# Patient Record
Sex: MALE | Race: WHITE | Employment: FULL TIME | URBAN - NONMETROPOLITAN AREA
[De-identification: names, ages, dates, MRNs, and addresses within clinical notes are randomized per-mention and may not be internally consistent; named-entity substitution may affect disease eponyms.]

---

## 2018-11-25 ENCOUNTER — HOSPITAL ENCOUNTER (EMERGENCY)
Age: 43
Discharge: HOME | End: 2018-11-25
Attending: EMERGENCY MEDICINE
Payer: COMMERCIAL

## 2018-11-25 ENCOUNTER — APPOINTMENT (OUTPATIENT)
Dept: CT IMAGING | Age: 43
End: 2018-11-25
Payer: COMMERCIAL

## 2018-11-25 ENCOUNTER — APPOINTMENT (OUTPATIENT)
Dept: GENERAL RADIOLOGY | Age: 43
End: 2018-11-25
Payer: COMMERCIAL

## 2018-11-25 VITALS
SYSTOLIC BLOOD PRESSURE: 142 MMHG | RESPIRATION RATE: 17 BRPM | BODY MASS INDEX: 26.68 KG/M2 | HEART RATE: 79 BPM | OXYGEN SATURATION: 98 % | WEIGHT: 170 LBS | DIASTOLIC BLOOD PRESSURE: 103 MMHG

## 2018-11-25 DIAGNOSIS — R07.89 CHEST WALL PAIN: Primary | ICD-10-CM

## 2018-11-25 LAB
ANION GAP SERPL CALCULATED.3IONS-SCNC: 13 MMOL/L (ref 9–17)
BUN BLDV-MCNC: 16 MG/DL (ref 6–20)
BUN/CREAT BLD: 23 (ref 9–20)
CALCIUM SERPL-MCNC: 8.9 MG/DL (ref 8.6–10.4)
CHLORIDE BLD-SCNC: 99 MMOL/L (ref 98–107)
CO2: 23 MMOL/L (ref 20–31)
CREAT SERPL-MCNC: 0.7 MG/DL (ref 0.7–1.2)
EKG ATRIAL RATE: 91 BPM
EKG P AXIS: 47 DEGREES
EKG P-R INTERVAL: 146 MS
EKG Q-T INTERVAL: 354 MS
EKG QRS DURATION: 88 MS
EKG QTC CALCULATION (BAZETT): 435 MS
EKG R AXIS: 65 DEGREES
EKG T AXIS: -18 DEGREES
EKG VENTRICULAR RATE: 91 BPM
GFR AFRICAN AMERICAN: >60 ML/MIN
GFR NON-AFRICAN AMERICAN: >60 ML/MIN
GFR SERPL CREATININE-BSD FRML MDRD: ABNORMAL ML/MIN/{1.73_M2}
GFR SERPL CREATININE-BSD FRML MDRD: ABNORMAL ML/MIN/{1.73_M2}
GLUCOSE BLD-MCNC: 290 MG/DL (ref 70–99)
HCT VFR BLD CALC: 39.7 % (ref 40.7–50.3)
HEMOGLOBIN: 13.9 G/DL (ref 13–17)
MCH RBC QN AUTO: 29.1 PG (ref 25.2–33.5)
MCHC RBC AUTO-ENTMCNC: 35 G/DL (ref 28.4–34.8)
MCV RBC AUTO: 83.2 FL (ref 82.6–102.9)
NRBC AUTOMATED: 0 PER 100 WBC
PDW BLD-RTO: 13.2 % (ref 11.8–14.4)
PLATELET # BLD: 268 K/UL (ref 138–453)
PMV BLD AUTO: 10 FL (ref 8.1–13.5)
POTASSIUM SERPL-SCNC: 4.1 MMOL/L (ref 3.7–5.3)
RBC # BLD: 4.77 M/UL (ref 4.21–5.77)
SODIUM BLD-SCNC: 135 MMOL/L (ref 135–144)
TROPONIN INTERP: NORMAL
TROPONIN T: <0.03 NG/ML
WBC # BLD: 10.3 K/UL (ref 3.5–11.3)

## 2018-11-25 PROCEDURE — 93005 ELECTROCARDIOGRAM TRACING: CPT

## 2018-11-25 PROCEDURE — 80048 BASIC METABOLIC PNL TOTAL CA: CPT

## 2018-11-25 PROCEDURE — 84484 ASSAY OF TROPONIN QUANT: CPT

## 2018-11-25 PROCEDURE — 36415 COLL VENOUS BLD VENIPUNCTURE: CPT

## 2018-11-25 PROCEDURE — 6360000004 HC RX CONTRAST MEDICATION: Performed by: EMERGENCY MEDICINE

## 2018-11-25 PROCEDURE — 96375 TX/PRO/DX INJ NEW DRUG ADDON: CPT

## 2018-11-25 PROCEDURE — 99285 EMERGENCY DEPT VISIT HI MDM: CPT

## 2018-11-25 PROCEDURE — 6370000000 HC RX 637 (ALT 250 FOR IP): Performed by: EMERGENCY MEDICINE

## 2018-11-25 PROCEDURE — 6360000002 HC RX W HCPCS: Performed by: EMERGENCY MEDICINE

## 2018-11-25 PROCEDURE — 96374 THER/PROPH/DIAG INJ IV PUSH: CPT

## 2018-11-25 PROCEDURE — 71260 CT THORAX DX C+: CPT

## 2018-11-25 PROCEDURE — 85027 COMPLETE CBC AUTOMATED: CPT

## 2018-11-25 PROCEDURE — 71046 X-RAY EXAM CHEST 2 VIEWS: CPT

## 2018-11-25 PROCEDURE — 96376 TX/PRO/DX INJ SAME DRUG ADON: CPT

## 2018-11-25 RX ORDER — ATORVASTATIN CALCIUM 20 MG/1
20 TABLET, FILM COATED ORAL DAILY
Status: ON HOLD | COMMUNITY
End: 2019-01-08

## 2018-11-25 RX ORDER — MORPHINE SULFATE 4 MG/ML
4 INJECTION, SOLUTION INTRAMUSCULAR; INTRAVENOUS ONCE
Status: COMPLETED | OUTPATIENT
Start: 2018-11-25 | End: 2018-11-25

## 2018-11-25 RX ORDER — ONDANSETRON 2 MG/ML
4 INJECTION INTRAMUSCULAR; INTRAVENOUS ONCE
Status: COMPLETED | OUTPATIENT
Start: 2018-11-25 | End: 2018-11-25

## 2018-11-25 RX ORDER — HYDROCODONE BITARTRATE AND ACETAMINOPHEN 5; 325 MG/1; MG/1
1 TABLET ORAL EVERY 6 HOURS PRN
Qty: 20 TABLET | Refills: 0 | Status: SHIPPED | OUTPATIENT
Start: 2018-11-25 | End: 2018-11-25 | Stop reason: ALTCHOICE

## 2018-11-25 RX ORDER — OXYCODONE HYDROCHLORIDE AND ACETAMINOPHEN 5; 325 MG/1; MG/1
1 TABLET ORAL EVERY 6 HOURS PRN
Qty: 20 TABLET | Refills: 0 | Status: SHIPPED | OUTPATIENT
Start: 2018-11-25 | End: 2018-11-30

## 2018-11-25 RX ADMIN — MORPHINE SULFATE 4 MG: 4 INJECTION, SOLUTION INTRAMUSCULAR; INTRAVENOUS at 18:37

## 2018-11-25 RX ADMIN — MORPHINE SULFATE 4 MG: 4 INJECTION, SOLUTION INTRAMUSCULAR; INTRAVENOUS at 17:21

## 2018-11-25 RX ADMIN — IOPAMIDOL 75 ML: 755 INJECTION, SOLUTION INTRAVENOUS at 19:01

## 2018-11-25 RX ADMIN — MORPHINE SULFATE 4 MG: 4 INJECTION, SOLUTION INTRAMUSCULAR; INTRAVENOUS at 19:54

## 2018-11-25 RX ADMIN — ONDANSETRON 4 MG: 2 INJECTION INTRAMUSCULAR; INTRAVENOUS at 19:53

## 2018-11-25 ASSESSMENT — ENCOUNTER SYMPTOMS
WHEEZING: 0
SHORTNESS OF BREATH: 1
NAUSEA: 1
DIARRHEA: 0
COUGH: 0
CONSTIPATION: 0
SORE THROAT: 0
RHINORRHEA: 0
ABDOMINAL DISTENTION: 0
VOMITING: 0

## 2018-11-25 ASSESSMENT — PAIN SCALES - GENERAL
PAINLEVEL_OUTOF10: 6
PAINLEVEL_OUTOF10: 8
PAINLEVEL_OUTOF10: 7
PAINLEVEL_OUTOF10: 8

## 2018-11-25 ASSESSMENT — PAIN DESCRIPTION - FREQUENCY
FREQUENCY: CONTINUOUS
FREQUENCY: CONTINUOUS

## 2018-11-25 ASSESSMENT — PAIN DESCRIPTION - PAIN TYPE
TYPE: ACUTE PAIN
TYPE: ACUTE PAIN

## 2018-11-25 ASSESSMENT — PAIN DESCRIPTION - ORIENTATION
ORIENTATION: MID
ORIENTATION: MID

## 2018-11-25 ASSESSMENT — PAIN DESCRIPTION - LOCATION
LOCATION: CHEST
LOCATION: CHEST

## 2018-11-25 ASSESSMENT — PAIN DESCRIPTION - DESCRIPTORS
DESCRIPTORS: ACHING
DESCRIPTORS: ACHING;STABBING

## 2018-11-25 ASSESSMENT — PAIN DESCRIPTION - PROGRESSION: CLINICAL_PROGRESSION: NOT CHANGED

## 2018-11-25 NOTE — ED PROVIDER NOTES
Admission medications    Medication Sig Start Date End Date Taking? Authorizing Provider   metFORMIN (GLUCOPHAGE) 500 MG tablet Take 500 mg by mouth daily   Yes Historical Provider, MD   atorvastatin (LIPITOR) 20 MG tablet Take 20 mg by mouth daily   Yes Historical Provider, MD   metoprolol tartrate (LOPRESSOR) 25 MG tablet Take 1 tablet by mouth 2 times daily  Patient taking differently: Take 50 mg by mouth 2 times daily  9/6/16  Yes Mynor Hess MD   lisinopril (PRINIVIL;ZESTRIL) 20 MG tablet Take 1 tablet by mouth daily  Patient taking differently: Take 5 mg by mouth daily  8/15/16  Yes Mynor Hess MD   aspirin 81 MG EC tablet Take 81 mg by mouth daily. Yes Historical Provider, MD   indomethacin (INDOCIN) 25 MG capsule Take 1 capsule by mouth 2 times daily 10/26/15 10/25/16  Mynor Hess MD       REVIEW OF SYSTEMS    (2-9 systems for level 4, 10 or more for level 5)      Review of Systems   Constitutional: Negative for activity change, appetite change, fatigue and fever. HENT: Negative for congestion, rhinorrhea and sore throat. Respiratory: Positive for shortness of breath. Negative for cough and wheezing. Cardiovascular: Positive for chest pain. Negative for palpitations and leg swelling. Gastrointestinal: Positive for nausea. Negative for abdominal distention, constipation, diarrhea and vomiting. Genitourinary: Negative for decreased urine volume and dysuria. Skin: Negative for rash. Neurological: Negative for dizziness, weakness, light-headedness, numbness and headaches. PHYSICAL EXAM   (up to 7 for level 4, 8 or more for level 5)     INITIAL VITALS:   /77   Pulse 85   Resp 18   Wt 170 lb (77.1 kg)   SpO2 95%   BMI 26.68 kg/m²     Physical Exam   Constitutional: He is oriented to person, place, and time. Nontoxic appearing, answering all questions appropriately no signs of distress   HENT:   Head: Normocephalic and atraumatic.    Eyes: Conjunctivae are normal. Right eye exhibits no discharge. Left eye exhibits no discharge. Cardiovascular: Normal rate, regular rhythm and normal heart sounds. Exam reveals no gallop and no friction rub. No murmur heard. Pulmonary/Chest: Effort normal and breath sounds normal. No respiratory distress. He has no wheezes. He has no rales. He exhibits tenderness. Sternotomy scar present that is haled, no surrounding erythema or edema noted, tenderness to palpation along the entire scar, no crepitus palpated. Abdominal: Soft. He exhibits no distension and no mass. There is no tenderness. There is no rebound and no guarding. Neurological: He is alert and oriented to person, place, and time. Skin: Skin is warm and dry. No rash noted. Nursing note and vitals reviewed. DIFFERENTIAL  DIAGNOSIS     Patient with signficnat chest wall pain, and did have symptoms starting after lifting something heavy, recent cabg, Plan for cardiac workup, and Analgesia, r/o ACS, v msk pain. klungs ctab.      PLAN (LABS / IMAGING / EKG):  Orders Placed This Encounter   Procedures    XR CHEST STANDARD (2 VW)    CT Chest Pulmonary Embolism W Contrast    CBC    Basic Metabolic Panel    Troponin    Telemetry monitoring    EKG 12 Lead    Insert peripheral IV       MEDICATIONS ORDERED:  Orders Placed This Encounter   Medications    morphine injection 4 mg    morphine injection 4 mg    iopamidol (ISOVUE-370) 76 % injection 75 mL       DIAGNOSTIC RESULTS / EMERGENCY DEPARTMENT COURSE / MDM     LABS:  Results for orders placed or performed during the hospital encounter of 11/25/18   CBC   Result Value Ref Range    WBC 10.3 3.5 - 11.3 k/uL    RBC 4.77 4.21 - 5.77 m/uL    Hemoglobin 13.9 13.0 - 17.0 g/dL    Hematocrit 39.7 (L) 40.7 - 50.3 %    MCV 83.2 82.6 - 102.9 fL    MCH 29.1 25.2 - 33.5 pg    MCHC 35.0 (H) 28.4 - 34.8 g/dL    RDW 13.2 11.8 - 14.4 %    Platelets 544 091 - 495 k/uL    MPV 10.0 8.1 - 13.5 fL    NRBC Automated 0.0 0.0 per

## 2018-11-26 NOTE — ED PROVIDER NOTES
8:10 PM:  Patient's EKG was compared to a previous EKG report and is unchanged. Cardiac biomarkers are not elevated. CT scan of the chest is negative for pulmonary embolism. Pain is very mechanical in character and he is placed on Percocet for this which she has taken in the past.         Summation      Patient Course:  Discharged. ED Medications administered this visit:    Medications   morphine injection 4 mg (4 mg Intravenous Given 11/25/18 1721)   morphine injection 4 mg (4 mg Intravenous Given 11/25/18 1837)   iopamidol (ISOVUE-370) 76 % injection 75 mL (75 mLs Intravenous Given 11/25/18 1901)   ondansetron (ZOFRAN) injection 4 mg (4 mg Intravenous Given 11/25/18 1953)   morphine injection 4 mg (4 mg Intravenous Given 11/25/18 1954)       New Prescriptions from this visit:    New Prescriptions    HYDROCODONE-ACETAMINOPHEN (NORCO) 5-325 MG PER TABLET    Take 1 tablet by mouth every 6 hours as needed for Pain for up to 5 days. .       Follow-up:  Keila Goodman MD  [place] [id number] [place] [id number] [phone]    Schedule an appointment as soon as possible for a visit       [address]  [phone]    As needed, If symptoms worsen        Final Impression:   1.  Chest wall pain               (Please note that portions of this note were completed with a voice recognition program.  Efforts were made to edit the dictations but occasionally words are mis-transcribed.)     Dwain Francis MD  11/25/18 2011

## 2019-01-07 ENCOUNTER — APPOINTMENT (OUTPATIENT)
Dept: GENERAL RADIOLOGY | Age: 44
DRG: 287 | End: 2019-01-07
Payer: COMMERCIAL

## 2019-01-07 ENCOUNTER — HOSPITAL ENCOUNTER (INPATIENT)
Age: 44
LOS: 1 days | Discharge: HOME | DRG: 287 | End: 2019-01-08
Attending: EMERGENCY MEDICINE | Admitting: INTERNAL MEDICINE
Payer: COMMERCIAL

## 2019-01-07 DIAGNOSIS — I50.9 MILD CONGESTIVE HEART FAILURE (HCC): ICD-10-CM

## 2019-01-07 DIAGNOSIS — I24.9 ACUTE CORONARY SYNDROME (HCC): Primary | ICD-10-CM

## 2019-01-07 LAB
ABSOLUTE EOS #: 0.22 K/UL (ref 0–0.44)
ABSOLUTE IMMATURE GRANULOCYTE: <0.03 K/UL (ref 0–0.3)
ABSOLUTE LYMPH #: 3.63 K/UL (ref 1.1–3.7)
ABSOLUTE MONO #: 1.01 K/UL (ref 0.1–1.2)
ANION GAP SERPL CALCULATED.3IONS-SCNC: 13 MMOL/L (ref 9–17)
BASOPHILS # BLD: 0 % (ref 0–2)
BASOPHILS ABSOLUTE: <0.03 K/UL (ref 0–0.2)
BUN BLDV-MCNC: 16 MG/DL (ref 6–20)
BUN/CREAT BLD: 21 (ref 9–20)
CALCIUM SERPL-MCNC: 8.8 MG/DL (ref 8.6–10.4)
CHLORIDE BLD-SCNC: 104 MMOL/L (ref 98–107)
CHOLESTEROL, FASTING: 121 MG/DL
CHOLESTEROL/HDL RATIO: 4.7
CO2: 23 MMOL/L (ref 20–31)
CREAT SERPL-MCNC: 0.78 MG/DL (ref 0.7–1.2)
DIFFERENTIAL TYPE: ABNORMAL
EOSINOPHILS RELATIVE PERCENT: 2 % (ref 1–4)
ESTIMATED AVERAGE GLUCOSE: 163 MG/DL
GFR AFRICAN AMERICAN: >60 ML/MIN
GFR NON-AFRICAN AMERICAN: >60 ML/MIN
GFR SERPL CREATININE-BSD FRML MDRD: ABNORMAL ML/MIN/{1.73_M2}
GFR SERPL CREATININE-BSD FRML MDRD: ABNORMAL ML/MIN/{1.73_M2}
GLUCOSE BLD-MCNC: 109 MG/DL (ref 70–99)
HBA1C MFR BLD: 7.3 % (ref 4.8–5.9)
HCT VFR BLD CALC: 38.2 % (ref 40.7–50.3)
HDLC SERPL-MCNC: 26 MG/DL
HEMOGLOBIN: 13.5 G/DL (ref 13–17)
IMMATURE GRANULOCYTES: 0 %
LDL CHOLESTEROL: 44 MG/DL (ref 0–130)
LYMPHOCYTES # BLD: 39 % (ref 24–43)
MCH RBC QN AUTO: 29.3 PG (ref 25.2–33.5)
MCHC RBC AUTO-ENTMCNC: 35.3 G/DL (ref 28.4–34.8)
MCV RBC AUTO: 83 FL (ref 82.6–102.9)
MONOCYTES # BLD: 11 % (ref 3–12)
NRBC AUTOMATED: 0 PER 100 WBC
PDW BLD-RTO: 13.7 % (ref 11.8–14.4)
PLATELET # BLD: 312 K/UL (ref 138–453)
PLATELET ESTIMATE: ABNORMAL
PMV BLD AUTO: 9.6 FL (ref 8.1–13.5)
POTASSIUM SERPL-SCNC: 3.8 MMOL/L (ref 3.7–5.3)
RBC # BLD: 4.6 M/UL (ref 4.21–5.77)
RBC # BLD: ABNORMAL 10*6/UL
SEG NEUTROPHILS: 48 % (ref 36–65)
SEGMENTED NEUTROPHILS ABSOLUTE COUNT: 4.39 K/UL (ref 1.5–8.1)
SODIUM BLD-SCNC: 140 MMOL/L (ref 135–144)
TRIGLYCERIDE, FASTING: 257 MG/DL
TROPONIN INTERP: NORMAL
TROPONIN INTERP: NORMAL
TROPONIN T: <0.03 NG/ML
TROPONIN T: <0.03 NG/ML
TROPONIN, HIGH SENSITIVITY: NORMAL NG/L (ref 0–22)
TROPONIN, HIGH SENSITIVITY: NORMAL NG/L (ref 0–22)
VLDLC SERPL CALC-MCNC: ABNORMAL MG/DL (ref 1–30)
WBC # BLD: 9.3 K/UL (ref 3.5–11.3)
WBC # BLD: ABNORMAL 10*3/UL

## 2019-01-07 PROCEDURE — 71045 X-RAY EXAM CHEST 1 VIEW: CPT

## 2019-01-07 PROCEDURE — 84484 ASSAY OF TROPONIN QUANT: CPT

## 2019-01-07 PROCEDURE — 96375 TX/PRO/DX INJ NEW DRUG ADDON: CPT

## 2019-01-07 PROCEDURE — 36415 COLL VENOUS BLD VENIPUNCTURE: CPT

## 2019-01-07 PROCEDURE — 83036 HEMOGLOBIN GLYCOSYLATED A1C: CPT

## 2019-01-07 PROCEDURE — 80061 LIPID PANEL: CPT

## 2019-01-07 PROCEDURE — 99285 EMERGENCY DEPT VISIT HI MDM: CPT

## 2019-01-07 PROCEDURE — 6370000000 HC RX 637 (ALT 250 FOR IP): Performed by: INTERNAL MEDICINE

## 2019-01-07 PROCEDURE — 93005 ELECTROCARDIOGRAM TRACING: CPT

## 2019-01-07 PROCEDURE — 6360000002 HC RX W HCPCS: Performed by: INTERNAL MEDICINE

## 2019-01-07 PROCEDURE — 2580000003 HC RX 258: Performed by: INTERNAL MEDICINE

## 2019-01-07 PROCEDURE — 96376 TX/PRO/DX INJ SAME DRUG ADON: CPT

## 2019-01-07 PROCEDURE — 85025 COMPLETE CBC W/AUTO DIFF WBC: CPT

## 2019-01-07 PROCEDURE — 96374 THER/PROPH/DIAG INJ IV PUSH: CPT

## 2019-01-07 PROCEDURE — 99255 IP/OBS CONSLTJ NEW/EST HI 80: CPT | Performed by: INTERNAL MEDICINE

## 2019-01-07 PROCEDURE — 80048 BASIC METABOLIC PNL TOTAL CA: CPT

## 2019-01-07 PROCEDURE — 6360000002 HC RX W HCPCS: Performed by: EMERGENCY MEDICINE

## 2019-01-07 PROCEDURE — 2000000000 HC ICU R&B

## 2019-01-07 RX ORDER — LISINOPRIL 20 MG/1
20 TABLET ORAL DAILY
Status: DISCONTINUED | OUTPATIENT
Start: 2019-01-07 | End: 2019-01-08 | Stop reason: HOSPADM

## 2019-01-07 RX ORDER — FENTANYL CITRATE 50 UG/ML
50 INJECTION, SOLUTION INTRAMUSCULAR; INTRAVENOUS ONCE
Status: COMPLETED | OUTPATIENT
Start: 2019-01-07 | End: 2019-01-07

## 2019-01-07 RX ORDER — ASPIRIN 81 MG/1
81 TABLET ORAL DAILY
Status: DISCONTINUED | OUTPATIENT
Start: 2019-01-07 | End: 2019-01-08 | Stop reason: HOSPADM

## 2019-01-07 RX ORDER — ATORVASTATIN CALCIUM 40 MG/1
40 TABLET, FILM COATED ORAL NIGHTLY
Status: DISCONTINUED | OUTPATIENT
Start: 2019-01-07 | End: 2019-01-08 | Stop reason: HOSPADM

## 2019-01-07 RX ORDER — ONDANSETRON 2 MG/ML
4 INJECTION INTRAMUSCULAR; INTRAVENOUS ONCE
Status: COMPLETED | OUTPATIENT
Start: 2019-01-07 | End: 2019-01-07

## 2019-01-07 RX ORDER — MORPHINE SULFATE 2 MG/ML
4 INJECTION, SOLUTION INTRAMUSCULAR; INTRAVENOUS ONCE
Status: COMPLETED | OUTPATIENT
Start: 2019-01-07 | End: 2019-01-07

## 2019-01-07 RX ORDER — SODIUM CHLORIDE 0.9 % (FLUSH) 0.9 %
10 SYRINGE (ML) INJECTION EVERY 12 HOURS SCHEDULED
Status: DISCONTINUED | OUTPATIENT
Start: 2019-01-07 | End: 2019-01-08 | Stop reason: HOSPADM

## 2019-01-07 RX ORDER — ACETAMINOPHEN 325 MG/1
650 TABLET ORAL EVERY 4 HOURS PRN
Status: DISCONTINUED | OUTPATIENT
Start: 2019-01-07 | End: 2019-01-08 | Stop reason: HOSPADM

## 2019-01-07 RX ORDER — ONDANSETRON 2 MG/ML
4 INJECTION INTRAMUSCULAR; INTRAVENOUS EVERY 6 HOURS PRN
Status: DISCONTINUED | OUTPATIENT
Start: 2019-01-07 | End: 2019-01-08 | Stop reason: HOSPADM

## 2019-01-07 RX ORDER — ALPRAZOLAM 0.25 MG/1
0.25 TABLET ORAL 2 TIMES DAILY PRN
Status: DISCONTINUED | OUTPATIENT
Start: 2019-01-07 | End: 2019-01-08 | Stop reason: HOSPADM

## 2019-01-07 RX ORDER — CLOPIDOGREL BISULFATE 75 MG/1
75 TABLET ORAL DAILY
Status: DISCONTINUED | OUTPATIENT
Start: 2019-01-07 | End: 2019-01-08 | Stop reason: HOSPADM

## 2019-01-07 RX ORDER — ALPRAZOLAM 0.25 MG/1
0.25 TABLET ORAL 2 TIMES DAILY PRN
COMMUNITY

## 2019-01-07 RX ORDER — FUROSEMIDE 10 MG/ML
60 INJECTION INTRAMUSCULAR; INTRAVENOUS ONCE
Status: COMPLETED | OUTPATIENT
Start: 2019-01-07 | End: 2019-01-07

## 2019-01-07 RX ORDER — ASPIRIN 81 MG/1
81 TABLET, CHEWABLE ORAL DAILY
Status: DISCONTINUED | OUTPATIENT
Start: 2019-01-07 | End: 2019-01-07

## 2019-01-07 RX ORDER — FENTANYL CITRATE 50 UG/ML
100 INJECTION, SOLUTION INTRAMUSCULAR; INTRAVENOUS ONCE
Status: COMPLETED | OUTPATIENT
Start: 2019-01-07 | End: 2019-01-07

## 2019-01-07 RX ORDER — SODIUM CHLORIDE 0.9 % (FLUSH) 0.9 %
10 SYRINGE (ML) INJECTION PRN
Status: DISCONTINUED | OUTPATIENT
Start: 2019-01-07 | End: 2019-01-08 | Stop reason: HOSPADM

## 2019-01-07 RX ORDER — KETOROLAC TROMETHAMINE 15 MG/ML
15 INJECTION, SOLUTION INTRAMUSCULAR; INTRAVENOUS ONCE
Status: COMPLETED | OUTPATIENT
Start: 2019-01-07 | End: 2019-01-07

## 2019-01-07 RX ADMIN — FUROSEMIDE 60 MG: 10 INJECTION, SOLUTION INTRAMUSCULAR; INTRAVENOUS at 13:51

## 2019-01-07 RX ADMIN — FENTANYL CITRATE 100 MCG: 50 INJECTION INTRAMUSCULAR; INTRAVENOUS at 14:45

## 2019-01-07 RX ADMIN — MORPHINE SULFATE 4 MG: 2 INJECTION, SOLUTION INTRAMUSCULAR; INTRAVENOUS at 13:51

## 2019-01-07 RX ADMIN — ENOXAPARIN SODIUM 40 MG: 40 INJECTION SUBCUTANEOUS at 19:24

## 2019-01-07 RX ADMIN — FENTANYL CITRATE 50 MCG: 50 INJECTION INTRAMUSCULAR; INTRAVENOUS at 16:47

## 2019-01-07 RX ADMIN — ACETAMINOPHEN 650 MG: 325 TABLET, FILM COATED ORAL at 20:38

## 2019-01-07 RX ADMIN — CLOPIDOGREL BISULFATE 75 MG: 75 TABLET ORAL at 19:24

## 2019-01-07 RX ADMIN — Medication 10 ML: at 20:39

## 2019-01-07 RX ADMIN — METOPROLOL TARTRATE 25 MG: 25 TABLET ORAL at 20:38

## 2019-01-07 RX ADMIN — ATORVASTATIN CALCIUM 40 MG: 40 TABLET, FILM COATED ORAL at 20:37

## 2019-01-07 RX ADMIN — ONDANSETRON 4 MG: 2 INJECTION INTRAMUSCULAR; INTRAVENOUS at 14:45

## 2019-01-07 RX ADMIN — ALPRAZOLAM 0.25 MG: 0.25 TABLET ORAL at 20:38

## 2019-01-07 RX ADMIN — KETOROLAC TROMETHAMINE 15 MG: 15 INJECTION INTRAMUSCULAR; INTRAVENOUS at 16:47

## 2019-01-07 ASSESSMENT — ENCOUNTER SYMPTOMS
DIARRHEA: 0
ABDOMINAL PAIN: 0
SHORTNESS OF BREATH: 1
CONSTIPATION: 0
ABDOMINAL DISTENTION: 0
BACK PAIN: 0
WHEEZING: 0
VOMITING: 0
COUGH: 0
COLOR CHANGE: 0
NAUSEA: 0

## 2019-01-07 ASSESSMENT — PAIN DESCRIPTION - LOCATION
LOCATION: CHEST
LOCATION_2: HEAD
LOCATION: CHEST
LOCATION: CHEST
LOCATION: HEAD
LOCATION: CHEST

## 2019-01-07 ASSESSMENT — PAIN DESCRIPTION - FREQUENCY
FREQUENCY: CONTINUOUS

## 2019-01-07 ASSESSMENT — PAIN SCALES - GENERAL
PAINLEVEL_OUTOF10: 4
PAINLEVEL_OUTOF10: 0
PAINLEVEL_OUTOF10: 4
PAINLEVEL_OUTOF10: 3
PAINLEVEL_OUTOF10: 6

## 2019-01-07 ASSESSMENT — PAIN DESCRIPTION - DESCRIPTORS
DESCRIPTORS: ACHING;TENDER
DESCRIPTORS_2: DULL
DESCRIPTORS: TENDER
DESCRIPTORS: STABBING
DESCRIPTORS: ACHING

## 2019-01-07 ASSESSMENT — PAIN DESCRIPTION - PAIN TYPE
TYPE: ACUTE PAIN
TYPE_2: ACUTE PAIN
TYPE: ACUTE PAIN
TYPE: ACUTE PAIN

## 2019-01-07 ASSESSMENT — PAIN DESCRIPTION - ONSET
ONSET: ON-GOING
ONSET: ON-GOING
ONSET: GRADUAL
ONSET: ON-GOING

## 2019-01-07 ASSESSMENT — PAIN DESCRIPTION - PROGRESSION: CLINICAL_PROGRESSION: GRADUALLY IMPROVING

## 2019-01-07 ASSESSMENT — PAIN DESCRIPTION - ORIENTATION
ORIENTATION_2: OTHER (COMMENT)
ORIENTATION: MID
ORIENTATION: OTHER (COMMENT)
ORIENTATION: MID
ORIENTATION: MID

## 2019-01-07 ASSESSMENT — PAIN DESCRIPTION - INTENSITY: RATING_2: 3

## 2019-01-08 ENCOUNTER — APPOINTMENT (OUTPATIENT)
Dept: NON INVASIVE DIAGNOSTICS | Age: 44
DRG: 287 | End: 2019-01-08
Payer: COMMERCIAL

## 2019-01-08 VITALS
SYSTOLIC BLOOD PRESSURE: 117 MMHG | HEIGHT: 67 IN | RESPIRATION RATE: 16 BRPM | DIASTOLIC BLOOD PRESSURE: 67 MMHG | BODY MASS INDEX: 33.65 KG/M2 | HEART RATE: 78 BPM | WEIGHT: 214.4 LBS | TEMPERATURE: 97.1 F | OXYGEN SATURATION: 95 %

## 2019-01-08 DIAGNOSIS — Z95.1 S/P CABG (CORONARY ARTERY BYPASS GRAFT): Primary | ICD-10-CM

## 2019-01-08 PROBLEM — I50.42 CHRONIC COMBINED SYSTOLIC AND DIASTOLIC CHF, NYHA CLASS 2 (HCC): Status: ACTIVE | Noted: 2019-01-08

## 2019-01-08 PROBLEM — I25.5 ISCHEMIC CARDIOMYOPATHY: Status: ACTIVE | Noted: 2019-01-08

## 2019-01-08 LAB
ANION GAP SERPL CALCULATED.3IONS-SCNC: 11 MMOL/L (ref 9–17)
BUN BLDV-MCNC: 22 MG/DL (ref 6–20)
BUN/CREAT BLD: 24 (ref 9–20)
CALCIUM SERPL-MCNC: 8.9 MG/DL (ref 8.6–10.4)
CHLORIDE BLD-SCNC: 102 MMOL/L (ref 98–107)
CHOLESTEROL/HDL RATIO: 4.3
CHOLESTEROL: 113 MG/DL
CO2: 28 MMOL/L (ref 20–31)
CREAT SERPL-MCNC: 0.92 MG/DL (ref 0.7–1.2)
EKG ATRIAL RATE: 77 BPM
EKG ATRIAL RATE: 82 BPM
EKG P AXIS: 36 DEGREES
EKG P AXIS: 36 DEGREES
EKG P-R INTERVAL: 146 MS
EKG P-R INTERVAL: 150 MS
EKG Q-T INTERVAL: 384 MS
EKG Q-T INTERVAL: 396 MS
EKG QRS DURATION: 92 MS
EKG QRS DURATION: 94 MS
EKG QTC CALCULATION (BAZETT): 448 MS
EKG QTC CALCULATION (BAZETT): 448 MS
EKG R AXIS: 50 DEGREES
EKG R AXIS: 53 DEGREES
EKG T AXIS: 13 DEGREES
EKG T AXIS: 8 DEGREES
EKG VENTRICULAR RATE: 77 BPM
EKG VENTRICULAR RATE: 82 BPM
GFR AFRICAN AMERICAN: >60 ML/MIN
GFR NON-AFRICAN AMERICAN: >60 ML/MIN
GFR SERPL CREATININE-BSD FRML MDRD: ABNORMAL ML/MIN/{1.73_M2}
GFR SERPL CREATININE-BSD FRML MDRD: ABNORMAL ML/MIN/{1.73_M2}
GLUCOSE BLD-MCNC: 142 MG/DL (ref 70–99)
HCT VFR BLD CALC: 37.5 % (ref 40.7–50.3)
HDLC SERPL-MCNC: 26 MG/DL
HEMOGLOBIN: 12.9 G/DL (ref 13–17)
LDL CHOLESTEROL: 36 MG/DL (ref 0–130)
LV EF: 43 %
LVEF MODALITY: NORMAL
MCH RBC QN AUTO: 29.3 PG (ref 25.2–33.5)
MCHC RBC AUTO-ENTMCNC: 34.4 G/DL (ref 28.4–34.8)
MCV RBC AUTO: 85 FL (ref 82.6–102.9)
NRBC AUTOMATED: 0 PER 100 WBC
PDW BLD-RTO: 13.9 % (ref 11.8–14.4)
PLATELET # BLD: 300 K/UL (ref 138–453)
PMV BLD AUTO: 9.9 FL (ref 8.1–13.5)
POTASSIUM SERPL-SCNC: 3.8 MMOL/L (ref 3.7–5.3)
RBC # BLD: 4.41 M/UL (ref 4.21–5.77)
SODIUM BLD-SCNC: 141 MMOL/L (ref 135–144)
TRIGL SERPL-MCNC: 253 MG/DL
VLDLC SERPL CALC-MCNC: ABNORMAL MG/DL (ref 1–30)
WBC # BLD: 7.7 K/UL (ref 3.5–11.3)

## 2019-01-08 PROCEDURE — 4A023N7 MEASUREMENT OF CARDIAC SAMPLING AND PRESSURE, LEFT HEART, PERCUTANEOUS APPROACH: ICD-10-PCS | Performed by: FAMILY MEDICINE

## 2019-01-08 PROCEDURE — 93306 TTE W/DOPPLER COMPLETE: CPT

## 2019-01-08 PROCEDURE — 2580000003 HC RX 258: Performed by: FAMILY MEDICINE

## 2019-01-08 PROCEDURE — 36415 COLL VENOUS BLD VENIPUNCTURE: CPT

## 2019-01-08 PROCEDURE — 80048 BASIC METABOLIC PNL TOTAL CA: CPT

## 2019-01-08 PROCEDURE — 6370000000 HC RX 637 (ALT 250 FOR IP): Performed by: INTERNAL MEDICINE

## 2019-01-08 PROCEDURE — B2111ZZ FLUOROSCOPY OF MULTIPLE CORONARY ARTERIES USING LOW OSMOLAR CONTRAST: ICD-10-PCS | Performed by: FAMILY MEDICINE

## 2019-01-08 PROCEDURE — 99238 HOSP IP/OBS DSCHRG MGMT 30/<: CPT | Performed by: INTERNAL MEDICINE

## 2019-01-08 PROCEDURE — B2151ZZ FLUOROSCOPY OF LEFT HEART USING LOW OSMOLAR CONTRAST: ICD-10-PCS | Performed by: FAMILY MEDICINE

## 2019-01-08 PROCEDURE — C1894 INTRO/SHEATH, NON-LASER: HCPCS

## 2019-01-08 PROCEDURE — C1725 CATH, TRANSLUMIN NON-LASER: HCPCS

## 2019-01-08 PROCEDURE — C1769 GUIDE WIRE: HCPCS

## 2019-01-08 PROCEDURE — 6370000000 HC RX 637 (ALT 250 FOR IP): Performed by: FAMILY MEDICINE

## 2019-01-08 PROCEDURE — 80061 LIPID PANEL: CPT

## 2019-01-08 PROCEDURE — 2709999900 HC NON-CHARGEABLE SUPPLY

## 2019-01-08 PROCEDURE — B2121ZZ FLUOROSCOPY OF SINGLE CORONARY ARTERY BYPASS GRAFT USING LOW OSMOLAR CONTRAST: ICD-10-PCS | Performed by: FAMILY MEDICINE

## 2019-01-08 PROCEDURE — 93455 CORONARY ART/GRFT ANGIO S&I: CPT | Performed by: FAMILY MEDICINE

## 2019-01-08 PROCEDURE — 85027 COMPLETE CBC AUTOMATED: CPT

## 2019-01-08 PROCEDURE — C1887 CATHETER, GUIDING: HCPCS

## 2019-01-08 PROCEDURE — 93459 L HRT ART/GRFT ANGIO: CPT | Performed by: FAMILY MEDICINE

## 2019-01-08 RX ORDER — NITROGLYCERIN 0.4 MG/1
TABLET SUBLINGUAL
Qty: 25 TABLET | Refills: 3 | Status: SHIPPED | OUTPATIENT
Start: 2019-01-08 | End: 2022-01-21 | Stop reason: SDUPTHER

## 2019-01-08 RX ORDER — ATORVASTATIN CALCIUM 80 MG/1
40 TABLET, FILM COATED ORAL DAILY
Qty: 30 TABLET | Refills: 2 | Status: SHIPPED | OUTPATIENT
Start: 2019-01-08 | End: 2019-01-31 | Stop reason: SDUPTHER

## 2019-01-08 RX ORDER — ACETAMINOPHEN 325 MG/1
650 TABLET ORAL EVERY 4 HOURS PRN
Status: DISCONTINUED | OUTPATIENT
Start: 2019-01-08 | End: 2019-01-08 | Stop reason: HOSPADM

## 2019-01-08 RX ORDER — NITROGLYCERIN 0.4 MG/1
0.4 TABLET SUBLINGUAL EVERY 5 MIN PRN
Status: DISCONTINUED | OUTPATIENT
Start: 2019-01-08 | End: 2019-01-08 | Stop reason: HOSPADM

## 2019-01-08 RX ORDER — DIPHENHYDRAMINE HCL 25 MG
50 CAPSULE ORAL ONCE
Status: DISCONTINUED | OUTPATIENT
Start: 2019-01-08 | End: 2019-01-08 | Stop reason: HOSPADM

## 2019-01-08 RX ORDER — SODIUM CHLORIDE 0.9 % (FLUSH) 0.9 %
10 SYRINGE (ML) INJECTION PRN
Status: DISCONTINUED | OUTPATIENT
Start: 2019-01-08 | End: 2019-01-08 | Stop reason: HOSPADM

## 2019-01-08 RX ORDER — CLOPIDOGREL BISULFATE 75 MG/1
75 TABLET ORAL DAILY
Qty: 30 TABLET | Refills: 3 | Status: SHIPPED | OUTPATIENT
Start: 2019-01-09 | End: 2019-01-31 | Stop reason: SDUPTHER

## 2019-01-08 RX ORDER — SODIUM CHLORIDE 0.9 % (FLUSH) 0.9 %
10 SYRINGE (ML) INJECTION EVERY 12 HOURS SCHEDULED
Status: DISCONTINUED | OUTPATIENT
Start: 2019-01-08 | End: 2019-01-08 | Stop reason: HOSPADM

## 2019-01-08 RX ORDER — RANOLAZINE 500 MG/1
500 TABLET, EXTENDED RELEASE ORAL 2 TIMES DAILY
Qty: 60 TABLET | Refills: 3 | Status: SHIPPED | OUTPATIENT
Start: 2019-01-08 | End: 2019-03-05

## 2019-01-08 RX ORDER — SODIUM CHLORIDE 9 MG/ML
INJECTION, SOLUTION INTRAVENOUS CONTINUOUS
Status: DISCONTINUED | OUTPATIENT
Start: 2019-01-08 | End: 2019-01-08 | Stop reason: HOSPADM

## 2019-01-08 RX ADMIN — ACETAMINOPHEN 650 MG: 325 TABLET, FILM COATED ORAL at 11:30

## 2019-01-08 RX ADMIN — CLOPIDOGREL BISULFATE 75 MG: 75 TABLET ORAL at 11:26

## 2019-01-08 RX ADMIN — LISINOPRIL 20 MG: 20 TABLET ORAL at 11:26

## 2019-01-08 RX ADMIN — ASPIRIN 81 MG: 81 TABLET, COATED ORAL at 11:26

## 2019-01-08 RX ADMIN — SODIUM CHLORIDE: 9 INJECTION, SOLUTION INTRAVENOUS at 08:45

## 2019-01-08 RX ADMIN — METOPROLOL TARTRATE 25 MG: 25 TABLET ORAL at 11:26

## 2019-01-08 ASSESSMENT — PAIN DESCRIPTION - LOCATION
LOCATION: CHEST
LOCATION_2: HEAD
LOCATION: CHEST
LOCATION: CHEST

## 2019-01-08 ASSESSMENT — PAIN SCALES - GENERAL
PAINLEVEL_OUTOF10: 2
PAINLEVEL_OUTOF10: 3
PAINLEVEL_OUTOF10: 3
PAINLEVEL_OUTOF10: 0
PAINLEVEL_OUTOF10: 3

## 2019-01-08 ASSESSMENT — PAIN DESCRIPTION - FREQUENCY
FREQUENCY: CONTINUOUS

## 2019-01-08 ASSESSMENT — PAIN DESCRIPTION - PAIN TYPE
TYPE: ACUTE PAIN
TYPE_2: ACUTE PAIN
TYPE: ACUTE PAIN
TYPE: ACUTE PAIN

## 2019-01-08 ASSESSMENT — PAIN DESCRIPTION - ONSET
ONSET: ON-GOING

## 2019-01-08 ASSESSMENT — PAIN DESCRIPTION - ORIENTATION
ORIENTATION: MID

## 2019-01-08 ASSESSMENT — PAIN DESCRIPTION - INTENSITY: RATING_2: 0

## 2019-01-08 ASSESSMENT — PAIN DESCRIPTION - DESCRIPTORS
DESCRIPTORS: TENDER
DESCRIPTORS: TENDER
DESCRIPTORS: ACHING;DULL

## 2019-01-09 RX ORDER — LISINOPRIL 20 MG/1
20 TABLET ORAL DAILY
Qty: 90 TABLET | Refills: 3 | Status: SHIPPED | OUTPATIENT
Start: 2019-01-09 | End: 2019-01-31 | Stop reason: SDUPTHER

## 2019-01-10 ENCOUNTER — HOSPITAL ENCOUNTER (OUTPATIENT)
Dept: CARDIAC REHAB | Age: 44
Setting detail: THERAPIES SERIES
Discharge: HOME | End: 2019-01-10
Payer: COMMERCIAL

## 2019-01-10 VITALS
RESPIRATION RATE: 16 BRPM | SYSTOLIC BLOOD PRESSURE: 98 MMHG | OXYGEN SATURATION: 97 % | HEIGHT: 67 IN | WEIGHT: 219 LBS | BODY MASS INDEX: 34.37 KG/M2 | HEART RATE: 77 BPM | DIASTOLIC BLOOD PRESSURE: 60 MMHG

## 2019-01-10 ASSESSMENT — PATIENT HEALTH QUESTIONNAIRE - PHQ9
SUM OF ALL RESPONSES TO PHQ QUESTIONS 1-9: 0
SUM OF ALL RESPONSES TO PHQ QUESTIONS 1-9: 0

## 2019-01-16 ENCOUNTER — HOSPITAL ENCOUNTER (OUTPATIENT)
Dept: CARDIAC REHAB | Age: 44
Setting detail: THERAPIES SERIES
Discharge: HOME | End: 2019-01-16
Payer: COMMERCIAL

## 2019-01-16 PROCEDURE — 93798 PHYS/QHP OP CAR RHAB W/ECG: CPT

## 2019-01-23 ENCOUNTER — HOSPITAL ENCOUNTER (OUTPATIENT)
Dept: CARDIAC REHAB | Age: 44
Setting detail: THERAPIES SERIES
Discharge: HOME | End: 2019-01-23
Payer: COMMERCIAL

## 2019-01-23 ENCOUNTER — HOSPITAL ENCOUNTER (OUTPATIENT)
Age: 44
Discharge: HOME | End: 2019-01-23
Payer: COMMERCIAL

## 2019-01-23 ENCOUNTER — OFFICE VISIT (OUTPATIENT)
Dept: CARDIOLOGY | Age: 44
End: 2019-01-23
Payer: COMMERCIAL

## 2019-01-23 VITALS
OXYGEN SATURATION: 96 % | SYSTOLIC BLOOD PRESSURE: 120 MMHG | WEIGHT: 221 LBS | BODY MASS INDEX: 34.69 KG/M2 | RESPIRATION RATE: 18 BRPM | DIASTOLIC BLOOD PRESSURE: 79 MMHG | HEIGHT: 67 IN | HEART RATE: 85 BPM

## 2019-01-23 DIAGNOSIS — I10 ESSENTIAL HYPERTENSION: ICD-10-CM

## 2019-01-23 DIAGNOSIS — Z98.890 S/P CARDIAC CATHETERIZATION: ICD-10-CM

## 2019-01-23 DIAGNOSIS — I25.810 CORONARY ARTERY DISEASE INVOLVING CORONARY BYPASS GRAFT OF NATIVE HEART WITHOUT ANGINA PECTORIS: Primary | ICD-10-CM

## 2019-01-23 DIAGNOSIS — E78.5 DYSLIPIDEMIA: ICD-10-CM

## 2019-01-23 LAB
ESTIMATED AVERAGE GLUCOSE: 148 MG/DL
HBA1C MFR BLD: 6.8 % (ref 4.8–5.9)

## 2019-01-23 PROCEDURE — 93798 PHYS/QHP OP CAR RHAB W/ECG: CPT

## 2019-01-23 PROCEDURE — 36415 COLL VENOUS BLD VENIPUNCTURE: CPT

## 2019-01-23 PROCEDURE — 83036 HEMOGLOBIN GLYCOSYLATED A1C: CPT

## 2019-01-23 PROCEDURE — 99214 OFFICE O/P EST MOD 30 MIN: CPT | Performed by: INTERNAL MEDICINE

## 2019-01-24 DIAGNOSIS — Z95.1 S/P CABG (CORONARY ARTERY BYPASS GRAFT): Primary | ICD-10-CM

## 2019-01-28 ENCOUNTER — HOSPITAL ENCOUNTER (OUTPATIENT)
Dept: CARDIAC REHAB | Age: 44
Setting detail: THERAPIES SERIES
Discharge: HOME | End: 2019-01-28
Payer: COMMERCIAL

## 2019-01-28 PROCEDURE — 93798 PHYS/QHP OP CAR RHAB W/ECG: CPT

## 2019-01-30 ENCOUNTER — HOSPITAL ENCOUNTER (OUTPATIENT)
Dept: NON INVASIVE DIAGNOSTICS | Age: 44
Discharge: HOME | End: 2019-01-30
Payer: COMMERCIAL

## 2019-01-30 DIAGNOSIS — Z95.1 S/P CABG (CORONARY ARTERY BYPASS GRAFT): ICD-10-CM

## 2019-01-30 PROCEDURE — 93017 CV STRESS TEST TRACING ONLY: CPT

## 2019-01-31 RX ORDER — CLOPIDOGREL BISULFATE 75 MG/1
75 TABLET ORAL DAILY
Qty: 90 TABLET | Refills: 3 | Status: SHIPPED | OUTPATIENT
Start: 2019-01-31 | End: 2022-01-27 | Stop reason: SDUPTHER

## 2019-01-31 RX ORDER — ATORVASTATIN CALCIUM 80 MG/1
40 TABLET, FILM COATED ORAL DAILY
Qty: 90 TABLET | Refills: 3 | Status: SHIPPED | OUTPATIENT
Start: 2019-01-31 | End: 2019-05-16 | Stop reason: DRUGHIGH

## 2019-01-31 RX ORDER — LISINOPRIL 20 MG/1
20 TABLET ORAL DAILY
Qty: 90 TABLET | Refills: 3 | Status: SHIPPED | OUTPATIENT
Start: 2019-01-31 | End: 2019-01-31 | Stop reason: SDUPTHER

## 2019-02-01 ENCOUNTER — TELEPHONE (OUTPATIENT)
Dept: CARDIOLOGY | Age: 44
End: 2019-02-01

## 2019-02-01 RX ORDER — METOPROLOL TARTRATE 50 MG/1
50 TABLET, FILM COATED ORAL 2 TIMES DAILY
Qty: 60 TABLET | Refills: 3 | Status: ON HOLD | OUTPATIENT
Start: 2019-02-01 | End: 2021-12-27 | Stop reason: HOSPADM

## 2019-02-01 RX ORDER — LISINOPRIL 20 MG/1
20 TABLET ORAL DAILY
Qty: 90 TABLET | Refills: 3 | Status: SHIPPED | OUTPATIENT
Start: 2019-02-01 | End: 2022-04-13 | Stop reason: SDUPTHER

## 2019-02-04 ENCOUNTER — HOSPITAL ENCOUNTER (OUTPATIENT)
Dept: CARDIAC REHAB | Age: 44
Setting detail: THERAPIES SERIES
Discharge: HOME | End: 2019-02-04
Payer: COMMERCIAL

## 2019-02-04 PROCEDURE — 93798 PHYS/QHP OP CAR RHAB W/ECG: CPT

## 2019-02-04 PROCEDURE — G0424 PULMONARY REHAB W EXER: HCPCS

## 2019-02-05 ENCOUNTER — APPOINTMENT (OUTPATIENT)
Dept: GENERAL RADIOLOGY | Age: 44
End: 2019-02-05
Payer: COMMERCIAL

## 2019-02-05 ENCOUNTER — HOSPITAL ENCOUNTER (EMERGENCY)
Age: 44
Discharge: HOME | End: 2019-02-05
Attending: EMERGENCY MEDICINE
Payer: COMMERCIAL

## 2019-02-05 ENCOUNTER — TELEPHONE (OUTPATIENT)
Dept: OTHER | Facility: CLINIC | Age: 44
End: 2019-02-05

## 2019-02-05 VITALS
OXYGEN SATURATION: 95 % | RESPIRATION RATE: 21 BRPM | DIASTOLIC BLOOD PRESSURE: 62 MMHG | TEMPERATURE: 97.6 F | SYSTOLIC BLOOD PRESSURE: 107 MMHG | HEART RATE: 77 BPM

## 2019-02-05 DIAGNOSIS — R07.89 CHEST TIGHTNESS: Primary | ICD-10-CM

## 2019-02-05 LAB
ABSOLUTE EOS #: 0.17 K/UL (ref 0–0.44)
ABSOLUTE IMMATURE GRANULOCYTE: 0.04 K/UL (ref 0–0.3)
ABSOLUTE LYMPH #: 3.26 K/UL (ref 1.1–3.7)
ABSOLUTE MONO #: 0.88 K/UL (ref 0.1–1.2)
ANION GAP SERPL CALCULATED.3IONS-SCNC: 11 MMOL/L (ref 9–17)
BASOPHILS # BLD: 0 % (ref 0–2)
BASOPHILS ABSOLUTE: 0.04 K/UL (ref 0–0.2)
BUN BLDV-MCNC: 17 MG/DL (ref 6–20)
BUN/CREAT BLD: 21 (ref 9–20)
CALCIUM SERPL-MCNC: 9 MG/DL (ref 8.6–10.4)
CHLORIDE BLD-SCNC: 104 MMOL/L (ref 98–107)
CO2: 24 MMOL/L (ref 20–31)
CREAT SERPL-MCNC: 0.81 MG/DL (ref 0.7–1.2)
D-DIMER QUANTITATIVE: <0.19 MG/L FEU (ref 0.19–0.5)
DIFFERENTIAL TYPE: ABNORMAL
EKG ATRIAL RATE: 87 BPM
EKG P AXIS: 36 DEGREES
EKG P-R INTERVAL: 150 MS
EKG Q-T INTERVAL: 356 MS
EKG QRS DURATION: 88 MS
EKG QTC CALCULATION (BAZETT): 428 MS
EKG R AXIS: 59 DEGREES
EKG T AXIS: 12 DEGREES
EKG VENTRICULAR RATE: 87 BPM
EOSINOPHILS RELATIVE PERCENT: 2 % (ref 1–4)
GFR AFRICAN AMERICAN: >60 ML/MIN
GFR NON-AFRICAN AMERICAN: >60 ML/MIN
GFR SERPL CREATININE-BSD FRML MDRD: ABNORMAL ML/MIN/{1.73_M2}
GFR SERPL CREATININE-BSD FRML MDRD: ABNORMAL ML/MIN/{1.73_M2}
GLUCOSE BLD-MCNC: 236 MG/DL (ref 70–99)
HCT VFR BLD CALC: 36.3 % (ref 40.7–50.3)
HEMOGLOBIN: 12.8 G/DL (ref 13–17)
IMMATURE GRANULOCYTES: 0 %
LYMPHOCYTES # BLD: 30 % (ref 24–43)
MCH RBC QN AUTO: 30.3 PG (ref 25.2–33.5)
MCHC RBC AUTO-ENTMCNC: 35.3 G/DL (ref 28.4–34.8)
MCV RBC AUTO: 86 FL (ref 82.6–102.9)
MONOCYTES # BLD: 8 % (ref 3–12)
NRBC AUTOMATED: 0 PER 100 WBC
PDW BLD-RTO: 13.4 % (ref 11.8–14.4)
PLATELET # BLD: 304 K/UL (ref 138–453)
PLATELET ESTIMATE: ABNORMAL
PMV BLD AUTO: 10.1 FL (ref 8.1–13.5)
POTASSIUM SERPL-SCNC: 3.7 MMOL/L (ref 3.7–5.3)
RBC # BLD: 4.22 M/UL (ref 4.21–5.77)
RBC # BLD: ABNORMAL 10*6/UL
SEG NEUTROPHILS: 60 % (ref 36–65)
SEGMENTED NEUTROPHILS ABSOLUTE COUNT: 6.66 K/UL (ref 1.5–8.1)
SODIUM BLD-SCNC: 139 MMOL/L (ref 135–144)
TROPONIN INTERP: NORMAL
TROPONIN T: <0.03 NG/ML
TROPONIN, HIGH SENSITIVITY: NORMAL NG/L (ref 0–22)
WBC # BLD: 11.1 K/UL (ref 3.5–11.3)
WBC # BLD: ABNORMAL 10*3/UL

## 2019-02-05 PROCEDURE — 80048 BASIC METABOLIC PNL TOTAL CA: CPT

## 2019-02-05 PROCEDURE — 84484 ASSAY OF TROPONIN QUANT: CPT

## 2019-02-05 PROCEDURE — 93005 ELECTROCARDIOGRAM TRACING: CPT

## 2019-02-05 PROCEDURE — 85025 COMPLETE CBC W/AUTO DIFF WBC: CPT

## 2019-02-05 PROCEDURE — 71045 X-RAY EXAM CHEST 1 VIEW: CPT

## 2019-02-05 PROCEDURE — 85379 FIBRIN DEGRADATION QUANT: CPT

## 2019-02-05 PROCEDURE — 99285 EMERGENCY DEPT VISIT HI MDM: CPT

## 2019-02-05 PROCEDURE — 36415 COLL VENOUS BLD VENIPUNCTURE: CPT

## 2019-02-05 RX ORDER — CETIRIZINE HYDROCHLORIDE 10 MG/1
10 TABLET ORAL DAILY
Qty: 30 TABLET | Refills: 0 | Status: SHIPPED | OUTPATIENT
Start: 2019-02-05 | End: 2019-03-07

## 2019-02-06 ENCOUNTER — TELEPHONE (OUTPATIENT)
Dept: OTHER | Facility: CLINIC | Age: 44
End: 2019-02-06

## 2019-02-06 ENCOUNTER — HOSPITAL ENCOUNTER (OUTPATIENT)
Dept: CARDIAC REHAB | Age: 44
Setting detail: THERAPIES SERIES
Discharge: HOME | End: 2019-02-06
Payer: COMMERCIAL

## 2019-02-06 PROCEDURE — 93798 PHYS/QHP OP CAR RHAB W/ECG: CPT

## 2019-03-02 ENCOUNTER — APPOINTMENT (OUTPATIENT)
Dept: GENERAL RADIOLOGY | Age: 44
End: 2019-03-02
Payer: COMMERCIAL

## 2019-03-02 ENCOUNTER — HOSPITAL ENCOUNTER (EMERGENCY)
Age: 44
Discharge: HOME | End: 2019-03-02
Payer: COMMERCIAL

## 2019-03-02 VITALS
OXYGEN SATURATION: 97 % | WEIGHT: 218 LBS | HEART RATE: 85 BPM | RESPIRATION RATE: 18 BRPM | BODY MASS INDEX: 34.21 KG/M2 | DIASTOLIC BLOOD PRESSURE: 75 MMHG | HEIGHT: 67 IN | TEMPERATURE: 98.2 F | SYSTOLIC BLOOD PRESSURE: 131 MMHG

## 2019-03-02 DIAGNOSIS — T14.8XXA ABRASION: ICD-10-CM

## 2019-03-02 DIAGNOSIS — S60.221A CONTUSION OF RIGHT HAND, INITIAL ENCOUNTER: Primary | ICD-10-CM

## 2019-03-02 DIAGNOSIS — S60.211A CONTUSION OF RIGHT WRIST, INITIAL ENCOUNTER: ICD-10-CM

## 2019-03-02 PROCEDURE — 6370000000 HC RX 637 (ALT 250 FOR IP): Performed by: PHYSICIAN ASSISTANT

## 2019-03-02 PROCEDURE — 73130 X-RAY EXAM OF HAND: CPT

## 2019-03-02 PROCEDURE — 73110 X-RAY EXAM OF WRIST: CPT

## 2019-03-02 PROCEDURE — 99283 EMERGENCY DEPT VISIT LOW MDM: CPT

## 2019-03-02 RX ORDER — HYDROCODONE BITARTRATE AND ACETAMINOPHEN 5; 325 MG/1; MG/1
1 TABLET ORAL EVERY 6 HOURS PRN
Qty: 16 TABLET | Refills: 0 | Status: SHIPPED | OUTPATIENT
Start: 2019-03-02 | End: 2019-03-05 | Stop reason: ALTCHOICE

## 2019-03-02 RX ORDER — OXYCODONE HYDROCHLORIDE AND ACETAMINOPHEN 5; 325 MG/1; MG/1
1 TABLET ORAL ONCE
Status: DISCONTINUED | OUTPATIENT
Start: 2019-03-02 | End: 2019-03-02 | Stop reason: HOSPADM

## 2019-03-02 ASSESSMENT — ENCOUNTER SYMPTOMS: ROS SKIN COMMENTS: SEE HPI

## 2019-03-02 ASSESSMENT — PAIN SCALES - GENERAL: PAINLEVEL_OUTOF10: 7

## 2019-03-02 ASSESSMENT — PAIN DESCRIPTION - PAIN TYPE: TYPE: ACUTE PAIN

## 2019-03-02 ASSESSMENT — PAIN DESCRIPTION - DESCRIPTORS: DESCRIPTORS: ACHING

## 2019-03-02 ASSESSMENT — PAIN DESCRIPTION - LOCATION: LOCATION: HAND

## 2019-03-05 ENCOUNTER — HOSPITAL ENCOUNTER (EMERGENCY)
Age: 44
Discharge: HOME | End: 2019-03-05
Attending: EMERGENCY MEDICINE
Payer: COMMERCIAL

## 2019-03-05 VITALS
TEMPERATURE: 97.4 F | BODY MASS INDEX: 34.14 KG/M2 | HEART RATE: 83 BPM | DIASTOLIC BLOOD PRESSURE: 106 MMHG | WEIGHT: 218 LBS | OXYGEN SATURATION: 98 % | SYSTOLIC BLOOD PRESSURE: 142 MMHG | RESPIRATION RATE: 16 BRPM

## 2019-03-05 DIAGNOSIS — S69.91XA INJURY OF RIGHT WRIST, INITIAL ENCOUNTER: Primary | ICD-10-CM

## 2019-03-05 PROCEDURE — 29130 APPL FINGER SPLINT STATIC: CPT

## 2019-03-05 PROCEDURE — 99282 EMERGENCY DEPT VISIT SF MDM: CPT

## 2019-03-05 RX ORDER — TRAMADOL HYDROCHLORIDE 50 MG/1
50 TABLET ORAL EVERY 6 HOURS PRN
Qty: 10 TABLET | Refills: 0 | Status: SHIPPED | OUTPATIENT
Start: 2019-03-05 | End: 2019-03-08

## 2019-03-05 ASSESSMENT — ENCOUNTER SYMPTOMS
CHEST TIGHTNESS: 0
VOMITING: 0
RHINORRHEA: 0
SHORTNESS OF BREATH: 0
SORE THROAT: 0
COUGH: 0
BACK PAIN: 0
EYE DISCHARGE: 0
BLOOD IN STOOL: 0
DIARRHEA: 0
ABDOMINAL PAIN: 0
NAUSEA: 0
EYE REDNESS: 0
CONSTIPATION: 0
WHEEZING: 0

## 2019-03-05 ASSESSMENT — PAIN DESCRIPTION - PAIN TYPE: TYPE: ACUTE PAIN

## 2019-03-05 ASSESSMENT — PAIN SCALES - GENERAL: PAINLEVEL_OUTOF10: 7

## 2019-03-05 ASSESSMENT — PAIN DESCRIPTION - DESCRIPTORS: DESCRIPTORS: ACHING

## 2019-03-05 ASSESSMENT — PAIN DESCRIPTION - LOCATION: LOCATION: HAND

## 2019-04-03 ENCOUNTER — APPOINTMENT (OUTPATIENT)
Dept: CT IMAGING | Age: 44
End: 2019-04-03
Payer: COMMERCIAL

## 2019-04-03 ENCOUNTER — APPOINTMENT (OUTPATIENT)
Dept: GENERAL RADIOLOGY | Age: 44
End: 2019-04-03
Payer: COMMERCIAL

## 2019-04-03 ENCOUNTER — HOSPITAL ENCOUNTER (EMERGENCY)
Age: 44
Discharge: HOME | End: 2019-04-03
Attending: EMERGENCY MEDICINE
Payer: COMMERCIAL

## 2019-04-03 VITALS
DIASTOLIC BLOOD PRESSURE: 81 MMHG | RESPIRATION RATE: 16 BRPM | SYSTOLIC BLOOD PRESSURE: 144 MMHG | HEART RATE: 91 BPM | TEMPERATURE: 98.2 F | WEIGHT: 225 LBS | BODY MASS INDEX: 35.31 KG/M2 | HEIGHT: 67 IN | OXYGEN SATURATION: 96 %

## 2019-04-03 DIAGNOSIS — S01.81XA LACERATION OF FOREHEAD, INITIAL ENCOUNTER: Primary | ICD-10-CM

## 2019-04-03 PROCEDURE — 99283 EMERGENCY DEPT VISIT LOW MDM: CPT

## 2019-04-03 PROCEDURE — 70250 X-RAY EXAM OF SKULL: CPT

## 2019-04-03 PROCEDURE — 70450 CT HEAD/BRAIN W/O DYE: CPT

## 2019-04-03 PROCEDURE — 6370000000 HC RX 637 (ALT 250 FOR IP): Performed by: EMERGENCY MEDICINE

## 2019-04-03 PROCEDURE — 6360000002 HC RX W HCPCS: Performed by: EMERGENCY MEDICINE

## 2019-04-03 PROCEDURE — 90715 TDAP VACCINE 7 YRS/> IM: CPT | Performed by: EMERGENCY MEDICINE

## 2019-04-03 PROCEDURE — 2500000003 HC RX 250 WO HCPCS: Performed by: EMERGENCY MEDICINE

## 2019-04-03 PROCEDURE — 90471 IMMUNIZATION ADMIN: CPT | Performed by: EMERGENCY MEDICINE

## 2019-04-03 RX ORDER — HYDROCODONE BITARTRATE AND ACETAMINOPHEN 5; 325 MG/1; MG/1
2 TABLET ORAL ONCE
Status: COMPLETED | OUTPATIENT
Start: 2019-04-03 | End: 2019-04-03

## 2019-04-03 RX ORDER — ONDANSETRON 4 MG/1
4 TABLET, ORALLY DISINTEGRATING ORAL ONCE
Status: COMPLETED | OUTPATIENT
Start: 2019-04-03 | End: 2019-04-03

## 2019-04-03 RX ORDER — LIDOCAINE HYDROCHLORIDE AND EPINEPHRINE 10; 10 MG/ML; UG/ML
20 INJECTION, SOLUTION INFILTRATION; PERINEURAL ONCE
Status: COMPLETED | OUTPATIENT
Start: 2019-04-03 | End: 2019-04-03

## 2019-04-03 RX ORDER — ACETAMINOPHEN 500 MG
1000 TABLET ORAL ONCE
Status: COMPLETED | OUTPATIENT
Start: 2019-04-03 | End: 2019-04-03

## 2019-04-03 RX ADMIN — ACETAMINOPHEN 1000 MG: 500 TABLET, FILM COATED ORAL at 21:16

## 2019-04-03 RX ADMIN — TETANUS TOXOID, REDUCED DIPHTHERIA TOXOID AND ACELLULAR PERTUSSIS VACCINE, ADSORBED 0.5 ML: 5; 2.5; 8; 8; 2.5 SUSPENSION INTRAMUSCULAR at 23:17

## 2019-04-03 RX ADMIN — ONDANSETRON 4 MG: 4 TABLET, ORALLY DISINTEGRATING ORAL at 21:03

## 2019-04-03 RX ADMIN — LIDOCAINE HYDROCHLORIDE AND EPINEPHRINE 20 ML: 10; 10 INJECTION, SOLUTION INFILTRATION; PERINEURAL at 22:41

## 2019-04-03 RX ADMIN — HYDROCODONE BITARTRATE AND ACETAMINOPHEN 2 TABLET: 5; 325 TABLET ORAL at 22:40

## 2019-04-03 ASSESSMENT — PAIN SCALES - GENERAL
PAINLEVEL_OUTOF10: 7
PAINLEVEL_OUTOF10: 4
PAINLEVEL_OUTOF10: 7

## 2019-04-03 ASSESSMENT — PAIN DESCRIPTION - PAIN TYPE: TYPE: ACUTE PAIN

## 2019-04-03 ASSESSMENT — PAIN DESCRIPTION - LOCATION: LOCATION: FACE

## 2019-04-03 ASSESSMENT — PAIN DESCRIPTION - DESCRIPTORS: DESCRIPTORS: ACHING

## 2019-04-03 ASSESSMENT — PAIN DESCRIPTION - ORIENTATION: ORIENTATION: MID;UPPER

## 2019-04-04 NOTE — ED NOTES
Patient reports nausea and feeling \"pukey\". Maira Oshea PA-C aware.       Edna Land RN  04/03/19 7193

## 2019-04-08 NOTE — ED PROVIDER NOTES
[facility] ED  eMERGENCY dEPARTMENT eNCOUnter      Pt Name: Josie Carrion  MRN: [id number]  [place] 1975  Date of evaluation: 4/3/2019  Provider: Mani Maxwell MD    CHIEF COMPLAINT     Chief Complaint   Patient presents with    Laceration     Forehead, onset 30mins ago after accidentally hitting himself with a fire poker. Pt is on blood thinners         HISTORY OF PRESENT ILLNESS   (Location/Symptom, Timing/Onset, Context/Setting,Quality, Duration, Modifying Factors, Severity)  Note limiting factors. Josie Carrion is a38 y.o. male who presents to the emergency department      Pt is a 43 yo male who presents after hitting himself in the head with a poker. The patient arrived with a piece of the metal stuck in his head, but took it out prior to being seen. Bleeding controlled. Pt is on blood thinner. Said he \"saw stars\" after being hit but did not lose consciousness. Pt only c/o headache at this time. Nursing Notes werereviewed. REVIEW OF SYSTEMS    (2-9 systems for level 4, 10 or more for level 5)     Review of Systems   Constitutional: Negative. HENT: Negative. Eyes: Negative. Respiratory: Negative. Cardiovascular: Negative. Gastrointestinal: Negative. Musculoskeletal: Negative. Neurological: Positive for light-headedness and headaches. Hematological: Bruises/bleeds easily. Except as noted above the remainder of the review of systems was reviewed and negative. PAST MEDICAL HISTORY     Past Medical History:   Diagnosis Date    CAD (coronary artery disease)     Carpal tunnel syndrome     Depressive disorder, not elsewhere classified     [facility] [address]     Heart attack ([facility] [address] 08/11/2018    STEMI    History of echocardiogram 01/08/2019    EF 40-45%. LV cavity sixe is mildly increased. Inferior and inferoseptal wall hypokenesis noted. Mild diastolic dysfunction.     Hyperlipidemia     Hypertension 2009         SURGICALHISTORY of motion. Neurological: He is alert and oriented to person, place, and time. Skin: Skin is warm and dry. DIAGNOSTIC RESULTS     EKG: All EKG's are interpreted by the Emergency Department Physician who either signs orCo-signs this chart in the absence of a cardiologist.      RADIOLOGY:   Non-plainfilm images such as CT, Ultrasound and MRI are read by the radiologist. Plain radiographic images are visualized and preliminarily interpreted by the emergency physician with the below findings:      Interpretationper the Radiologist below, if available at the time of this note:    CT Head WO Contrast   Final Result   No acute intracranial abnormality. XR SKULL (<4 VIEWS)   Final Result   No radiodense foreign body               ED BEDSIDE ULTRASOUND:   Performed by ED Physician - none    LABS:  Labs Reviewed - No data to display    All other labs were within normal range or not returned as of this dictation. EMERGENCY DEPARTMENT COURSE and DIFFERENTIAL DIAGNOSIS/MDM:   Vitals:    Vitals:    04/03/19 2033 04/03/19 2105 04/03/19 2124 04/03/19 2324   BP: (!) 155/104   (!) 144/81   Pulse: 91      Resp: 16      Temp: 98.2 °F (36.8 °C)      TempSrc: Tympanic      SpO2: 96% 97% 96%    Weight: 225 lb (102.1 kg)      Height: 5' 7\" (1.702 m)            MDM          Lac Repair  Date/Time: 4/9/2019 7:55 AM  Performed by: Robert Herrera MD  Authorized by: Robert Herrera MD     Consent:     Consent obtained:  Verbal    Consent given by:  Patient    Risks discussed:  Infection  Anesthesia (see MAR for exact dosages):      Anesthesia method:  None  Laceration details:     Location:  Scalp  Treatment:     Area cleansed with:  Betadine    Amount of cleaning:  Standard    Irrigation solution:  Sterile saline    Visualized foreign bodies/material removed: no    Skin repair:     Repair method:  Tissue adhesive  Approximation:     Approximation:  Close    Vermilion border: well-aligned    Post-procedure details: Dressing:  Antibiotic ointment    Patient tolerance of procedure: Tolerated well, no immediate complications        FINAL IMPRESSION      1. Laceration of forehead, initial encounter New Problem       DISPOSITION/PLAN   DISPOSITION Decision To Discharge 04/03/2019 11:02:51 PM      PATIENT REFERRED TO:  No follow-up provider specified. DISCHARGE MEDICATIONS:  Discharge Medication List as of 4/3/2019 11:14 PM                 Summation      Patient Course:      ED Medications administered this visit:    Medications   ondansetron (ZOFRAN-ODT) disintegrating tablet 4 mg (4 mg Oral Given 4/3/19 2103)   acetaminophen (TYLENOL) tablet 1,000 mg (1,000 mg Oral Given 4/3/19 2116)   lidocaine-EPINEPHrine 1 percent-1:468930 injection 20 mL (20 mLs Intradermal Given by Other 4/3/19 4841)   HYDROcodone-acetaminophen (NORCO) 5-325 MG per tablet 2 tablet (2 tablets Oral Given 4/3/19 7970)   Tetanus-Diphth-Acell Pertussis (BOOSTRIX) injection 0.5 mL (0.5 mLs Intramuscular Given 4/3/19 8357)       New Prescriptions from this visit:    Discharge Medication List as of 4/3/2019 11:14 PM          Follow-up:  No follow-up provider specified. Final Impression:   1.  Laceration of forehead, initial encounter New Problem              (Please note that portions of this note were completed with a voice recognition program.  Efforts were made to edit the dictations but occasionally words are mis-transcribed.)           Kermit Johnson MD  04/09/19 6400

## 2019-04-09 ASSESSMENT — ENCOUNTER SYMPTOMS
GASTROINTESTINAL NEGATIVE: 1
RESPIRATORY NEGATIVE: 1
EYES NEGATIVE: 1

## 2019-04-27 ENCOUNTER — HOSPITAL ENCOUNTER (OUTPATIENT)
Age: 44
Setting detail: OBSERVATION
Discharge: HOME | End: 2019-04-28
Attending: EMERGENCY MEDICINE | Admitting: INTERNAL MEDICINE
Payer: COMMERCIAL

## 2019-04-27 ENCOUNTER — APPOINTMENT (OUTPATIENT)
Dept: GENERAL RADIOLOGY | Age: 44
End: 2019-04-27
Payer: COMMERCIAL

## 2019-04-27 DIAGNOSIS — R07.9 CHEST PAIN, UNSPECIFIED TYPE: Primary | ICD-10-CM

## 2019-04-27 LAB
ABSOLUTE EOS #: 0.17 K/UL (ref 0–0.44)
ABSOLUTE IMMATURE GRANULOCYTE: 0.04 K/UL (ref 0–0.3)
ABSOLUTE LYMPH #: 3.99 K/UL (ref 1.1–3.7)
ABSOLUTE MONO #: 0.9 K/UL (ref 0.1–1.2)
ANION GAP SERPL CALCULATED.3IONS-SCNC: 15 MMOL/L (ref 9–17)
BASOPHILS # BLD: 1 % (ref 0–2)
BASOPHILS ABSOLUTE: 0.05 K/UL (ref 0–0.2)
BUN BLDV-MCNC: 18 MG/DL (ref 6–20)
BUN/CREAT BLD: 19 (ref 9–20)
CALCIUM SERPL-MCNC: 8.8 MG/DL (ref 8.6–10.4)
CHLORIDE BLD-SCNC: 98 MMOL/L (ref 98–107)
CO2: 24 MMOL/L (ref 20–31)
CREAT SERPL-MCNC: 0.93 MG/DL (ref 0.7–1.2)
DIFFERENTIAL TYPE: ABNORMAL
EKG ATRIAL RATE: 92 BPM
EKG P AXIS: 57 DEGREES
EKG P-R INTERVAL: 132 MS
EKG Q-T INTERVAL: 342 MS
EKG QRS DURATION: 94 MS
EKG QTC CALCULATION (BAZETT): 422 MS
EKG R AXIS: 81 DEGREES
EKG T AXIS: -42 DEGREES
EKG VENTRICULAR RATE: 92 BPM
EOSINOPHILS RELATIVE PERCENT: 2 % (ref 1–4)
GFR AFRICAN AMERICAN: >60 ML/MIN
GFR NON-AFRICAN AMERICAN: >60 ML/MIN
GFR SERPL CREATININE-BSD FRML MDRD: ABNORMAL ML/MIN/{1.73_M2}
GFR SERPL CREATININE-BSD FRML MDRD: ABNORMAL ML/MIN/{1.73_M2}
GLUCOSE BLD-MCNC: 305 MG/DL (ref 70–99)
HCT VFR BLD CALC: 40.7 % (ref 40.7–50.3)
HEMOGLOBIN: 14.2 G/DL (ref 13–17)
IMMATURE GRANULOCYTES: 0 %
LYMPHOCYTES # BLD: 38 % (ref 24–43)
MCH RBC QN AUTO: 30.5 PG (ref 25.2–33.5)
MCHC RBC AUTO-ENTMCNC: 34.9 G/DL (ref 28.4–34.8)
MCV RBC AUTO: 87.5 FL (ref 82.6–102.9)
MONOCYTES # BLD: 9 % (ref 3–12)
NRBC AUTOMATED: 0 PER 100 WBC
PDW BLD-RTO: 12.9 % (ref 11.8–14.4)
PLATELET # BLD: 280 K/UL (ref 138–453)
PLATELET ESTIMATE: ABNORMAL
PMV BLD AUTO: 10.3 FL (ref 8.1–13.5)
POTASSIUM SERPL-SCNC: 3.9 MMOL/L (ref 3.7–5.3)
RBC # BLD: 4.65 M/UL (ref 4.21–5.77)
RBC # BLD: ABNORMAL 10*6/UL
SEG NEUTROPHILS: 50 % (ref 36–65)
SEGMENTED NEUTROPHILS ABSOLUTE COUNT: 5.35 K/UL (ref 1.5–8.1)
SODIUM BLD-SCNC: 137 MMOL/L (ref 135–144)
TROPONIN INTERP: NORMAL
TROPONIN INTERP: NORMAL
TROPONIN T: <0.03 NG/ML
TROPONIN T: <0.03 NG/ML
TROPONIN, HIGH SENSITIVITY: NORMAL NG/L (ref 0–22)
TROPONIN, HIGH SENSITIVITY: NORMAL NG/L (ref 0–22)
WBC # BLD: 10.5 K/UL (ref 3.5–11.3)
WBC # BLD: ABNORMAL 10*3/UL

## 2019-04-27 PROCEDURE — 96376 TX/PRO/DX INJ SAME DRUG ADON: CPT

## 2019-04-27 PROCEDURE — 93005 ELECTROCARDIOGRAM TRACING: CPT

## 2019-04-27 PROCEDURE — 71045 X-RAY EXAM CHEST 1 VIEW: CPT

## 2019-04-27 PROCEDURE — 84484 ASSAY OF TROPONIN QUANT: CPT

## 2019-04-27 PROCEDURE — 85025 COMPLETE CBC W/AUTO DIFF WBC: CPT

## 2019-04-27 PROCEDURE — 6360000002 HC RX W HCPCS: Performed by: EMERGENCY MEDICINE

## 2019-04-27 PROCEDURE — G0378 HOSPITAL OBSERVATION PER HR: HCPCS

## 2019-04-27 PROCEDURE — 99285 EMERGENCY DEPT VISIT HI MDM: CPT

## 2019-04-27 PROCEDURE — 80048 BASIC METABOLIC PNL TOTAL CA: CPT

## 2019-04-27 PROCEDURE — 96374 THER/PROPH/DIAG INJ IV PUSH: CPT

## 2019-04-27 PROCEDURE — 96375 TX/PRO/DX INJ NEW DRUG ADDON: CPT

## 2019-04-27 PROCEDURE — 6370000000 HC RX 637 (ALT 250 FOR IP): Performed by: EMERGENCY MEDICINE

## 2019-04-27 RX ORDER — NITROGLYCERIN 0.4 MG/1
0.4 TABLET SUBLINGUAL EVERY 5 MIN PRN
Status: DISCONTINUED | OUTPATIENT
Start: 2019-04-27 | End: 2019-04-28 | Stop reason: HOSPADM

## 2019-04-27 RX ORDER — ONDANSETRON 2 MG/ML
4 INJECTION INTRAMUSCULAR; INTRAVENOUS ONCE
Status: COMPLETED | OUTPATIENT
Start: 2019-04-27 | End: 2019-04-27

## 2019-04-27 RX ORDER — ASPIRIN 325 MG
325 TABLET ORAL ONCE
Status: COMPLETED | OUTPATIENT
Start: 2019-04-27 | End: 2019-04-27

## 2019-04-27 RX ORDER — MORPHINE SULFATE 4 MG/ML
4 INJECTION, SOLUTION INTRAMUSCULAR; INTRAVENOUS ONCE
Status: COMPLETED | OUTPATIENT
Start: 2019-04-27 | End: 2019-04-27

## 2019-04-27 RX ADMIN — NITROGLYCERIN 0.4 MG: 0.4 TABLET SUBLINGUAL at 18:24

## 2019-04-27 RX ADMIN — NITROGLYCERIN 0.4 MG: 0.4 TABLET SUBLINGUAL at 19:54

## 2019-04-27 RX ADMIN — ASPIRIN 325 MG: 325 TABLET, COATED ORAL at 18:13

## 2019-04-27 RX ADMIN — NITROGLYCERIN 1 INCH: 20 OINTMENT TOPICAL at 19:37

## 2019-04-27 RX ADMIN — MORPHINE SULFATE 4 MG: 4 INJECTION INTRAVENOUS at 22:57

## 2019-04-27 RX ADMIN — NITROGLYCERIN 0.4 MG: 0.4 TABLET SUBLINGUAL at 18:15

## 2019-04-27 RX ADMIN — MORPHINE SULFATE 4 MG: 4 INJECTION INTRAVENOUS at 21:45

## 2019-04-27 RX ADMIN — ONDANSETRON 4 MG: 2 INJECTION INTRAMUSCULAR; INTRAVENOUS at 21:45

## 2019-04-27 ASSESSMENT — ENCOUNTER SYMPTOMS
APNEA: 0
SHORTNESS OF BREATH: 1
EYE DISCHARGE: 0
ABDOMINAL PAIN: 0
EYE REDNESS: 0
CHEST TIGHTNESS: 0
SORE THROAT: 0

## 2019-04-27 ASSESSMENT — PAIN SCALES - GENERAL
PAINLEVEL_OUTOF10: 3
PAINLEVEL_OUTOF10: 4
PAINLEVEL_OUTOF10: 6
PAINLEVEL_OUTOF10: 4
PAINLEVEL_OUTOF10: 3
PAINLEVEL_OUTOF10: 2
PAINLEVEL_OUTOF10: 6
PAINLEVEL_OUTOF10: 6
PAINLEVEL_OUTOF10: 4
PAINLEVEL_OUTOF10: 5
PAINLEVEL_OUTOF10: 4
PAINLEVEL_OUTOF10: 4

## 2019-04-27 ASSESSMENT — PAIN DESCRIPTION - PAIN TYPE
TYPE: ACUTE PAIN

## 2019-04-27 ASSESSMENT — PAIN DESCRIPTION - LOCATION
LOCATION: CHEST
LOCATION: CHEST;ARM
LOCATION: CHEST

## 2019-04-27 ASSESSMENT — PAIN DESCRIPTION - ORIENTATION
ORIENTATION: LEFT
ORIENTATION: LEFT

## 2019-04-27 ASSESSMENT — PAIN DESCRIPTION - PROGRESSION
CLINICAL_PROGRESSION: GRADUALLY IMPROVING
CLINICAL_PROGRESSION: GRADUALLY WORSENING
CLINICAL_PROGRESSION: GRADUALLY IMPROVING

## 2019-04-27 ASSESSMENT — PAIN DESCRIPTION - DESCRIPTORS: DESCRIPTORS: PRESSURE

## 2019-04-27 NOTE — ED PROVIDER NOTES
[facility] MED SURG  EMERGENCY DEPARTMENT ENCOUNTER      Pt Name: Yasmeen Lantigua  MRN: [id number]  [place] 1975  Date of evaluation: 4/27/2019  Provider: Raegan Ramirez, [address] [place] [address]       Chief Complaint   Patient presents with    Chest Pain     pt c/o left anterior chest pressure with radiation down his left arm. Pt states onset in the past 30 min       HISTORY OF PRESENT ILLNESS    Yasmeen Lantigua is a 44 y.o. male with hz of CABG who presents to the emergency department from from home with CC of chest pressure for 30 minutes. He states the \"pressure\" feels like when he had his MI. Patient has associated pain and numbness radiation down the left arm. Patient also has associated:  Diaphoresis, sob, and nausea. Triage notes and Nursing notes were reviewed by myself. Any discrepancies are addressed above. PAST MEDICAL HISTORY     Past Medical History:   Diagnosis Date    CAD (coronary artery disease)     Carpal tunnel syndrome     Depressive disorder, not elsewhere classified     Diabetes mellitus ([facility] [address]     Heart attack ([facility] [address] 08/11/2018    STEMI    History of echocardiogram 01/08/2019    EF 40-45%. LV cavity sixe is mildly increased. Inferior and inferoseptal wall hypokenesis noted. Mild diastolic dysfunction.     Hyperlipidemia     Hypertension 2009       SURGICAL HISTORY       Past Surgical History:   Procedure Laterality Date    CARDIAC CATHETERIZATION Left 01/07/2018    Right Ulnar/[facility]/    CORONARY ANGIOPLASTY  08/2018    double bypass[facility],     CORONARY ARTERY BYPASS GRAFT  2018       CURRENT MEDICATIONS       Discharge Medication List as of 4/28/2019  2:05 PM      CONTINUE these medications which have NOT CHANGED    Details   lisinopril (PRINIVIL;ZESTRIL) 20 MG tablet Take 1 tablet by mouth daily, Disp-90 tablet, R-3Normal      metoprolol tartrate (LOPRESSOR) 50 MG tablet Take 1 tablet by mouth 2 times daily, Intimate partner violence:     Fear of current or ex partner: None     Emotionally abused: None     Physically abused: None     Forced sexual activity: None   Other Topics Concern    None   Social History Narrative    None       REVIEW OF SYSTEMS     Review of Systems   Constitutional: Negative for activity change, diaphoresis and fever. HENT: Negative for congestion, ear discharge, ear pain and sore throat. Eyes: Negative for discharge and redness. Respiratory: Positive for shortness of breath. Negative for apnea and chest tightness. Cardiovascular: Positive for chest pain. Gastrointestinal: Negative for abdominal pain. Skin: Negative for pallor and rash. Neurological: Negative for dizziness, seizures, syncope, facial asymmetry, speech difficulty, weakness and headaches. All other systems reviewed and are negative. Except as noted above the remainder of the review of systems was reviewed and is negative. PHYSICAL EXAM    (up to 7 for level 4, 8 or more for level 5)     ED Triage Vitals [19 1658]   BP Temp Temp Source Pulse Resp SpO2 Height Weight   (!) 168/97 98.5 °F (36.9 °C) Tympanic 92 20 96 % -- 224 lb (101.6 kg)       Physical Exam   Constitutional: He is oriented to person, place, and time. He appears well-developed and well-nourished. HENT:   Head: Normocephalic and atraumatic. Eyes: Pupils are equal, round, and reactive to light. Conjunctivae and EOM are normal.   Neck: Normal range of motion. Neck supple. Cardiovascular: Normal rate, regular rhythm, normal heart sounds and intact distal pulses. Pulmonary/Chest: Effort normal and breath sounds normal.   Abdominal: Soft. Bowel sounds are normal.   Musculoskeletal: Normal range of motion. Neurological: He is alert and oriented to person, place, and time. Skin: Skin is warm and dry. Nursing note and vitals reviewed.       DIAGNOSTIC RESULTS     EK  Normal sinus at ventricular rate of 92 bpm  Normal axis, st depression leads II, III, aVF (unchanged from 2019)  ME, QRS, QTc intervals: normal      All EKG's are interpreted by [facility] Phys[facility] who either signs or Co-signs this chart in the absence of a cardiologist.      RADIOLOGY:   Interpretation per the Radiologist below, if available at the time of this note:    XR CHEST PORTABLE   Final Result   No acute cardiopulmonary disease. LABS:  Labs Reviewed   BASIC METABOLIC PANEL - Abnormal; Notable for the following components:       Result Value    Glucose 305 (*)     All other components within normal limits   CBC WITH AUTO DIFFERENTIAL - Abnormal; Notable for the following components:    MCHC 34.9 (*)     Absolute Lymph # 3.99 (*)     All other components within normal limits   CBC - Abnormal; Notable for the following components:    Hematocrit 39.6 (*)     All other components within normal limits   TROPONIN   TROPONIN   TROPONIN       All other labs were within normal range or not returned as of this dictation. EMERGENCY DEPARTMENT COURSE andMedical Decision Makin:  Signed out patient to Dr. Zakia Shoemaker pending second troponin. Strict return precautions and follow up instructions were discussed with the patient with which the patient agrees    ED Medications administered this visit:    Medications   aspirin tablet 325 mg (325 mg Oral Given 19)   nitroglycerin (NITRO-BID) 2 % ointment 1 inch (1 inch Topical Given 19)   ondansetron (ZOFRAN) injection 4 mg (4 mg Intravenous Given 19)   morphine injection 4 mg (4 mg Intravenous Given 19)   morphine injection 4 mg (4 mg Intravenous Given 19)          CLINICAL       1.  Chest pain, unspecified type          DISPOSITION/PLAN   DISPOSITION              (Please note that portions of this note were completed with a voice recognition program.  Efforts were made to edit the dictations but occasionallywords are

## 2019-04-28 VITALS
TEMPERATURE: 97.4 F | SYSTOLIC BLOOD PRESSURE: 113 MMHG | OXYGEN SATURATION: 97 % | BODY MASS INDEX: 34.84 KG/M2 | WEIGHT: 222 LBS | RESPIRATION RATE: 16 BRPM | HEIGHT: 67 IN | DIASTOLIC BLOOD PRESSURE: 74 MMHG | HEART RATE: 68 BPM

## 2019-04-28 PROBLEM — I25.10 ASHD (ARTERIOSCLEROTIC HEART DISEASE): Chronic | Status: ACTIVE | Noted: 2019-04-28

## 2019-04-28 LAB
EKG ATRIAL RATE: 66 BPM
EKG ATRIAL RATE: 81 BPM
EKG P AXIS: 31 DEGREES
EKG P AXIS: 44 DEGREES
EKG P-R INTERVAL: 150 MS
EKG P-R INTERVAL: 162 MS
EKG Q-T INTERVAL: 380 MS
EKG Q-T INTERVAL: 414 MS
EKG QRS DURATION: 94 MS
EKG QRS DURATION: 94 MS
EKG QTC CALCULATION (BAZETT): 434 MS
EKG QTC CALCULATION (BAZETT): 441 MS
EKG R AXIS: 59 DEGREES
EKG R AXIS: 63 DEGREES
EKG T AXIS: 17 DEGREES
EKG T AXIS: 30 DEGREES
EKG VENTRICULAR RATE: 66 BPM
EKG VENTRICULAR RATE: 81 BPM
HCT VFR BLD CALC: 39.6 % (ref 40.7–50.3)
HEMOGLOBIN: 13.4 G/DL (ref 13–17)
MCH RBC QN AUTO: 29.8 PG (ref 25.2–33.5)
MCHC RBC AUTO-ENTMCNC: 33.8 G/DL (ref 28.4–34.8)
MCV RBC AUTO: 88.2 FL (ref 82.6–102.9)
NRBC AUTOMATED: 0 PER 100 WBC
PDW BLD-RTO: 13.2 % (ref 11.8–14.4)
PLATELET # BLD: 262 K/UL (ref 138–453)
PMV BLD AUTO: 10.4 FL (ref 8.1–13.5)
RBC # BLD: 4.49 M/UL (ref 4.21–5.77)
TROPONIN INTERP: NORMAL
TROPONIN T: <0.03 NG/ML
TROPONIN, HIGH SENSITIVITY: NORMAL NG/L (ref 0–22)
WBC # BLD: 10.2 K/UL (ref 3.5–11.3)

## 2019-04-28 PROCEDURE — 36415 COLL VENOUS BLD VENIPUNCTURE: CPT

## 2019-04-28 PROCEDURE — 6370000000 HC RX 637 (ALT 250 FOR IP): Performed by: INTERNAL MEDICINE

## 2019-04-28 PROCEDURE — 6360000002 HC RX W HCPCS: Performed by: INTERNAL MEDICINE

## 2019-04-28 PROCEDURE — 93005 ELECTROCARDIOGRAM TRACING: CPT

## 2019-04-28 PROCEDURE — 2580000003 HC RX 258: Performed by: INTERNAL MEDICINE

## 2019-04-28 PROCEDURE — 96375 TX/PRO/DX INJ NEW DRUG ADDON: CPT

## 2019-04-28 PROCEDURE — 84484 ASSAY OF TROPONIN QUANT: CPT

## 2019-04-28 PROCEDURE — 85027 COMPLETE CBC AUTOMATED: CPT

## 2019-04-28 PROCEDURE — G0378 HOSPITAL OBSERVATION PER HR: HCPCS

## 2019-04-28 RX ORDER — ACETAMINOPHEN 325 MG/1
650 TABLET ORAL EVERY 4 HOURS PRN
Status: DISCONTINUED | OUTPATIENT
Start: 2019-04-28 | End: 2019-04-28 | Stop reason: HOSPADM

## 2019-04-28 RX ORDER — SODIUM CHLORIDE 9 MG/ML
INJECTION, SOLUTION INTRAVENOUS CONTINUOUS
Status: DISCONTINUED | OUTPATIENT
Start: 2019-04-28 | End: 2019-04-28 | Stop reason: HOSPADM

## 2019-04-28 RX ORDER — ASPIRIN 81 MG/1
81 TABLET ORAL DAILY
Status: DISCONTINUED | OUTPATIENT
Start: 2019-04-28 | End: 2019-04-28 | Stop reason: HOSPADM

## 2019-04-28 RX ORDER — ONDANSETRON 2 MG/ML
4 INJECTION INTRAMUSCULAR; INTRAVENOUS EVERY 6 HOURS PRN
Status: DISCONTINUED | OUTPATIENT
Start: 2019-04-28 | End: 2019-04-28 | Stop reason: HOSPADM

## 2019-04-28 RX ORDER — LISINOPRIL 20 MG/1
20 TABLET ORAL DAILY
Status: DISCONTINUED | OUTPATIENT
Start: 2019-04-28 | End: 2019-04-28 | Stop reason: HOSPADM

## 2019-04-28 RX ORDER — CLOPIDOGREL BISULFATE 75 MG/1
75 TABLET ORAL DAILY
Status: DISCONTINUED | OUTPATIENT
Start: 2019-04-28 | End: 2019-04-28 | Stop reason: HOSPADM

## 2019-04-28 RX ORDER — ALPRAZOLAM 0.25 MG/1
0.25 TABLET ORAL 2 TIMES DAILY PRN
Status: DISCONTINUED | OUTPATIENT
Start: 2019-04-28 | End: 2019-04-28 | Stop reason: HOSPADM

## 2019-04-28 RX ORDER — SODIUM CHLORIDE 0.9 % (FLUSH) 0.9 %
10 SYRINGE (ML) INJECTION EVERY 12 HOURS SCHEDULED
Status: DISCONTINUED | OUTPATIENT
Start: 2019-04-28 | End: 2019-04-28 | Stop reason: HOSPADM

## 2019-04-28 RX ORDER — ATORVASTATIN CALCIUM 40 MG/1
40 TABLET, FILM COATED ORAL DAILY
Status: DISCONTINUED | OUTPATIENT
Start: 2019-04-28 | End: 2019-04-28 | Stop reason: HOSPADM

## 2019-04-28 RX ORDER — KETOROLAC TROMETHAMINE 15 MG/ML
15 INJECTION, SOLUTION INTRAMUSCULAR; INTRAVENOUS EVERY 6 HOURS PRN
Status: DISCONTINUED | OUTPATIENT
Start: 2019-04-28 | End: 2019-04-28 | Stop reason: HOSPADM

## 2019-04-28 RX ORDER — METOPROLOL TARTRATE 50 MG/1
50 TABLET, FILM COATED ORAL 2 TIMES DAILY
Status: DISCONTINUED | OUTPATIENT
Start: 2019-04-28 | End: 2019-04-28 | Stop reason: HOSPADM

## 2019-04-28 RX ORDER — SODIUM CHLORIDE 0.9 % (FLUSH) 0.9 %
10 SYRINGE (ML) INJECTION PRN
Status: DISCONTINUED | OUTPATIENT
Start: 2019-04-28 | End: 2019-04-28 | Stop reason: HOSPADM

## 2019-04-28 RX ADMIN — SODIUM CHLORIDE: 9 INJECTION, SOLUTION INTRAVENOUS at 00:45

## 2019-04-28 RX ADMIN — ASPIRIN 81 MG: 81 TABLET, COATED ORAL at 09:32

## 2019-04-28 RX ADMIN — CLOPIDOGREL BISULFATE 75 MG: 75 TABLET ORAL at 09:32

## 2019-04-28 RX ADMIN — METFORMIN HYDROCHLORIDE 500 MG: 500 TABLET ORAL at 09:32

## 2019-04-28 RX ADMIN — LISINOPRIL 20 MG: 20 TABLET ORAL at 09:32

## 2019-04-28 RX ADMIN — ALPRAZOLAM 0.25 MG: 0.25 TABLET ORAL at 00:52

## 2019-04-28 RX ADMIN — ACETAMINOPHEN 650 MG: 325 TABLET, FILM COATED ORAL at 07:28

## 2019-04-28 RX ADMIN — KETOROLAC TROMETHAMINE 15 MG: 15 INJECTION, SOLUTION INTRAMUSCULAR; INTRAVENOUS at 12:30

## 2019-04-28 RX ADMIN — ATORVASTATIN CALCIUM 40 MG: 40 TABLET, FILM COATED ORAL at 09:32

## 2019-04-28 RX ADMIN — METOPROLOL TARTRATE 50 MG: 50 TABLET ORAL at 09:32

## 2019-04-28 RX ADMIN — ACETAMINOPHEN 650 MG: 325 TABLET, FILM COATED ORAL at 00:52

## 2019-04-28 RX ADMIN — METOPROLOL TARTRATE 50 MG: 50 TABLET ORAL at 00:52

## 2019-04-28 ASSESSMENT — PAIN DESCRIPTION - FREQUENCY
FREQUENCY: CONTINUOUS

## 2019-04-28 ASSESSMENT — PAIN DESCRIPTION - DESCRIPTORS
DESCRIPTORS: HEADACHE
DESCRIPTORS: ACHING;CONSTANT
DESCRIPTORS: DISCOMFORT

## 2019-04-28 ASSESSMENT — PAIN DESCRIPTION - PROGRESSION
CLINICAL_PROGRESSION: NOT CHANGED
CLINICAL_PROGRESSION: NOT CHANGED
CLINICAL_PROGRESSION: GRADUALLY IMPROVING

## 2019-04-28 ASSESSMENT — PAIN SCALES - GENERAL
PAINLEVEL_OUTOF10: 3
PAINLEVEL_OUTOF10: 0
PAINLEVEL_OUTOF10: 6
PAINLEVEL_OUTOF10: 1
PAINLEVEL_OUTOF10: 2
PAINLEVEL_OUTOF10: 4
PAINLEVEL_OUTOF10: 6

## 2019-04-28 ASSESSMENT — PAIN DESCRIPTION - LOCATION
LOCATION: HEAD
LOCATION: CHEST
LOCATION: CHEST;ARM

## 2019-04-28 ASSESSMENT — PAIN DESCRIPTION - ONSET
ONSET: ON-GOING

## 2019-04-28 ASSESSMENT — PAIN DESCRIPTION - ORIENTATION: ORIENTATION: LEFT

## 2019-04-28 ASSESSMENT — PAIN - FUNCTIONAL ASSESSMENT
PAIN_FUNCTIONAL_ASSESSMENT: ACTIVITIES ARE NOT PREVENTED

## 2019-04-28 ASSESSMENT — PAIN DESCRIPTION - PAIN TYPE
TYPE: ACUTE PAIN

## 2019-04-28 NOTE — PROGRESS NOTES
Discharge instructions gone over with the patient and patients spouse. Understanding noted, both verbalized to call Cecy Dinh and  to make follow up appts tomorrow.

## 2019-04-28 NOTE — H&P
Patient:  Joseph Oropeza  MRN: [id number]    Chief Complaint:    Chief Complaint   Patient presents with    Chest Pain     pt c/o left anterior chest pressure with radiation down his left arm. Pt states onset in the past 30 min       History Obtained From:  patient, electronic medical record    PCP: Parish Toribio MD    History of Present Illness: The patient is a 44 y.o. male who presents with  Chest pain in the center portion of his chest.  A little bit similar to when he had had heart issues in the past.  He recently had CABG in 2018 and subsequent heart catheterization January 2019. This morning he denies anything. No diaphoresis, chest pain, shortness of breath or nausea. Patient states he feels good except for the headache that the nitroglycerin gave him. Past Medical History:        Diagnosis Date    CAD (coronary artery disease)     Carpal tunnel syndrome     Depressive disorder, not elsewhere classified     Diabetes mellitus ([facility] [address]     Heart attack ([facility] [address] 08/11/2018    STEMI    History of echocardiogram 01/08/2019    EF 40-45%. LV cavity sixe is mildly increased. Inferior and inferoseptal wall hypokenesis noted. Mild diastolic dysfunction.  Hyperlipidemia     Hypertension 2009       Past Surgical History:        Procedure Laterality Date    CARDIAC CATHETERIZATION Left 01/07/2018    Right Ulnar/[facility]/    CORONARY ANGIOPLASTY  08/2018    double bypass, [facility],     CORONARY ARTERY BYPASS GRAFT  2018       Family History:       Problem Relation Age of Onset    High Blood Pressure Mother     Heart Disease Father     High Blood Pressure Father     Asthma Sister        Social History:   TOBACCO:   reports that he quit smoking about 8 months ago. He smoked 0.00 packs per day. He has never used smokeless tobacco.  ETOH:   reports that he does not drink alcohol. Allergies:  Patient has no known allergies.     Medications Prior to Admission:    Prior to Admission medications    Medication Sig Start Date End Date Taking? Authorizing Provider   lisinopril (PRINIVIL;ZESTRIL) 20 MG tablet Take 1 tablet by mouth daily 2/1/19  Yes Eva Jain MD   metoprolol tartrate (LOPRESSOR) 50 MG tablet Take 1 tablet by mouth 2 times daily  Patient taking differently: Take 50 mg by mouth 2 times daily 50mg in AM and 25mg HS 2/1/19  Yes Eva Jain MD   clopidogrel (PLAVIX) 75 MG tablet Take 1 tablet by mouth daily 1/31/19  Yes Eva Jain MD   atorvastatin (LIPITOR) 80 MG tablet Take 0.5 tablets by mouth daily 1/31/19  Yes Eva Jain MD   metFORMIN (GLUCOPHAGE) 500 MG tablet Take 500 mg by mouth 2 times daily (with meals)    Yes Historical Provider, MD   aspirin 81 MG EC tablet Take 81 mg by mouth daily. Yes Historical Provider, MD   nitroGLYCERIN (NITROSTAT) 0.4 MG SL tablet up to max of 3 total doses. If no relief after 1 dose, call 911. Patient taking differently: Place 0.4 mg under the tongue every 5 minutes as needed up to max of 3 total doses. If no relief after 1 dose, call 911. 1/8/19   Eva Jain MD   ALPRAZolam Marcella Major) 0.25 MG tablet Take 0.25 mg by mouth 2 times daily as needed for Sleep or Anxiety. Hieu Plummer Historical Provider, MD       Review of Systems:  Constitutional: Negative for activity change, diaphoresis and fever. HENT: Negative for congestion, ear discharge, ear pain and sore throat. Eyes: Negative for discharge and redness. Respiratory: Positive for shortness of breath. Negative for apnea and chest tightness. Cardiovascular: Positive for chest pain. Gastrointestinal: Negative for abdominal pain. Skin: Negative for pallor and rash. Neurological: Negative for dizziness, seizures, syncope, facial asymmetry, speech difficulty, weakness and headaches.    All other systems reviewed and are negative        Physical Exam:    Vitals:   Temp: 97.4 °F (36.3 °C)  BP: 113/74  Resp: 16  Pulse: 68  SpO2: 97 %  24HR INTAKE/OUTPUT:      Intake/Output Summary (Last 24 hours) at 4/28/2019 1339  Last data filed at 4/28/2019 1303  Gross per 24 hour   Intake 951 ml   Output 300 ml   Net 651 ml       Exam:  GEN:  alert and oriented to person, place and time  EYES: No gross abnormalities. and PERRL  NECK: normal, supple,  no carotid bruits  PULM: clear to auscultation bilaterally- no wheezes, rales or rhonchi, normal air movement, no respiratory distress  COR: regular rate & rhythm  ABD:  soft, non-tender and non-distended  EXT:     No edema. Scar noted on left leg  NEURO: follows commands, [place], no deficits  SKIN:  no rashes or significant lesions  -----------------------------------------------------------------  Diagnostic Data:   Lab Results   Component Value Date    WBC 10.2 04/28/2019    HGB 13.4 04/28/2019     04/28/2019       Lab Results   Component Value Date    BUN 18 04/27/2019    CREATININE 0.93 04/27/2019     04/27/2019    K 3.9 04/27/2019    CALCIUM 8.8 04/27/2019    CL 98 04/27/2019    CO2 24 04/27/2019    LABGLOM >60 04/27/2019       No results found for: Jauregui Potters, EPITHUA, LEUKOCYTESUR, SPECGRAV, GLUCOSEU, KETUA, PROTEINU, HGBUR, CASTUA, CRYSTUA, BACTERIA, YEAST    Lab Results   Component Value Date    TROPONINT <0.03 04/28/2019    CKTOTAL 104 03/13/2016    CKMB 1.3 03/13/2016       Xr Chest Portable    Result Date: 4/27/2019  EXAMINATION: SINGLE XRAY VIEW OF THE CHEST 4/27/2019 5:05 pm COMPARISON: February 5, 2019 HISTORY: ORDERING SYSTEM PROVIDED HISTORY:  TECHNOLOGIST PROVIDED HISTORY: cp FINDINGS: The patient is status post median sternotomy. The cardiomediastinal silhouette is stable. The lungs are grossly clear. There is no pleural effusion. There is no pneumothorax. There is no acute osseous abnormality. No acute cardiopulmonary disease.          Assessment:    Principal Problem:    Chest pain  Active Problems:    Chronic combined systolic and diastolic CHF, NYHA class 2 (HCC)    ASHD (arteriosclerotic heart disease) /  CABG   Resolved Problems:    * No resolved hospital problems. *      Patient Active Problem List    Diagnosis Date Noted    ASHD (arteriosclerotic heart disease) /  CABG 2018 2019    Ischemic cardiomyopathy 2019    Chronic combined systolic and diastolic CHF, NYHA class 2 ([address] 2019    S/P CABG (coronary artery bypass graft)     Dyslipidemia     Acute coronary syndrome ([facility] [address] 2019    Mild congestive heart failure ([facility] [address] 2019    Chest pain 2015    HTN (hypertension) 2015    Hyperlipidemia 2015    Tobacco abuse 2015    Family history of premature CAD 2015    Essential hypertension 2015    Pain in rib, left lower 2012       Plan:     · This patient requires overnight observation because of  Chest pain adult with history of known CAD but negative heart catheterization   · Factors affecting the medical complexity of this patient include Principal Problem:  ·   Chest pain  · Active Problems:  ·   Chronic combined systolic and diastolic CHF, NYHA class 2 ([facility])  ·   ASHD (arteriosclerotic heart disease) /  CABG   · Resolved Problems:  ·   * No resolved hospital problems. *  ·   · Estimated length of stay is 1 days  ·  cardiac enzymes are negative. Patient is completely asymptomatic other than headache from nitroglycerin. Recommended discharge home and follow up with outpatient. Patient was pleased with this. · High risk medication monitorin    CORE MEASURES  Core measures including DVT prophylaxis, Code Status, Nutrition, Therapy Options, chart reviewed and advance directives reviewed at this visit.     Bryce Smith MD  2019, 1:39 PM

## 2019-04-28 NOTE — ED NOTES
Writer informed that patient can not be taken to floor for admission until midnight due to lack of nursing staff to care for patient.      Ricardo Chua RN  04/27/19 0400

## 2019-04-28 NOTE — PROGRESS NOTES
Patient states his pain is a 3-4/10, states it as an achy type of pain that starts upper left chest radiating to his left armpit and arm. Patient states he does have full sensation in the LUE but notices in his fingertips some numbness/tingling. EKG done per orders.  Will continue to monitor

## 2019-04-28 NOTE — DISCHARGE SUMMARY
Home    Patient will be followed by Nelsy Riggs MD in 1-2 weeks,   Follow up with Dr. Miguel Mc    Signed:  Neil Garay  4/28/2019, 1:44 PM

## 2019-04-28 NOTE — PROGRESS NOTES
Admit from the ER Dx. Chest pain. PT is alert and oriented, has chest pain rated a 2. SR no ectopy rates in the 70's.

## 2019-05-16 RX ORDER — ATORVASTATIN CALCIUM 40 MG/1
40 TABLET, FILM COATED ORAL DAILY
Qty: 90 TABLET | Refills: 3 | Status: SHIPPED | OUTPATIENT
Start: 2019-05-16 | End: 2019-07-15 | Stop reason: SDUPTHER

## 2019-07-15 RX ORDER — ATORVASTATIN CALCIUM 40 MG/1
40 TABLET, FILM COATED ORAL DAILY
Qty: 90 TABLET | Refills: 3 | Status: SHIPPED | OUTPATIENT
Start: 2019-07-15

## 2020-12-14 ENCOUNTER — HOSPITAL ENCOUNTER (OUTPATIENT)
Dept: LAB | Age: 45
Setting detail: SPECIMEN
Discharge: HOME | End: 2020-12-14
Payer: COMMERCIAL

## 2020-12-14 PROCEDURE — U0003 INFECTIOUS AGENT DETECTION BY NUCLEIC ACID (DNA OR RNA); SEVERE ACUTE RESPIRATORY SYNDROME CORONAVIRUS 2 (SARS-COV-2) (CORONAVIRUS DISEASE [COVID-19]), AMPLIFIED PROBE TECHNIQUE, MAKING USE OF HIGH THROUGHPUT TECHNOLOGIES AS DESCRIBED BY CMS-2020-01-R: HCPCS

## 2020-12-14 PROCEDURE — C9803 HOPD COVID-19 SPEC COLLECT: HCPCS

## 2020-12-16 LAB — SARS-COV-2, NAA: NOT DETECTED

## 2021-12-21 ENCOUNTER — APPOINTMENT (OUTPATIENT)
Dept: GENERAL RADIOLOGY | Age: 46
End: 2021-12-21
Payer: COMMERCIAL

## 2021-12-21 ENCOUNTER — HOSPITAL ENCOUNTER (EMERGENCY)
Age: 46
Discharge: HOME | End: 2021-12-21
Attending: EMERGENCY MEDICINE
Payer: COMMERCIAL

## 2021-12-21 VITALS
BODY MASS INDEX: 29.66 KG/M2 | WEIGHT: 189 LBS | DIASTOLIC BLOOD PRESSURE: 83 MMHG | HEIGHT: 67 IN | RESPIRATION RATE: 18 BRPM | SYSTOLIC BLOOD PRESSURE: 135 MMHG | OXYGEN SATURATION: 96 % | HEART RATE: 80 BPM | TEMPERATURE: 98 F

## 2021-12-21 DIAGNOSIS — I49.8 BIGEMINY: ICD-10-CM

## 2021-12-21 DIAGNOSIS — D72.829 LEUKOCYTOSIS, UNSPECIFIED TYPE: ICD-10-CM

## 2021-12-21 DIAGNOSIS — R00.2 PALPITATIONS: Primary | ICD-10-CM

## 2021-12-21 DIAGNOSIS — R07.89 ATYPICAL CHEST PAIN: ICD-10-CM

## 2021-12-21 LAB
ABSOLUTE EOS #: 0.18 K/UL (ref 0–0.44)
ABSOLUTE IMMATURE GRANULOCYTE: 0.18 K/UL (ref 0–0.3)
ABSOLUTE LYMPH #: 5.25 K/UL (ref 1.1–3.7)
ABSOLUTE MONO #: 1.63 K/UL (ref 0.1–1.2)
ANION GAP SERPL CALCULATED.3IONS-SCNC: 12 MMOL/L (ref 9–17)
BASOPHILS # BLD: 0 % (ref 0–2)
BASOPHILS ABSOLUTE: 0 K/UL (ref 0–0.2)
BUN BLDV-MCNC: 26 MG/DL (ref 6–20)
BUN/CREAT BLD: 29 (ref 9–20)
CALCIUM SERPL-MCNC: 9.7 MG/DL (ref 8.6–10.4)
CHLORIDE BLD-SCNC: 103 MMOL/L (ref 98–107)
CO2: 24 MMOL/L (ref 20–31)
CREAT SERPL-MCNC: 0.9 MG/DL (ref 0.7–1.2)
D-DIMER QUANTITATIVE: <0.27 MG/L FEU (ref 0–0.59)
DIFFERENTIAL TYPE: ABNORMAL
EOSINOPHILS RELATIVE PERCENT: 1 % (ref 1–4)
GFR AFRICAN AMERICAN: >60 ML/MIN
GFR NON-AFRICAN AMERICAN: >60 ML/MIN
GFR SERPL CREATININE-BSD FRML MDRD: ABNORMAL ML/MIN/{1.73_M2}
GFR SERPL CREATININE-BSD FRML MDRD: ABNORMAL ML/MIN/{1.73_M2}
GLUCOSE BLD-MCNC: 120 MG/DL (ref 70–99)
HCT VFR BLD CALC: 41.5 % (ref 40.7–50.3)
HEMOGLOBIN: 13.9 G/DL (ref 13–17)
IMMATURE GRANULOCYTES: 1 %
LYMPHOCYTES # BLD: 29 % (ref 24–43)
MAGNESIUM: 1.8 MG/DL (ref 1.6–2.6)
MCH RBC QN AUTO: 30 PG (ref 25.2–33.5)
MCHC RBC AUTO-ENTMCNC: 33.5 G/DL (ref 28.4–34.8)
MCV RBC AUTO: 89.6 FL (ref 82.6–102.9)
MONOCYTES # BLD: 9 % (ref 3–12)
MORPHOLOGY: NORMAL
NRBC AUTOMATED: 0 PER 100 WBC
PDW BLD-RTO: 12.5 % (ref 11.8–14.4)
PLATELET # BLD: 429 K/UL (ref 138–453)
PLATELET ESTIMATE: ABNORMAL
PMV BLD AUTO: 10.1 FL (ref 8.1–13.5)
POTASSIUM SERPL-SCNC: 4.3 MMOL/L (ref 3.7–5.3)
RBC # BLD: 4.63 M/UL (ref 4.21–5.77)
RBC # BLD: ABNORMAL 10*6/UL
SEG NEUTROPHILS: 60 % (ref 36–65)
SEGMENTED NEUTROPHILS ABSOLUTE COUNT: 10.86 K/UL (ref 1.5–8.1)
SODIUM BLD-SCNC: 139 MMOL/L (ref 135–144)
TROPONIN INTERP: NORMAL
TROPONIN INTERP: NORMAL
TROPONIN T: NORMAL NG/ML
TROPONIN T: NORMAL NG/ML
TROPONIN, HIGH SENSITIVITY: 7 NG/L (ref 0–22)
TROPONIN, HIGH SENSITIVITY: <6 NG/L (ref 0–22)
WBC # BLD: 18.1 K/UL (ref 3.5–11.3)
WBC # BLD: ABNORMAL 10*3/UL

## 2021-12-21 PROCEDURE — 83735 ASSAY OF MAGNESIUM: CPT

## 2021-12-21 PROCEDURE — 85379 FIBRIN DEGRADATION QUANT: CPT

## 2021-12-21 PROCEDURE — 6370000000 HC RX 637 (ALT 250 FOR IP): Performed by: EMERGENCY MEDICINE

## 2021-12-21 PROCEDURE — 99285 EMERGENCY DEPT VISIT HI MDM: CPT

## 2021-12-21 PROCEDURE — 80048 BASIC METABOLIC PNL TOTAL CA: CPT

## 2021-12-21 PROCEDURE — 93005 ELECTROCARDIOGRAM TRACING: CPT | Performed by: EMERGENCY MEDICINE

## 2021-12-21 PROCEDURE — 85025 COMPLETE CBC W/AUTO DIFF WBC: CPT

## 2021-12-21 PROCEDURE — 71045 X-RAY EXAM CHEST 1 VIEW: CPT

## 2021-12-21 PROCEDURE — 84484 ASSAY OF TROPONIN QUANT: CPT

## 2021-12-21 RX ORDER — ACETAMINOPHEN 325 MG/1
650 TABLET ORAL ONCE
Status: COMPLETED | OUTPATIENT
Start: 2021-12-21 | End: 2021-12-21

## 2021-12-21 RX ORDER — FENOFIBRATE 160 MG/1
160 TABLET ORAL DAILY
COMMUNITY

## 2021-12-21 RX ADMIN — ACETAMINOPHEN 650 MG: 325 TABLET ORAL at 15:17

## 2021-12-21 ASSESSMENT — PAIN SCALES - GENERAL
PAINLEVEL_OUTOF10: 6
PAINLEVEL_OUTOF10: 0
PAINLEVEL_OUTOF10: 5

## 2021-12-21 ASSESSMENT — PAIN DESCRIPTION - LOCATION: LOCATION: CHEST

## 2021-12-21 ASSESSMENT — PAIN DESCRIPTION - PAIN TYPE: TYPE: ACUTE PAIN

## 2021-12-21 NOTE — ED PROVIDER NOTES
[facility] ED  EMERGENCY DEPARTMENT ENCOUNTER      Pt Name: Tamela Hastings  MRN: [id number]  [place] 1975  Date of evaluation: 2021  Provider: Josefa Stoll MD    [address] [place] [address]       Chief Complaint   Patient presents with    Chest Pain     onset just prior to arrival.          HISTORY OF PRESENT ILLNESS   (Location/Symptom, Timing/Onset, Context/Setting, Quality, Duration, Modifying Factors, Severity)  Note limiting factors. Tamela Hsatings is a 40 y.o. male who presents to the emergency department     67-year-old patient presents emergency department relating he has had chest tightness exacerbated when feeling his heart skip a beat. He has had this similar symptomatology for proximately 24 hours in duration. The patient has a significant past history-2018, cardiac stent placed and thereafter open heart surgery. Patient denies any associated syncopal episodes. Denies any radiation discomfort neck arm or jaw. Denies any associated shortness of breath. The patient also admittedly has hypertension, hypercholesterolemia, is prediabetic all of which is controlled. The patient has positive family history in that his father  from myocardial infarction in his late 45s. Patient is currently taking a baby aspirin as well as Plavix. Denies any previous history for deep venous fibrosis or pulmonary emboli. Patient relates that he has a follow-up appointment with Dr. Cony Lujan . Nursing Notes were reviewed. REVIEW OF SYSTEMS    (2-9 systems for level 4, 10 or more for level 5)     Review of Systems   All other systems reviewed and are negative. Except as noted above the remainder of the review of systems was reviewed and negative.        PAST MEDICAL HISTORY     Past Medical History:   Diagnosis Date    CAD (coronary artery disease)     Carpal tunnel syndrome     Depressive disorder, not elsewhere classified     [facility] mellitus [facility] [address]     Heart attack ([facility] [address] 08/11/2018    STEMI    History of echocardiogram 01/08/2019    EF 40-45%. LV cavity sixe is mildly increased. Inferior and inferoseptal wall hypokenesis noted. Mild diastolic dysfunction.  Hyperlipidemia     Hypertension 2009         SURGICAL HISTORY       Past Surgical History:   Procedure Laterality Date    CARDIAC CATHETERIZATION Left 01/07/2018    [facility] Ulnar/[facility]/    CORONARY ANGIOPLASTY  08/2018    double bypass, [facility],     CORONARY ARTERY BYPASS GRAFT  2018         CURRENT MEDICATIONS       Discharge Medication List as of 12/21/2021  4:43 PM      CONTINUE these medications which have NOT CHANGED    Details   fenofibrate (TRIGLIDE) 160 MG tablet Take 160 mg by mouth dailyHistorical Med      atorvastatin (LIPITOR) 40 MG tablet Take 1 tablet by mouth daily, Disp-90 tablet, R-3Normal      lisinopril (PRINIVIL;ZESTRIL) 20 MG tablet Take 1 tablet by mouth daily, Disp-90 tablet, R-3Normal      metoprolol tartrate (LOPRESSOR) 50 MG tablet Take 1 tablet by mouth 2 times daily, Disp-60 tablet, R-3Normal      clopidogrel (PLAVIX) 75 MG tablet Take 1 tablet by mouth daily, Disp-90 tablet, R-3Normal      metFORMIN (GLUCOPHAGE) 500 MG tablet Take 500 mg by mouth 2 times daily (with meals) Historical Med      aspirin 81 MG EC tablet Take 81 mg by mouth daily. nitroGLYCERIN (NITROSTAT) 0.4 MG SL tablet up to max of 3 total doses. If no relief after 1 dose, call 911., Disp-25 tablet, R-3Normal      ALPRAZolam (XANAX) 0.25 MG tablet Take 0.25 mg by mouth 2 times daily as needed for Sleep or Anxiety. Paloma Huynh Historical Med             ALLERGIES     Patient has no known allergies.     FAMILY HISTORY       Family History   Problem Relation Age of Onset    High Blood Pressure Mother     Heart Disease Father     High Blood Pressure Father     Asthma Sister           SOCIAL HISTORY       Social History     Socioeconomic History    Marital status:  8 or more for level 5)     ED Triage Vitals [12/21/21 1330]   BP Temp Temp Source Pulse Resp SpO2 Height Weight   139/84 98 °F (36.7 °C) Tympanic 88 18 98 % -- --       Physical Exam  Vitals reviewed. Constitutional:       Appearance: Normal appearance. HENT:      Head: Normocephalic. Nose: Nose normal.      Mouth/Throat:      Mouth: Mucous membranes are moist.   Eyes:      Extraocular Movements: Extraocular movements intact. Pupils: Pupils are equal, round, and reactive to light. Cardiovascular:      Rate and Rhythm: Normal rate. Rhythm irregular. Pulses: Normal pulses. Heart sounds: Normal heart sounds. Pulmonary:      Effort: Pulmonary effort is normal.      Breath sounds: Normal breath sounds. Abdominal:      General: Abdomen is flat. Palpations: Abdomen is soft. Tenderness: There is no abdominal tenderness. Musculoskeletal:         General: Normal range of motion. Cervical back: Normal range of motion. Right lower leg: No edema. Left lower leg: No edema. Skin:     General: Skin is warm. Capillary Refill: Capillary refill takes less than 2 seconds. Findings: No lesion or rash. Neurological:      General: No focal deficit present. Mental Status: He is alert and oriented to person, place, and time. Sensory: No sensory deficit. Motor: No weakness.          DIAGNOSTIC RESULTS     EKG: All EKG's are interpreted by the Emergency Department Physician who either signs or Co-signs this chart in the absence of a cardiologist.      RADIOLOGY:   Non-plain film images such as CT, Ultrasound and MRI are read by the radiologist. Plain radiographic images are visualized and preliminarily interpreted by the emergency physician with the below findings:      Interpretation per the Radiologist below, if available at the time of this note:    XR CHEST PORTABLE   Final Result   Unchanged appearance of the chest without acute airspace disease identified. NM MYOCARDIAL SPECT REST EXERCISE OR RX    (Results Pending)         ED BEDSIDE ULTRASOUND:   Performed by ED Physician - none    LABS:  Labs Reviewed   BASIC METABOLIC PANEL - Abnormal; Notable for the following components:       Result Value    Glucose 120 (*)     BUN 26 (*)     Bun/Cre Ratio 29 (*)     All other components within normal limits   CBC WITH AUTO DIFFERENTIAL - Abnormal; Notable for the following components:    WBC 18.1 (*)     Immature Granulocytes 1 (*)     Segs Absolute 10.86 (*)     Absolute Lymph # 5.25 (*)     Absolute Mono # 1.63 (*)     All other components within normal limits   TROPONIN   MAGNESIUM   TROPONIN   D-DIMER, QUANTITATIVE       All other labs were within normal range or not returned as of this dictation. EMERGENCY DEPARTMENT COURSE and DIFFERENTIAL DIAGNOSIS/MDM:   Vitals:    Vitals:    12/21/21 1630 12/21/21 1647 12/21/21 1649 12/21/21 1650   BP: 117/79   135/83   Pulse: 84 80     Resp: 20 19 18    Temp:       TempSrc:       SpO2: 96% 96%     Weight:       Height:             MDM  Number of Diagnoses or Management Options  Atypical chest pain  Bigeminy  Leukocytosis, unspecified type  Palpitations  Diagnosis management comments: 71-year-old patient presents emergency department multiple cardiac risk factors with concerns of chest pain primarily when he feels his heart skipping a beat. There is been no history for loss of consciousness. No chest pain without the component of feeling of his heart skipping a beat.     The patient has had no recent cardiac work-up to include but not limited to cardiac stress test Holter monitors or cardiac echo    Laboratory studies imaging studies electrocardiogram interpretation have been reviewed and discussed with the patient and cardiologist on-call Dr. Kiel Coreas  requests that the patient be scheduled for 24-hour Holter monitor, complete echo, Ernestina stress scan  All this was accomplished-patient given follow-up instructions as documented  Patient encouraged to contact his primary care physician concerning leukocytosis as currently there is no obvious focus of infection    Patient acknowledges discharge diagnosis and importance of timely [id number] no additional questions or concerns was released in stable condition       Amount and/or Complexity of Data Reviewed  Decide to obtain previous medical records or to obtain history from someone other than the patient: yes          REASSESSMENT   Serial cardiac enzymes performed found to be negative, D-dimer negative.  Patient remains comfortable pain-free when he is not having PVC    ED Course as of 12/23/21 1301   Tue Dec 21, 2021   1342 EKG 12 Lead  Performed at 1339-the ventricular rate is 62-sinus rhythm with frequent PVCs-PA interval 0.146-QRS duration 1.094-QTc corrected 0.367 no ST segment elevation [RS]   1513 XR CHEST PORTABLE  Chest x-ray negative radiologist read [RS]   Thu Dec 23, 2021   1258 CBC Auto Differential(!):    WBC 18.1(!)   RBC 4.63   Hemoglobin Quant 13.9   Hematocrit 41.5   MCV 89.6   MCH 30.0   MCHC 33.5   RDW 12.5   Platelet Count 547   MPV 10.1   NRBC Automated 0.0   Differential Type NOT REPORTED   WBC Morphology NOT REPORTED   RBC Morphology NOT REPORTED   Platelet Estimate NOT REPORTED   Seg Neutrophils 60   Lymphocytes 29   Monocytes 9   Eosinophils % 1   Immature Granulocytes 1(!)   Basophils 0   Segs Absolute 10.86(!)   Absolute Lymph # 5.25(!)   Absolute Mono # 1.63(!)   Absolute Eos # 0.18   Absolute Immature Granulocyte 0.18   Basophils Absolute 0.00   Morphology Normal [RS]   1259 Troponin:    Troponin, High Sensitivity <6   Troponin T NOT REPORTED   Troponin Interp NOT REPORTED [RS]   7022 Basic Metabolic Panel(!):    Glucose 120(!)   BUN 26(!)   Creatinine 0.90   Bun/Cre Ratio 29(!)   CALCIUM, SERUM, 669952 9.7   Sodium 139   Potassium 4.3   Chloride 103   CO2 24   Anion Gap 12   GFR Non- >60   GFR  >60 GFR Comment        GFR Staging      [RS]   1259 D-Dimer, Quantitative:    D-Dimer, Quant <0.27 [RS]      ED Course User Index  [RS] Yakov Go MD         CRITICAL CARE TIME   Total Critical Care time was minutes, excluding separately reportable procedures. There was a high probability of clinically significant/life threatening deterioration in the patient's condition which required my urgent intervention. CONSULTS:  None    PROCEDURES:  Unless otherwise noted below, none     Procedures    FINAL IMPRESSION      1. Palpitations    2. Bigeminy    3. Leukocytosis, unspecified type    4. Atypical chest pain          DISPOSITION/PLAN   DISPOSITION Decision To Discharge 12/21/2021 04:54:31 PM      PATIENT REFERRED TO:  No follow-up provider specified. DISCHARGE MEDICATIONS:  Discharge Medication List as of 12/21/2021  4:43 PM        Controlled Substances Monitoring:     RX Monitoring 3/2/2019   Attestation The Prescription Monitoring Report for this patient was reviewed today. Acute Pain Prescriptions Prescription exceeds daily limit for a specific reason. See comments or note. ;Severe pain not adequately treated with lower dose.        (Please note that portions of this note were completed with a voice recognition program.  Efforts were made to edit the dictations but occasionally words are mis-transcribed.)    Yakov Go MD (electronically signed)  Attending Emergency Physician           Yakov Go MD  12/23/21 0868

## 2021-12-21 NOTE — Clinical Note
Lianet Valenzuela was seen and treated in our emergency department on 12/21/2021. He may return to work on 12/23/2021. If you have any questions or concerns, please don't hesitate to call.       Gertrude Goldman MD

## 2021-12-22 ENCOUNTER — HOSPITAL ENCOUNTER (OUTPATIENT)
Dept: NON INVASIVE DIAGNOSTICS | Age: 46
Discharge: HOME | End: 2021-12-22
Payer: COMMERCIAL

## 2021-12-22 LAB
EKG ATRIAL RATE: 62 BPM
EKG P AXIS: 36 DEGREES
EKG P-R INTERVAL: 146 MS
EKG Q-T INTERVAL: 362 MS
EKG QRS DURATION: 94 MS
EKG QTC CALCULATION (BAZETT): 367 MS
EKG R AXIS: 72 DEGREES
EKG T AXIS: 30 DEGREES
EKG VENTRICULAR RATE: 62 BPM
LV EF: 43 %
LVEF MODALITY: NORMAL

## 2021-12-22 PROCEDURE — 93010 ELECTROCARDIOGRAM REPORT: CPT | Performed by: FAMILY MEDICINE

## 2021-12-22 PROCEDURE — 93017 CV STRESS TEST TRACING ONLY: CPT

## 2021-12-22 PROCEDURE — 78452 HT MUSCLE IMAGE SPECT MULT: CPT

## 2021-12-22 PROCEDURE — 93226 XTRNL ECG REC<48 HR SCAN A/R: CPT

## 2021-12-22 PROCEDURE — 3430000000 HC RX DIAGNOSTIC RADIOPHARMACEUTICAL: Performed by: FAMILY MEDICINE

## 2021-12-22 PROCEDURE — 6360000002 HC RX W HCPCS: Performed by: FAMILY MEDICINE

## 2021-12-22 PROCEDURE — 93225 XTRNL ECG REC<48 HRS REC: CPT

## 2021-12-22 PROCEDURE — A9500 TC99M SESTAMIBI: HCPCS | Performed by: FAMILY MEDICINE

## 2021-12-22 PROCEDURE — 93306 TTE W/DOPPLER COMPLETE: CPT

## 2021-12-22 RX ADMIN — REGADENOSON 0.4 MG: 0.08 INJECTION, SOLUTION INTRAVENOUS at 11:15

## 2021-12-22 RX ADMIN — Medication 10 MILLICURIE: at 11:01

## 2021-12-22 RX ADMIN — Medication 30 MILLICURIE: at 11:01

## 2021-12-23 NOTE — PROCEDURES
[address] [facility] [id number]                              CARDIAC STRESS TEST    PATIENT NAME: Radha Chin                    :        1975  MED REC NO:   [id number]                              ROOM:  ACCOUNT NO:   [de-identified]                           ADMIT DATE: 2021  PROVIDER:     Barbara Cleveland MD    CARDIOVASCULAR DIAGNOSTIC DEPARTMENT    DATE OF STUDY:  2021    ORDERING PROVIDER:  Barbara Cleveland MD    PRIMARY CARE PROVIDER:  Yesenia Madison MD    INTERPRETING PHYSICIAN:  Barbara Cleveland MD    EXERCISE MYOCARDIAL PERFUSION STRESS TEST REPORT    Rest/stress single-isotope SPECT imaging with exercise stress and gated  SPECT imaging. INDICATION:  Assessment of recent chest pain and/or discomfort. CLINICAL HISTORY:  The patient is a 73-year-old man with known coronary  artery disease. Previous cardiac history includes:  Coronary artery disease, stress  test, cardiac catheterization, coronary artery bypass graft, myocardial  infarction. Other previous history includes:  Chest pain, palpitations, fatigue,  dyspnea, lightheadedness, diabetes mellitus, hypertension, family  history of coronary artery disease in father. Symptoms just prior to testing included:  None. Relevant medications:  Metoprolol. PROCEDURE:  The patient performed treadmill exercise using a Edwar  protocol, completing 9:07 minutes and completing an estimated workload  of 01.77 metabolic equivalents (METS). The patient was unable to continue exercise testing due to leg pain, and  so regadenoson, at a dose of 0.4 mg, was given in order to optimize  cardiac stress imaging. The test was terminated due to leg fatigue. The heart rate was 82 beats per minute at baseline and increased to 133  beats per minute at peak exercise, which was 76% of the maximum  predicted heart rate.  The rest blood pressure was 110/62 mmHg and  increased to 132/82 mmHg, which is a normal response. During the  procedure, the patient developed fatigue and leg fatigue, but denied any  chest discomfort. Myocardial perfusion imaging: Imaging was performed at rest 30-45  minutes following the injection of 30 mCi of sestamibi. At peak  exercise, the patient was injected with 30 mCi of sestamibi and exercise  was continued for 1 minute. Gating post-stress tomographic imaging was  performed 30-45 minutes after stress. STRESS ECG RESULTS:  The resting electrocardiogram demonstrated normal  sinus rhythm without definitive ST-segment abnormalities suggestive of  myocardial ischemia. At peak exercise and during recovery, the patient developed:    No significant ST segment changes suggestive of myocardial ischemia with  occasional premature atrial contractions (PACs) and no premature  ventricular contractions (PVCs). NUCLEAR IMAGING RESULTS:  The overall quality of the study is good. Mild/moderate attenuation artifact was seen. There is no evidence of  abnormal lung uptake. Additionally, the right ventricle appears normal.  The left ventricular cavity is noted to be normal in size on the stress  images. There is no evidence of transient ischemic dilatation (TID) of  the left ventricle. Gated SPECT imaging demonstrates hypokinesis of the inferior and  inferoseptal region(s). The left ventricular ejection fraction was  calculated to be 43%. The rest images demonstrated a small/moderate perfusion abnormality of  severe intensity in the inferior region(s). On stress imaging, a moderate perfusion abnormality of moderate/severe  intensity was noted in the inferolateral, inferior and inferoseptal  region(s). IMPRESSION:  1. Abnormal myocardial perfusion study.  There is a moderate perfusion  defect of moderate/severe intensity in the inferolateral, inferior and  inferoseptal regions during stress and rest imaging, which is most  consistent with an old myocardial infarction with jazmine-infarct ischemia. 2.  Global left ventricular systolic function was abnormal with an EF of  43% with regional wall motion abnormalities. 3.  No significant electrocardiographic evidence of myocardial ischemia  during EKG monitoring without significant associated arrhythmias. Overall, these results are most consistent with an intermediate risk for  significant coronary artery disease. Additional testing including cardiac catheterization may be indicated. The sensitivity for detecting ischemia on this test may have been  reduced due to the patient being on a beta blocker.       Luanne Zuniga MD    D: 12/23/2021 9:03:44       T: 12/23/2021 9:04:24     JIGAR/ALDEN_EDIT  Job#: [id number]     Doc#: Unknown    CC:  Anne Marie Barrios MD

## 2021-12-23 NOTE — RESULT ENCOUNTER NOTE
Please let Mr. Hayde Olmos know that his test was abnormal and please make them an appointment in 1-2 weeks if not already done. Thanks.     Diann

## 2021-12-24 LAB
ACQUISITION DURATION: NORMAL S
AVERAGE HEART RATE: 89 BPM
EKG DIAGNOSIS: NORMAL
HOLTER MAX HEART RATE: 112 BPM
HOOKUP DATE: NORMAL
HOOKUP TIME: NORMAL
MAX HEART RATE TIME/DATE: NORMAL
MIN HEART RATE TIME/DATE: NORMAL
MIN HEART RATE: 73 BPM
NUMBER OF QRS COMPLEXES: NORMAL
NUMBER OF SUPRAVENTRICULAR COUPLETS: 0
NUMBER OF SUPRAVENTRICULAR ECTOPICS: 0
NUMBER OF SUPRAVENTRICULAR ISOLATED BEATS: 0
NUMBER OF VENTRICULAR BIGEMINAL CYCLES: 192
NUMBER OF VENTRICULAR COUPLETS: 128
NUMBER OF VENTRICULAR ECTOPICS: 7837

## 2021-12-26 ENCOUNTER — APPOINTMENT (OUTPATIENT)
Dept: GENERAL RADIOLOGY | Age: 46
End: 2021-12-26
Payer: COMMERCIAL

## 2021-12-26 ENCOUNTER — HOSPITAL ENCOUNTER (OUTPATIENT)
Age: 46
Setting detail: OBSERVATION
Discharge: HOME | End: 2021-12-27
Attending: EMERGENCY MEDICINE | Admitting: INTERNAL MEDICINE
Payer: COMMERCIAL

## 2021-12-26 DIAGNOSIS — R00.2 PALPITATIONS: ICD-10-CM

## 2021-12-26 DIAGNOSIS — R07.9 CHEST PAIN AT REST: ICD-10-CM

## 2021-12-26 DIAGNOSIS — I24.9 ACS (ACUTE CORONARY SYNDROME) (HCC): Primary | ICD-10-CM

## 2021-12-26 LAB
ABSOLUTE EOS #: 0.32 K/UL (ref 0–0.44)
ABSOLUTE IMMATURE GRANULOCYTE: 0.07 K/UL (ref 0–0.3)
ABSOLUTE LYMPH #: 5.22 K/UL (ref 1.1–3.7)
ABSOLUTE MONO #: 1.13 K/UL (ref 0.1–1.2)
ALBUMIN SERPL-MCNC: 4.4 G/DL (ref 3.5–5.2)
ALBUMIN/GLOBULIN RATIO: 1.5 (ref 1–2.5)
ALP BLD-CCNC: 65 U/L (ref 40–129)
ALT SERPL-CCNC: 15 U/L (ref 5–41)
ANION GAP SERPL CALCULATED.3IONS-SCNC: 12 MMOL/L (ref 9–17)
AST SERPL-CCNC: 17 U/L
BASOPHILS # BLD: 1 % (ref 0–2)
BASOPHILS ABSOLUTE: 0.07 K/UL (ref 0–0.2)
BILIRUB SERPL-MCNC: 0.2 MG/DL (ref 0.3–1.2)
BNP INTERPRETATION: NORMAL
BUN BLDV-MCNC: 26 MG/DL (ref 6–20)
BUN/CREAT BLD: 25 (ref 9–20)
CALCIUM SERPL-MCNC: 9.9 MG/DL (ref 8.6–10.4)
CHLORIDE BLD-SCNC: 103 MMOL/L (ref 98–107)
CO2: 23 MMOL/L (ref 20–31)
CREAT SERPL-MCNC: 1.03 MG/DL (ref 0.7–1.2)
D-DIMER QUANTITATIVE: 0.28 MG/L FEU (ref 0–0.59)
DIFFERENTIAL TYPE: ABNORMAL
EOSINOPHILS RELATIVE PERCENT: 2 % (ref 1–4)
GFR AFRICAN AMERICAN: >60 ML/MIN
GFR NON-AFRICAN AMERICAN: >60 ML/MIN
GFR SERPL CREATININE-BSD FRML MDRD: ABNORMAL ML/MIN/{1.73_M2}
GFR SERPL CREATININE-BSD FRML MDRD: ABNORMAL ML/MIN/{1.73_M2}
GLUCOSE BLD-MCNC: 101 MG/DL (ref 70–99)
HCT VFR BLD CALC: 39.9 % (ref 40.7–50.3)
HEMOGLOBIN: 13.9 G/DL (ref 13–17)
IMMATURE GRANULOCYTES: 1 %
INR BLD: 1
LYMPHOCYTES # BLD: 39 % (ref 24–43)
MCH RBC QN AUTO: 31.3 PG (ref 25.2–33.5)
MCHC RBC AUTO-ENTMCNC: 34.8 G/DL (ref 28.4–34.8)
MCV RBC AUTO: 89.9 FL (ref 82.6–102.9)
MONOCYTES # BLD: 8 % (ref 3–12)
NRBC AUTOMATED: 0 PER 100 WBC
PARTIAL THROMBOPLASTIN TIME: 27.5 SEC (ref 23.9–33.8)
PDW BLD-RTO: 12.5 % (ref 11.8–14.4)
PLATELET # BLD: 379 K/UL (ref 138–453)
PLATELET ESTIMATE: ABNORMAL
PMV BLD AUTO: 9.8 FL (ref 8.1–13.5)
POTASSIUM SERPL-SCNC: 4.4 MMOL/L (ref 3.7–5.3)
PRO-BNP: 281 PG/ML
PROTHROMBIN TIME: 12.9 SEC (ref 11.5–14.2)
RBC # BLD: 4.44 M/UL (ref 4.21–5.77)
RBC # BLD: ABNORMAL 10*6/UL
SARS-COV-2, RAPID: NOT DETECTED
SEG NEUTROPHILS: 49 % (ref 36–65)
SEGMENTED NEUTROPHILS ABSOLUTE COUNT: 6.69 K/UL (ref 1.5–8.1)
SODIUM BLD-SCNC: 138 MMOL/L (ref 135–144)
SPECIMEN DESCRIPTION: NORMAL
TOTAL PROTEIN: 7.3 G/DL (ref 6.4–8.3)
TROPONIN INTERP: NORMAL
TROPONIN INTERP: NORMAL
TROPONIN T: NORMAL NG/ML
TROPONIN T: NORMAL NG/ML
TROPONIN, HIGH SENSITIVITY: <6 NG/L (ref 0–22)
TROPONIN, HIGH SENSITIVITY: <6 NG/L (ref 0–22)
WBC # BLD: 13.5 K/UL (ref 3.5–11.3)
WBC # BLD: ABNORMAL 10*3/UL

## 2021-12-26 PROCEDURE — 85730 THROMBOPLASTIN TIME PARTIAL: CPT

## 2021-12-26 PROCEDURE — 93005 ELECTROCARDIOGRAM TRACING: CPT | Performed by: EMERGENCY MEDICINE

## 2021-12-26 PROCEDURE — 99284 EMERGENCY DEPT VISIT MOD MDM: CPT

## 2021-12-26 PROCEDURE — 6360000002 HC RX W HCPCS: Performed by: EMERGENCY MEDICINE

## 2021-12-26 PROCEDURE — 85610 PROTHROMBIN TIME: CPT

## 2021-12-26 PROCEDURE — 83880 ASSAY OF NATRIURETIC PEPTIDE: CPT

## 2021-12-26 PROCEDURE — 96374 THER/PROPH/DIAG INJ IV PUSH: CPT

## 2021-12-26 PROCEDURE — 6370000000 HC RX 637 (ALT 250 FOR IP): Performed by: EMERGENCY MEDICINE

## 2021-12-26 PROCEDURE — 85379 FIBRIN DEGRADATION QUANT: CPT

## 2021-12-26 PROCEDURE — 87635 SARS-COV-2 COVID-19 AMP PRB: CPT

## 2021-12-26 PROCEDURE — 36415 COLL VENOUS BLD VENIPUNCTURE: CPT

## 2021-12-26 PROCEDURE — 80053 COMPREHEN METABOLIC PANEL: CPT

## 2021-12-26 PROCEDURE — 84484 ASSAY OF TROPONIN QUANT: CPT

## 2021-12-26 PROCEDURE — 71045 X-RAY EXAM CHEST 1 VIEW: CPT

## 2021-12-26 PROCEDURE — C9803 HOPD COVID-19 SPEC COLLECT: HCPCS

## 2021-12-26 PROCEDURE — 96376 TX/PRO/DX INJ SAME DRUG ADON: CPT

## 2021-12-26 PROCEDURE — 1200000000 HC SEMI PRIVATE

## 2021-12-26 PROCEDURE — 85025 COMPLETE CBC W/AUTO DIFF WBC: CPT

## 2021-12-26 PROCEDURE — G0378 HOSPITAL OBSERVATION PER HR: HCPCS

## 2021-12-26 RX ORDER — NITROGLYCERIN 0.4 MG/1
0.4 TABLET SUBLINGUAL EVERY 5 MIN PRN
Status: DISCONTINUED | OUTPATIENT
Start: 2021-12-26 | End: 2021-12-27 | Stop reason: HOSPADM

## 2021-12-26 RX ORDER — ASPIRIN 81 MG/1
324 TABLET, CHEWABLE ORAL ONCE
Status: COMPLETED | OUTPATIENT
Start: 2021-12-26 | End: 2021-12-26

## 2021-12-26 RX ORDER — MORPHINE SULFATE 4 MG/ML
4 INJECTION, SOLUTION INTRAMUSCULAR; INTRAVENOUS ONCE
Status: COMPLETED | OUTPATIENT
Start: 2021-12-26 | End: 2021-12-26

## 2021-12-26 RX ORDER — MORPHINE SULFATE 2 MG/ML
2 INJECTION, SOLUTION INTRAMUSCULAR; INTRAVENOUS ONCE
Status: COMPLETED | OUTPATIENT
Start: 2021-12-26 | End: 2021-12-26

## 2021-12-26 RX ADMIN — MORPHINE SULFATE 4 MG: 4 INJECTION, SOLUTION INTRAMUSCULAR; INTRAVENOUS at 21:33

## 2021-12-26 RX ADMIN — MORPHINE SULFATE 2 MG: 2 INJECTION, SOLUTION INTRAMUSCULAR; INTRAVENOUS at 19:47

## 2021-12-26 RX ADMIN — ASPIRIN 324 MG: 81 TABLET, CHEWABLE ORAL at 18:28

## 2021-12-26 RX ADMIN — NITROGLYCERIN 0.4 MG: 0.4 TABLET SUBLINGUAL at 18:52

## 2021-12-26 RX ADMIN — NITROGLYCERIN 0.4 MG: 0.4 TABLET SUBLINGUAL at 18:28

## 2021-12-26 RX ADMIN — NITROGLYCERIN 0.4 MG: 0.4 TABLET SUBLINGUAL at 19:11

## 2021-12-26 ASSESSMENT — PAIN SCALES - GENERAL
PAINLEVEL_OUTOF10: 5
PAINLEVEL_OUTOF10: 7
PAINLEVEL_OUTOF10: 5

## 2021-12-26 ASSESSMENT — PAIN DESCRIPTION - LOCATION
LOCATION: CHEST
LOCATION: CHEST

## 2021-12-26 ASSESSMENT — PAIN DESCRIPTION - PAIN TYPE
TYPE: ACUTE PAIN
TYPE: ACUTE PAIN

## 2021-12-26 NOTE — ED PROVIDER NOTES
[address] [facility]  EMERGENCY DEPARTMENT ENCOUNTER      Pt Name: Jimmy Daniels  MRN: [id number]  [place] 1975  Date of evaluation: 12/26/2021  Provider: Monroe Leigh MD    CHIEF COMPLAINT       Chief Complaint   Patient presents with    Chest Pain     patient was seen here last week. chest pain ongoing for past week. patient had a stress test, echo, and event monitor this week. history of open heart. HISTORY OF PRESENT ILLNESS   (Location/Symptom, Timing/Onset, Context/Setting, Quality, Duration, Modifying Factors, Severity)  Note limiting factors. Jimmy Daniels is a 40 y.o. male who presents to the emergency department      43-year-old male presents emergency department for evaluation of left anterior chest pressure that began suddenly while he was watching TV at home. Pain has been constant since. Denies any nausea or vomiting. No diaphoresis. No cough or shortness of breath. Patient has a history of bypass surgery following vascular injury during cardiac stenting. Patient does have nitroglycerin but he had not taken any yet at home because last time he was seen in the emergency department was told his blood pressure was low. Denies any weakness dizziness lightheadedness. Did take a baby aspirin this morning. No recent medication changes. Did have a recent outpatient Holter monitor cardiac echo and stress test.          Nursing Notes were reviewed. REVIEW OF SYSTEMS    (2-9 systems for level 4, 10 or more for level 5)     Review of Systems   All other systems reviewed and are negative. Except as noted above the remainder of the review of systems was reviewed and negative. PAST MEDICAL HISTORY     Past Medical History:   Diagnosis Date    CAD (coronary artery disease)     Carpal tunnel syndrome     Depressive disorder, not elsewhere classified     Diabetes mellitus ([address]     Heart attack ([facility] [address] 08/11/2018    STEMI    History of echocardiogram 01/08/2019    EF 40-45%. LV cavity sixe is mildly increased. Inferior and inferoseptal wall hypokenesis noted. Mild diastolic dysfunction.  Hyperlipidemia     Hypertension 2009         SURGICAL HISTORY       Past Surgical History:   Procedure Laterality Date    CARDIAC CATHETERIZATION Left 01/07/2018    [facility]/    CORONARY ANGIOPLASTY  08/2018    double bypass, [facility],     CORONARY ARTERY BYPASS GRAFT  2018         CURRENT MEDICATIONS       Previous Medications    ALPRAZOLAM (XANAX) 0.25 MG TABLET    Take 0.25 mg by mouth 2 times daily as needed for Sleep or Anxiety. .    ASPIRIN 81 MG EC TABLET    Take 81 mg by mouth daily. ATORVASTATIN (LIPITOR) 40 MG TABLET    Take 1 tablet by mouth daily    CLOPIDOGREL (PLAVIX) 75 MG TABLET    Take 1 tablet by mouth daily    FENOFIBRATE (TRIGLIDE) 160 MG TABLET    Take 160 mg by mouth daily    LISINOPRIL (PRINIVIL;ZESTRIL) 20 MG TABLET    Take 1 tablet by mouth daily    METFORMIN (GLUCOPHAGE) 500 MG TABLET    Take 500 mg by mouth 2 times daily (with meals)     METOPROLOL TARTRATE (LOPRESSOR) 50 MG TABLET    Take 1 tablet by mouth 2 times daily    NITROGLYCERIN (NITROSTAT) 0.4 MG SL TABLET    up to max of 3 total doses. If no relief after 1 dose, call 911. ALLERGIES     Patient has no known allergies.     FAMILY HISTORY       Family History   Problem Relation Age of Onset    High Blood Pressure Mother     Heart Disease Father     High Blood Pressure Father     Asthma Sister           SOCIAL HISTORY       Social History     Socioeconomic History    Marital status:      Spouse name: Not on file    Number of children: Not on file    Years of education: Not on file    Highest education level: Not on file   Occupational History    Not on file   Tobacco Use    Smoking status: Current Some Day Smoker     Packs/day: 0.00     Types: Cigarettes    Smokeless tobacco: Never Used   Substance and Sexual Activity    Alcohol use: No    Drug use: No    Sexual activity: Yes     Partners: Female   Other Topics Concern    Not on file   Social History Narrative    Not on file     Social Determinants of Health     Financial Resource Strain:     Difficulty of Paying Living Expenses: Not on file   Food Insecurity:     Worried About Running Out of Food in the Last Year: Not on file    Jaylen of Food in the Last Year: Not on file   Transportation Needs:     Lack of Transportation (Medical): Not on file    Lack of Transportation (Non-Medical): Not on file   Physical Activity:     Days of Exercise per Week: Not on file    Minutes of Exercise per Session: Not on file   Stress:     Feeling of Stress : Not on file   Social Connections:     Frequency of Communication with Friends and Family: Not on file    Frequency of Social Gatherings with Friends and Family: Not on file    Attends [employer] Services: Not on file    Active Member of [address] [place] [address] Chef or Organizations: Not on file    Attends Club or Organization Meetings: Not on file    Marital Status: Not on file   Intimate Partner Violence:     Fear of Current or Ex-Partner: Not on file    Emotionally Abused: Not on file    Physically Abused: Not on file    Sexually Abused: Not on file   Housing Stability:     Unable to Pay for Housing in the Last Year: Not on file    Number of [place] in the Last Year: Not on file    Unstable Housing in the Last Year: Not on file       SCREENINGS                        PHYSICAL EXAM    (up to 7 for level 4, 8 or more for level 5)     ED Triage Vitals [12/26/21 1804]   BP Temp Temp Source Pulse Resp SpO2 Height Weight   -- 97.9 °F (36.6 °C) Tympanic -- -- -- -- --       Physical Exam  Vitals and nursing note reviewed. Constitutional:       General: He is not in acute distress. Appearance: He is not toxic-appearing. HENT:      Head: Normocephalic and atraumatic. Cardiovascular:      Rate and Rhythm: Normal rate and regular rhythm. Pulmonary:      Effort: Pulmonary effort is normal. No respiratory distress. Breath sounds: Normal breath sounds. Neurological:      General: No focal deficit present. Mental Status: He is alert and oriented to person, place, and time. Psychiatric:         Mood and Affect: Mood normal.         Behavior: Behavior normal.         DIAGNOSTIC RESULTS     EKG: All EKG's are interpreted by the Emergency Department Physician who either signs or Co-signs this chart in the absence of a cardiologist.    Sinus rhythm rate of 85 bpm.  Jony PVCs. T wave inversions lateral leads which is unchanged when compared with previous EKG from 4/28/2019. No acute ST segment or T wave findings. Sinus rhythm frequent PVCs no acute findings of ischemia or infarction. RADIOLOGY:   Non-plain film images such as CT, Ultrasound and MRI are read by the radiologist. Plain radiographic images are visualized and preliminarily interpreted by the emergency physician with the below findings:        Interpretation per the Radiologist below, if available at the time of this note:    No orders to display         ED BEDSIDE ULTRASOUND:   Performed by ED Physician - none    LABS:  Labs Reviewed - No data to display    All other labs were within normal range or not returned as of this dictation. EMERGENCY DEPARTMENT COURSE and DIFFERENTIAL DIAGNOSIS/MDM:   Vitals:    Vitals:    12/26/21 1804   Temp: 97.9 °F (36.6 °C)   TempSrc: Tympanic           MDM  Number of Diagnoses or Management Options  ACS (acute coronary syndrome) ([facility] [address]  Diagnosis management comments: Aspirin and nitroglycerin given. EKG PVCs otherwise unchanged. Troponin negative. Recent studies reviewed. Patient did have an abnormal myocardial perfusion study performed on the 12/21/2021. Per cardiology read there was moderate perfusion defect in the inferior lateral pleural and inferior septal regions. Attempted to call Dr. Rubén Bojorquez were unable to reach him.   Discussed via telephone with Dr. Chey Padilla cardiology at [facility]. [facility]. Per our discussion we will repeat troponin if repeat troponin is negative patient can be considered for an outpatient cardiac catheterization or observation admission but did not need to be transferred to [facility] unless there was change in his EKG or troponin or if his chest pain does not resolve. .  Patient reevaluated. After 3 sublingual nitro he still had some minor chest discomfort. Morphine was ordered. Discussed via telephone with Dr. Yeni Corona as well. Repeat troponin ordered for 9 PM.  Repeat EKG at 9 PM.  If no significant change and patient is chest pain-free will likely admit him here for observation. Patient and family updated on plan of care. MIPS       REASSESSMENT          CRITICAL CARE TIME   Total Critical Care time was  minutes, excluding separately reportable procedures. There was a high probability of clinically significant/life threatening deterioration in the patient's condition which required my urgent intervention. CONSULTS:  None    PROCEDURES:  Unless otherwise noted below, none     Procedures        FINAL IMPRESSION    No diagnosis found. DISPOSITION/PLAN   DISPOSITION        PATIENT REFERRED TO:  No follow-up provider specified. DISCHARGE MEDICATIONS:  New Prescriptions    No medications on file     Controlled Substances Monitoring:     RX Monitoring 3/2/2019   Attestation The Prescription Monitoring Report for this patient was reviewed today. Acute Pain Prescriptions Prescription exceeds daily limit for a specific reason. See comments or note. ;Severe pain not adequately treated with lower dose.        (Please note that portions of this note were completed with a voice recognition program.  Efforts were made to edit the dictations but occasionally words are mis-transcribed.)    Pretty Andujar MD (electronically signed)  Attending Emergency Physician             Pretty Andujar MD  01/11/22 6829

## 2021-12-26 NOTE — ED NOTES
Called Dr Angel Duenas via cell phone, left voicemail, waiting for call back.       Linda Ibarra  12/26/21 1638

## 2021-12-27 ENCOUNTER — TELEPHONE (OUTPATIENT)
Dept: CARDIOLOGY | Age: 46
End: 2021-12-27

## 2021-12-27 VITALS
SYSTOLIC BLOOD PRESSURE: 115 MMHG | HEIGHT: 67 IN | HEART RATE: 82 BPM | OXYGEN SATURATION: 96 % | BODY MASS INDEX: 30.01 KG/M2 | RESPIRATION RATE: 18 BRPM | TEMPERATURE: 97.4 F | WEIGHT: 191.2 LBS | DIASTOLIC BLOOD PRESSURE: 57 MMHG

## 2021-12-27 DIAGNOSIS — R07.9 CHEST PAIN AT REST: ICD-10-CM

## 2021-12-27 DIAGNOSIS — R00.2 PALPITATIONS: Primary | ICD-10-CM

## 2021-12-27 LAB
EKG ATRIAL RATE: 85 BPM
EKG ATRIAL RATE: 87 BPM
EKG P AXIS: 24 DEGREES
EKG P AXIS: 25 DEGREES
EKG P-R INTERVAL: 138 MS
EKG P-R INTERVAL: 144 MS
EKG Q-T INTERVAL: 366 MS
EKG Q-T INTERVAL: 368 MS
EKG QRS DURATION: 94 MS
EKG QRS DURATION: 94 MS
EKG QTC CALCULATION (BAZETT): 435 MS
EKG QTC CALCULATION (BAZETT): 442 MS
EKG R AXIS: 68 DEGREES
EKG R AXIS: 69 DEGREES
EKG T AXIS: 30 DEGREES
EKG T AXIS: 32 DEGREES
EKG VENTRICULAR RATE: 85 BPM
EKG VENTRICULAR RATE: 87 BPM

## 2021-12-27 PROCEDURE — 93010 ELECTROCARDIOGRAM REPORT: CPT | Performed by: FAMILY MEDICINE

## 2021-12-27 PROCEDURE — 93242 EXT ECG>48HR<7D RECORDING: CPT

## 2021-12-27 PROCEDURE — 99223 1ST HOSP IP/OBS HIGH 75: CPT | Performed by: FAMILY MEDICINE

## 2021-12-27 PROCEDURE — 6370000000 HC RX 637 (ALT 250 FOR IP): Performed by: FAMILY MEDICINE

## 2021-12-27 PROCEDURE — 6370000000 HC RX 637 (ALT 250 FOR IP): Performed by: INTERNAL MEDICINE

## 2021-12-27 PROCEDURE — 93243 EXT ECG>48HR<7D SCAN A/R: CPT

## 2021-12-27 PROCEDURE — G0378 HOSPITAL OBSERVATION PER HR: HCPCS

## 2021-12-27 RX ORDER — LISINOPRIL 20 MG/1
20 TABLET ORAL DAILY
Status: DISCONTINUED | OUTPATIENT
Start: 2021-12-27 | End: 2021-12-27 | Stop reason: HOSPADM

## 2021-12-27 RX ORDER — DILTIAZEM HYDROCHLORIDE 120 MG/1
120 CAPSULE, COATED, EXTENDED RELEASE ORAL DAILY
Status: DISCONTINUED | OUTPATIENT
Start: 2021-12-27 | End: 2021-12-27 | Stop reason: HOSPADM

## 2021-12-27 RX ORDER — CLOPIDOGREL BISULFATE 75 MG/1
75 TABLET ORAL DAILY
Status: DISCONTINUED | OUTPATIENT
Start: 2021-12-27 | End: 2021-12-27 | Stop reason: HOSPADM

## 2021-12-27 RX ORDER — ASPIRIN 81 MG/1
81 TABLET, CHEWABLE ORAL DAILY
Status: DISCONTINUED | OUTPATIENT
Start: 2021-12-27 | End: 2021-12-27 | Stop reason: HOSPADM

## 2021-12-27 RX ORDER — METOPROLOL SUCCINATE 100 MG/1
100 TABLET, EXTENDED RELEASE ORAL DAILY
Status: DISCONTINUED | OUTPATIENT
Start: 2021-12-28 | End: 2021-12-27 | Stop reason: HOSPADM

## 2021-12-27 RX ORDER — METOPROLOL SUCCINATE 100 MG/1
100 TABLET, EXTENDED RELEASE ORAL DAILY
Qty: 30 TABLET | Refills: 0 | Status: SHIPPED | OUTPATIENT
Start: 2021-12-28 | End: 2022-01-21 | Stop reason: SDUPTHER

## 2021-12-27 RX ORDER — ATORVASTATIN CALCIUM 40 MG/1
40 TABLET, FILM COATED ORAL NIGHTLY
Status: DISCONTINUED | OUTPATIENT
Start: 2021-12-27 | End: 2021-12-27 | Stop reason: HOSPADM

## 2021-12-27 RX ORDER — ALPRAZOLAM 0.25 MG/1
0.25 TABLET ORAL 2 TIMES DAILY PRN
Status: DISCONTINUED | OUTPATIENT
Start: 2021-12-27 | End: 2021-12-27 | Stop reason: HOSPADM

## 2021-12-27 RX ORDER — FENOFIBRATE 160 MG/1
160 TABLET ORAL DAILY
Status: DISCONTINUED | OUTPATIENT
Start: 2021-12-27 | End: 2021-12-27 | Stop reason: HOSPADM

## 2021-12-27 RX ORDER — METOPROLOL TARTRATE 50 MG/1
50 TABLET, FILM COATED ORAL 2 TIMES DAILY
Status: DISCONTINUED | OUTPATIENT
Start: 2021-12-27 | End: 2021-12-27

## 2021-12-27 RX ORDER — DILTIAZEM HYDROCHLORIDE 120 MG/1
120 CAPSULE, COATED, EXTENDED RELEASE ORAL DAILY
Qty: 30 CAPSULE | Refills: 0 | Status: SHIPPED | OUTPATIENT
Start: 2021-12-28 | End: 2022-01-21 | Stop reason: SDUPTHER

## 2021-12-27 RX ADMIN — FENOFIBRATE 160 MG: 160 TABLET ORAL at 08:09

## 2021-12-27 RX ADMIN — LISINOPRIL 20 MG: 20 TABLET ORAL at 08:09

## 2021-12-27 RX ADMIN — METFORMIN HYDROCHLORIDE 500 MG: 500 TABLET ORAL at 08:09

## 2021-12-27 RX ADMIN — CLOPIDOGREL BISULFATE 75 MG: 75 TABLET ORAL at 08:09

## 2021-12-27 RX ADMIN — ASPIRIN 81 MG: 81 TABLET, CHEWABLE ORAL at 08:09

## 2021-12-27 RX ADMIN — DILTIAZEM HYDROCHLORIDE 120 MG: 120 CAPSULE, COATED, EXTENDED RELEASE ORAL at 13:10

## 2021-12-27 RX ADMIN — METOPROLOL TARTRATE 50 MG: 50 TABLET, FILM COATED ORAL at 08:09

## 2021-12-27 ASSESSMENT — PAIN DESCRIPTION - PAIN TYPE
TYPE: ACUTE PAIN
TYPE: ACUTE PAIN

## 2021-12-27 ASSESSMENT — PAIN SCALES - GENERAL
PAINLEVEL_OUTOF10: 6
PAINLEVEL_OUTOF10: 3

## 2021-12-27 ASSESSMENT — PAIN DESCRIPTION - LOCATION
LOCATION: CHEST
LOCATION: CHEST

## 2021-12-27 ASSESSMENT — PAIN DESCRIPTION - ORIENTATION: ORIENTATION: LEFT;MID

## 2021-12-27 NOTE — PROGRESS NOTES
Left message for Dr. Iva Coleman asking for admission orders for pt and something to treat his chest, pain and also mentioned hes here for ACS

## 2021-12-27 NOTE — RESULT ENCOUNTER NOTE
Please make an appointment for Mr. Jennie Mclaughlin with me in the next 2-3 weeks. Thanks.     Diann

## 2021-12-27 NOTE — DISCHARGE SUMMARY
Mckenna Garcia M.D. Internal Medicine Discharge Summary    Patient ID:  Sebastian Srivastava  [id number]  1975    Admission date: 12/26/2021    Discharge date: 12/27/2021     Admitting Physician: Jimena Andrews MD     Primary Care Physician: Marlene Roberts MD     Primary Discharge Diagnoses:   Patient Active Problem List    Diagnosis Date Noted    Acute coronary syndromes [facility]) 12/26/2021    ASHD (arteriosclerotic heart disease) /  CABG 2018 04/28/2019    Ischemic cardiomyopathy 01/08/2019    Chronic combined systolic and diastolic CHF, NYHA class 2 ([facility] [address] 01/08/2019    S/P CABG (coronary artery bypass graft)     Dyslipidemia     Acute coronary syndrome ([facility] [address] 01/07/2019    Mild congestive heart failure ([address] 01/07/2019    Chest pain 06/02/2015    HTN (hypertension) 06/02/2015    Tobacco abuse 06/02/2015    Family history of premature CAD 06/02/2015    Pain in rib, left lower 05/31/2012       Additional Diagnoses:       Diagnosis Date    CAD (coronary artery disease)     Carpal tunnel syndrome     Depressive disorder, not elsewhere classified     Diabetes mellitus ([facility] [address]     Heart attack ([facility] [address] 08/11/2018    STEMI    History of echocardiogram 01/08/2019    EF 40-45%. LV cavity sixe is mildly increased. Inferior and inferoseptal wall hypokenesis noted. Mild diastolic dysfunction.  Hyperlipidemia     Hypertension 2009        Hospital Course: The patient was admitted for chest pain suspicious for acute coronary syndrome. He presented to the ER yesterday with left anterior chest pressure while watching TV at home. The pain is constant and radiated through to his back but not to his arm or neck. He denies any diaphoresis or SOB. He has a known h/o CAD and required emergent CABG following heart cath due to vascular injury during a stent placement. He had an abnormal myocardial perfusion study 12/21/21 with moderate perfusion defect in the inferior lateral and inferior septal regions. Troponins were negative and EKG showed no ischemic changes. Per Cardiology Lopressor was changed to Toprol  mg po QD and Diltiazem  mg po QD was added.        Discharge Exam:  GEN:    Awake, alert and oriented x3. EYES:  EOMI, pupils equal   NECK: Supple. No lymphadenopathy. No carotid bruit  CVS:    regular rate and rhythm, no audible murmur  PULM:  CTA, no wheezes, rales or rhonchi, no acute respiratory distress  ABD:    Bowels sounds normal.  Abdomen is soft. No distention. no tenderness to palpation. EXT:   no edema bilaterally . No calf tenderness. NEURO: Moves all extremities. Motor and sensory are grossly intact  SKIN:  No rashes. No skin lesions. Consultants:    · Dr. Diann Rivera, cardiology    Procedures:    · none    Complications:   · none    Significant Diagnostic Studies:   · Discharge Labs:  Lab Results   Component Value Date    WBC 13.5 (H) 12/26/2021    HGB 13.9 12/26/2021    MCV 89.9 12/26/2021     12/26/2021      Lab Results   Component Value Date    GLUCOSE 101 (H) 12/26/2021    BUN 26 (H) 12/26/2021    CREATININE 1.03 12/26/2021     12/26/2021    K 4.4 12/26/2021    CALCIUM 9.9 12/26/2021     12/26/2021    CO2 23 12/26/2021       Discharge Condition:   · stable    Disposition:   · Discharge to Home    Discharge Medications:     Medication List      START taking these medications    dilTIAZem 120 MG extended release capsule  Commonly known as: CARDIZEM CD  Take 1 capsule by mouth daily  Start taking on: December 28, 2021     metoprolol succinate 100 MG extended release tablet  Commonly known as: TOPROL XL  Take 1 tablet by mouth daily  Start taking on: December 28, 2021        CHANGE how you take these medications    nitroGLYCERIN 0.4 MG SL tablet  Commonly known as: NITROSTAT  up to max of 3 total doses. If no relief after 1 dose, call 911.   What changed:   · how much to take  · how to take this  · when to take this  · reasons to take this CONTINUE taking these medications    ALPRAZolam 0.25 MG tablet  Commonly known as: XANAX     aspirin 81 MG EC tablet     atorvastatin 40 MG tablet  Commonly known as: LIPITOR  Take 1 tablet by mouth daily     clopidogrel 75 MG tablet  Commonly known as: PLAVIX  Take 1 tablet by mouth daily     fenofibrate 160 MG tablet  Commonly known as: TRIGLIDE     lisinopril 20 MG tablet  Commonly known as: PRINIVIL;ZESTRIL  Take 1 tablet by mouth daily     metFORMIN 500 MG tablet  Commonly known as: GLUCOPHAGE        STOP taking these medications    metoprolol tartrate 50 MG tablet  Commonly known as: LOPRESSOR           Where to Get Your Medications      These medications were sent to [address] [phone]  [address] [place] [address]    Phone: [phone]   · dilTIAZem 120 MG extended release capsule  · metoprolol succinate 100 MG extended release tablet         Patient Instructions:   · Activity: activity as tolerated  · Diet: diabetic diet  · Wound Care: none needed  · Follow up with Brandin Caldwell MD in 1-2 weeks   · Follow-up with Dr. Bard Mendoza as directed    [facility] on Discharge (if applicable)  ACE/ARB in CHF: N/A  ASA in MI: N/A  Statin in MI: N/A  Statin in CVA: N/A  Antiplatelet in CVA: N/A    Total time spent on discharge services: 35 minutes  Including the following activities:  · Evaluation and Management of patient  · Discussion with patient and/or surrogate about current care plan  · Coordination with Case Management and/or   · Coordination of care with Consultants (if applicable)   · Coordination of care with Receiving Facility Physician (if applicable)  · Completion of DME forms (if applicable)  · Preparation of Discharge Summary  · Preparation of Medication Reconciliation  · Preparation of Discharge Prescriptions      Signed:  Marcella Cruz MD, M.D.  12/27/2021  4:32 PM

## 2021-12-27 NOTE — PROGRESS NOTES
Received a callback from Dr. Sandra Olson notified him the patients name, age, room number, and his admitting dx, then told him I needed admission orders because this patient has been up to the floor since midnight. I was told to put in orders for observation status for chest pain, restart home meds, consult cardio ( notified him that varun is suppose to round on pt today per ED nurse), keep npo but sips of water, continuous tele is also okay, when I asked for something for something for pts chest pain I was told no.

## 2021-12-27 NOTE — PROGRESS NOTES
Pt reports he was finally able to take a little nap, chest pain is still there continuously but has toned down a little bit to a 5-6 out of ten but he is still having those intermittent sharp pains that make him jump and want to yell,

## 2021-12-27 NOTE — PROGRESS NOTES
Pt arrived up to room, tele hooked up and VS obtained were WNL, only complaint pt had is that his chest is hurting left lower and then not radiating to the right , he also reports some intermittent numbness.  Tingling / weird sensation in his left fingers which is new per pt, hes rating his chest pain 8/10 and sometimes worse when he has these sharp shooting spasms In his chest. Notified pt I would be calling dr Benita Dick for orders shortly

## 2021-12-27 NOTE — CONSULTS
Joselo Jc am scribing for and in the presence of Pili Briseno. Diann GONZALEZ, MS, F.A.C.C. Patient: Adeola Saucedo  : 1975  Date of Admission: 2021  Primary Care Physician: Dhruv Cordon  Today's Date: 2021    REASON FOR CONSULTATION/CC: Chest Discomfort and Abnormal Stress Test    HPI: I had the pleasure of seeing Mr. Edd Boyce today who is a 40 y.o. male with a history of intermittent chest discomfort leading to this consultation. He reported that his chest pain started last night. He was just sitting down watching a movie. He also stated that he started to have heart palpitations as well. His heart palpitations lasted until about midnight last night. He stated that his chest pain felt like an ache pain. He had a stress test on 2018 (inferior wall defect with jazmine-infarct ischemia, ejection fraction 47% with inferior wall hypokinesis). Cardiac catheterization back in 2018, he had a retrograde dissection of the RCA back to the right coronary cusp and the ascending aorta.  He was taken for an emergent CABG with SVG to RCA. Echo on 2019: EF 40-45%. LV cavity sixe is mildly increased. Inferior and inferoseptal wall hypokenesis noted. Mild diastolic dysfunction. Cardiac Catheterization on 2019: Severe single vessel disease involving the LAD, circumflex and right coronary arteries. 1 of 1 bypass grafts patent. Normal LVEDP. Stress test done on 2021 showed abnormal myocardial perfusion study. There is a moderate perfusion defect of moderate/severe intensity in the inferolateral, inferior and inferoseptal regions during stress and rest imaging, which is most consistent with an old myocardial infarction with jazmine-infarct ischemia. EF was 43%. Echo done on 2021 showed the global left ventricular systolic function appears mildly reduced with an estimated ejection fraction of 40-45%. The inferoseptal and inferior walls appear hypokinetic in their motion. The left ventricular cavity size is within normal limits and the left ventricular wall thickness is mildly increased. Holter done on 12/22/2021 showed rhythm was sinus. Average AK interval 0.16, average QRS duration 0.09. Ventricular ectopic beats 6% of test.2. No diary; no symptoms reported. Frequent PVC's comprising 6% of total beats  including a few short ventricular runs, the longest being 7 beats at 83 bpm.    Mr. Ori Rivas as above came into the ER yesterday due to chest discomfort leading to this consultation. He came into the ER on 12/21/2021 due to chest pain as well. He reported that his chest pain started last night. He was just sitting down watching a movie. He also stated that when he gets this chest discomfort it is always associated with heart palpitations as well. His heart palpitations lasted until about midnight last night. He stated that his chest pain felt like an ache pain. His episodes are random. His heart palpitations he thinks has gotten worse over the last two weeks. He is eating and drinking ok. No issues with diarrhea or constipation. He denied any current chest pain, shortness of breath, abdominal pain, bleeding problems, problems with his medications or any other concerns at this time. Past Medical History:   Diagnosis Date    CAD (coronary artery disease)     Carpal tunnel syndrome     Depressive disorder, not elsewhere classified     Diabetes mellitus ([address]     Heart attack [facility] 08/11/2018    STEMI    History of echocardiogram 01/08/2019    EF 40-45%. LV cavity sixe is mildly increased. Inferior and inferoseptal wall hypokenesis noted. Mild diastolic dysfunction.  Hyperlipidemia     Hypertension 2009       CURRENT ALLERGIES: Patient has no known allergies. REVIEW OF SYSTEMS: 14 systems were reviewed. Pertinent positives and negatives as above, all else negative.      Past Surgical History:   Procedure Laterality Date    CARDIAC CATHETERIZATION Left 01/07/2018    Right [facility]/    CORONARY ANGIOPLASTY  08/2018    double bypass, [facility],     CORONARY ARTERY BYPASS GRAFT  2018    Social History:  Social History     Tobacco Use    Smoking status: Current Some Day Smoker     Packs/day: 0.00     Types: Cigarettes    Smokeless tobacco: Never Used   Substance Use Topics    Alcohol use: No    Drug use: No        OUTPATIENT MEDICATIONS:  Outpatient Medications Marked as Taking for the 12/26/21 encounter [facility] Encounter)   Medication Sig Dispense Refill    fenofibrate (TRIGLIDE) 160 MG tablet Take 160 mg by mouth daily      atorvastatin (LIPITOR) 40 MG tablet Take 1 tablet by mouth daily 90 tablet 3    lisinopril (PRINIVIL;ZESTRIL) 20 MG tablet Take 1 tablet by mouth daily 90 tablet 3    metoprolol tartrate (LOPRESSOR) 50 MG tablet Take 1 tablet by mouth 2 times daily 60 tablet 3    clopidogrel (PLAVIX) 75 MG tablet Take 1 tablet by mouth daily 90 tablet 3    nitroGLYCERIN (NITROSTAT) 0.4 MG SL tablet up to max of 3 total doses. If no relief after 1 dose, call 911. (Patient taking differently: Place 0.4 mg under the tongue every 5 minutes as needed up to max of 3 total doses. If no relief after 1 dose, call 911.) 25 tablet 3    ALPRAZolam (XANAX) 0.25 MG tablet Take 0.25 mg by mouth 2 times daily as needed for Sleep or Anxiety. Robert Franco metFORMIN (GLUCOPHAGE) 500 MG tablet Take 500 mg by mouth 2 times daily (with meals)       aspirin 81 MG EC tablet Take 81 mg by mouth daily.            ALPRAZolam (XANAX) tablet 0.25 mg, BID PRN  aspirin chewable tablet 81 mg, Daily  atorvastatin (LIPITOR) tablet 40 mg, Nightly  fenofibrate (TRIGLIDE) tablet 160 mg, Daily  lisinopril (PRINIVIL;ZESTRIL) tablet 20 mg, Daily  metFORMIN (GLUCOPHAGE) tablet 500 mg, BID WC  clopidogrel (PLAVIX) tablet 75 mg, Daily  dilTIAZem (CARDIZEM CD) extended release capsule 120 mg, Daily  [START ON 12/28/2021] metoprolol succinate (TOPROL XL) extended release tablet 100 mg, Daily  nitroGLYCERIN (NITROSTAT) SL tablet 0.4 mg, Q5 Min PRN        FAMILY HISTORY: family history includes Asthma in his sister; Heart Disease in his father; High Blood Pressure in his father and mother. PHYSICAL EXAM:   /61   Pulse 92   Temp 97 °F (36.1 °C) (Temporal)   Resp 18   Ht 5' 7.01\" (1.702 m)   Wt 191 lb 3.2 oz (86.7 kg)   SpO2 95%   BMI 29.94 kg/m²  Body mass index is 29.94 kg/m². [ INSTRUCTIONS:  \"[x]\" Indicates a positive item  \"[]\" Indicates a negative item   Vital Signs: (As obtained by patient/caregiver or practitioner observation)  No flowsheet data found. Constitutional: [x] Appears well-developed and well-nourished [x] No apparent distress      [] Abnormal-   Mental status  [x] Alert and awake  [x] Oriented to person/place/time [x]Able to follow commands      Eyes:  EOM    [x]  Normal  [] Abnormal-  Sclera  [x]  Normal  [] Abnormal -         Discharge [x]  None visible  [] Abnormal -    HENT:   [x] Normocephalic, atraumatic.   [] Abnormal   [] Mouth/Throat: Mucous membranes are moist.     External Ears [x] Normal  [] Abnormal-     Neck: [x] No visualized mass     Pulmonary/Chest: [x] Respiratory effort normal.  [x] No visualized signs of difficulty breathing or respiratory distress        [] Abnormal-     Neurological:        [x] No Facial Asymmetry (Cranial nerve 7 motor function) (limited exam to video visit)          [] No gaze palsy        [] Abnormal-         Skin:        [x] No significant exanthematous lesions or discoloration noted on facial skin         [] Abnormal-            Psychiatric:       [x] Normal Affect [] No Hallucinations        [] Abnormal-     Other pertinent observable physical exam findings: None          MOST RECENT LABS ON RECORD:   Lab Results   Component Value Date    WBC 13.5 (H) 12/26/2021    HGB 13.9 12/26/2021    HCT 39.9 (L) 12/26/2021     12/26/2021    CHOL 113 01/08/2019    TRIG 253 (H) 01/08/2019    HDL 26 (L) 01/08/2019    ALT 15 12/26/2021    AST 17 12/26/2021     12/26/2021    K 4.4 12/26/2021     12/26/2021    CREATININE 1.03 12/26/2021    BUN 26 (H) 12/26/2021    CO2 23 12/26/2021    INR 1.0 12/26/2021    LABA1C 6.8 (H) 01/23/2019       ASSESSMENT:  Patient Active Problem List    Diagnosis Date Noted    Acute coronary syndromes ([facility] 12/26/2021    ASHD (arteriosclerotic heart disease) /  CABG 2018 04/28/2019    Ischemic cardiomyopathy 01/08/2019    Chronic combined systolic and diastolic CHF, NYHA class 2 ([facility] 01/08/2019    S/P CABG (coronary artery bypass graft)     Dyslipidemia     Acute coronary syndrome ([facility] 01/07/2019    Mild congestive heart failure ([facility] 01/07/2019    Chest pain 06/02/2015    HTN (hypertension) 06/02/2015    Tobacco abuse 06/02/2015    Family history of premature CAD 06/02/2015    Pain in rib, left lower 05/31/2012        PLAN:  · Atypical chest pain but still suspicious for possible myocardial ischemia: History of CABG in the past. However, sounds like more likely arrhythmogenic especially with unremarkable HS troponin  · Antiplatelet Agent: Continue clopidogrel (Plavix): Plavix 75 mg daily. · Beta Blocker: STOP metoprolol tartrate (Lopressor) and START Metoprolol succinate (Toprol XL) 100 mg daily. I also discussed the potential side effects of this medication including lightheadedness and dizziness and instructed them to stop the medication of this occurs and call our office if this occurs. · Anti-anginal medications: START diltiazem CD (Cardizem CD) 120 mg once daily. I also discussed the potential side effects of this medication including lightheadedness and dizziness and instructed them to stop the medication of this occurs and call our office if this occurs. · Cholesterol Reduction Therapy: Continue Atorvastatin (Lipitor) 40 mg daily. · Counseling: I advised Mr. Tru Daley to call our office or go to the emergency room if he develops worsening or persistent chest pain or shortness of breath as this could be life threatening.  Abnormal Stress Test: As above his heart palpitations were his biggest complaint today. · I will follow up with him in two weeks in my office and if he is still having chest pains then we will go forward with doing a heart cath and discussing it more in detail at that time.  Recurrent intermittent palpitations: Rate Control Symptomatic. His heart palpitations was what he was more concerned with at this time. · Beta Blocker: STOP metoprolol tartrate (Lopressor) and START Metoprolol succinate (Toprol XL) 100 mg daily. I also discussed the potential side effects of this medication including lightheadedness and dizziness and instructed them to stop the medication of this occurs and call our office if this occurs. · Calcium Channel Blocker: START diltiazem CD (Cardizem CD) 120 mg once daily. · Additional Testing List: I ordered a CAM MONITOR to try and pinpoint the etiology of their symptoms     Coronary Artery Disease: S/p CABG in the past. Abnormal stress but will consider doing a heart catheterization in 1-2 weeks especially if chest pain persists in spite of medication changes.  Antiplatelet Agent: Continue clopidogrel (Plavix): Plavix 75 mg daily.  Beta Blocker: STOP metoprolol tartrate (Lopressor) and START Metoprolol succinate (Toprol XL) 100 mg daily.  Calcium Channel Blocker: START diltiazem CD (Cardizem CD) 120 mg once daily.  Anti-anginal medications: Continue nitroglycerin 0.4 mg tablets as needed for chest pain.  Cholesterol Reduction Therapy: Continue Atorvastatin (Lipitor) 40 mg daily.  Ischemic Cardiomyopathy: New York Heart Association Class: IIa (Mild symptoms only in normal activities), Echo done on 12/22/2021 showed his EF 40-45%    Beta Blocker: STOP metoprolol tartrate (Lopressor) and START Metoprolol succinate (Toprol XL) 100 mg daily.       ACE Inibitor/ARB: Continue lisinopril 20 mg daily. · Essential Hypertension: Controlled   Beta Blocker: STOP metoprolol tartrate (Lopressor) and START Metoprolol succinate (Toprol XL) 100 mg daily.  ACE Inibitor/ARB: Continue lisinopril 20 mg daily.  Calcium Channel Blocker: START diltiazem CD (Cardizem CD) 120 mg once daily. · Hyperlipidemia: Mixed, LDL done on 4/21/2021 was 34 mg/dL   · Cholesterol Reduction Therapy: Continue Atorvastatin (Lipitor) 40 mg daily. Once again, thank you for allowing me to participate in this patients care. Please do not hesitate to contact me could I be of further assistance. Sincerely,  Estephania Perkins. Diann GONZALEZ, MS, F.A.C.C. [facility] Cardiology Specialist     Place  Maribel Tenorio Sushmayessi Miranda Ayden [facility], [address] [place] [address]  Phone: [phone], Fax: [phone]     I believe that the risk of significant morbidity and mortality related to the patient's current medical conditions are: intermediate-highBritney Aguilera is a 40 y.o. male being evaluated by a Virtual Visit (video visit) encounter to address concerns as mentioned above. A caregiver was present when appropriate. Due to this being a TeleHealth encounter (During MKBEY-30 public health emergency), evaluation of the following organ systems was limited: Vitals/Constitutional/EENT/Resp/CV/GI//MS/Neuro/Skin/Heme-Lymph-Imm. Pursuant to the emergency declaration under the [address] [place] [address], [address] [place] [address] authority and the [employer] Act, this Virtual Visit was conducted with patient's (and/or legal guardian's) consent, to reduce the patient's risk of exposure to COVID-19 and provide necessary medical care. The patient (and/or legal guardian) has also been advised to contact this office for worsening conditions or problems, and seek emergency medical treatment and/or call 911 if deemed necessary.     Services were provided through a video synchronous discussion virtually to substitute for in-person visit. Mr. Edd Boyce was located in the patients hospital room in [address] [place] [address] [place] performed the visit from my home in [address] on 12/27/2021 at 12:54 PM    An electronic signature was used to authenticate this note.

## 2021-12-27 NOTE — ED NOTES
Called Dr Justice Kaba via cell phone, connected call to Dr Yobani Paz.      Klaudia Fuentes  12/26/21 1927

## 2021-12-27 NOTE — TELEPHONE ENCOUNTER
----- Message from Becky Alvarado MD sent at 12/23/2021  4:57 PM EST -----  Please let Mr. Emma Kessler know that his test was abnormal and please make them an appointment in 1-2 weeks if not already done. Thanks.     Diann

## 2021-12-27 NOTE — PROGRESS NOTES
Vitals and assessment done. Vitals are within normal limits. Patient states his continues to have chest pain rating it a 3/10 left/mid chest that feels like pressure. States it is much improved from last night but it is still there. Denies any shortness of breath or feeling dizzy. Will continue to monitor.

## 2021-12-27 NOTE — PROGRESS NOTES
While pt was in ED Dr. Gagan Serrato spoke with cardio but now Calling cardio office and faxing form now.  Just to be sure

## 2021-12-27 NOTE — ED NOTES
[employer] for transfer to [facility], waiting for call back.       Yumiko Cordero  12/26/21 1917

## 2021-12-27 NOTE — PROGRESS NOTES
Respiratory called to place CAM monitor. Discharge instructions went over with the patient. Patient denies any chest pain and no signs of distress. Patient will call when ready to leave.

## 2021-12-27 NOTE — PROGRESS NOTES
SW met with pt and spouse to complete assessment. Pt is alert and oriented and pleasant. Pt is a 40year old  male admitted for acute coronary syndrome. Pt lives with his wife and daughter in their home in [place]. Pt does not use any [facility] or community services at home. Pt drives and is able to get to appointments. Pt is a full code and follows with Dr Gloria Pulido as PCP. Pt does not have advance directives and states that his wife would be his decision maker if needed. Pt reports that his medications are affordable with his insurance. Pt plans to return home at discharge. Pt identifies no discharge needs or concerns at this time. SW will remain available as needed.  Prosper CHISHOLMW 12/27/2021

## 2021-12-27 NOTE — ED NOTES
[facility] called back with cardiologist, connected call to Dr Chantelle Jaramillo.       David Singer  12/26/21 1919

## 2021-12-27 NOTE — PROGRESS NOTES
Dr Alexandrea Del Rosario office called and notified patient has had no chest pain since this morning. They will pass on to Alexandrea Del Rosario. Possible DC.

## 2021-12-27 NOTE — DISCHARGE INSTR - DIET

## 2021-12-27 NOTE — H&P
History and Physical    Patient:  Fabián Perdomo  MRN: [id number]    Chief Complaint:  Chest pain    History Obtained From:  patient, electronic medical record    PCP: Kadeem Henderson MD    History of Present Illness: The patient is a 40 y.o. male who ended to the emergency room with complaints of left anterior chest pressure that began suddenly while sitting watching TV at home. Patient stated the pain was constant. He denied nausea or vomiting. He denied diaphoresis. He denied cough or shortness of breath. Patient does have history of bypass surgery following vascular injury during a cardiac stenting. Patient does have nitroglycerin but stated he had not taken any at home because previously had decreased his blood pressure so low that he had to go to the emergency room. Patient denied weakness or dizziness. He denied lightheadedness or syncope. Patient did complain of pain radiating into his back and shoulder. Patient stated he did take baby aspirin that morning. Patient stated he did have a recent outpatient Holter monitor, echocardiogram as well as stress test.  EKG showed PVCs otherwise was unchanged. His troponins were negative. Patient did have abnormal myocardial perfusion study on 12/21/2021. Per cardiology read that there was a moderate perfusion defect in the inferior lateral pleural and inferior septal regions. Emergency room attempted to call Dr. Ana Persaud however was unable to reach him. Discussion took place with [address] [place] [address] cardiology at [facility]. Junito's however it was requested to repeat the troponin and if negative then patient could be considered for outpatient cardiac catheterization or observation admission and did not need transfer to [place] unless there was a change in his EKG or troponin or if chest pain did not resolve.     Past Medical History:        Diagnosis Date    CAD (coronary artery disease)     Carpal tunnel syndrome     Depressive disorder, not elsewhere classified  [facility]     Heart attack ([facility] 08/11/2018    STEMI    History of echocardiogram 01/08/2019    EF 40-45%. LV cavity sixe is mildly increased. Inferior and inferoseptal wall hypokenesis noted. Mild diastolic dysfunction.  Hyperlipidemia     Hypertension 2009       Past Surgical History:        Procedure Laterality Date    CARDIAC CATHETERIZATION Left 01/07/2018    Right Ulnar/[facility]/    CORONARY ANGIOPLASTY  08/2018    double bypass, [facility],     CORONARY ARTERY BYPASS GRAFT  2018       Medications Prior to Admission:    Prior to Admission medications    Medication Sig Start Date End Date Taking? Authorizing Provider   fenofibrate (TRIGLIDE) 160 MG tablet Take 160 mg by mouth daily   Yes Historical Provider, MD   atorvastatin (LIPITOR) 40 MG tablet Take 1 tablet by mouth daily 7/15/19  Yes Sogn Encinas MD   lisinopril (PRINIVIL;ZESTRIL) 20 MG tablet Take 1 tablet by mouth daily 2/1/19  Yes Song Encinas MD   metoprolol tartrate (LOPRESSOR) 50 MG tablet Take 1 tablet by mouth 2 times daily 2/1/19  Yes Song Encinas MD   clopidogrel (PLAVIX) 75 MG tablet Take 1 tablet by mouth daily 1/31/19  Yes Song Encinas MD   nitroGLYCERIN (NITROSTAT) 0.4 MG SL tablet up to max of 3 total doses. If no relief after 1 dose, call 911. Patient taking differently: Place 0.4 mg under the tongue every 5 minutes as needed up to max of 3 total doses. If no relief after 1 dose, call 911. 1/8/19  Yes Song Encinas MD   ALPRAZolam Robinson Estevez) 0.25 MG tablet Take 0.25 mg by mouth 2 times daily as needed for Sleep or Anxiety. .   Yes Historical Provider, MD   metFORMIN (GLUCOPHAGE) 500 MG tablet Take 500 mg by mouth 2 times daily (with meals)    Yes Historical Provider, MD   aspirin 81 MG EC tablet Take 81 mg by mouth daily. Yes Historical Provider, MD       Allergies:  Patient has no known allergies.     Social History:   TOBACCO:   reports that he has been smoking cigarettes. He has been smoking about 0.00 packs per day. He has never used smokeless tobacco.  ETOH:   reports no history of alcohol use. Family History:       Problem Relation Age of Onset    High Blood Pressure Mother     Heart Disease Father     High Blood Pressure Father     Asthma Sister        Allergies:  Patient has no known allergies. Medications Prior to Admission:    Prior to Admission medications    Medication Sig Start Date End Date Taking? Authorizing Provider   fenofibrate (TRIGLIDE) 160 MG tablet Take 160 mg by mouth daily   Yes Historical Provider, MD   atorvastatin (LIPITOR) 40 MG tablet Take 1 tablet by mouth daily 7/15/19  Yes Meera House MD   lisinopril (PRINIVIL;ZESTRIL) 20 MG tablet Take 1 tablet by mouth daily 2/1/19  Yes Meera House MD   metoprolol tartrate (LOPRESSOR) 50 MG tablet Take 1 tablet by mouth 2 times daily 2/1/19  Yes Meera House MD   clopidogrel (PLAVIX) 75 MG tablet Take 1 tablet by mouth daily 1/31/19  Yes Meera House MD   nitroGLYCERIN (NITROSTAT) 0.4 MG SL tablet up to max of 3 total doses. If no relief after 1 dose, call 911. Patient taking differently: Place 0.4 mg under the tongue every 5 minutes as needed up to max of 3 total doses. If no relief after 1 dose, call 911. 1/8/19  Yes Meera House MD   ALPRAZolam Damir Rouleau) 0.25 MG tablet Take 0.25 mg by mouth 2 times daily as needed for Sleep or Anxiety. .   Yes Historical Provider, MD   metFORMIN (GLUCOPHAGE) 500 MG tablet Take 500 mg by mouth 2 times daily (with meals)    Yes Historical Provider, MD   aspirin 81 MG EC tablet Take 81 mg by mouth daily. Yes Historical Provider, MD       Review of Systems:  Constitutional:negative  for fevers, and negative for chills.   Eyes: negative for visual disturbance   ENT: negative for sore throat, negative nasal congestion, and negative for earache  Respiratory: negative for shortness of breath, negative for cough, and negative for 12/26/2021       UA:   No results found for: COLORU, CLARITYU, SPECGRAV, WBCUA, RBCUA, EPITHUA, LEUKOCYTESUR, GLUCOSEU, BLOODU, KETUA, PROTEINU, HGBUR, CASTUA, CRYSTUA, BACTERIA, YEAST    Lactic Acid:   No results found for: LACTA    D-Dimer:  Lab Results   Component Value Date    DDIMER 0.28 12/26/2021       PT/INR:  Lab Results   Component Value Date    PROTIME 12.9 12/26/2021    INR 1.0 12/26/2021       High Sensitivity Troponin:  Recent Labs     12/26/21  1810 12/26/21 2110   TROPHS <6 <6       ABGs:   No results found for: PHART, PH, PRK6ETH, PCO2, PO2ART, PO2, UQR7TIF, HCO3, BEART, BE, THGBART, THB, VNX9RAH, G0ZMJOQO, O2SAT, FIO2        XR CHEST PORTABLE   Final Result   Clear lungs. No acute cardiopulmonary abnormality. EKG reviewed    Assessment:    Active Problems:    Chest pain    Acute coronary syndromes [facility])  Resolved Problems:    * No resolved hospital problems.  *      Patient Active Problem List    Diagnosis Date Noted    Acute coronary syndromes ([facility] 12/26/2021    ASHD (arteriosclerotic heart disease) /  CABG 2018 04/28/2019    Ischemic cardiomyopathy 01/08/2019    Chronic combined systolic and diastolic CHF, NYHA class 2 ([facility] 01/08/2019    S/P CABG (coronary artery bypass graft)     Dyslipidemia     Acute coronary syndrome ([facility] 01/07/2019    Mild congestive heart failure ([facility] 01/07/2019    Chest pain 06/02/2015    HTN (hypertension) 06/02/2015    Tobacco abuse 06/02/2015    Family history of premature CAD 06/02/2015    Pain in rib, left lower 05/31/2012       Plan:     · This patient requires overnight observation because of Chest pain  · Factors affecting the medical complexity of this patient include ASHD  · Estimated length of stay is 1 days  · Chest pain  · Patient cardiology  · No cath here  · Trend labs  · ASHD  · Continue aspirin, Lipitor, Plavix, Triglide, lisinopril, Lopressor  · DVT prophylaxis: plavix  · Peptic ulcer prophylaxis: Pepcid  · High risk medications: none  · Social Service and Case Management consults for DC planning  · Dietician consult initiated    CORE MEASURES  DVT prophylaxis: Plavix, Triglide  Decubitus ulcer present on admission: No  CODE STATUS: FULL CODE  Nutrition Status: good   Physical therapy: No   Old Charts reviewed: Yes  EKG Reviewed:  Yes  Advance Directive Addressed: Yes    KRUNAL Camarillo - CNP, APRN, NP-C  12/27/2021, 8:11 AM

## 2021-12-28 ENCOUNTER — TELEPHONE (OUTPATIENT)
Dept: CARDIOLOGY | Age: 46
End: 2021-12-28

## 2021-12-28 NOTE — TELEPHONE ENCOUNTER
Please let patient know that I did not take him off work so if someone did he should talk to them but I have no objection to him going back to work. Who said he could not work?

## 2021-12-28 NOTE — TELEPHONE ENCOUNTER
----- Message from Genaro Wyman MD sent at 12/23/2021  4:57 PM EST -----  Please let Mr. Jennie Mclaughlin know that his test was abnormal and please make them an appointment in 1-2 weeks if not already done. Thanks.     Diann

## 2021-12-28 NOTE — TELEPHONE ENCOUNTER
Pt was admitted to hospital and has a virtual visit with . Pt would like to know if he can have a Return To Work slip?

## 2022-01-06 NOTE — PROCEDURES
[address], [place] [address]                                 EVENT MONITOR    PATIENT NAME: Thais Plaza                    :        1975  MED REC NO:   [id number]                              ROOM:       0294  ACCOUNT NO:   [de-identified]                           ADMIT DATE: 2021  PROVIDER:     Marco Palomino MD    CARDIOVASCULAR DIAGNOSTIC DEPARTMENT    DATE OF STUDY:  2021    ORDERING PROVIDER:  Marco Palomino MD    PRIMARY CARE PROVIDER:  Kristi Stephens MD    INTERPRETING PHYSICIAN:  Marco Palomino MD    DIAGNOSIS:  Palpitations. PHYSICIAN INTERPRETATION:  1. Predominant rhythm:  Normal sinus rhythm. 2.  Atrial tachycardia:  1 episode. Longest/fastest, 7 beats at average  112 bpm up to 126 bpm.  3.  PAC 0.01%. 4. PVC 1.26%. Multiple symptoms were reported and were mostly associated with isolated  PVCs. Clinical correlation required.         Sanjeev Vargas MD    D: 2022 9:28:10       T: 2022 9:29:37     JIGAR/ALDEN_EDIT  Job#: [id number]     Doc#: [id number]    CC:  Kristi Stephens MD

## 2022-01-07 ENCOUNTER — TELEPHONE (OUTPATIENT)
Dept: CARDIOLOGY | Age: 47
End: 2022-01-07

## 2022-01-07 NOTE — TELEPHONE ENCOUNTER
----- Message from Yris Dejesus MD sent at 1/6/2022  5:03 PM EST -----  Let Mr. Benton Gates know their test result was ok. Will discuss at next visit. Thanks.

## 2022-01-21 ENCOUNTER — HOSPITAL ENCOUNTER (OUTPATIENT)
Age: 47
Discharge: HOME | End: 2022-01-21
Payer: COMMERCIAL

## 2022-01-21 ENCOUNTER — OFFICE VISIT (OUTPATIENT)
Dept: CARDIOLOGY | Age: 47
End: 2022-01-21
Payer: COMMERCIAL

## 2022-01-21 VITALS
SYSTOLIC BLOOD PRESSURE: 129 MMHG | HEIGHT: 67 IN | OXYGEN SATURATION: 97 % | BODY MASS INDEX: 29.91 KG/M2 | WEIGHT: 190.6 LBS | RESPIRATION RATE: 18 BRPM | HEART RATE: 79 BPM | DIASTOLIC BLOOD PRESSURE: 89 MMHG

## 2022-01-21 DIAGNOSIS — E78.5 DYSLIPIDEMIA: ICD-10-CM

## 2022-01-21 DIAGNOSIS — R07.89 ATYPICAL CHEST PAIN: ICD-10-CM

## 2022-01-21 DIAGNOSIS — I10 ESSENTIAL HYPERTENSION: ICD-10-CM

## 2022-01-21 DIAGNOSIS — R94.39 ABNORMAL STRESS TEST: ICD-10-CM

## 2022-01-21 DIAGNOSIS — Z95.1 S/P CABG (CORONARY ARTERY BYPASS GRAFT): ICD-10-CM

## 2022-01-21 DIAGNOSIS — R00.2 PALPITATION: ICD-10-CM

## 2022-01-21 DIAGNOSIS — I25.10 ASHD (ARTERIOSCLEROTIC HEART DISEASE): ICD-10-CM

## 2022-01-21 DIAGNOSIS — R07.89 ATYPICAL CHEST PAIN: Primary | ICD-10-CM

## 2022-01-21 DIAGNOSIS — I20.8 CHRONIC STABLE ANGINA (HCC): ICD-10-CM

## 2022-01-21 LAB
ANION GAP SERPL CALCULATED.3IONS-SCNC: 12 MMOL/L (ref 9–17)
BUN BLDV-MCNC: 25 MG/DL (ref 6–20)
BUN/CREAT BLD: 25 (ref 9–20)
CALCIUM SERPL-MCNC: 9.7 MG/DL (ref 8.6–10.4)
CHLORIDE BLD-SCNC: 106 MMOL/L (ref 98–107)
CO2: 24 MMOL/L (ref 20–31)
CREAT SERPL-MCNC: 1 MG/DL (ref 0.7–1.2)
GFR AFRICAN AMERICAN: >60 ML/MIN
GFR NON-AFRICAN AMERICAN: >60 ML/MIN
GFR SERPL CREATININE-BSD FRML MDRD: ABNORMAL ML/MIN/{1.73_M2}
GFR SERPL CREATININE-BSD FRML MDRD: ABNORMAL ML/MIN/{1.73_M2}
GLUCOSE BLD-MCNC: 115 MG/DL (ref 70–99)
HCT VFR BLD CALC: 41.8 % (ref 40.7–50.3)
HEMOGLOBIN: 14 G/DL (ref 13–17)
MCH RBC QN AUTO: 30.3 PG (ref 25.2–33.5)
MCHC RBC AUTO-ENTMCNC: 33.5 G/DL (ref 28.4–34.8)
MCV RBC AUTO: 90.5 FL (ref 82.6–102.9)
NRBC AUTOMATED: 0 PER 100 WBC
PDW BLD-RTO: 12.8 % (ref 11.8–14.4)
PLATELET # BLD: 428 K/UL (ref 138–453)
PMV BLD AUTO: 10 FL (ref 8.1–13.5)
POTASSIUM SERPL-SCNC: 4.6 MMOL/L (ref 3.7–5.3)
RBC # BLD: 4.62 M/UL (ref 4.21–5.77)
SODIUM BLD-SCNC: 142 MMOL/L (ref 135–144)
WBC # BLD: 11.3 K/UL (ref 3.5–11.3)

## 2022-01-21 PROCEDURE — 99215 OFFICE O/P EST HI 40 MIN: CPT | Performed by: INTERNAL MEDICINE

## 2022-01-21 PROCEDURE — 80048 BASIC METABOLIC PNL TOTAL CA: CPT

## 2022-01-21 PROCEDURE — 36415 COLL VENOUS BLD VENIPUNCTURE: CPT

## 2022-01-21 PROCEDURE — 85027 COMPLETE CBC AUTOMATED: CPT

## 2022-01-21 RX ORDER — DILTIAZEM HYDROCHLORIDE 120 MG/1
120 CAPSULE, COATED, EXTENDED RELEASE ORAL DAILY
Qty: 90 CAPSULE | Refills: 3 | Status: SHIPPED | OUTPATIENT
Start: 2022-01-21 | End: 2022-06-01 | Stop reason: SDUPTHER

## 2022-01-21 RX ORDER — METOPROLOL SUCCINATE 100 MG/1
100 TABLET, EXTENDED RELEASE ORAL DAILY
Qty: 90 TABLET | Refills: 3 | Status: SHIPPED | OUTPATIENT
Start: 2022-01-21

## 2022-01-21 RX ORDER — SEMAGLUTIDE 1.34 MG/ML
INJECTION, SOLUTION SUBCUTANEOUS
Status: ON HOLD | COMMUNITY
Start: 2022-01-05 | End: 2022-09-13 | Stop reason: HOSPADM

## 2022-01-21 RX ORDER — NITROGLYCERIN 0.4 MG/1
0.4 TABLET SUBLINGUAL EVERY 5 MIN PRN
Qty: 25 TABLET | Refills: 3 | Status: SHIPPED | OUTPATIENT
Start: 2022-01-21

## 2022-01-21 NOTE — PROGRESS NOTES
Oswaldo Artist am scribing for and in the presence of Virlinda Najjar, MD, F.A.C.C..    Patient: Jurgen Campos  : 1975  Date of Visit: 2022    REASON FOR VISIT / CONSULTATION: Follow-up (Hx: CAD s/p cath, HTN, HLD. pt hasnt been seen since 2019. multiple ER visits. he only has mild palps now. sharp CP off and on maybe a couple minutes, goes away on its own. sometimes radiates to right shoulder or back. about 6 times since discharge. mostly on exertion. does have dizziness with palps. does have SOB )      History of Present Illness:        Dear Hyacinth Mueller MD    I had the pleasure of seeing Mr. Ronaldo Jimenez today who is a 40 y.o. male who presents for a follow up following a hospital visit on 2019 with chest pain during activity. He had a stress test on 2018 (inferior wall defect with jazmine-infarct ischemia, ejection fraction 47% with inferior wall hypokinesis).     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 He was taken for an emergent CABG with SVG to RCA. Echo on 2019: EF 40-45%. LV cavity sixe is mildly increased. Inferior and inferoseptal wall hypokenesis noted. Mild diastolic dysfunction. Cardiac Catheterization on 2019: Severe single vessel disease involving the LAD, circumflex and right coronary arteries. 1 of 1 bypass grafts patent. Normal LVEDP. Echo on 2019: EF 40-45%. LV cavity sixe is mildly increased. Inferior and inferoseptal wall hypokenesis noted. Mild diastolic dysfunction    Stress test done on 2021 showed abnormal myocardial perfusion study. There is a moderate perfusion defect of moderate/severe intensity in the inferolateral, inferior and inferoseptal regions during stress and rest imaging, which is most consistent with an old myocardial infarction with jazmine-infarct ischemia.  EF was 43%    Echo done on 12/22/2021 showed the global left ventricular systolic function appears mildly reduced with an estimated ejection fraction of 40-45%. The inferoseptal and inferior walls appear hypokinetic in their motion. The left ventricular cavity size is within normal limits and the left ventricular wall thickness is mildly increased. Holter done on 12/22/2021 showed rhythm was sinus. Average [place] interval 0.16, average QRS duration 0.09. Ventricular ectopic beats 6% of test.2. No diary; no symptoms reported. Frequent PVC's comprising 6% of total beats  including a few short ventricular runs, the longest being 7 beats at 83 bpm.     Mr. Jai Huynh is here for multiple ER and hospital visits. He said he was watching tv and he felt like his heart was would beat really hard. He was seen in the ED for this. He had an echo and stress test as outlined above. He was also evaluated by Dr. Radha Garcia and started on Toprol-XL in addition to his diltiazem. He continues to get sharp chest pain when walking. He reported that the pain goes to the right shoulder and is very similar to the pain he had prior to his bypass surgery. This started around the same time he had palpitations. It only happen when he overexerts hisself but this is something new to him. His a stress test is abnormal as outlined above showing infarction with jazmine-infarction ischemia. He does walk around 12,000 steps daily. In the last couple of years he has lost 50 pounds. He said he went from being a lazy  to doing a job where he stays active. No stomach pain, nausea or vomiting. No problems moving his bowels. He does wake up after about 2-3 hours and has trouble going back to sleep. Denied any abdominal pain, bleeding problems, problems with his medications or any other concerns at this time. No fever or cough. No leg or thigh pain when walking.      PAST MEDICAL HISTORY:        Past Medical History:   Diagnosis Date    CAD (coronary artery disease)  Carpal tunnel syndrome     Depressive disorder, not elsewhere classified     Diabetes mellitus ([facility]     Heart attack ([facility] 08/11/2018    STEMI    History of echocardiogram 01/08/2019    EF 40-45%. LV cavity sixe is mildly increased. Inferior and inferoseptal wall hypokenesis noted. Mild diastolic dysfunction.  Hyperlipidemia     Hypertension 2009       CURRENT ALLERGIES: Patient has no known allergies. REVIEW OF SYSTEMS: 14 systems were reviewed. Pertinent positives and negatives as above, all else negative. Past Surgical History:   Procedure Laterality Date    CARDIAC CATHETERIZATION Left 01/07/2018    [facility]    CORONARY ANGIOPLASTY  08/2018    double bypass, [facility],     CORONARY ARTERY BYPASS GRAFT  2018    Social History:  Social History     Tobacco Use    Smoking status: Current Some Day Smoker     Packs/day: 0.00     Types: Cigarettes    Smokeless tobacco: Never Used   Substance Use Topics    Alcohol use: No    Drug use: No        CURRENT MEDICATIONS:        Outpatient Medications Marked as Taking for the 1/21/22 encounter (Office Visit) with Brenden Fuller MD   Medication Sig Dispense Refill    dilTIAZem (CARDIZEM CD) 120 MG extended release capsule Take 1 capsule by mouth daily 30 capsule 0    metoprolol succinate (TOPROL XL) 100 MG extended release tablet Take 1 tablet by mouth daily 30 tablet 0    fenofibrate (TRIGLIDE) 160 MG tablet Take 160 mg by mouth daily      atorvastatin (LIPITOR) 40 MG tablet Take 1 tablet by mouth daily 90 tablet 3    lisinopril (PRINIVIL;ZESTRIL) 20 MG tablet Take 1 tablet by mouth daily 90 tablet 3    clopidogrel (PLAVIX) 75 MG tablet Take 1 tablet by mouth daily 90 tablet 3    nitroGLYCERIN (NITROSTAT) 0.4 MG SL tablet up to max of 3 total doses. If no relief after 1 dose, call 911.  (Patient taking differently: Place 0.4 mg under the tongue every 5 minutes as needed up to max of 3 total  12/26/2021    CHOL 113 01/08/2019    TRIG 253 (H) 01/08/2019    HDL 26 (L) 01/08/2019    ALT 15 12/26/2021    AST 17 12/26/2021     12/26/2021    K 4.4 12/26/2021     12/26/2021    CREATININE 1.03 12/26/2021    BUN 26 (H) 12/26/2021    CO2 23 12/26/2021    INR 1.0 12/26/2021    LABA1C 6.8 (H) 01/23/2019       ASSESSMENT:     1. Atypical chest pain    2. Abnormal stress test    3. Palpitation    4. ASHD (arteriosclerotic heart disease)    5. S/P CABG (coronary artery bypass graft)    6. Essential hypertension    7. Dyslipidemia    8. Chronic stable angina (HCC)         PLAN:      · Atypical chest pain but still suspicious for possible myocardial ischemia:  · Medical Management    Antiplatelet Agent: Continue aspirin 81 mg daily and Plavix 75 mg daily.  Beta Blocker: Continue Metoprolol succinate (Toprol XL) 100 mg daily.  Continue diltiazem 120 mg daily.  Anti-anginal medications: Continue nitroglycerin 0.4 mg tablets as needed for chest pain.  Cholesterol Reduction Therapy: Continue Atorvastatin (Lipitor) 40 mg daily. and continue fenofibrate 160 mg daily   Additional counseling: I advised them to call our office or go to the emergency room if they developed worsening or persistent chest pain or increased shortness of breath as this could be life threatening.  Abnormal Stress Test:    For these reasons, I recommended that the patient consider undergoing a cardiac catheterization with coronary angiography to assess their coronary anatomy and facilitate better treatment recommendations. I discussed the risks, benefits, and alternatives to the procedure including the risk of bleeding, contrast induced allergy and/or kidney damage, arrythmia, stroke, heart attack, death, or the need for further procedures.  I also discussed the fact that although treatment with simple medical management is a potential treatment option in place of cardiac catheterization, I expressed my opinion that cardiac catheterization in order to define their coronary anatomy and rule out severe 3 vessel or left main coronary artery disease would significant help guide the most appropriate treatment strategy ranging from no treatment, to medications, stents, to even coronary bypass surgery. The patient verbalized understanding of the risks benefits and alternatives and stated that they would like to undergo the procedure. We will plan to schedule the procedure here at [facility] on Next week. Alexandro Hess Medical Management for suspected Coronary artery disease and myocardial ischemia:   Antiplatelet Agent: Continue aspirin 81 mg daily and Plavix 75 mg daily.  Beta Blocker: Continue Metoprolol succinate (Toprol XL) 100 mg daily.  Continue diltiazem as above.  Anti-anginal medications: Continue nitroglycerin 0.4 mg tablets as needed for chest pain.  Cholesterol Reduction Therapy: Continue Atorvastatin (Lipitor) 40 mg daily. and continue fenofibrate 160 mg daily   Additional Testing List: I took the liberty of ordering a BMP for today to assess their potassium and renal function. I told them that they could get their lab work performed at the location of their choosing, unfortunately, if the lab work was not performed at a [facility]) facility I could not guarantee my ability to follow up with them on their results. and  I took the liberty of ordering a CBC. I told them that they could get their lab work performed at the location of their choosing, unfortunately, if the lab work was not performed at a [facility]) facility I could not guarantee my ability to follow up with them on their results.   Recurrent intermittent palpitations:  · Beta Blocker: Continue Metoprolol succinate (Toprol XL) 100 mg daily. · Calcium Channel Blocker: Continue diltiazem CD (Cardizem CD) 120 mg once daily. · His palpitations are much better.   I reviewed details of the stress test and echo with him and answered all of his questions. Atherosclerotic Heart Disease: S/P CABG x1 done on August 11, 2018 SVG to RCA  Antiplatelet Agent: Continue aspirin 81 mg daily and clopidogrel (Plavix) 75 mg daily. I also reminded him to watch for signs of blood in his stool or black tarry stools and stop the medication immediately if this develops as this could be life threatening. Beta Blocker: Continue Metoprolol succinate (Toprol XL) 100 mg daily. Anti-anginal medications: Continue nitroglycerin 0.4 mg tablets as needed for chest pain. Cholesterol Reduction Therapy: Continue Atorvastatin (Lipitor) 40 mg daily. and continue fenofibrate 160 mg daily. Follow-up repeat lipid panel as above. Blood pressures controlled. Essential Hypertension: Controlled  Beta Blocker: Continue Metoprolol succinate (Toprol XL) 100 mg daily. ACE Inibitor/ARB: Continue lisinopril 20 mg daily. Calcium Channel Blocker: Continue diltiazem CD (Cardizem CD) 120 mg once daily. Diuretics: Not indicated at this time. Additional Testing List: None    · Hyperlipidemia: Mixed  · Cholesterol Reduction Therapy: Continue Atorvastatin (Lipitor) 40 mg daily. and continue fenofibrate 160 mg daily. · Follow-up repeat lipid panel as above. In the meantime, I encouraged Mr. Hailey Hope to continue to take his other medications. FOLLOW UP:   I told Mr. Hailey Hope to call my office if he had any problems, but otherwise I asked him to Return in about 4 weeks (around 2/18/2022). However, I would be happy to see him sooner should the need arise. Sincerely,  Pamela White MD, F.A.C.C. [facility] Cardiology Specialist     [address]  Phone: [phone], Fax: [phone]     I believe that the risk of significant morbidity and mortality related to the patient's current medical conditions are: high.  Between 45-59 minutes were spent during prep work, discussion and exam of the patient, and follow up documentation and all of their questions were answered. The documentation recorded by the scribe, accurately and completely reflects the services I personally performed and the decisions made by me. Poornima Melvin MD, F.A.C.C.  January 21, 2022

## 2022-01-21 NOTE — PATIENT INSTRUCTIONS
SURVEY:    You may be receiving a survey from [employer] regarding your visit today. Please complete the survey to enable us to provide the highest quality of care to you and your family. If you cannot score us a very good on any question, please call the office to discuss how we could have made your experience a very good one. Thank you.

## 2022-01-24 ENCOUNTER — TELEPHONE (OUTPATIENT)
Dept: CARDIOLOGY | Age: 47
End: 2022-01-24

## 2022-01-24 NOTE — TELEPHONE ENCOUNTER
----- Message from Zuly Scales MD sent at 1/21/2022  9:37 PM EST -----  Blood work is good.   Thank you

## 2022-01-26 ENCOUNTER — HOSPITAL ENCOUNTER (EMERGENCY)
Age: 47
Discharge: HOME | End: 2022-01-26
Attending: EMERGENCY MEDICINE
Payer: COMMERCIAL

## 2022-01-26 ENCOUNTER — APPOINTMENT (OUTPATIENT)
Dept: GENERAL RADIOLOGY | Age: 47
End: 2022-01-26
Payer: COMMERCIAL

## 2022-01-26 VITALS
SYSTOLIC BLOOD PRESSURE: 154 MMHG | TEMPERATURE: 101.1 F | RESPIRATION RATE: 18 BRPM | HEART RATE: 72 BPM | WEIGHT: 190 LBS | OXYGEN SATURATION: 96 % | DIASTOLIC BLOOD PRESSURE: 80 MMHG | HEIGHT: 67 IN | BODY MASS INDEX: 29.82 KG/M2

## 2022-01-26 DIAGNOSIS — R07.89 ATYPICAL CHEST PAIN: Primary | ICD-10-CM

## 2022-01-26 LAB
ABSOLUTE EOS #: 0.24 K/UL (ref 0–0.44)
ABSOLUTE IMMATURE GRANULOCYTE: 0.04 K/UL (ref 0–0.3)
ABSOLUTE LYMPH #: 3.91 K/UL (ref 1.1–3.7)
ABSOLUTE MONO #: 0.87 K/UL (ref 0.1–1.2)
ALBUMIN SERPL-MCNC: 4.7 G/DL (ref 3.5–5.2)
ALBUMIN/GLOBULIN RATIO: 1.6 (ref 1–2.5)
ALP BLD-CCNC: 66 U/L (ref 40–129)
ALT SERPL-CCNC: 17 U/L (ref 5–41)
ANION GAP SERPL CALCULATED.3IONS-SCNC: 13 MMOL/L (ref 9–17)
AST SERPL-CCNC: 18 U/L
BASOPHILS # BLD: 1 % (ref 0–2)
BASOPHILS ABSOLUTE: 0.06 K/UL (ref 0–0.2)
BILIRUB SERPL-MCNC: 0.2 MG/DL (ref 0.3–1.2)
BNP INTERPRETATION: ABNORMAL
BUN BLDV-MCNC: 23 MG/DL (ref 6–20)
BUN/CREAT BLD: 26 (ref 9–20)
CALCIUM SERPL-MCNC: 9.8 MG/DL (ref 8.6–10.4)
CHLORIDE BLD-SCNC: 104 MMOL/L (ref 98–107)
CO2: 23 MMOL/L (ref 20–31)
CREAT SERPL-MCNC: 0.9 MG/DL (ref 0.7–1.2)
D-DIMER QUANTITATIVE: <0.27 MG/L FEU (ref 0–0.59)
DIFFERENTIAL TYPE: ABNORMAL
EOSINOPHILS RELATIVE PERCENT: 2 % (ref 1–4)
GFR AFRICAN AMERICAN: >60 ML/MIN
GFR NON-AFRICAN AMERICAN: >60 ML/MIN
GFR SERPL CREATININE-BSD FRML MDRD: ABNORMAL ML/MIN/{1.73_M2}
GFR SERPL CREATININE-BSD FRML MDRD: ABNORMAL ML/MIN/{1.73_M2}
GLUCOSE BLD-MCNC: 158 MG/DL (ref 70–99)
HCT VFR BLD CALC: 38.9 % (ref 40.7–50.3)
HEMOGLOBIN: 13.5 G/DL (ref 13–17)
IMMATURE GRANULOCYTES: 0 %
LYMPHOCYTES # BLD: 37 % (ref 24–43)
MAGNESIUM: 1.7 MG/DL (ref 1.6–2.6)
MCH RBC QN AUTO: 30.8 PG (ref 25.2–33.5)
MCHC RBC AUTO-ENTMCNC: 34.7 G/DL (ref 28.4–34.8)
MCV RBC AUTO: 88.6 FL (ref 82.6–102.9)
MONOCYTES # BLD: 8 % (ref 3–12)
NRBC AUTOMATED: 0 PER 100 WBC
PDW BLD-RTO: 12.6 % (ref 11.8–14.4)
PLATELET # BLD: 403 K/UL (ref 138–453)
PLATELET ESTIMATE: ABNORMAL
PMV BLD AUTO: 10 FL (ref 8.1–13.5)
POTASSIUM SERPL-SCNC: 3.5 MMOL/L (ref 3.7–5.3)
PRO-BNP: 302 PG/ML
RBC # BLD: 4.39 M/UL (ref 4.21–5.77)
RBC # BLD: ABNORMAL 10*6/UL
SARS-COV-2, RAPID: NOT DETECTED
SEG NEUTROPHILS: 52 % (ref 36–65)
SEGMENTED NEUTROPHILS ABSOLUTE COUNT: 5.44 K/UL (ref 1.5–8.1)
SODIUM BLD-SCNC: 140 MMOL/L (ref 135–144)
SPECIMEN DESCRIPTION: NORMAL
TOTAL PROTEIN: 7.6 G/DL (ref 6.4–8.3)
TROPONIN INTERP: NORMAL
TROPONIN INTERP: NORMAL
TROPONIN T: NORMAL NG/ML
TROPONIN T: NORMAL NG/ML
TROPONIN, HIGH SENSITIVITY: 8 NG/L (ref 0–22)
TROPONIN, HIGH SENSITIVITY: 9 NG/L (ref 0–22)
WBC # BLD: 10.6 K/UL (ref 3.5–11.3)
WBC # BLD: ABNORMAL 10*3/UL

## 2022-01-26 PROCEDURE — 6360000002 HC RX W HCPCS: Performed by: EMERGENCY MEDICINE

## 2022-01-26 PROCEDURE — 83880 ASSAY OF NATRIURETIC PEPTIDE: CPT

## 2022-01-26 PROCEDURE — 96376 TX/PRO/DX INJ SAME DRUG ADON: CPT

## 2022-01-26 PROCEDURE — 96374 THER/PROPH/DIAG INJ IV PUSH: CPT

## 2022-01-26 PROCEDURE — 36415 COLL VENOUS BLD VENIPUNCTURE: CPT

## 2022-01-26 PROCEDURE — 6370000000 HC RX 637 (ALT 250 FOR IP): Performed by: EMERGENCY MEDICINE

## 2022-01-26 PROCEDURE — 80053 COMPREHEN METABOLIC PANEL: CPT

## 2022-01-26 PROCEDURE — 85379 FIBRIN DEGRADATION QUANT: CPT

## 2022-01-26 PROCEDURE — 83735 ASSAY OF MAGNESIUM: CPT

## 2022-01-26 PROCEDURE — 96375 TX/PRO/DX INJ NEW DRUG ADDON: CPT

## 2022-01-26 PROCEDURE — 71045 X-RAY EXAM CHEST 1 VIEW: CPT

## 2022-01-26 PROCEDURE — 84484 ASSAY OF TROPONIN QUANT: CPT

## 2022-01-26 PROCEDURE — 99283 EMERGENCY DEPT VISIT LOW MDM: CPT

## 2022-01-26 PROCEDURE — 87635 SARS-COV-2 COVID-19 AMP PRB: CPT

## 2022-01-26 PROCEDURE — 85025 COMPLETE CBC W/AUTO DIFF WBC: CPT

## 2022-01-26 PROCEDURE — 93005 ELECTROCARDIOGRAM TRACING: CPT | Performed by: EMERGENCY MEDICINE

## 2022-01-26 RX ORDER — MORPHINE SULFATE 2 MG/ML
2 INJECTION, SOLUTION INTRAMUSCULAR; INTRAVENOUS ONCE
Status: COMPLETED | OUTPATIENT
Start: 2022-01-26 | End: 2022-01-26

## 2022-01-26 RX ORDER — NITROGLYCERIN 0.4 MG/1
0.4 TABLET SUBLINGUAL EVERY 5 MIN PRN
Status: DISCONTINUED | OUTPATIENT
Start: 2022-01-26 | End: 2022-01-27 | Stop reason: HOSPADM

## 2022-01-26 RX ORDER — ONDANSETRON 2 MG/ML
4 INJECTION INTRAMUSCULAR; INTRAVENOUS ONCE
Status: COMPLETED | OUTPATIENT
Start: 2022-01-26 | End: 2022-01-26

## 2022-01-26 RX ORDER — ACETAMINOPHEN 325 MG/1
650 TABLET ORAL ONCE
Status: COMPLETED | OUTPATIENT
Start: 2022-01-26 | End: 2022-01-26

## 2022-01-26 RX ADMIN — ACETAMINOPHEN 650 MG: 325 TABLET ORAL at 20:34

## 2022-01-26 RX ADMIN — NITROGLYCERIN 0.4 MG: 0.4 TABLET SUBLINGUAL at 21:31

## 2022-01-26 RX ADMIN — MORPHINE SULFATE 2 MG: 2 INJECTION, SOLUTION INTRAMUSCULAR; INTRAVENOUS at 22:45

## 2022-01-26 RX ADMIN — MORPHINE SULFATE 2 MG: 2 INJECTION, SOLUTION INTRAMUSCULAR; INTRAVENOUS at 20:33

## 2022-01-26 RX ADMIN — ONDANSETRON 4 MG: 2 INJECTION INTRAMUSCULAR; INTRAVENOUS at 20:33

## 2022-01-26 ASSESSMENT — PAIN DESCRIPTION - PAIN TYPE: TYPE: ACUTE PAIN

## 2022-01-26 ASSESSMENT — PAIN DESCRIPTION - LOCATION: LOCATION: CHEST

## 2022-01-26 ASSESSMENT — PAIN SCALES - GENERAL
PAINLEVEL_OUTOF10: 8
PAINLEVEL_OUTOF10: 5
PAINLEVEL_OUTOF10: 9

## 2022-01-26 ASSESSMENT — PAIN DESCRIPTION - DESCRIPTORS: DESCRIPTORS: CRUSHING;ACHING

## 2022-01-27 ENCOUNTER — TELEPHONE (OUTPATIENT)
Dept: CARDIOLOGY | Age: 47
End: 2022-01-27

## 2022-01-27 ENCOUNTER — HOSPITAL ENCOUNTER (OUTPATIENT)
Dept: CARDIAC CATH/INVASIVE PROCEDURES | Age: 47
Discharge: HOME | End: 2022-01-27
Payer: COMMERCIAL

## 2022-01-27 ENCOUNTER — HOSPITAL ENCOUNTER (OUTPATIENT)
Dept: NON INVASIVE DIAGNOSTICS | Age: 47
Discharge: HOME | End: 2022-01-27
Payer: COMMERCIAL

## 2022-01-27 VITALS
HEART RATE: 81 BPM | BODY MASS INDEX: 29.82 KG/M2 | HEIGHT: 67 IN | SYSTOLIC BLOOD PRESSURE: 126 MMHG | DIASTOLIC BLOOD PRESSURE: 78 MMHG | WEIGHT: 190 LBS | RESPIRATION RATE: 17 BRPM | TEMPERATURE: 97.6 F | OXYGEN SATURATION: 98 %

## 2022-01-27 DIAGNOSIS — I49.9 IRREGULAR HEART RATE: ICD-10-CM

## 2022-01-27 DIAGNOSIS — R00.2 PALPITATIONS: ICD-10-CM

## 2022-01-27 DIAGNOSIS — R00.2 PALPITATIONS: Primary | ICD-10-CM

## 2022-01-27 PROBLEM — R94.39 ABNORMAL RESULT OF OTHER CARDIOVASCULAR FUNCTION STUDY: Status: ACTIVE | Noted: 2022-01-27

## 2022-01-27 LAB
EKG ATRIAL RATE: 76 BPM
EKG P AXIS: 48 DEGREES
EKG P-R INTERVAL: 146 MS
EKG Q-T INTERVAL: 372 MS
EKG QRS DURATION: 94 MS
EKG QTC CALCULATION (BAZETT): 418 MS
EKG R AXIS: 69 DEGREES
EKG T AXIS: 56 DEGREES
EKG VENTRICULAR RATE: 76 BPM

## 2022-01-27 PROCEDURE — 2500000003 HC RX 250 WO HCPCS

## 2022-01-27 PROCEDURE — 6360000004 HC RX CONTRAST MEDICATION: Performed by: FAMILY MEDICINE

## 2022-01-27 PROCEDURE — C1887 CATHETER, GUIDING: HCPCS

## 2022-01-27 PROCEDURE — 93458 L HRT ARTERY/VENTRICLE ANGIO: CPT

## 2022-01-27 PROCEDURE — 93459 L HRT ART/GRFT ANGIO: CPT | Performed by: FAMILY MEDICINE

## 2022-01-27 PROCEDURE — C1769 GUIDE WIRE: HCPCS

## 2022-01-27 PROCEDURE — 2709999900 HC NON-CHARGEABLE SUPPLY

## 2022-01-27 PROCEDURE — 99153 MOD SED SAME PHYS/QHP EA: CPT

## 2022-01-27 PROCEDURE — C1894 INTRO/SHEATH, NON-LASER: HCPCS

## 2022-01-27 PROCEDURE — 99152 MOD SED SAME PHYS/QHP 5/>YRS: CPT

## 2022-01-27 PROCEDURE — 93010 ELECTROCARDIOGRAM REPORT: CPT | Performed by: INTERNAL MEDICINE

## 2022-01-27 PROCEDURE — 2580000003 HC RX 258: Performed by: FAMILY MEDICINE

## 2022-01-27 PROCEDURE — 6360000002 HC RX W HCPCS

## 2022-01-27 RX ORDER — SODIUM CHLORIDE 0.9 % (FLUSH) 0.9 %
5-40 SYRINGE (ML) INJECTION EVERY 12 HOURS SCHEDULED
Status: DISCONTINUED | OUTPATIENT
Start: 2022-01-27 | End: 2022-01-28 | Stop reason: HOSPADM

## 2022-01-27 RX ORDER — NITROGLYCERIN 0.4 MG/1
0.4 TABLET SUBLINGUAL EVERY 5 MIN PRN
Status: DISCONTINUED | OUTPATIENT
Start: 2022-01-27 | End: 2022-01-28 | Stop reason: HOSPADM

## 2022-01-27 RX ORDER — SODIUM CHLORIDE 9 MG/ML
25 INJECTION, SOLUTION INTRAVENOUS PRN
Status: DISCONTINUED | OUTPATIENT
Start: 2022-01-27 | End: 2022-01-28 | Stop reason: HOSPADM

## 2022-01-27 RX ORDER — ACETAMINOPHEN 325 MG/1
650 TABLET ORAL EVERY 4 HOURS PRN
Status: DISCONTINUED | OUTPATIENT
Start: 2022-01-27 | End: 2022-01-28 | Stop reason: HOSPADM

## 2022-01-27 RX ORDER — CLOPIDOGREL BISULFATE 75 MG/1
75 TABLET ORAL DAILY
Qty: 90 TABLET | Refills: 3 | Status: SHIPPED | OUTPATIENT
Start: 2022-01-27

## 2022-01-27 RX ORDER — SODIUM CHLORIDE 9 MG/ML
INJECTION, SOLUTION INTRAVENOUS CONTINUOUS
Status: DISCONTINUED | OUTPATIENT
Start: 2022-01-27 | End: 2022-01-28 | Stop reason: HOSPADM

## 2022-01-27 RX ORDER — DIPHENHYDRAMINE HCL 25 MG
50 CAPSULE ORAL ONCE
Status: DISCONTINUED | OUTPATIENT
Start: 2022-01-27 | End: 2022-01-28 | Stop reason: HOSPADM

## 2022-01-27 RX ORDER — SODIUM CHLORIDE 0.9 % (FLUSH) 0.9 %
5-40 SYRINGE (ML) INJECTION PRN
Status: DISCONTINUED | OUTPATIENT
Start: 2022-01-27 | End: 2022-01-28 | Stop reason: HOSPADM

## 2022-01-27 RX ADMIN — SODIUM CHLORIDE, PRESERVATIVE FREE 10 ML: 5 INJECTION INTRAVENOUS at 10:27

## 2022-01-27 RX ADMIN — IOPAMIDOL 60 ML: 755 INJECTION, SOLUTION INTRAVENOUS at 11:55

## 2022-01-27 RX ADMIN — SODIUM CHLORIDE: 9 INJECTION, SOLUTION INTRAVENOUS at 10:28

## 2022-01-27 ASSESSMENT — HEART SCORE: ECG: 0

## 2022-01-27 ASSESSMENT — PAIN SCALES - GENERAL: PAINLEVEL_OUTOF10: 0

## 2022-01-27 NOTE — TELEPHONE ENCOUNTER
Jodine Charon called us back saying he went to the ER last night due to going into atrial fibrillation & then developing chest pain shortly after that. He just woke up but denies having any further chest pain this morning - he is planning on going through with the cath this morning.

## 2022-01-27 NOTE — TELEPHONE ENCOUNTER
----- Message from Brenden Fuller MD sent at 1/27/2022  8:21 AM EST -----      ----- Message -----  From: Jack Selby MD  Sent: 1/26/2022  11:39 PM EST  To: Brenden Fuller MD

## 2022-01-27 NOTE — PROGRESS NOTES

## 2022-01-27 NOTE — PROGRESS NOTES
Please call Britney Sharon Dad and ask how he is doing this morning. He should have the cath done today. He was in the emergency room last night.  Thank you

## 2022-01-27 NOTE — TELEPHONE ENCOUNTER
Dr Lorelei Ontiveros note Please call Mr. Frantz Person and ask how he is doing this morning. He should have the cath done today. He was in the emergency room last night. Thank you      Called Mr. Frantz Person and left message

## 2022-01-27 NOTE — OP NOTE
-  Coronary Angiography Brief Post Operative Note:    Severe single vessel coronary artery disease involving a proximal 100% stenosis in the right coronary artery. Normal left ventricular end diastolic pressure. Widely patient SVG-PDA graft. Continue standard risk factor modification as clinically indicated and consider alternative etiologies of the patients symptoms.    Electronically signed by Edmond Huynh MD on 1/27/2022 at 1:06 PM

## 2022-01-27 NOTE — ED PROVIDER NOTES
[facility]  EMERGENCY DEPARTMENT ENCOUNTER      Pt Name: Jesus Santa  MRN: [id number]  [place] 1975  Date of evaluation: 1/26/2022  Provider: Shonna Avalos MD    CHIEF COMPLAINT       Chief Complaint   Patient presents with    Chest Pain     Pt states cp began at 724 this evening. Pt radiates to the back. cabg history. is supposed to see dr Latricia Lira for heart cath tomorrow. HISTORY OF PRESENT ILLNESS   (Location/Symptom, Timing/Onset, Context/Setting, Quality, Duration, Modifying Factors, Severity)  Note limiting factors. Jesus Santa is a 40 y.o. male who presents to the emergency department     78-year-old patient presents emergency department with concerns of chest pain. He stated that that approxi-7 PM he felt that his heart went into \"atrial fib\". Thereafter he developed sharp stabbing chest pains with some radiation to his back. The patient does have previous history for coronary artery disease necessitating stent placement and thereafter coronary bypass grafting. Most recently the patient had his medication changed with the addition of Toprol. Patient denies any fever. Denies any recent illnesses. He is vaccinated against Covid. Patient denies any syncopal episodes, nausea vomiting or symptoms suggestive of diaphoresis. Patient is being followed by cardiologist Dr. Neena Dee Notes were reviewed. REVIEW OF SYSTEMS    (2-9 systems for level 4, 10 or more for level 5)     Review of Systems   All other systems reviewed and are negative. Except as noted above the remainder of the review of systems was reviewed and negative. PAST MEDICAL HISTORY     Past Medical History:   Diagnosis Date    CAD (coronary artery disease)     Carpal tunnel syndrome     Depressive disorder, not elsewhere classified     Diabetes mellitus ([address]     [facility] [address] [address] 08/11/2018    STEMI    History of echocardiogram 01/08/2019    EF 40-45%.  LV cavity sixe is mildly increased. Inferior and inferoseptal wall hypokenesis noted. Mild diastolic dysfunction.  Hyperlipidemia     Hypertension 2009         SURGICAL HISTORY       Past Surgical History:   Procedure Laterality Date    CARDIAC CATHETERIZATION Left 01/07/2018    Right Ulnar/[facility]/    CARDIAC CATHETERIZATION Left 01/27/2022    Dr Tyson Agudelo/ right ulnar-    CORONARY ANGIOPLASTY  08/2018    [facility] bypass[facility],     CORONARY ARTERY BYPASS GRAFT  2018         CURRENT MEDICATIONS       Discharge Medication List as of 1/26/2022 10:56 PM      CONTINUE these medications which have NOT CHANGED    Details   OZEMPIC, 1 MG/DOSE, 4 MG/3ML SOPN DAWHistorical Med      nitroGLYCERIN (NITROSTAT) 0.4 MG SL tablet Place 1 tablet under the tongue every 5 minutes as needed for Chest pain up to max of 3 total doses. If no relief after 1 dose, call 911., Disp-25 tablet, R-3Normal      metoprolol succinate (TOPROL XL) 100 MG extended release tablet Take 1 tablet by mouth daily, Disp-90 tablet, R-3Normal      dilTIAZem (CARDIZEM CD) 120 MG extended release capsule Take 1 capsule by mouth daily, Disp-90 capsule, R-3Normal      fenofibrate (TRIGLIDE) 160 MG tablet Take 160 mg by mouth dailyHistorical Med      atorvastatin (LIPITOR) 40 MG tablet Take 1 tablet by mouth daily, Disp-90 tablet, R-3Normal      lisinopril (PRINIVIL;ZESTRIL) 20 MG tablet Take 1 tablet by mouth daily, Disp-90 tablet, R-3Normal      clopidogrel (PLAVIX) 75 MG tablet Take 1 tablet by mouth daily, Disp-90 tablet, R-3Normal      ALPRAZolam (XANAX) 0.25 MG tablet Take 0.25 mg by mouth 2 times daily as needed for Sleep or Anxiety. Dequan Garcia Historical Med      metFORMIN (GLUCOPHAGE) 500 MG tablet Take 500 mg by mouth 2 times daily (with meals) Historical Med      aspirin 81 MG EC tablet Take 81 mg by mouth daily. ALLERGIES     Patient has no known allergies.     FAMILY HISTORY       Family History Problem Relation Age of Onset    High Blood Pressure Mother     Heart Disease Father     High Blood Pressure Father     Asthma Sister           SOCIAL HISTORY       Social History     Socioeconomic History    Marital status:      Spouse name: Not on file    Number of children: Not on file    Years of education: Not on file    Highest education level: Not on file   Occupational History    Not on file   Tobacco Use    Smoking status: Current Some Day Smoker     Packs/day: 0.25     Types: Cigarettes    Smokeless tobacco: Never Used   Vaping Use    Vaping Use: Never used   Substance and Sexual Activity    Alcohol use: Yes     Comment: Rare    Drug use: No    Sexual activity: Yes     Partners: Female   Other Topics Concern    Not on file   Social History Narrative    Not on file     Social Determinants of Health     Financial Resource Strain:     Difficulty of Paying Living Expenses: Not on file   Food Insecurity:     Worried About Running Out of Food in the Last Year: Not on file    Jaylen of Food in the Last Year: Not on file   Transportation Needs:     Lack of Transportation (Medical): Not on file    Lack of Transportation (Non-Medical):  Not on file   Physical Activity:     Days of Exercise per Week: Not on file    Minutes of Exercise per Session: Not on file   Stress:     Feeling of Stress : Not on file   Social Connections:     Frequency of Communication with Friends and Family: Not on file    Frequency of Social Gatherings with Friends and Family: Not on file    Attends Sikhism Services: Not on file    Active Member of Clubs or Organizations: Not on file    Attends Club or Organization Meetings: Not on file    Marital Status: Not on file   Intimate Partner Violence:     Fear of Current or Ex-Partner: Not on file    Emotionally Abused: Not on file    Physically Abused: Not on file    Sexually Abused: Not on file   Housing Stability:     Unable to Pay for Housing in the Last Year: Not on file    Number of Places Lived in the Last Year: Not on file    Unstable Housing in the Last Year: Not on file       SCREENINGS          Heart Score for chest pain patients  History: Slightly Suspicious  ECG: Normal  Patient Age: < 39 years  *Risk factors for Atherosclerotic disease: Diabetes Mellitus,Hypercholesterolemia,Hypertension,Coronary Artery Disease  Risk Factors: > 3 Risk factors or history of atherosclerotic disease*  Troponin: < 1X normal limit  Heart Score Total: 2             PHYSICAL EXAM    (up to 7 for level 4, 8 or more for level 5)     ED Triage Vitals [01/26/22 2018]   BP Temp Temp Source Pulse Resp SpO2 Height Weight   (!) 154/80 101.1 °F (38.4 °C) Oral 78 20 98 % 5' 7\" (1.702 m) 190 lb (86.2 kg)       Physical Exam  Vitals and nursing note reviewed. HENT:      Head: Normocephalic. Nose: Nose normal.      Mouth/Throat:      Mouth: Mucous membranes are moist.   Eyes:      Extraocular Movements: Extraocular movements intact. Cardiovascular:      Rate and Rhythm: Normal rate and regular rhythm. Pulses: Normal pulses. Heart sounds: Normal heart sounds. Comments: Well-healed surgical incisional scar consistent with coronary bypass grafting  Pulmonary:      Effort: Pulmonary effort is normal.      Breath sounds: Normal breath sounds. Abdominal:      General: Abdomen is flat. Palpations: Abdomen is soft. Musculoskeletal:         General: No swelling or tenderness. Normal range of motion. Cervical back: Normal range of motion. Skin:     General: Skin is warm. Capillary Refill: Capillary refill takes less than 2 seconds. Findings: No lesion or rash. Neurological:      General: No focal deficit present. Mental Status: He is alert and oriented to person, place, and time.          DIAGNOSTIC RESULTS     EKG: All EKG's are interpreted by the Emergency Department Physician who either signs or Co-signs this chart in the absence of a cardiologist.      RADIOLOGY:   Non-plain film images such as CT, Ultrasound and MRI are read by the radiologist. Plain radiographic images are visualized and preliminarily interpreted by the emergency physician with the below findings:      Interpretation per the Radiologist below, if available at the time of this note:    XR CHEST PORTABLE   Final Result   No evidence of acute cardiopulmonary disease. ED BEDSIDE ULTRASOUND:   Performed by ED Physician - none    LABS:  Labs Reviewed   CBC WITH AUTO DIFFERENTIAL - Abnormal; Notable for the following components:       Result Value    Hematocrit 38.9 (*)     Absolute Lymph # 3.91 (*)     All other components within normal limits   COMPREHENSIVE METABOLIC PANEL W/ REFLEX TO MG FOR LOW K - Abnormal; Notable for the following components:    Glucose 158 (*)     BUN 23 (*)     Bun/Cre Ratio 26 (*)     Potassium 3.5 (*)     Total Bilirubin 0.20 (*)     All other components within normal limits   BRAIN NATRIURETIC PEPTIDE - Abnormal; Notable for the following components:    Pro- (*)     All other components within normal limits   COVID-19, RAPID   TROPONIN   D-DIMER, QUANTITATIVE   MAGNESIUM   TROPONIN       All other labs were within normal range or not returned as of this dictation. EMERGENCY DEPARTMENT COURSE and DIFFERENTIAL DIAGNOSIS/MDM:   Vitals:    Vitals:    01/26/22 2239 01/26/22 2254 01/26/22 2309 01/26/22 2324   BP:       Pulse: 73 72 75 72   Resp: 21 19 16 18   Temp:       TempSrc:       SpO2:       Weight:       Height:             MDM  Number of Diagnoses or Management Options  Atypical chest pain  Diagnosis management comments: Patient presents emergency department with history as documented in HPI    Given history, physical exam and diagnostic testing I feel this patient does not have an acute medical emergency.   Patient has been adequately risk stratified and using reasonable practice norms it is felt the patient is unlikely to be suffering from significant cardiovascular pathology; ACS, pulmonary embolism, pneumothorax, pneumonia, dissection of the aorta, pericardial tamponade. Laboratory studies imaging studies electrocardiogram interpretation reviewed with the patient    Serial high intensity troponins remain flat-D-dimer negative  Consultation undertaken with Dr. Guerita Aldrich the patient's cardiologist agreeable have the patient discharged and follow-up as previously arranged for his heart catheterization tomorrow     Patient knowledges discharge diagnosis and the importance of timely follow-up invited to return to emergency department with any concerns whatsoever have no additional concerns this time patient discharged in stable condition       Amount and/or Complexity of Data Reviewed  Decide to obtain previous medical records or to obtain history from someone other than the patient: yes          REASSESSMENT   Patient remained hemodynamic stable nontoxic-appearing during emergency department visitation-minimal relief with nitro sublingually x1, majority relief obtained with IV morphine 2 mg x 2    ED Course as of 01/27/22 1511   Wed Jan 26, 2022 2035 EKG 12 Lead  Performed at 2025-the ventricular rate 76, TN interval 0.146-QRS duration 0.094-QTc corrected 0.418 no ST segment elevation [RS]   2118 XR CHEST PORTABLE  Chest x-ray no acute processes radiologist read [RS]      ED Course User Index  [RS] Tyler Mchugh MD         CRITICAL CARE TIME   Total Critical Care time was minutes, excluding separately reportable procedures. There was a high probability of clinically significant/life threatening deterioration in the patient's condition which required my urgent intervention. CONSULTS:  None    PROCEDURES:  Unless otherwise noted below, none     Procedures    FINAL IMPRESSION      1.  Atypical chest pain          DISPOSITION/PLAN   DISPOSITION        PATIENT REFERRED TO:  Zoraida Beal MD  [facility] [address] [place] [id number] [phone]    Call in 2 days      Adair Ngo MD  [place] [phone]    Call in 3 days        DISCHARGE MEDICATIONS:  Discharge Medication List as of 1/26/2022 10:56 PM        Controlled Substances Monitoring:     RX Monitoring 3/2/2019   Attestation The Prescription Monitoring Report for this patient was reviewed today. Acute Pain Prescriptions Prescription exceeds daily limit for a specific reason. See comments or note. ;Severe pain not adequately treated with lower dose.        (Please note that portions of this note were completed with a voice recognition program.  Efforts were made to edit the dictations but occasionally words are mis-transcribed.)    Suzanna Guardado MD (electronically signed)  Attending Emergency Physician            Suzanna Guardado MD  01/27/22 [id number]

## 2022-02-25 NOTE — PROCEDURES
[address] [place] [id number]                                 EVENT MONITOR    PATIENT NAME: Amada Sepulveda                    :        1975  MED REC NO:   [id number]                              ROOM:  ACCOUNT NO:   [de-identified]                           ADMIT DATE: 2022  PROVIDER:     Gala Willis MD    CARDIOVASCULAR DIAGNOSTIC DEPARTMENT    DATE OF STUDY:  2022    ORDERING PROVIDER:  Gala Willis MD    PRIMARY CARE PROVIDER:  Milad Gary MD    INTERPRETING PHYSICIAN:  Gala Willis MD    DIAGNOSIS:  Palpitations; cardiac arrhythmia, unspecified. PHYSICIAN INTERPRETATION:  1. Predominant rhythm:  Normal sinus rhythm. 2.  PAC 0.02%. 3.  PVC 0.64%. Occasional PVCs and PACs. No symptoms were reported. Clinical  correlation required.         Madhu Lujan MD    D: 2022 9:46:31       T: 2022 9:47:38     JIGAR/ALDEN_EDIT  Job#: [id number]     Doc#: Unknown    CC:  Milad Gary MD

## 2022-03-11 ENCOUNTER — OFFICE VISIT (OUTPATIENT)
Dept: CARDIOLOGY | Age: 47
End: 2022-03-11
Payer: COMMERCIAL

## 2022-03-11 VITALS
SYSTOLIC BLOOD PRESSURE: 118 MMHG | HEART RATE: 81 BPM | OXYGEN SATURATION: 98 % | RESPIRATION RATE: 18 BRPM | HEIGHT: 67 IN | DIASTOLIC BLOOD PRESSURE: 74 MMHG | WEIGHT: 192.6 LBS | BODY MASS INDEX: 30.23 KG/M2

## 2022-03-11 DIAGNOSIS — R00.2 PALPITATIONS: ICD-10-CM

## 2022-03-11 DIAGNOSIS — R07.89 ATYPICAL CHEST PAIN: Primary | ICD-10-CM

## 2022-03-11 DIAGNOSIS — I25.10 ASHD (ARTERIOSCLEROTIC HEART DISEASE): ICD-10-CM

## 2022-03-11 DIAGNOSIS — E78.5 DYSLIPIDEMIA: ICD-10-CM

## 2022-03-11 DIAGNOSIS — I10 ESSENTIAL HYPERTENSION: ICD-10-CM

## 2022-03-11 PROCEDURE — 99213 OFFICE O/P EST LOW 20 MIN: CPT | Performed by: INTERNAL MEDICINE

## 2022-03-11 NOTE — PROGRESS NOTES
Tootie Crane am scribing for and in the presence of Franklin Sandifer, MD, F.A.C.C..    Patient: Sade Thakur  : 1975  Date of Visit: 2022    REASON FOR VISIT / CONSULTATION: Follow-up (HX:CP,abn stress, palp,ASHD, s/p cABG, HTN, HLD,angina PT is here for heart cath follow up on  he states he is doing well he quit smoking a month ago. occasional palp deneis:CP,SOB,lightheaded/dizziness,)      History of Present Illness:        Dear Lupis Mi MD    I had the pleasure of seeing Mr. Breanna Johnson today who is a 40 y.o. male who presents for a follow up following a hospital visit on 2019 with chest pain during activity. He had a stress test on 2018 (inferior wall defect with jazmine-infarct ischemia, ejection fraction 47% with inferior wall hypokinesis).     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 He was taken for an emergent CABG with SVG to RCA. Echo on 2019: EF 40-45%. LV cavity sixe is mildly increased. Inferior and inferoseptal wall hypokenesis noted. Mild diastolic dysfunction. Cardiac Catheterization on 2019: Severe single vessel disease involving the LAD, circumflex and right coronary arteries. 1 of 1 bypass grafts patent. Normal LVEDP. Echo on 2019: EF 40-45%. LV cavity sixe is mildly increased. Inferior and inferoseptal wall hypokenesis noted. Mild diastolic dysfunction    Stress test done on 2021 showed abnormal myocardial perfusion study. There is a moderate perfusion defect of moderate/severe intensity in the inferolateral, inferior and inferoseptal regions during stress and rest imaging, which is most consistent with an old myocardial infarction with jazmine-infarct ischemia.  EF was 43%    Echo done on 2021 showed the global left ventricular systolic function appears mildly reduced with an estimated ejection fraction of 40-45%. The inferoseptal and inferior walls appear hypokinetic in their motion. The left ventricular cavity size is within normal limits and the left ventricular wall thickness is mildly increased. Holter done on 12/22/2021 showed rhythm was sinus. Average NC interval 0.16, average QRS duration 0.09. Ventricular ectopic beats 6% of test.2. No diary; no symptoms reported. Frequent PVC's comprising 6% of total beats  including a few short ventricular runs, the longest being 7 beats at 83 bpm.     Heart cath done on 1/27/2022- Severe single vessel disease involving the right coronary arteries. 1 of 1 bypass grafts patent. Normal LVEDP. CAM done on 1/27/2022-  1. Predominant rhythm:  Normal sinus rhythm. 2.  PAC 0.02%. 3.  PVC 0.64%. Mr. Nata Galloway is here for follow up after having heart cath done on 1/26/2022. He states he is doing well and quit smoking a month ago. He also states he was admitted to [facility] for pancreatitis. His diabetic medication Ozempic was stopped. He is very happy that he quit smoking. He has no cardiac complaints today. No chest pain, pressure or tightness. No significant shortness of breath. No orthopnea or PND. No fever or cough. No nausea, vomiting or abdominal pain. He is very active physically with no significant exertional symptoms. No problems with current medications. He has no other complaints at this point. PAST MEDICAL HISTORY:        Past Medical History:   Diagnosis Date    CAD (coronary artery disease)     Carpal tunnel syndrome     Depressive disorder, not elsewhere classified     [facility] mellitus ([facility] [address]     Heart attack ([facility] [address] 08/11/2018    STEMI    History of echocardiogram 01/08/2019    EF 40-45%. LV cavity sixe is mildly increased. Inferior and inferoseptal wall hypokenesis noted. Mild diastolic dysfunction.  Hyperlipidemia     Hypertension 2009       CURRENT ALLERGIES: Patient has no known allergies.  REVIEW OF SYSTEMS: 14 systems were reviewed. Pertinent positives and negatives as above, all else negative. Past Surgical History:   Procedure Laterality Date    CARDIAC CATHETERIZATION Left 2018    Right Ulnar/[facility]/    CARDIAC CATHETERIZATION Left 2022    Dr Peri Agudelo/ right ulnar-    CORONARY ANGIOPLASTY  2018    double bypass, [facility],     CORONARY ARTERY BYPASS GRAFT  2018    Social History:  Social History     Tobacco Use    Smoking status: Former Smoker     Packs/day: 0.25     Years: 30.00     Pack years: 7.50     Types: Cigarettes     Quit date: 2022     Years since quittin.0    Smokeless tobacco: Never Used   Vaping Use    Vaping Use: Never used   Substance Use Topics    Alcohol use: Yes     Comment: Rare    Drug use: No        CURRENT MEDICATIONS:        Outpatient Medications Marked as Taking for the 3/11/22 encounter (Office Visit) with Nick Gómez MD   Medication Sig Dispense Refill    clopidogrel (PLAVIX) 75 MG tablet Take 1 tablet by mouth daily 90 tablet 3    nitroGLYCERIN (NITROSTAT) 0.4 MG SL tablet Place 1 tablet under the tongue every 5 minutes as needed for Chest pain up to max of 3 total doses. If no relief after 1 dose, call 911. 25 tablet 3    metoprolol succinate (TOPROL XL) 100 MG extended release tablet Take 1 tablet by mouth daily 90 tablet 3    dilTIAZem (CARDIZEM CD) 120 MG extended release capsule Take 1 capsule by mouth daily 90 capsule 3    fenofibrate (TRIGLIDE) 160 MG tablet Take 160 mg by mouth daily      atorvastatin (LIPITOR) 40 MG tablet Take 1 tablet by mouth daily 90 tablet 3    lisinopril (PRINIVIL;ZESTRIL) 20 MG tablet Take 1 tablet by mouth daily 90 tablet 3    ALPRAZolam (XANAX) 0.25 MG tablet Take 0.25 mg by mouth 2 times daily as needed for Sleep or Anxiety. Saintclair Challenger metFORMIN (GLUCOPHAGE) 500 MG tablet Take 500 mg by mouth 2 times daily (with meals)       aspirin 81 MG EC tablet Take 81 mg by mouth daily. FAMILY HISTORY: family history includes Asthma in his sister; Heart Disease in his father; High Blood Pressure in his father and mother. Physical Examination:     /74 (Site: Left Upper Arm, Position: Sitting, Cuff Size: Medium Adult)   Pulse 81   Resp 18   Ht 5' 7\" (1.702 m)   Wt 192 lb 9.6 oz (87.4 kg)   SpO2 98%   BMI 30.17 kg/m²  Body mass index is 30.17 kg/m². Constitutional: He is oriented to person, place, and time. He appears well-developed and well-nourished. In no acute distress. HEENT: Normocephalic and atraumatic. No JVD present. Carotid bruit is not present. No mass and no thyromegaly present. No lymphadenopathy present. Cardiovascular: Normal rate, regular rhythm, normal heart sounds. Exam reveals no gallop and no friction rubs. No heart murmur heard. Pulmonary/Chest: Effort normal and breath sounds normal. No respiratory distress. He has no wheezes, rhonchi or rales. Abdominal: Soft, non-tender. Bowel sounds and aorta are normal. He exhibits no organomegaly, mass or bruit. Extremities: No edema. No cyanosis and no clubbing. Pulses are 2+ radial and carotid pulses. 2+ dorsalis pedis and posterior tibial pulses bilaterally. Neurological: He is alert and oriented to person, place, and time. No evidence of gross cranial nerve deficit. Coordination appeared normal.   Skin: Skin is warm and dry. There is no rash or diaphoresis. Psychiatric: He has a normal mood and affect.  His speech is normal and behavior is normal.      MOST RECENT LABS ON RECORD:   Lab Results   Component Value Date    WBC 10.6 01/26/2022    HGB 13.5 01/26/2022    HCT 38.9 (L) 01/26/2022     01/26/2022    CHOL 113 01/08/2019    TRIG 253 (H) 01/08/2019    HDL 26 (L) 01/08/2019    ALT 17 01/26/2022    AST 18 01/26/2022     01/26/2022    K 3.5 (L) 01/26/2022     01/26/2022    CREATININE 0.90 01/26/2022    BUN 23 (H) 01/26/2022    [facility] 01/26/2022    INR 1.0 12/26/2021    LABA1C 6.8 (H) 01/23/2019       ASSESSMENT:     1. Atypical chest pain    2. Palpitations    3. ASHD (arteriosclerotic heart disease)    4. Essential hypertension    5. Dyslipidemia         PLAN:          Recurrent intermittent palpitations:  · Beta Blocker: Continue Metoprolol succinate (Toprol XL) 100 mg daily. · Calcium Channel Blocker: Continue diltiazem CD (Cardizem CD) 120 mg once daily. Atherosclerotic Heart Disease: S/P CABG x1 done on August 11, 2018 SVG to RCA  Repeat cardiac cath on 1/27/2022 showed patent SVG to right PDA, otherwise nonobstructive disease of the left coronary artery system. Antiplatelet Agent: Continue aspirin 81 mg daily and clopidogrel (Plavix) 75 mg daily. I also reminded him to watch for signs of blood in his stool or black tarry stools and stop the medication immediately if this develops as this could be life threatening. Beta Blocker: Continue Metoprolol succinate (Toprol XL) 100 mg daily. Anti-anginal medications: Continue nitroglycerin 0.4 mg tablets as needed for chest pain. Cholesterol Reduction Therapy: Continue Atorvastatin (Lipitor) 40 mg daily. and continue fenofibrate 160 mg daily. Blood pressures controlled. Essential Hypertension: Controlled  Beta Blocker: Continue Metoprolol succinate (Toprol XL) 100 mg daily. ACE Inibitor/ARB: Continue lisinopril 20 mg daily. Calcium Channel Blocker: Continue diltiazem CD (Cardizem CD) 120 mg once daily. Diuretics: Not indicated at this time. Additional Testing List: None    · Hyperlipidemia: Mixed  · Cholesterol Reduction Therapy: Continue Atorvastatin (Lipitor) 40 mg daily. and continue fenofibrate 160 mg daily. · LDL 61 on 2/13/2022. In the meantime, I encouraged Mr. Cassie Kim to continue to take his other medications. FOLLOW UP:   I told Mr. Cassie Kim to call my office if he had any problems, but otherwise I asked him to Return in about 1 year (around 3/11/2023).

## 2022-03-28 NOTE — LETTER
No phone call made from surgery coordinator, possibly it is from [place] [address]  [facility] AT THE [facility] MED SURG  [facility] [address]  Phone: [phone]             December 29, 2021    Patient: Magali Dennis   YOB: 1975   Date of Visit: 12/26/2021       To Whom It May Concern:    Keke Eason was seen and treated in our facility  beginning 12/26/2021 until 12/27/2021. He may return to work 12/29/2021 with no restrictions.       Sincerely,       Burke Vences RN         Signature:__________________________________

## 2022-04-13 ENCOUNTER — TELEPHONE (OUTPATIENT)
Dept: CARDIOLOGY | Age: 47
End: 2022-04-13

## 2022-04-13 RX ORDER — LISINOPRIL 20 MG/1
20 TABLET ORAL DAILY
Qty: 90 TABLET | Refills: 3 | Status: SHIPPED | OUTPATIENT
Start: 2022-04-13

## 2022-06-01 DIAGNOSIS — I25.10 ASHD (ARTERIOSCLEROTIC HEART DISEASE): ICD-10-CM

## 2022-06-01 DIAGNOSIS — R00.2 PALPITATION: ICD-10-CM

## 2022-06-01 DIAGNOSIS — Z95.1 S/P CABG (CORONARY ARTERY BYPASS GRAFT): ICD-10-CM

## 2022-06-01 DIAGNOSIS — I10 ESSENTIAL HYPERTENSION: ICD-10-CM

## 2022-06-01 DIAGNOSIS — R07.89 ATYPICAL CHEST PAIN: ICD-10-CM

## 2022-06-01 DIAGNOSIS — R94.39 ABNORMAL STRESS TEST: ICD-10-CM

## 2022-06-01 DIAGNOSIS — E78.5 DYSLIPIDEMIA: ICD-10-CM

## 2022-06-01 RX ORDER — DILTIAZEM HYDROCHLORIDE 120 MG/1
120 CAPSULE, COATED, EXTENDED RELEASE ORAL DAILY
Qty: 90 CAPSULE | Refills: 3 | Status: SHIPPED | OUTPATIENT
Start: 2022-06-01

## 2022-06-23 ENCOUNTER — TELEPHONE (OUTPATIENT)
Dept: CARDIOLOGY | Age: 47
End: 2022-06-23

## 2022-06-23 NOTE — TELEPHONE ENCOUNTER
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 Can you clear him from that appointment or do you need to see him?

## 2022-06-24 NOTE — TELEPHONE ENCOUNTER
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 Please write him a letter. His most recent work-up including cardiac cath and echo are good. Patient is compliant with medications and follow-up.   Thank you

## 2022-09-11 ENCOUNTER — HOSPITAL ENCOUNTER (INPATIENT)
Age: 47
LOS: 1 days | Discharge: HOME | DRG: 439 | End: 2022-09-13
Attending: EMERGENCY MEDICINE | Admitting: STUDENT IN AN ORGANIZED HEALTH CARE EDUCATION/TRAINING PROGRAM
Payer: COMMERCIAL

## 2022-09-11 ENCOUNTER — APPOINTMENT (OUTPATIENT)
Dept: CT IMAGING | Age: 47
DRG: 439 | End: 2022-09-11
Payer: COMMERCIAL

## 2022-09-11 ENCOUNTER — APPOINTMENT (OUTPATIENT)
Dept: GENERAL RADIOLOGY | Age: 47
DRG: 439 | End: 2022-09-11
Payer: COMMERCIAL

## 2022-09-11 DIAGNOSIS — K85.00 IDIOPATHIC ACUTE PANCREATITIS WITHOUT INFECTION OR NECROSIS: Primary | ICD-10-CM

## 2022-09-11 LAB
ABSOLUTE EOS #: 0.67 K/UL (ref 0–0.44)
ABSOLUTE IMMATURE GRANULOCYTE: 0.05 K/UL (ref 0–0.3)
ABSOLUTE LYMPH #: 4.39 K/UL (ref 1.1–3.7)
ABSOLUTE MONO #: 0.94 K/UL (ref 0.1–1.2)
ALBUMIN SERPL-MCNC: 4.6 G/DL (ref 3.5–5.2)
ALBUMIN/GLOBULIN RATIO: 1.4 (ref 1–2.5)
ALP BLD-CCNC: 68 U/L (ref 40–129)
ALT SERPL-CCNC: 17 U/L (ref 5–41)
ANION GAP SERPL CALCULATED.3IONS-SCNC: 11 MMOL/L (ref 9–17)
AST SERPL-CCNC: 17 U/L
BASOPHILS # BLD: 1 % (ref 0–2)
BASOPHILS ABSOLUTE: 0.08 K/UL (ref 0–0.2)
BILIRUB SERPL-MCNC: 0.3 MG/DL (ref 0.3–1.2)
BILIRUBIN URINE: NEGATIVE
BUN BLDV-MCNC: 18 MG/DL (ref 6–20)
BUN/CREAT BLD: 19 (ref 9–20)
CALCIUM SERPL-MCNC: 9.9 MG/DL (ref 8.6–10.4)
CHLORIDE BLD-SCNC: 102 MMOL/L (ref 98–107)
CO2: 25 MMOL/L (ref 20–31)
COLOR: YELLOW
CREAT SERPL-MCNC: 0.97 MG/DL (ref 0.7–1.2)
EOSINOPHILS RELATIVE PERCENT: 5 % (ref 1–4)
GFR AFRICAN AMERICAN: >60 ML/MIN
GFR NON-AFRICAN AMERICAN: >60 ML/MIN
GFR SERPL CREATININE-BSD FRML MDRD: ABNORMAL ML/MIN/{1.73_M2}
GFR SERPL CREATININE-BSD FRML MDRD: ABNORMAL ML/MIN/{1.73_M2}
GLUCOSE BLD-MCNC: 187 MG/DL (ref 70–99)
GLUCOSE URINE: NEGATIVE
HCT VFR BLD CALC: 40.7 % (ref 40.7–50.3)
HEMOGLOBIN: 13.8 G/DL (ref 13–17)
IMMATURE GRANULOCYTES: 0 %
KETONES, URINE: NEGATIVE
LEUKOCYTE ESTERASE, URINE: NEGATIVE
LIPASE: 446 U/L (ref 13–60)
LYMPHOCYTES # BLD: 33 % (ref 24–43)
MCH RBC QN AUTO: 30.3 PG (ref 25.2–33.5)
MCHC RBC AUTO-ENTMCNC: 33.9 G/DL (ref 28.4–34.8)
MCV RBC AUTO: 89.5 FL (ref 82.6–102.9)
MONOCYTES # BLD: 7 % (ref 3–12)
NITRITE, URINE: NEGATIVE
NRBC AUTOMATED: 0 PER 100 WBC
PDW BLD-RTO: 12.9 % (ref 11.8–14.4)
PH UA: 7 (ref 5–9)
PLATELET # BLD: 397 K/UL (ref 138–453)
PMV BLD AUTO: 10 FL (ref 8.1–13.5)
POTASSIUM SERPL-SCNC: 4.1 MMOL/L (ref 3.7–5.3)
PROTEIN UA: NEGATIVE
RBC # BLD: 4.55 M/UL (ref 4.21–5.77)
SEG NEUTROPHILS: 54 % (ref 36–65)
SEGMENTED NEUTROPHILS ABSOLUTE COUNT: 7.02 K/UL (ref 1.5–8.1)
SODIUM BLD-SCNC: 138 MMOL/L (ref 135–144)
SPECIFIC GRAVITY UA: 1.01 (ref 1.01–1.02)
TOTAL PROTEIN: 7.9 G/DL (ref 6.4–8.3)
TROPONIN, HIGH SENSITIVITY: <6 NG/L (ref 0–22)
TURBIDITY: CLEAR
URINE HGB: NEGATIVE
UROBILINOGEN, URINE: NORMAL
WBC # BLD: 13.2 K/UL (ref 3.5–11.3)

## 2022-09-11 PROCEDURE — 36415 COLL VENOUS BLD VENIPUNCTURE: CPT

## 2022-09-11 PROCEDURE — 85025 COMPLETE CBC W/AUTO DIFF WBC: CPT

## 2022-09-11 PROCEDURE — A4216 STERILE WATER/SALINE, 10 ML: HCPCS | Performed by: EMERGENCY MEDICINE

## 2022-09-11 PROCEDURE — 74177 CT ABD & PELVIS W/CONTRAST: CPT

## 2022-09-11 PROCEDURE — 83690 ASSAY OF LIPASE: CPT

## 2022-09-11 PROCEDURE — 93005 ELECTROCARDIOGRAM TRACING: CPT | Performed by: EMERGENCY MEDICINE

## 2022-09-11 PROCEDURE — 2500000003 HC RX 250 WO HCPCS: Performed by: EMERGENCY MEDICINE

## 2022-09-11 PROCEDURE — 6360000004 HC RX CONTRAST MEDICATION: Performed by: EMERGENCY MEDICINE

## 2022-09-11 PROCEDURE — 80053 COMPREHEN METABOLIC PANEL: CPT

## 2022-09-11 PROCEDURE — 96374 THER/PROPH/DIAG INJ IV PUSH: CPT

## 2022-09-11 PROCEDURE — 81003 URINALYSIS AUTO W/O SCOPE: CPT

## 2022-09-11 PROCEDURE — 84484 ASSAY OF TROPONIN QUANT: CPT

## 2022-09-11 PROCEDURE — 2580000003 HC RX 258: Performed by: EMERGENCY MEDICINE

## 2022-09-11 RX ORDER — SODIUM CHLORIDE 0.9 % (FLUSH) 0.9 %
3 SYRINGE (ML) INJECTION EVERY 8 HOURS
Status: DISCONTINUED | OUTPATIENT
Start: 2022-09-11 | End: 2022-09-13 | Stop reason: HOSPADM

## 2022-09-11 RX ORDER — 0.9 % SODIUM CHLORIDE 0.9 %
1000 INTRAVENOUS SOLUTION INTRAVENOUS ONCE
Status: COMPLETED | OUTPATIENT
Start: 2022-09-11 | End: 2022-09-12

## 2022-09-11 RX ADMIN — SODIUM CHLORIDE 1000 ML: 9 INJECTION, SOLUTION INTRAVENOUS at 23:18

## 2022-09-11 RX ADMIN — FAMOTIDINE 20 MG: 10 INJECTION INTRAVENOUS at 23:19

## 2022-09-11 RX ADMIN — IOPAMIDOL 75 ML: 755 INJECTION, SOLUTION INTRAVENOUS at 23:56

## 2022-09-11 NOTE — LETTER
[facility] AT [facility] MED SURG  [facility] [address]  Phone: [phone]    No name on file. September 13, 2022     Patient: Sebastian Srivastava   YOB: 1975   Date of Visit: 9/11/2022       To Whom It May Concern: It is my medical opinion that Justus Cohen may return to work on 9/15/22 with no restrictions. If you have any questions or concerns, please don't hesitate to call. Sincerely,        No name on file.

## 2022-09-11 NOTE — LETTER
[facility] AT THE [facility] MED SURG  [facility] [address]  Phone: [phone]             September 13, 2022    Patient: Lazarus Flesher   YOB: 1975   Date of Visit: 9/11/2022       To Whom It May Concern:    Janice Ruth was seen and treated in our facility  beginning 9/11/2022 until 9/13/22 . He may return to work on 9/15/22 with no restrictions.       Sincerely,       Kody Galvez RN         Signature:__________________________________

## 2022-09-12 ENCOUNTER — APPOINTMENT (OUTPATIENT)
Dept: ULTRASOUND IMAGING | Age: 47
DRG: 439 | End: 2022-09-12
Payer: COMMERCIAL

## 2022-09-12 ENCOUNTER — APPOINTMENT (OUTPATIENT)
Dept: GENERAL RADIOLOGY | Age: 47
DRG: 439 | End: 2022-09-12
Payer: COMMERCIAL

## 2022-09-12 PROBLEM — R10.13 EPIGASTRIC PAIN: Status: ACTIVE | Noted: 2022-09-12

## 2022-09-12 PROBLEM — K85.90 PANCREATITIS, UNSPECIFIED PANCREATITIS TYPE: Status: ACTIVE | Noted: 2022-09-12

## 2022-09-12 PROBLEM — R11.0 NAUSEA: Status: ACTIVE | Noted: 2022-09-12

## 2022-09-12 LAB
ABSOLUTE EOS #: 0.42 K/UL (ref 0–0.44)
ABSOLUTE IMMATURE GRANULOCYTE: 0.05 K/UL (ref 0–0.3)
ABSOLUTE LYMPH #: 3.97 K/UL (ref 1.1–3.7)
ABSOLUTE MONO #: 0.69 K/UL (ref 0.1–1.2)
ALBUMIN SERPL-MCNC: 3.9 G/DL (ref 3.5–5.2)
ALBUMIN/GLOBULIN RATIO: 1.5 (ref 1–2.5)
ALP BLD-CCNC: 52 U/L (ref 40–129)
ALT SERPL-CCNC: 13 U/L (ref 5–41)
ANION GAP SERPL CALCULATED.3IONS-SCNC: 11 MMOL/L (ref 9–17)
AST SERPL-CCNC: 12 U/L
BASOPHILS # BLD: 1 % (ref 0–2)
BASOPHILS ABSOLUTE: 0.09 K/UL (ref 0–0.2)
BILIRUB SERPL-MCNC: 0.2 MG/DL (ref 0.3–1.2)
BUN BLDV-MCNC: 20 MG/DL (ref 6–20)
BUN/CREAT BLD: 18 (ref 9–20)
CALCIUM SERPL-MCNC: 8.8 MG/DL (ref 8.6–10.4)
CHLORIDE BLD-SCNC: 109 MMOL/L (ref 98–107)
CHOLESTEROL/HDL RATIO: 4.7
CHOLESTEROL: 98 MG/DL
CO2: 23 MMOL/L (ref 20–31)
CREAT SERPL-MCNC: 1.09 MG/DL (ref 0.7–1.2)
EKG ATRIAL RATE: 83 BPM
EKG P AXIS: 32 DEGREES
EKG P-R INTERVAL: 148 MS
EKG Q-T INTERVAL: 390 MS
EKG QRS DURATION: 100 MS
EKG QTC CALCULATION (BAZETT): 458 MS
EKG R AXIS: 77 DEGREES
EKG T AXIS: 26 DEGREES
EKG VENTRICULAR RATE: 83 BPM
EOSINOPHILS RELATIVE PERCENT: 4 % (ref 1–4)
ESTIMATED AVERAGE GLUCOSE: 134 MG/DL
GFR AFRICAN AMERICAN: >60 ML/MIN
GFR NON-AFRICAN AMERICAN: >60 ML/MIN
GFR SERPL CREATININE-BSD FRML MDRD: ABNORMAL ML/MIN/{1.73_M2}
GFR SERPL CREATININE-BSD FRML MDRD: ABNORMAL ML/MIN/{1.73_M2}
GLUCOSE BLD-MCNC: 102 MG/DL (ref 70–99)
HBA1C MFR BLD: 6.3 % (ref 4–6)
HCT VFR BLD CALC: 36.8 % (ref 40.7–50.3)
HDLC SERPL-MCNC: 21 MG/DL
HEMOGLOBIN: 12.3 G/DL (ref 13–17)
IMMATURE GRANULOCYTES: 1 %
LDL CHOLESTEROL: 54 MG/DL (ref 0–130)
LIPASE: 746 U/L (ref 13–60)
LYMPHOCYTES # BLD: 38 % (ref 24–43)
MCH RBC QN AUTO: 30.1 PG (ref 25.2–33.5)
MCHC RBC AUTO-ENTMCNC: 33.4 G/DL (ref 28.4–34.8)
MCV RBC AUTO: 90 FL (ref 82.6–102.9)
MONOCYTES # BLD: 7 % (ref 3–12)
NRBC AUTOMATED: 0 PER 100 WBC
PDW BLD-RTO: 12.9 % (ref 11.8–14.4)
PLATELET # BLD: 341 K/UL (ref 138–453)
PMV BLD AUTO: 10.1 FL (ref 8.1–13.5)
POTASSIUM SERPL-SCNC: 4.6 MMOL/L (ref 3.7–5.3)
RBC # BLD: 4.09 M/UL (ref 4.21–5.77)
SEG NEUTROPHILS: 49 % (ref 36–65)
SEGMENTED NEUTROPHILS ABSOLUTE COUNT: 5.35 K/UL (ref 1.5–8.1)
SODIUM BLD-SCNC: 143 MMOL/L (ref 135–144)
TOTAL PROTEIN: 6.5 G/DL (ref 6.4–8.3)
TRIGL SERPL-MCNC: 113 MG/DL
TSH SERPL DL<=0.05 MIU/L-ACNC: 2.35 UIU/ML (ref 0.3–5)
WBC # BLD: 10.6 K/UL (ref 3.5–11.3)

## 2022-09-12 PROCEDURE — 1200000000 HC SEMI PRIVATE

## 2022-09-12 PROCEDURE — 2580000003 HC RX 258: Performed by: NURSE PRACTITIONER

## 2022-09-12 PROCEDURE — 99285 EMERGENCY DEPT VISIT HI MDM: CPT

## 2022-09-12 PROCEDURE — 96375 TX/PRO/DX INJ NEW DRUG ADDON: CPT

## 2022-09-12 PROCEDURE — 71045 X-RAY EXAM CHEST 1 VIEW: CPT

## 2022-09-12 PROCEDURE — 93010 ELECTROCARDIOGRAM REPORT: CPT | Performed by: INTERNAL MEDICINE

## 2022-09-12 PROCEDURE — 6360000002 HC RX W HCPCS: Performed by: STUDENT IN AN ORGANIZED HEALTH CARE EDUCATION/TRAINING PROGRAM

## 2022-09-12 PROCEDURE — 83036 HEMOGLOBIN GLYCOSYLATED A1C: CPT

## 2022-09-12 PROCEDURE — 80053 COMPREHEN METABOLIC PANEL: CPT

## 2022-09-12 PROCEDURE — 6370000000 HC RX 637 (ALT 250 FOR IP): Performed by: NURSE PRACTITIONER

## 2022-09-12 PROCEDURE — 99222 1ST HOSP IP/OBS MODERATE 55: CPT | Performed by: INTERNAL MEDICINE

## 2022-09-12 PROCEDURE — 6370000000 HC RX 637 (ALT 250 FOR IP): Performed by: STUDENT IN AN ORGANIZED HEALTH CARE EDUCATION/TRAINING PROGRAM

## 2022-09-12 PROCEDURE — 2580000003 HC RX 258: Performed by: STUDENT IN AN ORGANIZED HEALTH CARE EDUCATION/TRAINING PROGRAM

## 2022-09-12 PROCEDURE — 76705 ECHO EXAM OF ABDOMEN: CPT

## 2022-09-12 PROCEDURE — 85025 COMPLETE CBC W/AUTO DIFF WBC: CPT

## 2022-09-12 PROCEDURE — 6360000002 HC RX W HCPCS: Performed by: EMERGENCY MEDICINE

## 2022-09-12 PROCEDURE — 94761 N-INVAS EAR/PLS OXIMETRY MLT: CPT

## 2022-09-12 PROCEDURE — 80061 LIPID PANEL: CPT

## 2022-09-12 PROCEDURE — 6360000002 HC RX W HCPCS: Performed by: NURSE PRACTITIONER

## 2022-09-12 PROCEDURE — 84443 ASSAY THYROID STIM HORMONE: CPT

## 2022-09-12 PROCEDURE — 83690 ASSAY OF LIPASE: CPT

## 2022-09-12 PROCEDURE — 36415 COLL VENOUS BLD VENIPUNCTURE: CPT

## 2022-09-12 PROCEDURE — 82787 IGG 1 2 3 OR 4 EACH: CPT

## 2022-09-12 RX ORDER — ONDANSETRON 4 MG/1
4 TABLET, ORALLY DISINTEGRATING ORAL EVERY 4 HOURS PRN
Status: DISCONTINUED | OUTPATIENT
Start: 2022-09-12 | End: 2022-09-13 | Stop reason: HOSPADM

## 2022-09-12 RX ORDER — ONDANSETRON 2 MG/ML
4 INJECTION INTRAMUSCULAR; INTRAVENOUS ONCE
Status: COMPLETED | OUTPATIENT
Start: 2022-09-12 | End: 2022-09-12

## 2022-09-12 RX ORDER — ALPRAZOLAM 0.25 MG/1
0.25 TABLET ORAL 2 TIMES DAILY PRN
Status: DISCONTINUED | OUTPATIENT
Start: 2022-09-12 | End: 2022-09-13 | Stop reason: HOSPADM

## 2022-09-12 RX ORDER — METOPROLOL SUCCINATE 100 MG/1
100 TABLET, EXTENDED RELEASE ORAL DAILY
Status: DISCONTINUED | OUTPATIENT
Start: 2022-09-12 | End: 2022-09-13 | Stop reason: HOSPADM

## 2022-09-12 RX ORDER — KETOROLAC TROMETHAMINE 15 MG/ML
15 INJECTION, SOLUTION INTRAMUSCULAR; INTRAVENOUS ONCE
Status: COMPLETED | OUTPATIENT
Start: 2022-09-12 | End: 2022-09-12

## 2022-09-12 RX ORDER — ENOXAPARIN SODIUM 100 MG/ML
40 INJECTION SUBCUTANEOUS DAILY
Status: DISCONTINUED | OUTPATIENT
Start: 2022-09-12 | End: 2022-09-13 | Stop reason: HOSPADM

## 2022-09-12 RX ORDER — ASPIRIN 81 MG/1
81 TABLET ORAL DAILY
Status: DISCONTINUED | OUTPATIENT
Start: 2022-09-12 | End: 2022-09-13 | Stop reason: HOSPADM

## 2022-09-12 RX ORDER — SODIUM CHLORIDE 9 MG/ML
INJECTION, SOLUTION INTRAVENOUS PRN
Status: DISCONTINUED | OUTPATIENT
Start: 2022-09-12 | End: 2022-09-13 | Stop reason: HOSPADM

## 2022-09-12 RX ORDER — SODIUM CHLORIDE 0.9 % (FLUSH) 0.9 %
10 SYRINGE (ML) INJECTION EVERY 12 HOURS SCHEDULED
Status: DISCONTINUED | OUTPATIENT
Start: 2022-09-12 | End: 2022-09-13 | Stop reason: HOSPADM

## 2022-09-12 RX ORDER — HYDROMORPHONE HCL 110MG/55ML
0.25 PATIENT CONTROLLED ANALGESIA SYRINGE INTRAVENOUS
Status: DISCONTINUED | OUTPATIENT
Start: 2022-09-12 | End: 2022-09-13

## 2022-09-12 RX ORDER — FAMOTIDINE 20 MG/1
20 TABLET, FILM COATED ORAL 2 TIMES DAILY
Status: DISCONTINUED | OUTPATIENT
Start: 2022-09-12 | End: 2022-09-13 | Stop reason: HOSPADM

## 2022-09-12 RX ORDER — SODIUM CHLORIDE 0.9 % (FLUSH) 0.9 %
10 SYRINGE (ML) INJECTION PRN
Status: DISCONTINUED | OUTPATIENT
Start: 2022-09-12 | End: 2022-09-13 | Stop reason: HOSPADM

## 2022-09-12 RX ORDER — DILTIAZEM HYDROCHLORIDE 120 MG/1
120 CAPSULE, COATED, EXTENDED RELEASE ORAL DAILY
Status: DISCONTINUED | OUTPATIENT
Start: 2022-09-12 | End: 2022-09-13 | Stop reason: HOSPADM

## 2022-09-12 RX ORDER — SODIUM CHLORIDE 9 MG/ML
INJECTION, SOLUTION INTRAVENOUS CONTINUOUS
Status: DISCONTINUED | OUTPATIENT
Start: 2022-09-12 | End: 2022-09-12

## 2022-09-12 RX ORDER — ATORVASTATIN CALCIUM 40 MG/1
40 TABLET, FILM COATED ORAL DAILY
Status: DISCONTINUED | OUTPATIENT
Start: 2022-09-12 | End: 2022-09-13 | Stop reason: HOSPADM

## 2022-09-12 RX ORDER — ONDANSETRON 2 MG/ML
4 INJECTION INTRAMUSCULAR; INTRAVENOUS EVERY 4 HOURS PRN
Status: DISCONTINUED | OUTPATIENT
Start: 2022-09-12 | End: 2022-09-13 | Stop reason: HOSPADM

## 2022-09-12 RX ORDER — SODIUM CHLORIDE 9 MG/ML
INJECTION, SOLUTION INTRAVENOUS CONTINUOUS
Status: ACTIVE | OUTPATIENT
Start: 2022-09-12 | End: 2022-09-12

## 2022-09-12 RX ORDER — ONDANSETRON 2 MG/ML
4 INJECTION INTRAMUSCULAR; INTRAVENOUS EVERY 4 HOURS PRN
Status: DISCONTINUED | OUTPATIENT
Start: 2022-09-12 | End: 2022-09-12

## 2022-09-12 RX ORDER — FENOFIBRATE 160 MG/1
160 TABLET ORAL DAILY
Status: DISCONTINUED | OUTPATIENT
Start: 2022-09-12 | End: 2022-09-13 | Stop reason: HOSPADM

## 2022-09-12 RX ORDER — HYDROMORPHONE HCL 110MG/55ML
0.5 PATIENT CONTROLLED ANALGESIA SYRINGE INTRAVENOUS
Status: DISCONTINUED | OUTPATIENT
Start: 2022-09-12 | End: 2022-09-13

## 2022-09-12 RX ORDER — ONDANSETRON 2 MG/ML
4 INJECTION INTRAMUSCULAR; INTRAVENOUS EVERY 6 HOURS PRN
Status: DISCONTINUED | OUTPATIENT
Start: 2022-09-12 | End: 2022-09-12

## 2022-09-12 RX ORDER — ONDANSETRON 4 MG/1
4 TABLET, ORALLY DISINTEGRATING ORAL EVERY 8 HOURS PRN
Status: DISCONTINUED | OUTPATIENT
Start: 2022-09-12 | End: 2022-09-12

## 2022-09-12 RX ORDER — POTASSIUM CHLORIDE 20 MEQ/1
40 TABLET, EXTENDED RELEASE ORAL PRN
Status: DISCONTINUED | OUTPATIENT
Start: 2022-09-12 | End: 2022-09-13 | Stop reason: HOSPADM

## 2022-09-12 RX ORDER — POTASSIUM CHLORIDE 7.45 MG/ML
10 INJECTION INTRAVENOUS PRN
Status: DISCONTINUED | OUTPATIENT
Start: 2022-09-12 | End: 2022-09-13 | Stop reason: HOSPADM

## 2022-09-12 RX ORDER — LISINOPRIL 20 MG/1
20 TABLET ORAL DAILY
Status: DISCONTINUED | OUTPATIENT
Start: 2022-09-12 | End: 2022-09-13 | Stop reason: HOSPADM

## 2022-09-12 RX ORDER — MAGNESIUM SULFATE IN WATER 40 MG/ML
2000 INJECTION, SOLUTION INTRAVENOUS PRN
Status: DISCONTINUED | OUTPATIENT
Start: 2022-09-12 | End: 2022-09-13 | Stop reason: HOSPADM

## 2022-09-12 RX ORDER — ACETAMINOPHEN 325 MG/1
650 TABLET ORAL EVERY 6 HOURS PRN
Status: DISCONTINUED | OUTPATIENT
Start: 2022-09-12 | End: 2022-09-13 | Stop reason: HOSPADM

## 2022-09-12 RX ORDER — CLOPIDOGREL BISULFATE 75 MG/1
75 TABLET ORAL DAILY
Status: DISCONTINUED | OUTPATIENT
Start: 2022-09-12 | End: 2022-09-13 | Stop reason: HOSPADM

## 2022-09-12 RX ORDER — ACETAMINOPHEN 650 MG/1
650 SUPPOSITORY RECTAL EVERY 6 HOURS PRN
Status: DISCONTINUED | OUTPATIENT
Start: 2022-09-12 | End: 2022-09-13 | Stop reason: HOSPADM

## 2022-09-12 RX ORDER — POLYETHYLENE GLYCOL 3350 17 G/17G
17 POWDER, FOR SOLUTION ORAL DAILY PRN
Status: DISCONTINUED | OUTPATIENT
Start: 2022-09-12 | End: 2022-09-13 | Stop reason: HOSPADM

## 2022-09-12 RX ORDER — MORPHINE SULFATE 4 MG/ML
4 INJECTION, SOLUTION INTRAMUSCULAR; INTRAVENOUS ONCE
Status: COMPLETED | OUTPATIENT
Start: 2022-09-12 | End: 2022-09-12

## 2022-09-12 RX ADMIN — HYDROMORPHONE HYDROCHLORIDE 0.5 MG: 2 INJECTION, SOLUTION INTRAMUSCULAR; INTRAVENOUS; SUBCUTANEOUS at 07:40

## 2022-09-12 RX ADMIN — ASPIRIN 81 MG: 81 TABLET, COATED ORAL at 08:42

## 2022-09-12 RX ADMIN — FENOFIBRATE 160 MG: 160 TABLET ORAL at 20:06

## 2022-09-12 RX ADMIN — HYDROMORPHONE HYDROCHLORIDE 0.5 MG: 2 INJECTION, SOLUTION INTRAMUSCULAR; INTRAVENOUS; SUBCUTANEOUS at 04:29

## 2022-09-12 RX ADMIN — ATORVASTATIN CALCIUM 40 MG: 40 TABLET, FILM COATED ORAL at 20:06

## 2022-09-12 RX ADMIN — METFORMIN HYDROCHLORIDE 500 MG: 500 TABLET ORAL at 16:50

## 2022-09-12 RX ADMIN — ONDANSETRON 4 MG: 4 TABLET, ORALLY DISINTEGRATING ORAL at 13:22

## 2022-09-12 RX ADMIN — HYDROMORPHONE HYDROCHLORIDE 0.5 MG: 2 INJECTION, SOLUTION INTRAMUSCULAR; INTRAVENOUS; SUBCUTANEOUS at 10:21

## 2022-09-12 RX ADMIN — SODIUM CHLORIDE: 9 INJECTION, SOLUTION INTRAVENOUS at 09:16

## 2022-09-12 RX ADMIN — ONDANSETRON 4 MG: 2 INJECTION INTRAMUSCULAR; INTRAVENOUS at 00:24

## 2022-09-12 RX ADMIN — SODIUM CHLORIDE: 9 INJECTION, SOLUTION INTRAVENOUS at 02:38

## 2022-09-12 RX ADMIN — CLOPIDOGREL BISULFATE 75 MG: 75 TABLET ORAL at 20:06

## 2022-09-12 RX ADMIN — ENOXAPARIN SODIUM 40 MG: 100 INJECTION SUBCUTANEOUS at 08:42

## 2022-09-12 RX ADMIN — ONDANSETRON 4 MG: 4 TABLET, ORALLY DISINTEGRATING ORAL at 08:40

## 2022-09-12 RX ADMIN — DILTIAZEM HYDROCHLORIDE 120 MG: 120 CAPSULE, COATED, EXTENDED RELEASE ORAL at 08:42

## 2022-09-12 RX ADMIN — MORPHINE SULFATE 4 MG: 4 INJECTION, SOLUTION INTRAMUSCULAR; INTRAVENOUS at 00:24

## 2022-09-12 RX ADMIN — HYDROMORPHONE HYDROCHLORIDE 0.5 MG: 2 INJECTION, SOLUTION INTRAMUSCULAR; INTRAVENOUS; SUBCUTANEOUS at 20:06

## 2022-09-12 RX ADMIN — HYDROMORPHONE HYDROCHLORIDE 0.5 MG: 2 INJECTION, SOLUTION INTRAMUSCULAR; INTRAVENOUS; SUBCUTANEOUS at 16:50

## 2022-09-12 RX ADMIN — FAMOTIDINE 20 MG: 20 TABLET ORAL at 08:42

## 2022-09-12 RX ADMIN — METFORMIN HYDROCHLORIDE 500 MG: 500 TABLET ORAL at 09:15

## 2022-09-12 RX ADMIN — HYDROMORPHONE HYDROCHLORIDE 1 MG: 1 INJECTION, SOLUTION INTRAMUSCULAR; INTRAVENOUS; SUBCUTANEOUS at 01:26

## 2022-09-12 RX ADMIN — KETOROLAC TROMETHAMINE 15 MG: 15 INJECTION, SOLUTION INTRAMUSCULAR; INTRAVENOUS at 01:03

## 2022-09-12 RX ADMIN — SODIUM CHLORIDE: 9 INJECTION, SOLUTION INTRAVENOUS at 14:49

## 2022-09-12 RX ADMIN — HYDROMORPHONE HYDROCHLORIDE 0.5 MG: 2 INJECTION, SOLUTION INTRAMUSCULAR; INTRAVENOUS; SUBCUTANEOUS at 13:18

## 2022-09-12 RX ADMIN — FAMOTIDINE 20 MG: 20 TABLET ORAL at 20:06

## 2022-09-12 RX ADMIN — LISINOPRIL 20 MG: 20 TABLET ORAL at 08:42

## 2022-09-12 RX ADMIN — ONDANSETRON 4 MG: 2 INJECTION INTRAMUSCULAR; INTRAVENOUS at 23:35

## 2022-09-12 RX ADMIN — HYDROMORPHONE HYDROCHLORIDE 0.5 MG: 2 INJECTION, SOLUTION INTRAMUSCULAR; INTRAVENOUS; SUBCUTANEOUS at 23:31

## 2022-09-12 ASSESSMENT — PAIN SCALES - GENERAL
PAINLEVEL_OUTOF10: 9
PAINLEVEL_OUTOF10: 8
PAINLEVEL_OUTOF10: 5
PAINLEVEL_OUTOF10: 8
PAINLEVEL_OUTOF10: 6
PAINLEVEL_OUTOF10: 6
PAINLEVEL_OUTOF10: 8
PAINLEVEL_OUTOF10: 9
PAINLEVEL_OUTOF10: 6
PAINLEVEL_OUTOF10: 6
PAINLEVEL_OUTOF10: 8
PAINLEVEL_OUTOF10: 9
PAINLEVEL_OUTOF10: 9
PAINLEVEL_OUTOF10: 7
PAINLEVEL_OUTOF10: 8
PAINLEVEL_OUTOF10: 6
PAINLEVEL_OUTOF10: 8
PAINLEVEL_OUTOF10: 6
PAINLEVEL_OUTOF10: 9
PAINLEVEL_OUTOF10: 9

## 2022-09-12 ASSESSMENT — PAIN DESCRIPTION - DESCRIPTORS
DESCRIPTORS: SHARP;STABBING
DESCRIPTORS: STABBING
DESCRIPTORS: TENDER
DESCRIPTORS: PRESSURE
DESCRIPTORS: SHARP;STABBING
DESCRIPTORS: STABBING;BURNING
DESCRIPTORS: STABBING
DESCRIPTORS: STABBING

## 2022-09-12 ASSESSMENT — PAIN DESCRIPTION - FREQUENCY
FREQUENCY: CONTINUOUS
FREQUENCY: INTERMITTENT
FREQUENCY: CONTINUOUS

## 2022-09-12 ASSESSMENT — PAIN - FUNCTIONAL ASSESSMENT
PAIN_FUNCTIONAL_ASSESSMENT: ACTIVITIES ARE NOT PREVENTED
PAIN_FUNCTIONAL_ASSESSMENT: PREVENTS OR INTERFERES SOME ACTIVE ACTIVITIES AND ADLS
PAIN_FUNCTIONAL_ASSESSMENT: ACTIVITIES ARE NOT PREVENTED
PAIN_FUNCTIONAL_ASSESSMENT: ACTIVITIES ARE NOT PREVENTED
PAIN_FUNCTIONAL_ASSESSMENT: PREVENTS OR INTERFERES SOME ACTIVE ACTIVITIES AND ADLS

## 2022-09-12 ASSESSMENT — PAIN DESCRIPTION - LOCATION
LOCATION: ABDOMEN
LOCATION: ABDOMEN;BACK
LOCATION: ABDOMEN

## 2022-09-12 ASSESSMENT — PAIN DESCRIPTION - PAIN TYPE
TYPE: ACUTE PAIN

## 2022-09-12 ASSESSMENT — LIFESTYLE VARIABLES
HOW OFTEN DO YOU HAVE A DRINK CONTAINING ALCOHOL: NEVER
HOW MANY STANDARD DRINKS CONTAINING ALCOHOL DO YOU HAVE ON A TYPICAL DAY: PATIENT DOES NOT DRINK

## 2022-09-12 ASSESSMENT — PAIN SCALES - WONG BAKER: WONGBAKER_NUMERICALRESPONSE: 2

## 2022-09-12 ASSESSMENT — PAIN DESCRIPTION - ONSET
ONSET: ON-GOING

## 2022-09-12 ASSESSMENT — PAIN DESCRIPTION - ORIENTATION
ORIENTATION: MID;UPPER
ORIENTATION: UPPER
ORIENTATION: UPPER
ORIENTATION: MID;UPPER

## 2022-09-12 NOTE — PROGRESS NOTES
[facility]  Inpatient/Observation/Outpatient Rehabilitation    Date: 2022  Patient Name: Isi Peterson       [x] Inpatient Acute/Observation       []  Outpatient  : 1975       [] Pt no showed for scheduled appointment    [] Pt refused/declined therapy at this time due to:           [x] Pt cancelled due to:  [] No Reason Given   [] Sick/ill   [] Other:  Patient declined OT services, stating they are at their baseline level of function. Patient notes they are independent with functional mobility, toileting and simple ADLs. Patient discharged per their request. Patient was advised to inform staff if there is a decline in function or if they have any questions, and therapy can return at that time. Patient verbalized understanding.        LUIS Castillo, OTR/L Date: 2022

## 2022-09-12 NOTE — PROGRESS NOTES
Pt sitting in bed watching TV when writer entered the room. Pt is A&O x4. Vitals and assessment as charted. Pt stated pain is a 8 out of 10 in his upper abdomen. Writer went over the next time he is due for pain medication and pt is in agreeance and will call when he would like it. Pt denies any further needs at this time. Call light within reach.

## 2022-09-12 NOTE — CONSULTS
diarrhea  Patient complains of abdominal pain. The pain is located in the epigastrium. The pain is described as stabbing, and is 9/10 in intensity. Onset was 2 weeks ago. Symptoms have been gradually worsening since. Aggravating factors include food and moving around too much. Alleviating factors include ain medication and saying still. Associated symptoms include diarrhea and nausea. The patient denies fever, hematochezia, and melena. He endorses about 5-10 bowel movements a day currently adding that yesterday was once a day. Past Medical History:  Past Medical History:   Diagnosis Date    CAD (coronary artery disease)     Carpal tunnel syndrome     Depressive disorder, not elsewhere classified     Diabetes mellitus ([facility] [address]     Heart attack ([facility] [address] 2018    STEMI    History of echocardiogram 2019    EF 40-45%. LV cavity sixe is mildly increased. Inferior and inferoseptal wall hypokenesis noted. Mild diastolic dysfunction.     Hyperlipidemia     Hypertension        Past Surgical History:  Past Surgical History:   Procedure Laterality Date    CARDIAC CATHETERIZATION Left 2018    Right Ulnar/[facility]/    CARDIAC CATHETERIZATION Left 2022    Dr Andrea Agudelo/ right ulnar-    CORONARY ANGIOPLASTY  2018    double bypass, [facility],     CORONARY ARTERY BYPASS GRAFT  2018       Social History:  Social History     Tobacco Use    Smoking status: Former     Packs/day: 0.25     Years: 30.00     Pack years: 7.50     Types: Cigarettes     Quit date: 2022     Years since quittin.5    Smokeless tobacco: Never   Substance Use Topics    Alcohol use: Yes     Comment: Rare       Family History:   Family History   Problem Relation Age of Onset    High Blood Pressure Mother     Heart Disease Father     High Blood Pressure Father     Asthma Sister        Diet:  Diet NPO    Scheduled Medications:    aspirin  81 mg Oral Daily    atorvastatin  40 mg Oral Daily    clopidogrel  75 mg Oral Daily    dilTIAZem  120 mg Oral Daily    fenofibrate  160 mg Oral Daily    lisinopril  20 mg Oral Daily    metFORMIN  500 mg Oral BID     metoprolol succinate  100 mg Oral Daily    sodium chloride flush  10 mL IntraVENous 2 times per day    enoxaparin  40 mg SubCUTAneous Daily    famotidine  20 mg Oral BID    sodium chloride flush  3 mL IntraVENous Q8H     Infusions:    sodium chloride 150 mL/hr at 09/12/22 0238    sodium chloride       PRN Medications: ALPRAZolam, sodium chloride flush, sodium chloride, ondansetron **OR** ondansetron, polyethylene glycol, acetaminophen **OR** acetaminophen, potassium chloride **OR** potassium alternative oral replacement **OR** potassium chloride, magnesium sulfate, HYDROmorphone **OR** HYDROmorphone    Allergies: No Known Allergies    ROS: Constitutional: negative for chills, fevers and sweats  Eyes: negative for cataracts, icterus and redness  Ears, nose, mouth, throat, and face: negative for epistaxis, hearing loss and sore throat  Respiratory: negative for cough, hemoptysis and sputum  Cardiovascular: negative for chest pain, dyspnea and lower extremity edema  Gastrointestinal: as per HPI  Genitourinary: negative for dysuria, frequency and hematuria  Neurological: negative for coordination problems, dizziness and gait problems  Behavioral/Psych: positive for anxiety, negative for depression and mood swings    Objective:     Physical Exam:   /80   Pulse 66   Temp 97.1 °F (36.2 °C) (Temporal)   Resp 20   Ht 5' 7\" (1.702 m)   Wt 195 lb 12.8 oz (88.8 kg)   SpO2 94%   BMI 30.67 kg/m²      General appearance: alert, appears stated age and cooperative  Skin: Skin color, texture, turgor normal. No rashes or lesions  HEENT:   Neck: no adenopathy, no carotid bruit, no JVD, supple, symmetrical, trachea midline and thyroid not enlarged, symmetric, no tenderness/mass/nodules  Lungs: clear to auscultation bilaterally  Heart: regular rate and rhythm, S1, S2 normal, no murmur, click, rub or gallop  Abdomen: soft, tender to epigastric palpation; bowel sounds normal; no masses,  no organomegaly  Extremities: extremities normal, atraumatic, no cyanosis or edema  Neurologic: Mental status: Alert, oriented, thought content appropriate    Labs:  CBC:   Recent Labs     09/11/22 2305 09/12/22  0535   WBC 13.2* 10.6   HGB 13.8 12.3*    341     BMP:    Recent Labs     09/11/22 2305 09/12/22  0535    143   K 4.1 4.6    109*   CO2 25 23   BUN 18 20   CREATININE 0.97 1.09   GLUCOSE 187* 102*     Hepatic:   Recent Labs     09/11/22 2305 09/12/22  0535   AST 17 12   ALT 17 13   BILITOT 0.3 0.2*   ALKPHOS 68 52     Troponin: No results for input(s): TROPONINI in the last 72 hours. BNP: No results for input(s): BNP in the last 72 hours. Lipids: No results for input(s): CHOL, HDL in the last 72 hours. Invalid input(s): LDLCALCU  ABGs: No results found for: PHART, PO2ART, WPG4ASH  INR: No results for input(s): INR in the last 72 hours. Recent Labs     09/11/22 2305 09/12/22  0535   WBC 13.2* 10.6   HGB 13.8 12.3*   MCV 89.5 90.0    341    143   K 4.1 4.6    109*   CO2 25 23   BUN 18 20   CREATININE 0.97 1.09   GLUCOSE 187* 102*   CALCIUM 9.9 8.8   PROT 7.9 6.5   LABALBU 4.6 3.9   AST 17 12   ALT 17 13   ALKPHOS 68 52   BILITOT 0.3 0.2*   LIPASE 446* 746*       12/22/2021  ECHO-2D-M-MODE COMPLETE    Global left ventricular systolic function appears mildly reduced with an  estimated ejection fraction of 40-45%. The inferoseptal and inferior walls appear hypokinetic in their motion. The left ventricular cavity size is within normal limits and the left  ventricular wall thickness is mildly increased. No significant valvular disease was seen. CT of abdomen and pelvis   No definite acute process identified in the abdomen or in the pelvis. No evidence of urinary calculus or obstructive uropathy either side. No evidence of intestinal obstruction or ileus. No obvious constipation. Normal appendix visualized. Negative study of liver, gallbladder, spleen, pancreas and adrenal glands     Assessment:   Principal Problem:    Pancreatitis, unspecified pancreatitis type  Resolved Problems:    * No resolved hospital problems. *    Mr. Hayde Olmos is a 56year old man with a PMH of coronary artery disease s/p 2 vessel bypass graft whose recurrent pancreatitis started after he had a gastroenteritis. Differential for his pancreatitis includes auto-immune vs. Infectious. US needed to rule out possible of any stones in his gallbladder as it has greater sensitivity. Plan:   Pancreatitis with epigastric pain whose BUN/Cr ratio is at 18 - in need of volume resuscitation. He needs 200 ccs/hr of volume for 5 hours - reassess for volume overload. Then he can come down to 150ccs/hr for another 5 hours with reassessment. Pain control: Currently, patient is behind on pain control. Dilaudid every 2.5 hrs PRN is ideal in this acute phase of pancreatitis. Check IgG4 level  Check daily CBC, CMP, Lipase level. Nausea: Treat with SL Zofran 4mg every 4 hours   Diet: He states that he cannot tolerate any food orally at this time. Consideration is for broth in the next 24 hours as he tolerates. Screening for colon cancer: This can be done in the outpatient setting. He has never had one and has 2 maternal uncles who  of colon cancer - one in his 62s and another in his 46s. RUQ: US of the gallbladder to confirm no gallstones. We appreciate the consult and will follow along in his care. 9.  We appreciate the consult and will follow along in his care. Electronically signed by Josie Cerrato MD on 2022 at 8:04 AM     Note is dictated utilizing voice recognition software. Unfortunately this leads to occasional typographical errors. Please contact our office if you have any questions.

## 2022-09-12 NOTE — ED PROVIDER NOTES
[facility]      Pt Name: Enedina Tijerina  MRN: [id number]  [place] 1975  Date of evaluation: 9/11/2022  Provider: Eladia Montes MD    [address] [place] [address]       Chief Complaint   Patient presents with    Chest Pain     Mid-epigastric pain int x2 weeks with radiation to back, c/o feeling \"clammy\" and nausea, pt states CABG in 2018, pt states he felt like his abd/chest went \"numb and on fire\" PTA     HISTORY OF PRESENT ILLNESS   (Location/Symptom, Timing/Onset, Context/Setting, Quality, Duration, Modifying Factors, Severity)  Note limiting factors. Enedina Tijerina is a 56 y.o. male who presents to the emergency department with concern for epigastric pain. Denies any alcohol abuse. States he had pancreatitis in the past but is not sure from what. States this was years ago. Denies any urinary symptoms denies any shortness of breath. States he did have a CABG back in 2018. Denies any other acute complaints. HPI    Nursing Notes were reviewed. REVIEW OF SYSTEMS    (2-9 systems for level 4, 10 or more for level 5)     Review of Systems    Except as noted above the remainder of the review of systems was reviewed and negative. PAST MEDICAL HISTORY     Past Medical History:   Diagnosis Date    CAD (coronary artery disease)     Carpal tunnel syndrome     Depressive disorder, not elsewhere classified     Diabetes mellitus ([address]     Heart attack ([facility] [address] 08/11/2018    STEMI    History of echocardiogram 01/08/2019    EF 40-45%. LV cavity sixe is mildly increased. Inferior and inferoseptal wall hypokenesis noted. Mild diastolic dysfunction.     Hyperlipidemia     Hypertension 2009         SURGICAL HISTORY       Past Surgical History:   Procedure Laterality Date    CARDIAC CATHETERIZATION Left 01/07/2018    Right Ulnar/[facility]/    CARDIAC CATHETERIZATION Left 01/27/2022    Dr Darling Agudelo/ right ulnar-    CORONARY ANGIOPLASTY  08/2018    double bypass, [email or web address]    CORONARY ARTERY BYPASS GRAFT  2018         CURRENT MEDICATIONS       Current Discharge Medication List        CONTINUE these medications which have NOT CHANGED    Details   dilTIAZem (CARDIZEM CD) 120 MG extended release capsule Take 1 capsule by mouth daily  Qty: 90 capsule, Refills: 3    Associated Diagnoses: Atypical chest pain; Abnormal stress test; Palpitation; ASHD (arteriosclerotic heart disease); S/P CABG (coronary artery bypass graft); Essential hypertension; Dyslipidemia      lisinopril (PRINIVIL;ZESTRIL) 20 MG tablet Take 1 tablet by mouth daily  Qty: 90 tablet, Refills: 3      clopidogrel (PLAVIX) 75 MG tablet Take 1 tablet by mouth daily  Qty: 90 tablet, Refills: 3      OZEMPIC, 1 MG/DOSE, 4 MG/3ML SOPN       nitroGLYCERIN (NITROSTAT) 0.4 MG SL tablet Place 1 tablet under the tongue every 5 minutes as needed for Chest pain up to max of 3 total doses. If no relief after 1 dose, call 911. Qty: 25 tablet, Refills: 3      metoprolol succinate (TOPROL XL) 100 MG extended release tablet Take 1 tablet by mouth daily  Qty: 90 tablet, Refills: 3    Associated Diagnoses: Atypical chest pain; Abnormal stress test; Palpitation; ASHD (arteriosclerotic heart disease); S/P CABG (coronary artery bypass graft); Essential hypertension; Dyslipidemia      fenofibrate (TRIGLIDE) 160 MG tablet Take 160 mg by mouth daily      atorvastatin (LIPITOR) 40 MG tablet Take 1 tablet by mouth daily  Qty: 90 tablet, Refills: 3      ALPRAZolam (XANAX) 0.25 MG tablet Take 0.25 mg by mouth 2 times daily as needed for Sleep or Anxiety. .      metFORMIN (GLUCOPHAGE) 500 MG tablet Take 500 mg by mouth 2 times daily (with meals)       aspirin 81 MG EC tablet Take 81 mg by mouth daily. ALLERGIES     Patient has no known allergies.     FAMILY HISTORY       Family History   Problem Relation Age of Onset    High Blood Pressure Mother     Heart Disease Father     High Blood Pressure Father     Asthma Sister           SOCIAL HISTORY       Social History     Socioeconomic History    Marital status:      Spouse name: None    Number of children: None    Years of education: None    Highest education level: None   Tobacco Use    Smoking status: Former     Packs/day: 0.25     Years: 30.00     Pack years: 7.50     Types: Cigarettes     Quit date: 2022     Years since quittin.5    Smokeless tobacco: Never   Vaping Use    Vaping Use: Never used   Substance and Sexual Activity    Alcohol use: Yes     Comment: Rare    Drug use: No    Sexual activity: Yes     Partners: Female       SCREENINGS         Chely Coma Scale  Eye Opening: Spontaneous  Best Verbal Response: Oriented  Best Motor Response: Obeys commands  Chely Coma Scale Score: 15                     CIWA Assessment  BP: (!) 141/92  Heart Rate: 68                 PHYSICAL EXAM    (up to 7 for level 4, 8 or more for level 5)     ED Triage Vitals   BP Temp Temp Source Heart Rate Resp SpO2 Height Weight   22 2304 22 2301 22 2301 22 2301 22 23022 23022 0226 22 0226   (!) 147/86 99 °F (37.2 °C) Tympanic 76 17 98 % 5' 7\" (1.702 m) 195 lb 12.8 oz (88.8 kg)       Physical Exam    DIAGNOSTIC RESULTS     EKG: All EKG's are interpreted by the Emergency Department Physician who either signs or Co-signs this chart in the absence of a cardiologist.        RADIOLOGY:   Non-plain film images such as CT, Ultrasound and MRI are read by the radiologist. Plain radiographic images are visualized and preliminarily interpreted by the emergency physician with the below findings:        Interpretation per the Radiologist below, if available at the time of this note:    CT ABDOMEN PELVIS W IV CONTRAST Additional Contrast? None   Preliminary Result   No definite acute process identified in the abdomen or in the pelvis. No evidence of urinary calculus or obstructive uropathy either side.       No evidence of intestinal obstruction or ileus. No obvious constipation. Normal appendix visualized. Negative study of liver, gallbladder, spleen, pancreas and adrenal glands. XR CHEST PORTABLE   Preliminary Result   Stable chest x-ray, without a definable acute cardiopulmonary abnormality. ED BEDSIDE ULTRASOUND:   Performed by ED Physician - none    LABS:  Labs Reviewed   CBC WITH AUTO DIFFERENTIAL - Abnormal; Notable for the following components:       Result Value    WBC 13.2 (*)     Eosinophils % 5 (*)     Absolute Lymph # 4.39 (*)     Absolute Eos # 0.67 (*)     All other components within normal limits   COMPREHENSIVE METABOLIC PANEL - Abnormal; Notable for the following components:    Glucose 187 (*)     All other components within normal limits   LIPASE - Abnormal; Notable for the following components:    Lipase 446 (*)     All other components within normal limits   TROPONIN   URINALYSIS   LIPID PANEL   CBC WITH AUTO DIFFERENTIAL   COMPREHENSIVE METABOLIC PANEL W/ REFLEX TO MG FOR LOW K   LIPASE   HEMOGLOBIN A1C   TSH WITH REFLEX       All other labs were within normal range or not returned as of this dictation. EMERGENCY DEPARTMENT COURSE and DIFFERENTIAL DIAGNOSIS/MDM:   Vitals:    Vitals:    09/12/22 0131 09/12/22 0146 09/12/22 0200 09/12/22 0226   BP: 120/75 115/71 123/82 (!) 141/92   Pulse: 75 71 72 68   Resp: 26 23 20 18   Temp:    97.4 °F (36.3 °C)   TempSrc:    Temporal   SpO2: 96% 94% 94% 96%   Weight:    195 lb 12.8 oz (88.8 kg)   Height:    5' 7\" (1.702 m)         MDM  Number of Diagnoses or Management Options  Idiopathic acute pancreatitis without infection or necrosis  Diagnosis management comments: 71-year-old male presented with concern for epigastric pain. Initially patient had no peritoneal signs. Labs were consistent with pancreatitis. There is no evidence of gallstones or biliary tract involvement.   Patient was provided multiple pain medications and eventually admitted for pain control. Patient denies any alcohol abuse or history of triglycerides and stated he did have a previous episode of pancreatitis but he was not sure what it was secondary to. At time be admitted patient was not hypotensive or tachycardic afebrile not any acute distress, last dose of pain medication hydromorphone did help the patient. Amount and/or Complexity of Data Reviewed  Decide to obtain previous medical records or to obtain history from someone other than the patient: yes          [facility]   Total Critical Care time was  minutes, excluding separately reportable procedures. There was a high probability of clinically significant/life threatening deterioration in the patient's condition which required my urgent intervention. CONSULTS:  IP CONSULT TO SOCIAL WORK    PROCEDURES:  Unless otherwise noted below, none     Procedures        FINAL IMPRESSION      1. Idiopathic acute pancreatitis without infection or necrosis          DISPOSITION/PLAN   DISPOSITION Admitted 09/12/2022 01:53:30 AM      PATIENT REFERRED TO:  No follow-up provider specified. DISCHARGE MEDICATIONS:  Current Discharge Medication List        Controlled Substances Monitoring:     RX Monitoring 3/2/2019   Attestation The Prescription Monitoring Report for this patient was reviewed today. Acute Pain Prescriptions Prescription exceeds daily limit for a specific reason. See comments or note. ;Severe pain not adequately treated with lower dose.        (Please note that portions of this note were completed with a voice recognition program.  Efforts were made to edit the dictations but occasionally words are mis-transcribed.)    Willy Pretty MD (electronically signed)  Attending Emergency Physician            Willy Pretty MD  09/12/22 1580

## 2022-09-12 NOTE — PROGRESS NOTES
Patient admitted to [facility] at this time. Patient is alert and oriented x4, calm and cooperative with assessment. Writer applied telemetry pack and began fluids per orders requests. EKG done in ER. Patient requests water- writer  reminds patient of NPO status, patient verifies understanding. Denying any other additional needs, call light placed within reach, bed in lowest position-will continue to monitor.

## 2022-09-12 NOTE — PROGRESS NOTES
Writer to bedside to complete morning assessment. Upon entry to room, pt sitting up in bed watching television, respirations even and unlabored while on room air. Vitals obtained and assessment completed, see flow sheet for details. Pt educated on plan of care. Pt having pain, not due for pain medication until 0730. Pt denies further needs from writer at this time. Call light in reach. Will continue to monitor.

## 2022-09-12 NOTE — PROGRESS NOTES
Called lab at the request of Manju Garcia CNP, Dr Keon Jc wants pt to have an IgG4 check. Unsure how to place this order. Lab is going to look in to how order needs to be placed.

## 2022-09-12 NOTE — PROGRESS NOTES
[facility]  Inpatient/Observation/Outpatient Rehabilitation    Date: 2022  Patient Name: Jimmy Daniels       [x] Inpatient Acute/Observation       []  Outpatient  : 1975       [] Pt no showed for scheduled appointment    [] Pt refused/declined therapy at this time due to:           [x] Pt cancelled due to:  [] No Reason Given   [] Sick/ill   [] Other: Per nurse and pt, pt is completely independent and no need for a Pt eval at this time     Therapist/Assistant will attempt to see this patient, at our earliest opportunity.        Shailesh Ingram, PT Date: 2022

## 2022-09-12 NOTE — PROGRESS NOTES
Met with Patient this p.m. to discuss discharge planning. Patient is a 56year old , white male, admitted with a diagnosis of Pancreatitis. Patient is alert and oriented, pleasant and cooperative with this assessment. States that he wishes to return home with his wife when deemed medically stable for discharge. Patient resides with his wife in [place]. He is employed with [employer] and work location is [employer]. Patient uses no DME and has no outside resources or services currently in place. He drives himself and provides for his own transportation needs. PCP is Dr. John Chavarria. Patient has medical insurance and has no difficulty with regards to affording his medication at this time. Discharge plan is home when stable. Patient is a 'Full Code' status and has no medical directives currently in place. Patient voices interest in executing these documents. Referral made to [facility] care, Mariela Guerrero, for assistance with this request.  No anticipated needs or concerns identified by Patient at this time. LSW to monitor and assist with discharge planning as appropriate.     [address], [facility]  9/12/2022

## 2022-09-12 NOTE — PROGRESS NOTES
Afternoon assessment completed at this time. Pt complaining of pain and nausea. PRN PO Zofran and IV Dilaudid given. Pt denies further needs at this time. Visitor at bedside. Will continue to monitor.

## 2022-09-12 NOTE — H&P
Take 1 tablet by mouth daily 4/13/22   Song Encinas MD   clopidogrel (PLAVIX) 75 MG tablet Take 1 tablet by mouth daily 1/27/22   David Bass MD   [facility], 1 MG/DOSE, 4 MG/3ML [facility]  1/5/22   Historical Provider, MD   nitroGLYCERIN (NITROSTAT) 0.4 MG SL tablet Place 1 tablet under the tongue every 5 minutes as needed for Chest pain up to max of 3 total doses. If no relief after 1 dose, call 911. Patient not taking: Reported on 9/11/2022 1/21/22   Song Encinas MD   metoprolol succinate (TOPROL XL) 100 MG extended release tablet Take 1 tablet by mouth daily 1/21/22   Song Encinas MD   fenofibrate (TRIGLIDE) 160 MG tablet Take 160 mg by mouth daily    Historical Provider, MD   atorvastatin (LIPITOR) 40 MG tablet Take 1 tablet by mouth daily 7/15/19   Song Encinas MD   ALPRAZolam Robinson Estevez) 0.25 MG tablet Take 0.25 mg by mouth 2 times daily as needed for Sleep or Anxiety. Kathie Diaz Historical Provider, MD   metFORMIN (GLUCOPHAGE) 500 MG tablet Take 500 mg by mouth 2 times daily (with meals)     Historical Provider, MD   aspirin 81 MG EC tablet Take 81 mg by mouth daily. Historical Provider, MD       Allergies:  Patient has no known allergies. Social History:   TOBACCO:   reports that he quit smoking about 6 months ago. His smoking use included cigarettes. He has a 7.50 pack-year smoking history. He has never used smokeless tobacco.  ETOH:   reports current alcohol use. Family History:       Problem Relation Age of Onset    High Blood Pressure Mother     Heart Disease Father     High Blood Pressure Father     Asthma Sister        Allergies:  Patient has no known allergies. Medications Prior to Admission:    Prior to Admission medications    Medication Sig Start Date End Date Taking?  Authorizing Provider   dilTIAZem (CARDIZEM CD) 120 MG extended release capsule Take 1 capsule by mouth daily 6/1/22   Song Encinas MD   lisinopril (PRINIVIL;ZESTRIL) 20 MG tablet Take 1 tablet by mouth daily 4/13/22   Francesca Toth Rafaela Graves MD   clopidogrel (PLAVIX) 75 MG tablet Take 1 tablet by mouth daily 1/27/22   Ori Vicente MD   [facility], 1 MG/DOSE, 4 MG/3ML [facility]  1/5/22   Historical Provider, MD   nitroGLYCERIN (NITROSTAT) 0.4 MG SL tablet Place 1 tablet under the tongue every 5 minutes as needed for Chest pain up to max of 3 total doses. If no relief after 1 dose, call 911. Patient not taking: Reported on 9/11/2022 1/21/22   Raven Zhou MD   metoprolol succinate (TOPROL XL) 100 MG extended release tablet Take 1 tablet by mouth daily 1/21/22   Raven Zhou MD   fenofibrate (TRIGLIDE) 160 MG tablet Take 160 mg by mouth daily    Historical Provider, MD   atorvastatin (LIPITOR) 40 MG tablet Take 1 tablet by mouth daily 7/15/19   Raven Zhou MD   ALPRAZolam Priscilla Buggy) 0.25 MG tablet Take 0.25 mg by mouth 2 times daily as needed for Sleep or Anxiety. Baljit Conroy Historical Provider, MD   metFORMIN (GLUCOPHAGE) 500 MG tablet Take 500 mg by mouth 2 times daily (with meals)     Historical Provider, MD   aspirin 81 MG EC tablet Take 81 mg by mouth daily. Historical Provider, MD       Review of Systems:  Constitutional:negative  for fevers, and negative for chills. Eyes: negative for visual disturbance   ENT: negative for sore throat, negative nasal congestion, and negative for earache  Respiratory: negative for shortness of breath, negative for cough, and negative for wheezing  Cardiovascular: negative for chest pain, negative for palpitations, and negative for syncope  Gastrointestinal: positive for abdominal pain, positive for nausea,negative for vomiting, negative for diarrhea, negative for constipation, and negative for hematochezia or melena  Genitourinary: negative for dysuria, negative for urinary urgency, negative for urinary frequency, and negative for hematuria  Skin: negative for skin rash, and negative for skin lesions  Neurological: negative for unilateral weakness, numbness or tingling.     Physical Exam:    Vitals:   Temp: 97.4 °F (36.3 °C)  BP: 125/88  Resp: 20  Heart Rate: 81  SpO2: 95 %  24HR INTAKE/OUTPUT:    Intake/Output Summary (Last 24 hours) at 9/12/2022 1452  Last data filed at 9/12/2022 1318  Gross per 24 hour   Intake 841.81 ml   Output --   Net 841.81 ml       Weight    Body mass index is 30.67 kg/m². Exam:  GEN:    Awake, alert and oriented x3. EYES:  EOMI, pupils equal   NECK: Supple. No lymphadenopathy. No carotid bruit  CVS:    regular rate and rhythm, no audible murmur  PULM:  CTA, no wheezes, rales or rhonchi, no acute respiratory distress  ABD:    Bowels sounds normal.  Abdomen is soft. No distention. epigastric tenderness to palpation. EXT:   no edema bilaterally . No calf tenderness. NEURO: Moves all extremities. Motor and sensory are grossly intact  SKIN:  No rashes.   No skin lesions.    -----------------------------------------------------------------  Diagnostic Data:     DATA:    CBC:   Lab Results   Component Value Date    WBC 10.6 09/12/2022    RBC 4.09 (L) 09/12/2022    HGB 12.3 (L) 09/12/2022    HCT 36.8 (L) 09/12/2022    MCV 90.0 09/12/2022     09/12/2022        CMP:   Lab Results   Component Value Date    GLUCOSE 102 (H) 09/12/2022    BUN 20 09/12/2022    CREATININE 1.09 09/12/2022     09/12/2022    K 4.6 09/12/2022    CALCIUM 8.8 09/12/2022     (H) 09/12/2022    CO2 23 09/12/2022    PROT 6.5 09/12/2022    LABALBU 3.9 09/12/2022    BILITOT 0.2 (L) 09/12/2022    ALKPHOS 52 09/12/2022    ALT 13 09/12/2022    AST 12 09/12/2022       UA:   Lab Results   Component Value Date    COLORU Yellow 09/11/2022    SPECGRAV 1.010 09/11/2022    LEUKOCYTESUR NEGATIVE 09/11/2022    GLUCOSEU NEGATIVE 09/11/2022    KETUA NEGATIVE 09/11/2022    PROTEINU NEGATIVE 09/11/2022    HGBUR NEGATIVE 09/11/2022       Lactic Acid:   No results found for: LACTA    D-Dimer:  Lab Results   Component Value Date    DDIMER <0.27 01/26/2022       PT/INR:  Lab Results   Component Value Date/Time    PROTIME 12.9 12/26/2021 06:10 PM    INR 1.0 12/26/2021 06:10 PM       High Sensitivity Troponin:  Recent Labs     09/11/22  2305   TROPHS <6       ABGs:   No results found for: PHART, PH, TDQ5HET, PCO2, PO2ART, PO2, PNB3QEW, HCO3, BEART, BE, THGBART, THB, NAM0LYC, K2PTYRAD, O2SAT, FIO2        US GALLBLADDER RUQ   Final Result   Simple right renal cyst with otherwise unremarkable exam      RECOMMENDATIONS:   Unavailable         CT ABDOMEN PELVIS W IV CONTRAST Additional Contrast? None   Final Result   No definite acute process identified in the abdomen or in the pelvis. No evidence of urinary calculus or obstructive uropathy either side. No evidence of intestinal obstruction or ileus. No obvious constipation. Normal appendix visualized. Negative study of liver, gallbladder, spleen, pancreas and adrenal glands. XR CHEST PORTABLE   Final Result   Stable chest x-ray, without a definable acute cardiopulmonary abnormality. EKG reviewed    Assessment:    Principal Problem:    Pancreatitis, unspecified pancreatitis type  Active Problems:    Nausea    Epigastric pain    HTN (hypertension)    Chronic combined systolic and diastolic CHF, NYHA class 2 (HCC)    ASHD (arteriosclerotic heart disease) /  CABG 2018  Resolved Problems:    * No resolved hospital problems.  *      Patient Active Problem List    Diagnosis Date Noted    Abnormal result of other cardiovascular function study 01/27/2022    Palpitations     Pancreatitis, unspecified pancreatitis type 09/12/2022    Nausea 09/12/2022    Epigastric pain 09/12/2022    ACS (acute coronary syndrome) ([address] 12/26/2021    ASHD (arteriosclerotic heart disease) /  CABG 2018 04/28/2019    Ischemic cardiomyopathy 01/08/2019    Chronic combined systolic and diastolic CHF, NYHA class 2 ([address] 01/08/2019    S/P CABG (coronary artery bypass graft)     Dyslipidemia     Acute coronary syndrome ([address] 01/07/2019    Mild congestive heart failure ([address] 01/07/2019 Chest pain at rest 06/02/2015    HTN (hypertension) 06/02/2015    Tobacco abuse 06/02/2015    Family history of premature CAD 06/02/2015    Pain in rib, left lower 05/31/2012       Plan: This patient requires inpatient admission because of idiopathic pancreatitis  Factors affecting the medical complexity of this patient include ASHD, chronic combined CHF, hypertension  Estimated length of stay is 3 days  Idiopathic pancreatitis  Appreciate Dr. Mk Riley  IV fluids-200 mL/h x 5 hours followed by 150 MLS per hour x5 hours if no development Rales or signs of CHF. No IV fluids to be run overnight  N.p.o.  Pain management  IgG4  Ultrasound gallbladder-negative  ASHD  Continue aspirin, Lipitor, Plavix, Triglide  Chronic combined CHF  Caution with IV fluids  Continue Toprol-XL  Hypertension  Continue Toprol-XL, lisinopril, Cardizem CD  DVT prophylaxis: Lovenox  Peptic ulcer prophylaxis: Pepcid  High risk medications: none  Social Service and Case Management consults for DC planning  Dietician consult initiated    CORE MEASURES  DVT prophylaxis: Lovenox  Decubitus ulcer present on admission: No  CODE STATUS: FULL CODE  Nutrition Status: fair   Physical therapy: No   Old Charts reviewed: Yes  EKG Reviewed:  Yes  Advance Directive Addressed: Yes    KRUNAL Mancilla - CNP, KRUNAL, NP-C  9/12/2022, 2:52 PM

## 2022-09-12 NOTE — PROGRESS NOTES
Comprehensive Nutrition Assessment    Type and Reason for Visit:  Initial    Nutrition Recommendations/Plan:   Continue NPO diet. Progress to a low fat diet as medically appropriate. Malnutrition Assessment:  Malnutrition Status: At risk for malnutrition (Comment) (09/12/22 0913)    Context:  Acute Illness     Findings of the 6 clinical characteristics of malnutrition:  Energy Intake:  50% or less of estimated energy requirements for 5 or more days (2 weeks)  Weight Loss:  No significant weight loss     Body Fat Loss:  No significant body fat loss     Muscle Mass Loss:  No significant muscle mass loss    Fluid Accumulation:  No significant fluid accumulation     Strength:  Not Performed    Nutrition Assessment:    Inadequate oral intakes r/t altered GI aeb NPO, Hx N/V, reported decreased PO over the last 2 weeks. Pt reports having nausea and pain. States he follows a low carb diet, similar to keto diet. Discussed the importance of having balanced meal plans and eating more carbohydrate containing foods instead of eating protein foods as bulk of meal. Encouraged vegetables, low fat meals. Nutrition Related Findings:    appears well nourished Wound Type: None       Current Nutrition Intake & Therapies:    Average Meal Intake: NPO  Average Supplements Intake: NPO  Diet NPO    Anthropometric Measures:  Height: 5' 7\" (170.2 cm)  Ideal Body Weight (IBW): 148 lbs (67 kg)    Admission Body Weight: 195 lb 12.8 oz (88.8 kg)  Current Body Weight: 195 lb 12.8 oz (88.8 kg), 132.3 % IBW. Weight Source: Bed Scale  Current BMI (kg/m2): 30.7  Usual Body Weight: 190 lb 9.6 oz (86.5 kg)  % Weight Change (Calculated): 2.7  Weight Adjustment For: No Adjustment                 BMI Categories: Obese Class 1 (BMI 30.0-34. 9)    Estimated Daily Nutrient Needs:  Energy Requirements Based On: Kcal/kg  Weight Used for Energy Requirements: Current  Energy (kcal/day): 7789-2795 (15-18/kg)  Weight Used for Protein Requirements: Ideal  Protein (g/day): 87-101g (1.3-1.5g/kg)  Method Used for Fluid Requirements: ml/Kg  Fluid (ml/day): 2225 ml (25/kg)    Nutrition Diagnosis:   Inadequate oral intake related to altered GI function as evidenced by NPO or clear liquid status due to medical condition, poor intake prior to admission, nausea, vomiting    Nutrition Interventions:   Food and/or Nutrient Delivery: Continue NPO  Nutrition Education/Counseling: Education needed  Coordination of Nutrition Care: Continue to monitor while inpatient       Goals:     Goals: Initiate PO diet     Recent Labs     09/11/22  2305 09/12/22  0535    143   K 4.1 4.6    109*   CO2 25 23   BUN 18 20   CREATININE 0.97 1.09   GLUCOSE 187* 102*   ALT 17 13   ALKPHOS 68 52   GFR                            Lab Results   Component Value Date/Time    LABALBU 3.9 09/12/2022 05:35 AM    LABALBU 4.3 05/07/2012 08:14 AM       Nutrition Monitoring and Evaluation:   Behavioral-Environmental Outcomes: None Identified  Food/Nutrient Intake Outcomes: Diet Advancement/Tolerance  Physical Signs/Symptoms Outcomes: Biochemical Data, GI Status, Nausea or Vomiting, Weight    Discharge Planning:     Too soon to determine     Tisha Felty, [address] [place] [address],   Contact: [phone]

## 2022-09-13 VITALS
RESPIRATION RATE: 18 BRPM | HEART RATE: 84 BPM | DIASTOLIC BLOOD PRESSURE: 75 MMHG | HEIGHT: 67 IN | BODY MASS INDEX: 31.04 KG/M2 | SYSTOLIC BLOOD PRESSURE: 128 MMHG | OXYGEN SATURATION: 98 % | TEMPERATURE: 97.8 F | WEIGHT: 197.8 LBS

## 2022-09-13 LAB
ABSOLUTE EOS #: 0.41 K/UL (ref 0–0.44)
ABSOLUTE IMMATURE GRANULOCYTE: 0.08 K/UL (ref 0–0.3)
ABSOLUTE LYMPH #: 2.69 K/UL (ref 1.1–3.7)
ABSOLUTE MONO #: 0.69 K/UL (ref 0.1–1.2)
ALBUMIN SERPL-MCNC: 3.8 G/DL (ref 3.5–5.2)
ALBUMIN/GLOBULIN RATIO: 1.5 (ref 1–2.5)
ALP BLD-CCNC: 56 U/L (ref 40–129)
ALT SERPL-CCNC: 12 U/L (ref 5–41)
ANION GAP SERPL CALCULATED.3IONS-SCNC: 14 MMOL/L (ref 9–17)
AST SERPL-CCNC: 15 U/L
BASOPHILS # BLD: 0 % (ref 0–2)
BASOPHILS ABSOLUTE: 0.06 K/UL (ref 0–0.2)
BILIRUB SERPL-MCNC: 0.3 MG/DL (ref 0.3–1.2)
BUN BLDV-MCNC: 16 MG/DL (ref 6–20)
BUN/CREAT BLD: 18 (ref 9–20)
CALCIUM SERPL-MCNC: 8.9 MG/DL (ref 8.6–10.4)
CHLORIDE BLD-SCNC: 104 MMOL/L (ref 98–107)
CO2: 21 MMOL/L (ref 20–31)
CREAT SERPL-MCNC: 0.88 MG/DL (ref 0.7–1.2)
EOSINOPHILS RELATIVE PERCENT: 3 % (ref 1–4)
GFR AFRICAN AMERICAN: >60 ML/MIN
GFR NON-AFRICAN AMERICAN: >60 ML/MIN
GFR SERPL CREATININE-BSD FRML MDRD: ABNORMAL ML/MIN/{1.73_M2}
GFR SERPL CREATININE-BSD FRML MDRD: ABNORMAL ML/MIN/{1.73_M2}
GLUCOSE BLD-MCNC: 77 MG/DL (ref 70–99)
HCT VFR BLD CALC: 35.6 % (ref 40.7–50.3)
HEMOGLOBIN: 11.8 G/DL (ref 13–17)
IMMATURE GRANULOCYTES: 1 %
LIPASE: 144 U/L (ref 13–60)
LYMPHOCYTES # BLD: 19 % (ref 24–43)
MCH RBC QN AUTO: 30 PG (ref 25.2–33.5)
MCHC RBC AUTO-ENTMCNC: 33.1 G/DL (ref 28.4–34.8)
MCV RBC AUTO: 90.6 FL (ref 82.6–102.9)
MONOCYTES # BLD: 5 % (ref 3–12)
NRBC AUTOMATED: 0 PER 100 WBC
PDW BLD-RTO: 12.8 % (ref 11.8–14.4)
PLATELET # BLD: 325 K/UL (ref 138–453)
PMV BLD AUTO: 10.7 FL (ref 8.1–13.5)
POTASSIUM SERPL-SCNC: 3.6 MMOL/L (ref 3.7–5.3)
RBC # BLD: 3.93 M/UL (ref 4.21–5.77)
SEG NEUTROPHILS: 72 % (ref 36–65)
SEGMENTED NEUTROPHILS ABSOLUTE COUNT: 10.16 K/UL (ref 1.5–8.1)
SODIUM BLD-SCNC: 139 MMOL/L (ref 135–144)
TOTAL PROTEIN: 6.3 G/DL (ref 6.4–8.3)
WBC # BLD: 14.1 K/UL (ref 3.5–11.3)

## 2022-09-13 PROCEDURE — 80053 COMPREHEN METABOLIC PANEL: CPT

## 2022-09-13 PROCEDURE — 6370000000 HC RX 637 (ALT 250 FOR IP): Performed by: STUDENT IN AN ORGANIZED HEALTH CARE EDUCATION/TRAINING PROGRAM

## 2022-09-13 PROCEDURE — 36415 COLL VENOUS BLD VENIPUNCTURE: CPT

## 2022-09-13 PROCEDURE — 6360000002 HC RX W HCPCS: Performed by: STUDENT IN AN ORGANIZED HEALTH CARE EDUCATION/TRAINING PROGRAM

## 2022-09-13 PROCEDURE — 2580000003 HC RX 258: Performed by: STUDENT IN AN ORGANIZED HEALTH CARE EDUCATION/TRAINING PROGRAM

## 2022-09-13 PROCEDURE — 85025 COMPLETE CBC W/AUTO DIFF WBC: CPT

## 2022-09-13 PROCEDURE — 6370000000 HC RX 637 (ALT 250 FOR IP): Performed by: NURSE PRACTITIONER

## 2022-09-13 PROCEDURE — 99233 SBSQ HOSP IP/OBS HIGH 50: CPT | Performed by: INTERNAL MEDICINE

## 2022-09-13 PROCEDURE — 6360000002 HC RX W HCPCS: Performed by: NURSE PRACTITIONER

## 2022-09-13 PROCEDURE — 83690 ASSAY OF LIPASE: CPT

## 2022-09-13 RX ORDER — OXYCODONE HYDROCHLORIDE AND ACETAMINOPHEN 5; 325 MG/1; MG/1
1 TABLET ORAL EVERY 8 HOURS PRN
Qty: 9 TABLET | Refills: 0 | Status: SHIPPED | OUTPATIENT
Start: 2022-09-13 | End: 2022-09-16

## 2022-09-13 RX ORDER — POLYETHYLENE GLYCOL 3350, SODIUM SULFATE ANHYDROUS, SODIUM BICARBONATE, SODIUM CHLORIDE, POTASSIUM CHLORIDE 236; 22.74; 6.74; 5.86; 2.97 G/4L; G/4L; G/4L; G/4L; G/4L
4 POWDER, FOR SOLUTION ORAL ONCE
Qty: 4000 ML | Refills: 0 | Status: SHIPPED | OUTPATIENT
Start: 2022-09-13 | End: 2022-09-13

## 2022-09-13 RX ORDER — OXYCODONE HYDROCHLORIDE AND ACETAMINOPHEN 5; 325 MG/1; MG/1
1 TABLET ORAL EVERY 8 HOURS PRN
Status: DISCONTINUED | OUTPATIENT
Start: 2022-09-13 | End: 2022-09-13 | Stop reason: HOSPADM

## 2022-09-13 RX ORDER — TRAMADOL HYDROCHLORIDE 50 MG/1
50 TABLET ORAL EVERY 6 HOURS PRN
Status: DISCONTINUED | OUTPATIENT
Start: 2022-09-13 | End: 2022-09-13

## 2022-09-13 RX ADMIN — LISINOPRIL 20 MG: 20 TABLET ORAL at 08:11

## 2022-09-13 RX ADMIN — ONDANSETRON 4 MG: 2 INJECTION INTRAMUSCULAR; INTRAVENOUS at 03:57

## 2022-09-13 RX ADMIN — OXYCODONE HYDROCHLORIDE AND ACETAMINOPHEN 1 TABLET: 5; 325 TABLET ORAL at 09:43

## 2022-09-13 RX ADMIN — ASPIRIN 81 MG: 81 TABLET, COATED ORAL at 08:11

## 2022-09-13 RX ADMIN — METFORMIN HYDROCHLORIDE 500 MG: 500 TABLET ORAL at 08:11

## 2022-09-13 RX ADMIN — HYDROMORPHONE HYDROCHLORIDE 0.5 MG: 2 INJECTION, SOLUTION INTRAMUSCULAR; INTRAVENOUS; SUBCUTANEOUS at 03:51

## 2022-09-13 RX ADMIN — SODIUM CHLORIDE, PRESERVATIVE FREE 10 ML: 5 INJECTION INTRAVENOUS at 08:33

## 2022-09-13 RX ADMIN — ENOXAPARIN SODIUM 40 MG: 100 INJECTION SUBCUTANEOUS at 08:11

## 2022-09-13 RX ADMIN — FAMOTIDINE 20 MG: 20 TABLET ORAL at 08:11

## 2022-09-13 RX ADMIN — DILTIAZEM HYDROCHLORIDE 120 MG: 120 CAPSULE, COATED, EXTENDED RELEASE ORAL at 08:11

## 2022-09-13 ASSESSMENT — PAIN SCALES - GENERAL
PAINLEVEL_OUTOF10: 8
PAINLEVEL_OUTOF10: 8
PAINLEVEL_OUTOF10: 5
PAINLEVEL_OUTOF10: 5
PAINLEVEL_OUTOF10: 7

## 2022-09-13 ASSESSMENT — PAIN DESCRIPTION - LOCATION
LOCATION: ABDOMEN

## 2022-09-13 ASSESSMENT — PAIN SCALES - WONG BAKER
WONGBAKER_NUMERICALRESPONSE: 2

## 2022-09-13 ASSESSMENT — PAIN - FUNCTIONAL ASSESSMENT
PAIN_FUNCTIONAL_ASSESSMENT: ACTIVITIES ARE NOT PREVENTED
PAIN_FUNCTIONAL_ASSESSMENT: PREVENTS OR INTERFERES SOME ACTIVE ACTIVITIES AND ADLS
PAIN_FUNCTIONAL_ASSESSMENT: ACTIVITIES ARE NOT PREVENTED
PAIN_FUNCTIONAL_ASSESSMENT: PREVENTS OR INTERFERES SOME ACTIVE ACTIVITIES AND ADLS

## 2022-09-13 ASSESSMENT — PAIN DESCRIPTION - FREQUENCY
FREQUENCY: CONTINUOUS

## 2022-09-13 ASSESSMENT — PAIN DESCRIPTION - DESCRIPTORS
DESCRIPTORS: BURNING
DESCRIPTORS: BURNING
DESCRIPTORS: STABBING
DESCRIPTORS: STABBING

## 2022-09-13 ASSESSMENT — PAIN DESCRIPTION - ORIENTATION
ORIENTATION: MID;UPPER

## 2022-09-13 ASSESSMENT — PAIN DESCRIPTION - PAIN TYPE
TYPE: ACUTE PAIN

## 2022-09-13 ASSESSMENT — PAIN DESCRIPTION - DIRECTION
RADIATING_TOWARDS: UPPER ABD
RADIATING_TOWARDS: UPPER ABD

## 2022-09-13 ASSESSMENT — PAIN DESCRIPTION - ONSET
ONSET: ON-GOING

## 2022-09-13 NOTE — PROGRESS NOTES
Writer reviewed discharge instructions with patient and patient's mother. Patient denied questions. Copy of discharge instructions given to patient.

## 2022-09-13 NOTE — DISCHARGE SUMMARY
Discharge Summary    Wilfred Posada  :  1975  MRN:  [id number]    Admit date:  2022      Discharge date: 2022     Admitting Physician:  Bdu Car MD    Discharge Diagnoses:    Principal Problem:    Pancreatitis, unspecified pancreatitis type  Active Problems:    Nausea    Epigastric pain    HTN (hypertension)    Chronic combined systolic and diastolic CHF, NYHA class 2 (HCC)    ASHD (arteriosclerotic heart disease) /  CABG   Resolved Problems:    * No resolved hospital problems. *      Hospital Course:   Wilfred Posada is a 56 y.o. male admitted with idiopathic pancreatitis. He presented to the emergency room with complaints of epigastric pain. Patient stated the pain has been on and off for approximately 2 weeks with radiation into his back. He complained of nausea but no vomiting. He does have a history of CABG in 2018. He also stated he had a history of pancreatitis approximately 5 months ago with no known cause. He denied any alcohol abuse. He denied fever or chills. He denied any shortness of breath. During patient's evaluation CT abdomen and pelvis were completed which was negative. Chest x-ray was negative. Lipase was slightly elevated at 446. He was admitted and labs were trended. IVF given. Dr. Brenda Warren was consulted and will plan an outpatient colonoscopy. Hemodynamically he is stable. Tolerating liquids well. He will be discharged home today and follow up with Dr Brenda Warren in 2 weeks    Consultants:   Dr. Corry Krause, GI    Procedures: none    Complications: none    Discharge Condition: fair    Exam:  GEN:    Awake, alert and oriented x3. EYES:   EOMI, pupils equal   NECK: Supple. No lymphadenopathy. No carotid bruit  CVS:     regular rate and rhythm, no audible murmur  PULM:  CTA, no wheezes, rales or rhonchi, no acute respiratory distress  ABD:     Bowels sounds normal.  Abdomen is soft. No distention. Epigastric  tenderness to palpation. EXT:     no edema bilaterally . No calf tenderness. NEURO: Moves all extremities. Motor and sensory are grossly intact  SKIN:    No rashes. No skin lesions. Significant Diagnostic Studies:   Lab Results   Component Value Date    WBC 14.1 (H) 09/13/2022    HGB 11.8 (L) 09/13/2022     09/13/2022       Lab Results   Component Value Date    BUN 16 09/13/2022    CREATININE 0.88 09/13/2022     09/13/2022    K 3.6 (L) 09/13/2022    CALCIUM 8.9 09/13/2022     09/13/2022    CO2 21 09/13/2022    LABGLOM >60 09/13/2022       Lab Results   Component Value Date    LEUKOCYTESUR NEGATIVE 09/11/2022    SPECGRAV 1.010 09/11/2022    GLUCOSEU NEGATIVE 09/11/2022    KETUA NEGATIVE 09/11/2022    PROTEINU NEGATIVE 09/11/2022    HGBUR NEGATIVE 09/11/2022       CT ABDOMEN PELVIS W IV CONTRAST Additional Contrast? None    Result Date: 9/12/2022  EXAMINATION: CT OF THE ABDOMEN AND PELVIS WITH CONTRAST 9/11/2022 11:01 pm TECHNIQUE: CT of the abdomen and pelvis was performed with the administration of intravenous contrast. Multiplanar reformatted images are provided for review. Automated exposure control, iterative reconstruction, and/or weight based adjustment of the mA/kV was utilized to reduce the radiation dose to as low as reasonably achievable. COMPARISON: CT scan of the abdomen and pelvis on 07/13/2012. HISTORY: ORDERING SYSTEM PROVIDED HISTORY: abdominal pain TECHNOLOGIST PROVIDED HISTORY: abdominal pain Decision Support Exception - unselect if not a suspected or confirmed emergency medical condition->Emergency Medical Condition (MA) FINDINGS: Lower Chest: No acute process or focal abnormality in the base of the lungs. No hiatal hernia. No pneumoperitoneum. Organs: Normal liver. Gallbladder is contracted with no diagnostic finding. Normal spleen, pancreas and bilateral adrenal glands. At the lower pole of the right kidney, there is a simple cyst measuring 3.1 cm, which was only 0.58 cm in size in 2012.   No evidence of mass or hydronephrosis or stone in kidneys. Bilateral ureters are of normal size, without evidence of stone or obstruction. No evidence of stone or focal abnormality in the bladder. GI/Bowel: Large amount of food material present in stomach. Loops of small bowel are not abnormally distended or dilated. Normal appendix posterior inferior to cecum. Cecum is in high position in the right side of mid abdomen. Small to moderate amount of stool and small amount of gas are scattered in the right colon and transverse colon. In the descending colon small amount of stool and gas are scattered. In the sigmoid colon and rectum small amount of stool and gas are scattered. No evidence of diverticulosis coli. No evidence of colitis. Pelvis: No acute process in the pelvis. Retroperitoneum, peritoneum: Normal abdominal aorta with mild calcified plaque. All mesenteric arteries and their major branches are patent and opacified. No evidence of periaortic or mesenteric pathologic lymphadenopathy. No evidence of ascites. Bones/Soft Tissues: No evidence of inguinal, femoral or ventral hernia. No evidence of focal abnormality or acute process seen in the lumbar spine, pelvic bones and hip joints. No definite acute process identified in the abdomen or in the pelvis. No evidence of urinary calculus or obstructive uropathy either side. No evidence of intestinal obstruction or ileus. No obvious constipation. Normal appendix visualized. Negative study of liver, gallbladder, spleen, pancreas and adrenal glands. US GALLBLADDER RUQ    Result Date: 9/12/2022  EXAMINATION: RIGHT UPPER QUADRANT ULTRASOUND 9/12/2022 8:51 am COMPARISON: None. HISTORY: ORDERING SYSTEM PROVIDED HISTORY: pancreatitis TECHNOLOGIST PROVIDED HISTORY: pancreatitis FINDINGS: LIVER:  The liver demonstrates normal echogenicity without evidence of intrahepatic biliary ductal dilatation. Liver 13.6 cm in length.  BILIARY SYSTEM:  Gallbladder is unremarkable without evidence of pericholecystic fluid, wall thickening or stones. Negative sonographic Hinojosa's sign. Common bile duct is within normal limits measuring 3.9 mm. RIGHT KIDNEY: Right kidney 11 cm in length with normal echotexture. 3 cm cyst inferior pole right kidney appears simple. PANCREAS:  Visualized portions of the pancreas are unremarkable. OTHER: No evidence of right upper quadrant ascites. Simple right renal cyst with otherwise unremarkable exam RECOMMENDATIONS: Unavailable     XR CHEST PORTABLE    Result Date: 9/12/2022  EXAMINATION: ONE XRAY VIEW OF THE CHEST. ONE PORTABLE UPRIGHT AP VIEW OF CHEST 9/11/2022 11:03 pm COMPARISON: Chest x-ray on 01/26/2022 HISTORY: ORDERING SYSTEM PROVIDED HISTORY: chest pain TECHNOLOGIST PROVIDED HISTORY: chest pain FINDINGS: The lungs are without acute focal process. There is no effusion or pneumothorax. The cardiomediastinal silhouette is without acute process. Evidence of previous cardiothoracic surgery with sternal sutures in place, similar to previous studies. The osseous structures are without acute process. No interval change as compared to previous chest x-ray on 01/23/2022. Stable chest x-ray, without a definable acute cardiopulmonary abnormality.        Assessment and Plan:  Patient Active Problem List    Diagnosis Date Noted    Abnormal result of other cardiovascular function study 01/27/2022    Palpitations     Pancreatitis, unspecified pancreatitis type 09/12/2022    Nausea 09/12/2022    Epigastric pain 09/12/2022    ACS (acute coronary syndrome) ([address] 12/26/2021    ASHD (arteriosclerotic heart disease) /  CABG 2018 04/28/2019    Ischemic cardiomyopathy 01/08/2019    Chronic combined systolic and diastolic CHF, NYHA class 2 ([address] 01/08/2019    S/P CABG (coronary artery bypass graft)     Dyslipidemia     Acute coronary syndrome ([address] 01/07/2019    Mild congestive heart failure ([address] 01/07/2019    Chest pain at rest 06/02/2015    HTN (hypertension) 06/02/2015 Tobacco abuse 06/02/2015    Family history of premature CAD 06/02/2015    Pain in rib, left lower 05/31/2012        Discharge Medications:         Medication List        START taking these medications      oxyCODONE-acetaminophen 5-325 MG per tablet  Commonly known as: PERCOCET  Take 1 tablet by mouth every 8 hours as needed for Pain for up to 3 days. CONTINUE taking these medications      ALPRAZolam 0.25 MG tablet  Commonly known as: XANAX     aspirin 81 MG EC tablet     atorvastatin 40 MG tablet  Commonly known as: LIPITOR  Take 1 tablet by mouth daily     clopidogrel 75 MG tablet  Commonly known as: PLAVIX  Take 1 tablet by mouth daily     dilTIAZem 120 MG extended release capsule  Commonly known as: CARDIZEM CD  Take 1 capsule by mouth daily     fenofibrate 160 MG tablet  Commonly known as: TRIGLIDE     lisinopril 20 MG tablet  Commonly known as: PRINIVIL;ZESTRIL  Take 1 tablet by mouth daily     metFORMIN 500 MG tablet  Commonly known as: GLUCOPHAGE     metoprolol succinate 100 MG extended release tablet  Commonly known as: TOPROL XL  Take 1 tablet by mouth daily            STOP taking these medications      Ozempic (1 MG/DOSE) 4 MG/3ML Sopn  Generic drug: Semaglutide (1 MG/DOSE)            ASK your doctor about these medications      nitroGLYCERIN 0.4 MG SL tablet  Commonly known as: NITROSTAT  Place 1 tablet under the tongue every 5 minutes as needed for Chest pain up to max of 3 total doses. If no relief after 1 dose, call 911. Where to Get Your Medications        These medications were sent to [facility] [id number] Ning Rodriguez, [address] [place] [address], [address] [place] [address]      Phone: [phone]   oxyCODONE-acetaminophen 5-325 MG per tablet         Patient Instructions:    Activity: activity as tolerated  Diet: encourage fluids  Wound Care: none needed  Other: none     Disposition:   Discharge to Home    Follow up:  Patient will be followed by Yolette Malhotra MD in 1-2 weeks; Dr. Melania Salgado Dr 2 weeks    CORE MEASURES on Discharge (if applicable)  ACE/ARB in CHF: NA  Statin in [place]: NA  ASA in [place]: NA  Statin in CVA: NA  Antiplatelet in CVA: NA    Total time spent on discharge services: 40 minutes    Including the following activities:  Evaluation and Management of patient  Discussion with patient and/or surrogate about current care plan  Coordination with Case Management and/or   Coordination of care with Consultants (if applicable)   Coordination of care with Receiving Facility Physician (if applicable)  Completion of DME forms (if applicable)  Preparation of Discharge Summary  Preparation of Medication Reconciliation  Preparation of Discharge Prescriptions    Signed:  KRUNAL Burns - CNP, KRUNAL, NP-C  9/13/2022, 11:23 AM

## 2022-09-13 NOTE — PROGRESS NOTES
Pt laying in bed watching TV. Vitals and assessment as charted. Pt stated his pain is a 7 out of 10 in his abdomen. Dilaudid 0.5mg IV given. Call light within reach.

## 2022-09-13 NOTE — DISCHARGE INSTR - DIET
Good nutrition is important when healing from an illness, injury, or surgery. Follow any nutrition recommendations given to you during your hospital stay. If you were given an oral nutrition supplement while in the hospital, continue to take this supplement at home. You can take it with meals, in-between meals, and/or before bedtime. These supplements can be purchased at most local grocery stores, pharmacies, and chain [employer]stores.    If you have any questions about your diet or nutrition, call the hospital and ask for the dietitian  Fluids encouraged

## 2022-09-13 NOTE — PROGRESS NOTES
Progress Note    SUBJECTIVE:    Patient seen for f/u of Pancreatitis, unspecified pancreatitis type. He resting in bed no complaints. No n/v. Tolerating liquids. ROS:   Constitutional: negative  for fevers, and negative for chills. Respiratory: negative for shortness of breath, negative for cough, and negative for wheezing  Cardiovascular: negative for chest pain, and negative for palpitations  Gastrointestinal: negative for abdominal pain, negative for nausea,negative for vomiting, negative for diarrhea, and negative for constipation     All other systems were reviewed with the patient and are negative unless otherwise stated in HPI      OBJECTIVE:      Vitals:   Vitals:    09/13/22 0715   BP: 128/75   Pulse: 84   Resp: 18   Temp: 97.8 °F (36.6 °C)   SpO2: 98%     Weight: 197 lb 12.8 oz (89.7 kg)   Height: 5' 7\" (170.2 cm)     Weight  Wt Readings from Last 3 Encounters:   09/13/22 197 lb 12.8 oz (89.7 kg)   03/11/22 192 lb 9.6 oz (87.4 kg)   01/27/22 190 lb (86.2 kg)     Body mass index is 30.98 kg/m². 24HR INTAKE/OUTPUT:      Intake/Output Summary (Last 24 hours) at 9/13/2022 1117  Last data filed at 9/12/2022 2004  Gross per 24 hour   Intake 1839.81 ml   Output --   Net 1839.81 ml     -----------------------------------------------------------------  Exam:    GEN:    Awake, alert and oriented x3. EYES:  EOMI, pupils equal   NECK: Supple. No lymphadenopathy. No carotid bruit  CVS:    regular rate and rhythm, no audible murmur  PULM:  CTA, no wheezes, rales or rhonchi, no acute respiratory distress  ABD:    Bowels sounds normal.  Abdomen is soft. No distention. Epigastric  tenderness to palpation. EXT:   no edema bilaterally . No calf tenderness. NEURO: Moves all extremities. Motor and sensory are grossly intact  SKIN:  No rashes.   No skin lesions.    -----------------------------------------------------------------    Diagnostic Data:      Complete Blood Count:   Recent Labs 09/11/22 2305 09/12/22  0535 09/13/22  0530   WBC 13.2* 10.6 14.1*   RBC 4.55 4.09* 3.93*   HGB 13.8 12.3* 11.8*   HCT 40.7 36.8* 35.6*   MCV 89.5 90.0 90.6   MCH 30.3 30.1 30.0   MCHC 33.9 33.4 33.1   RDW 12.9 12.9 12.8    341 325   MPV 10.0 10.1 10.7        Last 3 Blood Glucose:   Recent Labs     09/11/22 2305 09/12/22  0535 09/13/22  0530   GLUCOSE 187* 102* 77        Comprehensive Metabolic Profile:   Recent Labs     09/11/22 2305 09/12/22  0535 09/13/22  0530    143 139   K 4.1 4.6 3.6*    109* 104   CO2 25 23 21   BUN 18 20 16   CREATININE 0.97 1.09 0.88   GLUCOSE 187* 102* 77   CALCIUM 9.9 8.8 8.9   PROT 7.9 6.5 6.3*   LABALBU 4.6 3.9 3.8   BILITOT 0.3 0.2* 0.3   ALKPHOS 68 52 56   AST 17 12 15   ALT 17 13 12        Urinalysis:   Lab Results   Component Value Date/Time    NITRU NEGATIVE 09/11/2022 11:19 PM    COLORU Yellow 09/11/2022 11:19 PM    PHUR 7.0 09/11/2022 11:19 PM    SPECGRAV 1.010 09/11/2022 11:19 PM    LEUKOCYTESUR NEGATIVE 09/11/2022 11:19 PM    UROBILINOGEN Normal 09/11/2022 11:19 PM    BILIRUBINUR NEGATIVE 09/11/2022 11:19 PM    GLUCOSEU NEGATIVE 09/11/2022 11:19 PM    KETUA NEGATIVE 09/11/2022 11:19 PM       HgBA1c:    Lab Results   Component Value Date/Time    LABA1C 6.3 09/12/2022 05:35 AM       Lactic Acid: No results found for: LACTA     Troponin: No results for input(s): TROPONINI in the last 72 hours. CRP:  No results for input(s): CRP in the last 72 hours. Radiology/Imaging:  US GALLBLADDER RUQ   Final Result   Simple right renal cyst with otherwise unremarkable exam      RECOMMENDATIONS:   Unavailable         CT ABDOMEN PELVIS W IV CONTRAST Additional Contrast? None   Final Result   No definite acute process identified in the abdomen or in the pelvis. No evidence of urinary calculus or obstructive uropathy either side. No evidence of intestinal obstruction or ileus. No obvious constipation. Normal appendix visualized.       Negative study of liver, gallbladder, spleen, pancreas and adrenal glands. XR CHEST PORTABLE   Final Result   Stable chest x-ray, without a definable acute cardiopulmonary abnormality. ASSESSMENT / PLAN:  Pancreatitis, unspecified pancreatitis type  Continue current therapy   Appreciate Dr. Catrina Reyes  Received IV fluids-200 mL/h x 5 hours followed by 150 MLS per hour x5 hours if no development Rales or signs of CHF. No IV fluids to be run overnight  Adv diet  Pain management-Percocet  IgG4-pending  Ultrasound gallbladder-negative  Outpatient scope  ASHD  Continue aspirin, Lipitor, Plavix, Triglide  Chronic combined CHF  Caution with IV fluids  Continue Toprol-XL  Hypertension  Continue Toprol-XL, lisinopril, Cardizem CD  Nutrition status:    Well developed, well nourished with no malnutrition  Dietician consult initiated  Hospital Prophylaxis:   DVT: Lovenox   Stress Ulcer: H2 Blocker   High risk medications: none   Disposition:    Discharge plan is home      KRUNAL Garcia - CNP , KRUNAL, NP-C  Hospitalist Medicine        9/13/2022, 11:17 AM

## 2022-09-13 NOTE — PLAN OF CARE
Problem: Pain  Goal: Verbalizes/displays adequate comfort level or baseline comfort level  Outcome: Progressing  Note: Pt is able to verbalize when in pain. Pt given pain medications as needed. Will continue to monitor.

## 2022-09-13 NOTE — PROGRESS NOTES
Gastroenterology Progress Note    nEedina Tijerina is a 56 y.o. male patient. Hospitalization Day:1    I am seeing the patient today for pancreatitis    SUBJECTIVE:      GI ROS: Abdominal pain is now down to about a 7    Physical    VITALS:  /75   Pulse 84   Temp 97.8 °F (36.6 °C) (Temporal)   Resp 18   Ht 5' 7\" (1.702 m)   Wt 197 lb 12.8 oz (89.7 kg)   SpO2 98%   BMI 30.98 kg/m²   TEMPERATURE:  Current - Temp: 97.8 °F (36.6 °C); Max - Temp  Av.3 °F (36.3 °C)  Min: 96.6 °F (35.9 °C)  Max: 97.8 °F (36.6 °C)    General:  Alert and oriented,  No apparent distress  Skin- without jaundice  Eyes: anicteric sclera  Cardiac: No JVD  Lungs No wheezing,  normal effort  Abdomen : Non-distended  Ext: without edema  Neuro: no asterixis     Data    Data Review:    Recent Labs     2235 22  0530   WBC 13.2* 10.6 14.1*   HGB 13.8 12.3* 11.8*   HCT 40.7 36.8* 35.6*   MCV 89.5 90.0 90.6    341 325     Recent Labs     22  0535 22  0530    143 139   K 4.1 4.6 3.6*    109* 104   CO2 25 23 21   BUN 18 20 16   CREATININE 0.97 1.09 0.88     Recent Labs     22  0535 22  0530   AST 17 12 15   ALT 17 13 12   BILITOT 0.3 0.2* 0.3   ALKPHOS 68 52 56     Recent Labs     22  0535 22  0530   LIPASE 446* 746* 144*     No results for input(s): PROTIME, INR in the last 72 hours. No results for input(s): PTT in the last 72 hours. Radiology Review:  US liver  Simple right renal cyst with otherwise unremarkable exam       RECOMMENDATIONS:   Unavailable       ASSESSMENT:  Principal Problem:    Pancreatitis, unspecified pancreatitis type  Active Problems:    Nausea    Epigastric pain    HTN (hypertension)    Chronic combined systolic and diastolic CHF, NYHA class 2 (HCC)    ASHD (arteriosclerotic heart disease) /  CABG   Resolved Problems:    * No resolved hospital problems.  *      The conditions that I am treating are Stable and are improving    PLAN :  OK to advance with clear liquid, low fat diet  2. Pain control as needed  3. Check daily lipase. 4. F/U with GI in outpatient clinic setting   Thank you for allowing me to participate in the care of your patient. Please feel free to contact me with any concerns. [phone]    Terrance Ko MD    Note is dictated utilizing voice recognition software. Unfortunately this leads to occasional typographical errors. Please contact our office if you have any questions.

## 2022-09-13 NOTE — PLAN OF CARE
Problem: Discharge Planning  Goal: Discharge to home or other facility with appropriate resources  Outcome: Completed     Problem: Pain  Goal: Verbalizes/displays adequate comfort level or baseline comfort level  9/13/2022 1133 by Jeffrey Funes RN  Outcome: Completed  9/13/2022 0102 by Juan A Zuniga RN  Outcome: Progressing  Note: Pt is able to verbalize when in pain. Pt given pain medications as needed. Will continue to monitor.        Problem: ABCDS Injury Assessment  Goal: Absence of physical injury  Outcome: Completed     Problem: Nutrition Deficit:  Goal: Optimize nutritional status  Outcome: Completed

## 2022-09-14 LAB
IGG 1: 449 MG/DL (ref 240–1118)
IGG 2: 308 MG/DL (ref 124–549)
IGG 3: 42 MG/DL (ref 21–134)
IGG 4: 51 MG/DL (ref 1–123)

## 2022-09-27 ENCOUNTER — TELEPHONE (OUTPATIENT)
Dept: CARDIOLOGY | Age: 47
End: 2022-09-27

## 2022-09-27 ENCOUNTER — TELEPHONE (OUTPATIENT)
Dept: GASTROENTEROLOGY | Age: 47
End: 2022-09-27

## 2022-09-27 ENCOUNTER — OFFICE VISIT (OUTPATIENT)
Dept: GASTROENTEROLOGY | Age: 47
End: 2022-09-27
Payer: COMMERCIAL

## 2022-09-27 ENCOUNTER — HOSPITAL ENCOUNTER (OUTPATIENT)
Age: 47
Discharge: HOME | End: 2022-09-27
Payer: COMMERCIAL

## 2022-09-27 VITALS
HEIGHT: 67 IN | DIASTOLIC BLOOD PRESSURE: 87 MMHG | HEART RATE: 86 BPM | RESPIRATION RATE: 18 BRPM | TEMPERATURE: 97.6 F | WEIGHT: 196.6 LBS | BODY MASS INDEX: 30.86 KG/M2 | SYSTOLIC BLOOD PRESSURE: 149 MMHG

## 2022-09-27 DIAGNOSIS — Z12.11 COLON CANCER SCREENING: ICD-10-CM

## 2022-09-27 DIAGNOSIS — R10.13 EPIGASTRIC ABDOMINAL PAIN: Primary | ICD-10-CM

## 2022-09-27 LAB
ALBUMIN SERPL-MCNC: 4.8 G/DL (ref 3.5–5.2)
ALBUMIN/GLOBULIN RATIO: 1.5 (ref 1–2.5)
ALP BLD-CCNC: 65 U/L (ref 40–129)
ALT SERPL-CCNC: 19 U/L (ref 5–41)
AMYLASE: 52 U/L (ref 28–100)
ANION GAP SERPL CALCULATED.3IONS-SCNC: 11 MMOL/L (ref 9–17)
AST SERPL-CCNC: 17 U/L
BILIRUB SERPL-MCNC: 0.3 MG/DL (ref 0.3–1.2)
BUN BLDV-MCNC: 24 MG/DL (ref 6–20)
BUN/CREAT BLD: 28 (ref 9–20)
CALCIUM SERPL-MCNC: 10.4 MG/DL (ref 8.6–10.4)
CHLORIDE BLD-SCNC: 101 MMOL/L (ref 98–107)
CO2: 26 MMOL/L (ref 20–31)
CREAT SERPL-MCNC: 0.86 MG/DL (ref 0.7–1.2)
GFR AFRICAN AMERICAN: >60 ML/MIN
GFR NON-AFRICAN AMERICAN: >60 ML/MIN
GFR SERPL CREATININE-BSD FRML MDRD: ABNORMAL ML/MIN/{1.73_M2}
GFR SERPL CREATININE-BSD FRML MDRD: ABNORMAL ML/MIN/{1.73_M2}
GLUCOSE BLD-MCNC: 117 MG/DL (ref 70–99)
POTASSIUM SERPL-SCNC: 4.5 MMOL/L (ref 3.7–5.3)
SODIUM BLD-SCNC: 138 MMOL/L (ref 135–144)
TOTAL PROTEIN: 8 G/DL (ref 6.4–8.3)

## 2022-09-27 PROCEDURE — 80053 COMPREHEN METABOLIC PANEL: CPT

## 2022-09-27 PROCEDURE — 36415 COLL VENOUS BLD VENIPUNCTURE: CPT

## 2022-09-27 PROCEDURE — 99213 OFFICE O/P EST LOW 20 MIN: CPT | Performed by: INTERNAL MEDICINE

## 2022-09-27 PROCEDURE — 82150 ASSAY OF AMYLASE: CPT

## 2022-09-27 RX ORDER — POLYETHYLENE GLYCOL 3350, SODIUM CHLORIDE, SODIUM BICARBONATE, POTASSIUM CHLORIDE 420; 11.2; 5.72; 1.48 G/4L; G/4L; G/4L; G/4L
4000 POWDER, FOR SOLUTION ORAL ONCE
Qty: 4000 ML | Refills: 0 | Status: SHIPPED | OUTPATIENT
Start: 2022-09-27 | End: 2022-09-27

## 2022-09-27 ASSESSMENT — ENCOUNTER SYMPTOMS
ABDOMINAL PAIN: 1
EYES NEGATIVE: 1
ALLERGIC/IMMUNOLOGIC NEGATIVE: 1

## 2022-09-27 NOTE — TELEPHONE ENCOUNTER
Dr Melara's office called asking if it is okay for patient to hold Plavix. Per Dr Jyoti Patel pt can hold plavix 7 days prior to colonoscopy.

## 2022-09-27 NOTE — PROGRESS NOTES
[facility] [place] [facility]  PART OF [place] [address] [address]  Dept: [phone]    Chief Complaint   Patient presents with    [address] follow up; pt states having \"extreme\" abdominal pain, nausea and diarrhea        HPI     Delmy Nichols is a 56 y.o. male with a past medical history of coronary artery disease, depression, diabetes mellitus type 2, hypertension, hyperlipidemia who presents for a post hospital discharge follow-up visit. He was hospitalized from 09/11/2022 to 9/13/2022 for management of pancreatitis with a presenting lipase level of 446 and a discharge lipase level of 144. His pain resolved, but he reports that he has had sharp acute epigastric pain which led him to present to the ED on 09/20/2022 - his lipase was 93 and amylase normal at 38 . His lipase level  which led to returned acutely on 09/20/2022. He states thathe has changed his diet to low fat. He adds that the pain is epigastric and currently about 8/10. Past Medical History:   Diagnosis Date    CAD (coronary artery disease)     Carpal tunnel syndrome     Depressive disorder, not elsewhere classified     Diabetes mellitus ([facility] [address]     Heart attack ([facility] [address] 08/11/2018    STEMI    History of echocardiogram 01/08/2019    EF 40-45%. LV cavity sixe is mildly increased. Inferior and inferoseptal wall hypokenesis noted. Mild diastolic dysfunction.     Hyperlipidemia     Hypertension 2009        Past Surgical History:   Procedure Laterality Date    CARDIAC CATHETERIZATION Left 01/07/2018    Right Ulnar/[facility]/    CARDIAC CATHETERIZATION Left 01/27/2022    Dr Vasu Agudelo/ right ulnar-    CORONARY ANGIOPLASTY  08/2018    double bypass, [place],     CORONARY ARTERY BYPASS GRAFT  2018        Family History   Problem Relation Age of Onset    High Blood Pressure Mother Heart Disease Father     High Blood Pressure Father     Asthma Sister     Colon Cancer Paternal Uncle     Colon Cancer Paternal Uncle     Pancreatic Cancer Maternal Aunt     Pancreatic Cancer Maternal Aunt         Review of Systems   Constitutional: Negative. HENT: Negative. Eyes: Negative. Respiratory:          COPD   Cardiovascular:         CAD  Hypertension   Gastrointestinal:  Positive for abdominal pain. Endocrine:        Hyperlipidemia   Genitourinary: Negative. Musculoskeletal: Negative. Skin: Negative. Allergic/Immunologic: Negative. Neurological: Negative. Hematological: Negative. Psychiatric/Behavioral:          Depressive disorder      BP (!) 149/87 (Site: Right Upper Arm, Position: Sitting, Cuff Size: Medium Adult)   Pulse 86   Temp 97.6 °F (36.4 °C) (Temporal)   Resp 18   Ht 5' 7\" (1.702 m)   Wt 196 lb 9.6 oz (89.2 kg)   BMI 30.79 kg/m²      Physical Exam  Constitutional:       Appearance: Normal appearance. HENT:      Head: Normocephalic and atraumatic. Nose: Nose normal.      Mouth/Throat:      Mouth: Mucous membranes are dry. Eyes:      Extraocular Movements: Extraocular movements intact. Pupils: Pupils are equal, round, and reactive to light. Cardiovascular:      Rate and Rhythm: Normal rate. Pulses: Normal pulses. Heart sounds: Normal heart sounds. Pulmonary:      Effort: Pulmonary effort is normal.      Breath sounds: Normal breath sounds. Abdominal:      General: Bowel sounds are normal.      Palpations: Abdomen is soft. Musculoskeletal:         General: Normal range of motion. Cervical back: Normal range of motion. Skin:     General: Skin is warm. Neurological:      General: No focal deficit present. Mental Status: He is alert.    Psychiatric:         Mood and Affect: Mood normal.        Lab Results   Component Value Date    WBC 14.1 (H) 09/13/2022    HGB 11.8 (L) 09/13/2022    HCT 35.6 (L) 09/13/2022    MCV 90.6 09/13/2022     09/13/2022       Lab Results   Component Value Date     09/27/2022    K 4.5 09/27/2022     09/27/2022    CO2 26 09/27/2022    BUN 24 (H) 09/27/2022    CREATININE 0.86 09/27/2022    GLUCOSE 117 (H) 09/27/2022    CALCIUM 10.4 09/27/2022    PROT 8.0 09/27/2022    LABALBU 4.8 09/27/2022    BILITOT 0.3 09/27/2022    ALKPHOS 65 09/27/2022    AST 17 09/27/2022    ALT 19 09/27/2022    LABGLOM >60 09/27/2022    GFRAA >60 09/27/2022       Assessment:  Porter Cam is a 56 y.o. male with a past medical history of coronary artery disease, depression, diabetes mellitus type 2, hypertension, hyperlipidemia, recurrent acute pancreatitis with a recent hospitalization from 09/11/- 09/13/2022. He presents for a post discharge visit with a complaint of epigastric pain which he states is at the same level as the pain he had while hospitalized - the difference is that the lipase level at presentation was 446 and currently it is 93. A lipase of 93 is not diagnostic of pancreatitis - it needs to be 3 times upper limit of normal. He is in need of a screening colonoscopy and an EGD can be performed. With a lipase of 93, there is no utility in getting a CT scan or US of the abdomen. Plan:  1. Epigastric abdominal pain  - EGD; Future  - COLONOSCOPY (Screening); Future  - polyethylene glycol-electrolytes (NULYTELY) 420 g solution; Take 4,000 mLs by mouth once for 1 dose  Dispense: 4000 mL; Refill: 0    2. Colon cancer screening  - COLONOSCOPY (Screening); Future  - polyethylene glycol-electrolytes (NULYTELY) 420 g solution; Take 4,000 mLs by mouth once for 1 dose  Dispense: 4000 mL; Refill: 0    3. Additional recommendations based on findings    Spent 20 minutes with the patient with greater than 50 percent of the time was spent on face-to-face time in discussion with the patient regarding diagnostic options/results, treatment options, counseling, and follow-up plan.   Electronically signed by Leodan Alvarado MD FOSTER on 9/27/22 at 1:57 PM EDT

## 2022-09-27 NOTE — TELEPHONE ENCOUNTER
Patient scheduled colonoscopy for 10/4/2022 per Dr. Jolly Humphrey request. Patient needs to hold Plavix 7 days prior to procedure (starting tonight). Spoke with Marjorie kaur from [facility], this is ok per Dr. Keerthi Calloway. Went over [employer] extended prep instructions with patient. Verbalized understanding. Faxed scheduling form to surgery.

## 2022-09-27 NOTE — H&P (VIEW-ONLY)
[facility] [place] PART OF [address]  Dept: [phone]    Chief Complaint   Patient presents with    [address] [place] [address] follow up; pt states having \"extreme\" abdominal pain, nausea and diarrhea        ZAHEER Mcadams is a 56 y.o. male with a past medical history of coronary artery disease, depression, diabetes mellitus type 2, hypertension, hyperlipidemia who presents for a post hospital discharge follow-up visit. He was hospitalized from 09/11/2022 to 9/13/2022 for management of pancreatitis with a presenting lipase level of 446 and a discharge lipase level of 144. His pain resolved, but he reports that he has had sharp acute epigastric pain which led him to present to the ED on 09/20/2022 - his lipase was 93 and amylase normal at 38 . His lipase level  which led to returned acutely on 09/20/2022. He states thathe has changed his diet to low fat. He adds that the pain is epigastric and currently about 8/10. Past Medical History:   Diagnosis Date    CAD (coronary artery disease)     Carpal tunnel syndrome     Depressive disorder, not elsewhere classified     Diabetes mellitus ([facility] [address]     Heart attack ([facility] [address] 08/11/2018    STEMI    History of echocardiogram 01/08/2019    EF 40-45%. LV cavity sixe is mildly increased. Inferior and inferoseptal wall hypokenesis noted. Mild diastolic dysfunction.     Hyperlipidemia     Hypertension 2009        Past Surgical History:   Procedure Laterality Date    CARDIAC CATHETERIZATION Left 01/07/2018    Right Ulnar/[facility]/    CARDIAC CATHETERIZATION Left 01/27/2022    Dr Sean Agudelo/ right ulnar-    CORONARY ANGIOPLASTY  08/2018    double bypass, [facility],     CORONARY ARTERY BYPASS GRAFT  2018        Family History   Problem Relation Age of Onset    High Blood Pressure Mother Heart Disease Father     High Blood Pressure Father     Asthma Sister     Colon Cancer Paternal Uncle     Colon Cancer Paternal Uncle     Pancreatic Cancer Maternal Aunt     Pancreatic Cancer Maternal Aunt         Review of Systems   Constitutional: Negative. HENT: Negative. Eyes: Negative. Respiratory:          COPD   Cardiovascular:         CAD  Hypertension   Gastrointestinal:  Positive for abdominal pain. Endocrine:        Hyperlipidemia   Genitourinary: Negative. Musculoskeletal: Negative. Skin: Negative. Allergic/Immunologic: Negative. Neurological: Negative. Hematological: Negative. Psychiatric/Behavioral:          Depressive disorder      BP (!) 149/87 (Site: Right Upper Arm, Position: Sitting, Cuff Size: Medium Adult)   Pulse 86   Temp 97.6 °F (36.4 °C) (Temporal)   Resp 18   Ht 5' 7\" (1.702 m)   Wt 196 lb 9.6 oz (89.2 kg)   BMI 30.79 kg/m²      Physical Exam  Constitutional:       Appearance: Normal appearance. HENT:      Head: Normocephalic and atraumatic. Nose: Nose normal.      Mouth/Throat:      Mouth: Mucous membranes are dry. Eyes:      Extraocular Movements: Extraocular movements intact. Pupils: Pupils are equal, round, and reactive to light. Cardiovascular:      Rate and Rhythm: Normal rate. Pulses: Normal pulses. Heart sounds: Normal heart sounds. Pulmonary:      Effort: Pulmonary effort is normal.      Breath sounds: Normal breath sounds. Abdominal:      General: Bowel sounds are normal.      Palpations: Abdomen is soft. Musculoskeletal:         General: Normal range of motion. Cervical back: Normal range of motion. Skin:     General: Skin is warm. Neurological:      General: No focal deficit present. Mental Status: He is alert.    Psychiatric:         Mood and Affect: Mood normal.        Lab Results   Component Value Date    WBC 14.1 (H) 09/13/2022    HGB 11.8 (L) 09/13/2022    HCT 35.6 (L) 09/13/2022    MCV 90.6 09/13/2022     09/13/2022       Lab Results   Component Value Date     09/27/2022    K 4.5 09/27/2022     09/27/2022    CO2 26 09/27/2022    BUN 24 (H) 09/27/2022    CREATININE 0.86 09/27/2022    GLUCOSE 117 (H) 09/27/2022    CALCIUM 10.4 09/27/2022    PROT 8.0 09/27/2022    LABALBU 4.8 09/27/2022    BILITOT 0.3 09/27/2022    ALKPHOS 65 09/27/2022    AST 17 09/27/2022    ALT 19 09/27/2022    LABGLOM >60 09/27/2022    GFRAA >60 09/27/2022       Assessment:  Dandre Ruffin is a 56 y.o. male with a past medical history of coronary artery disease, depression, diabetes mellitus type 2, hypertension, hyperlipidemia, recurrent acute pancreatitis with a recent hospitalization from 09/11/- 09/13/2022. He presents for a post discharge visit with a complaint of epigastric pain which he states is at the same level as the pain he had while hospitalized - the difference is that the lipase level at presentation was 446 and currently it is 93. A lipase of 93 is not diagnostic of pancreatitis - it needs to be 3 times upper limit of normal. He is in need of a screening colonoscopy and an EGD can be performed. With a lipase of 93, there is no utility in getting a CT scan or US of the abdomen. Plan:  1. Epigastric abdominal pain  - EGD; Future  - COLONOSCOPY (Screening); Future  - polyethylene glycol-electrolytes (NULYTELY) 420 g solution; Take 4,000 mLs by mouth once for 1 dose  Dispense: 4000 mL; Refill: 0    2. Colon cancer screening  - COLONOSCOPY (Screening); Future  - polyethylene glycol-electrolytes (NULYTELY) 420 g solution; Take 4,000 mLs by mouth once for 1 dose  Dispense: 4000 mL; Refill: 0    3. Additional recommendations based on findings    Spent 20 minutes with the patient with greater than 50 percent of the time was spent on face-to-face time in discussion with the patient regarding diagnostic options/results, treatment options, counseling, and follow-up plan.   Electronically signed by Darnell Valenzuela MD [facility]ER on 9/27/22 at 1:57 PM EDT

## 2022-09-27 NOTE — PATIENT INSTRUCTIONS
SURVEY:    You may be receiving a survey from [email or web address] regarding your visit today. Please complete the survey to enable us to provide the highest quality of care to you and your family. If you cannot score us a very good on any question, please call the office to discuss how we could have made your experience a very good one. Thank you.

## 2022-10-03 ENCOUNTER — ANESTHESIA EVENT (OUTPATIENT)
Dept: OPERATING ROOM | Age: 47
End: 2022-10-03
Payer: COMMERCIAL

## 2022-10-04 ENCOUNTER — ANESTHESIA (OUTPATIENT)
Dept: OPERATING ROOM | Age: 47
End: 2022-10-04
Payer: COMMERCIAL

## 2022-10-04 ENCOUNTER — HOSPITAL ENCOUNTER (OUTPATIENT)
Age: 47
Setting detail: OUTPATIENT SURGERY
Discharge: HOME | End: 2022-10-04
Attending: INTERNAL MEDICINE | Admitting: INTERNAL MEDICINE
Payer: COMMERCIAL

## 2022-10-04 VITALS
WEIGHT: 190 LBS | SYSTOLIC BLOOD PRESSURE: 119 MMHG | HEART RATE: 70 BPM | BODY MASS INDEX: 29.82 KG/M2 | DIASTOLIC BLOOD PRESSURE: 78 MMHG | HEIGHT: 67 IN | TEMPERATURE: 98.4 F | RESPIRATION RATE: 16 BRPM | OXYGEN SATURATION: 98 %

## 2022-10-04 DIAGNOSIS — K85.90 ACUTE PANCREATITIS, UNSPECIFIED COMPLICATION STATUS, UNSPECIFIED PANCREATITIS TYPE: ICD-10-CM

## 2022-10-04 LAB — GLUCOSE BLD-MCNC: 95 MG/DL (ref 74–100)

## 2022-10-04 PROCEDURE — 3609027000 HC COLONOSCOPY: Performed by: INTERNAL MEDICINE

## 2022-10-04 PROCEDURE — 7100000011 HC PHASE II RECOVERY - ADDTL 15 MIN: Performed by: INTERNAL MEDICINE

## 2022-10-04 PROCEDURE — 45378 DIAGNOSTIC COLONOSCOPY: CPT | Performed by: INTERNAL MEDICINE

## 2022-10-04 PROCEDURE — 6360000002 HC RX W HCPCS: Performed by: NURSE ANESTHETIST, CERTIFIED REGISTERED

## 2022-10-04 PROCEDURE — 82947 ASSAY GLUCOSE BLOOD QUANT: CPT

## 2022-10-04 PROCEDURE — 2500000003 HC RX 250 WO HCPCS: Performed by: NURSE ANESTHETIST, CERTIFIED REGISTERED

## 2022-10-04 PROCEDURE — 88305 TISSUE EXAM BY PATHOLOGIST: CPT

## 2022-10-04 PROCEDURE — 7100000010 HC PHASE II RECOVERY - FIRST 15 MIN: Performed by: INTERNAL MEDICINE

## 2022-10-04 PROCEDURE — 2580000003 HC RX 258: Performed by: NURSE ANESTHETIST, CERTIFIED REGISTERED

## 2022-10-04 PROCEDURE — 43239 EGD BIOPSY SINGLE/MULTIPLE: CPT | Performed by: INTERNAL MEDICINE

## 2022-10-04 PROCEDURE — 3609012400 HC EGD TRANSORAL BIOPSY SINGLE/MULTIPLE: Performed by: INTERNAL MEDICINE

## 2022-10-04 PROCEDURE — 2709999900 HC NON-CHARGEABLE SUPPLY: Performed by: INTERNAL MEDICINE

## 2022-10-04 PROCEDURE — 3700000000 HC ANESTHESIA ATTENDED CARE: Performed by: INTERNAL MEDICINE

## 2022-10-04 PROCEDURE — 3700000001 HC ADD 15 MINUTES (ANESTHESIA): Performed by: INTERNAL MEDICINE

## 2022-10-04 PROCEDURE — A4216 STERILE WATER/SALINE, 10 ML: HCPCS | Performed by: NURSE ANESTHETIST, CERTIFIED REGISTERED

## 2022-10-04 RX ORDER — SODIUM CHLORIDE, SODIUM LACTATE, POTASSIUM CHLORIDE, CALCIUM CHLORIDE 600; 310; 30; 20 MG/100ML; MG/100ML; MG/100ML; MG/100ML
INJECTION, SOLUTION INTRAVENOUS CONTINUOUS
Status: DISCONTINUED | OUTPATIENT
Start: 2022-10-04 | End: 2022-10-04 | Stop reason: HOSPADM

## 2022-10-04 RX ORDER — SUCRALFATE 1 G/1
1 TABLET ORAL 4 TIMES DAILY
Qty: 120 TABLET | Refills: 0 | Status: ON HOLD | OUTPATIENT
Start: 2022-10-04 | End: 2022-10-08 | Stop reason: SDUPTHER

## 2022-10-04 RX ORDER — PHENYLEPHRINE HYDROCHLORIDE 10 MG/ML
INJECTION INTRAVENOUS PRN
Status: DISCONTINUED | OUTPATIENT
Start: 2022-10-04 | End: 2022-10-04 | Stop reason: SDUPTHER

## 2022-10-04 RX ORDER — LIDOCAINE HYDROCHLORIDE 20 MG/ML
INJECTION, SOLUTION EPIDURAL; INFILTRATION; INTRACAUDAL; PERINEURAL PRN
Status: DISCONTINUED | OUTPATIENT
Start: 2022-10-04 | End: 2022-10-04 | Stop reason: SDUPTHER

## 2022-10-04 RX ORDER — SODIUM CHLORIDE 9 MG/ML
INJECTION INTRAVENOUS PRN
Status: DISCONTINUED | OUTPATIENT
Start: 2022-10-04 | End: 2022-10-04 | Stop reason: SDUPTHER

## 2022-10-04 RX ORDER — PROPOFOL 10 MG/ML
INJECTION, EMULSION INTRAVENOUS CONTINUOUS PRN
Status: DISCONTINUED | OUTPATIENT
Start: 2022-10-04 | End: 2022-10-04 | Stop reason: SDUPTHER

## 2022-10-04 RX ORDER — PANTOPRAZOLE SODIUM 40 MG/1
40 TABLET, DELAYED RELEASE ORAL
Qty: 30 TABLET | Refills: 1 | Status: ON HOLD | OUTPATIENT
Start: 2022-10-04 | End: 2022-10-08 | Stop reason: SDUPTHER

## 2022-10-04 RX ORDER — PROPOFOL 10 MG/ML
INJECTION, EMULSION INTRAVENOUS PRN
Status: DISCONTINUED | OUTPATIENT
Start: 2022-10-04 | End: 2022-10-04 | Stop reason: SDUPTHER

## 2022-10-04 RX ADMIN — PROPOFOL 40 MG: 10 INJECTION, EMULSION INTRAVENOUS at 11:18

## 2022-10-04 RX ADMIN — PROPOFOL 60 MG: 10 INJECTION, EMULSION INTRAVENOUS at 11:17

## 2022-10-04 RX ADMIN — PROPOFOL 50 MG: 10 INJECTION, EMULSION INTRAVENOUS at 11:19

## 2022-10-04 RX ADMIN — PHENYLEPHRINE HYDROCHLORIDE 100 MCG: 10 INJECTION INTRAVENOUS at 11:32

## 2022-10-04 RX ADMIN — PROPOFOL 180 MCG/KG/MIN: 10 INJECTION, EMULSION INTRAVENOUS at 11:18

## 2022-10-04 RX ADMIN — PHENYLEPHRINE HYDROCHLORIDE 150 MCG: 10 INJECTION INTRAVENOUS at 11:40

## 2022-10-04 RX ADMIN — SODIUM CHLORIDE 20 ML: 9 INJECTION INTRAMUSCULAR; INTRAVENOUS; SUBCUTANEOUS at 11:32

## 2022-10-04 RX ADMIN — SODIUM CHLORIDE, POTASSIUM CHLORIDE, SODIUM LACTATE AND CALCIUM CHLORIDE: 600; 310; 30; 20 INJECTION, SOLUTION INTRAVENOUS at 09:41

## 2022-10-04 RX ADMIN — LIDOCAINE HYDROCHLORIDE 40 MG: 20 INJECTION, SOLUTION EPIDURAL; INFILTRATION; INTRACAUDAL; PERINEURAL at 11:14

## 2022-10-04 RX ADMIN — LIDOCAINE HYDROCHLORIDE 100 MG: 20 INJECTION, SOLUTION EPIDURAL; INFILTRATION; INTRACAUDAL; PERINEURAL at 11:20

## 2022-10-04 RX ADMIN — LIDOCAINE HYDROCHLORIDE 40 MG: 20 INJECTION, SOLUTION EPIDURAL; INFILTRATION; INTRACAUDAL; PERINEURAL at 11:17

## 2022-10-04 RX ADMIN — PROPOFOL 30 MG: 10 INJECTION, EMULSION INTRAVENOUS at 11:32

## 2022-10-04 ASSESSMENT — LIFESTYLE VARIABLES: SMOKING_STATUS: 1

## 2022-10-04 ASSESSMENT — PAIN - FUNCTIONAL ASSESSMENT: PAIN_FUNCTIONAL_ASSESSMENT: NONE - DENIES PAIN

## 2022-10-04 NOTE — INTERVAL H&P NOTE
Update History & Physical    The patient's History and Physical of September 27, 2022 was reviewed with the patient and I examined the patient. There was no change. The surgical site was confirmed by the patient and me. Plan: The risks, benefits, expected outcome, and alternative to the recommended procedure have been discussed with the patient. Patient understands and wants to proceed with the procedure.      Electronically signed by Maximo Pearce MD on 10/4/2022 at 9:49 AM

## 2022-10-04 NOTE — ANESTHESIA PRE PROCEDURE
Department of Anesthesiology  Preprocedure Note       Name:  Willow Rodgers   Age:  56 y.o.  :  1975                                          MRN:  [id number]         Date:  10/4/2022      Surgeon: Rossy Howe):  Janette Bishop MD    Procedure: Procedure(s):  COLORECTAL CANCER SCREENING, HIGH RISK  EGD ESOPHAGOGASTRODUODENOSCOPY    Medications prior to admission:   Prior to Admission medications    Medication Sig Start Date End Date Taking? Authorizing Provider   dilTIAZem (CARDIZEM CD) 120 MG extended release capsule Take 1 capsule by mouth daily 22   Andres Downing MD   lisinopril (PRINIVIL;ZESTRIL) 20 MG tablet Take 1 tablet by mouth daily 22   Andres Downing MD   clopidogrel (PLAVIX) 75 MG tablet Take 1 tablet by mouth daily 22   Sherman De La Fuente MD   nitroGLYCERIN (NITROSTAT) 0.4 MG SL tablet Place 1 tablet under the tongue every 5 minutes as needed for Chest pain up to max of 3 total doses. If no relief after 1 dose, call 911. 22   Andres Downing MD   metoprolol succinate (TOPROL XL) 100 MG extended release tablet Take 1 tablet by mouth daily  Patient taking differently: Take 100 mg by mouth nightly 22   Andres Downing MD   fenofibrate (TRIGLIDE) 160 MG tablet Take 160 mg by mouth daily    Historical Provider, MD   atorvastatin (LIPITOR) 40 MG tablet Take 1 tablet by mouth daily 7/15/19   Andres Downing MD   [facility]olam [facility]) 0.25 MG tablet Take 0.25 mg by mouth 2 times daily as needed for Sleep or Anxiety. Bernstein Hidden Historical Provider, MD   metFORMIN (GLUCOPHAGE) 500 MG tablet Take 500 mg by mouth 2 times daily (with meals)     Historical Provider, MD   aspirin 81 MG EC tablet Take 81 mg by mouth daily.       Historical Provider, MD       Current medications:    Current Facility-Administered Medications   Medication Dose Route Frequency Provider Last Rate Last Admin    lactated ringers infusion   IntraVENous Continuous KRUNAL Khan - CRNA 100 mL/hr at 10/04/22 0941 New Bag at 10/04/22 0941       Allergies:  No Known Allergies    Problem List:    Patient Active Problem List   Diagnosis Code    Pain in rib, left lower R07.81    Chest pain at rest R07.9    HTN (hypertension) I10    Tobacco abuse Z72.0    Family history of premature CAD Z82.49    Acute coronary syndrome ([facility] [address] I24.9    Mild congestive heart failure (HCC) I50.9    S/P CABG (coronary artery bypass graft) Z95.1    Dyslipidemia E78.5    Ischemic cardiomyopathy I25.5    Chronic combined systolic and diastolic CHF, NYHA class 2 (HCC) I50.42    ASHD (arteriosclerotic heart disease) /  CABG 2018 I25.10    ACS (acute coronary syndrome) ([facility]) I24.9    Palpitations R00.2    Abnormal result of other cardiovascular function study R94.39    Pancreatitis, unspecified pancreatitis type K85.90    Nausea R11.0    Epigastric pain R10.13       Past Medical History:        Diagnosis Date    CAD (coronary artery disease)     Carpal tunnel syndrome     Depressive disorder, not elsewhere classified     Diabetes mellitus ([facility] [address]     Heart attack ([facility] [address] 2018    STEMI    History of echocardiogram 2019    EF 40-45%. LV cavity sixe is mildly increased. Inferior and inferoseptal wall hypokenesis noted. Mild diastolic dysfunction.     Hyperlipidemia     Hypertension 2009       Past Surgical History:        Procedure Laterality Date    CARDIAC CATHETERIZATION Left 2018    Right Ulnar/[facility]/    CARDIAC CATHETERIZATION Left 2022    Dr Apoorva Agudelo/ right ulnar-    CORONARY ANGIOPLASTY  2018    double bypass, [facility],     CORONARY ARTERY BYPASS GRAFT  2018       Social History:    Social History     Tobacco Use    Smoking status: Some Days     Packs/day: 0.25     Years: 30.00     Pack years: 7.50     Types: Cigarettes     Last attempt to quit: 2022     Years since quittin.6    Smokeless tobacco: Never   Substance Use Topics    Alcohol use: Yes     Comment: Rare                                Ready to quit: Not Answered  Counseling given: Not Answered      Vital Signs (Current):   Vitals:    09/28/22 1031 10/04/22 0920 10/04/22 0930   BP:   127/83   Pulse:   77   Resp:   16   Temp:   36.4 °C (97.6 °F)   TempSrc:   Temporal   SpO2:   96%   Weight: 196 lb (88.9 kg) 190 lb (86.2 kg)    Height: 5' 7\" (1.702 m) 5' 7\" (1.702 m)                                               BP Readings from Last 3 Encounters:   10/04/22 127/83   09/27/22 (!) 149/87   09/13/22 128/75       NPO Status: Time of last liquid consumption: 0500                        Time of last solid consumption: 2100                        Date of last liquid consumption: 10/04/22                        Date of last solid food consumption: 10/01/22    BMI:   Wt Readings from Last 3 Encounters:   10/04/22 190 lb (86.2 kg)   09/27/22 196 lb 9.6 oz (89.2 kg)   09/13/22 197 lb 12.8 oz (89.7 kg)     Body mass index is 29.76 kg/m².     CBC:   Lab Results   Component Value Date/Time    WBC 14.1 09/13/2022 05:30 AM    RBC 3.93 09/13/2022 05:30 AM    RBC 4.74 05/07/2012 08:14 AM    HGB 11.8 09/13/2022 05:30 AM    HCT 35.6 09/13/2022 05:30 AM    MCV 90.6 09/13/2022 05:30 AM    RDW 12.8 09/13/2022 05:30 AM     09/13/2022 05:30 AM     05/07/2012 08:14 AM       CMP:   Lab Results   Component Value Date/Time     09/27/2022 12:28 PM    K 4.5 09/27/2022 12:28 PM     09/27/2022 12:28 PM    CO2 26 09/27/2022 12:28 PM    BUN 24 09/27/2022 12:28 PM    CREATININE 0.86 09/27/2022 12:28 PM    GFRAA >60 09/27/2022 12:28 PM    LABGLOM >60 09/27/2022 12:28 PM    GLUCOSE 117 09/27/2022 12:28 PM    GLUCOSE 104 05/07/2012 08:14 AM    PROT 8.0 09/27/2022 12:28 PM    CALCIUM 10.4 09/27/2022 12:28 PM    BILITOT 0.3 09/27/2022 12:28 PM    ALKPHOS 65 09/27/2022 12:28 PM    AST 17 09/27/2022 12:28 PM    ALT 19 09/27/2022 12:28 PM       POC Tests:   Recent Labs     10/04/22  0939   POCGLU 95 Coags:   Lab Results   Component Value Date/Time    PROTIME 12.9 12/26/2021 06:10 PM    INR 1.0 12/26/2021 06:10 PM    APTT 27.5 12/26/2021 06:10 PM       HCG (If Applicable): No results found for: PREGTESTUR, PREGSERUM, HCG, HCGQUANT     ABGs: No results found for: PHART, PO2ART, AYM3XJF, EBI2MQF, BEART, S1CBYRMX     Type & Screen (If Applicable):  No results found for: LABABO, LABRH    Drug/Infectious Status (If Applicable):  No results found for: HIV, HEPCAB    COVID-19 Screening (If Applicable):   Lab Results   Component Value Date/Time    COVID19 Not Detected 01/26/2022 08:31 PM    COVID19 Not Detected 12/14/2020 01:19 PM           Anesthesia Evaluation  Patient summary reviewed and Nursing notes reviewed no history of anesthetic complications:   Airway: Mallampati: II          Dental: normal exam         Pulmonary:normal exam    (+) current smoker                           Cardiovascular:    (+) hypertension:, past MI: no interval change, CAD:, CABG/stent (S/P CABG x1 done on August 2018 SVG to RCA):, CHF:,       ECG reviewed               Beta Blocker:  Dose within 24 Hrs      ROS comment: Feeling much better after 2018 bypass    9/11/22  Sinus rhythm with occasional Premature ventricular complexes  Otherwise normal ECG  When compared with ECG of 26-JAN-2022 20:25,  Premature ventricular complexes are now Present    Heart cath done on 1/27/2022- Severe single vessel disease involving the right coronary arteries. 1 of 1 bypass grafts patent. Normal LVEDP     Neuro/Psych:   (+) depression/anxiety             GI/Hepatic/Renal: Neg GI/Hepatic/Renal ROS            Endo/Other:    (+) Diabetes, . Abdominal:   (+) obese,           Vascular: Other Findings:           Anesthesia Plan      general and TIVA     ASA 3       Induction: intravenous. MIPS: Prophylactic antiemetics administered. Anesthetic plan and risks discussed with patient.       Plan discussed with CRNA.                    Macie Hightower, APRN - CRNA   10/4/2022

## 2022-10-04 NOTE — ANESTHESIA POSTPROCEDURE EVALUATION
Department of Anesthesiology  Postprocedure Note    Patient: Tony Winston  MRN: [id number]  YOB: 1975  Date of evaluation: 10/4/2022      Procedure Summary     Date: 10/04/22 Room / Location: [address] [place] [address]    Anesthesia Start: 1113 Anesthesia Stop: 6717    Procedures:       COLORECTAL CANCER SCREENING, HIGH RISK      EGD BIOPSY Diagnosis:       Acute pancreatitis, unspecified complication status, unspecified pancreatitis type      (PANCREATITIS, EPIGASTRIC ABD. PAIN)    Surgeons: Estephanie Arizmendi MD Responsible Provider: KRUNAL Pruett CRNA    Anesthesia Type: general, TIVA ASA Status: 3          Anesthesia Type: No value filed.     Yrn Phase I:      Yrn Phase II: Yrn Score: 10      Anesthesia Post Evaluation    Patient location during evaluation: PACU  Patient participation: complete - patient participated  Level of consciousness: awake and alert  Pain score: 0  Airway patency: patent  Nausea & Vomiting: no nausea and no vomiting  Complications: no  Cardiovascular status: hemodynamically stable  Respiratory status: acceptable  Hydration status: euvolemic  Multimodal analgesia pain management approach

## 2022-10-04 NOTE — OP NOTE
[facility]    COLONOSCOPY    PROCEDURE DATE: 10/04/22    REFERRING PHYSICIAN: No ref. provider found     PRIMARY CARE PROVIDER: Erica Estrada MD    ATTENDING PHYSICIAN: Tatiana Singer MD     HISTORY: Mr. Jana Michael is a 56 y.o. male who presents to the  endoscopy unit for colonoscopy. The patient's clinical history is remarkable for panrcreatitis. He is currently medically stable and appropriate for the planned procedure. PREOPERATIVE DIAGNOSIS: screening for colon cancer. PROCEDURES:   Transanal Colonoscopy --screening. POSTPROCEDURE DIAGNOSIS:  Tortuous colon    MEDICATIONS: MAC per anesthesia     EBL: None      INSTRUMENT: Olympus CF-H190 AL Pediatric flexible Colonoscope. PREPARATION: The nature and character of the procedure as well as risks, benefits, and alternatives were discussed with the patient and informed consent was obtained. Complications were said to include, but were not limited to: medication allergy, medication reaction, cardiovascular and respiratory problems, bleeding, perforation, infection, and/or missed diagnosis. Following arrival in the endoscopy room, the patient was placed in the left lateral decubitus position and final time-out accomplished in the presence of the nursing staff. Baseline vital signs were obtained and reviewed, and IV sedation was subsequently initiated. FINDINGS: Rectal examination demonstrated no significant visible external abnormality and digital palpation was unremarkable. Following adequate conscious sedation the colonoscope was introduced and advanced under direct visualization to the cecum, which was identified by the ileocecal valve and appendiceal orifice. The bowel preparation was felt to be normal. Cecal intubation time was 4 minutes. Once maximally inserted, the endoscope was withdrawn and the mucosa was carefully inspected. The mucosal exam revealed normal colon mucosa. Tortuous colon overall.  Retroflexion was performed in the rectum and no internal hemorrhoids were noted. Withdrawal time was 6 minutes. IMPRESSION:   Tortuous colon     RECOMMENDATIONS:   1) Follow up with referring provider, as previously scheduled. 2) Repeat Colonoscopy in 10 years       Electronically signed by Zaid Bowen MD on 10/4/2022 at 12:26 PM       The patient was counseled at length about the risks of teodoro Covid-19 during their perioperative period and any recovery window from their procedure. The patient was made aware that teodoro Covid-19  may worsen their prognosis for recovering from their procedure  and lend to a higher morbidity and/or mortality risk. All material risks, benefits, and reasonable alternatives including postponing the procedure were discussed. The patient DOES wish to proceed with the procedure at this time.

## 2022-10-04 NOTE — PROGRESS NOTES
Patient verbalizes readiness for discharge. Discharge instructions given to patient and responsible adult, answered all questions, and verbalized understanding of discharge instructions. Discharge Criteria    Inpatients must meet Criteria 1 through 7. All other patients are either YES or N/A. If a NO is chosen then Anesthesia or Surgeon must be notified. 1.  Minimum 30 minutes after last dose of sedative medication, minimum 120 minutes after last dose of reversal agent. Yes      2. Systolic BP stable within 20 mmHg for 30 minutes & systolic BP between 90 & 009 or within 10 mmHg of baseline. Yes      3. Pulse between 60 and 100 or within 10 bpm of baseline. Yes      4. Spontaneous respiratory rate >/= 10 per minute. Yes      5. SaO2 >/= 95 or  >/= baseline. Yes      6. Able to cough and swallow or return to baseline function. Yes      7. Alert and oriented or return to baseline mental status. Yes      8. Demonstrates controlled, coordinated movements, ambulates with steady gait, or return to baseline activity function. Yes      9. Minimal or no pain or nausea, or at a level tolerable and acceptable to patient. Yes      10. Takes and retains oral fluids as allowed. Yes      11. Procedural / perioperative site stable. Minimal or no bleeding. Yes          12. If GI endoscopy procedure, minimal or no abdominal distention or passing flatus. Yes      13. Written discharge instructions and emergency telephone number provided. Yes      14. Accompanied by a responsible adult.     Yes

## 2022-10-04 NOTE — DISCHARGE INSTRUCTIONS
SAME DAY SURGERY DISCHARGE INSTRUCTIONS    1. Do not drive or operate hazardous machinery for 24 hours. 2.  Do not make important personal or business decisions for 24 hours. 3.  Do not drink alcoholic beverages for 24 hours. 4.  Do not smoke tobacco products for 24 hours. 5.  Eat light foods (Jell-O, soups, etc....) and drink plenty of fluids (water, Sprite, etc...) up to 8 glasses per day, as you can tolerate. 6.  Limit your activities for 24 hours. Do not engage in heavy work until your surgeon gives you permission. 7.  Call your surgeon for any questions regarding your surgery. COLONOSCOPY DISCHARGE INSTRUCTIONS:    It's normal to have a feeling of fullness or mild cramping in your abdomen afterwards due to air that is put into your bowel during the procedure. Mild activities such as walking will help you pass the air. You may resume your regular diet. ENDOSCOPY DISCHARGE INSTRUCTIONS:    You may have a mild sore throat; this should get better over the next day or two. Sipping warm liquids, a salt-water gargle or throat lozenges may be used. You may have some belching or a feeling of fullness in your abdomen. This is from air that was put into your stomach during the procedure. This should pass in a few hours. May resume your regular diet. You will receive a letter or phone call with your test results in 2 weeks. If you have not received a letter or a phone call in 2 weeks please call the office for your results. CALL THE DOCTOR IF YOU HAVE:    Chest pain or trouble breathing. A hoarse voice or trouble swallowing    Bleeding, vomiting or spitting up of blood that is more than a few streaks or red or black stools    A fever above 101F or if you have chills    Pain that is worse or different than any pain you had before the procedure    Nausea or vomiting that lasts for more than 2 hours.        If symptoms are to severe call 911 or go to the nearest Emergency Room.

## 2022-10-04 NOTE — OP NOTE
[facility]    EGD    PROCEDURE DATE: 10/04/22    REFERRING PHYSICIAN: No ref. provider found     PRIMARY CARE PROVIDER: Kerwin Woodard MD    ATTENDING PHYSICIAN: Emir Webb MD     HISTORY: Mr. Willow Rodgers is a 56 y.o. male who presents to the  Endoscopy unit for upper endoscopy. The patient's clinical history is remarkable for pancreatitis. He is currently medically stable and appropriate for the planned procedure. PREOPERATIVE DIAGNOSIS: Epigastric pain. PROCEDURES:   1) Transoral Upper Endoscopy. POSTOPERATIVE DIAGNOSIS:   Gastric ulcer  Gastritis  Duodenitis  Gastris erosions     MEDICATIONS: MAC per anesthesia     EBL: 2 ml    INSTRUMENT: Olympus GIF-H190 flexible Gastroscope. PREPARATION: The nature and character of the procedure as well as risks, benefits, and alternatives were discussed with the patient and informed consent was obtained. Complications were said to include, but were not limited to: medication allergy, medication reaction, cardiovascular and respiratory problems, bleeding, perforation, infection, and/or missed diagnosis. Following arrival in the endoscopy room, the patient was placed in the left lateral decubitus position and final time-out accomplished in the presence of the nursing staff. Baseline vital signs were obtained and reviewed, and IV sedation was subsequently initiated. FINDINGS:   Esophagus: The esophagus was inspected to the Z-line. The endoscopic exam showed no ulcers or lesions. GEJ appeared regular. Stomach: The stomach was inspected in both forward and retroflex fashion and was appropriately distensible. The cardia, fundus, incisura, antrum and pylorus were identified via direct visualization. The endoscopic exam showed hematin throughout stomach with fresh blood. 3cm ulcer noted on greater curvature of stomach with blackened appeared. No active bleeding vessels noted. Scattered erosions noted on gastric mucosa.   Retroflex showed no ulcers on incisura and no hiatal hernia. Duodenum: The proximal small bowel was inspected through the bulb, sweep, and second portion of the duodenum. The endoscopic exam showed erythema in bulb with small erosions present at the border of the bulb and sweep as well as in the third portion. Biopsies of gastric antrum, gastric body adjacent to the ulcer, duodenal bulb with cold biopsy forceps. IMPRESSION:  Gastric ulcer  Gastritis  Duodenitis  Gastris erosions       RECOMMENDATIONS:    1) Avoid NSAIDs. These include medications like ibuprofen, Aleve, Motrin, Naprosyn, aspirin 325 mg. Alternative is acetaminophen no more than 2400 mg daily. 2) Recommend pantoprazole mg daily for 30 days, Carafate 1 g QID for 30 days  3) Repeat EGD in 8 weeks           Electronically signed by Gumaro Wade MD on 10/4/2022 at 12:30 PM      The patient was counseled at length about the risks of teodoro Covid-19 during their perioperative period and any recovery window from their procedure. The patient was made aware that teodoro Covid-19  may worsen their prognosis for recovering from their procedure  and lend to a higher morbidity and/or mortality risk. All material risks, benefits, and reasonable alternatives including postponing the procedure were discussed. The patient DOES wish to proceed with the procedure at this time.

## 2022-10-05 ENCOUNTER — APPOINTMENT (OUTPATIENT)
Dept: CT IMAGING | Age: 47
DRG: 383 | End: 2022-10-05
Payer: COMMERCIAL

## 2022-10-05 ENCOUNTER — HOSPITAL ENCOUNTER (INPATIENT)
Age: 47
LOS: 1 days | Discharge: HOME | DRG: 383 | End: 2022-10-08
Attending: STUDENT IN AN ORGANIZED HEALTH CARE EDUCATION/TRAINING PROGRAM | Admitting: STUDENT IN AN ORGANIZED HEALTH CARE EDUCATION/TRAINING PROGRAM
Payer: COMMERCIAL

## 2022-10-05 ENCOUNTER — TELEPHONE (OUTPATIENT)
Dept: GASTROENTEROLOGY | Age: 47
End: 2022-10-05

## 2022-10-05 DIAGNOSIS — R10.9 INTRACTABLE ABDOMINAL PAIN: ICD-10-CM

## 2022-10-05 DIAGNOSIS — R10.10 PAIN OF UPPER ABDOMEN: Primary | ICD-10-CM

## 2022-10-05 LAB
ABSOLUTE EOS #: 0.05 K/UL (ref 0–0.44)
ABSOLUTE IMMATURE GRANULOCYTE: 0.04 K/UL (ref 0–0.3)
ABSOLUTE LYMPH #: 3.2 K/UL (ref 1.1–3.7)
ABSOLUTE MONO #: 0.5 K/UL (ref 0.1–1.2)
ALBUMIN SERPL-MCNC: 5.1 G/DL (ref 3.5–5.2)
ALBUMIN/GLOBULIN RATIO: 1.5 (ref 1–2.5)
ALP BLD-CCNC: 75 U/L (ref 40–129)
ALT SERPL-CCNC: 29 U/L (ref 5–41)
ANION GAP SERPL CALCULATED.3IONS-SCNC: 15 MMOL/L (ref 9–17)
AST SERPL-CCNC: 22 U/L
BASOPHILS # BLD: 0 % (ref 0–2)
BASOPHILS ABSOLUTE: <0.03 K/UL (ref 0–0.2)
BILIRUB SERPL-MCNC: 0.3 MG/DL (ref 0.3–1.2)
BILIRUBIN URINE: NEGATIVE
BUN BLDV-MCNC: 20 MG/DL (ref 6–20)
BUN/CREAT BLD: 19 (ref 9–20)
CALCIUM SERPL-MCNC: 10.2 MG/DL (ref 8.6–10.4)
CHLORIDE BLD-SCNC: 106 MMOL/L (ref 98–107)
CO2: 21 MMOL/L (ref 20–31)
COLOR: YELLOW
CREAT SERPL-MCNC: 1.06 MG/DL (ref 0.7–1.2)
EOSINOPHILS RELATIVE PERCENT: 0 % (ref 1–4)
EPITHELIAL CELLS UA: NORMAL /HPF (ref 0–5)
GFR SERPL CREATININE-BSD FRML MDRD: >60 ML/MIN/1.73M2
GLUCOSE BLD-MCNC: 144 MG/DL (ref 70–99)
GLUCOSE URINE: NEGATIVE
HCT VFR BLD CALC: 46.7 % (ref 40.7–50.3)
HEMOGLOBIN: 16.4 G/DL (ref 13–17)
IMMATURE GRANULOCYTES: 0 %
KETONES, URINE: NEGATIVE
LACTIC ACID: 2.8 MMOL/L (ref 0.5–2.2)
LEUKOCYTE ESTERASE, URINE: NEGATIVE
LIPASE: 102 U/L (ref 13–60)
LIPASE: 85 U/L (ref 13–60)
LYMPHOCYTES # BLD: 26 % (ref 24–43)
MCH RBC QN AUTO: 31.4 PG (ref 25.2–33.5)
MCHC RBC AUTO-ENTMCNC: 35.1 G/DL (ref 28.4–34.8)
MCV RBC AUTO: 89.3 FL (ref 82.6–102.9)
MONOCYTES # BLD: 4 % (ref 3–12)
NITRITE, URINE: NEGATIVE
NRBC AUTOMATED: 0 PER 100 WBC
PDW BLD-RTO: 12.9 % (ref 11.8–14.4)
PH UA: 5.5 (ref 5–9)
PLATELET # BLD: 399 K/UL (ref 138–453)
PMV BLD AUTO: 10.2 FL (ref 8.1–13.5)
POTASSIUM SERPL-SCNC: 4.5 MMOL/L (ref 3.7–5.3)
PROTEIN UA: NEGATIVE
RBC # BLD: 5.23 M/UL (ref 4.21–5.77)
RBC UA: NORMAL /HPF (ref 0–2)
SEG NEUTROPHILS: 70 % (ref 36–65)
SEGMENTED NEUTROPHILS ABSOLUTE COUNT: 8.67 K/UL (ref 1.5–8.1)
SODIUM BLD-SCNC: 142 MMOL/L (ref 135–144)
SPECIFIC GRAVITY UA: 1.01 (ref 1.01–1.02)
TOTAL PROTEIN: 8.6 G/DL (ref 6.4–8.3)
TURBIDITY: CLEAR
URINE HGB: NEGATIVE
UROBILINOGEN, URINE: NORMAL
WBC # BLD: 12.5 K/UL (ref 3.5–11.3)
WBC UA: NORMAL /HPF (ref 0–5)

## 2022-10-05 PROCEDURE — 74177 CT ABD & PELVIS W/CONTRAST: CPT

## 2022-10-05 PROCEDURE — 2580000003 HC RX 258: Performed by: STUDENT IN AN ORGANIZED HEALTH CARE EDUCATION/TRAINING PROGRAM

## 2022-10-05 PROCEDURE — 99285 EMERGENCY DEPT VISIT HI MDM: CPT

## 2022-10-05 PROCEDURE — 96361 HYDRATE IV INFUSION ADD-ON: CPT

## 2022-10-05 PROCEDURE — 96375 TX/PRO/DX INJ NEW DRUG ADDON: CPT

## 2022-10-05 PROCEDURE — G0378 HOSPITAL OBSERVATION PER HR: HCPCS

## 2022-10-05 PROCEDURE — 96374 THER/PROPH/DIAG INJ IV PUSH: CPT

## 2022-10-05 PROCEDURE — 2580000003 HC RX 258: Performed by: PHYSICIAN ASSISTANT

## 2022-10-05 PROCEDURE — 83690 ASSAY OF LIPASE: CPT

## 2022-10-05 PROCEDURE — 96376 TX/PRO/DX INJ SAME DRUG ADON: CPT

## 2022-10-05 PROCEDURE — 93005 ELECTROCARDIOGRAM TRACING: CPT | Performed by: STUDENT IN AN ORGANIZED HEALTH CARE EDUCATION/TRAINING PROGRAM

## 2022-10-05 PROCEDURE — 81001 URINALYSIS AUTO W/SCOPE: CPT

## 2022-10-05 PROCEDURE — 85025 COMPLETE CBC W/AUTO DIFF WBC: CPT

## 2022-10-05 PROCEDURE — 6360000002 HC RX W HCPCS: Performed by: STUDENT IN AN ORGANIZED HEALTH CARE EDUCATION/TRAINING PROGRAM

## 2022-10-05 PROCEDURE — 94761 N-INVAS EAR/PLS OXIMETRY MLT: CPT

## 2022-10-05 PROCEDURE — 80053 COMPREHEN METABOLIC PANEL: CPT

## 2022-10-05 PROCEDURE — 6370000000 HC RX 637 (ALT 250 FOR IP): Performed by: STUDENT IN AN ORGANIZED HEALTH CARE EDUCATION/TRAINING PROGRAM

## 2022-10-05 PROCEDURE — 6360000004 HC RX CONTRAST MEDICATION: Performed by: PHYSICIAN ASSISTANT

## 2022-10-05 PROCEDURE — 83605 ASSAY OF LACTIC ACID: CPT

## 2022-10-05 PROCEDURE — 36415 COLL VENOUS BLD VENIPUNCTURE: CPT

## 2022-10-05 PROCEDURE — 6360000002 HC RX W HCPCS: Performed by: PHYSICIAN ASSISTANT

## 2022-10-05 RX ORDER — ENOXAPARIN SODIUM 100 MG/ML
40 INJECTION SUBCUTANEOUS DAILY
Status: DISCONTINUED | OUTPATIENT
Start: 2022-10-05 | End: 2022-10-07

## 2022-10-05 RX ORDER — POTASSIUM CHLORIDE 7.45 MG/ML
10 INJECTION INTRAVENOUS PRN
Status: DISCONTINUED | OUTPATIENT
Start: 2022-10-05 | End: 2022-10-06

## 2022-10-05 RX ORDER — ATORVASTATIN CALCIUM 40 MG/1
40 TABLET, FILM COATED ORAL DAILY
Status: DISCONTINUED | OUTPATIENT
Start: 2022-10-05 | End: 2022-10-08 | Stop reason: HOSPADM

## 2022-10-05 RX ORDER — ONDANSETRON 2 MG/ML
4 INJECTION INTRAMUSCULAR; INTRAVENOUS ONCE
Status: COMPLETED | OUTPATIENT
Start: 2022-10-05 | End: 2022-10-05

## 2022-10-05 RX ORDER — SUCRALFATE 1 G/1
1 TABLET ORAL 4 TIMES DAILY
Status: DISCONTINUED | OUTPATIENT
Start: 2022-10-05 | End: 2022-10-08 | Stop reason: HOSPADM

## 2022-10-05 RX ORDER — ASPIRIN 81 MG/1
81 TABLET ORAL DAILY
Status: DISCONTINUED | OUTPATIENT
Start: 2022-10-06 | End: 2022-10-08 | Stop reason: HOSPADM

## 2022-10-05 RX ORDER — ACETAMINOPHEN 650 MG/1
650 SUPPOSITORY RECTAL EVERY 6 HOURS PRN
Status: DISCONTINUED | OUTPATIENT
Start: 2022-10-05 | End: 2022-10-08 | Stop reason: HOSPADM

## 2022-10-05 RX ORDER — SODIUM CHLORIDE 9 MG/ML
INJECTION, SOLUTION INTRAVENOUS PRN
Status: DISCONTINUED | OUTPATIENT
Start: 2022-10-05 | End: 2022-10-08 | Stop reason: HOSPADM

## 2022-10-05 RX ORDER — CLOPIDOGREL BISULFATE 75 MG/1
75 TABLET ORAL DAILY
Status: DISCONTINUED | OUTPATIENT
Start: 2022-10-05 | End: 2022-10-08 | Stop reason: HOSPADM

## 2022-10-05 RX ORDER — POLYETHYLENE GLYCOL 3350 17 G/17G
17 POWDER, FOR SOLUTION ORAL DAILY PRN
Status: DISCONTINUED | OUTPATIENT
Start: 2022-10-05 | End: 2022-10-08 | Stop reason: HOSPADM

## 2022-10-05 RX ORDER — 0.9 % SODIUM CHLORIDE 0.9 %
1000 INTRAVENOUS SOLUTION INTRAVENOUS ONCE
Status: COMPLETED | OUTPATIENT
Start: 2022-10-05 | End: 2022-10-05

## 2022-10-05 RX ORDER — PANTOPRAZOLE SODIUM 40 MG/1
40 TABLET, DELAYED RELEASE ORAL
Status: DISCONTINUED | OUTPATIENT
Start: 2022-10-06 | End: 2022-10-07

## 2022-10-05 RX ORDER — POTASSIUM CHLORIDE 20 MEQ/1
40 TABLET, EXTENDED RELEASE ORAL PRN
Status: DISCONTINUED | OUTPATIENT
Start: 2022-10-05 | End: 2022-10-06

## 2022-10-05 RX ORDER — ACETAMINOPHEN 325 MG/1
650 TABLET ORAL EVERY 6 HOURS PRN
Status: DISCONTINUED | OUTPATIENT
Start: 2022-10-05 | End: 2022-10-08 | Stop reason: HOSPADM

## 2022-10-05 RX ORDER — MORPHINE SULFATE 2 MG/ML
2 INJECTION, SOLUTION INTRAMUSCULAR; INTRAVENOUS
Status: DISCONTINUED | OUTPATIENT
Start: 2022-10-05 | End: 2022-10-06

## 2022-10-05 RX ORDER — METOPROLOL SUCCINATE 100 MG/1
100 TABLET, EXTENDED RELEASE ORAL NIGHTLY
Status: DISCONTINUED | OUTPATIENT
Start: 2022-10-05 | End: 2022-10-08 | Stop reason: HOSPADM

## 2022-10-05 RX ORDER — FENOFIBRATE 160 MG/1
160 TABLET ORAL DAILY
Status: DISCONTINUED | OUTPATIENT
Start: 2022-10-05 | End: 2022-10-08 | Stop reason: HOSPADM

## 2022-10-05 RX ORDER — MORPHINE SULFATE 4 MG/ML
4 INJECTION, SOLUTION INTRAMUSCULAR; INTRAVENOUS ONCE
Status: COMPLETED | OUTPATIENT
Start: 2022-10-05 | End: 2022-10-05

## 2022-10-05 RX ORDER — LISINOPRIL 20 MG/1
20 TABLET ORAL DAILY
Status: DISCONTINUED | OUTPATIENT
Start: 2022-10-06 | End: 2022-10-08 | Stop reason: HOSPADM

## 2022-10-05 RX ORDER — ONDANSETRON 2 MG/ML
4 INJECTION INTRAMUSCULAR; INTRAVENOUS EVERY 6 HOURS PRN
Status: DISCONTINUED | OUTPATIENT
Start: 2022-10-05 | End: 2022-10-08 | Stop reason: HOSPADM

## 2022-10-05 RX ORDER — PROCHLORPERAZINE EDISYLATE 5 MG/ML
10 INJECTION INTRAMUSCULAR; INTRAVENOUS ONCE
Status: COMPLETED | OUTPATIENT
Start: 2022-10-05 | End: 2022-10-05

## 2022-10-05 RX ORDER — DILTIAZEM HYDROCHLORIDE 120 MG/1
120 CAPSULE, COATED, EXTENDED RELEASE ORAL DAILY
Status: DISCONTINUED | OUTPATIENT
Start: 2022-10-06 | End: 2022-10-08 | Stop reason: HOSPADM

## 2022-10-05 RX ORDER — SODIUM CHLORIDE 0.9 % (FLUSH) 0.9 %
10 SYRINGE (ML) INJECTION PRN
Status: DISCONTINUED | OUTPATIENT
Start: 2022-10-05 | End: 2022-10-08 | Stop reason: HOSPADM

## 2022-10-05 RX ORDER — MAGNESIUM SULFATE IN WATER 40 MG/ML
2000 INJECTION, SOLUTION INTRAVENOUS PRN
Status: DISCONTINUED | OUTPATIENT
Start: 2022-10-05 | End: 2022-10-08 | Stop reason: HOSPADM

## 2022-10-05 RX ORDER — SODIUM CHLORIDE 9 MG/ML
INJECTION, SOLUTION INTRAVENOUS CONTINUOUS
Status: DISCONTINUED | OUTPATIENT
Start: 2022-10-05 | End: 2022-10-08

## 2022-10-05 RX ORDER — ALPRAZOLAM 0.25 MG/1
0.25 TABLET ORAL 2 TIMES DAILY PRN
Status: DISCONTINUED | OUTPATIENT
Start: 2022-10-05 | End: 2022-10-08 | Stop reason: HOSPADM

## 2022-10-05 RX ORDER — MORPHINE SULFATE 4 MG/ML
4 INJECTION, SOLUTION INTRAMUSCULAR; INTRAVENOUS
Status: DISCONTINUED | OUTPATIENT
Start: 2022-10-05 | End: 2022-10-06

## 2022-10-05 RX ORDER — ONDANSETRON 4 MG/1
4 TABLET, ORALLY DISINTEGRATING ORAL EVERY 8 HOURS PRN
Status: DISCONTINUED | OUTPATIENT
Start: 2022-10-05 | End: 2022-10-08 | Stop reason: HOSPADM

## 2022-10-05 RX ORDER — SODIUM CHLORIDE 0.9 % (FLUSH) 0.9 %
10 SYRINGE (ML) INJECTION EVERY 12 HOURS SCHEDULED
Status: DISCONTINUED | OUTPATIENT
Start: 2022-10-05 | End: 2022-10-08 | Stop reason: HOSPADM

## 2022-10-05 RX ADMIN — IOPAMIDOL 75 ML: 755 INJECTION, SOLUTION INTRAVENOUS at 15:48

## 2022-10-05 RX ADMIN — ONDANSETRON 4 MG: 2 INJECTION INTRAMUSCULAR; INTRAVENOUS at 15:35

## 2022-10-05 RX ADMIN — METOPROLOL SUCCINATE 100 MG: 100 TABLET, FILM COATED, EXTENDED RELEASE ORAL at 20:46

## 2022-10-05 RX ADMIN — SODIUM CHLORIDE: 9 INJECTION, SOLUTION INTRAVENOUS at 20:09

## 2022-10-05 RX ADMIN — MORPHINE SULFATE 4 MG: 4 INJECTION, SOLUTION INTRAMUSCULAR; INTRAVENOUS at 15:35

## 2022-10-05 RX ADMIN — MORPHINE SULFATE 4 MG: 4 INJECTION, SOLUTION INTRAMUSCULAR; INTRAVENOUS at 17:52

## 2022-10-05 RX ADMIN — CLOPIDOGREL BISULFATE 75 MG: 75 TABLET ORAL at 20:46

## 2022-10-05 RX ADMIN — FENOFIBRATE 160 MG: 160 TABLET ORAL at 20:46

## 2022-10-05 RX ADMIN — MORPHINE SULFATE 4 MG: 4 INJECTION, SOLUTION INTRAMUSCULAR; INTRAVENOUS at 20:44

## 2022-10-05 RX ADMIN — ONDANSETRON 4 MG: 2 INJECTION INTRAMUSCULAR; INTRAVENOUS at 17:14

## 2022-10-05 RX ADMIN — SUCRALFATE 1 G: 1 TABLET ORAL at 20:46

## 2022-10-05 RX ADMIN — SODIUM CHLORIDE 1000 ML: 9 INJECTION, SOLUTION INTRAVENOUS at 16:27

## 2022-10-05 RX ADMIN — PROCHLORPERAZINE EDISYLATE 10 MG: 5 INJECTION INTRAMUSCULAR; INTRAVENOUS at 19:11

## 2022-10-05 RX ADMIN — MORPHINE SULFATE 4 MG: 4 INJECTION, SOLUTION INTRAMUSCULAR; INTRAVENOUS at 23:54

## 2022-10-05 RX ADMIN — ATORVASTATIN CALCIUM 40 MG: 40 TABLET, FILM COATED ORAL at 20:46

## 2022-10-05 ASSESSMENT — PAIN DESCRIPTION - PAIN TYPE: TYPE: ACUTE PAIN

## 2022-10-05 ASSESSMENT — PAIN DESCRIPTION - ORIENTATION
ORIENTATION: MID
ORIENTATION: LEFT;UPPER;MID
ORIENTATION: MID

## 2022-10-05 ASSESSMENT — PAIN DESCRIPTION - LOCATION
LOCATION: ABDOMEN

## 2022-10-05 ASSESSMENT — PAIN SCALES - GENERAL
PAINLEVEL_OUTOF10: 7
PAINLEVEL_OUTOF10: 8
PAINLEVEL_OUTOF10: 0
PAINLEVEL_OUTOF10: 7
PAINLEVEL_OUTOF10: 8

## 2022-10-05 ASSESSMENT — PAIN DESCRIPTION - DESCRIPTORS
DESCRIPTORS: CRUSHING
DESCRIPTORS: STABBING
DESCRIPTORS: STABBING

## 2022-10-05 ASSESSMENT — PAIN DESCRIPTION - DIRECTION: RADIATING_TOWARDS: BACK

## 2022-10-05 ASSESSMENT — ENCOUNTER SYMPTOMS
NAUSEA: 1
ABDOMINAL DISTENTION: 1
VOMITING: 1

## 2022-10-05 ASSESSMENT — PAIN DESCRIPTION - ONSET: ONSET: ON-GOING

## 2022-10-05 ASSESSMENT — PAIN DESCRIPTION - FREQUENCY: FREQUENCY: CONTINUOUS

## 2022-10-05 ASSESSMENT — PAIN - FUNCTIONAL ASSESSMENT
PAIN_FUNCTIONAL_ASSESSMENT: 0-10
PAIN_FUNCTIONAL_ASSESSMENT: PREVENTS OR INTERFERES SOME ACTIVE ACTIVITIES AND ADLS

## 2022-10-05 NOTE — ED PROVIDER NOTES
[facility]  EMERGENCY DEPARTMENT ENCOUNTER      Pt Name: Jana Michael  MRN: [id number]  [place] 1975  Date of evaluation: 10/5/2022  Provider: Damaris Meraz PA-C    CHIEF COMPLAINT       Chief Complaint   Patient presents with    Abdominal Pain     Mid abdominal, LUQ since this AM. Pt had EGD and colonoscopy yesterday    Emesis         HISTORY OF PRESENT ILLNESS   (Location/Symptom, Timing/Onset, Context/Setting, Quality, Duration, Modifying Factors, Severity)  Note limiting factors. Jana Michael is a 56 y.o. male who presents to the emergency department PMH of pancreatitis for evaluation of sudden onset of epigastric region abdominal pain with associated nausea and vomiting nonbloody beginning at 1 PM today after eating chicken and mashed potatoes. Patient recently had a colonoscopy and a EGD yesterday by GI specialist Dr. Sowmya Lucero. Patient admits to chills but denies fever chest pain acute shortness of breath syncopal events or other complaints. Patient also denies recent EtOH. Nursing Notes were reviewed. REVIEW OF SYSTEMS    (2-9 systems for level 4, 10 or more for level 5)     Review of Systems   Constitutional:  Positive for chills. Negative for fever. See hpi   Cardiovascular: Negative. Gastrointestinal:  Positive for abdominal distention, nausea and vomiting. See hpi   Genitourinary: Negative. Musculoskeletal: Negative. Skin: Negative. Neurological: Negative. Psychiatric/Behavioral: Negative. Except as noted above the remainder of the review of systems was reviewed and negative. PAST MEDICAL HISTORY     Past Medical History:   Diagnosis Date    CAD (coronary artery disease)     Carpal tunnel syndrome     Depressive disorder, not elsewhere classified     Diabetes mellitus ([address]     Heart attack ([facility] [address] 08/11/2018    STEMI    History of echocardiogram 01/08/2019    EF 40-45%. LV cavity sixe is mildly increased.  Inferior and inferoseptal wall hypokenesis noted. Mild diastolic dysfunction. Hyperlipidemia     Hypertension 2009         SURGICAL HISTORY       Past Surgical History:   Procedure Laterality Date    CARDIAC CATHETERIZATION Left 01/07/2018    Right Ulnar/[facility]/    CARDIAC CATHETERIZATION Left 01/27/2022    Dr Anali Agudelo/ right ulnar-    COLONOSCOPY N/A 10/4/2022    COLORECTAL CANCER SCREENING, HIGH RISK performed by Karen Kelly MD at [facility]  08/2018    double bypass, [facility],     CORONARY ARTERY BYPASS GRAFT  2018    UPPER GASTROINTESTINAL ENDOSCOPY N/A 10/4/2022    EGD BIOPSY performed by Karen Kelly MD at [address]       Previous Medications    ALPRAZOLAM (XANAX) 0.25 MG TABLET    Take 0.25 mg by mouth 2 times daily as needed for Sleep or Anxiety. .    ASPIRIN 81 MG EC TABLET    Take 81 mg by mouth daily. ATORVASTATIN (LIPITOR) 40 MG TABLET    Take 1 tablet by mouth daily    CLOPIDOGREL (PLAVIX) 75 MG TABLET    Take 1 tablet by mouth daily    DILTIAZEM (CARDIZEM CD) 120 MG EXTENDED RELEASE CAPSULE    Take 1 capsule by mouth daily    FENOFIBRATE (TRIGLIDE) 160 MG TABLET    Take 160 mg by mouth daily    LISINOPRIL (PRINIVIL;ZESTRIL) 20 MG TABLET    Take 1 tablet by mouth daily    METFORMIN (GLUCOPHAGE) 500 MG TABLET    Take 500 mg by mouth 2 times daily (with meals)     METOPROLOL SUCCINATE (TOPROL XL) 100 MG EXTENDED RELEASE TABLET    Take 1 tablet by mouth daily    NITROGLYCERIN (NITROSTAT) 0.4 MG SL TABLET    Place 1 tablet under the tongue every 5 minutes as needed for Chest pain up to max of 3 total doses. If no relief after 1 dose, call 911. PANTOPRAZOLE (PROTONIX) 40 MG TABLET    Take 1 tablet by mouth every morning (before breakfast)    SUCRALFATE (CARAFATE) 1 GM TABLET    Take 1 tablet by mouth 4 times daily       ALLERGIES     Patient has no known allergies.     FAMILY HISTORY       Family History Problem Relation Age of Onset    High Blood Pressure Mother     Heart Disease Father     High Blood Pressure Father     Asthma Sister     Colon Cancer Paternal Uncle     Colon Cancer Paternal Uncle     Pancreatic Cancer Maternal Aunt     Pancreatic Cancer Maternal Aunt           SOCIAL HISTORY       Social History     Socioeconomic History    Marital status:    Tobacco Use    Smoking status: Some Days     Packs/day: 0.25     Years: 30.00     Pack years: 7.50     Types: Cigarettes     Last attempt to quit: 2022     Years since quittin.6    Smokeless tobacco: Never   Vaping Use    Vaping Use: Never used   Substance and Sexual Activity    Alcohol use: Yes     Comment: Rare    Drug use: No    Sexual activity: Yes     Partners: Female       SCREENINGS         Chely Coma Scale  Eye Opening: Spontaneous  Best Verbal Response: Oriented  Best Motor Response: Obeys commands  Chely Coma Scale Score: 15                     CIWA Assessment  BP: 115/76  Heart Rate: 99                 PHYSICAL EXAM    (up to 7 for level 4, 8 or more for level 5)     ED Triage Vitals   BP Temp Temp Source Heart Rate Resp SpO2 Height Weight   10/05/22 1418 10/05/22 1422 10/05/22 1422 10/05/22 1418 10/05/22 1418 10/05/22 1418 -- --   (!) 120/104 98.4 °F (36.9 °C) Oral 99 22 97 %         Physical Exam  Vitals and nursing note reviewed. Constitutional:       General: He is not in acute distress. Appearance: He is well-developed. He is not ill-appearing or toxic-appearing. HENT:      Head: Normocephalic. Cardiovascular:      Rate and Rhythm: Normal rate and regular rhythm. Pulmonary:      Effort: Pulmonary effort is normal.      Breath sounds: Normal breath sounds. Abdominal:      General: Bowel sounds are normal.      Tenderness: There is abdominal tenderness in the epigastric area. Skin:     General: Skin is warm and dry. Capillary Refill: Capillary refill takes less than 2 seconds.    Neurological: General: No focal deficit present. Mental Status: He is alert. Psychiatric:         Mood and Affect: Mood normal.         Behavior: Behavior normal.       DIAGNOSTIC RESULTS     E  RADIOLOGY:   Non-plain film images such as CT, Ultrasound and MRI are read by the radiologist. Plain radiographic images are visualized and preliminarily interpreted by the emergency physician with the below findings:        Interpretation per the Radiologist below, if available at the time of this note:    CT ABDOMEN PELVIS W IV CONTRAST Additional Contrast? None   Preliminary Result   No acute abnormality is seen in the abdomen or pelvis. No bowel obstruction is seen. Normal appendix. ED BEDSIDE ULTRASOUND:   Performed by ED Physician - none    LABS:  Labs Reviewed   CBC WITH AUTO DIFFERENTIAL - Abnormal; Notable for the following components:       Result Value    WBC 12.5 (*)     MCHC 35.1 (*)     Seg Neutrophils 70 (*)     Eosinophils % 0 (*)     Segs Absolute 8.67 (*)     All other components within normal limits   COMPREHENSIVE METABOLIC PANEL - Abnormal; Notable for the following components:    Glucose 144 (*)     Total Protein 8.6 (*)     All other components within normal limits   LACTIC ACID - Abnormal; Notable for the following components:    Lactic Acid 2.8 (*)     All other components within normal limits   LIPASE - Abnormal; Notable for the following components:    Lipase 85 (*)     All other components within normal limits   LIPASE - Abnormal; Notable for the following components:    Lipase 102 (*)     All other components within normal limits   URINALYSIS WITH MICROSCOPIC       All other labs were within normal range or not returned as of this dictation.     EMERGENCY DEPARTMENT COURSE and DIFFERENTIAL DIAGNOSIS/MDM:   Vitals:    Vitals:    10/05/22 1730 10/05/22 1800 10/05/22 1815 10/05/22 1835   BP: 92/69 106/75 116/72 115/76   Pulse:       Resp:       Temp:       TempSrc:       SpO2: MDM     Amount and/or Complexity of Data Reviewed  Decide to obtain previous medical records or to obtain history from someone other than the patient: yes          REASSESSMENT      Patient has had uncomplicated ER course patient reevaluated 1848 hrs. Patient reports his pain is getting worse with continued nausea. No vomiting appreciated at this time. I discussed discharge versus observation. Patient states he knows if he goes home with pain medicine and Zofran he will be back as he feels like his pain is getting worse. We will phone hospitalist to present case at this time. CRITICAL CARE TIME   Total Critical Care time was  minutes, excluding separately reportable procedures. There was a high probability of clinically significant/life threatening deterioration in the patient's condition which required my urgent intervention. CONSULTS:  I discussed case with Dr. Kyree Jeffries including history clinical exam laboratory imaging findings who assumes care of patient. PROCEDURES:  Unless otherwise noted below, none     Procedures        FINAL IMPRESSION      1. Pain of upper abdomen          DISPOSITION/PLAN   DISPOSITION Admitted 10/05/2022 06:54:43 PM      PATIENT REFERRED TO:  No follow-up provider specified. DISCHARGE MEDICATIONS:  New Prescriptions    No medications on file     Controlled Substances Monitoring:     RX Monitoring 3/2/2019   Attestation The Prescription Monitoring Report for this patient was reviewed today. Acute Pain Prescriptions Prescription exceeds daily limit for a specific reason. See comments or note. ;Severe pain not adequately treated with lower dose.        (Please note that portions of this note were completed with a voice recognition program.  Efforts were made to edit the dictations but occasionally words are mis-transcribed.)    Ioana Card PA-C (electronically signed)  Attending Emergency Physician           Ioana Card PA-C  10/05/22 [id number]

## 2022-10-05 NOTE — LETTER
Critical access hospital AT THE [facility]  [facility] [address]  Phone: [phone]             October 8, 2022    Patient: Linnea Cam   YOB: 1975   Date of Visit: 10/5/2022       To Whom It May Concern:    Irina Hood was seen and treated in our facility  beginning 10/5/2022 until 10/8/2022 . He may return to work on 10/10/2022.       Sincerely,       Niraj Treadwell RN         Signature:__________________________________

## 2022-10-05 NOTE — ED NOTES
Attempted to call report. Nurse will call back when able to take report on patient.           Cristina Posada RN  10/05/22 [id number]

## 2022-10-05 NOTE — TELEPHONE ENCOUNTER
Patient called stated he is having left lower abd pain he had EGD and colon yesterday woke up this morning vomiting called over to surgery spoke to Erendira Pitts spoke to Dr Manuela Garcia she stated for the patient go to the Ed, I called patient called advise him to go to ED

## 2022-10-06 PROBLEM — E43 SEVERE MALNUTRITION (HCC): Status: ACTIVE | Noted: 2022-10-06

## 2022-10-06 LAB
ABSOLUTE EOS #: 0.27 K/UL (ref 0–0.44)
ABSOLUTE IMMATURE GRANULOCYTE: <0.03 K/UL (ref 0–0.3)
ABSOLUTE LYMPH #: 3.29 K/UL (ref 1.1–3.7)
ABSOLUTE MONO #: 1.23 K/UL (ref 0.1–1.2)
ALBUMIN SERPL-MCNC: 3.7 G/DL (ref 3.5–5.2)
ALBUMIN/GLOBULIN RATIO: 1.4 (ref 1–2.5)
ALP BLD-CCNC: 50 U/L (ref 40–129)
ALT SERPL-CCNC: 20 U/L (ref 5–41)
ANION GAP SERPL CALCULATED.3IONS-SCNC: 11 MMOL/L (ref 9–17)
AST SERPL-CCNC: 16 U/L
BASOPHILS # BLD: 0 % (ref 0–2)
BASOPHILS ABSOLUTE: 0.03 K/UL (ref 0–0.2)
BILIRUB SERPL-MCNC: 0.4 MG/DL (ref 0.3–1.2)
BUN BLDV-MCNC: 23 MG/DL (ref 6–20)
BUN/CREAT BLD: 24 (ref 9–20)
CALCIUM SERPL-MCNC: 8.6 MG/DL (ref 8.6–10.4)
CHLORIDE BLD-SCNC: 109 MMOL/L (ref 98–107)
CO2: 21 MMOL/L (ref 20–31)
CREAT SERPL-MCNC: 0.96 MG/DL (ref 0.7–1.2)
DATE, STOOL #1: ABNORMAL
EKG ATRIAL RATE: 83 BPM
EKG P AXIS: 35 DEGREES
EKG P-R INTERVAL: 144 MS
EKG Q-T INTERVAL: 390 MS
EKG QRS DURATION: 98 MS
EKG QTC CALCULATION (BAZETT): 458 MS
EKG R AXIS: 73 DEGREES
EKG T AXIS: 43 DEGREES
EKG VENTRICULAR RATE: 83 BPM
EOSINOPHILS RELATIVE PERCENT: 3 % (ref 1–4)
GFR SERPL CREATININE-BSD FRML MDRD: >60 ML/MIN/1.73M2
GLUCOSE BLD-MCNC: 84 MG/DL (ref 70–99)
HCT VFR BLD CALC: 34.7 % (ref 40.7–50.3)
HCT VFR BLD CALC: 37.4 % (ref 40.7–50.3)
HEMOCCULT SP1 STL QL: POSITIVE
HEMOGLOBIN: 12 G/DL (ref 13–17)
HEMOGLOBIN: 12.9 G/DL (ref 13–17)
IMMATURE GRANULOCYTES: 0 %
LIPASE: 51 U/L (ref 13–60)
LYMPHOCYTES # BLD: 30 % (ref 24–43)
MCH RBC QN AUTO: 30.9 PG (ref 25.2–33.5)
MCHC RBC AUTO-ENTMCNC: 34.5 G/DL (ref 28.4–34.8)
MCV RBC AUTO: 89.7 FL (ref 82.6–102.9)
MONOCYTES # BLD: 11 % (ref 3–12)
NRBC AUTOMATED: 0 PER 100 WBC
PDW BLD-RTO: 13.1 % (ref 11.8–14.4)
PLATELET # BLD: 307 K/UL (ref 138–453)
PMV BLD AUTO: 10.6 FL (ref 8.1–13.5)
POTASSIUM SERPL-SCNC: 3.7 MMOL/L (ref 3.7–5.3)
RBC # BLD: 4.17 M/UL (ref 4.21–5.77)
SEG NEUTROPHILS: 56 % (ref 36–65)
SEGMENTED NEUTROPHILS ABSOLUTE COUNT: 6.04 K/UL (ref 1.5–8.1)
SODIUM BLD-SCNC: 141 MMOL/L (ref 135–144)
SURGICAL PATHOLOGY REPORT: NORMAL
TIME, STOOL #1: 1635
TOTAL PROTEIN: 6.3 G/DL (ref 6.4–8.3)
WBC # BLD: 10.9 K/UL (ref 3.5–11.3)

## 2022-10-06 PROCEDURE — 85018 HEMOGLOBIN: CPT

## 2022-10-06 PROCEDURE — 96361 HYDRATE IV INFUSION ADD-ON: CPT

## 2022-10-06 PROCEDURE — 94761 N-INVAS EAR/PLS OXIMETRY MLT: CPT

## 2022-10-06 PROCEDURE — 82272 OCCULT BLD FECES 1-3 TESTS: CPT

## 2022-10-06 PROCEDURE — G0378 HOSPITAL OBSERVATION PER HR: HCPCS

## 2022-10-06 PROCEDURE — 85014 HEMATOCRIT: CPT

## 2022-10-06 PROCEDURE — 2580000003 HC RX 258: Performed by: NURSE PRACTITIONER

## 2022-10-06 PROCEDURE — 6360000002 HC RX W HCPCS: Performed by: STUDENT IN AN ORGANIZED HEALTH CARE EDUCATION/TRAINING PROGRAM

## 2022-10-06 PROCEDURE — 97162 PT EVAL MOD COMPLEX 30 MIN: CPT

## 2022-10-06 PROCEDURE — 6370000000 HC RX 637 (ALT 250 FOR IP): Performed by: STUDENT IN AN ORGANIZED HEALTH CARE EDUCATION/TRAINING PROGRAM

## 2022-10-06 PROCEDURE — 36415 COLL VENOUS BLD VENIPUNCTURE: CPT

## 2022-10-06 PROCEDURE — 96376 TX/PRO/DX INJ SAME DRUG ADON: CPT

## 2022-10-06 PROCEDURE — 93010 ELECTROCARDIOGRAM REPORT: CPT | Performed by: INTERNAL MEDICINE

## 2022-10-06 PROCEDURE — 2580000003 HC RX 258: Performed by: STUDENT IN AN ORGANIZED HEALTH CARE EDUCATION/TRAINING PROGRAM

## 2022-10-06 PROCEDURE — 96375 TX/PRO/DX INJ NEW DRUG ADDON: CPT

## 2022-10-06 PROCEDURE — 80053 COMPREHEN METABOLIC PANEL: CPT

## 2022-10-06 PROCEDURE — 6370000000 HC RX 637 (ALT 250 FOR IP): Performed by: NURSE PRACTITIONER

## 2022-10-06 PROCEDURE — 85025 COMPLETE CBC W/AUTO DIFF WBC: CPT

## 2022-10-06 PROCEDURE — 83690 ASSAY OF LIPASE: CPT

## 2022-10-06 PROCEDURE — 96372 THER/PROPH/DIAG INJ SC/IM: CPT

## 2022-10-06 RX ORDER — OXYCODONE HYDROCHLORIDE 5 MG/1
5 TABLET ORAL EVERY 4 HOURS PRN
Status: DISCONTINUED | OUTPATIENT
Start: 2022-10-06 | End: 2022-10-08 | Stop reason: HOSPADM

## 2022-10-06 RX ORDER — MORPHINE SULFATE 2 MG/ML
2 INJECTION, SOLUTION INTRAMUSCULAR; INTRAVENOUS
Status: DISCONTINUED | OUTPATIENT
Start: 2022-10-06 | End: 2022-10-08 | Stop reason: HOSPADM

## 2022-10-06 RX ORDER — TRAMADOL HYDROCHLORIDE 50 MG/1
50 TABLET ORAL EVERY 6 HOURS PRN
Status: DISCONTINUED | OUTPATIENT
Start: 2022-10-06 | End: 2022-10-08 | Stop reason: HOSPADM

## 2022-10-06 RX ORDER — MORPHINE SULFATE 4 MG/ML
4 INJECTION, SOLUTION INTRAMUSCULAR; INTRAVENOUS
Status: DISCONTINUED | OUTPATIENT
Start: 2022-10-06 | End: 2022-10-08 | Stop reason: HOSPADM

## 2022-10-06 RX ORDER — OXYCODONE HYDROCHLORIDE 5 MG/1
10 TABLET ORAL EVERY 4 HOURS PRN
Status: DISCONTINUED | OUTPATIENT
Start: 2022-10-06 | End: 2022-10-08 | Stop reason: HOSPADM

## 2022-10-06 RX ADMIN — MORPHINE SULFATE 4 MG: 4 INJECTION, SOLUTION INTRAMUSCULAR; INTRAVENOUS at 03:40

## 2022-10-06 RX ADMIN — OXYCODONE 10 MG: 5 TABLET ORAL at 17:21

## 2022-10-06 RX ADMIN — SUCRALFATE 1 G: 1 TABLET ORAL at 17:21

## 2022-10-06 RX ADMIN — ONDANSETRON 4 MG: 2 INJECTION INTRAMUSCULAR; INTRAVENOUS at 07:25

## 2022-10-06 RX ADMIN — METOPROLOL SUCCINATE 100 MG: 100 TABLET, FILM COATED, EXTENDED RELEASE ORAL at 21:03

## 2022-10-06 RX ADMIN — ONDANSETRON 4 MG: 2 INJECTION INTRAMUSCULAR; INTRAVENOUS at 00:00

## 2022-10-06 RX ADMIN — SODIUM CHLORIDE: 9 INJECTION, SOLUTION INTRAVENOUS at 14:37

## 2022-10-06 RX ADMIN — SODIUM CHLORIDE: 9 INJECTION, SOLUTION INTRAVENOUS at 03:43

## 2022-10-06 RX ADMIN — SUCRALFATE 1 G: 1 TABLET ORAL at 11:59

## 2022-10-06 RX ADMIN — ATORVASTATIN CALCIUM 40 MG: 40 TABLET, FILM COATED ORAL at 21:03

## 2022-10-06 RX ADMIN — TRAMADOL HYDROCHLORIDE 50 MG: 50 TABLET, COATED ORAL at 13:10

## 2022-10-06 RX ADMIN — ENOXAPARIN SODIUM 40 MG: 100 INJECTION SUBCUTANEOUS at 09:30

## 2022-10-06 RX ADMIN — OXYCODONE 10 MG: 5 TABLET ORAL at 21:40

## 2022-10-06 RX ADMIN — MORPHINE SULFATE 4 MG: 4 INJECTION, SOLUTION INTRAMUSCULAR; INTRAVENOUS at 09:30

## 2022-10-06 RX ADMIN — ONDANSETRON 4 MG: 2 INJECTION INTRAMUSCULAR; INTRAVENOUS at 13:11

## 2022-10-06 RX ADMIN — MORPHINE SULFATE 4 MG: 4 INJECTION, SOLUTION INTRAMUSCULAR; INTRAVENOUS at 11:59

## 2022-10-06 RX ADMIN — MORPHINE SULFATE 4 MG: 4 INJECTION, SOLUTION INTRAMUSCULAR; INTRAVENOUS at 07:25

## 2022-10-06 RX ADMIN — SUCRALFATE 1 G: 1 TABLET ORAL at 21:03

## 2022-10-06 ASSESSMENT — PAIN DESCRIPTION - LOCATION
LOCATION: ABDOMEN

## 2022-10-06 ASSESSMENT — PAIN DESCRIPTION - FREQUENCY
FREQUENCY: CONTINUOUS

## 2022-10-06 ASSESSMENT — PAIN DESCRIPTION - ORIENTATION
ORIENTATION: LEFT;UPPER
ORIENTATION: MID
ORIENTATION: LEFT
ORIENTATION: LEFT
ORIENTATION: LEFT;UPPER
ORIENTATION: MID
ORIENTATION: LEFT;UPPER
ORIENTATION: LEFT
ORIENTATION: LEFT;UPPER
ORIENTATION: LEFT;UPPER

## 2022-10-06 ASSESSMENT — PAIN DESCRIPTION - ONSET
ONSET: ON-GOING

## 2022-10-06 ASSESSMENT — PAIN DESCRIPTION - DESCRIPTORS
DESCRIPTORS: STABBING
DESCRIPTORS: STABBING;CRAMPING
DESCRIPTORS: STABBING;CRAMPING
DESCRIPTORS: STABBING
DESCRIPTORS: CRUSHING
DESCRIPTORS: STABBING
DESCRIPTORS: STABBING
DESCRIPTORS: STABBING;CRAMPING
DESCRIPTORS: STABBING
DESCRIPTORS: STABBING;CRAMPING
DESCRIPTORS: STABBING;CRAMPING
DESCRIPTORS: STABBING
DESCRIPTORS: STABBING
DESCRIPTORS: ACHING

## 2022-10-06 ASSESSMENT — PAIN DESCRIPTION - PAIN TYPE
TYPE: ACUTE PAIN

## 2022-10-06 ASSESSMENT — PAIN DESCRIPTION - DIRECTION
RADIATING_TOWARDS: BACK

## 2022-10-06 ASSESSMENT — PAIN SCALES - GENERAL
PAINLEVEL_OUTOF10: 6
PAINLEVEL_OUTOF10: 7
PAINLEVEL_OUTOF10: 8
PAINLEVEL_OUTOF10: 8
PAINLEVEL_OUTOF10: 3
PAINLEVEL_OUTOF10: 9
PAINLEVEL_OUTOF10: 6
PAINLEVEL_OUTOF10: 7
PAINLEVEL_OUTOF10: 8
PAINLEVEL_OUTOF10: 6
PAINLEVEL_OUTOF10: 8
PAINLEVEL_OUTOF10: 6
PAINLEVEL_OUTOF10: 8
PAINLEVEL_OUTOF10: 8
PAINLEVEL_OUTOF10: 2
PAINLEVEL_OUTOF10: 8

## 2022-10-06 ASSESSMENT — PAIN - FUNCTIONAL ASSESSMENT
PAIN_FUNCTIONAL_ASSESSMENT: PREVENTS OR INTERFERES SOME ACTIVE ACTIVITIES AND ADLS
PAIN_FUNCTIONAL_ASSESSMENT: ACTIVITIES ARE NOT PREVENTED
PAIN_FUNCTIONAL_ASSESSMENT: PREVENTS OR INTERFERES SOME ACTIVE ACTIVITIES AND ADLS
PAIN_FUNCTIONAL_ASSESSMENT: ACTIVITIES ARE NOT PREVENTED

## 2022-10-06 ASSESSMENT — PAIN SCALES - WONG BAKER: WONGBAKER_NUMERICALRESPONSE: 0

## 2022-10-06 NOTE — PROGRESS NOTES
Discussed discharge plans with the patient. Patient is a 56year old male here with Pain of upper abdomen. He is alert , oriented , and pleasant during  our conversation. Patient is  and lives with his wife. He uses no medical equipment. Patient and his wife share in the cooking and the cleaning. He is independent with his ADL's. Patient manages his own mediations and drives. He has no outside services in the home. His PCP is Aleksey Thorne MD . He has medical insurance that helps with medication costs. He works a full time job at [employer]     The discharge plan is home with no services at this time. He does not have advance directives . He wants his wife to make medical decision if he is unable too. PHANW to monitor and assist with any needs or concerns as they arise.     JAK Solis

## 2022-10-06 NOTE — PROGRESS NOTES
Pt shift assessment and vitals completed as charted. Pt resting in bed. Pt continues to complain of abdominal pain 8/10, informed pt writer will look into prn medication to see when it's available next. Writer collected H&H at this time and sent down to lab. Pt denies any additional needs. Bed in lowest position, call light within reach. Will continue to monitor.

## 2022-10-06 NOTE — H&P
History and Physical    Patient:  Horace Poon  MRN: [id number]    Chief Complaint:  abdominal pain    History Obtained From:  patient, electronic medical record    PCP: Trinity Baker MD    History of Present Illness: The patient is a 56 y.o. male who presented to the ER with complaints of abdominal pain primarily LUQ area and epigastric area. Recently scoped by Dr. Kleber Khan and he stated he has been fighting with the [employer] regarding his Carafate. Colonoscopy from 10/4/2022 showed tortuous colon. EGD from 10/4/2022 showed gastric ulcer, gastritis, duodenitis and gastric erosions. At that time patient was instructed to avoid NSAIDs and was placed on Protonix and Carafate with plan for a repeat EGD in 8 weeks. Patient admits to smoking history. Past Medical History:        Diagnosis Date    CAD (coronary artery disease)     Carpal tunnel syndrome     Depressive disorder, not elsewhere classified     Diabetes mellitus ([address]     Heart attack ([facility] [address] 08/11/2018    STEMI    History of echocardiogram 01/08/2019    EF 40-45%. LV cavity sixe is mildly increased. Inferior and inferoseptal wall hypokenesis noted. Mild diastolic dysfunction. Hyperlipidemia     Hypertension 2009       Past Surgical History:        Procedure Laterality Date    CARDIAC CATHETERIZATION Left 01/07/2018    Right Ulnar/[facility]/    CARDIAC CATHETERIZATION Left 01/27/2022    Dr Annel Agudelo/ right ulnar-    COLONOSCOPY N/A 10/4/2022    COLORECTAL CANCER SCREENING, HIGH RISK performed by Lauren Ames MD at [facility]  08/2018    double bypass, [facility],     CORONARY ARTERY BYPASS GRAFT  2018    UPPER GASTROINTESTINAL ENDOSCOPY N/A 10/4/2022    EGD BIOPSY performed by Lauren Ames MD at [address] [place] [address]       Medications Prior to Admission:    Prior to Admission medications    Medication Sig Start Date End Date Taking?  Authorizing Provider   pantoprazole (PROTONIX) 40 MG tablet Take 1 tablet by mouth every morning (before breakfast) 10/4/22 11/3/22  Janette Bishop MD   sucralfate (CARAFATE) 1 GM tablet Take 1 tablet by mouth 4 times daily 10/4/22 11/3/22  Janette Bishop MD   dilTIAZem (CARDIZEM CD) 120 MG extended release capsule Take 1 capsule by mouth daily 6/1/22   Andres Downing MD   lisinopril (PRINIVIL;ZESTRIL) 20 MG tablet Take 1 tablet by mouth daily 4/13/22   Andres Downing MD   clopidogrel (PLAVIX) 75 MG tablet Take 1 tablet by mouth daily 1/27/22   Sherman De La Fuente MD   nitroGLYCERIN (NITROSTAT) 0.4 MG SL tablet Place 1 tablet under the tongue every 5 minutes as needed for Chest pain up to max of 3 total doses. If no relief after 1 dose, call 911. 1/21/22   Andres Downing MD   metoprolol succinate (TOPROL XL) 100 MG extended release tablet Take 1 tablet by mouth daily  Patient taking differently: Take 100 mg by mouth nightly 1/21/22   Andres Downing MD   fenofibrate (TRIGLIDE) 160 MG tablet Take 160 mg by mouth daily    Historical Provider, MD   atorvastatin (LIPITOR) 40 MG tablet Take 1 tablet by mouth daily 7/15/19   Andres Downing MD   ALPRAZolam [facility]) 0.25 MG tablet Take 0.25 mg by mouth 2 times daily as needed for Sleep or Anxiety. Bernstein Hidden Historical Provider, MD   metFORMIN (GLUCOPHAGE) 500 MG tablet Take 500 mg by mouth 2 times daily (with meals)     Historical Provider, MD   aspirin 81 MG EC tablet Take 81 mg by mouth daily. Historical Provider, MD       Allergies:  Patient has no known allergies. Social History:   TOBACCO:   reports that he quit smoking about 7 months ago. His smoking use included cigarettes. He has a 7.50 pack-year smoking history. He has never used smokeless tobacco.  ETOH:   reports that he does not currently use alcohol.     Family History:       Problem Relation Age of Onset    High Blood Pressure Mother     Heart Disease Father     High Blood Pressure Father     Asthma Sister     Colon Cancer Paternal Uncle     Colon Cancer Paternal Uncle     Pancreatic Cancer Maternal Aunt     Pancreatic Cancer Maternal Aunt        Allergies:  Patient has no known allergies. Medications Prior to Admission:    Prior to Admission medications    Medication Sig Start Date End Date Taking? Authorizing Provider   pantoprazole (PROTONIX) 40 MG tablet Take 1 tablet by mouth every morning (before breakfast) 10/4/22 11/3/22  Bailee Velasquez MD   sucralfate (CARAFATE) 1 GM tablet Take 1 tablet by mouth 4 times daily 10/4/22 11/3/22  Bailee Velasquez MD   dilTIAZem (CARDIZEM CD) 120 MG extended release capsule Take 1 capsule by mouth daily 6/1/22   Todd Canas MD   lisinopril (PRINIVIL;ZESTRIL) 20 MG tablet Take 1 tablet by mouth daily 4/13/22   Todd Canas MD   clopidogrel (PLAVIX) 75 MG tablet Take 1 tablet by mouth daily 1/27/22   India Mi MD   nitroGLYCERIN (NITROSTAT) 0.4 MG SL tablet Place 1 tablet under the tongue every 5 minutes as needed for Chest pain up to max of 3 total doses. If no relief after 1 dose, call 911. 1/21/22   Todd Canas MD   metoprolol succinate (TOPROL XL) 100 MG extended release tablet Take 1 tablet by mouth daily  Patient taking differently: Take 100 mg by mouth nightly 1/21/22   Todd Canas MD   fenofibrate (TRIGLIDE) 160 MG tablet Take 160 mg by mouth daily    Historical Provider, MD   atorvastatin (LIPITOR) 40 MG tablet Take 1 tablet by mouth daily 7/15/19   Todd Canas MD   ALPRAZolam Pinky Regulus) 0.25 MG tablet Take 0.25 mg by mouth 2 times daily as needed for Sleep or Anxiety. Boo Samuels Historical Provider, MD   metFORMIN (GLUCOPHAGE) 500 MG tablet Take 500 mg by mouth 2 times daily (with meals)     Historical Provider, MD   aspirin 81 MG EC tablet Take 81 mg by mouth daily. Historical Provider, MD       Review of Systems:  Constitutional:negative  for fevers, and negative for chills.   Eyes: negative for visual disturbance   ENT: negative for sore throat, negative nasal congestion, and negative for earache  Respiratory: negative for shortness of breath, negative for cough, and negative for wheezing  Cardiovascular: negative for chest pain, negative for palpitations, and negative for syncope  Gastrointestinal: positive for abdominal pain, positive for nausea,positive for vomiting, negative for diarrhea, negative for constipation, and negative for hematochezia or melena  Genitourinary: negative for dysuria, negative for urinary urgency, negative for urinary frequency, and negative for hematuria  Skin: negative for skin rash, and negative for skin lesions  Neurological: negative for unilateral weakness, numbness or tingling. Physical Exam:    Vitals:   Temp: 96.8 °F (36 °C)  BP: 99/68  Resp: 18  Heart Rate: 86  SpO2: 93 %  24HR INTAKE/OUTPUT:    Intake/Output Summary (Last 24 hours) at 10/6/2022 5873  Last data filed at 10/6/2022 0534  Gross per 24 hour   Intake 1112.68 ml   Output --   Net 1112.68 ml       Weight    Body mass index is 29.87 kg/m². Exam:  GEN:    Awake, alert and oriented x3. EYES:  EOMI, pupils equal   NECK: Supple. No lymphadenopathy. No carotid bruit  CVS:    regular rate and rhythm, no audible murmur  PULM:  CTA, no wheezes, rales or rhonchi, no acute respiratory distress  ABD:    Bowels sounds normal.  Abdomen is soft. No distention. LUQ, epigastric  tenderness to palpation. EXT:   no edema bilaterally . No calf tenderness. NEURO: Moves all extremities. Motor and sensory are grossly intact  SKIN:  No rashes.   No skin lesions.    -----------------------------------------------------------------  Diagnostic Data:     DATA:    CBC:   Lab Results   Component Value Date    WBC 10.9 10/06/2022    RBC 4.17 (L) 10/06/2022    HGB 12.9 (L) 10/06/2022    HCT 37.4 (L) 10/06/2022    MCV 89.7 10/06/2022     10/06/2022        CMP:   Lab Results   Component Value Date    GLUCOSE 84 10/06/2022    BUN 23 (H) 10/06/2022    CREATININE 0.96 10/06/2022     10/06/2022    K 3.7 10/06/2022    CALCIUM 8.6 10/06/2022     (H) 10/06/2022    CO2 21 10/06/2022    PROT 6.3 (L) 10/06/2022    LABALBU 3.7 10/06/2022    BILITOT 0.4 10/06/2022    ALKPHOS 50 10/06/2022    ALT 20 10/06/2022    AST 16 10/06/2022       UA:   Lab Results   Component Value Date    COLORU Yellow 10/05/2022    SPECGRAV 1.010 10/05/2022    WBCUA 0 TO 2 10/05/2022    RBCUA 0 TO 2 10/05/2022    EPITHUA 0 TO 2 10/05/2022    LEUKOCYTESUR NEGATIVE 10/05/2022    GLUCOSEU NEGATIVE 10/05/2022    KETUA NEGATIVE 10/05/2022    PROTEINU NEGATIVE 10/05/2022    HGBUR NEGATIVE 10/05/2022       Lactic Acid:   Lab Results   Component Value Date    LACTA 2.8 (H) 10/05/2022       D-Dimer:  Lab Results   Component Value Date    DDIMER <0.27 01/26/2022       PT/INR:  Lab Results   Component Value Date/Time    PROTIME 12.9 12/26/2021 06:10 PM    INR 1.0 12/26/2021 06:10 PM       High Sensitivity Troponin:  No results for input(s): TROPHS in the last 72 hours. ABGs:   No results found for: PHART, PH, KBC3HBG, PCO2, PO2ART, PO2, YYN3ZPB, HCO3, BEART, BE, THGBART, THB, LBF9ZFD, Q7QTNGMW, O2SAT, FIO2        CT ABDOMEN PELVIS W IV CONTRAST Additional Contrast? None   Preliminary Result   No acute abnormality is seen in the abdomen or pelvis. No bowel obstruction is seen. Normal appendix. EKG reviewed    Assessment:    Principal Problem:    Intractable abdominal pain  Active Problems:    HTN (hypertension)    Tobacco abuse    Chronic combined systolic and diastolic CHF, NYHA class 2 (HCC)    ASHD (arteriosclerotic heart disease) /  CABG 2018  Resolved Problems:    * No resolved hospital problems.  *      Patient Active Problem List    Diagnosis Date Noted    Abnormal result of other cardiovascular function study 01/27/2022    Palpitations     Intractable abdominal pain 10/05/2022    Pancreatitis, unspecified pancreatitis type 09/12/2022    Nausea 09/12/2022    Epigastric pain 09/12/2022    [facility] 12/26/2021    ASHD (arteriosclerotic heart disease) /  CABG 2018 04/28/2019    Ischemic cardiomyopathy 01/08/2019    Chronic combined systolic and diastolic CHF, NYHA class 2 ([facility] 01/08/2019    S/P CABG (coronary artery bypass graft)     Dyslipidemia     Acute coronary syndrome ([facility] 01/07/2019    Mild congestive heart failure ([facility] 01/07/2019    Chest pain at rest 06/02/2015    HTN (hypertension) 06/02/2015    Tobacco abuse 06/02/2015    Family history of premature CAD 06/02/2015    Pain in rib, left lower 05/31/2012       Plan: This patient requires inpatient admission because of intractable abdominal pain  Factors affecting the medical complexity of this patient include tobacco abuse, chronic combined CHF, hypertension  Estimated length of stay is 2 days  Intractable abdominal pain  Continue Protonix, Carafate  IV fluids  Start clear liquids  Tobacco abuse  Smoking cessation  Chronic combined CHF  Caution with IV fluids  Continue lisinopril, Toprol-XL  Hypertension  Continue Cardizem, lisinopril  DVT prophylaxis: Lovenox  Peptic ulcer prophylaxis: Pepcid  High risk medications: none  Social Service and Case Management consults for DC planning  Dietician consult initiated    CORE MEASURES  DVT prophylaxis: Lovenox  Decubitus ulcer present on admission: No  CODE STATUS: FULL CODE  Nutrition Status: fair   Physical therapy: No   Old Charts reviewed: Yes  EKG Reviewed:  Yes  Advance Directive Addressed: Yes    KRUNAL Jurado - CNP, KRUNAL, NP-C  10/6/2022, 7:12 AM

## 2022-10-06 NOTE — PROGRESS NOTES
Reassessment completed at this time. Vitals taken and documented. Patient encouraged to ask questions. Patient requests for pain medication and nausea medication. Medication given. Patient denies any further needs or complaints. Call light and bed side table within reach. Side rails up times two.

## 2022-10-06 NOTE — PROGRESS NOTES
Physical Therapy  Facility/Department: [facility] AT THE [facility] MED SURG  Physical Therapy Initial Assessment    Name: Burke Torres  : 1975  MRN: [id number]  Date of Service: 10/6/2022    Discharge Recommendations:  Home independently   PT Equipment Recommendations  Equipment Needed: No      Patient Diagnosis(es): The encounter diagnosis was Pain of upper abdomen. Past Medical History:  has a past medical history of CAD (coronary artery disease), Carpal tunnel syndrome, Depressive disorder, not elsewhere classified, Diabetes mellitus ([facility] [address] Heart attack ([facility] [address] History of echocardiogram, Hyperlipidemia, and Hypertension. Past Surgical History:  has a past surgical history that includes Coronary angioplasty (2018); Cardiac catheterization (Left, 2018); Coronary artery bypass graft (); Cardiac catheterization (Left, 2022); Colonoscopy (N/A, 10/4/2022); and Upper gastrointestinal endoscopy (N/A, 10/4/2022). Assessment   Body Structures, Functions, Activity Limitations Requiring Skilled Therapeutic Intervention:  (NONE)  Assessment: UPPER ABDOMEN PAIN  Treatment Diagnosis: GENERALIZED WEAKNESS. Therapy Prognosis: Good  Decision Making: Medium Complexity  No Skilled PT: Independent with functional mobility   Requires PT Follow-Up: No  Activity Tolerance  Activity Tolerance: Patient tolerated treatment well     Plan   Physcial Therapy Plan  General Plan: Discharge with evaluation only  Safety Devices  Type of Devices: Call light within reach, Left in chair, Nurse notified  Restraints  Restraints Initially in Place: No     Restrictions  Restrictions/Precautions  Required Braces or Orthoses?: No     Subjective   General  Chart Reviewed: Yes  Patient assessed for rehabilitation services?: Yes  Additional Pertinent Hx: UPPER ABDOMEN PAIN  Diagnosis: UPPERABDOMEN PAIN. Follows Commands: Within Functional Limits  Subjective  Subjective: PAIN UPPER ABDOMEN /10.          Social/Functional History Vision/Hearing  Vision  Vision: Within Functional Limits  Hearing  Hearing: Within functional limits    Cognition   Orientation  Overall Orientation Status: Within Normal Limits  Cognition  Overall Cognitive Status: WNL     Objective   Heart Rate: 81  Heart Rate Source: Apical;Monitor  BP: 99/67  BP Location: Left upper arm  BP Method: Automatic  Patient Position: Semi fowlers  MAP (Calculated): 77.67  Resp: 18  SpO2: 93 %  O2 Device: None (Room air)     Observation/Palpation  Posture: Good        AROM RLE (degrees)  RLE AROM: WNL  AROM LLE (degrees)  LLE AROM : WNL  AROM RUE (degrees)  RUE AROM : WNL  AROM LUE (degrees)  LUE AROM : WNL  Strength RLE  Strength RLE: WNL  Strength LLE  Strength LLE: WNL  Strength RUE  Strength RUE: WNL  Strength LUE  Strength LUE: WNL        Bed Mobility Training  Bed Mobility Training: Yes  Overall Level of Assistance: Independent  Interventions: Demonstration  Rolling: Independent  Supine to Sit: Independent  Sit to Supine: Independent  Scooting: Independent  Balance  Sitting: Intact  Standing: Intact  Transfer Training  Transfer Training: Yes  Overall Level of Assistance: Independent  Interventions: Demonstration  Sit to Stand: Independent  Stand to Sit: Independent  Stand Pivot Transfers: Independent  Bed to Chair: Independent  Toilet Transfer: Independent  Gait Training  Gait Training: Yes  Right Side Weight Bearing: As tolerated  Left Side Weight Bearing: As tolerated  Gait  Overall Level of Assistance: Independent  Interventions: Demonstration  Distance (ft): 30 Feet  Assistive Device:  (GAIT NO DEVICES)                          OutComes Score                                                  AM-PAC Score             Tinneti Score       Goals  Short Term Goals  Time Frame for Short Term Goals: 1 DAY  Long Term Goals  Time Frame for Long Term Goals : 1 WEEK  Long Term Goal 1: RETURN TO WORK  Patient Goals   Patient Goals : RETURN HOME       Education  Patient Education  Education Given To: Patient  Education Provided: Transfer Training (GAIT TRAINING)  Education Method: Demonstration  Barriers to Learning: None  Education Outcome: Verbalized understanding      Therapy Time   Individual Concurrent Group Co-treatment   Time In [phone]         Time Out 0746         Minutes 15         Timed Code Treatment Minutes: [address]

## 2022-10-06 NOTE — PROGRESS NOTES
Called Dr. Scott Barrientos office at this time due to patient lack of pain control. Patient has been medicated with all available medications and still rating pain 8/10. Waiting for call back.

## 2022-10-06 NOTE — PROGRESS NOTES
Patient A&Ox4, calm and cooperative. Assessment and vital signs completed, see flowsheet. Patient reports stabbing pain 8/10 to LUQ of abdomen, radiating to back. RUQ and LUQ of abdomen hyperactive, RLQ and LLQ are active. Tenderness noted in LUQ. Patient states pain is worse when he moves. Patient resting in bed, call light within reach, denies further needs, will continue to monitor.

## 2022-10-06 NOTE — PROGRESS NOTES
Patient admitted to floor. Vitals taken and documented. Assessment completed and documented. Navigator completed. Patient oriented to room and call light system. Patient educated on physician's orders and medication to be given tonight. Patient encouraged to ask questions. Denies needs or concerns at this time. Patient requesting for pain medication. Call light and over bed table within reach. Side rails up times two.

## 2022-10-06 NOTE — PROGRESS NOTES
[facility]  Inpatient/Observation/Outpatient Rehabilitation    Date: 10/6/2022  Patient Name: Zeeshan Linda       [x] Inpatient Acute/Observation       []  Outpatient  : 1975       [] Pt no showed for scheduled appointment    [x] Pt refused/declined therapy at this time due to:        Patient declined OT services, stating they are at their baseline level of function. Patient notes they are independent with functional mobility, toileting and simple ADLs. Patient discharged per their request. Patient was advised to inform staff if there is a decline in function or if they have any questions, and therapy can return at that time. Patient verbalized understanding.         [] Pt cancelled due to:  [] No Reason Given   [] Sick/ill   [] Other:      LUIS Fournier, OTR/L Date: 10/6/2022

## 2022-10-06 NOTE — PROGRESS NOTES
Comprehensive Nutrition Assessment    Type and Reason for Visit:  Initial, Positive Nutrition Screen    Nutrition Recommendations/Plan:   Low fat diet advancement as per GI  Education low fat     Malnutrition Assessment:  Malnutrition Status:  Severe malnutrition (10/06/22 0911)    Context:  Acute Illness     Findings of the 6 clinical characteristics of malnutrition:  Energy Intake:  50% or less of estimated energy requirements for 5 or more days  Weight Loss:  Greater than 2% over 1 week     Body Fat Loss:  No significant body fat loss     Muscle Mass Loss:  No significant muscle mass loss    Fluid Accumulation:  No significant fluid accumulation     Strength:  Not Performed    Nutrition Assessment:    Severe (acute) malnutrition r/t inadequate nutrient intakes, AEB <50% PO last 5 days and >2% weight losses in last week. Recent pancreatitis, EGD and colonoscopy. States avoiding higher fat foods, but also asks for more information regarding. DM with good control per A1C. Nutrition Related Findings:    no malnutrition indices Wound Type: None       Current Nutrition Intake & Therapies:    Average Meal Intake: NPO  Average Supplements Intake: NPO  Diet NPO    Anthropometric Measures:  Height: 5' 7\" (170.2 cm)  Ideal Body Weight (IBW): 148 lbs (67 kg)    Admission Body Weight: 190 lb 11.2 oz (86.5 kg)  Current Body Weight: 190 lb 11.2 oz (86.5 kg), 128.9 % IBW. Weight Source: Bed Scale  Current BMI (kg/m2): 29.9  Usual Body Weight: 195 lb (88.5 kg) (195-198# just prior to scope prep)  % Weight Change (Calculated): -2.2  Weight Adjustment For: No Adjustment                 BMI Categories: Overweight (BMI 25.0-29. 9)    Estimated Daily Nutrient Needs:  Energy Requirements Based On: Kcal/kg  Weight Used for Energy Requirements: Current  Energy (kcal/day): 9305-4340 (18-23)  Weight Used for Protein Requirements: Ideal  Protein (g/day): 81-94 (1.2-1.4)  Method Used for Fluid Requirements: 1 ml/kcal  Fluid (ml/day): 2000    Nutrition Diagnosis:   Severe malnutrition, In context of acute illness or injury related to altered GI function as evidenced by NPO or clear liquid status due to medical condition, poor intake prior to admission, weight loss greater than or equal to 2% in 1 week    Lab Results   Component Value Date     10/06/2022    K 3.7 10/06/2022     (H) 10/06/2022    CO2 21 10/06/2022    BUN 23 (H) 10/06/2022    CREATININE 0.96 10/06/2022    GLUCOSE 84 10/06/2022    CALCIUM 8.6 10/06/2022    PROT 6.3 (L) 10/06/2022    LABALBU 3.7 10/06/2022    BILITOT 0.4 10/06/2022    ALKPHOS 50 10/06/2022    AST 16 10/06/2022    ALT 20 10/06/2022    LABGLOM >60 10/06/2022    GFRAA >60 09/27/2022     Lab Results   Component Value Date    LABA1C 6.3 (H) 09/12/2022     Lab Results   Component Value Date     09/12/2022     No results found for: VITD25    Nutrition Interventions:   Food and/or Nutrient Delivery: Start Oral Diet  Nutrition Education/Counseling: Education initiated  Coordination of Nutrition Care: Continue to monitor while inpatient  Plan of Care discussed with: patient    Goals:     Goals: Meet at least 75% of estimated needs       Nutrition Monitoring and Evaluation:   Behavioral-Environmental Outcomes: None Identified  Food/Nutrient Intake Outcomes: Food and Nutrient Intake  Physical Signs/Symptoms Outcomes: Biochemical Data, Weight, GI Status    Discharge Planning:     Too soon to determine     Danyelle Danielson, [address] [place] [address],   Contact: [phone]

## 2022-10-06 NOTE — PLAN OF CARE
Problem: Discharge Planning  Goal: Discharge to home or other facility with appropriate resources  Outcome: Progressing  Flowsheets (Taken 10/6/2022 0426)  Discharge to home or other facility with appropriate resources: Identify barriers to discharge with patient and caregiver     Problem: Pain  Goal: Verbalizes/displays adequate comfort level or baseline comfort level  Outcome: Progressing  Flowsheets (Taken 10/6/2022 0426)  Verbalizes/displays adequate comfort level or baseline comfort level:   Encourage patient to monitor pain and request assistance   Assess pain using appropriate pain scale   Administer analgesics based on type and severity of pain and evaluate response   Implement non-pharmacological measures as appropriate and evaluate response   Consider cultural and social influences on pain and pain management   Notify Licensed Independent Practitioner if interventions unsuccessful or patient reports new pain     Problem: Safety - Adult  Goal: Free from fall injury  Outcome: Progressing  Flowsheets (Taken 10/6/2022 0426)  Free From Fall Injury: Instruct family/caregiver on patient safety

## 2022-10-06 NOTE — PROGRESS NOTES
Patient A&Ox4, calm and cooperative. Assessment and vital signs completed, see flowsheet. Patient reports stabbing pain 6/10 to LUQ of abdomen, radiating to back, improved from 8/10 after receiving tramadol. Patient ate prior to assessment in accordance with clear liquid diet. Bowel sounds active in all four quadrants. Patient resting in bed, call light within reach, denies further needs, will continue to monitor.

## 2022-10-06 NOTE — PROGRESS NOTES
Clinical Pharmacy Note  Metformin-IV Contrast Interaction Monitoring  Serum Creatinine Follow-up    Ana Jefferson is a 56 y.o. male     Recent Labs     10/05/22  1430 10/06/22  0540   CREATININE 1.06 0.96     Estimated Creatinine Clearance: 101 mL/min (based on SCr of 0.96 mg/dL). It is recommended that METFORMIN be temporarily discontinued in patients undergoing radiologic studies in which intravascular iodinated contrast media are utilized. DATE:  iopamidol given on 10/5/22 at  15:48. Authorizing Physician for original order: Dr Leland Gaucher    Assessment/Plan:  1. Metformin has been held > 48 hours from time of IV contrast dye administration. 2.  Will re-evaluate GFR in 48 hours and restart if appropriate. If eGFR is greater than 30, it should be safe to resume metformin. Note: previous  recommendations stated the following as safe to administer: sCr  < 1.5 mg/dL in males, or < 1.4 mg/dL in females.       Nithin Lee, [address] [place] [address], [address] [place] [address]  10/6/2022  11:47 AM

## 2022-10-07 ENCOUNTER — APPOINTMENT (OUTPATIENT)
Dept: CT IMAGING | Age: 47
DRG: 383 | End: 2022-10-07
Payer: COMMERCIAL

## 2022-10-07 LAB
ABSOLUTE EOS #: 0.31 K/UL (ref 0–0.44)
ABSOLUTE IMMATURE GRANULOCYTE: <0.03 K/UL (ref 0–0.3)
ABSOLUTE LYMPH #: 3.41 K/UL (ref 1.1–3.7)
ABSOLUTE MONO #: 0.93 K/UL (ref 0.1–1.2)
ALBUMIN SERPL-MCNC: 3.7 G/DL (ref 3.5–5.2)
ALBUMIN/GLOBULIN RATIO: 1.5 (ref 1–2.5)
ALP BLD-CCNC: 50 U/L (ref 40–129)
ALT SERPL-CCNC: 19 U/L (ref 5–41)
ANION GAP SERPL CALCULATED.3IONS-SCNC: 9 MMOL/L (ref 9–17)
AST SERPL-CCNC: 19 U/L
BASOPHILS # BLD: 0 % (ref 0–2)
BASOPHILS ABSOLUTE: <0.03 K/UL (ref 0–0.2)
BILIRUB SERPL-MCNC: 0.4 MG/DL (ref 0.3–1.2)
BUN BLDV-MCNC: 15 MG/DL (ref 6–20)
BUN/CREAT BLD: 17 (ref 9–20)
CALCIUM SERPL-MCNC: 8.7 MG/DL (ref 8.6–10.4)
CHLORIDE BLD-SCNC: 105 MMOL/L (ref 98–107)
CO2: 25 MMOL/L (ref 20–31)
CREAT SERPL-MCNC: 0.88 MG/DL (ref 0.7–1.2)
EOSINOPHILS RELATIVE PERCENT: 3 % (ref 1–4)
GFR SERPL CREATININE-BSD FRML MDRD: >60 ML/MIN/1.73M2
GLUCOSE BLD-MCNC: 77 MG/DL (ref 70–99)
HCT VFR BLD CALC: 34.5 % (ref 40.7–50.3)
HEMOGLOBIN: 11.9 G/DL (ref 13–17)
IMMATURE GRANULOCYTES: 0 %
LYMPHOCYTES # BLD: 37 % (ref 24–43)
MCH RBC QN AUTO: 30.6 PG (ref 25.2–33.5)
MCHC RBC AUTO-ENTMCNC: 34.5 G/DL (ref 28.4–34.8)
MCV RBC AUTO: 88.7 FL (ref 82.6–102.9)
MONOCYTES # BLD: 10 % (ref 3–12)
NRBC AUTOMATED: 0 PER 100 WBC
PDW BLD-RTO: 12.7 % (ref 11.8–14.4)
PLATELET # BLD: 262 K/UL (ref 138–453)
PMV BLD AUTO: 10.1 FL (ref 8.1–13.5)
POTASSIUM SERPL-SCNC: 3.9 MMOL/L (ref 3.7–5.3)
RBC # BLD: 3.89 M/UL (ref 4.21–5.77)
SEG NEUTROPHILS: 50 % (ref 36–65)
SEGMENTED NEUTROPHILS ABSOLUTE COUNT: 4.64 K/UL (ref 1.5–8.1)
SODIUM BLD-SCNC: 139 MMOL/L (ref 135–144)
TOTAL PROTEIN: 6.1 G/DL (ref 6.4–8.3)
WBC # BLD: 9.3 K/UL (ref 3.5–11.3)

## 2022-10-07 PROCEDURE — 6370000000 HC RX 637 (ALT 250 FOR IP): Performed by: STUDENT IN AN ORGANIZED HEALTH CARE EDUCATION/TRAINING PROGRAM

## 2022-10-07 PROCEDURE — 96376 TX/PRO/DX INJ SAME DRUG ADON: CPT

## 2022-10-07 PROCEDURE — 2580000003 HC RX 258: Performed by: STUDENT IN AN ORGANIZED HEALTH CARE EDUCATION/TRAINING PROGRAM

## 2022-10-07 PROCEDURE — 74177 CT ABD & PELVIS W/CONTRAST: CPT

## 2022-10-07 PROCEDURE — 96361 HYDRATE IV INFUSION ADD-ON: CPT

## 2022-10-07 PROCEDURE — 6360000004 HC RX CONTRAST MEDICATION: Performed by: STUDENT IN AN ORGANIZED HEALTH CARE EDUCATION/TRAINING PROGRAM

## 2022-10-07 PROCEDURE — 2580000003 HC RX 258: Performed by: NURSE PRACTITIONER

## 2022-10-07 PROCEDURE — 6360000002 HC RX W HCPCS: Performed by: STUDENT IN AN ORGANIZED HEALTH CARE EDUCATION/TRAINING PROGRAM

## 2022-10-07 PROCEDURE — 1200000000 HC SEMI PRIVATE

## 2022-10-07 PROCEDURE — 85025 COMPLETE CBC W/AUTO DIFF WBC: CPT

## 2022-10-07 PROCEDURE — 36415 COLL VENOUS BLD VENIPUNCTURE: CPT

## 2022-10-07 PROCEDURE — 80053 COMPREHEN METABOLIC PANEL: CPT

## 2022-10-07 PROCEDURE — 94761 N-INVAS EAR/PLS OXIMETRY MLT: CPT

## 2022-10-07 RX ORDER — PANTOPRAZOLE SODIUM 40 MG/1
40 TABLET, DELAYED RELEASE ORAL
Status: DISCONTINUED | OUTPATIENT
Start: 2022-10-07 | End: 2022-10-08 | Stop reason: HOSPADM

## 2022-10-07 RX ORDER — POLYVINYL ALCOHOL 14 MG/ML
1 SOLUTION/ DROPS OPHTHALMIC EVERY 6 HOURS PRN
Status: DISCONTINUED | OUTPATIENT
Start: 2022-10-07 | End: 2022-10-08 | Stop reason: HOSPADM

## 2022-10-07 RX ADMIN — POLYVINYL ALCOHOL 1 DROP: 14 SOLUTION/ DROPS OPHTHALMIC at 08:44

## 2022-10-07 RX ADMIN — ONDANSETRON 4 MG: 2 INJECTION INTRAMUSCULAR; INTRAVENOUS at 01:04

## 2022-10-07 RX ADMIN — SODIUM CHLORIDE: 9 INJECTION, SOLUTION INTRAVENOUS at 13:47

## 2022-10-07 RX ADMIN — PANTOPRAZOLE SODIUM 40 MG: 40 TABLET, DELAYED RELEASE ORAL at 06:31

## 2022-10-07 RX ADMIN — SUCRALFATE 1 G: 1 TABLET ORAL at 16:00

## 2022-10-07 RX ADMIN — OXYCODONE 5 MG: 5 TABLET ORAL at 20:19

## 2022-10-07 RX ADMIN — IOPAMIDOL 75 ML: 755 INJECTION, SOLUTION INTRAVENOUS at 13:20

## 2022-10-07 RX ADMIN — DILTIAZEM HYDROCHLORIDE 120 MG: 120 CAPSULE, COATED, EXTENDED RELEASE ORAL at 08:36

## 2022-10-07 RX ADMIN — SODIUM CHLORIDE: 9 INJECTION, SOLUTION INTRAVENOUS at 03:27

## 2022-10-07 RX ADMIN — MORPHINE SULFATE 2 MG: 2 INJECTION, SOLUTION INTRAMUSCULAR; INTRAVENOUS at 09:42

## 2022-10-07 RX ADMIN — POLYVINYL ALCOHOL 1 DROP: 14 SOLUTION/ DROPS OPHTHALMIC at 16:01

## 2022-10-07 RX ADMIN — OXYCODONE 10 MG: 5 TABLET ORAL at 06:41

## 2022-10-07 RX ADMIN — OXYCODONE 10 MG: 5 TABLET ORAL at 01:58

## 2022-10-07 RX ADMIN — OXYCODONE 10 MG: 5 TABLET ORAL at 11:41

## 2022-10-07 RX ADMIN — SUCRALFATE 1 G: 1 TABLET ORAL at 13:44

## 2022-10-07 RX ADMIN — PANTOPRAZOLE SODIUM 40 MG: 40 TABLET, DELAYED RELEASE ORAL at 16:00

## 2022-10-07 RX ADMIN — SODIUM CHLORIDE, PRESERVATIVE FREE 10 ML: 5 INJECTION INTRAVENOUS at 13:55

## 2022-10-07 RX ADMIN — SUCRALFATE 1 G: 1 TABLET ORAL at 08:37

## 2022-10-07 RX ADMIN — ATORVASTATIN CALCIUM 40 MG: 40 TABLET, FILM COATED ORAL at 20:19

## 2022-10-07 RX ADMIN — FENOFIBRATE 160 MG: 160 TABLET ORAL at 08:36

## 2022-10-07 RX ADMIN — METOPROLOL SUCCINATE 100 MG: 100 TABLET, FILM COATED, EXTENDED RELEASE ORAL at 20:18

## 2022-10-07 RX ADMIN — ONDANSETRON 4 MG: 4 TABLET, ORALLY DISINTEGRATING ORAL at 16:00

## 2022-10-07 RX ADMIN — OXYCODONE 5 MG: 5 TABLET ORAL at 15:59

## 2022-10-07 RX ADMIN — LISINOPRIL 20 MG: 20 TABLET ORAL at 08:35

## 2022-10-07 RX ADMIN — SUCRALFATE 1 G: 1 TABLET ORAL at 20:18

## 2022-10-07 RX ADMIN — ONDANSETRON 4 MG: 2 INJECTION INTRAMUSCULAR; INTRAVENOUS at 08:54

## 2022-10-07 ASSESSMENT — PAIN DESCRIPTION - PAIN TYPE
TYPE: ACUTE PAIN

## 2022-10-07 ASSESSMENT — PAIN DESCRIPTION - DESCRIPTORS
DESCRIPTORS: STABBING
DESCRIPTORS: STABBING
DESCRIPTORS: POUNDING
DESCRIPTORS: STABBING
DESCRIPTORS: ACHING
DESCRIPTORS: STABBING;CRAMPING
DESCRIPTORS: STABBING
DESCRIPTORS: STABBING;CRAMPING
DESCRIPTORS: SHARP;STABBING
DESCRIPTORS: STABBING

## 2022-10-07 ASSESSMENT — PAIN DESCRIPTION - LOCATION
LOCATION: ABDOMEN

## 2022-10-07 ASSESSMENT — PAIN SCALES - GENERAL
PAINLEVEL_OUTOF10: 8
PAINLEVEL_OUTOF10: 8
PAINLEVEL_OUTOF10: 7
PAINLEVEL_OUTOF10: 8
PAINLEVEL_OUTOF10: 7
PAINLEVEL_OUTOF10: 8
PAINLEVEL_OUTOF10: 6
PAINLEVEL_OUTOF10: 7
PAINLEVEL_OUTOF10: 7
PAINLEVEL_OUTOF10: 8
PAINLEVEL_OUTOF10: 9

## 2022-10-07 ASSESSMENT — PAIN DESCRIPTION - ONSET
ONSET: ON-GOING

## 2022-10-07 ASSESSMENT — PAIN DESCRIPTION - ORIENTATION
ORIENTATION: UPPER;LEFT
ORIENTATION: LEFT
ORIENTATION: LEFT
ORIENTATION: LEFT;UPPER
ORIENTATION: UPPER;LEFT
ORIENTATION: UPPER;LEFT
ORIENTATION: LEFT
ORIENTATION: LEFT
ORIENTATION: LEFT;UPPER
ORIENTATION: LEFT

## 2022-10-07 ASSESSMENT — PAIN DESCRIPTION - FREQUENCY
FREQUENCY: CONTINUOUS

## 2022-10-07 ASSESSMENT — PAIN - FUNCTIONAL ASSESSMENT
PAIN_FUNCTIONAL_ASSESSMENT: PREVENTS OR INTERFERES SOME ACTIVE ACTIVITIES AND ADLS
PAIN_FUNCTIONAL_ASSESSMENT: ACTIVITIES ARE NOT PREVENTED
PAIN_FUNCTIONAL_ASSESSMENT: PREVENTS OR INTERFERES SOME ACTIVE ACTIVITIES AND ADLS
PAIN_FUNCTIONAL_ASSESSMENT: PREVENTS OR INTERFERES SOME ACTIVE ACTIVITIES AND ADLS
PAIN_FUNCTIONAL_ASSESSMENT: ACTIVITIES ARE NOT PREVENTED
PAIN_FUNCTIONAL_ASSESSMENT: PREVENTS OR INTERFERES SOME ACTIVE ACTIVITIES AND ADLS
PAIN_FUNCTIONAL_ASSESSMENT: ACTIVITIES ARE NOT PREVENTED

## 2022-10-07 ASSESSMENT — PAIN SCALES - WONG BAKER
WONGBAKER_NUMERICALRESPONSE: 0

## 2022-10-07 ASSESSMENT — PAIN DESCRIPTION - DIRECTION
RADIATING_TOWARDS: LEFT SIDE OF BACK
RADIATING_TOWARDS: BACK

## 2022-10-07 NOTE — PROGRESS NOTES
Pt reassessed and vitals complete as charted. Pt resting in bed. Pt complains of pain 8/10 in abdomen. Writer informed pt prn pain medication is not available until 0140. Pt complains of nausea, prn IV zofran given. Pt denies any additional needs at this time. Bed in lowest position. Call light within reach. Will continue to monitor.

## 2022-10-07 NOTE — PROGRESS NOTES
Pt stated when he attempted to eat broth it caused him to have pain 9/10. Pt asked for medication. Nurse assessing to see what we can give the pt. Pt denied any other needs at this time.

## 2022-10-07 NOTE — PLAN OF CARE
Problem: Discharge Planning  Goal: Discharge to home or other facility with appropriate resources  Flowsheets (Taken 10/7/2022 0235)  Discharge to home or other facility with appropriate resources: Identify barriers to discharge with patient and caregiver     Problem: Pain  Goal: Verbalizes/displays adequate comfort level or baseline comfort level  Flowsheets (Taken 10/7/2022 0235)  Verbalizes/displays adequate comfort level or baseline comfort level:   Encourage patient to monitor pain and request assistance   Assess pain using appropriate pain scale   Administer analgesics based on type and severity of pain and evaluate response   Implement non-pharmacological measures as appropriate and evaluate response     Problem: Safety - Adult  Goal: Free from fall injury  Flowsheets (Taken 10/6/2022 0426 by Bossman Johnston RN)  Free From Fall Injury: Instruct family/caregiver on patient safety  Note: Call light in reach at all times, frequent checks, bed in lowest position, wheels of bed and chair locked, non skid footwear on, appropriate transfer techniques, personal items within reach, walkways free of obstructions, fall risk armband and sign displayed, Cortes fall risk score per protocol. No falls this shift, will continue to monitor.        Problem: Nutrition Deficit:  Goal: Optimize nutritional status  Flowsheets (Taken 10/7/2022 0235)  Nutrient intake appropriate for improving, restoring, or maintaining nutritional needs:   Monitor oral intake, labs, and treatment plans   Provide specific nutrition education to patient or family as appropriate     Problem: Hematologic - Adult  Goal: Maintains hematologic stability  Flowsheets (Taken 10/7/2022 0235)  Maintains hematologic stability:   Assess for signs and symptoms of bleeding or hemorrhage   Monitor labs for bleeding or clotting disorders  Note: Continue to monitor H&H.

## 2022-10-07 NOTE — PROGRESS NOTES
Progress Note    SUBJECTIVE:    Patient seen for f/u of Intractable abdominal pain. He resting quietly continues to complain of abdominal pain. Hemodynamically stable. ROS:   Constitutional: negative  for fevers, and negative for chills. Respiratory: negative for shortness of breath, negative for cough, and negative for wheezing  Cardiovascular: negative for chest pain, and negative for palpitations  Gastrointestinal: positive for abdominal pain, negative for nausea,negative for vomiting, negative for diarrhea, and negative for constipation     All other systems were reviewed with the patient and are negative unless otherwise stated in HPI      OBJECTIVE:      Vitals:   Vitals:    10/07/22 1211   BP:    Pulse:    Resp: 16   Temp:    SpO2:      Weight: 197 lb 6.4 oz (89.5 kg)   Height: 5' 7\" (170.2 cm)     Weight  Wt Readings from Last 3 Encounters:   10/07/22 197 lb 6.4 oz (89.5 kg)   10/04/22 190 lb (86.2 kg)   09/27/22 196 lb 9.6 oz (89.2 kg)     Body mass index is 30.92 kg/m². 24HR INTAKE/OUTPUT:      Intake/Output Summary (Last 24 hours) at 10/7/2022 1344  Last data filed at 10/7/2022 0513  Gross per 24 hour   Intake 2170.47 ml   Output 1 ml   Net 2169.47 ml     -----------------------------------------------------------------  Exam:    GEN:    Awake, alert and oriented x3. EYES:   EOMI, pupils equal   NECK: Supple. No lymphadenopathy. No carotid bruit  CVS:     regular rate and rhythm, no audible murmur  PULM:  CTA, no wheezes, rales or rhonchi, no acute respiratory distress  ABD:     Bowels sounds normal.  Abdomen is soft. No distention. LUQ, epigastric  tenderness to palpation. EXT:     no edema bilaterally . No calf tenderness. NEURO: Moves all extremities. Motor and sensory are grossly intact  SKIN:    No rashes.   No skin lesions.    -----------------------------------------------------------------    Diagnostic Data:      Complete Blood Count:   Recent Labs     10/05/22  5903 10/06/22  0540 10/06/22  1855 10/07/22  0615   WBC 12.5* 10.9  --  9.3   RBC 5.23 4.17*  --  3.89*   HGB 16.4 12.9* 12.0* 11.9*   HCT 46.7 37.4* 34.7* 34.5*   MCV 89.3 89.7  --  88.7   MCH 31.4 30.9  --  30.6   MCHC 35.1* 34.5  --  34.5   RDW 12.9 13.1  --  12.7    307  --  262   MPV 10.2 10.6  --  10.1        Last 3 Blood Glucose:   Recent Labs     10/05/22  1430 10/06/22  0540 10/07/22  0615   GLUCOSE 144* 84 77        Comprehensive Metabolic Profile:   Recent Labs     10/05/22  1430 10/06/22  0540 10/07/22  0615    141 139   K 4.5 3.7 3.9    109* 105   CO2 21 21 25   BUN 20 23* 15   CREATININE 1.06 0.96 0.88   GLUCOSE 144* 84 77   CALCIUM 10.2 8.6 8.7   PROT 8.6* 6.3* 6.1*   LABALBU 5.1 3.7 3.7   BILITOT 0.3 0.4 0.4   ALKPHOS 75 50 50   AST 22 16 19   ALT 29 20 19        Urinalysis:   Lab Results   Component Value Date/Time    NITRU NEGATIVE 10/05/2022 06:19 PM    COLORU Yellow 10/05/2022 06:19 PM    PHUR 5.5 10/05/2022 06:19 PM    WBCUA 0 TO 2 10/05/2022 06:19 PM    RBCUA 0 TO 2 10/05/2022 06:19 PM    SPECGRAV 1.010 10/05/2022 06:19 PM    LEUKOCYTESUR NEGATIVE 10/05/2022 06:19 PM    UROBILINOGEN Normal 10/05/2022 06:19 PM    BILIRUBINUR NEGATIVE 10/05/2022 06:19 PM    GLUCOSEU NEGATIVE 10/05/2022 06:19 PM    KETUA NEGATIVE 10/05/2022 06:19 PM       HgBA1c:    Lab Results   Component Value Date/Time    LABA1C 6.3 09/12/2022 05:35 AM       Lactic Acid:   Lab Results   Component Value Date/Time    LACTA 2.8 10/05/2022 02:28 PM        Troponin: No results for input(s): TROPONINI in the last 72 hours. CRP:  No results for input(s): CRP in the last 72 hours. Radiology/Imaging:  CT ABDOMEN PELVIS W IV CONTRAST Additional Contrast? None   Final Result   No acute abnormality is seen in the abdomen or pelvis. No bowel obstruction is seen. Normal appendix.          CT ABDOMEN PELVIS W IV CONTRAST Additional Contrast? None    (Results Pending)         ASSESSMENT / PLAN:  Intractable abdominal pain  Continue current therapy   Appreciate general surgery   Continue Protonix, Carafate  IV fluids  Start clear liquids  Stool positive for blood  Labs stable  Hold aspirin and Plavix  Repeat CT scan of abdomen and pelvis today  Tobacco abuse  Smoking cessation  Chronic combined CHF  Caution with IV fluids  Continue lisinopril, Toprol-XL  Hypertension  Continue Cardizem, lisinopril  Nutrition status:   obesity, non-morbid  Dietician consult initiated  Hospital Prophylaxis:   DVT: SCD's   Stress Ulcer:  Protonix, Carafate    High risk medications: none   Disposition:    Discharge plan is home      KRUNAL Potts - CNP , KRUNAL, NP-C  Hospitalist Medicine        10/7/2022, 1:44 PM

## 2022-10-07 NOTE — PROGRESS NOTES
Writer at bedside for shift assessment. Patient sitting up in bed, respirations are even and unlabored while on room air. Vitals obtained and assessment completed, see flowsheet for details. Patient states he is in 8/ 10 pain. pt denies further needs at this time. Call light in reach, will continue to monitor.

## 2022-10-07 NOTE — PLAN OF CARE
Problem: Discharge Planning  Goal: Discharge to home or other facility with appropriate resources  10/7/2022 0956 by Paulino Fitch RN  Outcome: Progressing  Flowsheets (Taken 10/7/2022 0705)  Discharge to home or other facility with appropriate resources: Identify barriers to discharge with patient and caregiver  10/7/2022 0235 by Pippa Shaffer RN  Flowsheets (Taken 10/7/2022 0235)  Discharge to home or other facility with appropriate resources: Identify barriers to discharge with patient and caregiver     Problem: Pain  Goal: Verbalizes/displays adequate comfort level or baseline comfort level  10/7/2022 0956 by Paulino Fitch RN  Outcome: Progressing  Flowsheets (Taken 10/7/2022 0235 by Pippa Shaffer RN)  Verbalizes/displays adequate comfort level or baseline comfort level:   Encourage patient to monitor pain and request assistance   Assess pain using appropriate pain scale   Administer analgesics based on type and severity of pain and evaluate response   Implement non-pharmacological measures as appropriate and evaluate response  10/7/2022 0235 by Pippa Shaffer RN  Flowsheets (Taken 10/7/2022 0235)  Verbalizes/displays adequate comfort level or baseline comfort level:   Encourage patient to monitor pain and request assistance   Assess pain using appropriate pain scale   Administer analgesics based on type and severity of pain and evaluate response   Implement non-pharmacological measures as appropriate and evaluate response     Problem: Safety - Adult  Goal: Free from fall injury  10/7/2022 0956 by Paulino Fitch RN  Outcome: Progressing  Flowsheets (Taken 10/7/2022 0955)  Free From Fall Injury: Instruct family/caregiver on patient safety  10/7/2022 0235 by Pippa Shaffer RN  Flowsheets (Taken 10/6/2022 0426 by Leigh Siddiqi RN)  Free From Fall Injury: Instruct family/caregiver on patient safety  Note: Call light in reach at all times, frequent checks, bed in lowest position, wheels of bed and chair locked, non skid footwear on, appropriate transfer techniques, personal items within reach, walkways free of obstructions, fall risk armband and sign displayed, Cortes fall risk score per protocol. No falls this shift, will continue to monitor.        Problem: Nutrition Deficit:  Goal: Optimize nutritional status  10/7/2022 0956 by Ariadne Daniel RN  Outcome: Progressing  Flowsheets (Taken 10/7/2022 0235 by Morenita Tran RN)  Nutrient intake appropriate for improving, restoring, or maintaining nutritional needs:   Monitor oral intake, labs, and treatment plans   Provide specific nutrition education to patient or family as appropriate  10/7/2022 0235 by Morenita Tran RN  Flowsheets (Taken 10/7/2022 0235)  Nutrient intake appropriate for improving, restoring, or maintaining nutritional needs:   Monitor oral intake, labs, and treatment plans   Provide specific nutrition education to patient or family as appropriate     Problem: Hematologic - Adult  Goal: Maintains hematologic stability  10/7/2022 0956 by Ariadne Daniel RN  Outcome: Progressing  Flowsheets (Taken 10/7/2022 0705)  Maintains hematologic stability: Assess for signs and symptoms of bleeding or hemorrhage  10/7/2022 0235 by Morenita Tran RN  Flowsheets (Taken 10/7/2022 0235)  Maintains hematologic stability:   Assess for signs and symptoms of bleeding or hemorrhage   Monitor labs for bleeding or clotting disorders  Note: Continue to monitor H&H.

## 2022-10-07 NOTE — PROGRESS NOTES
Vitals and assessment done at this time. See flowsheet for more details. Pt stated he was having some pain in the LUQ. Pt stated he was having some rebound tenderness. Pt resting in bed. Denied any other needs at this time. Call light within reach. Will continue monitor.

## 2022-10-08 VITALS
HEART RATE: 61 BPM | TEMPERATURE: 97.1 F | RESPIRATION RATE: 16 BRPM | WEIGHT: 197 LBS | SYSTOLIC BLOOD PRESSURE: 124 MMHG | OXYGEN SATURATION: 95 % | BODY MASS INDEX: 30.92 KG/M2 | HEIGHT: 67 IN | DIASTOLIC BLOOD PRESSURE: 80 MMHG

## 2022-10-08 LAB
ABSOLUTE EOS #: 0.29 K/UL (ref 0–0.44)
ABSOLUTE IMMATURE GRANULOCYTE: <0.03 K/UL (ref 0–0.3)
ABSOLUTE LYMPH #: 2.58 K/UL (ref 1.1–3.7)
ABSOLUTE MONO #: 0.87 K/UL (ref 0.1–1.2)
ALBUMIN SERPL-MCNC: 3.7 G/DL (ref 3.5–5.2)
ALBUMIN/GLOBULIN RATIO: 1.5 (ref 1–2.5)
ALP BLD-CCNC: 50 U/L (ref 40–129)
ALT SERPL-CCNC: 20 U/L (ref 5–41)
ANION GAP SERPL CALCULATED.3IONS-SCNC: 11 MMOL/L (ref 9–17)
AST SERPL-CCNC: 19 U/L
BASOPHILS # BLD: 0 % (ref 0–2)
BASOPHILS ABSOLUTE: 0.03 K/UL (ref 0–0.2)
BILIRUB SERPL-MCNC: 0.4 MG/DL (ref 0.3–1.2)
BUN BLDV-MCNC: 6 MG/DL (ref 6–20)
BUN/CREAT BLD: 7 (ref 9–20)
CALCIUM SERPL-MCNC: 8.6 MG/DL (ref 8.6–10.4)
CHLORIDE BLD-SCNC: 104 MMOL/L (ref 98–107)
CO2: 25 MMOL/L (ref 20–31)
CREAT SERPL-MCNC: 0.82 MG/DL (ref 0.7–1.2)
EOSINOPHILS RELATIVE PERCENT: 3 % (ref 1–4)
GFR SERPL CREATININE-BSD FRML MDRD: >60 ML/MIN/1.73M2
GLUCOSE BLD-MCNC: 86 MG/DL (ref 70–99)
HCT VFR BLD CALC: 34.2 % (ref 40.7–50.3)
HEMOGLOBIN: 12.2 G/DL (ref 13–17)
IMMATURE GRANULOCYTES: 0 %
LYMPHOCYTES # BLD: 28 % (ref 24–43)
MAGNESIUM: 1.6 MG/DL (ref 1.6–2.6)
MCH RBC QN AUTO: 31.3 PG (ref 25.2–33.5)
MCHC RBC AUTO-ENTMCNC: 35.7 G/DL (ref 28.4–34.8)
MCV RBC AUTO: 87.7 FL (ref 82.6–102.9)
MONOCYTES # BLD: 10 % (ref 3–12)
NRBC AUTOMATED: 0 PER 100 WBC
PDW BLD-RTO: 12.6 % (ref 11.8–14.4)
PLATELET # BLD: 279 K/UL (ref 138–453)
PMV BLD AUTO: 10.2 FL (ref 8.1–13.5)
POTASSIUM SERPL-SCNC: 3.4 MMOL/L (ref 3.7–5.3)
RBC # BLD: 3.9 M/UL (ref 4.21–5.77)
SEG NEUTROPHILS: 59 % (ref 36–65)
SEGMENTED NEUTROPHILS ABSOLUTE COUNT: 5.37 K/UL (ref 1.5–8.1)
SODIUM BLD-SCNC: 140 MMOL/L (ref 135–144)
TOTAL PROTEIN: 6.2 G/DL (ref 6.4–8.3)
WBC # BLD: 9.2 K/UL (ref 3.5–11.3)

## 2022-10-08 PROCEDURE — 80053 COMPREHEN METABOLIC PANEL: CPT

## 2022-10-08 PROCEDURE — 83735 ASSAY OF MAGNESIUM: CPT

## 2022-10-08 PROCEDURE — 85025 COMPLETE CBC W/AUTO DIFF WBC: CPT

## 2022-10-08 PROCEDURE — 36415 COLL VENOUS BLD VENIPUNCTURE: CPT

## 2022-10-08 PROCEDURE — 94761 N-INVAS EAR/PLS OXIMETRY MLT: CPT

## 2022-10-08 PROCEDURE — 2580000003 HC RX 258: Performed by: NURSE PRACTITIONER

## 2022-10-08 PROCEDURE — 6370000000 HC RX 637 (ALT 250 FOR IP): Performed by: NURSE PRACTITIONER

## 2022-10-08 PROCEDURE — 6360000002 HC RX W HCPCS: Performed by: STUDENT IN AN ORGANIZED HEALTH CARE EDUCATION/TRAINING PROGRAM

## 2022-10-08 PROCEDURE — 6370000000 HC RX 637 (ALT 250 FOR IP): Performed by: STUDENT IN AN ORGANIZED HEALTH CARE EDUCATION/TRAINING PROGRAM

## 2022-10-08 RX ORDER — OXYCODONE HYDROCHLORIDE 5 MG/1
5 TABLET ORAL EVERY 6 HOURS PRN
Qty: 12 TABLET | Refills: 0 | Status: SHIPPED | OUTPATIENT
Start: 2022-10-08 | End: 2022-10-11

## 2022-10-08 RX ORDER — ONDANSETRON 4 MG/1
4 TABLET, ORALLY DISINTEGRATING ORAL EVERY 8 HOURS PRN
Qty: 20 TABLET | Refills: 0 | Status: SHIPPED | OUTPATIENT
Start: 2022-10-08

## 2022-10-08 RX ORDER — POLYETHYLENE GLYCOL 3350 17 G/17G
17 POWDER, FOR SOLUTION ORAL DAILY PRN
Qty: 527 G | Refills: 0 | Status: SHIPPED | OUTPATIENT
Start: 2022-10-08 | End: 2022-11-07

## 2022-10-08 RX ORDER — PANTOPRAZOLE SODIUM 40 MG/1
40 TABLET, DELAYED RELEASE ORAL 2 TIMES DAILY
Qty: 60 TABLET | Refills: 0 | Status: SHIPPED | OUTPATIENT
Start: 2022-10-08 | End: 2022-11-07

## 2022-10-08 RX ORDER — SUCRALFATE 1 G/1
1 TABLET ORAL 4 TIMES DAILY
Qty: 120 TABLET | Refills: 0 | Status: SHIPPED | OUTPATIENT
Start: 2022-10-08 | End: 2022-11-07

## 2022-10-08 RX ADMIN — DILTIAZEM HYDROCHLORIDE 120 MG: 120 CAPSULE, COATED, EXTENDED RELEASE ORAL at 08:55

## 2022-10-08 RX ADMIN — ONDANSETRON 4 MG: 2 INJECTION INTRAMUSCULAR; INTRAVENOUS at 07:05

## 2022-10-08 RX ADMIN — TRAMADOL HYDROCHLORIDE 50 MG: 50 TABLET, COATED ORAL at 11:05

## 2022-10-08 RX ADMIN — LISINOPRIL 20 MG: 20 TABLET ORAL at 08:55

## 2022-10-08 RX ADMIN — OXYCODONE 10 MG: 5 TABLET ORAL at 07:03

## 2022-10-08 RX ADMIN — SUCRALFATE 1 G: 1 TABLET ORAL at 12:16

## 2022-10-08 RX ADMIN — FENOFIBRATE 160 MG: 160 TABLET ORAL at 08:55

## 2022-10-08 RX ADMIN — SODIUM CHLORIDE: 9 INJECTION, SOLUTION INTRAVENOUS at 01:33

## 2022-10-08 RX ADMIN — PANTOPRAZOLE SODIUM 40 MG: 40 TABLET, DELAYED RELEASE ORAL at 06:57

## 2022-10-08 RX ADMIN — METFORMIN HYDROCHLORIDE 500 MG: 500 TABLET ORAL at 12:16

## 2022-10-08 RX ADMIN — OXYCODONE 10 MG: 5 TABLET ORAL at 12:15

## 2022-10-08 RX ADMIN — SUCRALFATE 1 G: 1 TABLET ORAL at 08:55

## 2022-10-08 RX ADMIN — OXYCODONE 5 MG: 5 TABLET ORAL at 01:33

## 2022-10-08 ASSESSMENT — PAIN - FUNCTIONAL ASSESSMENT: PAIN_FUNCTIONAL_ASSESSMENT: ACTIVITIES ARE NOT PREVENTED

## 2022-10-08 ASSESSMENT — PAIN DESCRIPTION - DESCRIPTORS
DESCRIPTORS: ACHING
DESCRIPTORS: STABBING;SHARP
DESCRIPTORS: ACHING
DESCRIPTORS: ACHING

## 2022-10-08 ASSESSMENT — PAIN SCALES - GENERAL
PAINLEVEL_OUTOF10: 7

## 2022-10-08 ASSESSMENT — PAIN DESCRIPTION - PAIN TYPE: TYPE: ACUTE PAIN

## 2022-10-08 ASSESSMENT — PAIN DESCRIPTION - LOCATION
LOCATION: ABDOMEN

## 2022-10-08 ASSESSMENT — PAIN DESCRIPTION - FREQUENCY: FREQUENCY: CONTINUOUS

## 2022-10-08 ASSESSMENT — PAIN DESCRIPTION - ONSET: ONSET: ON-GOING

## 2022-10-08 ASSESSMENT — PAIN DESCRIPTION - ORIENTATION
ORIENTATION: RIGHT
ORIENTATION: MID;LEFT
ORIENTATION: MID

## 2022-10-08 NOTE — PROGRESS NOTES
Patient in bed resting at this time. Vitals and assessment completed. Vitals stable. Patient alert and oriented. Patient complains of pain in abdomen and slight nausea. PRN medications administered. Assessment negative otherwise. Patient denies any needs. Call light within reach. Will continue to monitor.

## 2022-10-08 NOTE — PROGRESS NOTES
Patient left facility at this time. Discharge instructions previously administered to patient without complication. IV previously removed without complication and dressing intact. Patient left with all personal belongings with private transportation to home.

## 2022-10-08 NOTE — DISCHARGE SUMMARY
Natalya Espinosa M.D. Internal Medicine Discharge Summary    Patient ID:  Stephen Coon  [id number]  1975    Admission date: 10/5/2022    Discharge date: 10/8/2022     Admitting Physician: Lex Gaytan MD     Primary Care Physician: Genesis Orta MD     Primary Discharge Diagnoses:   Patient Active Problem List    Diagnosis Date Noted    Abnormal result of other cardiovascular function study 01/27/2022    Palpitations     Severe malnutrition ([address] 10/06/2022    Intractable abdominal pain 10/05/2022    Pancreatitis, unspecified pancreatitis type 09/12/2022    Nausea 09/12/2022    Epigastric pain 09/12/2022    ACS (acute coronary syndrome) ([address] 12/26/2021    ASHD (arteriosclerotic heart disease) /  CABG 2018 04/28/2019    Ischemic cardiomyopathy 01/08/2019    Chronic combined systolic and diastolic CHF, NYHA class 2 ([address] 01/08/2019    S/P CABG (coronary artery bypass graft)     Dyslipidemia     Acute coronary syndrome ([address] 01/07/2019    Mild congestive heart failure ([address] 01/07/2019    Chest pain at rest 06/02/2015    HTN (hypertension) 06/02/2015    Tobacco abuse 06/02/2015    Family history of premature CAD 06/02/2015    Pain in rib, left lower 05/31/2012       Additional Diagnoses:       Diagnosis Date    CAD (coronary artery disease)     Carpal tunnel syndrome     Depressive disorder, not elsewhere classified     Diabetes mellitus ([address]     Heart attack ([address] 08/11/2018    STEMI    History of echocardiogram 01/08/2019    EF 40-45%. LV cavity sixe is mildly increased. Inferior and inferoseptal wall hypokenesis noted. Mild diastolic dysfunction. Hyperlipidemia     Hypertension 2009        Hospital Course: The patient was admitted for intractable abd pain due to gastric ulcer. He was seen by Gen Surg and was found to have a non-surgical abdomen. He was treated with PPI and Carafate. Initially required morphine for pain but was weaned to Percocet.   He is tolerating diet for the most part but still having some epigastric post partum pain. Per Gen Surg, workup is complete and he may be DC'd home with PPI BID and Carafate QID. Discharge Exam:  GEN:    Awake, alert and oriented x3. EYES:  EOMI, pupils equal   NECK: Supple. No lymphadenopathy. No carotid bruit  CVS:    regular rate and rhythm, no audible murmur  PULM:  CTA, no wheezes, rales or rhonchi, no acute respiratory distress  ABD:    Bowels sounds normal.  Abdomen is soft. No distention. epigastric tenderness to palpation. EXT:   no edema bilaterally . No calf tenderness. NEURO: Moves all extremities. Motor and sensory are grossly intact  SKIN:  No rashes. No skin lesions.       Consultants:    Dr. Jun Wood, gen surg    Procedures:    none    Complications:   none    Significant Diagnostic Studies:   Discharge Labs:  Complete Blood Count:   Recent Labs     10/06/22  0540 10/06/22  1855 10/07/22  0615 10/08/22  0520   WBC 10.9  --  9.3 9.2   RBC 4.17*  --  3.89* 3.90*   HGB 12.9* 12.0* 11.9* 12.2*   HCT 37.4* 34.7* 34.5* 34.2*   MCV 89.7  --  88.7 87.7   MCH 30.9  --  30.6 31.3   MCHC 34.5  --  34.5 35.7*   RDW 13.1  --  12.7 12.6     --  262 279   MPV 10.6  --  10.1 10.2        Last 3 Blood Glucose:   Recent Labs     10/05/22  1430 10/06/22  0540 10/07/22  0615 10/08/22  0520   GLUCOSE 144* 84 77 86        Comprehensive Metabolic Profile:   Recent Labs     10/06/22  0540 10/07/22  0615 10/08/22  0520    139 140   K 3.7 3.9 3.4*   * 105 104   CO2 21 25 25   BUN 23* 15 6   CREATININE 0.96 0.88 0.82   GLUCOSE 84 77 86   CALCIUM 8.6 8.7 8.6   PROT 6.3* 6.1* 6.2*   LABALBU 3.7 3.7 3.7   BILITOT 0.4 0.4 0.4   ALKPHOS 50 50 50   AST 16 19 19   ALT 20 19 20        Urinalysis:   Lab Results   Component Value Date/Time    NITRU NEGATIVE 10/05/2022 06:19 PM    COLORU Yellow 10/05/2022 06:19 PM    PHUR 5.5 10/05/2022 06:19 PM    WBCUA 0 TO 2 10/05/2022 06:19 PM    RBCUA 0 TO 2 10/05/2022 06:19 PM    SPECGRAV 1.010 10/05/2022 06:19 PM LEUKOCYTESUR NEGATIVE 10/05/2022 06:19 PM    UROBILINOGEN Normal 10/05/2022 06:19 PM    BILIRUBINUR NEGATIVE 10/05/2022 06:19 PM    GLUCOSEU NEGATIVE 10/05/2022 06:19 PM    KETUA NEGATIVE 10/05/2022 06:19 PM       HgBA1c:    Lab Results   Component Value Date/Time    LABA1C 6.3 09/12/2022 05:35 AM       Lactic Acid:   Lab Results   Component Value Date/Time    LACTA 2.8 10/05/2022 02:28 PM        Radiology/Imaging:  CT ABDOMEN PELVIS W IV CONTRAST Additional Contrast? None   Final Result   No acute intra-abdominal or intrapelvic abnormalities are noted. Bibasilar atelectasis         CT ABDOMEN PELVIS W IV CONTRAST Additional Contrast? None   Final Result   No acute abnormality is seen in the abdomen or pelvis. No bowel obstruction is seen. Normal appendix. Discharge Condition:   stable    Disposition:   Discharge to Home    Discharge Medications:     Medication List        START taking these medications      ondansetron 4 MG disintegrating tablet  Commonly known as: ZOFRAN-ODT  Take 1 tablet by mouth every 8 hours as needed for Nausea or Vomiting     oxyCODONE 5 MG immediate release tablet  Commonly known as: ROXICODONE  Take 1 tablet by mouth every 6 hours as needed for Pain for up to 3 days.      polyethylene glycol 17 g packet  Commonly known as: GLYCOLAX  Take 17 g by mouth daily as needed for Constipation            CHANGE how you take these medications      metoprolol succinate 100 MG extended release tablet  Commonly known as: TOPROL XL  Take 1 tablet by mouth daily  What changed: when to take this     pantoprazole 40 MG tablet  Commonly known as: PROTONIX  Take 1 tablet by mouth in the morning and at bedtime  What changed: when to take this            CONTINUE taking these medications      ALPRAZolam 0.25 MG tablet  Commonly known as: XANAX     aspirin 81 MG EC tablet     atorvastatin 40 MG tablet  Commonly known as: LIPITOR  Take 1 tablet by mouth daily     clopidogrel 75 MG tablet  Commonly known as: PLAVIX  Take 1 tablet by mouth daily     dilTIAZem 120 MG extended release capsule  Commonly known as: CARDIZEM CD  Take 1 capsule by mouth daily     fenofibrate 160 MG tablet  Commonly known as: TRIGLIDE     lisinopril 20 MG tablet  Commonly known as: PRINIVIL;ZESTRIL  Take 1 tablet by mouth daily     metFORMIN 500 MG tablet  Commonly known as: GLUCOPHAGE     nitroGLYCERIN 0.4 MG SL tablet  Commonly known as: NITROSTAT  Place 1 tablet under the tongue every 5 minutes as needed for Chest pain up to max of 3 total doses. If no relief after 1 dose, call 911.     sucralfate 1 GM tablet  Commonly known as: Carafate  Take 1 tablet by mouth 4 times daily               Where to Get Your Medications        These medications were sent to [facility] [id number] [facility], [address] [place] [address]      Phone: [phone]   ondansetron 4 MG disintegrating tablet  oxyCODONE 5 MG immediate release tablet  pantoprazole 40 MG tablet  polyethylene glycol 17 g packet  sucralfate 1 GM tablet         Patient Instructions:    Activity: activity as tolerated  Diet: regular diet  Wound Care: none needed  Follow up with Willis Mathews MD in 1-2 weeks   Follow-up with Dr. Manuela Garcia in 1-2 weeks as directed    [facility] on Discharge (if applicable)  ACE/ARB in CHF: N/A  ASA in MI: N/A  Statin in [place]: N/A  Statin in CVA: N/A  Antiplatelet in CVA: N/A    Total time spent on discharge services: 40 minutes  Including the following activities:  Evaluation and Management of patient  Discussion with patient and/or surrogate about current care plan  Coordination with Case Management and/or   Coordination of care with Consultants (if applicable)   Coordination of care with Receiving Facility Physician (if applicable)  Completion of DME forms (if applicable)  Preparation of Discharge Summary  Preparation of Medication Reconciliation  Preparation of Discharge Prescriptions      Signed:  Agnes Francois MD, MJUAREZ.  10/8/2022  12:34 PM

## 2022-10-08 NOTE — DISCHARGE INSTR - DIET
Good nutrition is important when healing from an illness, injury, or surgery. Follow any nutrition recommendations given to you during your hospital stay. If you were given an oral nutrition supplement while in the hospital, continue to take this supplement at home. You can take it with meals, in-between meals, and/or before bedtime. These supplements can be purchased at most local grocery stores, pharmacies, and chain Tomorrow-stores. If you have any questions about your diet or nutrition, call the hospital and ask for the dietitian.   Regular diet as tolerated

## 2022-10-08 NOTE — PROGRESS NOTES
Quincy Simons M.D. Internal Medicine Progress Note    Patient: Kenroy Soto  Date of Admission: 10/5/2022  2:31 PM  Hospital Day # 1  Date of Evaluation: 10/8/2022      SUBJECTIVE:    Patient seen for f/u of Intractable abdominal pain. He has known gastric ulcer and further workup has been negative. He is feeling better today and tolerating liquid diet, however did have some epigastric pain after eating. Nausea has improved. He has been constipated      ROS:   Constitutional: negative  for fevers, and negative for chills. Respiratory: negative for shortness of breath, negative for cough, and negative for wheezing  Cardiovascular: negative for chest pain, and negative for palpitations  Gastrointestinal: positive for abdominal pain, negative for nausea,negative for vomiting, negative for diarrhea, and positive for constipation     All other systems were reviewed with the patient and are negative unless otherwise stated in HPI    -----------------------------------------------------------------  OBJECTIVE:  Vitals:   Temp: 97.1 °F (36.2 °C)  BP: 132/81  Resp: 16  Heart Rate: 69  SpO2: 95 % on room air    Weight  Wt Readings from Last 3 Encounters:   10/08/22 197 lb (89.4 kg)   10/04/22 190 lb (86.2 kg)   09/27/22 196 lb 9.6 oz (89.2 kg)     Body mass index is 30.85 kg/m². 24HR INTAKE/OUTPUT:      Intake/Output Summary (Last 24 hours) at 10/8/2022 1218  Last data filed at 10/8/2022 0855  Gross per 24 hour   Intake 2087 ml   Output --   Net 2087 ml       Exam:  GEN:    Awake, alert and oriented x3. EYES:  EOMI, pupils equal   NECK: Supple. No lymphadenopathy. No carotid bruit  CVS:    regular rate and rhythm, no audible murmur  PULM:  CTA, no wheezes, rales or rhonchi, no acute respiratory distress  ABD:    Bowels sounds normal.  Abdomen is soft. No distention. epigastric tenderness to palpation. EXT:   no edema bilaterally . No calf tenderness. NEURO: Moves all extremities.   Motor and sensory are grossly intact  SKIN:  No rashes. No skin lesions.    -----------------------------------------------------------------  DATA:  Complete Blood Count:   Recent Labs     10/06/22  0540 10/06/22  1855 10/07/22  0615 10/08/22  0520   WBC 10.9  --  9.3 9.2   RBC 4.17*  --  3.89* 3.90*   HGB 12.9* 12.0* 11.9* 12.2*   HCT 37.4* 34.7* 34.5* 34.2*   MCV 89.7  --  88.7 87.7   MCH 30.9  --  30.6 31.3   MCHC 34.5  --  34.5 35.7*   RDW 13.1  --  12.7 12.6     --  262 279   MPV 10.6  --  10.1 10.2        Last 3 Blood Glucose:   Recent Labs     10/05/22  1430 10/06/22  0540 10/07/22  0615 10/08/22  0520   GLUCOSE 144* 84 77 86        Comprehensive Metabolic Profile:   Recent Labs     10/06/22  0540 10/07/22  0615 10/08/22  0520    139 140   K 3.7 3.9 3.4*   * 105 104   CO2 21 25 25   BUN 23* 15 6   CREATININE 0.96 0.88 0.82   GLUCOSE 84 77 86   CALCIUM 8.6 8.7 8.6   PROT 6.3* 6.1* 6.2*   LABALBU 3.7 3.7 3.7   BILITOT 0.4 0.4 0.4   ALKPHOS 50 50 50   AST 16 19 19   ALT 20 19 20        Urinalysis:   Lab Results   Component Value Date/Time    NITRU NEGATIVE 10/05/2022 06:19 PM    COLORU Yellow 10/05/2022 06:19 PM    PHUR 5.5 10/05/2022 06:19 PM    WBCUA 0 TO 2 10/05/2022 06:19 PM    RBCUA 0 TO 2 10/05/2022 06:19 PM    SPECGRAV 1.010 10/05/2022 06:19 PM    LEUKOCYTESUR NEGATIVE 10/05/2022 06:19 PM    UROBILINOGEN Normal 10/05/2022 06:19 PM    BILIRUBINUR NEGATIVE 10/05/2022 06:19 PM    GLUCOSEU NEGATIVE 10/05/2022 06:19 PM    KETUA NEGATIVE 10/05/2022 06:19 PM       HgBA1c:    Lab Results   Component Value Date/Time    LABA1C 6.3 09/12/2022 05:35 AM       Lactic Acid:   Lab Results   Component Value Date/Time    LACTA 2.8 10/05/2022 02:28 PM        Radiology/Imaging:  CT ABDOMEN PELVIS W IV CONTRAST Additional Contrast? None   Final Result   No acute intra-abdominal or intrapelvic abnormalities are noted.       Bibasilar atelectasis         CT ABDOMEN PELVIS W IV CONTRAST Additional Contrast? None   Final Result No acute abnormality is seen in the abdomen or pelvis. No bowel obstruction is seen. Normal appendix.                  ASSESSMENT / PLAN:  Intractable abdominal pain d/t gastric ulcer  Gen Surg consult appreciated - discussed with Dr. Cameron PAYNE from Gen Surg standpoint for DC today  Continue Protonix and Carafate  Will DC with zofran prn as well  Counseled to avoid NSAIDs  Diabetes mellitus  Continue Metformin  Hypertension  Continue Cardizem, Lisinopril, Metoprolol  Nutrition status:   obesity, non-morbid  Dietician consult initiated  High risk medications: none   Disposition:    Discharge plan is home    Dionne Robbins MD , M.GHADA.  10/8/2022  12:18 PM

## 2022-10-08 NOTE — PLAN OF CARE
Problem: Discharge Planning  Goal: Discharge to home or other facility with appropriate resources  10/8/2022 1311 by Barry Crow RN  Outcome: Completed     Problem: Pain  Goal: Verbalizes/displays adequate comfort level or baseline comfort level  10/8/2022 1311 by Barry Crow RN  Outcome: Completed     Problem: Safety - Adult  Goal: Free from fall injury  10/8/2022 1311 by Barry Crow RN  Outcome: Completed     Problem: Nutrition Deficit:  Goal: Optimize nutritional status  10/8/2022 1311 by Barry Crow RN  Outcome: Completed     Problem: Hematologic - Adult  Goal: Maintains hematologic stability  10/8/2022 1311 by Barry Crow RN  Outcome: Completed

## 2022-10-08 NOTE — PLAN OF CARE
Problem: Discharge Planning  Goal: Discharge to home or other facility with appropriate resources  Outcome: Progressing  Flowsheets (Taken 10/7/2022 0705 by Ariadne Daniel, RN)  Discharge to home or other facility with appropriate resources: Identify barriers to discharge with patient and caregiver     Problem: Pain  Goal: Verbalizes/displays adequate comfort level or baseline comfort level  Outcome: Progressing  Flowsheets (Taken 10/7/2022 0235 by Morenita Tran RN)  Verbalizes/displays adequate comfort level or baseline comfort level:   Encourage patient to monitor pain and request assistance   Assess pain using appropriate pain scale   Administer analgesics based on type and severity of pain and evaluate response   Implement non-pharmacological measures as appropriate and evaluate response     Problem: Safety - Adult  Goal: Free from fall injury  Outcome: Progressing  Flowsheets (Taken 10/7/2022 0955 by Ariadne Daniel RN)  Free From Fall Injury: Instruct family/caregiver on patient safety     Problem: Nutrition Deficit:  Goal: Optimize nutritional status  Outcome: Progressing  Flowsheets (Taken 10/7/2022 0235 by Morenita Tran RN)  Nutrient intake appropriate for improving, restoring, or maintaining nutritional needs:   Monitor oral intake, labs, and treatment plans   Provide specific nutrition education to patient or family as appropriate     Problem: Hematologic - Adult  Goal: Maintains hematologic stability  Outcome: Progressing  Flowsheets (Taken 10/7/2022 0705 by Ariadne Daniel RN)  Maintains hematologic stability: Assess for signs and symptoms of bleeding or hemorrhage

## 2022-10-08 NOTE — PROGRESS NOTES
Writer at bedside for second assessment. Patient resting in bed. Vitals and second assessment completed as charted. Patient states he is having pain, medication given. Patient denies any further needs at this time. Call light within reach.

## 2022-10-11 ENCOUNTER — TELEPHONE (OUTPATIENT)
Dept: GASTROENTEROLOGY | Age: 47
End: 2022-10-11

## 2022-10-11 NOTE — TELEPHONE ENCOUNTER
Contacted patient regarding need to move EGD to 12/7/22 instead of Dec. 20th. Patient agreeable, states has not yet received instructions - verified mailing address and copy of instructions mailed.

## 2022-10-27 RX ORDER — PANTOPRAZOLE SODIUM 40 MG/1
TABLET, DELAYED RELEASE ORAL
Qty: 30 TABLET | Refills: 1 | OUTPATIENT
Start: 2022-10-27

## 2022-11-18 ENCOUNTER — OFFICE VISIT (OUTPATIENT)
Dept: PRIMARY CARE CLINIC | Age: 47
End: 2022-11-18
Payer: COMMERCIAL

## 2022-11-18 VITALS
OXYGEN SATURATION: 98 % | HEART RATE: 72 BPM | BODY MASS INDEX: 31.71 KG/M2 | WEIGHT: 202 LBS | SYSTOLIC BLOOD PRESSURE: 118 MMHG | DIASTOLIC BLOOD PRESSURE: 80 MMHG | HEIGHT: 67 IN

## 2022-11-18 DIAGNOSIS — R05.9 COUGH, UNSPECIFIED TYPE: ICD-10-CM

## 2022-11-18 DIAGNOSIS — F41.1 GENERALIZED ANXIETY DISORDER: ICD-10-CM

## 2022-11-18 DIAGNOSIS — E11.69 TYPE 2 DIABETES MELLITUS WITH OTHER SPECIFIED COMPLICATION, WITHOUT LONG-TERM CURRENT USE OF INSULIN (HCC): ICD-10-CM

## 2022-11-18 DIAGNOSIS — I10 PRIMARY HYPERTENSION: ICD-10-CM

## 2022-11-18 DIAGNOSIS — E78.5 HYPERLIPIDEMIA, UNSPECIFIED HYPERLIPIDEMIA TYPE: ICD-10-CM

## 2022-11-18 DIAGNOSIS — J45.20 MILD INTERMITTENT ASTHMA, UNSPECIFIED WHETHER COMPLICATED: Primary | ICD-10-CM

## 2022-11-18 PROBLEM — E11.9 DIABETES MELLITUS (HCC): Status: ACTIVE | Noted: 2022-11-18

## 2022-11-18 PROCEDURE — 99204 OFFICE O/P NEW MOD 45 MIN: CPT | Performed by: STUDENT IN AN ORGANIZED HEALTH CARE EDUCATION/TRAINING PROGRAM

## 2022-11-18 PROCEDURE — 3074F SYST BP LT 130 MM HG: CPT | Performed by: STUDENT IN AN ORGANIZED HEALTH CARE EDUCATION/TRAINING PROGRAM

## 2022-11-18 PROCEDURE — 3044F HG A1C LEVEL LT 7.0%: CPT | Performed by: STUDENT IN AN ORGANIZED HEALTH CARE EDUCATION/TRAINING PROGRAM

## 2022-11-18 PROCEDURE — 3078F DIAST BP <80 MM HG: CPT | Performed by: STUDENT IN AN ORGANIZED HEALTH CARE EDUCATION/TRAINING PROGRAM

## 2022-11-18 RX ORDER — FENOFIBRATE 160 MG/1
160 TABLET ORAL DAILY
Qty: 90 TABLET | Refills: 0 | Status: SHIPPED | OUTPATIENT
Start: 2022-11-18

## 2022-11-18 RX ORDER — POLYETHYLENE GLYCOL 3350, SODIUM CHLORIDE, SODIUM BICARBONATE, POTASSIUM CHLORIDE 420; 11.2; 5.72; 1.48 G/4L; G/4L; G/4L; G/4L
POWDER, FOR SOLUTION ORAL
COMMUNITY
Start: 2022-09-27 | End: 2022-11-29 | Stop reason: CLARIF

## 2022-11-18 RX ORDER — METOPROLOL TARTRATE 50 MG/1
TABLET, FILM COATED ORAL
COMMUNITY
Start: 2022-09-04 | End: 2022-11-18

## 2022-11-18 RX ORDER — FLUTICASONE PROPIONATE 110 UG/1
1 AEROSOL, METERED RESPIRATORY (INHALATION) 2 TIMES DAILY
Qty: 12 G | Refills: 3 | Status: SHIPPED | OUTPATIENT
Start: 2022-11-18

## 2022-11-18 RX ORDER — ALBUTEROL SULFATE 90 UG/1
2 AEROSOL, METERED RESPIRATORY (INHALATION) 4 TIMES DAILY PRN
Qty: 18 G | Refills: 3 | Status: SHIPPED | OUTPATIENT
Start: 2022-11-18

## 2022-11-18 RX ORDER — ALPRAZOLAM 0.25 MG/1
0.25 TABLET ORAL 2 TIMES DAILY PRN
Qty: 60 TABLET | Refills: 0 | Status: SHIPPED | OUTPATIENT
Start: 2022-11-18 | End: 2022-12-18

## 2022-11-18 RX ORDER — ATORVASTATIN CALCIUM 80 MG/1
TABLET, FILM COATED ORAL
COMMUNITY
Start: 2022-09-04

## 2022-11-18 RX ORDER — PREDNISONE 20 MG/1
TABLET ORAL
COMMUNITY
Start: 2022-11-15 | End: 2022-11-29 | Stop reason: CLARIF

## 2022-11-18 RX ORDER — METOPROLOL SUCCINATE 100 MG/1
100 TABLET, EXTENDED RELEASE ORAL DAILY
COMMUNITY
End: 2022-11-18

## 2022-11-18 RX ORDER — AMOXICILLIN 875 MG/1
TABLET, COATED ORAL
COMMUNITY
Start: 2022-11-15 | End: 2022-11-29 | Stop reason: CLARIF

## 2022-11-18 RX ORDER — PREDNISONE 20 MG/1
20 TABLET ORAL 2 TIMES DAILY
Qty: 10 TABLET | Refills: 0 | Status: SHIPPED | OUTPATIENT
Start: 2022-11-18 | End: 2022-11-23

## 2022-11-18 SDOH — HEALTH STABILITY: PHYSICAL HEALTH: ON AVERAGE, HOW MANY MINUTES DO YOU ENGAGE IN EXERCISE AT THIS LEVEL?: 10 MIN

## 2022-11-18 SDOH — HEALTH STABILITY: PHYSICAL HEALTH: ON AVERAGE, HOW MANY DAYS PER WEEK DO YOU ENGAGE IN MODERATE TO STRENUOUS EXERCISE (LIKE A BRISK WALK)?: 3 DAYS

## 2022-11-18 ASSESSMENT — SOCIAL DETERMINANTS OF HEALTH (SDOH)
WITHIN THE LAST YEAR, HAVE YOU BEEN HUMILIATED OR EMOTIONALLY ABUSED IN OTHER WAYS BY YOUR PARTNER OR EX-PARTNER?: NO
WITHIN THE LAST YEAR, HAVE YOU BEEN KICKED, HIT, SLAPPED, OR OTHERWISE PHYSICALLY HURT BY YOUR PARTNER OR EX-PARTNER?: NO
WITHIN THE LAST YEAR, HAVE YOU BEEN AFRAID OF YOUR PARTNER OR EX-PARTNER?: NO
WITHIN THE LAST YEAR, HAVE TO BEEN RAPED OR FORCED TO HAVE ANY KIND OF SEXUAL ACTIVITY BY YOUR PARTNER OR EX-PARTNER?: NO

## 2022-11-18 ASSESSMENT — PATIENT HEALTH QUESTIONNAIRE - PHQ9
6. FEELING BAD ABOUT YOURSELF - OR THAT YOU ARE A FAILURE OR HAVE LET YOURSELF OR YOUR FAMILY DOWN: 0
SUM OF ALL RESPONSES TO PHQ QUESTIONS 1-9: 14
SUM OF ALL RESPONSES TO PHQ QUESTIONS 1-9: 14
7. TROUBLE CONCENTRATING ON THINGS, SUCH AS READING THE NEWSPAPER OR WATCHING TELEVISION: 3
SUM OF ALL RESPONSES TO PHQ9 QUESTIONS 1 & 2: 4
8. MOVING OR SPEAKING SO SLOWLY THAT OTHER PEOPLE COULD HAVE NOTICED. OR THE OPPOSITE, BEING SO FIGETY OR RESTLESS THAT YOU HAVE BEEN MOVING AROUND A LOT MORE THAN USUAL: 0
4. FEELING TIRED OR HAVING LITTLE ENERGY: 2
3. TROUBLE FALLING OR STAYING ASLEEP: 3
SUM OF ALL RESPONSES TO PHQ QUESTIONS 1-9: 14
2. FEELING DOWN, DEPRESSED OR HOPELESS: 1
10. IF YOU CHECKED OFF ANY PROBLEMS, HOW DIFFICULT HAVE THESE PROBLEMS MADE IT FOR YOU TO DO YOUR WORK, TAKE CARE OF THINGS AT HOME, OR GET ALONG WITH OTHER PEOPLE: 0
1. LITTLE INTEREST OR PLEASURE IN DOING THINGS: 3
5. POOR APPETITE OR OVEREATING: 2
9. THOUGHTS THAT YOU WOULD BE BETTER OFF DEAD, OR OF HURTING YOURSELF: 0
SUM OF ALL RESPONSES TO PHQ QUESTIONS 1-9: 14

## 2022-11-18 NOTE — PATIENT INSTRUCTIONS
SURVEY:    You may be receiving a survey from [employer] regarding your visit today. Please complete the survey to enable us to provide the highest quality of care to you and your family. If you cannot score us a very good on any question, please call the office to discuss how we could of made your experience a very good one. Thank you.

## 2022-11-18 NOTE — PROGRESS NOTES
[address], [address] , [facility], [address] [place] [address] is a 56 y.o. male with  has a past medical history of CAD (coronary artery disease), Carpal tunnel syndrome, Depressive disorder, not elsewhere classified, Diabetes mellitus ([facility] [address] Heart attack ([facility] [address] History of echocardiogram, Hyperlipidemia, and Hypertension. Presented to the office today for:  Chief Complaint   Patient presents with    Establish Care    Chest Congestion       Assessment/Plan   1. Mild intermittent asthma, unspecified whether complicated  -     albuterol sulfate HFA (VENTOLIN HFA) 108 (90 Base) MCG/ACT inhaler; Inhale 2 puffs into the lungs 4 times daily as needed for Wheezing, Disp-18 g, R-3Normal  -     DME Order for Nebulizer as OP  -     fluticasone (FLOVENT HFA) 110 MCG/ACT inhaler; Inhale 1 puff into the lungs 2 times daily, Disp-12 g, R-3Normal  -     predniSONE (DELTASONE) 20 MG tablet; Take 1 tablet by mouth 2 times daily for 5 days, Disp-10 tablet, R-0Normal  2. Primary hypertension  -     CBC with Auto Differential; Future  -     Comprehensive Metabolic Panel; Future  3. Hyperlipidemia, unspecified hyperlipidemia type  -     fenofibrate (TRIGLIDE) 160 MG tablet; Take 1 tablet by mouth daily, Disp-90 tablet, R-0Normal  -     Lipid Panel; Future  4. Generalized anxiety disorder  -     ALPRAZolam (XANAX) 0.25 MG tablet; Take 1 tablet by mouth 2 times daily as needed for Sleep or Anxiety for up to 30 days. , Disp-60 tablet, R-0Normal  5. Type 2 diabetes mellitus with other specified complication, without long-term current use of insulin (HCC)  -     Hemoglobin A1C; Future  6. Cough, unspecified type    Return in about 3 months (around 2/18/2023) for F/U T2DM/Meds. Mild intermittent asthma - Flovent started today, Albuterol PRN  HTN - stable at this time, continue w/ meds at the current doses.   Hyperlipidemia - on a statin and fenofibrate, continue at the current doses  Anxiety - continue w/ Xanax at the current dose for now, risks/benefits/alternatives were discussed. T2DM - stable A1c at this time. Continue w/ metformin at the current dose. F/U with Cardiology as per prior plans. Lisa Alvarez was evaluated today and a DME order was entered for a nebulizer compressor in order to administer Albuterol due to the diagnosis of Mild intermittent asthma. The need for this equipment and treatment was discussed with the patient and he understands and is in agreement. Last PDMP Arturo Almanzar as Reviewed:  Review User Review Instant Review Result   Arron Alcala 11/18/2022  1:33 PM Reviewed PDMP [1]         All patient questions answered. Pt voiced understanding. Medications Discontinued During This Encounter   Medication Reason    atorvastatin (LIPITOR) 40 MG tablet LIST CLEANUP    metoprolol tartrate (LOPRESSOR) 50 MG tablet LIST CLEANUP    metoprolol succinate (TOPROL XL) 100 MG extended release tablet LIST CLEANUP    ALPRAZolam (XANAX) 0.25 MG tablet REORDER    fenofibrate (TRIGLIDE) 160 MG tablet REORDER       Patient received counseling on the following healthy behaviors: nutrition, exercise and medication adherence. I encouraged and discussed lifestyle modifications including diet and exercise and the patient was agreeable to making positive/beneficial changes to both to help improve their overall health. Discussed use, benefit, and side effects of prescribed medications. Barriers to medication compliance addressed. Patient given educational materials: see patient instructions. HM - HM items completed today as per orders. Outstanding HM items though not limited to immunizations were discussed with the patient today, including risks, benefits and alternatives. The patient will discuss these during the next appointment per their preference. If there are any worsening or concerning signs or symptoms, patient will report to the ED and/or contact EMS-[id number] for immediate evaluation.  Teach back method was used. Subjective:    HPI  Chief Complaint   Patient presents with    Establish Care    Chest Congestion     Establish care  New patient is here to establish care. Cough, shortness of breath, Asthma  Currently patient has a cough and has had this for 2 weeks. He is currently taking amoxicillin 875mg and prednisone and albuterol PRN, sometimes daily. The patient has a history of asthma, at the age of 16. HYPERTENSION:  He is not exercising, He is adherent to a low-salt diet. Blood pressure is being monitored at home, average readings are 124/81. Patient is on Lisinopril, metoprolol, Cardizem. Hyperlipidemia:  No new myalgias or GI upset on atorvastatin (Lipitor). Medication compliance: compliant all of the time. Patient is  following a low fat, low cholesterol diet. He is  exercising regularly. He is also on fenofibrate    Lab Results   Component Value Date    CHOL 98 09/12/2022    TRIG 113 09/12/2022    HDL 21 (L) 09/12/2022     Lab Results   Component Value Date    ALT 20 10/08/2022    AST 19 10/08/2022        Anxiety:  The patient presents for new evaluation and treatment of anxiety disorder. He has the following anxiety symptoms: difficulty concentrating and fatigue. Current treatment includes Xanax. He needs a refill on Xanax. He usually takes it up to 2 times BID and for sleeping difficulty. The patient did not tolerate SSRI/SNRI's well. He uses breathing techniques. The patient has been noting more stress at work. The patient had been following with Tita Phipps in the past. Last filled on 8/28/2022. Follow-up of Type 2 diabetes mellitus.    Meds - metformin only at this time  Compliance - good  Home blood sugar records: 125  Any episodes of hypoglycemia? no  Fluctuating blood sugars?no  Weight trend: stable  Current diet: in general, a \"healthy\" diet    Current exercise: walking  Known diabetic complications: none  Hgb A1c -   Lab Results   Component Value Date    LABA1C 6.3 (H) 2022     BP -   BP Readings from Last 1 Encounters:   22 118/80     Lipid Panel -   Lab Results   Component Value Date/Time    HDL 21 2022 05:35 AM    TRIG 113 2022 05:35 AM       Health Maintenance -   Alcohol/Substance use History - None/Minimal    Tobacco Use      Smoking status: Former        Packs/day: 0.25        Years: 30.00        Pack years: 7.5        Types: Cigarettes        Quit date: 2022        Years since quittin.7      Smokeless tobacco: Never    Family History   Problem Relation Age of Onset    High Blood Pressure Mother     Heart Disease Father     High Blood Pressure Father     Asthma Sister     Colon Cancer Paternal Uncle     Colon Cancer Paternal Uncle     Pancreatic Cancer Maternal Aunt     Pancreatic Cancer Maternal Aunt        [place] Scores 2022 1/10/2019   PHQ2 Score 4 0   PHQ9 Score 14 0     Interpretation of Total Score Depression Severity: 1-4 = Minimal depression, 5-9 = Mild depression, 10-14 = Moderate depression, 15-19 = Moderately severe depression, 20-27 = Severe depression    Review of Systems  Constitutional: Negative for activity change, appetite change, chills, diaphoresis, fatigue, fever and unexpected weight change. HENT: Negative for sinus pressure, sinus pain, sore throat and trouble swallowing. Respiratory: Positive for cough, shortness of breath and wheezing. Cardiovascular: Negative for chest pain, palpitations and leg swelling. Gastrointestinal: Negative for abdominal pain, diarrhea, nausea and vomiting. Endocrine: Negative for cold intolerance, polydipsia, polyphagia and polyuria. Genitourinary: Negative for difficulty urinating, flank pain and frequency. Musculoskeletal: Negative for gait problem and joint swelling. Negative for back pain, neck pain and neck stiffness. Skin: Negative for color change and wound. Negative for pallor and rash.    Allergic/Immunologic: Negative for environmental allergies and food allergies. Neurological: Negative for light-headedness, numbness and headaches. Psychiatric/Behavioral: Negative for sleep disturbance. Negative for confusion and suicidal ideas. Positive for anxiety. Objective:    /80   Pulse 72   Ht 5' 7\" (1.702 m)   Wt 202 lb (91.6 kg)   SpO2 98%   BMI 31.64 kg/m²    BP Readings from Last 3 Encounters:   11/18/22 118/80   10/08/22 124/80   10/04/22 119/78     Physical Exam  Constitutional: Patient is oriented to person, place, and time. Patient appears well-developed and well-nourished. No distress. HENT: Head: Normocephalic and atraumatic. Eyes: Pupils are equal, round, and reactive to light. Conjunctivae are normal. Right eye exhibits no discharge. Left eye exhibits no discharge. Cardiovascular: Normal rate, regular rhythm and normal heart sounds. Pulmonary/Chest: Effort normal and breath sounds normal. No respiratory distress. Patient has slight wheezing present to posterior lung fields. Abdominal: Soft. Bowel sounds are normal. Patient exhibits no distension. There is no tenderness. Musculoskeletal:  Patient exhibits no edema and tenderness. Patient exhibits no deformity. Neurological: Patient is alert and oriented to person, place, and time. Skin: Skin is warm and dry. Patient is not diaphoretic. Psychiatric: Patient's speech is normal and behavior is normal. Thought content normal.   Mental Status: appearance:  appropriately dressed and healthy looking, behavior:  normal, attitude:  cooperative, speech:  appropriate, mood:  euthymic, affect:  congruent with mood, thought content:  no evidence of psychosis, thought process:  logical and coherent, orientation:  oriented in all spheres, memory:  recent:  good and remote:  good, insight:  good , judgment:  good , and cognitive:  intact  Vitals reviewed.       Lab Results   Component Value Date    WBC 9.2 10/08/2022    HGB 12.2 (L) 10/08/2022    HCT 34.2 (L) 10/08/2022     10/08/2022 CHOL 98 09/12/2022    TRIG 113 09/12/2022    HDL 21 (L) 09/12/2022    ALT 20 10/08/2022    AST 19 10/08/2022     10/08/2022    K 3.4 (L) 10/08/2022     10/08/2022    CREATININE 0.82 10/08/2022    BUN 6 10/08/2022    CO2 25 10/08/2022    TSH 2.35 09/12/2022    INR 1.0 12/26/2021    LABA1C 6.3 (H) 09/12/2022     Lab Results   Component Value Date    CALCIUM 8.6 10/08/2022     Lab Results   Component Value Date    LDLCHOLESTEROL 54 09/12/2022       Please note that this chart was generated using voice recognition Dragon dictation software. Although every effort was made to ensure the accuracy of this automated transcription, some errors in transcription may have occurred.     Electronically signed by Dr. Rebecca Leavitt MD on 11/18/2022 at 1:45 PM

## 2022-11-29 ENCOUNTER — OFFICE VISIT (OUTPATIENT)
Dept: PRIMARY CARE CLINIC | Age: 47
End: 2022-11-29
Payer: COMMERCIAL

## 2022-11-29 VITALS
WEIGHT: 204 LBS | HEART RATE: 82 BPM | BODY MASS INDEX: 32.02 KG/M2 | DIASTOLIC BLOOD PRESSURE: 72 MMHG | OXYGEN SATURATION: 98 % | SYSTOLIC BLOOD PRESSURE: 126 MMHG | HEIGHT: 67 IN

## 2022-11-29 DIAGNOSIS — J40 BRONCHITIS: ICD-10-CM

## 2022-11-29 DIAGNOSIS — J45.31 MILD PERSISTENT ASTHMA WITH ACUTE EXACERBATION: Primary | ICD-10-CM

## 2022-11-29 PROCEDURE — 3078F DIAST BP <80 MM HG: CPT | Performed by: STUDENT IN AN ORGANIZED HEALTH CARE EDUCATION/TRAINING PROGRAM

## 2022-11-29 PROCEDURE — 99214 OFFICE O/P EST MOD 30 MIN: CPT | Performed by: STUDENT IN AN ORGANIZED HEALTH CARE EDUCATION/TRAINING PROGRAM

## 2022-11-29 PROCEDURE — 3074F SYST BP LT 130 MM HG: CPT | Performed by: STUDENT IN AN ORGANIZED HEALTH CARE EDUCATION/TRAINING PROGRAM

## 2022-11-29 RX ORDER — AZITHROMYCIN 250 MG/1
250 TABLET, FILM COATED ORAL SEE ADMIN INSTRUCTIONS
Qty: 6 TABLET | Refills: 0 | Status: SHIPPED | OUTPATIENT
Start: 2022-11-29 | End: 2022-12-04

## 2022-11-29 RX ORDER — PREDNISONE 20 MG/1
20 TABLET ORAL 2 TIMES DAILY
Qty: 20 TABLET | Refills: 1 | Status: SHIPPED | OUTPATIENT
Start: 2022-11-29 | End: 2022-12-09

## 2022-11-29 NOTE — TELEPHONE ENCOUNTER
Patient needed to cancel EGD from 12/7/22 due to starting on antibiotics/steroids today. Patient does not want to reschedule at this time due to insurance benefits/deductible information with having to reschedule past 1/1/2023.

## 2022-11-29 NOTE — PROGRESS NOTES
[address], [address] , [facility], [address] [place] [address] is a 56 y.o. male with  has a past medical history of CAD (coronary artery disease), Carpal tunnel syndrome, Depressive disorder, not elsewhere classified, Diabetes mellitus ([facility] [address] Heart attack ([facility] [address] History of echocardiogram, Hyperlipidemia, and Hypertension. Presented to the office today for:  Chief Complaint   Patient presents with    Cough    Congestion       Assessment/Plan   1. Mild persistent asthma with acute exacerbation  -     predniSONE (DELTASONE) 20 MG tablet; Take 1 tablet by mouth 2 times daily for 10 days, Disp-20 tablet, R-1Normal  -     albuterol (PROVENTIL) (5 MG/ML) 0.5% nebulizer solution; Take 1 mL by nebulization 4 times daily as needed for Wheezing, Disp-120 each, R-3Normal  2. Bronchitis  -     predniSONE (DELTASONE) 20 MG tablet; Take 1 tablet by mouth 2 times daily for 10 days, Disp-20 tablet, R-1Normal  -     azithromycin (ZITHROMAX) 250 MG tablet; Take 1 tablet by mouth See Admin Instructions for 5 days 500mg on day 1 followed by 250mg on days 2 - 5, Disp-6 tablet, R-0Normal  -     albuterol (PROVENTIL) (5 MG/ML) 0.5% nebulizer solution; Take 1 mL by nebulization 4 times daily as needed for Wheezing, Disp-120 each, R-3Normal    Return if symptoms worsen or fail to improve, for F/U as per prior plans. Symptomatic therapy suggested: push fluids, rest, gargle warm salt water, use vaporizer or mist prn and apply heat to sinuses prn. Nasal saline sprays PRN. Use Neti Pot PRN. Tylenol PRN for pain/fevers, Albuterol PRN for any shortness of breath/wheezing/mild dyspnea. Cepacol PRN for sore throat. Start with Azithromycin, Prednisone. May consider additional w/u and management if needed, including CXR/advanced chest imaging, labs. If there are any worsening or concerning signs or symptoms, patient will report to the ED and/or contact [id number] for immediate evaluation.  Teach back method was used. All patient questions answered. Pt voiced understanding. All patient questions answered. Pt voiced understanding. Medications Discontinued During This Encounter   Medication Reason    amoxicillin (AMOXIL) 875 MG tablet ERROR    pantoprazole (PROTONIX) 40 MG tablet ERROR    polyethylene glycol-electrolytes (NULYTELY) 420 g solution ERROR    predniSONE (DELTASONE) 20 MG tablet ERROR    sucralfate (CARAFATE) 1 GM tablet ERROR       Patient received counseling on the following healthy behaviors: nutrition, exercise and medication adherence. I encouraged and discussed lifestyle modifications including diet and exercise and the patient was agreeable to making positive/beneficial changes to both to help improve their overall health. Discussed use, benefit, and side effects of prescribed medications. Barriers to medication compliance addressed. Patient given educational materials: see patient instructions. HM - HM items completed today as per orders. Outstanding HM items though not limited to immunizations were discussed with the patient today, including risks, benefits and alternatives. The patient will discuss these during the next appointment per their preference. If there are any worsening or concerning signs or symptoms, patient will report to the ED and/or contact EMS-[id number] for immediate evaluation. Teach back method was used. Subjective:    HPI  Chief Complaint   Patient presents with    Cough    Congestion     Cough and Congestion for 1 months  Nature of Onset and Mechanism -  gradual, worsening over time  Location - chest , head   Characteristics/Radiation/Quality - chest tightness, sore throat , swollen throat morning and evenings worse , yellow green mucus , cough, diarrhea , with associated shortness of breath and wheezing. He denies nausea / vomiting. The patient completed Amoxicillin 875mg.      Health Maintenance -   Alcohol/Substance use History - None/Minimal    Tobacco Use      Smoking status: Former        Packs/day: 0.25        Years: 30.00        Pack years: 7.5        Types: Cigarettes        Quit date: 2022        Years since quittin.7      Smokeless tobacco: Never    Family History   Problem Relation Age of Onset    High Blood Pressure Mother     Heart Disease Father     High Blood Pressure Father     Asthma Sister     Colon Cancer Paternal Uncle     Colon Cancer Paternal Uncle     Pancreatic Cancer Maternal Aunt     Pancreatic Cancer Maternal Aunt        [facility] Scores 2022 1/10/2019   PHQ2 Score 4 0   PHQ9 Score 14 0     Interpretation of Total Score Depression Severity: 1-4 = Minimal depression, 5-9 = Mild depression, 10-14 = Moderate depression, 15-19 = Moderately severe depression, 20-27 = Severe depression    Review of Systems  Constitutional: Negative for activity change, appetite change, chills, diaphoresis, fatigue, fever and unexpected weight change. HENT: Negative for sinus pressure, sinus pain, sore throat and trouble swallowing. Respiratory: Positive for cough, shortness of breath and wheezing. Cardiovascular: Negative for chest pain, palpitations and leg swelling. Gastrointestinal: Negative for abdominal pain, diarrhea, nausea and vomiting. Endocrine: Negative for cold intolerance, polydipsia, polyphagia and polyuria. Genitourinary: Negative for difficulty urinating, flank pain and frequency. Musculoskeletal: Negative for gait problem and joint swelling. Negative for back pain, neck pain and neck stiffness. Skin: Negative for color change and wound. Negative for pallor and rash. Allergic/Immunologic: Negative for environmental allergies and food allergies. Neurological: Negative for light-headedness, numbness and headaches. Psychiatric/Behavioral: Negative for sleep disturbance. Negative for confusion and suicidal ideas.      Objective:    /72   Pulse 82   Ht 5' 7\" (1.702 m)   Wt 204 lb (92.5 kg)   SpO2 98%   BMI 31.95 kg/m²    BP Readings from Last 3 Encounters:   11/29/22 126/72   11/18/22 118/80   10/08/22 124/80     Physical Exam  Constitutional: Patient is oriented to person, place, and time. Patient appears well-developed and well-nourished. No distress. HENT: Head: Normocephalic and atraumatic. Eyes: Pupils are equal, round, and reactive to light. Conjunctivae are normal. Right eye exhibits no discharge. Left eye exhibits no discharge. Cardiovascular: Normal rate, regular rhythm and normal heart sounds. Pulmonary/Chest: Effort normal and breath sounds normal. No respiratory distress. Patient has some wheezing to posterior lung fields. Abdominal: Soft. Bowel sounds are normal. Patient exhibits no distension. There is no tenderness. Musculoskeletal:  Patient exhibits no edema and tenderness. Patient exhibits no deformity. Neurological: Patient is alert and oriented to person, place, and time. Skin: Skin is warm and dry. Patient is not diaphoretic. Psychiatric: Patient's speech is normal and behavior is normal. Thought content normal.   Vitals reviewed. Lab Results   Component Value Date    WBC 9.2 10/08/2022    HGB 12.2 (L) 10/08/2022    HCT 34.2 (L) 10/08/2022     10/08/2022    CHOL 98 09/12/2022    TRIG 113 09/12/2022    HDL 21 (L) 09/12/2022    ALT 20 10/08/2022    AST 19 10/08/2022     10/08/2022    K 3.4 (L) 10/08/2022     10/08/2022    CREATININE 0.82 10/08/2022    BUN 6 10/08/2022    CO2 25 10/08/2022    TSH 2.35 09/12/2022    INR 1.0 12/26/2021    LABA1C 6.3 (H) 09/12/2022     Lab Results   Component Value Date    CALCIUM 8.6 10/08/2022     Lab Results   Component Value Date    LDLCHOLESTEROL 54 09/12/2022       Please note that this chart was generated using voice recognition Dragon dictation software. Although every effort was made to ensure the accuracy of this automated transcription, some errors in transcription may have occurred.     Electronically signed by  Rebecca Leavitt MD on 11/29/2022 at 9:00 AM

## 2022-12-01 DIAGNOSIS — J45.31 MILD PERSISTENT ASTHMA WITH ACUTE EXACERBATION: Primary | ICD-10-CM

## 2022-12-01 DIAGNOSIS — J40 BRONCHITIS: ICD-10-CM

## 2022-12-01 RX ORDER — ALBUTEROL SULFATE 0.63 MG/3ML
1 SOLUTION RESPIRATORY (INHALATION) EVERY 6 HOURS PRN
Qty: 270 ML | Refills: 3 | Status: SHIPPED | OUTPATIENT
Start: 2022-12-01

## 2022-12-07 RX ORDER — ATORVASTATIN CALCIUM 80 MG/1
80 TABLET, FILM COATED ORAL DAILY
Qty: 90 TABLET | Refills: 3 | Status: SHIPPED | OUTPATIENT
Start: 2022-12-07

## 2022-12-18 PROBLEM — R05.9 COUGH: Status: RESOLVED | Noted: 2022-11-18 | Resolved: 2022-12-18

## 2022-12-19 ENCOUNTER — OFFICE VISIT (OUTPATIENT)
Dept: PRIMARY CARE CLINIC | Age: 47
End: 2022-12-19
Payer: COMMERCIAL

## 2022-12-19 VITALS
BODY MASS INDEX: 32.02 KG/M2 | HEIGHT: 67 IN | HEART RATE: 90 BPM | DIASTOLIC BLOOD PRESSURE: 82 MMHG | OXYGEN SATURATION: 97 % | SYSTOLIC BLOOD PRESSURE: 132 MMHG | WEIGHT: 204 LBS

## 2022-12-19 DIAGNOSIS — R06.02 SHORTNESS OF BREATH: Primary | ICD-10-CM

## 2022-12-19 DIAGNOSIS — R00.2 PALPITATIONS: ICD-10-CM

## 2022-12-19 DIAGNOSIS — J45.20 MILD INTERMITTENT ASTHMA, UNSPECIFIED WHETHER COMPLICATED: ICD-10-CM

## 2022-12-19 PROCEDURE — 99214 OFFICE O/P EST MOD 30 MIN: CPT | Performed by: STUDENT IN AN ORGANIZED HEALTH CARE EDUCATION/TRAINING PROGRAM

## 2022-12-19 PROCEDURE — 3074F SYST BP LT 130 MM HG: CPT | Performed by: STUDENT IN AN ORGANIZED HEALTH CARE EDUCATION/TRAINING PROGRAM

## 2022-12-19 PROCEDURE — 3078F DIAST BP <80 MM HG: CPT | Performed by: STUDENT IN AN ORGANIZED HEALTH CARE EDUCATION/TRAINING PROGRAM

## 2022-12-19 NOTE — PROGRESS NOTES
[address], [address] , [facility], [facility] is a 56 y.o. male with  has a past medical history of CAD (coronary artery disease), Carpal tunnel syndrome, Depressive disorder, not elsewhere classified, Diabetes mellitus ([address] Heart attack ([address] History of echocardiogram, Hyperlipidemia, and Hypertension. Presented to the office today for:  Chief Complaint   Patient presents with    Shortness of Breath       Assessment/Plan   1. Shortness of breath  -     ECHO Complete 2D W Doppler W Color; Future  -     Full PFT Study With Bronchodilator; Future  2. Mild intermittent asthma, unspecified whether complicated  3. Palpitations  -     Longterm Continuous Cardiac Event Monitor; Future    Return in about 10 weeks (around 2/27/2023), or if symptoms worsen or fail to improve, for F/U as per prior plans. Increase Flovent to 220, patient has stopped smoking for 2 days (to be continued)  Palpitations - CAM and ECHO to be obtained and plan to discuss with Cardiology. Encouraged hydration   Work restriction to max 40 hours a week - patient indicates that reduced work stress/hours to [employer] would be beneficial for him  Suspected underlying GERD/exacerbation  - trial of Pepcid BID. All patient questions answered. Pt voiced understanding. There are no discontinued medications. Patient received counseling on the following healthy behaviors: nutrition, exercise and medication adherence. I encouraged and discussed lifestyle modifications including diet and exercise and the patient was agreeable to making positive/beneficial changes to both to help improve their overall health. Discussed use, benefit, and side effects of prescribed medications. Barriers to medication compliance addressed. Patient given educational materials: see patient instructions. HM - HM items completed today as per orders.  Outstanding HM items though not limited to immunizations were discussed with the patient today, including risks, benefits and alternatives. The patient will discuss these during the next appointment per their preference. If there are any worsening or concerning signs or symptoms, patient will report to the ED and/or contact [phone] for immediate evaluation. Teach back method was used. Subjective:    HPI  Chief Complaint   Patient presents with    Shortness of Breath     Shortness of Breath  Patient is here today with c/o having troubles breathing x 2 months. He c/o wheezing and having heart palpitations and fatigue. He reports he had open heart surgery 3 years ago and he is working 70+ hrs per week. He works at [employer]. He wants to know if you will write for a 30-40 hr week restriction at this time. He took a nitrostat last night which resolved his symptoms. Prior labs and imaging were reviewed. He had a prior Diagnostic Cardiac Cath on 2022 - indicating Severe single vessel disease involving the right coronary arteries with 1 of 1 bypass grafts patent with a normal LVEDP. Last ECHO on 2021 - EF40-45% hypokinetic motion. He states that he continues to have intermittent episodes of palpitations. He follows with . The patient continues to take Lisinopril 20mg, Cardizem 120mg ER and Toprol 100mg XL - no recent med changes. The patient indicates more stress at work and around this time of the year his father had passed away. He is trying to quit smoking at this time. The patient is tolerating the Flovent well. The patient also has been having more heartburn than usual in the past month.     Health Maintenance -   Alcohol/Substance use History - None/Minimal    Tobacco Use      Smoking status: Former        Packs/day: 0.25        Years: 30.00        Pack years: 7.5        Types: Cigarettes        Quit date: 2022        Years since quittin.8      Smokeless tobacco: Never    Family History   Problem Relation Age of Onset    High Blood Pressure Mother     Heart Disease Father     High Blood Pressure Father     Asthma Sister     Colon Cancer Paternal Uncle     Colon Cancer Paternal Uncle     Pancreatic Cancer Maternal Aunt     Pancreatic Cancer Maternal Aunt        [facility] Scores 11/18/2022 1/10/2019   PHQ2 Score 4 0   PHQ9 Score 14 0     Interpretation of Total Score Depression Severity: 1-4 = Minimal depression, 5-9 = Mild depression, 10-14 = Moderate depression, 15-19 = Moderately severe depression, 20-27 = Severe depression    Review of Systems  Constitutional: Negative for activity change, appetite change, chills, diaphoresis, fatigue, fever and unexpected weight change. HENT: Negative for sinus pressure, sinus pain, sore throat and trouble swallowing. Respiratory: Negative for cough, shortness of breath and wheezing. Cardiovascular: Negative for chest pain, positive for palpitations and no leg swelling. Gastrointestinal: Negative for abdominal pain, diarrhea, nausea and vomiting. Positive for heartburn. Endocrine: Negative for cold intolerance, polydipsia, polyphagia and polyuria. Genitourinary: Negative for difficulty urinating, flank pain and frequency. Musculoskeletal: Negative for gait problem and joint swelling. Negative for back pain, neck pain and neck stiffness. Skin: Negative for color change and wound. Negative for pallor and rash. Allergic/Immunologic: Negative for environmental allergies and food allergies. Neurological: Negative for light-headedness, numbness and headaches. Psychiatric/Behavioral: Negative for sleep disturbance. Negative for confusion and suicidal ideas. Objective:    BP (!) 142/98   Pulse 90   Ht 5' 7\" (1.702 m)   Wt 204 lb (92.5 kg)   SpO2 97%   BMI 31.95 kg/m²    BP Readings from Last 3 Encounters:   12/19/22 (!) 142/98   11/29/22 126/72   11/18/22 118/80     Physical Exam  Constitutional: Patient is oriented to person, place, and time.  Patient appears well-developed and well-nourished. No distress. HENT: Head: Normocephalic and atraumatic. Eyes: Pupils are equal, round, and reactive to light. Conjunctivae are normal. Right eye exhibits no discharge. Left eye exhibits no discharge. Cardiovascular: Normal rate, regular rhythm and normal heart sounds. Pulmonary/Chest: Effort normal and breath sounds normal. No respiratory distress. Patient has no wheezes. Abdominal: Soft. Bowel sounds are normal. Patient exhibits no distension. There is no tenderness. Musculoskeletal:  Patient exhibits no edema and tenderness. Patient exhibits no deformity. Neurological: Patient is alert and oriented to person, place, and time. Skin: Skin is warm and dry. Patient is not diaphoretic. Psychiatric: Patient's speech is normal and behavior is normal. Thought content normal.   Vitals reviewed. Lab Results   Component Value Date    WBC 9.2 10/08/2022    HGB 12.2 (L) 10/08/2022    HCT 34.2 (L) 10/08/2022     10/08/2022    CHOL 98 09/12/2022    TRIG 113 09/12/2022    HDL 21 (L) 09/12/2022    ALT 20 10/08/2022    AST 19 10/08/2022     10/08/2022    K 3.4 (L) 10/08/2022     10/08/2022    CREATININE 0.82 10/08/2022    BUN 6 10/08/2022    CO2 25 10/08/2022    TSH 2.35 09/12/2022    INR 1.0 12/26/2021    LABA1C 6.3 (H) 09/12/2022     Lab Results   Component Value Date    CALCIUM 8.6 10/08/2022     Lab Results   Component Value Date    LDLCHOLESTEROL 54 09/12/2022       Please note that this chart was generated using voice recognition Dragon dictation software. Although every effort was made to ensure the accuracy of this automated transcription, some errors in transcription may have occurred.     Electronically signed by Dr. Susana Irvin MD on 12/19/2022 at 10:58 AM

## 2022-12-27 ENCOUNTER — APPOINTMENT (OUTPATIENT)
Dept: GENERAL RADIOLOGY | Age: 47
End: 2022-12-27
Payer: COMMERCIAL

## 2022-12-27 ENCOUNTER — HOSPITAL ENCOUNTER (EMERGENCY)
Age: 47
Discharge: HOME | End: 2022-12-28
Attending: EMERGENCY MEDICINE | Admitting: STUDENT IN AN ORGANIZED HEALTH CARE EDUCATION/TRAINING PROGRAM
Payer: COMMERCIAL

## 2022-12-27 DIAGNOSIS — R07.9 CHEST PAIN WITH MODERATE RISK FOR CARDIAC ETIOLOGY: Primary | ICD-10-CM

## 2022-12-27 LAB
ABSOLUTE EOS #: 0.26 K/UL (ref 0–0.44)
ABSOLUTE IMMATURE GRANULOCYTE: 0.04 K/UL (ref 0–0.3)
ABSOLUTE LYMPH #: 4.16 K/UL (ref 1.1–3.7)
ABSOLUTE MONO #: 0.94 K/UL (ref 0.1–1.2)
ALBUMIN SERPL-MCNC: 4.7 G/DL (ref 3.5–5.2)
ALBUMIN/GLOBULIN RATIO: 1.5 (ref 1–2.5)
ALP BLD-CCNC: 66 U/L (ref 40–129)
ALT SERPL-CCNC: 27 U/L (ref 5–41)
ANION GAP SERPL CALCULATED.3IONS-SCNC: 10 MMOL/L (ref 9–17)
AST SERPL-CCNC: 20 U/L
BASOPHILS # BLD: 1 % (ref 0–2)
BASOPHILS ABSOLUTE: 0.05 K/UL (ref 0–0.2)
BILIRUB SERPL-MCNC: 0.3 MG/DL (ref 0.3–1.2)
BILIRUBIN DIRECT: <0.1 MG/DL
BILIRUBIN, INDIRECT: NORMAL MG/DL (ref 0–1)
BUN BLDV-MCNC: 22 MG/DL (ref 6–20)
BUN/CREAT BLD: 23 (ref 9–20)
CALCIUM SERPL-MCNC: 9.8 MG/DL (ref 8.6–10.4)
CHLORIDE BLD-SCNC: 102 MMOL/L (ref 98–107)
CO2: 24 MMOL/L (ref 20–31)
CREAT SERPL-MCNC: 0.97 MG/DL (ref 0.7–1.2)
D-DIMER QUANTITATIVE: <0.27 MG/L FEU (ref 0–0.59)
EKG ATRIAL RATE: 82 BPM
EKG P AXIS: 38 DEGREES
EKG P-R INTERVAL: 162 MS
EKG Q-T INTERVAL: 362 MS
EKG QRS DURATION: 92 MS
EKG QTC CALCULATION (BAZETT): 422 MS
EKG R AXIS: 60 DEGREES
EKG T AXIS: 33 DEGREES
EKG VENTRICULAR RATE: 82 BPM
EOSINOPHILS RELATIVE PERCENT: 2 % (ref 1–4)
GFR SERPL CREATININE-BSD FRML MDRD: >60 ML/MIN/1.73M2
GLUCOSE BLD-MCNC: 148 MG/DL (ref 70–99)
HCT VFR BLD CALC: 40.4 % (ref 40.7–50.3)
HEMOGLOBIN: 14.4 G/DL (ref 13–17)
IMMATURE GRANULOCYTES: 0 %
LIPASE: 50 U/L (ref 13–60)
LYMPHOCYTES # BLD: 39 % (ref 24–43)
MCH RBC QN AUTO: 31.4 PG (ref 25.2–33.5)
MCHC RBC AUTO-ENTMCNC: 35.6 G/DL (ref 28.4–34.8)
MCV RBC AUTO: 88 FL (ref 82.6–102.9)
MONOCYTES # BLD: 9 % (ref 3–12)
NRBC AUTOMATED: 0 PER 100 WBC
PDW BLD-RTO: 12.8 % (ref 11.8–14.4)
PLATELET # BLD: 317 K/UL (ref 138–453)
PMV BLD AUTO: 10.5 FL (ref 8.1–13.5)
POTASSIUM SERPL-SCNC: 4.1 MMOL/L (ref 3.7–5.3)
RBC # BLD: 4.59 M/UL (ref 4.21–5.77)
SEG NEUTROPHILS: 49 % (ref 36–65)
SEGMENTED NEUTROPHILS ABSOLUTE COUNT: 5.28 K/UL (ref 1.5–8.1)
SODIUM BLD-SCNC: 136 MMOL/L (ref 135–144)
TOTAL PROTEIN: 7.9 G/DL (ref 6.4–8.3)
TROPONIN, HIGH SENSITIVITY: 9 NG/L (ref 0–22)
TROPONIN, HIGH SENSITIVITY: 9 NG/L (ref 0–22)
WBC # BLD: 10.7 K/UL (ref 3.5–11.3)

## 2022-12-27 PROCEDURE — 84484 ASSAY OF TROPONIN QUANT: CPT

## 2022-12-27 PROCEDURE — 85025 COMPLETE CBC W/AUTO DIFF WBC: CPT

## 2022-12-27 PROCEDURE — 6370000000 HC RX 637 (ALT 250 FOR IP): Performed by: EMERGENCY MEDICINE

## 2022-12-27 PROCEDURE — 93010 ELECTROCARDIOGRAM REPORT: CPT | Performed by: INTERNAL MEDICINE

## 2022-12-27 PROCEDURE — 36415 COLL VENOUS BLD VENIPUNCTURE: CPT

## 2022-12-27 PROCEDURE — 99285 EMERGENCY DEPT VISIT HI MDM: CPT

## 2022-12-27 PROCEDURE — 83690 ASSAY OF LIPASE: CPT

## 2022-12-27 PROCEDURE — 85379 FIBRIN DEGRADATION QUANT: CPT

## 2022-12-27 PROCEDURE — 96374 THER/PROPH/DIAG INJ IV PUSH: CPT

## 2022-12-27 PROCEDURE — 71045 X-RAY EXAM CHEST 1 VIEW: CPT

## 2022-12-27 PROCEDURE — 80076 HEPATIC FUNCTION PANEL: CPT

## 2022-12-27 PROCEDURE — 93005 ELECTROCARDIOGRAM TRACING: CPT | Performed by: EMERGENCY MEDICINE

## 2022-12-27 PROCEDURE — 2580000003 HC RX 258: Performed by: EMERGENCY MEDICINE

## 2022-12-27 PROCEDURE — 80048 BASIC METABOLIC PNL TOTAL CA: CPT

## 2022-12-27 PROCEDURE — 6360000002 HC RX W HCPCS: Performed by: EMERGENCY MEDICINE

## 2022-12-27 RX ORDER — ALPRAZOLAM 0.25 MG/1
0.25 TABLET ORAL 3 TIMES DAILY PRN
Status: CANCELLED | OUTPATIENT
Start: 2022-12-27

## 2022-12-27 RX ORDER — SODIUM CHLORIDE 0.9 % (FLUSH) 0.9 %
10 SYRINGE (ML) INJECTION PRN
Status: CANCELLED | OUTPATIENT
Start: 2022-12-27

## 2022-12-27 RX ORDER — CLOPIDOGREL BISULFATE 75 MG/1
75 TABLET ORAL DAILY
Status: CANCELLED | OUTPATIENT
Start: 2022-12-28

## 2022-12-27 RX ORDER — FENOFIBRATE 160 MG/1
160 TABLET ORAL DAILY
Status: CANCELLED | OUTPATIENT
Start: 2022-12-28

## 2022-12-27 RX ORDER — ACETAMINOPHEN 650 MG/1
650 SUPPOSITORY RECTAL EVERY 6 HOURS PRN
Status: CANCELLED | OUTPATIENT
Start: 2022-12-27

## 2022-12-27 RX ORDER — SODIUM CHLORIDE 0.9 % (FLUSH) 0.9 %
5-40 SYRINGE (ML) INJECTION EVERY 12 HOURS SCHEDULED
Status: CANCELLED | OUTPATIENT
Start: 2022-12-27

## 2022-12-27 RX ORDER — ASPIRIN 81 MG/1
81 TABLET ORAL DAILY
Status: CANCELLED | OUTPATIENT
Start: 2022-12-28

## 2022-12-27 RX ORDER — ALBUTEROL SULFATE 90 UG/1
2 AEROSOL, METERED RESPIRATORY (INHALATION) 4 TIMES DAILY PRN
Status: CANCELLED | OUTPATIENT
Start: 2022-12-27

## 2022-12-27 RX ORDER — DILTIAZEM HYDROCHLORIDE 120 MG/1
120 CAPSULE, COATED, EXTENDED RELEASE ORAL DAILY
Status: CANCELLED | OUTPATIENT
Start: 2022-12-28

## 2022-12-27 RX ORDER — LISINOPRIL 5 MG/1
20 TABLET ORAL DAILY
Status: CANCELLED | OUTPATIENT
Start: 2022-12-28

## 2022-12-27 RX ORDER — ACETAMINOPHEN 325 MG/1
650 TABLET ORAL EVERY 6 HOURS PRN
Status: CANCELLED | OUTPATIENT
Start: 2022-12-27

## 2022-12-27 RX ORDER — ONDANSETRON 4 MG/1
4 TABLET, ORALLY DISINTEGRATING ORAL EVERY 8 HOURS PRN
Status: CANCELLED | OUTPATIENT
Start: 2022-12-27

## 2022-12-27 RX ORDER — 0.9 % SODIUM CHLORIDE 0.9 %
1000 INTRAVENOUS SOLUTION INTRAVENOUS ONCE
Status: COMPLETED | OUTPATIENT
Start: 2022-12-27 | End: 2022-12-27

## 2022-12-27 RX ORDER — FLUTICASONE PROPIONATE 110 UG/1
1 AEROSOL, METERED RESPIRATORY (INHALATION) 2 TIMES DAILY
Status: CANCELLED | OUTPATIENT
Start: 2022-12-27

## 2022-12-27 RX ORDER — ALPRAZOLAM 0.25 MG/1
0.25 TABLET ORAL ONCE
Status: COMPLETED | OUTPATIENT
Start: 2022-12-27 | End: 2022-12-27

## 2022-12-27 RX ORDER — ATORVASTATIN CALCIUM 40 MG/1
80 TABLET, FILM COATED ORAL DAILY
Status: CANCELLED | OUTPATIENT
Start: 2022-12-28

## 2022-12-27 RX ORDER — POLYETHYLENE GLYCOL 3350 17 G/17G
17 POWDER, FOR SOLUTION ORAL DAILY PRN
Status: CANCELLED | OUTPATIENT
Start: 2022-12-27

## 2022-12-27 RX ORDER — ONDANSETRON 2 MG/ML
4 INJECTION INTRAMUSCULAR; INTRAVENOUS EVERY 6 HOURS PRN
Status: CANCELLED | OUTPATIENT
Start: 2022-12-27

## 2022-12-27 RX ORDER — ALBUTEROL SULFATE 0.63 MG/3ML
1 SOLUTION RESPIRATORY (INHALATION) EVERY 6 HOURS PRN
Status: CANCELLED | OUTPATIENT
Start: 2022-12-27

## 2022-12-27 RX ORDER — MORPHINE SULFATE 2 MG/ML
2 INJECTION, SOLUTION INTRAMUSCULAR; INTRAVENOUS ONCE
Status: COMPLETED | OUTPATIENT
Start: 2022-12-27 | End: 2022-12-27

## 2022-12-27 RX ORDER — METOPROLOL SUCCINATE 100 MG/1
100 TABLET, EXTENDED RELEASE ORAL DAILY
Status: CANCELLED | OUTPATIENT
Start: 2022-12-28

## 2022-12-27 RX ORDER — ALPRAZOLAM 0.25 MG/1
0.25 TABLET ORAL 3 TIMES DAILY PRN
COMMUNITY

## 2022-12-27 RX ADMIN — MORPHINE SULFATE 2 MG: 2 INJECTION, SOLUTION INTRAMUSCULAR; INTRAVENOUS at 20:20

## 2022-12-27 RX ADMIN — SODIUM CHLORIDE 1000 ML: 9 INJECTION, SOLUTION INTRAVENOUS at 20:06

## 2022-12-27 RX ADMIN — ALPRAZOLAM 0.25 MG: 0.25 TABLET ORAL at 21:00

## 2022-12-27 ASSESSMENT — PAIN DESCRIPTION - PAIN TYPE: TYPE: ACUTE PAIN

## 2022-12-27 ASSESSMENT — PAIN DESCRIPTION - DESCRIPTORS: DESCRIPTORS: HEAVINESS;PRESSURE

## 2022-12-27 ASSESSMENT — PAIN SCALES - GENERAL
PAINLEVEL_OUTOF10: 6
PAINLEVEL_OUTOF10: 10

## 2022-12-27 ASSESSMENT — PAIN DESCRIPTION - FREQUENCY: FREQUENCY: CONTINUOUS

## 2022-12-27 ASSESSMENT — PAIN DESCRIPTION - LOCATION
LOCATION: CHEST;BACK
LOCATION: CHEST

## 2022-12-27 ASSESSMENT — PAIN DESCRIPTION - ORIENTATION: ORIENTATION: MID

## 2022-12-27 NOTE — Clinical Note
Discharge Plan[de-identified] Other/[facility])   Telemetry/Cardiac Monitoring Required?: Yes

## 2022-12-28 VITALS
DIASTOLIC BLOOD PRESSURE: 66 MMHG | HEIGHT: 67 IN | SYSTOLIC BLOOD PRESSURE: 113 MMHG | HEART RATE: 77 BPM | WEIGHT: 197 LBS | BODY MASS INDEX: 30.92 KG/M2 | RESPIRATION RATE: 16 BRPM | TEMPERATURE: 98.7 F | OXYGEN SATURATION: 94 %

## 2022-12-28 PROBLEM — I20.0 UNSTABLE ANGINA (HCC): Status: ACTIVE | Noted: 2022-12-28

## 2022-12-28 LAB
FLU A ANTIGEN: NEGATIVE
FLU B ANTIGEN: NEGATIVE
SARS-COV-2, RAPID: NOT DETECTED
SPECIMEN DESCRIPTION: NORMAL

## 2022-12-28 PROCEDURE — 6370000000 HC RX 637 (ALT 250 FOR IP): Performed by: EMERGENCY MEDICINE

## 2022-12-28 PROCEDURE — 87635 SARS-COV-2 COVID-19 AMP PRB: CPT

## 2022-12-28 PROCEDURE — 87804 INFLUENZA ASSAY W/OPTIC: CPT

## 2022-12-28 PROCEDURE — 1200000000 HC SEMI PRIVATE

## 2022-12-28 RX ORDER — SODIUM CHLORIDE 0.9 % (FLUSH) 0.9 %
10 SYRINGE (ML) INJECTION PRN
Status: CANCELLED | OUTPATIENT
Start: 2022-12-28

## 2022-12-28 RX ORDER — POLYETHYLENE GLYCOL 3350 17 G/17G
17 POWDER, FOR SOLUTION ORAL DAILY PRN
Status: CANCELLED | OUTPATIENT
Start: 2022-12-28

## 2022-12-28 RX ORDER — ACETAMINOPHEN 325 MG/1
650 TABLET ORAL EVERY 6 HOURS PRN
Status: CANCELLED | OUTPATIENT
Start: 2022-12-28

## 2022-12-28 RX ORDER — ONDANSETRON 2 MG/ML
4 INJECTION INTRAMUSCULAR; INTRAVENOUS EVERY 6 HOURS PRN
Status: CANCELLED | OUTPATIENT
Start: 2022-12-28

## 2022-12-28 RX ORDER — SODIUM CHLORIDE 0.9 % (FLUSH) 0.9 %
10 SYRINGE (ML) INJECTION EVERY 12 HOURS SCHEDULED
Status: CANCELLED | OUTPATIENT
Start: 2022-12-28

## 2022-12-28 RX ORDER — ONDANSETRON 4 MG/1
4 TABLET, ORALLY DISINTEGRATING ORAL EVERY 8 HOURS PRN
Status: CANCELLED | OUTPATIENT
Start: 2022-12-28

## 2022-12-28 RX ORDER — PANTOPRAZOLE SODIUM 40 MG/1
40 TABLET, DELAYED RELEASE ORAL
Status: CANCELLED | OUTPATIENT
Start: 2022-12-28

## 2022-12-28 RX ORDER — ACETAMINOPHEN 650 MG/1
650 SUPPOSITORY RECTAL EVERY 6 HOURS PRN
Status: CANCELLED | OUTPATIENT
Start: 2022-12-28

## 2022-12-28 RX ORDER — ALPRAZOLAM 0.25 MG/1
0.25 TABLET ORAL ONCE
Status: COMPLETED | OUTPATIENT
Start: 2022-12-28 | End: 2022-12-28

## 2022-12-28 RX ORDER — SODIUM CHLORIDE 9 MG/ML
INJECTION, SOLUTION INTRAVENOUS PRN
Status: CANCELLED | OUTPATIENT
Start: 2022-12-28

## 2022-12-28 RX ADMIN — ALPRAZOLAM 0.25 MG: 0.25 TABLET ORAL at 03:30

## 2022-12-28 ASSESSMENT — HEART SCORE: ECG: 1

## 2022-12-28 ASSESSMENT — ENCOUNTER SYMPTOMS
NAUSEA: 1
CHEST TIGHTNESS: 1
VOMITING: 0
COLOR CHANGE: 0
BACK PAIN: 0
PHOTOPHOBIA: 0
VOICE CHANGE: 0
SHORTNESS OF BREATH: 0
TROUBLE SWALLOWING: 0

## 2022-12-28 NOTE — DISCHARGE INSTR - COC
Continuity of Care Form    Patient Name: Rosemary Dolan   :  1975  MRN:  [id number]    Admit date:  2022  Discharge date:  ***    Code Status Order: Prior   Advance Directives:     Admitting Physician:  Fleurette Seip, MD  PCP: Fleurette Seip, MD    Discharging Nurse: [facility] Unit/Room#:   Discharging Unit Phone Number: ***    Emergency Contact:   Extended Emergency Contact Information  Primary Emergency Contact: Dominic Nagyfany  Address: [place] [address] [place] [address] Phone: [phone]  Relation: Spouse  Hearing or visual needs: None  Other needs: None  Preferred language: [place]   needed?  No  Secondary Emergency Contact: angelina nagy  [place] Phone: [phone]  Relation: Child    Past Surgical History:  Past Surgical History:   Procedure Laterality Date    CARDIAC CATHETERIZATION Left 2018    Right Ulnar/[facility]/    CARDIAC CATHETERIZATION Left 2022    Dr Andrea Agudelo/ right ulnar-    COLONOSCOPY N/A 10/04/2022    COLORECTAL CANCER SCREENING, HIGH RISK performed by Jossue Toledo MD at [address],[facility]  10/04/2022    -tortuous colon    CORONARY ANGIOPLASTY  2018    double bypass, [facility],     CORONARY ARTERY BYPASS GRAFT  2018    ESOPHAGOGASTRODUODENOSCOPY  10/04/2022    -bx(neg H-Pylori,duodenum-normal,mild gastritis)    UPPER GASTROINTESTINAL ENDOSCOPY N/A 10/04/2022    EGD BIOPSY performed by Jossue Toledo MD at [address] [place] [address]       Immunization History:   Immunization History   Administered Date(s) Administered    COVID-19, MODERNA BLUE border, Primary or Immunocompromised, (age 12y+), IM, 100 mcg/0.5mL 2021, 2021, 2021    COVID-19, PFIZER Bivalent BOOSTER, DO NOT Dilute, (age 12y+), IM, 30 mcg/0.3 mL 2022    Influenza Vaccine, unspecified formulation 2018    Influenza Virus Vaccine 2014, 10/26/2015    Tdap (Boostrix, Adacel) 04/03/2019       Active Problems:  Patient Active Problem List   Diagnosis Code    Pain in rib, left lower R07.81    Chest pain at rest R07.9    HTN (hypertension) I10    Tobacco abuse Z72.0    Family history of premature CAD Z82.49    Acute coronary syndrome (HCC) I24.9    Mild congestive heart failure (HCC) I50.9    S/P CABG (coronary artery bypass graft) Z95.1    Dyslipidemia E78.5    Ischemic cardiomyopathy I25.5    Chronic combined systolic and diastolic CHF, NYHA class 2 ([facility] I50.42    ASHD (arteriosclerotic heart disease) /  CABG 2018 I25.10    ACS (acute coronary syndrome) ([facility] I24.9    Palpitations R00.2    Abnormal result of other cardiovascular function study R94.39    Pancreatitis, unspecified pancreatitis type K85.90    Nausea R11.0    Epigastric pain R10.13    Intractable abdominal pain R10.9    Severe malnutrition ([facility] E43    Diabetes mellitus ([facility] E11.9    Generalized anxiety disorder F41.1    Primary hypertension I10    Hyperlipidemia E78.5    Mild intermittent asthma J45.20    Mild persistent asthma with acute exacerbation J45.31    Bronchitis J40    Shortness of breath R06.02    Unstable angina (HCC) I20.0       Isolation/Infection:   Isolation            No Isolation          Patient Infection Status       Infection Onset Added Last Indicated Last Indicated By Review Planned Expiration Resolved Resolved By    None active    Resolved    COVID-19 (Rule Out) 12/28/22 12/28/22 12/28/22 COVID-19, Rapid (Ordered)   12/28/22 Rule-Out Test Resulted    COVID-19 (Rule Out) 01/26/22 01/26/22 01/26/22 COVID-19, Rapid (Ordered)   01/26/22 Rule-Out Test Resulted    COVID-19 (Rule Out) 12/26/21 12/26/21 12/26/21 COVID-19, Rapid (Ordered)   12/26/21 Rule-Out Test Resulted    COVID-19 (Rule Out) 12/14/20 12/14/20 12/14/20 Covid-19 Ambulatory (Ordered)   12/16/20 Rule-Out Test Resulted            Nurse Assessment:  Last Vital Signs: /66   Pulse 77   Temp 98.7 °F (37.1 °C) (Oral)   Resp 16   Ht 5' 7\" (1.702 m)   Wt 197 lb (89.4 kg)   SpO2 94%   BMI 30.85 kg/m²     Last documented pain score (0-10 scale): Pain Level: 10  Last Weight:   Wt Readings from Last 1 Encounters:   22 197 lb (89.4 kg)     Mental Status:  {IP PT MENTAL STATUS:57480}    IV Access:  { ESTEBAN IV ACCESS:224780674}    Nursing Mobility/ADLs:  Walking   {CHP DME QKE}  Transfer  {CHP DME CNST:623672243}  Bathing  {CHP DME VMAN:011009657}  Dressing  {CHP DME XIRA:534391397}  Toileting  {CHP DME HKH}  Feeding  {CHP DME REGZ:398454198}  Med Admin  {CHP DME YINQ:635463380}  Med Delivery   { ESTEBAN MED Delivery:966027599}    Wound Care Documentation and Therapy:        Elimination:  Continence: Bowel: {YES / QS:85708}  Bladder: {YES / LH:82389}  Urinary Catheter: {Urinary Catheter:704986177}   Colostomy/Ileostomy/Ileal Conduit: {YES / B}       Date of Last BM: ***  No intake or output data in the 24 hours ending 22 1106  No intake/output data recorded.     Safety Concerns:     [employer] Safety Concerns:682301979}    Impairments/Disabilities:      [employer] Impairments/Disabilities:340763527}    Nutrition Therapy:  Current Nutrition Therapy:   [employer] Diet List:113667363}    Routes of Feeding: {CHP DME Other Feedings:903247408}  Liquids: {Slp liquid thickness:20218}  Daily Fluid Restriction: {CHP DME Yes amt example:801306234}  Last Modified Barium Swallow with Video (Video Swallowing Test): {Done Not Done PAVN:896394734}    Treatments at the Time of Hospital Discharge:   Respiratory Treatments: ***  Oxygen Therapy:  {Therapy; copd oxygen:70462}  Ventilator:    {[facility] Vent YMGF:616169892}    Rehab Therapies: {THERAPEUTIC INTERVENTION:3523738718}  Weight Bearing Status/Restrictions: [employer]  Weight Bearin}  Other Medical Equipment (for information only, NOT a DME order):  {EQUIPMENT:172312458}  Other Treatments: ***    Patient's personal belongings (please select all that are sent with patient):  {CHP DME Belongings:520066679}    RN SIGNATURE:  {Esignature:892924751}    CASE MANAGEMENT/SOCIAL WORK SECTION    Inpatient Status Date: ***    Readmission Risk Assessment Score:  Readmission Risk              Risk of Unplanned Readmission:  12           Discharging to Facility/ Agency   Name:   Address:  Phone:  Fax:    Dialysis Facility (if applicable)   Name:  Address:  Dialysis Schedule:  Phone:  Fax:    / signature: {Esignature:244245828}    PHYSICIAN SECTION    Prognosis: {Prognosis:5587405742}    Condition at Discharge: [address] [place] [address] Patient Condition:549854562}    Rehab Potential (if transferring to Rehab): {Prognosis:6322791857}    Recommended Labs or Other Treatments After Discharge: ***    Physician Certification: I certify the above information and transfer of Leigh Kincaid  is necessary for the continuing treatment of the diagnosis listed and that he requires {Admit to Appropriate Level of Care:65742} for {GREATER/LESS:601906516} 30 days.      Update Admission H&P: {CHP DME Changes in TTHIX:468094678}    PHYSICIAN SIGNATURE:  {Esignature:601451661}

## 2022-12-28 NOTE — DISCHARGE INSTRUCTIONS
You are rescheduled for a Stress test on 12/29/22 first appointment is 10:45 and second appointment is 11:15. If you cannot make this appointment call centralized at [phone].

## 2022-12-28 NOTE — DISCHARGE SUMMARY
[address], [facility] [place]    Discharge Summary      NAME:  Jeremias Mejia  :  1975  MRN:  [id number]    Admit date:  2022  Discharge date:  2022    Admitting Physician:  Devora Mayo MD    Primary Diagnosis on Admission:   Present on Admission:   Unstable angina ([facility]   ASHD (arteriosclerotic heart disease) /  CABG    Chronic combined systolic and diastolic CHF, NYHA class 2 (HCC)   Diabetes mellitus ([address]   Dyslipidemia   Epigastric pain   Family history of premature CAD   Generalized anxiety disorder   HTN (hypertension)   Hyperlipidemia   Mild congestive heart failure (HCC)   Mild intermittent asthma   Chest pain at rest   Palpitations   S/P CABG (coronary artery bypass graft)   Primary hypertension      Secondary Diagnoses:  has Palpitations and Abnormal result of other cardiovascular function study on their pertinent problem list.    Admission Condition:  stable     Discharged Condition: stable    Hospital Course: The patient was admitted for the management of concerns for unstable angina. Cardiology was consulted in the ED. The patient had been admitted and was scheduled for a Stress test and ECHO today. He was monitored in the ED however needed to leave prior to testing being completed. Vitals and Labs are stable. All consultants involved during this admission are agreeable to d/c.     Consults:  Cardiology    Significant Diagnostic/theraputic interventions: IVF, EKG, Troponins      Disposition:   home    Instructions to Patient:      Follow up with Devora Mayo MD and  within 1 week's time     Discharge Medications:       Medication List        ASK your doctor about these medications      * albuterol sulfate  (90 Base) MCG/ACT inhaler  Commonly known as: Ventolin HFA  Inhale 2 puffs into the lungs 4 times daily as needed for Wheezing     * albuterol (5 MG/ML) 0.5% nebulizer solution  Commonly known as: PROVENTIL  Take 1 mL by nebulization 4 times daily as needed for Wheezing     * albuterol 0.63 MG/3ML nebulizer solution  Commonly known as: ACCUNEB  Take 3 mLs by nebulization every 6 hours as needed for Wheezing     ALPRAZolam 0.25 MG tablet  Commonly known as: XANAX     aspirin 81 MG EC tablet     atorvastatin 80 MG tablet  Commonly known as: LIPITOR  Take 1 tablet by mouth daily     clopidogrel 75 MG tablet  Commonly known as: PLAVIX  Take 1 tablet by mouth daily     dilTIAZem 120 MG extended release capsule  Commonly known as: CARDIZEM CD  Take 1 capsule by mouth daily     fenofibrate 160 MG tablet  Commonly known as: TRIGLIDE  Take 1 tablet by mouth daily     fluticasone 110 MCG/ACT inhaler  Commonly known as: Flovent HFA  Inhale 1 puff into the lungs 2 times daily     lisinopril 20 MG tablet  Commonly known as: PRINIVIL;ZESTRIL  Take 1 tablet by mouth daily     metFORMIN 500 MG tablet  Commonly known as: GLUCOPHAGE     metoprolol succinate 100 MG extended release tablet  Commonly known as: TOPROL XL  Take 1 tablet by mouth daily     nitroGLYCERIN 0.4 MG SL tablet  Commonly known as: NITROSTAT  Place 1 tablet under the tongue every 5 minutes as needed for Chest pain up to max of 3 total doses. If no relief after 1 dose, call 911. ondansetron 4 MG disintegrating tablet  Commonly known as: ZOFRAN-ODT  Take 1 tablet by mouth every 8 hours as needed for Nausea or Vomiting           * This list has 3 medication(s) that are the same as other medications prescribed for you. Read the directions carefully, and ask your doctor or other care provider to review them with you. Send Copies to: Carlos Barry MD    Patient Instructions:    Activity: activity as tolerated  Diet: cardiac diet  Follow up with Carlos Barry MD and  within 1 weeks' time     Total time spent on discharge services: 35 minutes  Including the following activities:  Evaluation and Management of patient  Discussion with patient and/or surrogate about current care plan  Coordination with Case Management and/or   Coordination of care with Consultants (if applicable)   Coordination of care with Receiving Facility Physician (if applicable)  Completion of DME forms (if applicable)  Preparation of Discharge Summary  Preparation of Medication Reconciliation  Preparation of Discharge Prescriptions    Please note that this chart was generated using voice recognition Dragon dictation software. Although every effort was made to ensure the accuracy of this automated transcription, some errors in transcription may have occurred.     Aide Davis MD  12/28/2022 12:05 PM

## 2022-12-28 NOTE — PLAN OF CARE
Pt requesting to leave AMA. Will not stay for stress test or ECHO. States, have to go to work and have been here for 17 hours and have not .  Dr Corbin Castleman notified and requested we set up an appointment for outpt

## 2022-12-28 NOTE — ED PROVIDER NOTES
[facility] ED  EMERGENCY DEPARTMENT ENCOUNTER      Pt Name: Fahad Valentin  MRN: [id number]  [place] 1975  Date of evaluation: 2022  Provider: Venkat Rae, [facility]       Chief Complaint   Patient presents with    Chest Pain     Squeezing continuous chest pain started last night. HISTORY OF PRESENT ILLNESS   (Location/Symptom, Timing/Onset, Context/Setting, Quality, Duration, Modifying Factors, Severity)  Note limiting factors. Fahad Valentin is a 56 y.o. male who presents to the emergency department with complaints of chest discomfort. The patient has a history of ST elevation MI with cardiac cath, coronary artery dissection and subsequent CABG in 2017. Patient has had a cardiac cath this year for chest pains. This was in January. The patient has a scheduled echocardiogram and pulmonary function test on Thursday but states that at about 5:45 PM today he developed significant chest pains, reports that it feels like a squeezing pressure. He is having intermittent pains. He reports he has a significant family history of coronary artery disease in his father who  at the age of 52 from massive heart attack. The patient denies shortness of breath. Denies any fevers, chills, cough and sputum production. HPI    Nursing Notes were reviewed. REVIEW OF SYSTEMS    (2-9 systems for level 4, 10 or more for level 5)     Review of Systems   Constitutional:  Positive for activity change and fatigue. HENT:  Negative for trouble swallowing and voice change. Eyes:  Negative for photophobia and visual disturbance. Respiratory:  Positive for chest tightness. Negative for shortness of breath. Cardiovascular:  Positive for chest pain. Negative for palpitations. Gastrointestinal:  Positive for nausea. Negative for vomiting. Genitourinary: Negative. Musculoskeletal:  Negative for back pain and myalgias. Skin:  Negative for color change and pallor. Neurological:  Negative for dizziness and facial asymmetry. Psychiatric/Behavioral:  Negative for confusion. The patient is nervous/anxious. Except as noted above the remainder of the review of systems was reviewed and negative. PAST MEDICAL HISTORY     Past Medical History:   Diagnosis Date    CAD (coronary artery disease)     Carpal tunnel syndrome     Depressive disorder, not elsewhere classified     Diabetes mellitus ([facility]     Heart attack ([facility] 08/11/2018    STEMI    History of echocardiogram 01/08/2019    EF 40-45%. LV cavity sixe is mildly increased. Inferior and inferoseptal wall hypokenesis noted. Mild diastolic dysfunction.     Hyperlipidemia     Hypertension 2009         SURGICAL HISTORY       Past Surgical History:   Procedure Laterality Date    CARDIAC CATHETERIZATION Left 01/07/2018    Right Ulnar/[employer] [facility]/    CARDIAC CATHETERIZATION Left 01/27/2022    Dr Katheryn Agudelo/ right ulnar-    COLONOSCOPY N/A 10/04/2022    COLORECTAL CANCER SCREENING, HIGH RISK performed by Gary Garland MD at [address] [place] [address]  10/04/2022    -tortuous colon    CORONARY ANGIOPLASTY  08/2018    double bypass, [facility],     CORONARY ARTERY BYPASS GRAFT  2018    ESOPHAGOGASTRODUODENOSCOPY  10/04/2022    -bx(neg H-Pylori,duodenum-normal,mild gastritis)    UPPER GASTROINTESTINAL ENDOSCOPY N/A 10/04/2022    EGD BIOPSY performed by Gary Garland MD at [address] [place] [address]       Previous Medications    ALBUTEROL (ACCUNEB) 0.63 MG/3ML NEBULIZER SOLUTION    Take 3 mLs by nebulization every 6 hours as needed for Wheezing    ALBUTEROL (PROVENTIL) (5 MG/ML) 0.5% NEBULIZER SOLUTION    Take 1 mL by nebulization 4 times daily as needed for Wheezing    ALBUTEROL SULFATE HFA (VENTOLIN HFA) 108 (90 BASE) MCG/ACT INHALER    Inhale 2 puffs into the lungs 4 times daily as needed for Wheezing    ALPRAZOLAM (XANAX) 0.25 MG TABLET    Take 0.25 mg by mouth 3 times daily as needed for Sleep. ASPIRIN 81 MG EC TABLET    Take 81 mg by mouth daily. ATORVASTATIN (LIPITOR) 80 MG TABLET    Take 1 tablet by mouth daily    CLOPIDOGREL (PLAVIX) 75 MG TABLET    Take 1 tablet by mouth daily    DILTIAZEM (CARDIZEM CD) 120 MG EXTENDED RELEASE CAPSULE    Take 1 capsule by mouth daily    FENOFIBRATE (TRIGLIDE) 160 MG TABLET    Take 1 tablet by mouth daily    FLUTICASONE (FLOVENT HFA) 110 MCG/ACT INHALER    Inhale 1 puff into the lungs 2 times daily    LISINOPRIL (PRINIVIL;ZESTRIL) 20 MG TABLET    Take 1 tablet by mouth daily    METFORMIN (GLUCOPHAGE) 500 MG TABLET    Take 500 mg by mouth 2 times daily (with meals)     METOPROLOL SUCCINATE (TOPROL XL) 100 MG EXTENDED RELEASE TABLET    Take 1 tablet by mouth daily    NITROGLYCERIN (NITROSTAT) 0.4 MG SL TABLET    Place 1 tablet under the tongue every 5 minutes as needed for Chest pain up to max of 3 total doses. If no relief after 1 dose, call 911. ONDANSETRON (ZOFRAN-ODT) 4 MG DISINTEGRATING TABLET    Take 1 tablet by mouth every 8 hours as needed for Nausea or Vomiting       ALLERGIES     Patient has no known allergies.     FAMILY HISTORY       Family History   Problem Relation Age of Onset    High Blood Pressure Mother     Heart Disease Father     High Blood Pressure Father     Asthma Sister     Colon Cancer Paternal Uncle     Colon Cancer Paternal Uncle     Pancreatic Cancer Maternal Aunt     Pancreatic Cancer Maternal Aunt           SOCIAL HISTORY       Social History     Socioeconomic History    Marital status:      Spouse name: None    Number of children: None    Years of education: None    Highest education level: None   Tobacco Use    Smoking status: Every Day     Packs/day: 0.25     Years: 30.00     Pack years: 7.50     Types: Cigarettes     Last attempt to quit: 2022     Years since quittin.8    Smokeless tobacco: Never   Vaping Use    Vaping Use: Never used   Substance and Sexual Activity    Alcohol use: Not Currently    Drug use: No    Sexual activity: Yes     Partners: Female     Social Determinants of Health     Physical Activity: Insufficiently Active    Days of Exercise per Week: 3 days    Minutes of Exercise per Session: 10 min   Intimate Partner Violence: Not At Risk    Fear of Current or Ex-Partner: No    Emotionally Abused: No    Physically Abused: No    Sexually Abused: No       SCREENINGS         [place] Coma Scale  Eye Opening: Spontaneous  Best Verbal Response: Oriented  Best Motor Response: Obeys commands  [place] Coma Scale Score: 15                     CIWA Assessment  BP: 116/72  Heart Rate: 79                 PHYSICAL EXAM    (up to 7 for level 4, 8 or more for level 5)     ED Triage Vitals   BP Temp Temp Source Heart Rate Resp SpO2 Height Weight   12/27/22 1830 12/27/22 1830 12/27/22 1830 12/27/22 1830 12/27/22 1830 12/27/22 1830 12/27/22 1848 12/27/22 1848   125/87 98.7 °F (37.1 °C) Oral 81 17 98 % 5' 7\" (1.702 m) 197 lb (89.4 kg)       Physical Exam  Vitals and nursing note reviewed. Constitutional:       General: He is in acute distress. Appearance: He is not toxic-appearing. HENT:      Head: Normocephalic. Right Ear: External ear normal.      Left Ear: External ear normal.   Eyes:      Conjunctiva/sclera: Conjunctivae normal.   Cardiovascular:      Rate and Rhythm: Normal rate and regular rhythm. Pulmonary:      Effort: Pulmonary effort is normal.   Abdominal:      Palpations: Abdomen is soft. Musculoskeletal:      Cervical back: Neck supple. Right lower leg: No edema. Left lower leg: No edema. Skin:     General: Skin is warm and dry. Capillary Refill: Capillary refill takes less than 2 seconds. Neurological:      General: No focal deficit present. Mental Status: He is alert and oriented to person, place, and time.        DIAGNOSTIC RESULTS     EKG: All EKG's are interpreted by the Emergency Department Physician who either signs or Co-signs this chart in the absence of a cardiologist.        RADIOLOGY:   Non-plain film images such as CT, Ultrasound and MRI are read by the radiologist. Plain radiographic images are visualized and preliminarily interpreted by the emergency physician with the below findings:        Interpretation per the Radiologist below, if available at the time of this note:    XR CHEST PORTABLE   Final Result   No acute process. ED BEDSIDE ULTRASOUND:   Performed by ED Physician - none    LABS:  Labs Reviewed   BASIC METABOLIC PANEL - Abnormal; Notable for the following components:       Result Value    Glucose 148 (*)     BUN 22 (*)     Bun/Cre Ratio 23 (*)     All other components within normal limits   CBC WITH AUTO DIFFERENTIAL - Abnormal; Notable for the following components:    Hematocrit 40.4 (*)     MCHC 35.6 (*)     Absolute Lymph # 4.16 (*)     All other components within normal limits   TROPONIN   LIPASE   HEPATIC FUNCTION PANEL   D-DIMER, QUANTITATIVE   TROPONIN       All other labs were within normal range or not returned as of this dictation. EMERGENCY DEPARTMENT COURSE and DIFFERENTIAL DIAGNOSIS/MDM:   Vitals:    Vitals:    12/27/22 2215 12/27/22 2327 12/27/22 2330 12/27/22 2345   BP:  122/83 122/60 116/72   Pulse: 80 83 83 79   Resp: 20 23 22 17   Temp:       TempSrc:       SpO2: 94% 92% 94% 94%   Weight:       Height:               MDM     Amount and/or Complexity of Data Reviewed  Decide to obtain previous medical records or to obtain history from someone other than the patient: yes      Spoke with Dr. Marcelle Keita, plan is for echo, stress tomorrow. I spoke with Dr. Maryjane Cortes who graciously accepts this patient for further care and evaluation    REASSESSMENT     ED Course as of 12/28/22 0146   Tue Dec 27, 2022   1934 Heart cath 1/27/22  Severe single vessel coronary artery disease involving a proximal 100% stenosis in the right coronary artery. Normal left ventricular end diastolic pressure. Widely patient SVG-PDA graft. Continue standard risk factor modification as clinically indicated and consider alternative etiologies of the patients symptoms. [NV]      ED Course User Index  [NV] Annita Tapia DO         CRITICAL CARE TIME       CONSULTS:  IP CONSULT TO CASE MANAGEMENT    PROCEDURES:  Unless otherwise noted below, none     Procedures        FINAL IMPRESSION      1. Chest pain with moderate risk for cardiac etiology          DISPOSITION/PLAN   DISPOSITION Decision To Admit 12/27/2022 09:08:39 PM      PATIENT REFERRED TO:  No follow-up provider specified. DISCHARGE MEDICATIONS:  New Prescriptions    No medications on file     Controlled Substances Monitoring:     RX Monitoring 3/2/2019   Attestation The Prescription Monitoring Report for this patient was reviewed today. Acute Pain Prescriptions Prescription exceeds daily limit for a specific reason. See comments or note. ;Severe pain not adequately treated with lower dose.        (Please note that portions of this note were completed with a voice recognition program.  Efforts were made to edit the dictations but occasionally words are mis-transcribed.)    Venkat Rae DO (electronically signed)  Attending Emergency Physician              Venkat Rae DO  12/28/22 0147

## 2022-12-28 NOTE — H&P
[address], [facility]    History & Physical Examination Note              Date:   12/28/2022  Patient name:  Porter Cam  Date of admission:  12/27/2022  6:22 PM  MRN:   [id number]  YOB: 1975    CHIEF COMPLAINT:       Chief Complaint   Patient presents with    Chest Pain     Squeezing continuous chest pain started last night. History Obtained From:  Patient and chart review. HPI:     The patient is a 56 y.o.  male who presented with chest wall pain/discomfort that has been worsening overnight. He has a prior cardiac history of CABG and had a prior CABG in the past. The chest pain that he had was severe, without radiation and a squeezing type pain that comes and goes. He said he was not exerting himself at the time and was on a break. He has been having intermittent jolts as well. He has a significant family history. He does not have any shortness of breath, cough, fevers or chills. He has a history of asthma and his Flovent had been increased recently. He has been having more palpitations (daily, more frequent, lasting coming and going) than normal recently and there were plans for an ECHO, Holter, and PFT's later this week. The palpitations occur when he is home and relaxing. He also had severe anxiety last night. He had been tried on a Pepcid as well to r/o GERD as a cause contributing to the chest wall pain. The patient denies any GI/ symptoms at this time. He had mild abdominal discomfort which went away. The patient has been having more /increased chest tightness with a history of asthma. In the ED, labs and imaging were obtained and were reviewed. The case was discussed with the ED Provider prior to admission. Case had been discussed with Cardiology at the time as well.     PAST MEDICAL HISTORY:        has a past medical history of CAD (coronary artery disease), Carpal tunnel syndrome, Depressive disorder, not elsewhere classified, Diabetes mellitus ([facility] [address] Heart attack ([facility] [address] History of echocardiogram, Hyperlipidemia, and Hypertension. Diagnosis Date    CAD (coronary artery disease)     Carpal tunnel syndrome     Depressive disorder, not elsewhere classified     Diabetes mellitus ([facility] [address]     Heart attack ([facility] [address] 08/11/2018    STEMI    History of echocardiogram 01/08/2019    EF 40-45%. LV cavity sixe is mildly increased. Inferior and inferoseptal wall hypokenesis noted. Mild diastolic dysfunction. Hyperlipidemia     Hypertension 2009     PAST SURGICAL HISTORY:      has a past surgical history that includes Coronary angioplasty (08/2018); Cardiac catheterization (Left, 01/07/2018); Coronary artery bypass graft (2018); Cardiac catheterization (Left, 01/27/2022); Colonoscopy (N/A, 10/04/2022); Upper gastrointestinal endoscopy (N/A, 10/04/2022); Colonoscopy (10/04/2022); and Esophagogastroduodenoscopy (10/04/2022). Procedure Laterality Date    CARDIAC CATHETERIZATION Left 01/07/2018    Right Ulnar/[facility]/    CARDIAC CATHETERIZATION Left 01/27/2022    Dr Terrell Alfarofin/ right ulnar-    COLONOSCOPY N/A 10/04/2022    COLORECTAL CANCER SCREENING, HIGH RISK performed by Perri Guaman MD at [address] [place] [address]  10/04/2022    -tortuous colon    CORONARY ANGIOPLASTY  08/2018    double bypass, [facility],     CORONARY ARTERY BYPASS GRAFT  2018    ESOPHAGOGASTRODUODENOSCOPY  10/04/2022    -bx(neg H-Pylori,duodenum-normal,mild gastritis)    UPPER GASTROINTESTINAL ENDOSCOPY N/A 10/04/2022    EGD BIOPSY performed by Perri Guaman MD at [address] [place] [address] Ave:     family history includes Asthma in his sister; Colon Cancer in his paternal uncle and paternal uncle; Heart Disease in his father; High Blood Pressure in his father and mother; Pancreatic Cancer in his maternal aunt and maternal aunt.       Problem Relation Age of Onset    High Blood Pressure Mother     Heart Disease Father     High Blood Pressure Father     Asthma Sister     Colon Cancer Paternal Uncle     Colon Cancer Paternal Uncle     Pancreatic Cancer Maternal Aunt     Pancreatic Cancer Maternal Aunt      HOME MEDICATIONS:     Prior to Admission medications    Medication Sig Start Date End Date Taking? Authorizing Provider   ALPRAZolam (XANAX) 0.25 MG tablet Take 0.25 mg by mouth 3 times daily as needed for Sleep. Yes Historical Provider, MD   atorvastatin (LIPITOR) 80 MG tablet Take 1 tablet by mouth daily 12/7/22   Joao Dumont MD   albuterol (ACCUNEB) 0.63 MG/3ML nebulizer solution Take 3 mLs by nebulization every 6 hours as needed for Wheezing 12/1/22   Kimmie Kwong MD   albuterol (PROVENTIL) (5 MG/ML) 0.5% nebulizer solution Take 1 mL by nebulization 4 times daily as needed for Wheezing 11/29/22   Kimmie Kwong MD   albuterol sulfate HFA (VENTOLIN HFA) 108 (90 Base) MCG/ACT inhaler Inhale 2 puffs into the lungs 4 times daily as needed for Wheezing 11/18/22   Kimmie Kwong MD   fluticasone (FLOVENT HFA) 110 MCG/ACT inhaler Inhale 1 puff into the lungs 2 times daily 11/18/22   Kimmie Kwong MD   fenofibrate (TRIGLIDE) 160 MG tablet Take 1 tablet by mouth daily 11/18/22   Kimmie Kwong MD   ondansetron (ZOFRAN-ODT) 4 MG disintegrating tablet Take 1 tablet by mouth every 8 hours as needed for Nausea or Vomiting  Patient not taking: No sig reported 10/8/22   Marcella Cruz MD   dilTIAZem (CARDIZEM CD) 120 MG extended release capsule Take 1 capsule by mouth daily 6/1/22   Joao Dumont MD   lisinopril (PRINIVIL;ZESTRIL) 20 MG tablet Take 1 tablet by mouth daily 4/13/22   Joao Dumont MD   clopidogrel (PLAVIX) 75 MG tablet Take 1 tablet by mouth daily 1/27/22   Genaro Wyman MD   nitroGLYCERIN (NITROSTAT) 0.4 MG SL tablet Place 1 tablet under the tongue every 5 minutes as needed for Chest pain up to max of 3 total doses. If no relief after 1 dose, call 911.   Patient not taking: No sig reported 1/21/22 Carson Lester MD   metoprolol succinate (TOPROL XL) 100 MG extended release tablet Take 1 tablet by mouth daily 1/21/22   Carson Lester MD   metFORMIN (GLUCOPHAGE) 500 MG tablet Take 500 mg by mouth 2 times daily (with meals)     Historical Provider, MD   aspirin 81 MG EC tablet Take 81 mg by mouth daily. Historical Provider, MD       ALLERGIES:      Patient has no known allergies. SOCIAL HISTORY:      reports that he has been smoking cigarettes. He has a 7.50 pack-year smoking history. He has never used smokeless tobacco. He reports that he does not currently use alcohol. He reports that he does not use drugs. TOBACCO:   reports that he has been smoking cigarettes. He has a 7.50 pack-year smoking history. He has never used smokeless tobacco.  ETOH:   reports that he does not currently use alcohol. REVIEW OF SYSTEMS:     CONSTITUTIONAL:  no fevers  EYES: negative for double vision  HEENT: No headaches, no rhinorrhea, no nasal congestion, no sore throat, no difficulty swallowing  RESPIRATORY:negative for dyspnea, no wheezing.   CARDIOVASCULAR: positive for chest pain, no palpitations, no fatigue, no edema   GASTROINTESTINAL: no nausea, no vomiting, no change in bowel habits, no abdominal pain   GENITOURINARY: negative for frequency, no dysuria, no nocturia   INTEGUMENT: negative for rash, no easy bruising   HEMATOLOGIC/LYMPHATIC: negative for swelling/edema   ALLERGIC/IMMUNOLOGIC: negative for urticaria   ENDOCRINE: no polydipsia, no polyuria, no hot or cold intolerance  MUSCULOSKELETAL: no joint pains, no muscle aches, no swelling of joints or extremities  NEUROLOGICAL: no numbness, no tingling  BEHAVIOR/PSYCH: negative      PHYSICAL EXAM:     Vitals:    12/28/22 0502 12/28/22 0512 12/28/22 0522 12/28/22 0532   BP: 130/68 118/68  113/66   Pulse: 72 71 79 77   Resp: 18 16 15 16   Temp:       TempSrc:       SpO2: 94% 94% 95% 94%   Weight:       Height:           No intake or output data in the 24 hours ending 12/28/22 0642    General Appearance  Alert , awake , oriented x 3, not in acute distress  HEENT - Head is normocephalic, atraumatic. Eye - no icterus no redness, EOMI  Ear- No ear pain noted, normal external ear and no discharge  Lungs - Bilateral equal air entry , no wheezes, rales or rhonchi, aeration good  Cardiovascular - Heart sounds are normal.  Regular rhythm, normal rate without murmur, gallop or rub. Abdomen - Soft, nontender, nondistended, no masses or organomegaly  Neurologic - There are no new focal motor or sensory deficits, muscle strength 5/5 in all extremities, normal muscle tone and bulk, no abnormal sensation.   Skin - No bruising or bleeding on exposed skin area  Extremities - No cyanosis, clubbing or edema  Psych - normal affect        DIAGNOSTICS:      Laboratory Testing:    Recent Results (from the past 24 hour(s))   EKG 12 Lead    Collection Time: 12/27/22  6:25 PM   Result Value Ref Range    Ventricular Rate 82 BPM    Atrial Rate 82 BPM    P-R Interval 162 ms    QRS Duration 92 ms    Q-T Interval 362 ms    QTc Calculation (Bazett) 422 ms    P Axis 38 degrees    R Axis 60 degrees    T Axis 33 degrees   Basic Metabolic Panel    Collection Time: 12/27/22  6:30 PM   Result Value Ref Range    Glucose 148 (H) 70 - 99 mg/dL    BUN 22 (H) 6 - 20 mg/dL    Creatinine 0.97 0.70 - 1.20 mg/dL    Est, Glom Filt Rate >60 >60 mL/min/1.73m2    Bun/Cre Ratio 23 (H) 9 - 20    Calcium 9.8 8.6 - 10.4 mg/dL    Sodium 136 135 - 144 mmol/L    Potassium 4.1 3.7 - 5.3 mmol/L    Chloride 102 98 - 107 mmol/L    CO2 24 20 - 31 mmol/L    Anion Gap 10 9 - 17 mmol/L   CBC with Auto Differential    Collection Time: 12/27/22  6:30 PM   Result Value Ref Range    WBC 10.7 3.5 - 11.3 k/uL    RBC 4.59 4.21 - 5.77 m/uL    Hemoglobin 14.4 13.0 - 17.0 g/dL    Hematocrit 40.4 (L) 40.7 - 50.3 %    MCV 88.0 82.6 - 102.9 fL    MCH 31.4 25.2 - 33.5 pg    MCHC 35.6 (H) 28.4 - 34.8 g/dL    RDW 12.8 11.8 - 14.4 %    Platelets 072 138 - 453 k/uL    MPV 10.5 8.1 - 13.5 fL    NRBC Automated 0.0 0.0 per 100 WBC    Seg Neutrophils 49 36 - 65 %    Lymphocytes 39 24 - 43 %    Monocytes 9 3 - 12 %    Eosinophils % 2 1 - 4 %    Basophils 1 0 - 2 %    Immature Granulocytes 0 0 %    Segs Absolute 5.28 1.50 - 8.10 k/uL    Absolute Lymph # 4.16 (H) 1.10 - 3.70 k/uL    Absolute Mono # 0.94 0.10 - 1.20 k/uL    Absolute Eos # 0.26 0.00 - 0.44 k/uL    Basophils Absolute 0.05 0.00 - 0.20 k/uL    Absolute Immature Granulocyte 0.04 0.00 - 0.30 k/uL   Troponin    Collection Time: 12/27/22  6:30 PM   Result Value Ref Range    Troponin, High Sensitivity 9 0 - 22 ng/L   Lipase    Collection Time: 12/27/22  6:30 PM   Result Value Ref Range    Lipase 50 13 - 60 U/L   Hepatic Function Panel    Collection Time: 12/27/22  6:30 PM   Result Value Ref Range    Albumin 4.7 3.5 - 5.2 g/dL    Alkaline Phosphatase 66 40 - 129 U/L    ALT 27 5 - 41 U/L    AST 20 <40 U/L    Total Bilirubin 0.3 0.3 - 1.2 mg/dL    Bilirubin, Direct <0.1 <0.3 mg/dL    Bilirubin, Indirect Can not be calculated 0.0 - 1.0 mg/dL    Total Protein 7.9 6.4 - 8.3 g/dL    Albumin/Globulin Ratio 1.5 1.0 - 2.5   D-Dimer, Quantitative    Collection Time: 12/27/22  6:30 PM   Result Value Ref Range    D-Dimer, Quant <0.27 0.00 - 0.59 mg/L FEU   Troponin    Collection Time: 12/27/22  8:38 PM   Result Value Ref Range    Troponin, High Sensitivity 9 0 - 22 ng/L   COVID-19, Rapid    Collection Time: 12/28/22  1:55 AM    Specimen: Nasopharyngeal Swab   Result Value Ref Range    Specimen Description . NASOPHARYNGEAL SWAB     SARS-CoV-2, Rapid Not Detected Not Detected   Rapid influenza A/B antigens    Collection Time: 12/28/22  1:55 AM    Specimen: Nasopharyngeal   Result Value Ref Range    Flu A Antigen NEGATIVE NEGATIVE    Flu B Antigen NEGATIVE NEGATIVE         Imaging/Diagonstics:  XR CHEST PORTABLE    Result Date: 12/27/2022  EXAMINATION: ONE XRAY VIEW OF THE CHEST 12/27/2022 3:53 pm COMPARISON: 09/12/2022 HISTORY: ORDERING SYSTEM PROVIDED HISTORY: cp TECHNOLOGIST PROVIDED HISTORY: cp FINDINGS: The lungs are without acute focal process. There is no effusion or pneumothorax. The cardiomediastinal silhouette is stable. The osseous structures are stable. Stable sternotomy wires     No acute process. ASSESSMENT:       Principal Problem:    Unstable angina (HCC)  Active Problems:    Palpitations    Epigastric pain    Diabetes mellitus (HCC)    Generalized anxiety disorder    Primary hypertension    Hyperlipidemia    Mild intermittent asthma    Chest pain at rest    HTN (hypertension)    Family history of premature CAD    Mild congestive heart failure (HCC)    S/P CABG (coronary artery bypass graft)    Dyslipidemia    Chronic combined systolic and diastolic CHF, NYHA class 2 (HCC)    ASHD (arteriosclerotic heart disease) /  CABG 2018  Resolved Problems:    * No resolved hospital problems. *      PLAN:     Patient status: Admit the patient    MEDICAL DECISION MAKING:    Primary Problem(s): Unstable angina (HCC)  Differential diagnoses: GERD, Costochondritis, Asthma exacerbation  Condition is worsening  Treatment plan: Continue current treatment  Imaging:  Stress and ECHO  Medications: Continue home medications as ordered  BP meds, including Lisinopril, diltiazem, Toprol  Lipitor  Breathing tx as per prior  Asa/Plavix  Metformi/Home Meds  May consider Steroids, pending clinical status. Nutrition status:    Well developed, well nourished with no malnutrition  Dietician consult initiated    Hospital Prophylaxis:   DVT: SCD's   Stress Ulcer: plan for PPI    MDM Data:   Management and/or test interpretation discussed with ER MD at time of admission  Consults and Nursing notes were personally reviewed, all current labs and imaging were personally reviewed, tests ordered: CBC, BMP, and history obtained by independent historian     MIPS Medication Reconciliation documentation:    [x] I have utilized all available immediate resources to obtain, update, or review the patient's current medications (including all prescriptions, over-the-counter products, herbals, cannabinoid products and vitamin/mineral/dietary/nutritional supplements. Disposition:  Shared decision making: All test results, treatment options and disposition options were discussed with the patient today  Social determinants of health that may impact management: none  Code status: Full Code   Disposition: Discharge plan is pending    Consults: IP CONSULT TO CASE MANAGEMENT  IP CONSULT TO SOCIAL WORK  PT/OT  Vital signs per unit routine  Pulse Oximetry   RT Evaluation & Treat   NPO  Daily weights  Fall precautions  Incentive spirometry  Intake and output   Place intermittent pneumatic compression device  Telemetry monitoring    MIPS Advanced Care Planning documentation:  [x] I have confirmed that the patient's Advance Care Plan is present, Code Status is documented, or surrogate decision maker is listed in the patient's medical record    Above plan discussed with the patient who agreed to the above plan     CORE MEASURES  Core measures including DVT prophylaxis, Code Status/Advanced Directives, Nutrition, Therapy Options as well as prior records, imaging, and labs were reviewed at this encounter. Please note that this chart was generated using voice recognition Dragon dictation software. Although every effort was made to ensure the accuracy of this automated transcription, some errors in transcription may have occurred.     Lul Fleming MD  12/28/2022 6:42 AM

## 2022-12-29 ENCOUNTER — HOSPITAL ENCOUNTER (OUTPATIENT)
Dept: NON INVASIVE DIAGNOSTICS | Age: 47
Discharge: HOME | End: 2022-12-29
Payer: COMMERCIAL

## 2022-12-29 ENCOUNTER — HOSPITAL ENCOUNTER (OUTPATIENT)
Dept: PULMONOLOGY | Age: 47
Discharge: HOME | End: 2022-12-29
Payer: COMMERCIAL

## 2022-12-29 DIAGNOSIS — R06.02 SHORTNESS OF BREATH: ICD-10-CM

## 2022-12-29 DIAGNOSIS — R00.2 PALPITATIONS: ICD-10-CM

## 2022-12-29 PROCEDURE — 3430000000 HC RX DIAGNOSTIC RADIOPHARMACEUTICAL: Performed by: EMERGENCY MEDICINE

## 2022-12-29 PROCEDURE — 94726 PLETHYSMOGRAPHY LUNG VOLUMES: CPT

## 2022-12-29 PROCEDURE — 6360000002 HC RX W HCPCS: Performed by: INTERNAL MEDICINE

## 2022-12-29 PROCEDURE — 6370000000 HC RX 637 (ALT 250 FOR IP): Performed by: INTERNAL MEDICINE

## 2022-12-29 PROCEDURE — 94729 DIFFUSING CAPACITY: CPT

## 2022-12-29 PROCEDURE — 93243 EXT ECG>48HR<7D SCAN A/R: CPT

## 2022-12-29 PROCEDURE — A9500 TC99M SESTAMIBI: HCPCS | Performed by: EMERGENCY MEDICINE

## 2022-12-29 PROCEDURE — 93017 CV STRESS TEST TRACING ONLY: CPT

## 2022-12-29 PROCEDURE — 78452 HT MUSCLE IMAGE SPECT MULT: CPT

## 2022-12-29 PROCEDURE — 94060 EVALUATION OF WHEEZING: CPT

## 2022-12-29 PROCEDURE — 94664 DEMO&/EVAL PT USE INHALER: CPT

## 2022-12-29 PROCEDURE — 93306 TTE W/DOPPLER COMPLETE: CPT

## 2022-12-29 PROCEDURE — 93242 EXT ECG>48HR<7D RECORDING: CPT

## 2022-12-29 RX ORDER — TECHNETIUM TC-99M SESTAMIBI 1 MG/10ML
10 INJECTION INTRAVENOUS
Status: COMPLETED | OUTPATIENT
Start: 2022-12-29 | End: 2022-12-29

## 2022-12-29 RX ORDER — TECHNETIUM TC-99M SESTAMIBI 1 MG/10ML
30 INJECTION INTRAVENOUS
Status: COMPLETED | OUTPATIENT
Start: 2022-12-29 | End: 2022-12-29

## 2022-12-29 RX ORDER — ALBUTEROL SULFATE 90 UG/1
4 AEROSOL, METERED RESPIRATORY (INHALATION) ONCE
Status: COMPLETED | OUTPATIENT
Start: 2022-12-29 | End: 2022-12-29

## 2022-12-29 RX ADMIN — Medication 10 MILLICURIE: at 08:22

## 2022-12-29 RX ADMIN — ALBUTEROL SULFATE 4 PUFF: 90 AEROSOL, METERED RESPIRATORY (INHALATION) at 07:49

## 2022-12-29 RX ADMIN — REGADENOSON 0.4 MG: 0.08 INJECTION, SOLUTION INTRAVENOUS at 10:13

## 2022-12-29 RX ADMIN — Medication 30 MILLICURIE: at 10:02

## 2022-12-29 NOTE — PROCEDURES
[address], [place] [address]                              CARDIAC STRESS TEST    PATIENT NAME: Lalo Kim                    :        1975  MED REC NO:   [id number]                              ROOM:  ACCOUNT NO:   [de-identified]                           ADMIT DATE: 2022  PROVIDER:     Nick White MD    CARDIOVASCULAR DIAGNOSTIC DEPARTMENT    DATE OF STUDY:  2022    ORDERING PROVIDER:  Susana Irvin MD    PRIMARY CARE PROVIDER:  Susana Irvin MD    INTERPRETING PHYSICIAN:  Dalila Martinez. Radha Abreu MD    EXERCISE MYOCARDIAL PERFUSION STRESS TEST REPORT    Rest/Stress single-isotope SPECT imaging with exercise stress and gated  SPECT imaging    INDICATION:  Assessment of recent chest pain and/or discomfort. CLINICAL HISTORY:  The patient is a 56-year-old man with known coronary  artery disease. Previous cardiac history includes:  Coronary artery disease, stress  test, cardiac catheterization, coronary artery bypass graft, myocardial  infarction. Other previous history includes:  Chest pain, dyspnea,lightheadedness,  diabetes mellitus, smoker, hypertension, family history of coronary  artery disease <60 in mother and father. Symptoms just prior to testing included:  None. Relevant medications:  Metoprolol (Toprol). Diltiazem (Cardizem). PROCEDURE:  The patient performed treadmill exercise using a Edwar  protocol, completing 7:05 minutes and completing an estimated workload  of 9.05 metabolic equivalents (METS). The patient was unable to continue exercise testing due to shortness of  breath and leg pain, and so regadenoson, at a dose of 0.4 mg, was given  in order to optimize cardiac stress imaging. The test was terminated due to fatigue and shortness of breath.     The heart rate was 93 beats per minute at baseline and increased to 127  beats per minute at peak exercise, which was 72% of the maximum  predicted heart rate. The rest blood pressure was 120/68 mmHg and  increased to 136/70 mmHg, which is a normal response. During the  procedure, the patient developed fatigue and shortness of breath, but  denied any chest discomfort. Myocardial perfusion imaging: Imaging was performed at rest 30-45  minutes following the injection of 10 mCi of sestamibi. At peak  exercise, the patient was injected with 30 mCi of sestamibi and exercise  was continued for 1 minute. Gating post-stress tomographic imaging was  performed 30-45 minutes after stress. STRESS ECG RESULTS:  The resting electrocardiogram demonstrated normal  sinus rhythm without definitive ST-segment abnormalities suggestive of  myocardial ischemia. At peak exercise and during recovery, the patient developed:    No significant ST segment changes suggestive of myocardial ischemia with  no premature atrial contractions (PACs) and no premature ventricular  contractions (PVCs). NUCLEAR IMAGING RESULTS:  The overall quality of the study is fair. Mild attenuation artifact was seen. There is no evidence of abnormal  lung uptake. Additionally, the right ventricle appears normal.  The  left ventricular cavity is noted to be normal in size on the stress  images. There is no evidence of transient ischemic dilatation (TID) of  the left ventricle. Gated SPECT imaging demonstrates hypokinesis of the inferior and  inferoapical regions. The left ventricular ejection fraction was  calculated to be 39%. The rest images demonstrated a moderate perfusion abnormality of  moderate to severe intensity in the inferior and inferoapical regions. On stress imaging, a moderate perfusion abnormality of moderate to  severe intensity was noted in the inferior region. IMPRESSION:  1. Abnormal myocardial perfusion study.  There is a moderate perfusion  defect of moderate to severe intensity in the inferior and inferoapical  regions during stress and rest imaging, which is most consistent with an  old myocardial infarction with mild jazmine-infarct ischemia. 2.  Global left ventricular systolic function was abnormal, with  regional wall motion abnormalities. 3.  No significant electrocardiographic evidence of myocardial ischemia  during EKG monitoring without significant associated arrhythmias. Overall, these results are most consistent with an intermediate risk  scan. Depending on the patient symptoms and level of clinical suspicion,  aggressive medical management vs. additional testing by coronary  angiography may be indicated. The sensitivity for detecting ischemia on this test may have been  reduced due to the patient being on a beta blocker and a calcium channel  blocker.       El Johnston MD    D: 12/29/2022 14:22:30       T: 12/29/2022 14:23:36     [email or web address]  Job#: [id number]     Doc#: Unknown    CC:  Israel Nguyen MD

## 2022-12-30 ENCOUNTER — TELEPHONE (OUTPATIENT)
Dept: PRIMARY CARE CLINIC | Age: 47
End: 2022-12-30

## 2022-12-30 NOTE — PROCEDURES
[address], [place] [address]                               PULMONARY FUNCTION    PATIENT NAME: Dimitry Hernandez                    :        1975  MED REC NO:   [id number]                              ROOM:  ACCOUNT NO:   [de-identified]                           ADMIT DATE: 2022  PROVIDER:     Caron Primrose    DATE OF PROCEDURE:  2022    FINDINGS:  Spirometry shows FVC is 3.75, 80% of predicted. FEV1 is  3.28, 89% of predicted, which is within normal limits. FEV1/FVC ratio  is normal.  Postbronchodilator, there is no significant response to  bronchodilator, but clinical response is possible. Lung volume shows  residual volume is 1.72, 95% of predicted. Total lung capacity is 5.54,  87% of predicted. Both are within normal limits. Diffusion capacity is  20.78, 73% of predicted, which is consistent with mild reduction in  diffusion capacity, which could be secondary to emphysema,  intraparenchymal lung disease, pulmonary vascular disease, or anemia. Clinical correlation is recommended. IMPRESSION:  This pulmonary function test shows normal spirometry  without evidence of obstruction. No significant response to  bronchodilator. Lung volumes are normal.  Diffusion capacity is mildly  reduced, which could be secondary to emphysema, pulmonary vascular  disease, or intraparenchymal lung disease. Clinical correlation is  recommended.         Jen Pimentel    D: 2022 20:35:03       T: 2022 20:37:16     [email or web address]  Job#: [id number]     Doc#: [id number]    CC:  Melissa Medina MD

## 2023-01-03 ENCOUNTER — TELEPHONE (OUTPATIENT)
Dept: CARDIOLOGY | Age: 48
End: 2023-01-03

## 2023-01-03 NOTE — TELEPHONE ENCOUNTER
----- Message from Karmen Meyers MD sent at 1/1/2023  9:32 PM EST -----  Stress est is unchanged from prior. Continue current therapy and follow up. Please call with questions and/or concerns. Please call if symptoms persisted or got worse.  Thank you

## 2023-01-10 ENCOUNTER — OFFICE VISIT (OUTPATIENT)
Dept: PRIMARY CARE CLINIC | Age: 48
End: 2023-01-10
Payer: COMMERCIAL

## 2023-01-10 VITALS
HEART RATE: 80 BPM | SYSTOLIC BLOOD PRESSURE: 118 MMHG | BODY MASS INDEX: 31.55 KG/M2 | OXYGEN SATURATION: 97 % | WEIGHT: 201 LBS | HEIGHT: 67 IN | DIASTOLIC BLOOD PRESSURE: 64 MMHG

## 2023-01-10 DIAGNOSIS — R07.89 CHEST WALL PAIN: Primary | ICD-10-CM

## 2023-01-10 DIAGNOSIS — R06.02 SHORTNESS OF BREATH: ICD-10-CM

## 2023-01-10 DIAGNOSIS — J30.1 ALLERGIC RHINITIS DUE TO POLLEN, UNSPECIFIED SEASONALITY: ICD-10-CM

## 2023-01-10 DIAGNOSIS — G47.9 SLEEPING DIFFICULTY: ICD-10-CM

## 2023-01-10 DIAGNOSIS — R06.09 CHRONIC DYSPNEA: ICD-10-CM

## 2023-01-10 DIAGNOSIS — R91.1 LUNG NODULE: ICD-10-CM

## 2023-01-10 DIAGNOSIS — K21.9 GASTROESOPHAGEAL REFLUX DISEASE, UNSPECIFIED WHETHER ESOPHAGITIS PRESENT: ICD-10-CM

## 2023-01-10 PROCEDURE — 3078F DIAST BP <80 MM HG: CPT | Performed by: STUDENT IN AN ORGANIZED HEALTH CARE EDUCATION/TRAINING PROGRAM

## 2023-01-10 PROCEDURE — 99214 OFFICE O/P EST MOD 30 MIN: CPT | Performed by: STUDENT IN AN ORGANIZED HEALTH CARE EDUCATION/TRAINING PROGRAM

## 2023-01-10 PROCEDURE — 3074F SYST BP LT 130 MM HG: CPT | Performed by: STUDENT IN AN ORGANIZED HEALTH CARE EDUCATION/TRAINING PROGRAM

## 2023-01-10 RX ORDER — PANTOPRAZOLE SODIUM 40 MG/1
40 TABLET, DELAYED RELEASE ORAL
Qty: 90 TABLET | Refills: 0 | Status: SHIPPED | OUTPATIENT
Start: 2023-01-10

## 2023-01-10 RX ORDER — CETIRIZINE HYDROCHLORIDE 10 MG/1
10 TABLET ORAL DAILY
Qty: 90 TABLET | Refills: 0 | Status: SHIPPED | OUTPATIENT
Start: 2023-01-10 | End: 2023-02-09

## 2023-01-10 ASSESSMENT — PATIENT HEALTH QUESTIONNAIRE - PHQ9
SUM OF ALL RESPONSES TO PHQ QUESTIONS 1-9: 0
1. LITTLE INTEREST OR PLEASURE IN DOING THINGS: 0
SUM OF ALL RESPONSES TO PHQ9 QUESTIONS 1 & 2: 0
SUM OF ALL RESPONSES TO PHQ QUESTIONS 1-9: 0
9. THOUGHTS THAT YOU WOULD BE BETTER OFF DEAD, OR OF HURTING YOURSELF: 0
3. TROUBLE FALLING OR STAYING ASLEEP: 0
2. FEELING DOWN, DEPRESSED OR HOPELESS: 0
5. POOR APPETITE OR OVEREATING: 0
6. FEELING BAD ABOUT YOURSELF - OR THAT YOU ARE A FAILURE OR HAVE LET YOURSELF OR YOUR FAMILY DOWN: 0
7. TROUBLE CONCENTRATING ON THINGS, SUCH AS READING THE NEWSPAPER OR WATCHING TELEVISION: 0
SUM OF ALL RESPONSES TO PHQ QUESTIONS 1-9: 0
4. FEELING TIRED OR HAVING LITTLE ENERGY: 0
8. MOVING OR SPEAKING SO SLOWLY THAT OTHER PEOPLE COULD HAVE NOTICED. OR THE OPPOSITE, BEING SO FIGETY OR RESTLESS THAT YOU HAVE BEEN MOVING AROUND A LOT MORE THAN USUAL: 0
SUM OF ALL RESPONSES TO PHQ QUESTIONS 1-9: 0
10. IF YOU CHECKED OFF ANY PROBLEMS, HOW DIFFICULT HAVE THESE PROBLEMS MADE IT FOR YOU TO DO YOUR WORK, TAKE CARE OF THINGS AT HOME, OR GET ALONG WITH OTHER PEOPLE: 0

## 2023-01-10 NOTE — PROGRESS NOTES
C[address], [address] , [facility], [address] [place] [address] is a 56 y.o. male with  has a past medical history of CAD (coronary artery disease), Carpal tunnel syndrome, Depressive disorder, not elsewhere classified, Diabetes mellitus ([facility] Heart attack ([facility] History of echocardiogram, Hyperlipidemia, and Hypertension. Presented to the office today for:  Chief Complaint   Patient presents with    Discuss Labs       Assessment/Plan   1. Chest wall pain  2. Shortness of breath  -     CT CHEST HIGH RESOLUTION; Future  3. Sleeping difficulty  -     Home Sleep Study; Future  4. Lung nodule  -     CT CHEST HIGH RESOLUTION; Future  5. Chronic dyspnea  -     CT CHEST HIGH RESOLUTION; Future  6. Gastroesophageal reflux disease, unspecified whether esophagitis present  -     pantoprazole (PROTONIX) 40 MG tablet; Take 1 tablet by mouth every morning (before breakfast), Disp-90 tablet, R-0Normal  7. Allergic rhinitis due to pollen, unspecified seasonality  -     cetirizine (ZYRTEC) 10 MG tablet; Take 1 tablet by mouth daily, Disp-90 tablet, R-0Normal    Return in about 1 month (around 2/10/2023), or if symptoms worsen or fail to improve, for F/U Chest wall pain. Start on a PPI today; Trial of Zyrtec  Cardiology consultation to be obtained/discussed with  today  Sleep Study was ordered today/repeat, last one being about 5+ years ago. CT chest ordered today for ongoing dyspnea and f/u of lung nodule. All patient questions answered. Pt voiced understanding. Medications Discontinued During This Encounter   Medication Reason    ondansetron (ZOFRAN-ODT) 4 MG disintegrating tablet ERROR       Patient received counseling on the following healthy behaviors: nutrition, exercise and medication adherence.  I encouraged and discussed lifestyle modifications including diet and exercise and the patient was agreeable to making positive/beneficial changes to both to help improve their overall health. Discussed use, benefit, and side effects of prescribed medications. Barriers to medication compliance addressed. Patient given educational materials: see patient instructions. HM - HM items completed today as per orders. Outstanding HM items though not limited to immunizations were discussed with the patient today, including risks, benefits and alternatives. The patient will discuss these during the next appointment per their preference. If there are any worsening or concerning signs or symptoms, patient will report to the ED and/or contact [phone] for immediate evaluation. Teach back method was used. Subjective:    HPI  Chief Complaint   Patient presents with    Discuss Labs     Pt presents to discuss recent cardiac and PFT testing. He is still getting sharp chest pain/ and heaviness, with palpitations and SOB even at rest lasting up to 30 minutes with increased fatigue. He is also getting intermittent tinnitus, admits HA and lightheadedness, presyncope, admits visual changes at times. The patient had a stress test on 12/29/2022 - noted to be abnormal and regional wall motion abnormalities. ECHO was reviewed 12/19/2022 - YZ18%, mild diastolic dysfunction. The patient's weight has been stable. PFT's on 12/29/2022 are WNL. The patient had been tested for Sleep Apnea about 5 years ago. Stop BANG noted to be 6. The patient did not take any nitro. He is also having heartburn and takes OTC antacids with some relief of his symptoms. The patient is using albuterol a few times per day/no relief of symptoms. He does not take any medications for allergies. The patient indicates that he at his place of work it is very manfred. The patient had a Cardiac Cath on 01/27/2022. \"Conclusions    Procedure Summary    Severe single vessel disease involving the right coronary arteries. 1 of 1 bypass grafts patent. Normal LVEDP. \"    ECHO 12/29/2022  \"CONCLUSIONS   Summary  Global left ventricular systolic function appears moderately reduced with an  estimated ejection fraction of 40%. The left ventricular cavity size is within normal limits and the left  ventricular wall thickness is moderately increased. Thinning and hypokinesis of the inferior wall. The inferoseptal wall is abnormal in its motion which is not unusual status  post open heart surgery. No significant valvular abnormalities. Mild diastolic dysfunction. \"    Stress 2022  \"IMPRESSION:  1. Abnormal myocardial perfusion study. There is a moderate perfusion  defect of moderate to severe intensity in the inferior and inferoapical  regions during stress and rest imaging, which is most consistent with an  old myocardial infarction with mild jazmine-infarct ischemia. 2.  Global left ventricular systolic function was abnormal, with  regional wall motion abnormalities. \"    CT Chest 2018  \"     Impression   No central or segmental pulmonary embolus. No acute pulmonary process. 4 mm nodule within the lingula. Follow up based on guidelines below. RECOMMENDATIONS:   Fleischner Society guidelines for follow-up and management of incidentally   detected pulmonary nodules:       Single Solid Nodule:       Nodule size less than 6 mm   In a low-risk patient, no routine follow-up. In a high-risk patient, optional CT at 12 months. - Low risk patients include individuals with minimal or absent history of   smoking and other known risk factors. - High risk patients include individuals with a history or smoking or known   risk factors. Radiology 2017 http://pubs. rsna.org/doi/full/10.1148/radiol. 0964456163       \"    Health Maintenance -   Alcohol/Substance use History - None/Minimal    Tobacco Use      Smoking status: Every Day        Packs/day: 0.25        Years: 30.00        Pack years: 7.5        Types: Cigarettes        Last attempt to quit: 2022        Years since quittin.9      Smokeless tobacco: Never    Family History   Problem Relation Age of Onset    High Blood Pressure Mother     Heart Disease Father     High Blood Pressure Father     Asthma Sister     Colon Cancer Paternal Uncle     Colon Cancer Paternal Uncle     Pancreatic Cancer Maternal Aunt     Pancreatic Cancer Maternal Aunt        [facility] Scores 1/10/2023 11/18/2022 1/10/2019   PHQ2 Score 0 4 0   PHQ9 Score 0 14 0     Interpretation of Total Score Depression Severity: 1-4 = Minimal depression, 5-9 = Mild depression, 10-14 = Moderate depression, 15-19 = Moderately severe depression, 20-27 = Severe depression    Review of Systems  Constitutional: Negative for activity change, appetite change, chills, diaphoresis, fatigue, fever and unexpected weight change. HENT: Negative for sinus pressure, sinus pain, sore throat and trouble swallowing. Respiratory: Positive for cough, shortness of breath and wheezing. Cardiovascular: Positive for chest pain, palpitations and no leg swelling. Gastrointestinal: Negative for abdominal pain, diarrhea, nausea and vomiting. Endocrine: Negative for cold intolerance, polydipsia, polyphagia and polyuria. Genitourinary: Negative for difficulty urinating, flank pain and frequency. Musculoskeletal: Negative for gait problem and joint swelling. Negative for back pain, neck pain and neck stiffness. Skin: Negative for color change and wound. Negative for pallor and rash. Allergic/Immunologic: Positive for environmental allergies and food allergies. Neurological: Negative for light-headedness, numbness and headaches. Psychiatric/Behavioral: Negative for sleep disturbance. Negative for confusion and suicidal ideas.      Objective:    /64   Pulse 80   Ht 5' 7\" (1.702 m)   Wt 201 lb (91.2 kg)   SpO2 97%   BMI 31.48 kg/m²    BP Readings from Last 3 Encounters:   01/10/23 118/64   12/28/22 113/66   12/19/22 132/82     Physical Exam  Constitutional: Patient is oriented to person, place, and time. Patient appears well-developed and well-nourished. No distress. HENT: Head: Normocephalic and atraumatic. Eyes: Pupils are equal, round, and reactive to light. Conjunctivae are normal. Right eye exhibits no discharge. Left eye exhibits no discharge. Cardiovascular: Normal rate, regular rhythm and normal heart sounds. Pulmonary/Chest: Effort normal and breath sounds normal. No respiratory distress. Patient has minimal to no wheezes present. Abdominal: Soft. Bowel sounds are normal. Patient exhibits no distension. There is no tenderness. Musculoskeletal:  Patient exhibits no edema and tenderness. Patient exhibits no deformity. Neurological: Patient is alert and oriented to person, place, and time. Skin: Skin is warm and dry. Patient is not diaphoretic. Psychiatric: Patient's speech is normal and behavior is normal. Thought content normal.   Vitals reviewed. Lab Results   Component Value Date    WBC 10.7 12/27/2022    HGB 14.4 12/27/2022    HCT 40.4 (L) 12/27/2022     12/27/2022    CHOL 98 09/12/2022    TRIG 113 09/12/2022    HDL 21 (L) 09/12/2022    ALT 27 12/27/2022    AST 20 12/27/2022     12/27/2022    K 4.1 12/27/2022     12/27/2022    CREATININE 0.97 12/27/2022    BUN 22 (H) 12/27/2022    CO2 24 12/27/2022    TSH 2.35 09/12/2022    INR 1.0 12/26/2021    LABA1C 6.3 (H) 09/12/2022     Lab Results   Component Value Date    CALCIUM 9.8 12/27/2022     Lab Results   Component Value Date    LDLCHOLESTEROL 54 09/12/2022       Please note that this chart was generated using voice recognition Dragon dictation software. Although every effort was made to ensure the accuracy of this automated transcription, some errors in transcription may have occurred.     Electronically signed by Dr. Marilou Lopez MD on 1/10/2023 at 2:12 PM

## 2023-01-13 ENCOUNTER — TELEPHONE (OUTPATIENT)
Dept: PRIMARY CARE CLINIC | Age: 48
End: 2023-01-13

## 2023-01-13 RX ORDER — RANOLAZINE 500 MG/1
500 TABLET, EXTENDED RELEASE ORAL 2 TIMES DAILY
Qty: 60 TABLET | Refills: 0 | Status: SHIPPED | OUTPATIENT
Start: 2023-01-13

## 2023-01-13 NOTE — TELEPHONE ENCOUNTER
Reached out to pt to see how he is feeling, he states no improvement since visit 1/10/23. He is \"miserable feeling like crap\". He is wanting to trial Ranexa (med pended). Appt with [facility] 1/31/2023.     Requested he provide office with an update of status early next week

## 2023-01-16 RX ORDER — LISINOPRIL 20 MG/1
20 TABLET ORAL DAILY
Qty: 90 TABLET | Refills: 3 | Status: SHIPPED | OUTPATIENT
Start: 2023-01-16

## 2023-01-17 ENCOUNTER — TELEPHONE (OUTPATIENT)
Dept: CARDIOLOGY | Age: 48
End: 2023-01-17

## 2023-01-17 NOTE — TELEPHONE ENCOUNTER
----- Message from Hermelinda Toney MD sent at 1/16/2023  9:32 PM EST -----  Heart monitor is good. Dr. Maritza Maya MD is concerned about his stress test result. Please let me see him for reevaluation sometime this week or next week.   Thank you

## 2023-01-19 DIAGNOSIS — E78.5 DYSLIPIDEMIA: ICD-10-CM

## 2023-01-19 DIAGNOSIS — R94.39 ABNORMAL STRESS TEST: ICD-10-CM

## 2023-01-19 DIAGNOSIS — Z95.1 S/P CABG (CORONARY ARTERY BYPASS GRAFT): ICD-10-CM

## 2023-01-19 DIAGNOSIS — R00.2 PALPITATION: ICD-10-CM

## 2023-01-19 DIAGNOSIS — I10 ESSENTIAL HYPERTENSION: ICD-10-CM

## 2023-01-19 DIAGNOSIS — R07.89 ATYPICAL CHEST PAIN: ICD-10-CM

## 2023-01-19 DIAGNOSIS — I25.10 ASHD (ARTERIOSCLEROTIC HEART DISEASE): ICD-10-CM

## 2023-01-19 RX ORDER — CLOPIDOGREL BISULFATE 75 MG/1
TABLET ORAL
Qty: 90 TABLET | Refills: 3 | Status: SHIPPED | OUTPATIENT
Start: 2023-01-19

## 2023-01-19 RX ORDER — METOPROLOL SUCCINATE 100 MG/1
TABLET, EXTENDED RELEASE ORAL
Qty: 90 TABLET | Refills: 3 | Status: SHIPPED | OUTPATIENT
Start: 2023-01-19 | End: 2023-01-20

## 2023-01-20 ENCOUNTER — OFFICE VISIT (OUTPATIENT)
Dept: CARDIOLOGY | Age: 48
End: 2023-01-20
Payer: COMMERCIAL

## 2023-01-20 ENCOUNTER — HOSPITAL ENCOUNTER (OUTPATIENT)
Age: 48
Discharge: HOME | End: 2023-01-20
Payer: COMMERCIAL

## 2023-01-20 VITALS
SYSTOLIC BLOOD PRESSURE: 119 MMHG | WEIGHT: 197 LBS | DIASTOLIC BLOOD PRESSURE: 77 MMHG | HEART RATE: 90 BPM | HEIGHT: 67 IN | OXYGEN SATURATION: 97 % | BODY MASS INDEX: 30.92 KG/M2 | RESPIRATION RATE: 18 BRPM

## 2023-01-20 DIAGNOSIS — R00.2 PALPITATION: ICD-10-CM

## 2023-01-20 DIAGNOSIS — I25.10 ASHD (ARTERIOSCLEROTIC HEART DISEASE): ICD-10-CM

## 2023-01-20 DIAGNOSIS — Z95.1 S/P CABG (CORONARY ARTERY BYPASS GRAFT): ICD-10-CM

## 2023-01-20 DIAGNOSIS — R42 DIZZY: ICD-10-CM

## 2023-01-20 DIAGNOSIS — R06.02 SOB (SHORTNESS OF BREATH): ICD-10-CM

## 2023-01-20 DIAGNOSIS — R07.89 CHEST TIGHTNESS: ICD-10-CM

## 2023-01-20 DIAGNOSIS — E78.2 MIXED HYPERLIPIDEMIA: ICD-10-CM

## 2023-01-20 DIAGNOSIS — I10 ESSENTIAL HYPERTENSION: ICD-10-CM

## 2023-01-20 DIAGNOSIS — R07.89 CHEST TIGHTNESS: Primary | ICD-10-CM

## 2023-01-20 LAB
ANION GAP SERPL CALCULATED.3IONS-SCNC: 6 MMOL/L (ref 9–17)
BUN BLDV-MCNC: 14 MG/DL (ref 6–20)
BUN/CREAT BLD: 15 (ref 9–20)
CALCIUM SERPL-MCNC: 9.5 MG/DL (ref 8.6–10.4)
CHLORIDE BLD-SCNC: 107 MMOL/L (ref 98–107)
CO2: 28 MMOL/L (ref 20–31)
CREAT SERPL-MCNC: 0.92 MG/DL (ref 0.7–1.2)
GFR SERPL CREATININE-BSD FRML MDRD: >60 ML/MIN/1.73M2
GLUCOSE BLD-MCNC: 209 MG/DL (ref 70–99)
HCT VFR BLD CALC: 40.9 % (ref 40.7–50.3)
HEMOGLOBIN: 14.1 G/DL (ref 13–17)
MCH RBC QN AUTO: 31.1 PG (ref 25.2–33.5)
MCHC RBC AUTO-ENTMCNC: 34.5 G/DL (ref 28.4–34.8)
MCV RBC AUTO: 90.3 FL (ref 82.6–102.9)
NRBC AUTOMATED: 0 PER 100 WBC
PDW BLD-RTO: 13.4 % (ref 11.8–14.4)
PLATELET # BLD: 308 K/UL (ref 138–453)
PMV BLD AUTO: 10.8 FL (ref 8.1–13.5)
POTASSIUM SERPL-SCNC: 4.7 MMOL/L (ref 3.7–5.3)
RBC # BLD: 4.53 M/UL (ref 4.21–5.77)
SODIUM BLD-SCNC: 141 MMOL/L (ref 135–144)
TROPONIN, HIGH SENSITIVITY: <6 NG/L (ref 0–22)
WBC # BLD: 9.1 K/UL (ref 3.5–11.3)

## 2023-01-20 PROCEDURE — 85027 COMPLETE CBC AUTOMATED: CPT

## 2023-01-20 PROCEDURE — 93000 ELECTROCARDIOGRAM COMPLETE: CPT | Performed by: INTERNAL MEDICINE

## 2023-01-20 PROCEDURE — 3078F DIAST BP <80 MM HG: CPT | Performed by: INTERNAL MEDICINE

## 2023-01-20 PROCEDURE — 36415 COLL VENOUS BLD VENIPUNCTURE: CPT

## 2023-01-20 PROCEDURE — 99215 OFFICE O/P EST HI 40 MIN: CPT | Performed by: INTERNAL MEDICINE

## 2023-01-20 PROCEDURE — 84484 ASSAY OF TROPONIN QUANT: CPT

## 2023-01-20 PROCEDURE — 80048 BASIC METABOLIC PNL TOTAL CA: CPT

## 2023-01-20 PROCEDURE — 3074F SYST BP LT 130 MM HG: CPT | Performed by: INTERNAL MEDICINE

## 2023-01-20 RX ORDER — METOPROLOL SUCCINATE 100 MG/1
100 TABLET, EXTENDED RELEASE ORAL 2 TIMES DAILY
Qty: 180 TABLET | Refills: 3 | Status: SHIPPED | OUTPATIENT
Start: 2023-01-20

## 2023-01-20 NOTE — PATIENT INSTRUCTIONS
SURVEY:    You may be receiving a survey from [employer] regarding your visit today. Please complete the survey to enable us to provide the highest quality of care to you and your family. If you cannot score us a very good on any question, please call the office to discuss how we could have made your experience a very good one. Thank you.

## 2023-01-20 NOTE — PROGRESS NOTES
Jud Srivastava am scribing for and in the presence of Mally Verma MD, F.A.C.C..    Patient: Yordan Brunson  : 1975  Date of Visit: 2023    REASON FOR VISIT / CONSULTATION: Follow-up (HX: CP, Pals, CAD, HTN, HLD. Pt states he is doing ok. C/o: CP, pressure and sharp pain. Duration can be 20 minutes to all night long. Can have SOB with these episodes. Heart Palpitations at times. Has been happening over the past month or so. )      History of Present Illness:        Dear Chelsey Blanc MD    I had the pleasure of seeing . Jaz Wilde today who is a 56 y.o. male who presents for a follow up following a hospital visit on 2019 with chest pain during activity. He had a stress test on 2018 (inferior wall defect with jazmine-infarct ischemia, ejection fraction 47% with inferior wall hypokinesis). 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 He was taken for an emergent CABG with SVG to RCA. Echo on 2019: EF 40-45%. LV cavity sixe is mildly increased. Inferior and inferoseptal wall hypokenesis noted. Mild diastolic dysfunction. Cardiac Catheterization on 2019: Severe single vessel disease involving the LAD, circumflex and right coronary arteries. 1 of 1 bypass grafts patent. Normal LVEDP. Echo on 2019: EF 40-45%. LV cavity sixe is mildly increased. Inferior and inferoseptal wall hypokenesis noted. Mild diastolic dysfunction    Stress test done on 2021 showed abnormal myocardial perfusion study. There is a moderate perfusion defect of moderate/severe intensity in the inferolateral, inferior and inferoseptal regions during stress and rest imaging, which is most consistent with an old myocardial infarction with jazmine-infarct ischemia.  EF was 43%    Echo done on 2021 showed the global left ventricular systolic function appears mildly reduced with an estimated ejection fraction of 40-45%. The inferoseptal and inferior walls appear hypokinetic in their motion. The left ventricular cavity size is within normal limits and the left ventricular wall thickness is mildly increased. Holter done on 12/22/2021 showed rhythm was sinus. Average TN interval 0.16, average QRS duration 0.09. Ventricular ectopic beats 6% of test.2. No diary; no symptoms reported. Frequent PVC's comprising 6% of total beats  including a few short ventricular runs, the longest being 7 beats at 83 bpm.     Heart cath done on 1/27/2022- Severe single vessel disease involving the right coronary arteries. 1 of 1 bypass grafts patent. Normal LVEDP. CAM done on 1/27/2022- Predominant rhythm:  Normal sinus rhythm. PAC 0.02%. PVC 0.64%. ER/ Hospitalized on 12/27/2022 with chest pains. Echo done on 12/29/2022: Global left ventricular systolic function appears moderately reduced with an  estimated ejection fraction of 40%. The left ventricular cavity size is within normal limits and the left ventricular wall thickness is moderately increased. Thinning and hypokinesis of the inferior wall. The inferoseptal wall is abnormal in its motion which is not unusual status post open heart surgery. No significant valvular abnormalities. Mild diastolic dysfunction. Stress test done on 12/29/2022: Abnormal myocardial perfusion study. There is a moderate perfusion defect of moderate to severe intensity in the inferior and inferoapical regions during stress and rest imaging, which is most consistent with an old myocardial infarction with mild jazmine-infarct ischemia. Overall, these results are most consistent with an intermediate risk scan. CAM done on 12/29/2022: Predominant rhythm: Normal sinus rhythm. Atrial tachycardia (AT) 2 episodes, longest 4 beats at average 122 bpm up to 38 bpm, fastest 3 beats at average 137 bpm up to 142 bpm. PAC 0.01%. PVC 2.27%.  Seven days and 2 hours recorded. Sinus rhythm with average heart rate of 88 bpm, ranging between 62 and 125 bpm. Rare PACs with two short runs of ectopic atrial tachycardia, the longest is 4 beats at a heart rate of 122 bpm. Occasional PVCs. No ventricular runs. Mr. Tyra Proctor is here for follow up today. He reports doing ok at this time. He has been having chest discomfort that he feels is getting worse and more frequent. It can occur about every day. Duration can be 20 minutes and he can associate heart palpitations, dizziness and shortness of breath with these episodes. He also can wake up with this discomfort as well. It feel like it is getting stronger and at times he can feel it worsen with exertion. He stated that it can feel like he is having an asthma attack. His chest gets tight and it is hard to catch his breath. He feels like he is not doing as much as he used to do. He has been tested for sleep apnea and this has been negative in the past. His PCP did order a home sleep screening test for him to reassess this at this time. He can also get some pain in his left thigh area with exertion. He can also get some nausea however he denied any vomiting. No bleeding issues or issues moving his bowels. No problems with current medications. He is not drinking any alcohol or caffeine beverages at this time. Bleeding Risks: Mr. Tyra Proctor denies any current or recent bleeding problems including a history of a GI bleed, ulcers, recent or upcoming surgeries, blood in his stool or black tarry stools or blood in his urine. EKG done today in office (1/20/2023): Showed no ischemic changes. PAST MEDICAL HISTORY:        Past Medical History:   Diagnosis Date    CAD (coronary artery disease)     Carpal tunnel syndrome     Depressive disorder, not elsewhere classified     Diabetes mellitus ([facility] [address]     Heart attack ([facility] [address] 08/11/2018    STEMI    History of echocardiogram 01/08/2019    EF 40-45%. LV cavity sixe is mildly increased. Inferior and inferoseptal wall hypokenesis noted. Mild diastolic dysfunction. Hyperlipidemia     Hypertension 2009       CURRENT ALLERGIES: Patient has no known allergies. REVIEW OF SYSTEMS: 14 systems were reviewed. Pertinent positives and negatives as above, all else negative.      Past Surgical History:   Procedure Laterality Date    CARDIAC CATHETERIZATION Left 2018    Right Ulnar/[facility]/    CARDIAC CATHETERIZATION Left 2022    Dr Janeth Agudelo/ right ulnar-    COLONOSCOPY N/A 10/04/2022    COLORECTAL CANCER SCREENING, HIGH RISK performed by Robby Goldstein MD at [facility]  10/04/2022    -tortuous colon    CORONARY ANGIOPLASTY  2018    double bypass, [facility],     CORONARY ARTERY BYPASS GRAFT  2018    ESOPHAGOGASTRODUODENOSCOPY  10/04/2022    -bx(neg H-Pylori,duodenum-normal,mild gastritis)    UPPER GASTROINTESTINAL ENDOSCOPY N/A 10/04/2022    EGD BIOPSY performed by Robby Goldstein MD at [address] History:  Social History     Tobacco Use    Smoking status: Every Day     Packs/day: 0.25     Years: 30.00     Pack years: 7.50     Types: Cigarettes     Last attempt to quit: 2022     Years since quittin.9    Smokeless tobacco: Never   Vaping Use    Vaping Use: Never used   Substance Use Topics    Alcohol use: Not Currently    Drug use: No        CURRENT MEDICATIONS:        Outpatient Medications Marked as Taking for the 23 encounter (Office Visit) with Nalini Corona MD   Medication Sig Dispense Refill    metoprolol succinate (TOPROL XL) 100 MG extended release tablet TAKE 1 TABLET BY MOUTH EVERY DAY 90 tablet 3    clopidogrel (PLAVIX) 75 MG tablet TAKE 1 TABLET BY MOUTH EVERY DAY 90 tablet 3    lisinopril (PRINIVIL;ZESTRIL) 20 MG tablet Take 1 tablet by mouth daily 90 tablet 3    ranolazine (RANEXA) 500 MG extended release tablet Take 1 tablet by mouth 2 times daily 60 tablet 0 cetirizine (ZYRTEC) 10 MG tablet Take 1 tablet by mouth daily 90 tablet 0    pantoprazole (PROTONIX) 40 MG tablet Take 1 tablet by mouth every morning (before breakfast) 90 tablet 0    ALPRAZolam (XANAX) 0.25 MG tablet Take 0.25 mg by mouth 3 times daily as needed for Sleep. atorvastatin (LIPITOR) 80 MG tablet Take 1 tablet by mouth daily 90 tablet 3    albuterol (ACCUNEB) 0.63 MG/3ML nebulizer solution Take 3 mLs by nebulization every 6 hours as needed for Wheezing 270 mL 3    albuterol (PROVENTIL) (5 MG/ML) 0.5% nebulizer solution Take 1 mL by nebulization 4 times daily as needed for Wheezing 120 each 3    albuterol sulfate HFA (VENTOLIN HFA) 108 (90 Base) MCG/ACT inhaler Inhale 2 puffs into the lungs 4 times daily as needed for Wheezing 18 g 3    fluticasone (FLOVENT HFA) 110 MCG/ACT inhaler Inhale 1 puff into the lungs 2 times daily 12 g 3    fenofibrate (TRIGLIDE) 160 MG tablet Take 1 tablet by mouth daily 90 tablet 0    dilTIAZem (CARDIZEM CD) 120 MG extended release capsule Take 1 capsule by mouth daily 90 capsule 3    nitroGLYCERIN (NITROSTAT) 0.4 MG SL tablet Place 1 tablet under the tongue every 5 minutes as needed for Chest pain up to max of 3 total doses. If no relief after 1 dose, call 911. 25 tablet 3    metFORMIN (GLUCOPHAGE) 500 MG tablet Take 500 mg by mouth 2 times daily (with meals)       aspirin 81 MG EC tablet Take 81 mg by mouth daily. FAMILY HISTORY: family history includes Asthma in his sister; Colon Cancer in his paternal uncle and paternal uncle; Heart Disease in his father; High Blood Pressure in his father and mother; Pancreatic Cancer in his maternal aunt and maternal aunt. Physical Examination:     /77 (Site: Left Upper Arm, Position: Sitting, Cuff Size: Medium Adult)   Pulse 90   Resp 18   Ht 5' 7\" (1.702 m)   Wt 197 lb (89.4 kg)   SpO2 97%   BMI 30.85 kg/m²  Body mass index is 30.85 kg/m². Constitutional: He is oriented to person, place, and time.  He appears well-developed and well-nourished. In no acute distress. HEENT: Normocephalic and atraumatic. No JVD present. Carotid bruit is not present. No mass and no thyromegaly present. No lymphadenopathy present. Cardiovascular: Normal rate, regular rhythm, normal heart sounds. Exam reveals no gallop and no friction rubs. No heart murmur heard. Pulmonary/Chest: Effort normal and breath sounds normal. No respiratory distress. He has no wheezes, rhonchi or rales. Abdominal: Soft, non-tender. Bowel sounds and aorta are normal. He exhibits no organomegaly, mass or bruit. Extremities: No edema. No cyanosis and no clubbing. Pulses are 2+ radial and carotid pulses. 2+ dorsalis pedis and posterior tibial pulses bilaterally. Neurological: He is alert and oriented to person, place, and time. No evidence of gross cranial nerve deficit. Coordination appeared normal.   Skin: Skin is warm and dry. There is no rash or diaphoresis. Psychiatric: He has a normal mood and affect. His speech is normal and behavior is normal.      MOST RECENT LABS ON RECORD:   Lab Results   Component Value Date    WBC 10.7 12/27/2022    HGB 14.4 12/27/2022    HCT 40.4 (L) 12/27/2022     12/27/2022    CHOL 98 09/12/2022    TRIG 113 09/12/2022    HDL 21 (L) 09/12/2022    ALT 27 12/27/2022    AST 20 12/27/2022     12/27/2022    K 4.1 12/27/2022     12/27/2022    CREATININE 0.97 12/27/2022    BUN 22 (H) 12/27/2022    CO2 24 12/27/2022    TSH 2.35 09/12/2022    INR 1.0 12/26/2021    LABA1C 6.3 (H) 09/12/2022       ASSESSMENT:     1. Chest tightness    2. Palpitation    3. SOB (shortness of breath)    4. Dizzy    5. ASHD (arteriosclerotic heart disease)    6. S/P CABG (coronary artery bypass graft)    7. Essential hypertension    8. Mixed hyperlipidemia       PLAN:        Atypical Chest Pain: He has been having chest discomfort that he feels is getting worse and more frequent. It is not similar to the pain and heart attack.   He has catheterization x1 in his most recent cardiac cath back in January 2022 showed the angiogram, otherwise nonobstructive CAD. His stress test is unchanged from prior, evidence of inferior myocardial infarction with minimal jazmine-infarction ischemia. Having said his symptoms are worrisome for ischemic etiology particularly that it is slowly getting worse. He came to the emergency room few days ago and he signed himself out because of the prolonged wait. He is also complaining of worsening palpitations, dizziness and shortness of breath. I went over the results of his CAM monitor with him today in great details. I will increase his Toprol-XL to 100 mg twice daily because his average heart rate 88 bpm with occasional isolated PVCs. Symptoms are interfering with his daily activities, he feels like he is not doing as much as he used to do. We discussed his chest pains in great detail and in the end he was agreeable to repeating his heart cath to be sure his grafts are open. Antiplatelet Agent: Continue aspirin 81 mg daily and clopidogrel (Plavix 75 mg daily. I also reminded him to watch for signs of blood in his stool or black tarry stools and stop the medication immediately if this develops as this could be life threatening. Beta Blocker: INCREASE to Metoprolol succinate (Toprol XL) 100 mg BID. I also discussed the potential side effects of this medication including lightheadedness and dizziness and instructed them to stop the medication of this occurs and call our office if this occurs. Calcium Channel Blocker: Continue [id number] mg daily     Other Anti-anginal medications: Continue Ranolazine (Ranexa) 500 mg twice daily I discussed the potential side effects of this medication including lightheadedness and dizziness and told him to call the office if this occurs. Statin Therapy: Continue atorvastatin (Lipitor) 80 mg nightly. and continue fenofibrate 160 mg daily. Counseling: I advised Mr. Corine Castro to call our office or go to the emergency room if he develops worsening or persistent chest pain or shortness of breath as this could be life threatening. Depending on his heart cath results we may refer him to phase three cardiac rehab if needed to get his heart stronger. Additional Testing List: For these reasons, I recommended that the patient consider undergoing a cardiac catheterization with coronary angiography to assess their coronary anatomy and facilitate better treatment recommendations. I discussed the risks, benefits, and alternatives to the procedure including the risk of bleeding, contrast induced allergy and/or kidney damage, arrythmia, stroke, heart attack, death, or the need for further procedures. I also discussed the fact that although treatment with simple medical management is a potential treatment option in place of cardiac catheterization, I expressed my opinion that cardiac catheterization in order to define their coronary anatomy and rule out severe 3 vessel or left main coronary artery disease would significant help guide the most appropriate treatment strategy ranging from no treatment, to medications, stents, to even coronary bypass surgery. The patient verbalized understanding of the risks benefits and alternatives and stated that they would like to undergo the procedure. We will plan to schedule the procedure here at [facility] on 1/26/2023. I took the liberty of ordering a BMP for today to assess their potassium and renal function. I told them that they could get their lab work performed at the location of their choosing, unfortunately, if the lab work was not performed at a [facility]) facility I could not guarantee my ability to follow up with them on their results. and  I took the liberty of ordering a CBC.  I told them that they could get their lab work performed at the location of their choosing, unfortunately, if the lab work was not performed at a [address] [place] [address] Health facility I could not guarantee my ability to follow up with them on their results. Recurrent intermittent palpitations: Rate Control Symptomatic. We did review his CAM results that was done on 12/29/2022: Predominant rhythm: Normal sinus rhythm. Atrial tachycardia (AT) 2 episodes, longest 4 beats at average 122 bpm up to 38 bpm, fastest 3 beats at average 137 bpm up to 142 bpm. PAC 0.01%. PVC 2.27%. Seven days and 2 hours recorded. Sinus rhythm with average heart rate of 88 bpm, ranging between 62 and 125 bpm. Rare PACs with two short runs of ectopic atrial tachycardia, the longest is 4 beats at a heart rate of 122 bpm. Occasional PVCs. No ventricular runs. Beta Blocker: INCREASE to Metoprolol succinate (Toprol XL) 100 mg BID to help with his heart palpitations and chest discomfort as above. Calcium Channel Blocker: Continue diltiazem CD (Cardizem CD) 120 mg once daily. Lightheadedness/dizziness: No falls or near falls. Nonpharmacologic counseling: Because of his condition, I reminded him to try and keep himself well-hydrated and to take extra time when moving from laying to sitting, sitting to standing and standing to walking. I also explained to him to help improve his symptoms he should include 3 g sodium diet, 1 or 2 L of sports drinks daily, knee-high compressions stockings. Atherosclerotic Heart Disease: S/P CABG x1 done on August 11, 2018 SVG to RCA  Repeat cardiac cath on 1/27/2022 showed patent SVG to right PDA, otherwise nonobstructive disease of the left coronary artery system. Antiplatelet Agent: Continue aspirin 81 mg daily and clopidogrel (Plavix) 75 mg daily. I also reminded him to watch for signs of blood in his stool or black tarry stools and stop the medication immediately if this develops as this could be life threatening. Beta Blocker: INCREASE to Metoprolol succinate (Toprol XL) 100 mg BID.      Anti-anginal medications: Continue nitroglycerin 0.4 mg tablets as needed for chest pain. Anti-anginal medications: Continue ranolazine (Ranexa) 500 mg twice daily. Cholesterol Reduction Therapy: Continue Atorvastatin (Lipitor) 40 mg daily. and continue fenofibrate 160 mg daily. Essential Hypertension: Controlled  Beta Blocker: INCREASE to Metoprolol succinate (Toprol XL) 100 mg BID. ACE Inibitor/ARB: Continue lisinopril 20 mg daily. Calcium Channel Blocker: Continue diltiazem CD (Cardizem CD) 120 mg once daily. Hyperlipidemia: Mixed, LDL done on 9/12/2022 was 54 mg/dL   Cholesterol Reduction Therapy: Continue Atorvastatin (Lipitor) 40 mg daily. and continue fenofibrate 160 mg daily. In the meantime, I encouraged Mr. Adeline Andino to continue to take his other medications. FOLLOW UP:   I told Mr. Adeline Andino to call my office if he had any problems, but otherwise I asked him to Return in about 4 weeks (around 2/17/2023). However, I would be happy to see him sooner should the need arise. Sincerely,  Alannah Gama MD, F.A.C.C. [facility] Cardiology Specialist    [address] [place] [address]  Phone: [phone], Fax: [phone]     I believe that the risk of significant morbidity and mortality related to the patient's current medical conditions are: high. Approximately 45 minutes were spent during prep work, discussion and exam of the patient, and follow up documentation and all of their questions were answered. The documentation recorded by the scribe, accurately and completely reflects the services I personally performed and the decisions made by me. Alannah Gama MD, F.A.C.C.  January 20, 2023

## 2023-01-23 ENCOUNTER — HOSPITAL ENCOUNTER (OUTPATIENT)
Dept: CT IMAGING | Age: 48
Discharge: HOME | End: 2023-01-25
Payer: COMMERCIAL

## 2023-01-23 DIAGNOSIS — R06.09 CHRONIC DYSPNEA: ICD-10-CM

## 2023-01-23 DIAGNOSIS — R06.02 SHORTNESS OF BREATH: ICD-10-CM

## 2023-01-23 DIAGNOSIS — R91.1 LUNG NODULE: ICD-10-CM

## 2023-01-23 PROCEDURE — 71250 CT THORAX DX C-: CPT

## 2023-01-26 ENCOUNTER — HOSPITAL ENCOUNTER (OUTPATIENT)
Dept: CARDIAC CATH/INVASIVE PROCEDURES | Age: 48
Discharge: HOME | End: 2023-01-26
Attending: FAMILY MEDICINE | Admitting: FAMILY MEDICINE
Payer: COMMERCIAL

## 2023-01-26 VITALS
SYSTOLIC BLOOD PRESSURE: 113 MMHG | RESPIRATION RATE: 25 BRPM | TEMPERATURE: 97.1 F | DIASTOLIC BLOOD PRESSURE: 68 MMHG | HEART RATE: 80 BPM | OXYGEN SATURATION: 96 %

## 2023-01-26 PROCEDURE — 93459 L HRT ART/GRFT ANGIO: CPT

## 2023-01-26 PROCEDURE — 2709999900 HC NON-CHARGEABLE SUPPLY

## 2023-01-26 PROCEDURE — 6360000002 HC RX W HCPCS

## 2023-01-26 PROCEDURE — 99152 MOD SED SAME PHYS/QHP 5/>YRS: CPT

## 2023-01-26 PROCEDURE — 99153 MOD SED SAME PHYS/QHP EA: CPT

## 2023-01-26 PROCEDURE — C1894 INTRO/SHEATH, NON-LASER: HCPCS

## 2023-01-26 PROCEDURE — 2500000003 HC RX 250 WO HCPCS

## 2023-01-26 PROCEDURE — C1769 GUIDE WIRE: HCPCS

## 2023-01-26 PROCEDURE — 93459 L HRT ART/GRFT ANGIO: CPT | Performed by: FAMILY MEDICINE

## 2023-01-26 PROCEDURE — 6360000002 HC RX W HCPCS: Performed by: FAMILY MEDICINE

## 2023-01-26 PROCEDURE — 6360000004 HC RX CONTRAST MEDICATION: Performed by: FAMILY MEDICINE

## 2023-01-26 RX ORDER — SODIUM CHLORIDE 9 MG/ML
INJECTION, SOLUTION INTRAVENOUS PRN
Status: DISCONTINUED | OUTPATIENT
Start: 2023-01-26 | End: 2023-01-26 | Stop reason: HOSPADM

## 2023-01-26 RX ORDER — SODIUM CHLORIDE 9 MG/ML
INJECTION, SOLUTION INTRAVENOUS CONTINUOUS
Status: DISCONTINUED | OUTPATIENT
Start: 2023-01-26 | End: 2023-01-26 | Stop reason: HOSPADM

## 2023-01-26 RX ORDER — ACETAMINOPHEN 325 MG/1
650 TABLET ORAL EVERY 4 HOURS PRN
Status: DISCONTINUED | OUTPATIENT
Start: 2023-01-26 | End: 2023-01-26 | Stop reason: HOSPADM

## 2023-01-26 RX ORDER — NITROGLYCERIN 0.4 MG/1
0.4 TABLET SUBLINGUAL EVERY 5 MIN PRN
Qty: 25 TABLET | Refills: 3 | Status: SHIPPED | OUTPATIENT
Start: 2023-01-26

## 2023-01-26 RX ORDER — SODIUM CHLORIDE 0.9 % (FLUSH) 0.9 %
5-40 SYRINGE (ML) INJECTION PRN
Status: DISCONTINUED | OUTPATIENT
Start: 2023-01-26 | End: 2023-01-26 | Stop reason: HOSPADM

## 2023-01-26 RX ORDER — LORAZEPAM 2 MG/ML
1 INJECTION INTRAMUSCULAR ONCE
Status: COMPLETED | OUTPATIENT
Start: 2023-01-26 | End: 2023-01-26

## 2023-01-26 RX ORDER — SODIUM CHLORIDE 0.9 % (FLUSH) 0.9 %
5-40 SYRINGE (ML) INJECTION EVERY 12 HOURS SCHEDULED
Status: DISCONTINUED | OUTPATIENT
Start: 2023-01-26 | End: 2023-01-26 | Stop reason: HOSPADM

## 2023-01-26 RX ORDER — DIPHENHYDRAMINE HCL 50 MG
50 CAPSULE ORAL ONCE
Status: DISCONTINUED | OUTPATIENT
Start: 2023-01-26 | End: 2023-01-26 | Stop reason: HOSPADM

## 2023-01-26 RX ORDER — NITROGLYCERIN 0.4 MG/1
0.4 TABLET SUBLINGUAL EVERY 5 MIN PRN
Status: DISCONTINUED | OUTPATIENT
Start: 2023-01-26 | End: 2023-01-26 | Stop reason: HOSPADM

## 2023-01-26 RX ADMIN — IOPAMIDOL 40 ML: 755 INJECTION, SOLUTION INTRAVENOUS at 13:55

## 2023-01-26 RX ADMIN — LORAZEPAM 1 MG: 2 INJECTION INTRAMUSCULAR; INTRAVENOUS at 12:37

## 2023-01-26 ASSESSMENT — PAIN DESCRIPTION - LOCATION: LOCATION: CHEST

## 2023-01-26 ASSESSMENT — PAIN DESCRIPTION - DESCRIPTORS: DESCRIPTORS: ACHING;SHARP

## 2023-01-26 ASSESSMENT — PAIN DESCRIPTION - PAIN TYPE: TYPE: CHRONIC PAIN

## 2023-01-26 ASSESSMENT — PAIN SCALES - GENERAL: PAINLEVEL_OUTOF10: 3

## 2023-01-26 ASSESSMENT — PAIN DESCRIPTION - FREQUENCY: FREQUENCY: INTERMITTENT

## 2023-01-26 NOTE — DISCHARGE INSTRUCTIONS
Discharge Instructions for Cardiac Catheterization    A cardiac catheterization is a diagnostic test used to evaluate the health of the heart and its blood vessels. The test is done with a thin catheter carefully threaded into your heart from a leg or arm artery. Most likely, you will be allowed to go home the same day as the procedure. Steps to Take at Home:   Pain- apply ice to site 15-20 minutes every hour for the first 2 days. Showering is okay 24 hours after procedure. No soaking in a pool, hot tub, bath tub, or standing water for one week. Bleeding (outward or under the skin-hematoma)- apply firm pressure for 10-15 minutes or until the bleeding stops, then call your doctor. If unable to get bleeding stopped, call 911. Kidney damage- Call if you urinate less than normal, have swelling or feel puffy, and/or gain 2 or more pounds over night in the first week. If procedure was in ARM:  You were instructed to keep wrist straight and still for two hours after the procedure. The arm and hand may now be used for normal daily activities except, avoid using the heal of hand while getting up and down from furniture for the first few days. Keep affected arm elevated, hand higher than elbow, while pressure dressing in place to decrease swelling. 1)  Gauze and Elastoplast   Remove in 4 hours as follows:     TIME:_____8:00PM__________  Remove 1 piece of tape at a time, waiting 15 -20 minutes between layers to monitor for bleeding. If dressing sticks, place wrist under cool running water to help loosen gauze from site then pat site dry. *** If hand feels numb, tingly, and/or cold- loosen first 1-2 layers of tape if dressing still in place. If no relief noticed, remove pressure dressing as per above instructions. Seek medical help if no relief or if dressing already off. Diet   Drink plenty of fluids after the test to flush the x-ray dye from your system. Return to your normal diet.    No alcoholic beverages for 24 hours after the procedure. Physical Activity   The sedative will make you sleepy. Rest until the effects have worn off. Nausea and vomiting from the sedative is normal and usually does not last long. Ask your doctor when you will be able to return to work. Do not drive, operate machinery, do anything that requires attention to detail, or sign important papers for at least 24 hours or until your doctor says it is safe. Avoid heavy lifting, physically demanding activities, and sexual activity for 2-3 days. Lift nothing over 10 pounds (a gallon of milk is 8 pounds). Do not sit for long periods of time. Try to change positions frequently. Medications   Resume taking your normal medicines as advised. Use acetaminophen (Tylenol) for pain relief. (Avoid anti-inflammatory drugs such as- ibuprofen (Advil, Motrin), naproxen sodium (Aleve), Excedrin for a few days)  If you had to stop taking these medications before the procedure, ask your doctor when you can resume taking them:   Anti-inflammatory drugs   Blood thinners, such as warfarin (Coumadin)   If you are taking medicines, follow these general guidelines:   Take your medicine as directed. Do not change the amount or the schedule. Do not stop taking them without talking to your doctor. Do not share them. Know the side effects and report any to your doctor. Some drugs can be dangerous when mixed. Talk to a doctor or pharmacist if you are taking more than one drug. This includes over-the-counter medicine and herb or dietary supplements. Plan ahead for refills so you don't run out. Follow-up   The test results are available right after the procedure. At that point, the doctor will discuss the findings and suggest appropriate treatment options. In some cases, the results can indicate an immediate need for surgery. Schedule a follow-up appointment as directed by your doctor.    Call Your Doctor If Any of the Following Occurs   Signs of infection- including fever and chills   Redness, swelling, increasing pain, feels warm to touch, red streak forming from site, or any discharge from the procedure site.    Call 911 If Any of the Following Occurs   Drooping facial muscles   Changes in vision or speech   Difficulty walking or using your limbs   Change in sensation, including numbness, feeling cold, or change in color   Extreme sweating, nausea or vomiting   Dizziness or lightheadedness   Chest pain   Rapid, irregular heartbeat   Palpitations   Cough, shortness of breath, or difficulty breathing   Weakness or fainting   If you think you have an emergency, CALL 911

## 2023-01-26 NOTE — PROGRESS NOTES
Discharge instructions reviewed with patient and family, voice understanding. Vital signs stable. No active bleeding noted at access site. Getting dressed for home.

## 2023-01-26 NOTE — PROGRESS NOTES
Inpatients must meet criteria 1 through 7.   1. Minimum 30 minutes after last dose of sedative medication, minimum 120 minutes after last dose of reversal agent. Yes   2. Systolic BP stable within 20 mmHg for 30 minutes & systolic BP between 90 & 980 or within 10 mmHg of baseline. Yes   3. Pulse between 60 and 100 or within 10 bpm of baseline. Yes   4. Spontaneous respiratory rate >/= 10 per minute. Yes   5. SaO2 >/= 95 or >/= baseline. Yes   6. Able to cough and swallow or return to baseline function. Yes   7. Alert and oriented or return to baseline mental status. Yes   8. Demonstrates controlled, coordinated movements, ambulates with steady gait, or return to baseline activity function. Yes   9. Minimal or no pain or nausea, or at a level tolerable and acceptable to patient. Yes   10. Takes and retains oral fluids as allowed. Yes   11. Procedural / perioperative site stable. Minimal or no bleeding. Yes   12. If GI endoscopy procedure, minimal or no abdominal distention or passing flatus. N/A   13. Written discharge instructions and emergency telephone number provided. Yes   14. Accompanied by a responsible adult. Yes   Adult patient discharged from facility without responsible person meets above criteria plus the following:   a) remains awake without stimulus for 30 minutes   b) oriented appropriate for age   c) all vital signs stable   d) no significant risk of losing protective reflexes   e) able to maintain pre-procedure mobility without assistance   f) no nausea or dizziness   g) transportation arrangements that do not require patient to operate motor Vehicle.    N/A

## 2023-01-31 ENCOUNTER — OFFICE VISIT (OUTPATIENT)
Dept: PRIMARY CARE CLINIC | Age: 48
End: 2023-01-31
Payer: COMMERCIAL

## 2023-01-31 ENCOUNTER — ANESTHESIA EVENT (OUTPATIENT)
Dept: OPERATING ROOM | Age: 48
End: 2023-01-31
Payer: COMMERCIAL

## 2023-01-31 ENCOUNTER — HOSPITAL ENCOUNTER (INPATIENT)
Age: 48
LOS: 3 days | Discharge: HOME | End: 2023-02-03
Attending: STUDENT IN AN ORGANIZED HEALTH CARE EDUCATION/TRAINING PROGRAM | Admitting: STUDENT IN AN ORGANIZED HEALTH CARE EDUCATION/TRAINING PROGRAM
Payer: COMMERCIAL

## 2023-01-31 ENCOUNTER — APPOINTMENT (OUTPATIENT)
Dept: CT IMAGING | Age: 48
End: 2023-01-31
Attending: STUDENT IN AN ORGANIZED HEALTH CARE EDUCATION/TRAINING PROGRAM
Payer: COMMERCIAL

## 2023-01-31 VITALS
HEIGHT: 67 IN | WEIGHT: 198 LBS | OXYGEN SATURATION: 97 % | HEART RATE: 128 BPM | DIASTOLIC BLOOD PRESSURE: 72 MMHG | SYSTOLIC BLOOD PRESSURE: 110 MMHG | BODY MASS INDEX: 31.08 KG/M2

## 2023-01-31 DIAGNOSIS — R04.2 HEMOPTYSIS: Primary | ICD-10-CM

## 2023-01-31 DIAGNOSIS — R11.2 NAUSEA AND VOMITING, UNSPECIFIED VOMITING TYPE: ICD-10-CM

## 2023-01-31 PROBLEM — Z87.19 HISTORY OF PANCREATITIS: Status: ACTIVE | Noted: 2023-01-31

## 2023-01-31 PROBLEM — K92.0 GASTROINTESTINAL HEMORRHAGE WITH HEMATEMESIS: Status: ACTIVE | Noted: 2023-01-31

## 2023-01-31 LAB
ABO/RH: NORMAL
ABSOLUTE EOS #: 0.36 K/UL (ref 0–0.44)
ABSOLUTE IMMATURE GRANULOCYTE: 0.03 K/UL (ref 0–0.3)
ABSOLUTE LYMPH #: 2.48 K/UL (ref 1.1–3.7)
ABSOLUTE MONO #: 1.46 K/UL (ref 0.1–1.2)
ALBUMIN SERPL-MCNC: 4.4 G/DL (ref 3.5–5.2)
ALBUMIN/GLOBULIN RATIO: 1.2 (ref 1–2.5)
ALP BLD-CCNC: 60 U/L (ref 40–129)
ALT SERPL-CCNC: 17 U/L (ref 5–41)
ANION GAP SERPL CALCULATED.3IONS-SCNC: 15 MMOL/L (ref 9–17)
ANTIBODY SCREEN: NEGATIVE
ARM BAND NUMBER: NORMAL
AST SERPL-CCNC: 15 U/L
BACTERIA: ABNORMAL
BASOPHILS # BLD: 0 % (ref 0–2)
BASOPHILS ABSOLUTE: 0.04 K/UL (ref 0–0.2)
BILIRUB SERPL-MCNC: 0.7 MG/DL (ref 0.3–1.2)
BILIRUBIN URINE: NEGATIVE
BUN BLDV-MCNC: 25 MG/DL (ref 6–20)
BUN/CREAT BLD: 25 (ref 9–20)
CALCIUM SERPL-MCNC: 9.2 MG/DL (ref 8.6–10.4)
CHLORIDE BLD-SCNC: 100 MMOL/L (ref 98–107)
CO2: 21 MMOL/L (ref 20–31)
COLOR: YELLOW
CREAT SERPL-MCNC: 1.01 MG/DL (ref 0.7–1.2)
EKG ATRIAL RATE: 117 BPM
EKG P AXIS: 22 DEGREES
EKG P-R INTERVAL: 142 MS
EKG Q-T INTERVAL: 320 MS
EKG QRS DURATION: 92 MS
EKG QTC CALCULATION (BAZETT): 446 MS
EKG R AXIS: 70 DEGREES
EKG T AXIS: -5 DEGREES
EKG VENTRICULAR RATE: 117 BPM
EOSINOPHILS RELATIVE PERCENT: 3 % (ref 1–4)
EPITHELIAL CELLS UA: ABNORMAL /HPF (ref 0–5)
EXPIRATION DATE: NORMAL
FIBRINOGEN: 448 MG/DL (ref 179–518)
GFR SERPL CREATININE-BSD FRML MDRD: >60 ML/MIN/1.73M2
GLUCOSE BLD-MCNC: 211 MG/DL (ref 70–99)
GLUCOSE URINE: NEGATIVE
HCT VFR BLD CALC: 40.9 % (ref 40.7–50.3)
HCT VFR BLD CALC: 46.5 % (ref 40.7–50.3)
HEMOGLOBIN: 14.5 G/DL (ref 13–17)
HEMOGLOBIN: 16.5 G/DL (ref 13–17)
IMMATURE GRANULOCYTES: 0 %
INR BLD: 1.1
IRON SATURATION: 24 % (ref 20–55)
IRON SERPL-MCNC: 74 UG/DL (ref 59–158)
KETONES, URINE: NEGATIVE
LEUKOCYTE ESTERASE, URINE: NEGATIVE
LIPASE: 50 U/L (ref 13–60)
LYMPHOCYTES # BLD: 23 % (ref 24–43)
MCH RBC QN AUTO: 31 PG (ref 25.2–33.5)
MCHC RBC AUTO-ENTMCNC: 35.5 G/DL (ref 28.4–34.8)
MCV RBC AUTO: 87.4 FL (ref 82.6–102.9)
MONOCYTES # BLD: 13 % (ref 3–12)
MUCUS: ABNORMAL
NITRITE, URINE: NEGATIVE
NRBC AUTOMATED: 0 PER 100 WBC
PDW BLD-RTO: 13.4 % (ref 11.8–14.4)
PH UA: 5.5 (ref 5–9)
PLATELET # BLD: 363 K/UL (ref 138–453)
PMV BLD AUTO: 10.9 FL (ref 8.1–13.5)
POTASSIUM SERPL-SCNC: 3.7 MMOL/L (ref 3.7–5.3)
PROTEIN UA: NEGATIVE
PROTHROMBIN TIME: 14.1 SEC (ref 11.5–14.2)
RBC # BLD: 5.32 M/UL (ref 4.21–5.77)
RBC UA: ABNORMAL /HPF (ref 0–2)
SEG NEUTROPHILS: 61 % (ref 36–65)
SEGMENTED NEUTROPHILS ABSOLUTE COUNT: 6.62 K/UL (ref 1.5–8.1)
SODIUM BLD-SCNC: 136 MMOL/L (ref 135–144)
SPECIFIC GRAVITY UA: 1.02 (ref 1.01–1.02)
TIBC SERPL-MCNC: 314 UG/DL (ref 250–450)
TOTAL PROTEIN: 8 G/DL (ref 6.4–8.3)
TROPONIN, HIGH SENSITIVITY: 9 NG/L (ref 0–22)
TURBIDITY: CLEAR
UNSATURATED IRON BINDING CAPACITY: 240 UG/DL (ref 112–347)
URINE HGB: NEGATIVE
UROBILINOGEN, URINE: NORMAL
WBC # BLD: 11 K/UL (ref 3.5–11.3)
WBC UA: ABNORMAL /HPF (ref 0–5)

## 2023-01-31 PROCEDURE — 85025 COMPLETE CBC W/AUTO DIFF WBC: CPT

## 2023-01-31 PROCEDURE — 83690 ASSAY OF LIPASE: CPT

## 2023-01-31 PROCEDURE — 6360000004 HC RX CONTRAST MEDICATION: Performed by: NURSE PRACTITIONER

## 2023-01-31 PROCEDURE — 74178 CT ABD&PLV WO CNTR FLWD CNTR: CPT

## 2023-01-31 PROCEDURE — 6360000002 HC RX W HCPCS: Performed by: NURSE PRACTITIONER

## 2023-01-31 PROCEDURE — 85018 HEMOGLOBIN: CPT

## 2023-01-31 PROCEDURE — 93005 ELECTROCARDIOGRAM TRACING: CPT | Performed by: NURSE PRACTITIONER

## 2023-01-31 PROCEDURE — 94761 N-INVAS EAR/PLS OXIMETRY MLT: CPT

## 2023-01-31 PROCEDURE — 1200000000 HC SEMI PRIVATE

## 2023-01-31 PROCEDURE — 86850 RBC ANTIBODY SCREEN: CPT

## 2023-01-31 PROCEDURE — 3074F SYST BP LT 130 MM HG: CPT | Performed by: STUDENT IN AN ORGANIZED HEALTH CARE EDUCATION/TRAINING PROGRAM

## 2023-01-31 PROCEDURE — 84484 ASSAY OF TROPONIN QUANT: CPT

## 2023-01-31 PROCEDURE — 86901 BLOOD TYPING SEROLOGIC RH(D): CPT

## 2023-01-31 PROCEDURE — 36415 COLL VENOUS BLD VENIPUNCTURE: CPT

## 2023-01-31 PROCEDURE — 83550 IRON BINDING TEST: CPT

## 2023-01-31 PROCEDURE — 3078F DIAST BP <80 MM HG: CPT | Performed by: STUDENT IN AN ORGANIZED HEALTH CARE EDUCATION/TRAINING PROGRAM

## 2023-01-31 PROCEDURE — 99215 OFFICE O/P EST HI 40 MIN: CPT | Performed by: STUDENT IN AN ORGANIZED HEALTH CARE EDUCATION/TRAINING PROGRAM

## 2023-01-31 PROCEDURE — 93010 ELECTROCARDIOGRAM REPORT: CPT | Performed by: INTERNAL MEDICINE

## 2023-01-31 PROCEDURE — 85610 PROTHROMBIN TIME: CPT

## 2023-01-31 PROCEDURE — 6360000002 HC RX W HCPCS: Performed by: STUDENT IN AN ORGANIZED HEALTH CARE EDUCATION/TRAINING PROGRAM

## 2023-01-31 PROCEDURE — 86900 BLOOD TYPING SEROLOGIC ABO: CPT

## 2023-01-31 PROCEDURE — 85384 FIBRINOGEN ACTIVITY: CPT

## 2023-01-31 PROCEDURE — C9113 INJ PANTOPRAZOLE SODIUM, VIA: HCPCS | Performed by: NURSE PRACTITIONER

## 2023-01-31 PROCEDURE — 81001 URINALYSIS AUTO W/SCOPE: CPT

## 2023-01-31 PROCEDURE — 80053 COMPREHEN METABOLIC PANEL: CPT

## 2023-01-31 PROCEDURE — 2580000003 HC RX 258: Performed by: NURSE PRACTITIONER

## 2023-01-31 PROCEDURE — 85014 HEMATOCRIT: CPT

## 2023-01-31 PROCEDURE — 83540 ASSAY OF IRON: CPT

## 2023-01-31 RX ORDER — SODIUM CHLORIDE 9 MG/ML
INJECTION, SOLUTION INTRAVENOUS PRN
Status: DISCONTINUED | OUTPATIENT
Start: 2023-01-31 | End: 2023-02-03 | Stop reason: HOSPADM

## 2023-01-31 RX ORDER — SODIUM CHLORIDE 9 MG/ML
INJECTION, SOLUTION INTRAVENOUS CONTINUOUS
Status: DISCONTINUED | OUTPATIENT
Start: 2023-01-31 | End: 2023-02-02

## 2023-01-31 RX ORDER — MORPHINE SULFATE 2 MG/ML
2 INJECTION, SOLUTION INTRAMUSCULAR; INTRAVENOUS
Status: DISCONTINUED | OUTPATIENT
Start: 2023-01-31 | End: 2023-01-31

## 2023-01-31 RX ORDER — PROMETHAZINE HYDROCHLORIDE 25 MG/ML
6.25 INJECTION, SOLUTION INTRAMUSCULAR; INTRAVENOUS EVERY 6 HOURS PRN
Status: DISCONTINUED | OUTPATIENT
Start: 2023-01-31 | End: 2023-02-03 | Stop reason: HOSPADM

## 2023-01-31 RX ORDER — SODIUM CHLORIDE 0.9 % (FLUSH) 0.9 %
5-40 SYRINGE (ML) INJECTION EVERY 12 HOURS SCHEDULED
Status: DISCONTINUED | OUTPATIENT
Start: 2023-01-31 | End: 2023-02-03 | Stop reason: HOSPADM

## 2023-01-31 RX ORDER — METOPROLOL TARTRATE 5 MG/5ML
5 INJECTION INTRAVENOUS EVERY 6 HOURS PRN
Status: DISCONTINUED | OUTPATIENT
Start: 2023-01-31 | End: 2023-02-03 | Stop reason: HOSPADM

## 2023-01-31 RX ORDER — ONDANSETRON 2 MG/ML
4 INJECTION INTRAMUSCULAR; INTRAVENOUS EVERY 6 HOURS PRN
Status: DISCONTINUED | OUTPATIENT
Start: 2023-01-31 | End: 2023-02-03 | Stop reason: HOSPADM

## 2023-01-31 RX ORDER — MORPHINE SULFATE 4 MG/ML
4 INJECTION, SOLUTION INTRAMUSCULAR; INTRAVENOUS
Status: DISCONTINUED | OUTPATIENT
Start: 2023-01-31 | End: 2023-01-31

## 2023-01-31 RX ORDER — HYDROMORPHONE HCL 110MG/55ML
0.5 PATIENT CONTROLLED ANALGESIA SYRINGE INTRAVENOUS
Status: DISCONTINUED | OUTPATIENT
Start: 2023-01-31 | End: 2023-02-03 | Stop reason: HOSPADM

## 2023-01-31 RX ORDER — SODIUM CHLORIDE 0.9 % (FLUSH) 0.9 %
5-40 SYRINGE (ML) INJECTION PRN
Status: DISCONTINUED | OUTPATIENT
Start: 2023-01-31 | End: 2023-02-03 | Stop reason: HOSPADM

## 2023-01-31 RX ORDER — HYDROMORPHONE HCL 110MG/55ML
0.25 PATIENT CONTROLLED ANALGESIA SYRINGE INTRAVENOUS
Status: DISCONTINUED | OUTPATIENT
Start: 2023-01-31 | End: 2023-02-03 | Stop reason: HOSPADM

## 2023-01-31 RX ADMIN — HYDROMORPHONE HYDROCHLORIDE 0.5 MG: 2 INJECTION, SOLUTION INTRAMUSCULAR; INTRAVENOUS; SUBCUTANEOUS at 17:13

## 2023-01-31 RX ADMIN — ONDANSETRON 4 MG: 2 INJECTION INTRAMUSCULAR; INTRAVENOUS at 13:06

## 2023-01-31 RX ADMIN — HYDROMORPHONE HYDROCHLORIDE 0.5 MG: 2 INJECTION, SOLUTION INTRAMUSCULAR; INTRAVENOUS; SUBCUTANEOUS at 23:36

## 2023-01-31 RX ADMIN — IOPAMIDOL 75 ML: 755 INJECTION, SOLUTION INTRAVENOUS at 13:20

## 2023-01-31 RX ADMIN — SODIUM CHLORIDE 8 MG/HR: 9 INJECTION, SOLUTION INTRAVENOUS at 14:57

## 2023-01-31 RX ADMIN — ONDANSETRON 4 MG: 2 INJECTION INTRAMUSCULAR; INTRAVENOUS at 19:31

## 2023-01-31 RX ADMIN — SODIUM CHLORIDE 8 MG/HR: 9 INJECTION, SOLUTION INTRAVENOUS at 22:07

## 2023-01-31 RX ADMIN — HYDROMORPHONE HYDROCHLORIDE 0.5 MG: 2 INJECTION, SOLUTION INTRAMUSCULAR; INTRAVENOUS; SUBCUTANEOUS at 20:22

## 2023-01-31 RX ADMIN — MORPHINE SULFATE 2 MG: 2 INJECTION, SOLUTION INTRAMUSCULAR; INTRAVENOUS at 12:56

## 2023-01-31 RX ADMIN — MORPHINE SULFATE 4 MG: 4 INJECTION, SOLUTION INTRAMUSCULAR; INTRAVENOUS at 14:58

## 2023-01-31 RX ADMIN — SODIUM CHLORIDE: 9 INJECTION, SOLUTION INTRAVENOUS at 22:06

## 2023-01-31 RX ADMIN — SODIUM CHLORIDE: 9 INJECTION, SOLUTION INTRAVENOUS at 13:11

## 2023-01-31 SDOH — ECONOMIC STABILITY: FOOD INSECURITY: WITHIN THE PAST 12 MONTHS, YOU WORRIED THAT YOUR FOOD WOULD RUN OUT BEFORE YOU GOT MONEY TO BUY MORE.: NEVER TRUE

## 2023-01-31 SDOH — ECONOMIC STABILITY: FOOD INSECURITY: WITHIN THE PAST 12 MONTHS, THE FOOD YOU BOUGHT JUST DIDN'T LAST AND YOU DIDN'T HAVE MONEY TO GET MORE.: NEVER TRUE

## 2023-01-31 ASSESSMENT — PAIN DESCRIPTION - PAIN TYPE
TYPE: ACUTE PAIN

## 2023-01-31 ASSESSMENT — PAIN DESCRIPTION - LOCATION
LOCATION: ABDOMEN

## 2023-01-31 ASSESSMENT — PAIN SCALES - GENERAL
PAINLEVEL_OUTOF10: 7
PAINLEVEL_OUTOF10: 8
PAINLEVEL_OUTOF10: 10
PAINLEVEL_OUTOF10: 7
PAINLEVEL_OUTOF10: 8
PAINLEVEL_OUTOF10: 10
PAINLEVEL_OUTOF10: 8

## 2023-01-31 ASSESSMENT — PAIN DESCRIPTION - DESCRIPTORS
DESCRIPTORS: ACHING;CRAMPING
DESCRIPTORS: CRAMPING;SHARP
DESCRIPTORS: CRAMPING
DESCRIPTORS: ACHING;CRAMPING;SHARP
DESCRIPTORS: ACHING;CRAMPING
DESCRIPTORS: CRAMPING
DESCRIPTORS: ACHING;CRAMPING

## 2023-01-31 ASSESSMENT — PAIN DESCRIPTION - ORIENTATION
ORIENTATION: LOWER
ORIENTATION: LEFT

## 2023-01-31 ASSESSMENT — PAIN DESCRIPTION - FREQUENCY
FREQUENCY: CONTINUOUS
FREQUENCY: INTERMITTENT

## 2023-01-31 ASSESSMENT — PAIN DESCRIPTION - ONSET
ONSET: ON-GOING

## 2023-01-31 ASSESSMENT — SOCIAL DETERMINANTS OF HEALTH (SDOH): HOW HARD IS IT FOR YOU TO PAY FOR THE VERY BASICS LIKE FOOD, HOUSING, MEDICAL CARE, AND HEATING?: NOT HARD AT ALL

## 2023-01-31 NOTE — PROGRESS NOTES
Pt brought up from Dr. Roth office at this time. Pt used the restroom and put a gown on and is in bed. Call light within reach.

## 2023-01-31 NOTE — PROGRESS NOTES
[address], [address] , [facility], [address] [place] [address] is a 56 y.o. male with  has a past medical history of CAD (coronary artery disease), Carpal tunnel syndrome, Depressive disorder, not elsewhere classified, Diabetes mellitus ([facility] [address] Heart attack ([facility] [address] History of echocardiogram, Hyperlipidemia, and Hypertension. Presented to the office today for:  Chief Complaint   Patient presents with    Nausea & Vomiting    Diarrhea       Assessment/Plan   1. Hemoptysis  2. Nausea and vomiting, unspecified vomiting type  Concern for GI Bleed, Plan for admission today, labs/imaging to be obtained STAT. All patient questions answered. Pt voiced understanding. Medications Discontinued During This Encounter   Medication Reason    nitroGLYCERIN (NITROSTAT) 0.4 MG SL tablet ERROR       Patient received counseling on the following healthy behaviors: nutrition, exercise and medication adherence. I encouraged and discussed lifestyle modifications including diet and exercise and the patient was agreeable to making positive/beneficial changes to both to help improve their overall health. Discussed use, benefit, and side effects of prescribed medications. Barriers to medication compliance addressed. Patient given educational materials: see patient instructions. HM - HM items completed today as per orders. Outstanding HM items though not limited to immunizations were discussed with the patient today, including risks, benefits and alternatives. The patient will discuss these during the next appointment per their preference. If there are any worsening or concerning signs or symptoms, patient will report to the ED and/or contact [id number] for immediate evaluation. Teach back method was used.      Subjective:    HPI  Chief Complaint   Patient presents with    Nausea & Vomiting    Diarrhea     Nausea & Vomiting and Diarrhea for 1 days  Nature of Onset and Mechanism -  sudden, rapid   Characteristics/Radiation/Quality - vomiting blood (1/2 cup + bright red blood 5 episodes in 24-36h);  diarrhea (no known blood), low grade fever, light headed dizzy dehydrated abdominal pain stabbing , palpitations. The patient has not taken any NSAIDs. He had a fever last night 99.8F. He has not been tolerating any of his PO intake. He did not take any of his BP medications. He sometimes has shortness of breath. No hx of any sick contacts. He is on ASA/Plavix and does not eat spicy foods. Severity (0-10) - 9/10 at this time. Health Maintenance -   Alcohol/Substance use History - None    Tobacco Use      Smoking status: Some Days        Packs/day: 0.25        Years: 30.00        Pack years: 7.5        Types: Cigarettes        Last attempt to quit: 2022        Years since quittin.9      Smokeless tobacco: Never    Family History   Problem Relation Age of Onset    High Blood Pressure Mother     Heart Disease Father     High Blood Pressure Father     Asthma Sister     Colon Cancer Paternal Uncle     Colon Cancer Paternal Uncle     Pancreatic Cancer Maternal Aunt     Pancreatic Cancer Maternal Aunt        [facility] Scores 1/10/2023 2022 1/10/2019   PHQ2 Score 0 4 0   PHQ9 Score 0 14 0     Interpretation of Total Score Depression Severity: 1-4 = Minimal depression, 5-9 = Mild depression, 10-14 = Moderate depression, 15-19 = Moderately severe depression, 20-27 = Severe depression    Review of Systems  Constitutional: Negative for activity change, appetite change, chills, diaphoresis, fatigue, fever and unexpected weight change. HENT: Negative for sinus pressure, sinus pain, sore throat and trouble swallowing. Respiratory: Negative for cough, shortness of breath and wheezing. Cardiovascular: Negative for chest pain, palpitations and leg swelling. Gastrointestinal: Negative for abdominal pain, diarrhea, nausea and vomiting. Positive for coughing blood.   Endocrine: Negative for cold intolerance, polydipsia, polyphagia and polyuria. Genitourinary: Negative for difficulty urinating, flank pain and frequency. Musculoskeletal: Negative for gait problem and joint swelling. Negative for back pain, neck pain and neck stiffness. Skin: Negative for color change and wound. Negative for pallor and rash. Allergic/Immunologic: Negative for environmental allergies and food allergies. Neurological: Negative for light-headedness, numbness and headaches. Psychiatric/Behavioral: Negative for sleep disturbance. Negative for confusion and suicidal ideas. Objective:    /72   Pulse (!) 128   Ht 5' 7\" (1.702 m)   Wt 198 lb (89.8 kg)   SpO2 97%   BMI 31.01 kg/m²    BP Readings from Last 3 Encounters:   01/31/23 110/72   01/26/23 113/68   01/20/23 119/77     Physical Exam  Constitutional: Patient is oriented to person, place, and time. Patient appears well-developed and well-nourished. No distress. HENT: Head: Normocephalic and atraumatic. Eyes: Pupils are equal, round, and reactive to light. Conjunctivae are normal. Right eye exhibits no discharge. Left eye exhibits no discharge. Cardiovascular: Normal rate, regular rhythm and normal heart sounds. Pulmonary/Chest: Effort normal and breath sounds normal. No respiratory distress. Patient has no wheezes. Abdominal: Soft. Bowel sounds are normal. Patient exhibits no distension. There is some epigastric tenderness. Musculoskeletal:  Patient exhibits no edema and tenderness. Patient exhibits no deformity. Neurological: Patient is alert and oriented to person, place, and time. Skin: Skin is warm and dry. Patient is not diaphoretic. Psychiatric: Patient's speech is normal and behavior is normal. Thought content normal.   Vitals reviewed.       Lab Results   Component Value Date    WBC 9.1 01/20/2023    HGB 14.1 01/20/2023    HCT 40.9 01/20/2023     01/20/2023    CHOL 98 09/12/2022    TRIG 113 09/12/2022    HDL 21 (L) 09/12/2022    ALT 27 12/27/2022    AST 20 12/27/2022     01/20/2023    K 4.7 01/20/2023     01/20/2023    CREATININE 0.92 01/20/2023    BUN 14 01/20/2023    CO2 28 01/20/2023    TSH 2.35 09/12/2022    INR 1.0 12/26/2021    LABA1C 6.3 (H) 09/12/2022     Lab Results   Component Value Date    CALCIUM 9.5 01/20/2023     Lab Results   Component Value Date    LDLCHOLESTEROL 54 09/12/2022       Please note that this chart was generated using voice recognition Dragon dictation software. Although every effort was made to ensure the accuracy of this automated transcription, some errors in transcription may have occurred.     Electronically signed by Dr. Cat Contreras MD on 1/31/2023 at 12:51 PM

## 2023-01-31 NOTE — LETTER
[facility]  [facility] [address]  Phone: [phone]             February 3, 2023    Patient: Lobo Cordero   YOB: 1975   Date of Visit: 1/31/2023       To Whom It May Concern:    Bran Odonnell was seen and treated in our facility  beginning 1/31/2023 until 2/3/2023. He may return to work on 2/6/2023 with no restrictions.       Sincerely,       Roby Vásquez RN         Signature:__________________________________

## 2023-01-31 NOTE — H&P
History and Physical    Patient:  Reji Fischer  MRN: [id number]    Chief Complaint: Vomiting blood and abdominal pain    History Obtained From:  patient, electronic medical record    PCP: Stefano Mendoza MD    History of Present Illness: The patient is a 56 y.o. male who presented as a direct admission from his primary care office with complaints of severe abdominal pain across his upper abdomen with radiation into his back. He complains of hemoptysis for approximately a day and a half. He does have history of pancreatitis. He stated he was having diarrhea as well but was unsure if he had bloody or dark stools. He complained of dizziness but denied syncope. He described the pain as sharp and intense. He stated he vomited approximately a half a cup plus bright red blood on 5 episodes. He denied any NSAID use. He stated he had a low-grade temperature of 99.8. He stated he has been unable to eat. Patient is currently on aspirin and Plavix with a recent cardiac catheterization. Patient denied alcohol or substance abuse. He is a smoker. Past Medical History:        Diagnosis Date    CAD (coronary artery disease)     Carpal tunnel syndrome     Depressive disorder, not elsewhere classified     Diabetes mellitus ([address]     Gastrointestinal hemorrhage with hematemesis 1/31/2023    Heart attack ([address] 08/11/2018    STEMI    History of echocardiogram 01/08/2019    EF 40-45%. LV cavity sixe is mildly increased. Inferior and inferoseptal wall hypokenesis noted. Mild diastolic dysfunction.     History of pancreatitis 1/31/2023    Hyperlipidemia     Hypertension 2009       Past Surgical History:        Procedure Laterality Date    CARDIAC CATHETERIZATION Left 01/07/2018    Right Ulnar/[facility]/    CARDIAC CATHETERIZATION Left 01/27/2022    Dr Miracle Agudelo/ right ulnar-    COLONOSCOPY N/A 10/04/2022    COLORECTAL CANCER SCREENING, HIGH RISK performed by Shabbir Echavarria MD at [address] COLONOSCOPY  10/04/2022    -tortuous colon    CORONARY ANGIOPLASTY  08/2018    double bypass, [facility],     CORONARY ARTERY BYPASS GRAFT  2018    ESOPHAGOGASTRODUODENOSCOPY  10/04/2022    -bx(neg H-Pylori,duodenum-normal,mild gastritis)    UPPER GASTROINTESTINAL ENDOSCOPY N/A 10/04/2022    EGD BIOPSY performed by Rula Peralta MD at [address]       Medications Prior to Admission:    Prior to Admission medications    Medication Sig Start Date End Date Taking? Authorizing Provider   metFORMIN (GLUCOPHAGE) 500 MG tablet Take 1 tablet by mouth 2 times daily (with meals) 1/23/23   Moise Gil MD   metoprolol succinate (TOPROL XL) 100 MG extended release tablet Take 1 tablet by mouth in the morning and at bedtime  Patient taking differently: Take 100 mg by mouth every evening 1/20/23   Keyon Booth MD   clopidogrel (PLAVIX) 75 MG tablet TAKE 1 TABLET BY MOUTH EVERY DAY 1/19/23   Keyon Booth MD   lisinopril (PRINIVIL;ZESTRIL) 20 MG tablet Take 1 tablet by mouth daily 1/16/23   Keoyn Booth MD   ranolazine (RANEXA) 500 MG extended release tablet Take 1 tablet by mouth 2 times daily 1/13/23   Moise Gil MD   cetirizine (ZYRTEC) 10 MG tablet Take 1 tablet by mouth daily 1/10/23 2/9/23  Moise Gil MD   pantoprazole (PROTONIX) 40 MG tablet Take 1 tablet by mouth every morning (before breakfast) 1/10/23   Moise Gil MD   ALPRAZolam Lesta Spells) 0.25 MG tablet Take 0.25 mg by mouth 3 times daily as needed for Sleep.     Historical Provider, MD   atorvastatin (LIPITOR) 80 MG tablet Take 1 tablet by mouth daily 12/7/22   Keyon Booth MD   albuterol (ACCUNEB) 0.63 MG/3ML nebulizer solution Take 3 mLs by nebulization every 6 hours as needed for Wheezing 12/1/22   Moise Gil MD   albuterol (PROVENTIL) (5 MG/ML) 0.5% nebulizer solution Take 1 mL by nebulization 4 times daily as needed for Wheezing  Patient not taking: No sig reported 11/29/22   Moise Gil MD   albuterol sulfate HFA (VENTOLIN HFA) 108 (90 Base) MCG/ACT inhaler Inhale 2 puffs into the lungs 4 times daily as needed for Wheezing 11/18/22   Israel Nguyen MD   fluticasone (FLOVENT HFA) 110 MCG/ACT inhaler Inhale 1 puff into the lungs 2 times daily 11/18/22   sIrael Nguyen MD   fenofibrate (TRIGLIDE) 160 MG tablet Take 1 tablet by mouth daily 11/18/22   Israel Nguyen MD   dilTIAZem (CARDIZEM CD) 120 MG extended release capsule Take 1 capsule by mouth daily 6/1/22   Naveen Cespedes MD   nitroGLYCERIN (NITROSTAT) 0.4 MG SL tablet Place 1 tablet under the tongue every 5 minutes as needed for Chest pain up to max of 3 total doses. If no relief after 1 dose, call 911. 1/21/22   Naveen Cespedes MD   aspirin 81 MG EC tablet Take 81 mg by mouth daily. Historical Provider, MD       Allergies:  Patient has no known allergies. Social History:   TOBACCO:   reports that he has been smoking cigarettes. He has a 7.50 pack-year smoking history. He has never used smokeless tobacco.  ETOH:   reports that he does not currently use alcohol. Family History:       Problem Relation Age of Onset    High Blood Pressure Mother     Heart Disease Father     High Blood Pressure Father     Asthma Sister     Colon Cancer Paternal Uncle     Colon Cancer Paternal Uncle     Pancreatic Cancer Maternal Aunt     Pancreatic Cancer Maternal Aunt        Allergies:  Patient has no known allergies. Medications Prior to Admission:    Prior to Admission medications    Medication Sig Start Date End Date Taking?  Authorizing Provider   metFORMIN (GLUCOPHAGE) 500 MG tablet Take 1 tablet by mouth 2 times daily (with meals) 1/23/23   Israel Nguyen MD   metoprolol succinate (TOPROL XL) 100 MG extended release tablet Take 1 tablet by mouth in the morning and at bedtime  Patient taking differently: Take 100 mg by mouth every evening 1/20/23   Naveen Cepsedes MD   clopidogrel (PLAVIX) 75 MG tablet TAKE 1 TABLET BY MOUTH EVERY DAY 1/19/23   Naveen Cespedes MD lisinopril (PRINIVIL;ZESTRIL) 20 MG tablet Take 1 tablet by mouth daily 1/16/23   Mu Still MD   ranolazine (RANEXA) 500 MG extended release tablet Take 1 tablet by mouth 2 times daily 1/13/23   Tod Necessary, MD   cetirizine (ZYRTEC) 10 MG tablet Take 1 tablet by mouth daily 1/10/23 2/9/23  Tod Necessary, MD   pantoprazole (PROTONIX) 40 MG tablet Take 1 tablet by mouth every morning (before breakfast) 1/10/23   Tod Necessary, MD   ALPRAZolam Ozjoanna Annehead) 0.25 MG tablet Take 0.25 mg by mouth 3 times daily as needed for Sleep. Historical Provider, MD   atorvastatin (LIPITOR) 80 MG tablet Take 1 tablet by mouth daily 12/7/22   Mu Still MD   albuterol (ACCUNEB) 0.63 MG/3ML nebulizer solution Take 3 mLs by nebulization every 6 hours as needed for Wheezing 12/1/22   Tod Necessary, MD   albuterol (PROVENTIL) (5 MG/ML) 0.5% nebulizer solution Take 1 mL by nebulization 4 times daily as needed for Wheezing  Patient not taking: No sig reported 11/29/22   Tod Necessary, MD   albuterol sulfate HFA (VENTOLIN HFA) 108 (90 Base) MCG/ACT inhaler Inhale 2 puffs into the lungs 4 times daily as needed for Wheezing 11/18/22   Tod Necessary, MD   fluticasone (FLOVENT HFA) 110 MCG/ACT inhaler Inhale 1 puff into the lungs 2 times daily 11/18/22   Tod Necessary, MD   fenofibrate (TRIGLIDE) 160 MG tablet Take 1 tablet by mouth daily 11/18/22   Tod Necessary, MD   dilTIAZem (CARDIZEM CD) 120 MG extended release capsule Take 1 capsule by mouth daily 6/1/22   Mu Still MD   nitroGLYCERIN (NITROSTAT) 0.4 MG SL tablet Place 1 tablet under the tongue every 5 minutes as needed for Chest pain up to max of 3 total doses. If no relief after 1 dose, call 911. 1/21/22   Mu Still MD   aspirin 81 MG EC tablet Take 81 mg by mouth daily. Historical Provider, MD       Review of Systems:  Constitutional:negative  for fevers, and negative for chills.   Positive dizziness  Eyes: negative for visual disturbance   ENT: negative for sore throat, negative nasal congestion, and negative for earache  Respiratory: negative for shortness of breath, negative for cough, and negative for wheezing  Cardiovascular: negative for chest pain, positive for palpitations, and negative for syncope  Gastrointestinal: positive for abdominal pain, positive for nausea,positive for vomiting, positive for diarrhea, negative for constipation, and negative for hematochezia or melena positive hemoptysis  Genitourinary: negative for dysuria, negative for urinary urgency, negative for urinary frequency, and negative for hematuria  Skin: negative for skin rash, and negative for skin lesions  Neurological: negative for unilateral weakness, numbness or tingling. Physical Exam:    Vitals:                  24HR INTAKE/OUTPUT:  No intake or output data in the 24 hours ending 01/31/23 1314    Weight    There is no height or weight on file to calculate BMI. Exam:  GEN:    Awake, alert and oriented x3. EYES:  EOMI, pupils equal   NECK: Supple. No lymphadenopathy. No carotid bruit  CVS:    regular rate and rhythm, no audible murmur  PULM:  CTA, no wheezes, rales or rhonchi, no acute respiratory distress  ABD:    Bowels sounds normal.  Abdomen is soft. No distention. Across upper abdomen with radiation to back  tenderness to palpation. EXT:   no edema bilaterally . No calf tenderness. NEURO: Moves all extremities. Motor and sensory are grossly intact  SKIN:  No rashes.   No skin lesions.    -----------------------------------------------------------------  Diagnostic Data:     DATA:    CBC:   Lab Results   Component Value Date    WBC 11.0 01/31/2023    RBC 5.32 01/31/2023    HGB 16.5 01/31/2023    HCT 46.5 01/31/2023    MCV 87.4 01/31/2023     01/31/2023        CMP:   Lab Results   Component Value Date    GLUCOSE 209 (H) 01/20/2023    BUN 14 01/20/2023    CREATININE 0.92 01/20/2023     01/20/2023    K 4.7 01/20/2023    CALCIUM 9.5 01/20/2023     01/20/2023 CO2 28 01/20/2023    PROT 7.9 12/27/2022    LABALBU 4.7 12/27/2022    BILITOT 0.3 12/27/2022    ALKPHOS 66 12/27/2022    ALT 27 12/27/2022    AST 20 12/27/2022       UA:   Lab Results   Component Value Date    COLORU Yellow 10/05/2022    SPECGRAV 1.010 10/05/2022    WBCUA 0 TO 2 10/05/2022    RBCUA 0 TO 2 10/05/2022    EPITHUA 0 TO 2 10/05/2022    LEUKOCYTESUR NEGATIVE 10/05/2022    GLUCOSEU NEGATIVE 10/05/2022    KETUA NEGATIVE 10/05/2022    PROTEINU NEGATIVE 10/05/2022    HGBUR NEGATIVE 10/05/2022       Lactic Acid:   Lab Results   Component Value Date    LACTA 2.8 (H) 10/05/2022       D-Dimer:  Lab Results   Component Value Date    DDIMER <0.27 12/27/2022       PT/INR:  Lab Results   Component Value Date/Time    PROTIME 12.9 12/26/2021 06:10 PM    INR 1.0 12/26/2021 06:10 PM       High Sensitivity Troponin:  No results for input(s): TROPHS in the last 72 hours. ABGs:   No results found for: PHART, PH, EVZ6HZJ, PCO2, PO2ART, PO2, UWQ8ZMW, HCO3, BEART, BE, THGBART, THB, ILM4PUM, X7ECCPVE, O2SAT, FIO2        CT ABDOMEN PELVIS W WO CONTRAST Additional Contrast? None    (Results Pending)             Assessment:    Principal Problem:    Gastrointestinal hemorrhage with hematemesis  Active Problems:    Intractable abdominal pain    History of pancreatitis    HTN (hypertension)    S/P CABG (coronary artery bypass graft)  Resolved Problems:    * No resolved hospital problems.  *      Patient Active Problem List    Diagnosis Date Noted    Abnormal cardiovascular stress test 01/27/2022    Palpitations     Hemoptysis 01/31/2023    Nausea and vomiting 01/31/2023    Gastrointestinal hemorrhage with hematemesis 01/31/2023    History of pancreatitis 01/31/2023    Chest wall pain 01/10/2023    Sleeping difficulty 01/10/2023    Lung nodule 01/10/2023    Chronic dyspnea 01/10/2023    Gastroesophageal reflux disease 01/10/2023    Allergic rhinitis due to pollen 01/10/2023    Unstable angina ([address] 12/28/2022    Shortness of breath 12/19/2022    Mild persistent asthma with acute exacerbation 11/29/2022    Bronchitis 11/29/2022    Diabetes mellitus ([facility] 11/18/2022    Generalized anxiety disorder 11/18/2022    Primary hypertension 11/18/2022    Hyperlipidemia 11/18/2022    Mild intermittent asthma 11/18/2022    Severe malnutrition ([address] 10/06/2022    Intractable abdominal pain 10/05/2022    Pancreatitis, unspecified pancreatitis type 09/12/2022    Nausea 09/12/2022    Epigastric pain 09/12/2022    ACS (acute coronary syndrome) ([address] 12/26/2021    ASHD (arteriosclerotic heart disease) /  CABG 2018 04/28/2019    Ischemic cardiomyopathy 01/08/2019    Chronic combined systolic and diastolic CHF, NYHA class 2 ([address] 01/08/2019    S/P CABG (coronary artery bypass graft)     Dyslipidemia     Acute coronary syndrome ([address] 01/07/2019    Mild congestive heart failure ([address] 01/07/2019    Chest pain at rest 06/02/2015    HTN (hypertension) 06/02/2015    Tobacco abuse 06/02/2015    Family history of premature CAD 06/02/2015    Pain in rib, left lower 05/31/2012       Plan:     MEDICAL DECISION MAKING:    Primary Problem(s): Gastrointestinal hemorrhage with hematemesis  Differential diagnoses: Severe gastritis pancreatitis, ischemic bowel  Condition is an undiagnosed new problem with uncertain prognosis  Condition is stable  Treatment plan: Consultation: Dr. Christene Boxer, GI  Imaging: CT abdomen and pelvis with and without contrast ordered  Medications:   IV fluids  IV Protonix infusion  IV morphine  Medication Monitoring / High Risk Medications: none   Type and screen  CBC with differential, CMP, lipase, troponin, INR  N.p.o.     History of pancreatitis  Condition is stable  Treatment plan: Continue current treatment  Imaging: CT abdomen and pelvis without and with contrast ordered  Medications:   IV fluids  N.p.o.  Lipase, CMP      Hypertension  Condition is a chronic stable condition  Treatment plan: Continue current treatment  Imaging: no further imaging studies ordered today  Medications:   IV Lopressor as needed    Nutrition status: Well developed, well nourished with no malnutrition  Dietician consult initiated    Hospital Prophylaxis:   DVT: none due to bleed    Stress Ulcer: PPI     MDM Data:   Test interpretation:  My independent EKG interpretation: sinus tachycardia  My independent X-ray interpretation:   Awaiting completion  Management and/or test interpretation discussed with  Dr. Gale Still and Nursing notes were personally reviewed, all current labs and imaging were personally reviewed, tests ordered: CBC, BMP, and history obtained by independent historian       Disposition:  Shared decision making: All test results, treatment options and disposition options were discussed with the patient today  Social determinants of health that may impact management: none  Code status: Full Code   Disposition: Discharge plan is home        Critical Care Time:  Total critical care time caring for this patient with life threatening, unstable organ failure, including direct patient contact, management of life support systems, review of data including imaging and labs, discussions with other team members and physicians at least 0 minutes so far today, excluding separately billable procedures. [facility] Medication Reconciliation documentation:    [x] I have utilized all available immediate resources to obtain, update, or review the patient's current medications (including all prescriptions, over-the-counter products, herbals, cannabinoid products and bitamin/mineral/dietary/nutritional supplements. Shahid.Mendon 'yes\", Savage Nunes     []  The patient is not eligible for medication reconciliation; the patient is in an emergent medical situation where delaying treatment would jeopardize the patient's health    []  I did NOT confirm, update or review the patient's current list of medications today.   [DOES NOT SATISFY [facility] PERFORMANCE]        [facility] Advanced Care Planning documentation:  [x] I have confirmed that the patient's Advance Care Plan is present, Code Status is documented, or surrogate decision maker is listed in the patient's medical record  [If \"yes\", STOP HERE]     [] The patient's [address] is NOT present because:    []  I confirmed today that the patient does not wish or was not able to name a   surrogate decision maker or provide and advance care plan.    [] Hospice care is currently being provided or has been provided within the   calendar year. []  I did NOT confirm today the presence of an [address] or surrogate   decision maker documented within the patient's medical record. [DOES NOT SATISFY [facility] PERFORMANCE]    CORE MEASURES  DVT prophylaxis: NO chemical ppx due to active bleed  Decubitus ulcer present on admission: No  CODE STATUS: FULL CODE  Nutrition Status: good   Physical therapy: No   Old Charts reviewed: Yes  EKG Reviewed:  Yes  Advance Directive Addressed: Yes    KRUNAL Monson - CNP, KRUNAL, NP-C  1/31/2023, 1:14 PM

## 2023-01-31 NOTE — CARE COORDINATION
Case Management Assessment  Initial Evaluation    Date/Time of Evaluation: 1/31/2023 3:27 PM  Assessment Completed by: JAK Mead    If patient is discharged prior to next notation, then this note serves as note for discharge by case management. Patient Name: Keyon Ness                   YOB: 1975  Diagnosis: GI bleeding [K92.2]  GI bleed [K92.2]                   Date / Time: 1/31/2023 12:05 PM    Patient Admission Status: Inpatient   Readmission Risk (Low < 19, Mod (19-27), High > 27): Readmission Risk Score: 14.3    Current PCP: Janeth Green MD  PCP verified by CM? Yes    Chart Reviewed: Yes      History Provided by: Patient, Significant Other  Patient Orientation: Alert and Oriented, Person, Place, Situation, Self    Patient Cognition: Alert    Hospitalization in the last 30 days (Readmission):  No    If yes, Readmission Assessment in  Navigator will be completed. Advance Directives:      Code Status: Full Code   Patient's Primary Decision Maker is: Legal Next of Kin    Primary Decision MakeFranklin Perez - Spouse - [phone]    Discharge Planning:    Patient lives with: Spouse/Significant Other Type of Home: House  Primary Care Giver: Self  Patient Support Systems include: Spouse/Significant Other, Family Members   Current Financial resources: Other (Comment) (commercial)  Current community resources: None  Current services prior to admission: None            Current DME:              Type of Home Care services:  None    ADLS  Prior functional level: Independent in ADLs/IADLs  Current functional level: Independent in ADLs/IADLs    PT AM-PAC:   /24  OT AM-PAC:   /24    Family can provide assistance at DC: Yes  Would you like Case Management to discuss the discharge plan with any other family members/significant others, and if so, who?  No  Plans to Return to Present Housing: Yes  Other Identified Issues/Barriers to RETURNING to current housing: na  Potential Assistance needed at discharge: N/A            Potential DME:    Patient expects to discharge to: [facility] for transportation at discharge: Self    Financial    Payor: [place] / Plan: [place] [address] POS II / Product Type: *No Product type* /     Does insurance require precert for SNF: Yes    Potential assistance Purchasing Medications: No      [facility]  for PADMINI Ruiz, [address] [place] [address]  Phone: [phone] Fax: [phone]    [id number] - [place], [place] - [address] [facility] [phone] [facility] [phone]  [facility] [address]  Phone: [phone] Fax: [phone]      Notes:    Factors facilitating achievement of predicted outcomes: Family support, Motivated, Cooperative, and Pleasant    Barriers to discharge: none    Additional Case Management Notes: The discharge plan is home with no services at this time. The Plan for Transition of Care is related to the following treatment goals of GI bleeding [K92.2]  GI bleed [K92.2]    Transportation/Food Security/Housekeeping Addressed: No issues identified or concerns. The Patient and/or Patient Representative Agree with the Discharge Plan? Yes    The discharge plan is home with no services at this time.     Rosa Maria Handley [facility]  Case Management Department  Ph: [phone]

## 2023-02-01 ENCOUNTER — ANESTHESIA (OUTPATIENT)
Dept: OPERATING ROOM | Age: 48
End: 2023-02-01
Payer: COMMERCIAL

## 2023-02-01 PROBLEM — K29.70 GASTRITIS WITHOUT BLEEDING: Status: ACTIVE | Noted: 2023-02-01

## 2023-02-01 LAB
ABSOLUTE EOS #: 0.49 K/UL (ref 0–0.44)
ABSOLUTE IMMATURE GRANULOCYTE: <0.03 K/UL (ref 0–0.3)
ABSOLUTE LYMPH #: 2.59 K/UL (ref 1.1–3.7)
ABSOLUTE MONO #: 0.97 K/UL (ref 0.1–1.2)
ALBUMIN SERPL-MCNC: 4 G/DL (ref 3.5–5.2)
ALBUMIN/GLOBULIN RATIO: 1.5 (ref 1–2.5)
ALP SERPL-CCNC: 51 U/L (ref 40–129)
ALT SERPL-CCNC: 15 U/L (ref 5–41)
ANION GAP SERPL CALCULATED.3IONS-SCNC: 11 MMOL/L (ref 9–17)
AST SERPL-CCNC: 12 U/L
BASOPHILS # BLD: 0 % (ref 0–2)
BASOPHILS ABSOLUTE: <0.03 K/UL (ref 0–0.2)
BILIRUB SERPL-MCNC: 0.6 MG/DL (ref 0.3–1.2)
BUN SERPL-MCNC: 20 MG/DL (ref 6–20)
BUN/CREAT BLD: 23 (ref 9–20)
CALCIUM SERPL-MCNC: 8.3 MG/DL (ref 8.6–10.4)
CHLORIDE SERPL-SCNC: 103 MMOL/L (ref 98–107)
CO2 SERPL-SCNC: 23 MMOL/L (ref 20–31)
CREAT SERPL-MCNC: 0.87 MG/DL (ref 0.7–1.2)
EOSINOPHILS RELATIVE PERCENT: 6 % (ref 1–4)
GFR SERPL CREATININE-BSD FRML MDRD: >60 ML/MIN/1.73M2
GLUCOSE SERPL-MCNC: 155 MG/DL (ref 70–99)
HCT VFR BLD AUTO: 33.1 % (ref 40.7–50.3)
HCT VFR BLD AUTO: 33.6 % (ref 40.7–50.3)
HCT VFR BLD AUTO: 35.7 % (ref 40.7–50.3)
HCT VFR BLD AUTO: 36.3 % (ref 40.7–50.3)
HGB BLD-MCNC: 11.6 G/DL (ref 13–17)
HGB BLD-MCNC: 11.9 G/DL (ref 13–17)
HGB BLD-MCNC: 12.4 G/DL (ref 13–17)
HGB BLD-MCNC: 12.5 G/DL (ref 13–17)
IMMATURE GRANULOCYTES: 0 %
LYMPHOCYTES # BLD: 33 % (ref 24–43)
MCH RBC QN AUTO: 30.9 PG (ref 25.2–33.5)
MCHC RBC AUTO-ENTMCNC: 34.4 G/DL (ref 28.4–34.8)
MCV RBC AUTO: 89.6 FL (ref 82.6–102.9)
MONOCYTES # BLD: 12 % (ref 3–12)
NRBC AUTOMATED: 0 PER 100 WBC
PDW BLD-RTO: 13.5 % (ref 11.8–14.4)
PLATELET # BLD AUTO: 300 K/UL (ref 138–453)
PMV BLD AUTO: 10.9 FL (ref 8.1–13.5)
POTASSIUM SERPL-SCNC: 4 MMOL/L (ref 3.7–5.3)
PROT SERPL-MCNC: 6.7 G/DL (ref 6.4–8.3)
RBC # BLD: 4.05 M/UL (ref 4.21–5.77)
SEG NEUTROPHILS: 49 % (ref 36–65)
SEGMENTED NEUTROPHILS ABSOLUTE COUNT: 3.9 K/UL (ref 1.5–8.1)
SODIUM SERPL-SCNC: 137 MMOL/L (ref 135–144)
TROPONIN I SERPL DL<=0.01 NG/ML-MCNC: 10 NG/L (ref 0–22)
WBC # BLD AUTO: 8 K/UL (ref 3.5–11.3)

## 2023-02-01 PROCEDURE — 6370000000 HC RX 637 (ALT 250 FOR IP): Performed by: INTERNAL MEDICINE

## 2023-02-01 PROCEDURE — 84484 ASSAY OF TROPONIN QUANT: CPT

## 2023-02-01 PROCEDURE — 36415 COLL VENOUS BLD VENIPUNCTURE: CPT

## 2023-02-01 PROCEDURE — 6360000002 HC RX W HCPCS: Performed by: STUDENT IN AN ORGANIZED HEALTH CARE EDUCATION/TRAINING PROGRAM

## 2023-02-01 PROCEDURE — 3700000000 HC ANESTHESIA ATTENDED CARE: Performed by: INTERNAL MEDICINE

## 2023-02-01 PROCEDURE — 6360000002 HC RX W HCPCS: Performed by: INTERNAL MEDICINE

## 2023-02-01 PROCEDURE — 85025 COMPLETE CBC W/AUTO DIFF WBC: CPT

## 2023-02-01 PROCEDURE — 6360000002 HC RX W HCPCS: Performed by: NURSE ANESTHETIST, CERTIFIED REGISTERED

## 2023-02-01 PROCEDURE — 99252 IP/OBS CONSLTJ NEW/EST SF 35: CPT | Performed by: INTERNAL MEDICINE

## 2023-02-01 PROCEDURE — 80053 COMPREHEN METABOLIC PANEL: CPT

## 2023-02-01 PROCEDURE — 6360000002 HC RX W HCPCS: Performed by: NURSE PRACTITIONER

## 2023-02-01 PROCEDURE — 1200000000 HC SEMI PRIVATE

## 2023-02-01 PROCEDURE — 93005 ELECTROCARDIOGRAM TRACING: CPT | Performed by: FAMILY MEDICINE

## 2023-02-01 PROCEDURE — 2709999900 HC NON-CHARGEABLE SUPPLY: Performed by: INTERNAL MEDICINE

## 2023-02-01 PROCEDURE — 2500000003 HC RX 250 WO HCPCS: Performed by: NURSE ANESTHETIST, CERTIFIED REGISTERED

## 2023-02-01 PROCEDURE — 43235 EGD DIAGNOSTIC BRUSH WASH: CPT | Performed by: INTERNAL MEDICINE

## 2023-02-01 PROCEDURE — 85014 HEMATOCRIT: CPT

## 2023-02-01 PROCEDURE — 3609017100 HC EGD: Performed by: INTERNAL MEDICINE

## 2023-02-01 PROCEDURE — 94761 N-INVAS EAR/PLS OXIMETRY MLT: CPT

## 2023-02-01 PROCEDURE — C9113 INJ PANTOPRAZOLE SODIUM, VIA: HCPCS | Performed by: INTERNAL MEDICINE

## 2023-02-01 PROCEDURE — 2580000003 HC RX 258: Performed by: NURSE ANESTHETIST, CERTIFIED REGISTERED

## 2023-02-01 PROCEDURE — 7100000011 HC PHASE II RECOVERY - ADDTL 15 MIN: Performed by: INTERNAL MEDICINE

## 2023-02-01 PROCEDURE — 7100000010 HC PHASE II RECOVERY - FIRST 15 MIN: Performed by: INTERNAL MEDICINE

## 2023-02-01 PROCEDURE — 2580000003 HC RX 258: Performed by: INTERNAL MEDICINE

## 2023-02-01 PROCEDURE — 85018 HEMOGLOBIN: CPT

## 2023-02-01 PROCEDURE — 6370000000 HC RX 637 (ALT 250 FOR IP): Performed by: STUDENT IN AN ORGANIZED HEALTH CARE EDUCATION/TRAINING PROGRAM

## 2023-02-01 PROCEDURE — 6360000002 HC RX W HCPCS

## 2023-02-01 RX ORDER — FAMOTIDINE 20 MG/1
20 TABLET, FILM COATED ORAL 2 TIMES DAILY
Status: DISCONTINUED | OUTPATIENT
Start: 2023-02-01 | End: 2023-02-03 | Stop reason: HOSPADM

## 2023-02-01 RX ORDER — CALCIUM CARBONATE 200(500)MG
500 TABLET,CHEWABLE ORAL 3 TIMES DAILY PRN
Status: DISCONTINUED | OUTPATIENT
Start: 2023-02-01 | End: 2023-02-03 | Stop reason: HOSPADM

## 2023-02-01 RX ORDER — NITROGLYCERIN 0.4 MG/1
0.4 TABLET SUBLINGUAL EVERY 5 MIN PRN
Status: DISCONTINUED | OUTPATIENT
Start: 2023-02-01 | End: 2023-02-03 | Stop reason: HOSPADM

## 2023-02-01 RX ORDER — SODIUM CHLORIDE 9 MG/ML
INJECTION, SOLUTION INTRAVENOUS PRN
Status: DISCONTINUED | OUTPATIENT
Start: 2023-02-01 | End: 2023-02-01 | Stop reason: HOSPADM

## 2023-02-01 RX ORDER — LIDOCAINE HYDROCHLORIDE 20 MG/ML
INJECTION, SOLUTION INFILTRATION; PERINEURAL PRN
Status: DISCONTINUED | OUTPATIENT
Start: 2023-02-01 | End: 2023-02-01 | Stop reason: SDUPTHER

## 2023-02-01 RX ORDER — PROPOFOL 10 MG/ML
INJECTION, EMULSION INTRAVENOUS PRN
Status: DISCONTINUED | OUTPATIENT
Start: 2023-02-01 | End: 2023-02-01 | Stop reason: SDUPTHER

## 2023-02-01 RX ORDER — ALPRAZOLAM 0.25 MG/1
0.25 TABLET ORAL ONCE
Status: COMPLETED | OUTPATIENT
Start: 2023-02-01 | End: 2023-02-01

## 2023-02-01 RX ORDER — SODIUM CHLORIDE 0.9 % (FLUSH) 0.9 %
5-40 SYRINGE (ML) INJECTION EVERY 12 HOURS SCHEDULED
Status: DISCONTINUED | OUTPATIENT
Start: 2023-02-01 | End: 2023-02-01 | Stop reason: HOSPADM

## 2023-02-01 RX ORDER — SODIUM CHLORIDE, SODIUM LACTATE, POTASSIUM CHLORIDE, CALCIUM CHLORIDE 600; 310; 30; 20 MG/100ML; MG/100ML; MG/100ML; MG/100ML
INJECTION, SOLUTION INTRAVENOUS ONCE
Status: COMPLETED | OUTPATIENT
Start: 2023-02-01 | End: 2023-02-01

## 2023-02-01 RX ORDER — ONDANSETRON 2 MG/ML
INJECTION INTRAMUSCULAR; INTRAVENOUS PRN
Status: DISCONTINUED | OUTPATIENT
Start: 2023-02-01 | End: 2023-02-01 | Stop reason: SDUPTHER

## 2023-02-01 RX ORDER — ATORVASTATIN CALCIUM 40 MG/1
80 TABLET, FILM COATED ORAL NIGHTLY
Status: DISCONTINUED | OUTPATIENT
Start: 2023-02-01 | End: 2023-02-03 | Stop reason: HOSPADM

## 2023-02-01 RX ORDER — RANOLAZINE 500 MG/1
500 TABLET, EXTENDED RELEASE ORAL 2 TIMES DAILY
Status: DISCONTINUED | OUTPATIENT
Start: 2023-02-01 | End: 2023-02-03 | Stop reason: HOSPADM

## 2023-02-01 RX ORDER — SODIUM CHLORIDE 0.9 % (FLUSH) 0.9 %
5-40 SYRINGE (ML) INJECTION PRN
Status: DISCONTINUED | OUTPATIENT
Start: 2023-02-01 | End: 2023-02-01 | Stop reason: HOSPADM

## 2023-02-01 RX ORDER — METOPROLOL SUCCINATE 50 MG/1
50 TABLET, EXTENDED RELEASE ORAL DAILY
Status: DISCONTINUED | OUTPATIENT
Start: 2023-02-01 | End: 2023-02-03 | Stop reason: HOSPADM

## 2023-02-01 RX ADMIN — PROMETHAZINE HYDROCHLORIDE 6.25 MG: 25 INJECTION, SOLUTION INTRAMUSCULAR; INTRAVENOUS at 11:08

## 2023-02-01 RX ADMIN — HYDROMORPHONE HYDROCHLORIDE 0.5 MG: 2 INJECTION, SOLUTION INTRAMUSCULAR; INTRAVENOUS; SUBCUTANEOUS at 09:05

## 2023-02-01 RX ADMIN — NITROGLYCERIN 0.4 MG: 0.4 TABLET SUBLINGUAL at 21:07

## 2023-02-01 RX ADMIN — RANOLAZINE 500 MG: 500 TABLET, FILM COATED, EXTENDED RELEASE ORAL at 21:58

## 2023-02-01 RX ADMIN — HYDROMORPHONE HYDROCHLORIDE 0.5 MG: 2 INJECTION, SOLUTION INTRAMUSCULAR; INTRAVENOUS; SUBCUTANEOUS at 18:57

## 2023-02-01 RX ADMIN — METOPROLOL SUCCINATE 50 MG: 50 TABLET, EXTENDED RELEASE ORAL at 21:58

## 2023-02-01 RX ADMIN — ATORVASTATIN CALCIUM 80 MG: 40 TABLET, FILM COATED ORAL at 21:58

## 2023-02-01 RX ADMIN — ALPRAZOLAM 0.25 MG: 0.25 TABLET ORAL at 21:58

## 2023-02-01 RX ADMIN — HYDROMORPHONE HYDROCHLORIDE 0.5 MG: 2 INJECTION, SOLUTION INTRAMUSCULAR; INTRAVENOUS; SUBCUTANEOUS at 05:30

## 2023-02-01 RX ADMIN — NITROGLYCERIN 0.4 MG: 0.4 TABLET SUBLINGUAL at 21:12

## 2023-02-01 RX ADMIN — ONDANSETRON 4 MG: 2 INJECTION INTRAMUSCULAR; INTRAVENOUS at 07:45

## 2023-02-01 RX ADMIN — HYDROMORPHONE HYDROCHLORIDE 0.5 MG: 2 INJECTION, SOLUTION INTRAMUSCULAR; INTRAVENOUS; SUBCUTANEOUS at 12:05

## 2023-02-01 RX ADMIN — LIDOCAINE HYDROCHLORIDE 5 ML: 20 INJECTION, SOLUTION INFILTRATION; PERINEURAL at 08:10

## 2023-02-01 RX ADMIN — SODIUM CHLORIDE: 9 INJECTION, SOLUTION INTRAVENOUS at 12:10

## 2023-02-01 RX ADMIN — PROPOFOL 70 MG: 10 INJECTION, EMULSION INTRAVENOUS at 08:08

## 2023-02-01 RX ADMIN — ONDANSETRON 4 MG: 2 INJECTION INTRAMUSCULAR; INTRAVENOUS at 20:19

## 2023-02-01 RX ADMIN — ONDANSETRON 4 MG: 2 INJECTION INTRAMUSCULAR; INTRAVENOUS at 01:37

## 2023-02-01 RX ADMIN — HYDROMORPHONE HYDROCHLORIDE 0.5 MG: 2 INJECTION, SOLUTION INTRAMUSCULAR; INTRAVENOUS; SUBCUTANEOUS at 15:12

## 2023-02-01 RX ADMIN — FAMOTIDINE 20 MG: 20 TABLET, FILM COATED ORAL at 21:58

## 2023-02-01 RX ADMIN — LIDOCAINE HYDROCHLORIDE 5 ML: 20 INJECTION, SOLUTION INFILTRATION; PERINEURAL at 08:08

## 2023-02-01 RX ADMIN — SODIUM CHLORIDE, POTASSIUM CHLORIDE, SODIUM LACTATE AND CALCIUM CHLORIDE: 600; 310; 30; 20 INJECTION, SOLUTION INTRAVENOUS at 08:05

## 2023-02-01 RX ADMIN — ALUMINUM HYDROXIDE, MAGNESIUM HYDROXIDE, AND SIMETHICONE: 200; 200; 20 SUSPENSION ORAL at 21:59

## 2023-02-01 RX ADMIN — ONDANSETRON 4 MG: 2 INJECTION INTRAMUSCULAR; INTRAVENOUS at 14:19

## 2023-02-01 RX ADMIN — HYDROMORPHONE HYDROCHLORIDE 0.5 MG: 2 INJECTION, SOLUTION INTRAMUSCULAR; INTRAVENOUS; SUBCUTANEOUS at 02:28

## 2023-02-01 RX ADMIN — SODIUM CHLORIDE, PRESERVATIVE FREE 10 ML: 5 INJECTION INTRAVENOUS at 09:26

## 2023-02-01 RX ADMIN — HYDROMORPHONE HYDROCHLORIDE 0.5 MG: 2 INJECTION, SOLUTION INTRAMUSCULAR; INTRAVENOUS; SUBCUTANEOUS at 21:58

## 2023-02-01 ASSESSMENT — PAIN DESCRIPTION - LOCATION
LOCATION: ABDOMEN

## 2023-02-01 ASSESSMENT — PAIN SCALES - GENERAL
PAINLEVEL_OUTOF10: 6
PAINLEVEL_OUTOF10: 6
PAINLEVEL_OUTOF10: 9
PAINLEVEL_OUTOF10: 8
PAINLEVEL_OUTOF10: 8
PAINLEVEL_OUTOF10: 7
PAINLEVEL_OUTOF10: 9
PAINLEVEL_OUTOF10: 8
PAINLEVEL_OUTOF10: 6
PAINLEVEL_OUTOF10: 8
PAINLEVEL_OUTOF10: 8

## 2023-02-01 ASSESSMENT — PAIN DESCRIPTION - FREQUENCY
FREQUENCY: CONTINUOUS

## 2023-02-01 ASSESSMENT — PAIN DESCRIPTION - ONSET
ONSET: ON-GOING

## 2023-02-01 ASSESSMENT — PAIN DESCRIPTION - DESCRIPTORS
DESCRIPTORS: SHARP
DESCRIPTORS: PRESSURE;STABBING
DESCRIPTORS: SHARP;SHOOTING;SQUEEZING
DESCRIPTORS: SHARP;SHOOTING;PRESSURE
DESCRIPTORS: SHARP;SQUEEZING;CRAMPING
DESCRIPTORS: SHARP
DESCRIPTORS: ACHING;CRAMPING
DESCRIPTORS: SQUEEZING
DESCRIPTORS: ACHING;CRAMPING
DESCRIPTORS: SHARP
DESCRIPTORS: SHARP
DESCRIPTORS: ACHING;CRAMPING
DESCRIPTORS: SHARP;STABBING
DESCRIPTORS: SHARP
DESCRIPTORS: SHARP;SQUEEZING;SHOOTING

## 2023-02-01 ASSESSMENT — PAIN DESCRIPTION - ORIENTATION
ORIENTATION: UPPER;LEFT
ORIENTATION: LEFT
ORIENTATION: UPPER;LEFT
ORIENTATION: UPPER
ORIENTATION: UPPER;LEFT
ORIENTATION: LEFT
ORIENTATION: UPPER
ORIENTATION: LEFT;UPPER

## 2023-02-01 ASSESSMENT — PAIN - FUNCTIONAL ASSESSMENT
PAIN_FUNCTIONAL_ASSESSMENT: ACTIVITIES ARE NOT PREVENTED

## 2023-02-01 ASSESSMENT — PAIN DESCRIPTION - PAIN TYPE
TYPE: ACUTE PAIN

## 2023-02-01 ASSESSMENT — LIFESTYLE VARIABLES: SMOKING_STATUS: 1

## 2023-02-01 ASSESSMENT — PAIN DESCRIPTION - DIRECTION: RADIATING_TOWARDS: LEFT SHOULDER BLADE

## 2023-02-01 ASSESSMENT — ENCOUNTER SYMPTOMS: SHORTNESS OF BREATH: 1

## 2023-02-01 NOTE — PROGRESS NOTES
Comprehensive Nutrition Assessment    Type and Reason for Visit:  Initial, Positive Nutrition Screen    Nutrition Recommendations/Plan:   Monitor NPO duration     Malnutrition Assessment:  Malnutrition Status: At risk for malnutrition (Comment) (02/01/23 1101)    Context:  Acute Illness     Findings of the 6 clinical characteristics of malnutrition:  Energy Intake:  Mild decrease in energy intake (Comment) (since Sunday)  Weight Loss:  No significant weight loss (uncertain)     Body Fat Loss:  No significant body fat loss     Muscle Mass Loss:  No significant muscle mass loss    Fluid Accumulation:  No significant fluid accumulation     Strength:  Not Performed    Nutrition Assessment:    Predicted suboptimal nutrient intakes r/t altered GI status, AEB poor PO since Sunday, NPO and GI blood losses. Post EGD without significant finding. Still feels miserable per interview, taking small amounts of ice chips only. 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 Will monitor duration of NPO, currently at day #4 NPO or very poor PO. Nutrition Related Findings:    no malnutrition indices. Wound Type: None       Current Nutrition Intake & Therapies:    Average Meal Intake: NPO  Average Supplements Intake: NPO  Diet NPO Exceptions are: Ice Chips    Anthropometric Measures:  Height: 5' 7\" (170.2 cm)  Ideal Body Weight (IBW): 148 lbs (67 kg)    Admission Body Weight: 189 lb 13.1 oz (86.1 kg)  Current Body Weight: 189 lb 8 oz (86 kg), 128 % IBW. Weight Source: Bed Scale  Current BMI (kg/m2): 29.7  Usual Body Weight: 198 lb (89.8 kg)  % Weight Change (Calculated): -4.3  Weight Adjustment For: No Adjustment                 BMI Categories: Overweight (BMI 25.0-29. 9)    Estimated Daily Nutrient Needs:  Energy Requirements Based On: Kcal/kg  Weight Used for Energy Requirements: Current  Energy (kcal/day): 8830-0873 (20-23)  Weight Used for Protein Requirements: Ideal  Protein (g/day): 81-87 (1.2-1.3)  Method Used for Fluid Requirements: 1 ml/kcal  Fluid (ml/day): 2000    Nutrition Diagnosis:   Predicted inadequate energy intake related to altered GI function as evidenced by NPO or clear liquid status due to medical condition, poor intake prior to admission    Lab Results   Component Value Date     02/01/2023    K 4.0 02/01/2023     02/01/2023    CO2 23 02/01/2023    BUN 20 02/01/2023    CREATININE 0.87 02/01/2023    GLUCOSE 155 (H) 02/01/2023    CALCIUM 8.3 (L) 02/01/2023    PROT 6.7 02/01/2023    LABALBU 4.0 02/01/2023    BILITOT 0.6 02/01/2023    ALKPHOS 51 02/01/2023    AST 12 02/01/2023    ALT 15 02/01/2023    LABGLOM >60 02/01/2023    GFRAA >60 09/27/2022     Hemoglobin A1C   Date Value Ref Range Status   09/12/2022 6.3 (H) 4.0 - 6.0 % Final     No results found for: VITD25    Nutrition Interventions:   Food and/or Nutrient Delivery: Start Oral Diet, Continue NPO  Nutrition Education/Counseling: No recommendation at this time  Coordination of Nutrition Care: Continue to monitor while inpatient  Plan of Care discussed with: patient    Goals:     Goals: Meet at least 75% of estimated needs       Nutrition Monitoring and Evaluation:   Behavioral-Environmental Outcomes: None Identified  Food/Nutrient Intake Outcomes: Diet Advancement/Tolerance, Food and Nutrient Intake  Physical Signs/Symptoms Outcomes: Biochemical Data, Weight, GI Status    Discharge Planning:     Too soon to determine     Kleveridalia Maikel, [address] [place] [address],   Contact: [phone]

## 2023-02-01 NOTE — PROGRESS NOTES
Discharge Criteria    Inpatients must meet Criteria 1 through 7. All other patients are either YES or N/A. If a NO is chosen then Anesthesia or Surgeon must be notified. 1.  Minimum 30 minutes after last dose of sedative medication, minimum 120 minutes after last dose of reversal agent. Yes      2. Systolic BP stable within 20 mmHg for 30 minutes & systolic BP between 90 & 794 or within 10 mmHg of baseline. Yes      3. Pulse between 60 and 100 or within 10 bpm of baseline. Yes      4. Spontaneous respiratory rate >/= 10 per minute. Yes      5. SaO2 >/= 95 or  >/= baseline. Yes      6. Able to cough and swallow or return to baseline function. Yes      7. Alert and oriented or return to baseline mental status. Yes      8. Demonstrates controlled, coordinated movements, ambulates with steady gait, or return to baseline activity function. Yes      9. Minimal or no pain or nausea, or at a level tolerable and acceptable to patient. Yes      10. Takes and retains oral fluids as allowed. NO -- patient refuses PO fluids      11. Procedural / perioperative site stable. Minimal or no bleeding. Yes          12. If GI endoscopy procedure, minimal or no abdominal distention or passing flatus. Yes      13. Written discharge instructions and emergency telephone number provided. N/A -- return to IP floor      14. Accompanied by a responsible adult.     N/A -- return to IP floor

## 2023-02-01 NOTE — OP NOTE
[facility]    EGD    PROCEDURE DATE: 02/01/23    REFERRING PHYSICIAN: No ref. provider found     PRIMARY CARE PROVIDER: Susana Irvin MD    ATTENDING PHYSICIAN: Kenn Reyes MD     HISTORY: Mr. Conrado Anaya is a 56 y.o. male who presents to the  Endoscopy unit for upper endoscopy. The patient's clinical history is remarkable for recurrent pancreatitis. He is currently medically stable and appropriate for the planned procedure. PREOPERATIVE DIAGNOSIS: Hematemesis. PROCEDURES:   1) Transoral Upper Endoscopy. POSTOPERATIVE DIAGNOSIS:   Mild gastritis     MEDICATIONS: MAC per anesthesia     EBL: None    INSTRUMENT: Olympus GIF-H190  flexible Gastroscope. PREPARATION: The nature and character of the procedure as well as risks, benefits, and alternatives were discussed with the patient and informed consent was obtained. Complications were said to include, but were not limited to: medication allergy, medication reaction, cardiovascular and respiratory problems, bleeding, perforation, infection, and/or missed diagnosis. Following arrival in the endoscopy room, the patient was placed in the left lateral decubitus position and final time-out accomplished in the presence of the nursing staff. Baseline vital signs were obtained and reviewed, and IV sedation was subsequently initiated. FINDINGS:   Esophagus: The esophagus was inspected to the Z-line. The endoscopic exam showed GEJ at 45 cm from the incisor - no tears or lesions noted. Stomach: The stomach was inspected in both forward and retroflex fashion and was appropriately distensible. The cardia, fundus, incisura, antrum and pylorus were identified via direct visualization. The endoscopic exam showed mild erythema in the antrum - no ulcers. No ulcers on incisura on retroflex. No blood or hematin noted in stomach. No hiatal hernia .      Duodenum: The proximal small bowel was inspected through the bulb, sweep, and second portion of the duodenum. The endoscopic exam showed no blood or lesions from bulb, sweep to third portion. IMPRESSION:  Mild gastritis    RECOMMENDATIONS:   1) Avoid NSAIDs. These include medications like ibuprofen, Aleve, Motrin, Naprosyn, aspirin 325 mg. Alternative is acetaminophen no more than 2400 mg daily. 2) Recommend omeprazole 20 mg daily for 30 days       Electronically signed by Yenni Burger MD on 2/1/2023 at 8:27 AM      The patient was counseled at length about the risks of teodoro Covid-19 during their perioperative period and any recovery window from their procedure. The patient was made aware that teodoro Covid-19  may worsen their prognosis for recovering from their procedure  and lend to a higher morbidity and/or mortality risk. All material risks, benefits, and reasonable alternatives including postponing the procedure were discussed. The patient DOES wish to proceed with the procedure at this time.

## 2023-02-01 NOTE — PROGRESS NOTES
Patient resting in bed watching television at this time. Vital signs and assessment completed. Patient c/o of nausea and 8/10 pain. Too soon for more pain medication but Zofran IV given for nausea. Patient would like his pain medication as soon as he is able to have it. Patient is alert and orientated. Patient is stable on feet and capable of moving about in the room independently. Patient encouraged to call out if patient is feeling lightheadness, unsteady or is in need of any assistance. Patient denies any other needs at this time. Call light, bedside table and personal belongings within reach.

## 2023-02-01 NOTE — PROGRESS NOTES
Patient rating pain 8/10 at this time and requesting pain medication. Dilaudid 0.5mg IV given for pain. Patient states he is still nauseated but hasn't thrown up since getting to the floor so he believes that is an improvement. Patient is requesting zofran IV as soon as able to get it. Second assessment completed at this time. Assessment unchanged from previous shift assessment. Vital signs completed. Patient denies any other needs at this time. Call light, bedside table and personal belongings within reach.

## 2023-02-01 NOTE — PROGRESS NOTES
Pharmacy called writer about protonix drip. Asked about drip being changed to IV push protonix. Writer called Dr. Keena Cowart at this time. MD agrees to change protonix to IV push instead of drip. Will inform pharmacy. Will continue to monitor.

## 2023-02-01 NOTE — PROGRESS NOTES
[address], [address]    Progress Note    Date:   2/1/2023  Patient name:  Magy Coreas  Date of admission:  1/31/2023 12:05 PM  MRN:   [id number]  YOB: 1975    SUBJECTIVE/Last 24 hours update:     Patient seen and examined at the bed side , no new acute events overnight, no new complains except for ongoing severe epigastric/RUQ abdominal discomfort and pain, 8/10 this morning. He has been needed pain meds as due as well as nausea medications/Zofran. Notes from nursing staff and Consults had been reviewed, and the overnight progress had been checked with the nursing staff as well. He does not recall eating anything different/no sick contacts. He did have 3 beers a few days ago. Concerns for urine output noted, no urinary retention noted on bladder scan. REVIEW OF SYSTEMS:      CONSTITUTIONAL:  no fevers, no headcahes  EYES: negative for blury vision  HEENT: No headaches, No nasal congestion, no difficulty swallowing  RESPIRATORY:negative for dyspnea, no wheezing, no Cough  CARDIOVASCULAR: negative for chest pain, no palpitations  GASTROINTESTINAL: positive for nausea, no vomiting, no change in bowel habits, severe abdominal pain   GENITOURINARY: negative for dysuria, no hematuria   MUSCULOSKELETAL: no joint pains, no muscle aches, no swelling of joints or extremities  NEUROLOGICAL: No  Weakness or numbness      PAST MEDICAL HISTORY:      has a past medical history of CAD (coronary artery disease), Carpal tunnel syndrome, Depressive disorder, not elsewhere classified, Diabetes mellitus ([address] Gastrointestinal hemorrhage with hematemesis, Heart attack ([address] History of echocardiogram, History of pancreatitis, Hyperlipidemia, Hypertension, and PTSD (post-traumatic stress disorder). PAST SURGICAL HISTORY:      has a past surgical history that includes Coronary angioplasty (08/2018); Cardiac catheterization (Left, 01/07/2018);  Coronary artery bypass graft (2018); Cardiac catheterization (Left, 01/27/2022); Colonoscopy (N/A, 10/04/2022); Upper gastrointestinal endoscopy (N/A, 10/04/2022); Colonoscopy (10/04/2022); and Esophagogastroduodenoscopy (10/04/2022). SOCIAL HISTORY:      reports that he has been smoking cigarettes. He has a 7.50 pack-year smoking history. He has never used smokeless tobacco. He reports that he does not currently use alcohol. He reports that he does not use drugs. TOBACCO:   reports that he has been smoking cigarettes. He has a 7.50 pack-year smoking history. He has never used smokeless tobacco.  ETOH:   reports that he does not currently use alcohol. FAMILY HISTORY:     family history includes Asthma in his sister; Colon Cancer in his paternal uncle and paternal uncle; Heart Disease in his father; High Blood Pressure in his father and mother; Pancreatic Cancer in his maternal aunt and maternal aunt. Problem Relation Age of Onset    High Blood Pressure Mother     Heart Disease Father     High Blood Pressure Father     Asthma Sister     Colon Cancer Paternal Uncle     Colon Cancer Paternal Uncle     Pancreatic Cancer Maternal Aunt     Pancreatic Cancer Maternal Aunt      HOME MEDICATIONS:      Prior to Admission medications    Medication Sig Start Date End Date Taking?  Authorizing Provider   metFORMIN (GLUCOPHAGE) 500 MG tablet Take 1 tablet by mouth 2 times daily (with meals) 1/23/23   Rebecca Leavitt MD   metoprolol succinate (TOPROL XL) 100 MG extended release tablet Take 1 tablet by mouth in the morning and at bedtime  Patient taking differently: Take 100 mg by mouth every evening 1/20/23   Heath Garcia MD   clopidogrel (PLAVIX) 75 MG tablet TAKE 1 TABLET BY MOUTH EVERY DAY 1/19/23   Heath Garcia MD   lisinopril (PRINIVIL;ZESTRIL) 20 MG tablet Take 1 tablet by mouth daily 1/16/23   Heath Garcia MD   ranolazine (RANEXA) 500 MG extended release tablet Take 1 tablet by mouth 2 times daily 1/13/23   Rebecca Leavitt MD cetirizine (ZYRTEC) 10 MG tablet Take 1 tablet by mouth daily 1/10/23 2/9/23  Sumanth Snow MD   pantoprazole (PROTONIX) 40 MG tablet Take 1 tablet by mouth every morning (before breakfast) 1/10/23   Sumanth Snow MD   ALPRAZolam Phylliss Bolds) 0.25 MG tablet Take 0.25 mg by mouth 3 times daily as needed for Sleep. Historical Provider, MD   atorvastatin (LIPITOR) 80 MG tablet Take 1 tablet by mouth daily 12/7/22   Natasha Perez MD   albuterol (ACCUNEB) 0.63 MG/3ML nebulizer solution Take 3 mLs by nebulization every 6 hours as needed for Wheezing 12/1/22   Sumanth Snow MD   albuterol (PROVENTIL) (5 MG/ML) 0.5% nebulizer solution Take 1 mL by nebulization 4 times daily as needed for Wheezing  Patient not taking: No sig reported 11/29/22   Sumanth Snow MD   albuterol sulfate HFA (VENTOLIN HFA) 108 (90 Base) MCG/ACT inhaler Inhale 2 puffs into the lungs 4 times daily as needed for Wheezing 11/18/22   Sumanth Snow MD   fluticasone (FLOVENT HFA) 110 MCG/ACT inhaler Inhale 1 puff into the lungs 2 times daily 11/18/22   Sumanth Snow MD   fenofibrate (TRIGLIDE) 160 MG tablet Take 1 tablet by mouth daily 11/18/22   Sumanth Snow MD   dilTIAZem (CARDIZEM CD) 120 MG extended release capsule Take 1 capsule by mouth daily 6/1/22   Natasha Perez MD   nitroGLYCERIN (NITROSTAT) 0.4 MG SL tablet Place 1 tablet under the tongue every 5 minutes as needed for Chest pain up to max of 3 total doses. If no relief after 1 dose, call 911. 1/21/22   Natasha Perez MD   aspirin 81 MG EC tablet Take 81 mg by mouth daily. Historical Provider, MD       ALLERGIES:     Patient has no known allergies.       OBJECTIVE:       Vitals:    01/31/23 1930 01/31/23 2339 02/01/23 0415 02/01/23 0532   BP: 118/85 (!) 144/79  126/80   Pulse: (!) 103 (!) 104  89   Resp: 20 18  18   Temp: 97.7 °F (36.5 °C) 98.1 °F (36.7 °C)  97.3 °F (36.3 °C)   TempSrc: Temporal Temporal  Temporal   SpO2: 97% 94%  95%   Weight:   189 lb 8 oz (86 kg)    Height: Intake/Output Summary (Last 24 hours) at 2/1/2023 0711  Last data filed at 2/1/2023 0415  Gross per 24 hour   Intake 2377 ml   Output 425 ml   Net 1952 ml       PHYSICAL EXAM:  General Appearance  Alert , awake , not in acute distress  HEENT - Head is normocephalic, atraumatic. Lungs - Bilateral equal air entry , no wheezes, rales or rhonchi, aeration good  Cardiovascular - Heart sounds are normal.  Regular rhythm, normal rate without murmur, gallop or rub.   Abdomen - Soft, tender to palpation RUQ, nondistended, no masses or organomegaly  Neurologic - There are no new focal motor or sensory deficits  Skin - No bruising or bleeding on exposed skin area  Extremities - No cyanosis, clubbing or edema      DIAGNOSTICS:     Laboratory Testing:    See [facility] EMR for lab data    Recent Results (from the past 24 hour(s))   CBC auto differential    Collection Time: 01/31/23 12:46 PM   Result Value Ref Range    WBC 11.0 3.5 - 11.3 k/uL    RBC 5.32 4.21 - 5.77 m/uL    Hemoglobin 16.5 13.0 - 17.0 g/dL    Hematocrit 46.5 40.7 - 50.3 %    MCV 87.4 82.6 - 102.9 fL    MCH 31.0 25.2 - 33.5 pg    MCHC 35.5 (H) 28.4 - 34.8 g/dL    RDW 13.4 11.8 - 14.4 %    Platelets 676 482 - 882 k/uL    MPV 10.9 8.1 - 13.5 fL    NRBC Automated 0.0 0.0 per 100 WBC    Seg Neutrophils 61 36 - 65 %    Lymphocytes 23 (L) 24 - 43 %    Monocytes 13 (H) 3 - 12 %    Eosinophils % 3 1 - 4 %    Basophils 0 0 - 2 %    Immature Granulocytes 0 0 %    Segs Absolute 6.62 1.50 - 8.10 k/uL    Absolute Lymph # 2.48 1.10 - 3.70 k/uL    Absolute Mono # 1.46 (H) 0.10 - 1.20 k/uL    Absolute Eos # 0.36 0.00 - 0.44 k/uL    Basophils Absolute 0.04 0.00 - 0.20 k/uL    Absolute Immature Granulocyte 0.03 0.00 - 0.30 k/uL   Protime-INR    Collection Time: 01/31/23 12:46 PM   Result Value Ref Range    Protime 14.1 11.5 - 14.2 sec    INR 1.1    Iron and TIBC    Collection Time: 01/31/23 12:46 PM   Result Value Ref Range    Iron 74 59 - 158 ug/dL    TIBC 314 250 - 450 ug/dL Iron Saturation 24 20 - 55 %    UIBC 240 112 - 347 ug/dL   Comprehensive Metabolic Panel w/ Reflex to MG    Collection Time: 01/31/23 12:46 PM   Result Value Ref Range    Glucose 211 (H) 70 - 99 mg/dL    BUN 25 (H) 6 - 20 mg/dL    Creatinine 1.01 0.70 - 1.20 mg/dL    Est, Glom Filt Rate >60 >60 mL/min/1.73m2    Bun/Cre Ratio 25 (H) 9 - 20    Calcium 9.2 8.6 - 10.4 mg/dL    Sodium 136 135 - 144 mmol/L    Potassium 3.7 3.7 - 5.3 mmol/L    Chloride 100 98 - 107 mmol/L    CO2 21 20 - 31 mmol/L    Anion Gap 15 9 - 17 mmol/L    Alkaline Phosphatase 60 40 - 129 U/L    ALT 17 5 - 41 U/L    AST 15 <40 U/L    Total Bilirubin 0.7 0.3 - 1.2 mg/dL    Total Protein 8.0 6.4 - 8.3 g/dL    Albumin 4.4 3.5 - 5.2 g/dL    Albumin/Globulin Ratio 1.2 1.0 - 2.5   Fibrinogen    Collection Time: 01/31/23 12:46 PM   Result Value Ref Range    Fibrinogen 448 179 - 518 mg/dL   Troponin    Collection Time: 01/31/23 12:46 PM   Result Value Ref Range    Troponin, High Sensitivity 9 0 - 22 ng/L   Lipase    Collection Time: 01/31/23 12:46 PM   Result Value Ref Range    Lipase 50 13 - 60 U/L   TYPE AND SCREEN    Collection Time: 01/31/23 12:46 PM   Result Value Ref Range    Expiration Date 02/03/2023,2359     Arm Band Number [id number]     ABO/Rh A NEGATIVE     Antibody Screen NEGATIVE    EKG 12 lead    Collection Time: 01/31/23 12:51 PM   Result Value Ref Range    Ventricular Rate 117 BPM    Atrial Rate 117 BPM    P-R Interval 142 ms    QRS Duration 92 ms    Q-T Interval 320 ms    QTc Calculation (Bazett) 446 ms    P Axis 22 degrees    R Axis 70 degrees    T Axis -5 degrees   Urinalysis with Reflex to Culture    Collection Time: 01/31/23  3:20 PM    Specimen: Urine   Result Value Ref Range    Color, UA Yellow Yellow    Turbidity UA Clear Clear    Glucose, Ur NEGATIVE NEGATIVE    Bilirubin Urine NEGATIVE NEGATIVE    Ketones, Urine NEGATIVE NEGATIVE    Specific Gravity, UA 1.020 1.010 - 1.020    Urine Hgb NEGATIVE NEGATIVE    pH, UA 5.5 5.0 - 9.0 Protein, UA NEGATIVE NEGATIVE    Urobilinogen, Urine Normal Normal    Nitrite, Urine NEGATIVE NEGATIVE    Leukocyte Esterase, Urine NEGATIVE NEGATIVE   Microscopic Urinalysis    Collection Time: 01/31/23  3:20 PM   Result Value Ref Range    WBC, UA 0 TO 2 0 - 5 /HPF    RBC, UA None 0 - 2 /HPF    Epithelial Cells UA 0 TO 2 0 - 5 /HPF    Bacteria, UA TRACE (A) None    Mucus, UA TRACE (A) None   Hemoglobin and Hematocrit, Blood    Collection Time: 01/31/23  5:50 PM   Result Value Ref Range    Hemoglobin 14.5 13.0 - 17.0 g/dL    Hematocrit 40.9 40.7 - 50.3 %   Hemoglobin and Hematocrit, Blood    Collection Time: 02/01/23  1:55 AM   Result Value Ref Range    Hemoglobin 12.4 (L) 13.0 - 17.0 g/dL    Hematocrit 35.7 (L) 40.7 - 50.3 %   CBC auto differential    Collection Time: 02/01/23  5:55 AM   Result Value Ref Range    WBC 8.0 3.5 - 11.3 k/uL    RBC 4.05 (L) 4.21 - 5.77 m/uL    Hemoglobin 12.5 (L) 13.0 - 17.0 g/dL    Hematocrit 36.3 (L) 40.7 - 50.3 %    MCV 89.6 82.6 - 102.9 fL    MCH 30.9 25.2 - 33.5 pg    MCHC 34.4 28.4 - 34.8 g/dL    RDW 13.5 11.8 - 14.4 %    Platelets 475 867 - 281 k/uL    MPV 10.9 8.1 - 13.5 fL    NRBC Automated 0.0 0.0 per 100 WBC    Seg Neutrophils 49 36 - 65 %    Lymphocytes 33 24 - 43 %    Monocytes 12 3 - 12 %    Eosinophils % 6 (H) 1 - 4 %    Basophils 0 0 - 2 %    Immature Granulocytes 0 0 %    Segs Absolute 3.90 1.50 - 8.10 k/uL    Absolute Lymph # 2.59 1.10 - 3.70 k/uL    Absolute Mono # 0.97 0.10 - 1.20 k/uL    Absolute Eos # 0.49 (H) 0.00 - 0.44 k/uL    Basophils Absolute <0.03 0.00 - 0.20 k/uL    Absolute Immature Granulocyte <0.03 0.00 - 0.30 k/uL   Comprehensive Metabolic Panel w/ Reflex to MG    Collection Time: 02/01/23  5:55 AM   Result Value Ref Range    Glucose 155 (H) 70 - 99 mg/dL    BUN 20 6 - 20 mg/dL    Creatinine 0.87 0.70 - 1.20 mg/dL    Est, Glom Filt Rate >60 >60 mL/min/1.73m2    Bun/Cre Ratio 23 (H) 9 - 20    Calcium 8.3 (L) 8.6 - 10.4 mg/dL    Sodium 137 135 - 144 mmol/L    Potassium 4.0 3.7 - 5.3 mmol/L    Chloride 103 98 - 107 mmol/L    CO2 23 20 - 31 mmol/L    Anion Gap 11 9 - 17 mmol/L    Alkaline Phosphatase 51 40 - 129 U/L    ALT 15 5 - 41 U/L    AST 12 <40 U/L    Total Bilirubin 0.6 0.3 - 1.2 mg/dL    Total Protein 6.7 6.4 - 8.3 g/dL    Albumin 4.0 3.5 - 5.2 g/dL    Albumin/Globulin Ratio 1.5 1.0 - 2.5         Current Facility-Administered Medications   Medication Dose Route Frequency Provider Last Rate Last Admin    lactated ringers IV soln infusion   IntraVENous Once [place] KRUNAL Oden - KALIA        [facility] Hold] 0.9 % sodium chloride infusion   IntraVENous Continuous Ning Ink, APRN -  mL/hr at 01/31/23 2206 New Bag at 01/31/23 2206    [MAR Hold] sodium chloride flush 0.9 % injection 5-40 mL  5-40 mL IntraVENous 2 times per day Ning Ink, APRN - CNP        [MAR Hold] sodium chloride flush 0.9 % injection 5-40 mL  5-40 mL IntraVENous PRN Ning Ink, APRN - CNP        [MAR Hold] 0.9 % sodium chloride infusion   IntraVENous PRN Ning Ink, APRN - CNP        [MAR Hold] ondansetron (ZOFRAN) injection 4 mg  4 mg IntraVENous Q6H PRN Ning Ink, APRN - CNP   4 mg at 02/01/23 0137    [MAR Hold] pantoprazole (PROTONIX) 80 mg in sodium chloride 0.9 % 100 mL infusion  8 mg/hr IntraVENous Continuous Ning Ink, APRN - CNP 10 mL/hr at 01/31/23 2207 8 mg/hr at 01/31/23 2207    [MAR Hold] metoprolol (LOPRESSOR) injection 5 mg  5 mg IntraVENous Q6H PRN Ning Ink, APRN - CNP        [MAR Hold] HYDROmorphone (DILAUDID) injection 0.25 mg  0.25 mg IntraVENous Q3H PRN Severiano Floyd MD        Or    [facility] Hold] HYDROmorphone (DILAUDID) injection 0.5 mg  0.5 mg IntraVENous Q3H PRN Severiano Floyd MD   0.5 mg at 02/01/23 0530    [MAR Hold] promethazine (PHENERGAN) injection 6.25 mg  6.25 mg IntraMUSCular Q6H PRN Severiano Floyd MD           ASSESSMENT:     Principal Problem:    Gastrointestinal hemorrhage with hematemesis  Active Problems:    Intractable abdominal pain    History of pancreatitis    HTN (hypertension)    S/P CABG (coronary artery bypass graft)  Resolved Problems:    * No resolved hospital problems. *      PLAN:     Primary Problem(s): Gastrointestinal hemorrhage with hematemesis  Differential diagnoses: Enteritis  Condition is an undiagnosed new problem with uncertain prognosis  Condition is unchanged  Treatment plan: Continue current treatment  Imaging: no further imaging studies ordered today  Medications: Continue IVF, Protonix drip for now, and IV Pain Meds  Medication Monitoring / High Risk Medications: none   GI plans for EGD today, NPO  Monitor urine output, +425, net Pos +1.9L  IVF for now  Dispo pending clinical status    DVT prophylaxis: reason for no prophylaxis: concern for GI bleed  GI prophylaxis:  Protonix    Above plan discussed with the patient who agreed to the above plan     Discussed care plan with nurse after getting their input. Please note that this chart was generated using voice recognition Dragon dictation software. Although every effort was made to ensure the accuracy of this automated transcription, some errors in transcription may have occurred.     Cat Contreras MD  2/1/2023 7:11 AM

## 2023-02-01 NOTE — PROGRESS NOTES
Dr. Susu Tinsley visits with patient, reviews finding of EGD with patient; patient denies PO fluids at this time.

## 2023-02-01 NOTE — PROGRESS NOTES
Dilaudid 0.5mg IV prn given for 8/10 pain. Vitals signs obtained for prior to going down to surgery in an hour. Patient is ready to go down to surgery dept.

## 2023-02-01 NOTE — PROGRESS NOTES
Surgical paper checklist and paper clipped to front of thin chart. EPIC checklist completed at this time.

## 2023-02-01 NOTE — ANESTHESIA POSTPROCEDURE EVALUATION
Department of Anesthesiology  Postprocedure Note    Patient: Tony Winston  MRN: [id number]  YOB: 1975  Date of evaluation: 2/1/2023      Procedure Summary     Date: 02/01/23 Room / Location: [address] [place] [address]    Anesthesia Start: 0805 Anesthesia Stop: 0820    Procedure: EGD ESOPHAGOGASTRODUODENOSCOPY Diagnosis:       Gastrointestinal hemorrhage, unspecified gastrointestinal hemorrhage type      (GI BLEED)    Surgeons: Estephanie Arizmendi MD Responsible Provider: KRUNAL Boucher CRNA    Anesthesia Type: general, TIVA ASA Status: 3          Anesthesia Type: No value filed.     Yrn Phase I: Yrn Score: 10    Yrn Phase II: Yrn Score: 10      Anesthesia Post Evaluation    Patient location during evaluation: PACU  Patient participation: complete - patient participated  Level of consciousness: awake  Airway patency: patent  Nausea & Vomiting: no vomiting and no nausea  Complications: no  Cardiovascular status: blood pressure returned to baseline and hemodynamically stable  Respiratory status: acceptable, spontaneous ventilation and room air  Hydration status: stable

## 2023-02-01 NOTE — PROGRESS NOTES
Writer rounded on patient who is still awake. Patient states he still has not been able to sleep all night d/t the pain. Writer updated patient on next time for available pain medications. Writer went over expectations with patient just prior to 0630 when surgery team will arrive to  patient. Patient does not have any jewelry on, glasses on dentures. Writer updated patient he will need to be in nothing but gown prior to surgical team picking him up. Patient acknowledged. Patient updated writer that patient had just urinated and had went 125ml in urinal. Urine is savana and hazy in color. Patient denies any other needs at this time. Call light, bedside table and personal belongings within reach.

## 2023-02-01 NOTE — CONSULTS
Gastroenterology Consult Note    Patient:   Lg Hunter   Admit date:  1/31/2023  Facility:   [facility]  Referring/PCP: Asim Kelly MD  Date:     2/1/2023  Consultant:   Alan Sommer MD, MD    Subjective: This 56 y.o. male was admitted 1/31/2023 with a diagnosis of \"GI bleeding [K92.2]  GI bleed [K92.2]\" and is seen in consultation regarding hematemesis. He states that it started 3 days ago on 01/29/2023. He states that it started with epigastric abdominal pain and then progressed to nausea as well as hematemesis. Past Medical History:  Past Medical History:   Diagnosis Date    CAD (coronary artery disease)     Carpal tunnel syndrome     Depressive disorder, not elsewhere classified     Diabetes mellitus ([facility] [address]     Gastrointestinal hemorrhage with hematemesis 01/31/2023    Heart attack ([facility] [address] 08/11/2018    STEMI    History of echocardiogram 01/08/2019    EF 40-45%. LV cavity sixe is mildly increased. Inferior and inferoseptal wall hypokenesis noted. Mild diastolic dysfunction.     History of pancreatitis 01/31/2023    Hyperlipidemia     Hypertension 2009    PTSD (post-traumatic stress disorder)     uexpected open heart surgery in 2018       Past Surgical History:  Past Surgical History:   Procedure Laterality Date    CARDIAC CATHETERIZATION Left 01/07/2018    Right Ulnar/[facility]/    CARDIAC CATHETERIZATION Left 01/27/2022    Dr Maddie Agudelo/ right ulnar-    COLONOSCOPY N/A 10/04/2022    COLORECTAL CANCER SCREENING, HIGH RISK performed by Nel Miranda MD at [address] [place] [address]  10/04/2022    [facility] colon    CORONARY ANGIOPLASTY  08/2018    double bypass, [facility],     CORONARY ARTERY BYPASS GRAFT  2018    ESOPHAGOGASTRODUODENOSCOPY  10/04/2022    -bx(neg H-Pylori,duodenum-normal,mild gastritis)    UPPER GASTROINTESTINAL ENDOSCOPY N/A 10/04/2022    EGD BIOPSY performed by Nel Miranda MD at [address] [place] [address] History:  Social History     Tobacco Use    Smoking status: Some Days     Packs/day: 0.25     Years: 30.00     Pack years: 7.50     Types: Cigarettes     Last attempt to quit: 2022     Years since quittin.9    Smokeless tobacco: Never   Substance Use Topics    Alcohol use: Not Currently       Family History:   Family History   Problem Relation Age of Onset    High Blood Pressure Mother     Heart Disease Father     High Blood Pressure Father     Asthma Sister     Colon Cancer Paternal Uncle     Colon Cancer Paternal Uncle     Pancreatic Cancer Maternal Aunt     Pancreatic Cancer Maternal Aunt        Diet:  Diet NPO Exceptions are: Ice Chips    Scheduled Medications:    [MAR Hold] sodium chloride flush  5-40 mL IntraVENous 2 times per day     Infusions:    lactated ringers IV soln      [MAR Hold] sodium chloride 125 mL/hr at 23    [MAR Hold] sodium chloride      [MAR Hold] pantoprozole (PROTONIX) infusion 8 mg/hr (23)     PRN Medications: [MAR Hold] sodium chloride flush, [MAR Hold] sodium chloride, [MAR Hold] ondansetron, [MAR Hold] metoprolol, [MAR Hold] HYDROmorphone **OR** [MAR Hold] HYDROmorphone, [MAR Hold] promethazine    Allergies: No Known Allergies    ROS: Constitutional: negative for chills, fevers and sweats  Eyes: negative for cataracts, icterus and redness  Ears, nose, mouth, throat, and face: negative for epistaxis, hearing loss and sore throat  Respiratory: negative for cough, hemoptysis and sputum  Cardiovascular: negative for chest pain, dyspnea and lower extremity edema  Gastrointestinal: as per HPI  Genitourinary: negative for dysuria, frequency and hematuria  Neurological: negative for coordination problems, dizziness and gait problems  Behavioral/Psych: negative for anxiety, depression and mood swings    Objective:     Physical Exam:   /80   Pulse 89   Temp 97.3 °F (36.3 °C) (Temporal)   Resp 18   Ht 5' 7\" (1.702 m)   Wt 189 lb 8 oz (86 kg)   SpO2 95% BMI 29.68 kg/m²      General appearance: alert, appears stated age and cooperative  Skin: Skin color, texture, turgor normal. No rashes or lesions  HEENT:   Neck: no adenopathy, no carotid bruit, no JVD, supple, symmetrical, trachea midline and thyroid not enlarged, symmetric, no tenderness/mass/nodules  Lungs: clear to auscultation bilaterally  Heart: regular rate and rhythm, S1, S2 normal, no murmur, click, rub or gallop  Abdomen: soft, tender; bowel sounds normal; no masses,  no organomegaly  Extremities: extremities normal, atraumatic, no cyanosis or edema  Neurologic: Mental status: Alert, oriented, thought content appropriate    Labs:  CBC:   Recent Labs     01/31/23  1246 01/31/23  1750 02/01/23  0155 02/01/23  0555   WBC 11.0  --   --  8.0   HGB 16.5 14.5 12.4* 12.5*     --   --  300     BMP:    Recent Labs     01/31/23  1246 02/01/23  0555    137   K 3.7 4.0    103   CO2 21 23   BUN 25* 20   CREATININE 1.01 0.87   GLUCOSE 211* 155*     Hepatic:   Recent Labs     01/31/23  1246 02/01/23  0555   AST 15 12   ALT 17 15   BILITOT 0.7 0.6   ALKPHOS 60 51     Troponin: No results for input(s): TROPONINI in the last 72 hours. BNP: No results for input(s): BNP in the last 72 hours. Lipids: No results for input(s): CHOL, HDL in the last 72 hours.     Invalid input(s): LDLCALCU  ABGs: No results found for: PHART, PO2ART, ALQ3NRJ  INR:   Recent Labs     01/31/23  1246   INR 1.1       Recent Labs     01/31/23  1246 01/31/23  1750 02/01/23  0155 02/01/23  0555   WBC 11.0  --   --  8.0   HGB 16.5 14.5 12.4* 12.5*   MCV 87.4  --   --  89.6     --   --  300   INR 1.1  --   --   --      --   --  137   K 3.7  --   --  4.0     --   --  103   CO2 21  --   --  23   BUN 25*  --   --  20   CREATININE 1.01  --   --  0.87   GLUCOSE 211*  --   --  155*   CALCIUM 9.2  --   --  8.3*   PROT 8.0  --   --  6.7   LABALBU 4.4  --   --  4.0   AST 15  --   --  12   ALT 17  --   --  15   ALKPHOS 60  -- --  46   BILITOT 0.7  --   --  0.6   LIPASE 50  --   --   --      Assessment:   Principal Problem:    Gastrointestinal hemorrhage with hematemesis  Active Problems:    Intractable abdominal pain    History of pancreatitis    HTN (hypertension)    S/P CABG (coronary artery bypass graft)  Resolved Problems:    * No resolved hospital problems. *    César Nagel is a  56year old man with a past medical history of pancreatitis who presents with epigastric pain and hematemesis. He has a history of peptic ulcer disease. ASA 2, Mallampati score 2    Plan:   EGD    Electronically signed by Tiana Calvin MD on 2/1/2023 at 7:57 AM     Informed consent was obtained and potential risks and complications of the procedure were discussed. He is willing to continue with the procedure. Note is dictated utilizing voice recognition software. Unfortunately this leads to occasional typographical errors. Please contact our office if you have any questions.

## 2023-02-01 NOTE — PROGRESS NOTES
Surgical Extension tubing applied. Zofran IV given for nausea. Writer was asking patient about him getting up and going to the bathroom and patient states he has not needed to go to the bathroom yet this shift. Writer asked patient when the last time he urinate was and patient stated when his urine was obtained for labs, which was noted to be 1520 in chart. Writer asked patient if he was able to get up and try to go to the bathroom, patient states he was just in there to attempt to have a bowel movement but did not have one and did not have the urge to urinate while in the bathroom. Patient is NPO at this time and states he has been feeling dehydrated and has been vomiting and having diarrhea for the past 3 days up until being admitted into the hospital.    Writer bladder scanned patient at this time and got 152ml on 3 separate scans. Scan printed out and placed into thin chart. No edema noted to patient and lungs are clear. Patient's labs reviewed. Writer updated patient that writer would like to monitor this and for let writer to know if/when he goes to urinate and to use urinal for accurate output measurement. Patient acknowledged.

## 2023-02-01 NOTE — ANESTHESIA PRE PROCEDURE
Department of Anesthesiology  Preprocedure Note       Name:  Lisa Alvarez   Age:  56 y.o.  :  1975                                          MRN:  [id number]         Date:  2023      Surgeon: Josh Bradley):  Mariana Lopez MD    Procedure: Procedure(s):  EGD ESOPHAGOGASTRODUODENOSCOPY    Medications prior to admission:   Prior to Admission medications    Medication Sig Start Date End Date Taking? Authorizing Provider   metFORMIN (GLUCOPHAGE) 500 MG tablet Take 1 tablet by mouth 2 times daily (with meals) 23   Jayson Bauman MD   metoprolol succinate (TOPROL XL) 100 MG extended release tablet Take 1 tablet by mouth in the morning and at bedtime  Patient taking differently: Take 100 mg by mouth every evening 23   Mel Castelan MD   clopidogrel (PLAVIX) 75 MG tablet TAKE 1 TABLET BY MOUTH EVERY DAY 23   Mel Castelan MD   lisinopril (PRINIVIL;ZESTRIL) 20 MG tablet Take 1 tablet by mouth daily 23   Mel Castelan MD   ranolazine (RANEXA) 500 MG extended release tablet Take 1 tablet by mouth 2 times daily 23   Jayson Bauman MD   cetirizine (ZYRTEC) 10 MG tablet Take 1 tablet by mouth daily 1/10/23 2/9/23  Jayson Bauman MD   pantoprazole (PROTONIX) 40 MG tablet Take 1 tablet by mouth every morning (before breakfast) 1/10/23   Jayson Bauman MD   ALPRAZolam Noemi Retort) 0.25 MG tablet Take 0.25 mg by mouth 3 times daily as needed for Sleep.     Historical Provider, MD   atorvastatin (LIPITOR) 80 MG tablet Take 1 tablet by mouth daily 22   Mel Castelan MD   albuterol (ACCUNEB) 0.63 MG/3ML nebulizer solution Take 3 mLs by nebulization every 6 hours as needed for Wheezing 22   Jayson Bauman MD   albuterol (PROVENTIL) (5 MG/ML) 0.5% nebulizer solution Take 1 mL by nebulization 4 times daily as needed for Wheezing  Patient not taking: No sig reported 22   Jayson Bauman MD   albuterol sulfate HFA (VENTOLIN HFA) 108 (90 Base) MCG/ACT inhaler Inhale 2 puffs into the lungs 4 times daily as needed for Wheezing 11/18/22   Tamiko Durán MD   fluticasone [facility] HFA) 110 MCG/ACT inhaler Inhale 1 puff into the lungs 2 times daily 11/18/22   Tamiko Durán MD   fenofibrate (TRIGLIDE) 160 MG tablet Take 1 tablet by mouth daily 11/18/22   Tamiko Durán MD   dilTIAZem (CARDIZEM CD) 120 MG extended release capsule Take 1 capsule by mouth daily 6/1/22   Nadine Meter, MD   nitroGLYCERIN (NITROSTAT) 0.4 MG SL tablet Place 1 tablet under the tongue every 5 minutes as needed for Chest pain up to max of 3 total doses. If no relief after 1 dose, call 911. 1/21/22   Nadinehemant Michael MD   aspirin 81 MG EC tablet Take 81 mg by mouth daily. Historical Provider, MD       Current medications:    No current facility-administered medications for this visit. No current outpatient medications on file.      Facility-Administered Medications Ordered in Other Visits   Medication Dose Route Frequency Provider Last Rate Last Admin    lactated ringers IV soln infusion   IntraVENous Once Phill Schirmer, APRN - CRNA        [facility] Hold] 0.9 % sodium chloride infusion   IntraVENous Continuous KRUNAL Jurado  mL/hr at 01/31/23 2206 New Bag at 01/31/23 2206    [MAR Hold] sodium chloride flush 0.9 % injection 5-40 mL  5-40 mL IntraVENous 2 times per day KRUNAL Jurado CNP        [facility] Hold] sodium chloride flush 0.9 % injection 5-40 mL  5-40 mL IntraVENous PRN KRUNAL Jurado CNP        [MAR Hold] 0.9 % sodium chloride infusion   IntraVENous PRN KRUNAL Jurado CNP        [MAR Hold] ondansetron (ZOFRAN) injection 4 mg  4 mg IntraVENous Q6H PRN KRUNAL Jurado CNP   4 mg at 02/01/23 0137    [MAR Hold] pantoprazole (PROTONIX) 80 mg in sodium chloride 0.9 % 100 mL infusion  8 mg/hr IntraVENous Continuous KRUNAL Jurado CNP 10 mL/hr at 01/31/23 2207 8 mg/hr at 01/31/23 2207    [MAR Hold] metoprolol (LOPRESSOR) injection 5 mg  5 mg IntraVENous Q6H PRN Kwabena Lea, APRN - CNP        [MAR Hold] HYDROmorphone (DILAUDID) injection 0.25 mg  0.25 mg IntraVENous Q3H PRN Dmitry Raza MD        Or   [facility] Hold] HYDROmorphone (DILAUDID) injection 0.5 mg  0.5 mg IntraVENous Q3H PRN Dmitry Raza MD   0.5 mg at 02/01/23 0530    [MAR Hold] promethazine (PHENERGAN) injection 6.25 mg  6.25 mg IntraMUSCular Q6H PRN Dmitry Raza MD           Allergies:  No Known Allergies    Problem List:    Patient Active Problem List   Diagnosis Code    Pain in rib, left lower R07.81    Chest pain at rest R07.9    HTN (hypertension) I10    Tobacco abuse Z72.0    Family history of premature CAD Z82.49    Acute coronary syndrome ([facility] [address] I24.9    Mild congestive heart failure (HCC) I50.9    S/P CABG (coronary artery bypass graft) Z95.1    Dyslipidemia E78.5    Ischemic cardiomyopathy I25.5    Chronic combined systolic and diastolic CHF, NYHA class 2 ([facility] I50.42    ASHD (arteriosclerotic heart disease) /  CABG 2018 I25.10    ACS (acute coronary syndrome) ([facility]) I24.9    Palpitations R00.2    Abnormal cardiovascular stress test R94.39    Pancreatitis, unspecified pancreatitis type K85.90    Nausea R11.0    Epigastric pain R10.13    Intractable abdominal pain R10.9    Severe malnutrition ([facility] [address] E43    Diabetes mellitus ([facility] [address] E11.9    Generalized anxiety disorder F41.1    Primary hypertension I10    Hyperlipidemia E78.5    Mild intermittent asthma J45.20    Mild persistent asthma with acute exacerbation J45.31    Bronchitis J40    Shortness of breath R06.02    Unstable angina (HCC) I20.0    Chest wall pain R07.89    Sleeping difficulty G47.9    Lung nodule R91.1    Chronic dyspnea R06.09    Gastroesophageal reflux disease K21.9    Allergic rhinitis due to pollen J30.1    Hemoptysis R04.2    Nausea and vomiting R11.2    Gastrointestinal hemorrhage with hematemesis K92.0    History of pancreatitis Z87.19       Past Medical History:        Diagnosis Date    CAD (coronary artery disease)     Carpal tunnel syndrome     Depressive disorder, not elsewhere classified     Diabetes mellitus ([facility] [address]     Gastrointestinal hemorrhage with hematemesis 2023    Heart attack ([facility] [address] 2018    STEMI    History of echocardiogram 2019    EF 40-45%. LV cavity sixe is mildly increased. Inferior and inferoseptal wall hypokenesis noted. Mild diastolic dysfunction.  History of pancreatitis 2023    Hyperlipidemia     Hypertension     PTSD (post-traumatic stress disorder)     uexpected open heart surgery in 2018       Past Surgical History:        Procedure Laterality Date    CARDIAC CATHETERIZATION Left 2018    Right Ulnar/[facility] [place]/    CARDIAC CATHETERIZATION Left 2022    Dr Rosendo Alfarofin/ right ulnar-    COLONOSCOPY N/A 10/04/2022    COLORECTAL CANCER SCREENING, HIGH RISK performed by Ariana Angelo MD at [address] [place] [address]  10/04/2022    -tortuous colon    [facility]ON[facility]Y  2018    [facility]     CORONARY ARTERY BYPASS GRAFT  2018    ESOPHAGOGASTRODUODENOSCOPY  10/04/2022    -bx(neg H-Pylori,duodenum-normal,mild gastritis)    UPPER GASTROINTESTINAL ENDOSCOPY N/A 10/04/2022    EGD BIOPSY performed by Ariana Angelo MD at [address] [place] [address] History:    Social History     Tobacco Use    Smoking status: Some Days     Packs/day: 0.25     Years: 30.00     Pack years: 7.50     Types: Cigarettes     Last attempt to quit: 2022     Years since quittin.9    Smokeless tobacco: Never   Substance Use Topics    Alcohol use: Not Currently                                Ready to quit: Not Answered  Counseling given: Not Answered      Vital Signs (Current): There were no vitals filed for this visit.                                            BP Readings from Last 3 Encounters:   23 126/80   23 110/72   01/26/23 113/68       NPO Status:                                                                                 BMI:   Wt Readings from Last 3 Encounters:   02/01/23 189 lb 8 oz (86 kg)   01/31/23 198 lb (89.8 kg)   01/20/23 197 lb (89.4 kg)     There is no height or weight on file to calculate BMI.    CBC:   Lab Results   Component Value Date/Time    WBC 8.0 02/01/2023 05:55 AM    RBC 4.05 02/01/2023 05:55 AM    RBC 4.74 05/07/2012 08:14 AM    HGB 12.5 02/01/2023 05:55 AM    HCT 36.3 02/01/2023 05:55 AM    MCV 89.6 02/01/2023 05:55 AM    RDW 13.5 02/01/2023 05:55 AM     02/01/2023 05:55 AM     05/07/2012 08:14 AM       CMP:   Lab Results   Component Value Date/Time     02/01/2023 05:55 AM    K 4.0 02/01/2023 05:55 AM     02/01/2023 05:55 AM    CO2 23 02/01/2023 05:55 AM    BUN 20 02/01/2023 05:55 AM    CREATININE 0.87 02/01/2023 05:55 AM    GFRAA >60 09/27/2022 12:28 PM    LABGLOM >60 02/01/2023 05:55 AM    GLUCOSE 155 02/01/2023 05:55 AM    GLUCOSE 104 05/07/2012 08:14 AM    PROT 6.7 02/01/2023 05:55 AM    CALCIUM 8.3 02/01/2023 05:55 AM    BILITOT 0.6 02/01/2023 05:55 AM    ALKPHOS 51 02/01/2023 05:55 AM    AST 12 02/01/2023 05:55 AM    ALT 15 02/01/2023 05:55 AM       POC Tests:   No results for input(s): POCGLU, POCNA, POCK, POCCL, POCBUN, POCHEMO, POCHCT in the last 72 hours.     Coags:   Lab Results   Component Value Date/Time    PROTIME 14.1 01/31/2023 12:46 PM    INR 1.1 01/31/2023 12:46 PM    APTT 27.5 12/26/2021 06:10 PM       HCG (If Applicable): No results found for: PREGTESTUR, PREGSERUM, HCG, HCGQUANT     ABGs: No results found for: PHART, PO2ART, TTD7RVH, OXH6XVI, BEART, Q5DAVJXK     Type & Screen (If Applicable):  No results found for: LABABO, LABRH    Drug/Infectious Status (If Applicable):  No results found for: HIV, HEPCAB    COVID-19 Screening (If Applicable):   Lab Results   Component Value Date/Time    COVID19 Not Detected 12/28/2022 01:55 AM    COVID19 Not Detected 12/14/2020 01:19 PM           Anesthesia Evaluation  Patient summary reviewed and Nursing notes reviewed no history of anesthetic complications:   Airway: Mallampati: II  TM distance: >3 FB   Neck ROM: full  Mouth opening: > = 3 FB   Dental: normal exam         Pulmonary:normal exam    (+) shortness of breath:  asthma: current smoker          Patient did not smoke on day of surgery. Cardiovascular:  Exercise tolerance: good (>4 METS),   (+) hypertension:, angina: at rest, past MI: no interval change, CAD:, CABG/stent (S/P CABG x1 done on August 2018 SVG to RCA):, CHF: systolic and diastolic, hyperlipidemia      ECG reviewed  Rhythm: regular  Rate: normal  Echocardiogram reviewed         Beta Blocker:  No for medical reason (unable to hold anything down)      ROS comment: Sinus tachycardia  ST & T wave abnormality, consider inferior ischemia  Abnormal ECG  When compared with ECG of 27-DEC-2022 18:25,  T wave inversion now evident in Inferior leads  Nonspecific T wave abnormality now evident in Lateral leads  Confirmed by Ramiro Smart MD, [facility] ([id number]) on 1/31/2023 8:20:37 PM    CATH 1/26/23   Severe single vessel disease involving the right coronary artery. Mild to moderate disease in the LAD and Cx. Normal left ventricular end diastolic pressure. (LVEDP). ECHO Summary 12/29/22  Global left ventricular systolic function appears moderately reduced with an  estimated ejection fraction of 40%. The left ventricular cavity size is within normal limits and the left  ventricular wall thickness is moderately increased. Thinning and hypokinesis of the inferior wall. The inferoseptal wall is abnormal in its motion which is not unusual status  post open heart surgery. No significant valvular abnormalities. Mild diastolic dysfunction.      Neuro/Psych:   (+) depression/anxiety             GI/Hepatic/Renal: Neg GI/Hepatic/Renal ROS  (+) GERD: well controlled,           Endo/Other:    (+) DiabetesType II DM, , .                 Abdominal:   (+) obese,           Vascular: Other Findings:             Anesthesia Plan      general and TIVA     ASA 3       Induction: intravenous. MIPS: Prophylactic antiemetics administered. Anesthetic plan and risks discussed with patient. Use of blood products discussed with patient whom consented to blood products.                      KRUNAL Mesa - CRNA   2/1/2023

## 2023-02-01 NOTE — PROGRESS NOTES
No acitve bleeding on EGD  No lesions noted  No evidence of bleeding from esophagus to 3rd portion of duodenum. Normal lipase, normal H/H  OK to advance diet and discharged from a GI perspective.      Component Ref Range & Units 1/31/23 1246 12/27/22 1830 10/6/22 0540 10/5/22 1735 10/5/22 1430 9/13/22 0530 9/12/22 0535   Lipase 13 - 60 U/L 50  50  51  102 High   85 High   144 High   746 High Panic       Component Ref Range & Units 1/31/23 1750 1/31/23 1246 1/20/23 1116 12/27/22 1830 10/8/22 0520 10/7/22 0615 10/6/22 1855   Hemoglobin 13.0 - 17.0 g/dL 14.5  16.5  14.1  14.4  12.2 Low   11.9 Low   12.0 Low     Hematocrit 40.7 - 50.3 % 40.9  46.5  40.9  40.4 Low   34.2 Low   34.5 Low   34.7 Low       Lydia Diego MD  [facility] Gastroenterology

## 2023-02-02 LAB
ABSOLUTE EOS #: 0.53 K/UL (ref 0–0.44)
ABSOLUTE IMMATURE GRANULOCYTE: <0.03 K/UL (ref 0–0.3)
ABSOLUTE LYMPH #: 2.41 K/UL (ref 1.1–3.7)
ABSOLUTE MONO #: 0.92 K/UL (ref 0.1–1.2)
ALBUMIN SERPL-MCNC: 3.7 G/DL (ref 3.5–5.2)
ALBUMIN/GLOBULIN RATIO: 1.5 (ref 1–2.5)
ALP SERPL-CCNC: 49 U/L (ref 40–129)
ALT SERPL-CCNC: 13 U/L (ref 5–41)
ANION GAP SERPL CALCULATED.3IONS-SCNC: 10 MMOL/L (ref 9–17)
AST SERPL-CCNC: 14 U/L
BASOPHILS # BLD: 0 % (ref 0–2)
BASOPHILS ABSOLUTE: <0.03 K/UL (ref 0–0.2)
BILIRUB SERPL-MCNC: 0.6 MG/DL (ref 0.3–1.2)
BUN SERPL-MCNC: 13 MG/DL (ref 6–20)
BUN/CREAT BLD: 15 (ref 9–20)
CALCIUM SERPL-MCNC: 8.7 MG/DL (ref 8.6–10.4)
CHLORIDE SERPL-SCNC: 106 MMOL/L (ref 98–107)
CO2 SERPL-SCNC: 23 MMOL/L (ref 20–31)
CREAT SERPL-MCNC: 0.88 MG/DL (ref 0.7–1.2)
EKG ATRIAL RATE: 85 BPM
EKG P AXIS: 24 DEGREES
EKG P-R INTERVAL: 150 MS
EKG Q-T INTERVAL: 426 MS
EKG QRS DURATION: 100 MS
EKG QTC CALCULATION (BAZETT): 506 MS
EKG R AXIS: 60 DEGREES
EKG T AXIS: 20 DEGREES
EKG VENTRICULAR RATE: 85 BPM
EOSINOPHILS RELATIVE PERCENT: 6 % (ref 1–4)
GFR SERPL CREATININE-BSD FRML MDRD: >60 ML/MIN/1.73M2
GLUCOSE SERPL-MCNC: 96 MG/DL (ref 70–99)
HCT VFR BLD AUTO: 33.2 % (ref 40.7–50.3)
HGB BLD-MCNC: 11.4 G/DL (ref 13–17)
IMMATURE GRANULOCYTES: 0 %
LYMPHOCYTES # BLD: 29 % (ref 24–43)
MCH RBC QN AUTO: 30.6 PG (ref 25.2–33.5)
MCHC RBC AUTO-ENTMCNC: 34.3 G/DL (ref 28.4–34.8)
MCV RBC AUTO: 89.2 FL (ref 82.6–102.9)
MONOCYTES # BLD: 11 % (ref 3–12)
NRBC AUTOMATED: 0 PER 100 WBC
PDW BLD-RTO: 13.5 % (ref 11.8–14.4)
PLATELET # BLD AUTO: 282 K/UL (ref 138–453)
PMV BLD AUTO: 10.6 FL (ref 8.1–13.5)
POTASSIUM SERPL-SCNC: 3.8 MMOL/L (ref 3.7–5.3)
PROT SERPL-MCNC: 6.2 G/DL (ref 6.4–8.3)
RBC # BLD: 3.72 M/UL (ref 4.21–5.77)
SEG NEUTROPHILS: 54 % (ref 36–65)
SEGMENTED NEUTROPHILS ABSOLUTE COUNT: 4.48 K/UL (ref 1.5–8.1)
SODIUM SERPL-SCNC: 139 MMOL/L (ref 135–144)
TROPONIN I SERPL DL<=0.01 NG/ML-MCNC: 10 NG/L (ref 0–22)
WBC # BLD AUTO: 8.4 K/UL (ref 3.5–11.3)

## 2023-02-02 PROCEDURE — C9113 INJ PANTOPRAZOLE SODIUM, VIA: HCPCS | Performed by: STUDENT IN AN ORGANIZED HEALTH CARE EDUCATION/TRAINING PROGRAM

## 2023-02-02 PROCEDURE — 6360000002 HC RX W HCPCS: Performed by: INTERNAL MEDICINE

## 2023-02-02 PROCEDURE — 2580000003 HC RX 258: Performed by: INTERNAL MEDICINE

## 2023-02-02 PROCEDURE — 36415 COLL VENOUS BLD VENIPUNCTURE: CPT

## 2023-02-02 PROCEDURE — 94761 N-INVAS EAR/PLS OXIMETRY MLT: CPT

## 2023-02-02 PROCEDURE — 85025 COMPLETE CBC W/AUTO DIFF WBC: CPT

## 2023-02-02 PROCEDURE — 2580000003 HC RX 258: Performed by: FAMILY MEDICINE

## 2023-02-02 PROCEDURE — 2580000003 HC RX 258: Performed by: STUDENT IN AN ORGANIZED HEALTH CARE EDUCATION/TRAINING PROGRAM

## 2023-02-02 PROCEDURE — 6370000000 HC RX 637 (ALT 250 FOR IP): Performed by: INTERNAL MEDICINE

## 2023-02-02 PROCEDURE — 6370000000 HC RX 637 (ALT 250 FOR IP): Performed by: STUDENT IN AN ORGANIZED HEALTH CARE EDUCATION/TRAINING PROGRAM

## 2023-02-02 PROCEDURE — 6360000002 HC RX W HCPCS: Performed by: STUDENT IN AN ORGANIZED HEALTH CARE EDUCATION/TRAINING PROGRAM

## 2023-02-02 PROCEDURE — 93010 ELECTROCARDIOGRAM REPORT: CPT | Performed by: INTERNAL MEDICINE

## 2023-02-02 PROCEDURE — 84484 ASSAY OF TROPONIN QUANT: CPT

## 2023-02-02 PROCEDURE — 1200000000 HC SEMI PRIVATE

## 2023-02-02 PROCEDURE — A4216 STERILE WATER/SALINE, 10 ML: HCPCS | Performed by: STUDENT IN AN ORGANIZED HEALTH CARE EDUCATION/TRAINING PROGRAM

## 2023-02-02 PROCEDURE — 80053 COMPREHEN METABOLIC PANEL: CPT

## 2023-02-02 PROCEDURE — 0DJ08ZZ INSPECTION OF UPPER INTESTINAL TRACT, VIA NATURAL OR ARTIFICIAL OPENING ENDOSCOPIC: ICD-10-PCS | Performed by: INTERNAL MEDICINE

## 2023-02-02 PROCEDURE — 99222 1ST HOSP IP/OBS MODERATE 55: CPT | Performed by: FAMILY MEDICINE

## 2023-02-02 RX ORDER — PANTOPRAZOLE SODIUM 40 MG/1
40 TABLET, DELAYED RELEASE ORAL
Status: DISCONTINUED | OUTPATIENT
Start: 2023-02-02 | End: 2023-02-03 | Stop reason: HOSPADM

## 2023-02-02 RX ORDER — OXYCODONE HYDROCHLORIDE AND ACETAMINOPHEN 5; 325 MG/1; MG/1
1 TABLET ORAL EVERY 4 HOURS PRN
Status: DISCONTINUED | OUTPATIENT
Start: 2023-02-02 | End: 2023-02-03 | Stop reason: HOSPADM

## 2023-02-02 RX ORDER — ACETAMINOPHEN 325 MG/1
650 TABLET ORAL EVERY 6 HOURS PRN
Status: DISCONTINUED | OUTPATIENT
Start: 2023-02-02 | End: 2023-02-03 | Stop reason: HOSPADM

## 2023-02-02 RX ADMIN — SODIUM CHLORIDE: 9 INJECTION, SOLUTION INTRAVENOUS at 01:26

## 2023-02-02 RX ADMIN — HYDROMORPHONE HYDROCHLORIDE 0.5 MG: 2 INJECTION, SOLUTION INTRAMUSCULAR; INTRAVENOUS; SUBCUTANEOUS at 04:56

## 2023-02-02 RX ADMIN — OXYCODONE HYDROCHLORIDE AND ACETAMINOPHEN 1 TABLET: 5; 325 TABLET ORAL at 22:57

## 2023-02-02 RX ADMIN — HYDROMORPHONE HYDROCHLORIDE 0.5 MG: 2 INJECTION, SOLUTION INTRAMUSCULAR; INTRAVENOUS; SUBCUTANEOUS at 01:24

## 2023-02-02 RX ADMIN — SODIUM CHLORIDE 40 MG: 9 INJECTION, SOLUTION INTRAMUSCULAR; INTRAVENOUS; SUBCUTANEOUS at 07:56

## 2023-02-02 RX ADMIN — OXYCODONE HYDROCHLORIDE AND ACETAMINOPHEN 1 TABLET: 5; 325 TABLET ORAL at 19:04

## 2023-02-02 RX ADMIN — RANOLAZINE 500 MG: 500 TABLET, FILM COATED, EXTENDED RELEASE ORAL at 21:16

## 2023-02-02 RX ADMIN — OXYCODONE HYDROCHLORIDE AND ACETAMINOPHEN 1 TABLET: 5; 325 TABLET ORAL at 07:55

## 2023-02-02 RX ADMIN — HYDROMORPHONE HYDROCHLORIDE 0.5 MG: 2 INJECTION, SOLUTION INTRAMUSCULAR; INTRAVENOUS; SUBCUTANEOUS at 14:11

## 2023-02-02 RX ADMIN — SODIUM CHLORIDE, PRESERVATIVE FREE 10 ML: 5 INJECTION INTRAVENOUS at 14:11

## 2023-02-02 RX ADMIN — METOPROLOL SUCCINATE 50 MG: 50 TABLET, EXTENDED RELEASE ORAL at 07:56

## 2023-02-02 RX ADMIN — ALUMINUM HYDROXIDE, MAGNESIUM HYDROXIDE, AND SIMETHICONE: 200; 200; 20 SUSPENSION ORAL at 06:47

## 2023-02-02 RX ADMIN — FAMOTIDINE 20 MG: 20 TABLET, FILM COATED ORAL at 07:56

## 2023-02-02 RX ADMIN — PANTOPRAZOLE SODIUM 40 MG: 40 TABLET, DELAYED RELEASE ORAL at 16:30

## 2023-02-02 RX ADMIN — RANOLAZINE 500 MG: 500 TABLET, FILM COATED, EXTENDED RELEASE ORAL at 07:55

## 2023-02-02 RX ADMIN — ANTACID TABLETS 500 MG: 500 TABLET, CHEWABLE ORAL at 21:20

## 2023-02-02 RX ADMIN — FAMOTIDINE 20 MG: 20 TABLET, FILM COATED ORAL at 21:16

## 2023-02-02 RX ADMIN — ATORVASTATIN CALCIUM 80 MG: 40 TABLET, FILM COATED ORAL at 21:16

## 2023-02-02 RX ADMIN — ONDANSETRON 4 MG: 2 INJECTION INTRAMUSCULAR; INTRAVENOUS at 05:02

## 2023-02-02 RX ADMIN — SODIUM CHLORIDE, PRESERVATIVE FREE 10 ML: 5 INJECTION INTRAVENOUS at 21:20

## 2023-02-02 RX ADMIN — OXYCODONE HYDROCHLORIDE AND ACETAMINOPHEN 1 TABLET: 5; 325 TABLET ORAL at 11:57

## 2023-02-02 ASSESSMENT — PAIN DESCRIPTION - ONSET
ONSET: ON-GOING

## 2023-02-02 ASSESSMENT — PAIN DESCRIPTION - FREQUENCY
FREQUENCY: CONTINUOUS

## 2023-02-02 ASSESSMENT — PAIN SCALES - GENERAL
PAINLEVEL_OUTOF10: 8
PAINLEVEL_OUTOF10: 7
PAINLEVEL_OUTOF10: 8
PAINLEVEL_OUTOF10: 8
PAINLEVEL_OUTOF10: 7
PAINLEVEL_OUTOF10: 7
PAINLEVEL_OUTOF10: 6

## 2023-02-02 ASSESSMENT — PAIN DESCRIPTION - LOCATION
LOCATION: ABDOMEN

## 2023-02-02 ASSESSMENT — PAIN DESCRIPTION - ORIENTATION
ORIENTATION: UPPER

## 2023-02-02 ASSESSMENT — PAIN DESCRIPTION - DESCRIPTORS
DESCRIPTORS: SHOOTING
DESCRIPTORS: ACHING
DESCRIPTORS: ACHING;SHOOTING
DESCRIPTORS: STABBING
DESCRIPTORS: ACHING;STABBING
DESCRIPTORS: ACHING;SHOOTING;SHARP

## 2023-02-02 ASSESSMENT — PAIN DESCRIPTION - PAIN TYPE
TYPE: ACUTE PAIN

## 2023-02-02 ASSESSMENT — PAIN - FUNCTIONAL ASSESSMENT
PAIN_FUNCTIONAL_ASSESSMENT: ACTIVITIES ARE NOT PREVENTED

## 2023-02-02 NOTE — PROGRESS NOTES
Physician Progress Note      Ronny LAZCANO #:                  [id number]  :                       1975  ADMIT DATE:       2023 12:05 PM  [address] [place] DATE:  RESPONDING  PROVIDER #:        Andrei Maxwell MD          QUERY TEXT:    Patient admitted  with gastrointestinal hemorrhage with hematemesis and is on   chronic anticoagulation. Home meds Plavix and aspirin. If possible, please document in the progress notes and discharge summary if   you are evaluating and/or treating any of the following: The medical record reflects the following:  Risk Factors: prior to admission on aspirin and Plavix with a recent cardiac   catheterization. Clinical Indicators:  per H&P:  severe abdominal pain with radiation into   back. ..  hemoptysis for approximately a day and a half. .. stated he vomited   approximately a half a cup plus bright red blood on 5 episodes;  Per EGD: mild   gastritis  Treatment: IVF, IV Protonix, Pepsid, Tums,  Plavix and aspirin stopped. Reyes Adams, MSN, RN, CCDS, [facility]  Clinical   [phone]  . Options provided:  -- Gastrointestinal hemorrhage associated with Plavix and aspirin  -- Bleeding unrelated to anticoagulation  -- Other - I will add my own diagnosis  -- Disagree - Not applicable / Not valid  -- Disagree - Clinically unable to determine / Unknown  -- Refer to Clinical Documentation Reviewer    PROVIDER RESPONSE TEXT:    This patient has gastrointestinal hemorrhage associated with Plavix and   aspirin.     Query created by: Fransisco Alfaro on 2023 1:22 PM      Electronically signed by:  Andrei Maxwell MD 2023 1:29 PM

## 2023-02-02 NOTE — PROGRESS NOTES
[address], [address]    Progress Note    Date:   2/2/2023  Patient name:  Jana Michael  Date of admission:  1/31/2023 12:05 PM  MRN:   [id number]  YOB: 1975    SUBJECTIVE/Last 24 hours update:     Patient seen and examined at the bed side , no new acute events overnight except for chest pain and LUQ abdominal pain. That has been ongoing. Ekg/trop and Cards consult was obtained. Diet was advanced, he was started on Pepcid/Tums and Also provided with a GI cocktail (which provided him some relief of his symptoms). Notes from nursing staff and Consults had been reviewed, and the overnight progress had been checked with the nursing staff as well. REVIEW OF SYSTEMS:      CONSTITUTIONAL:  no fevers, no headcahes  EYES: negative for blury vision  HEENT: No headaches, No nasal congestion, no difficulty swallowing  RESPIRATORY:negative for dyspnea, no wheezing, no Cough  CARDIOVASCULAR: negative for chest pain, no palpitations  GASTROINTESTINAL: no nausea, no vomiting, no change in bowel habits,LUQ/epigastric abdominal/flank pain   GENITOURINARY: negative for dysuria, no hematuria   MUSCULOSKELETAL: no joint pains, no muscle aches, no swelling of joints or extremities  NEUROLOGICAL: No  Weakness or numbness      PAST MEDICAL HISTORY:      has a past medical history of CAD (coronary artery disease), Carpal tunnel syndrome, Depressive disorder, not elsewhere classified, Diabetes mellitus ([address] Gastrointestinal hemorrhage with hematemesis, Heart attack ([address] History of echocardiogram, History of pancreatitis, Hyperlipidemia, Hypertension, and PTSD (post-traumatic stress disorder). PAST SURGICAL HISTORY:      has a past surgical history that includes Coronary angioplasty (08/2018); Cardiac catheterization (Left, 01/07/2018); Coronary artery bypass graft (2018); Cardiac catheterization (Left, 01/27/2022); Colonoscopy (N/A, 10/04/2022);  Upper gastrointestinal endoscopy (N/A, 10/04/2022); Colonoscopy (10/04/2022); Esophagogastroduodenoscopy (10/04/2022); and Upper gastrointestinal endoscopy (N/A, 2/1/2023). SOCIAL HISTORY:      reports that he has been smoking cigarettes. He has a 7.50 pack-year smoking history. He has never used smokeless tobacco. He reports that he does not currently use alcohol. He reports that he does not use drugs. TOBACCO:   reports that he has been smoking cigarettes. He has a 7.50 pack-year smoking history. He has never used smokeless tobacco.  ETOH:   reports that he does not currently use alcohol. FAMILY HISTORY:     family history includes Asthma in his sister; Colon Cancer in his paternal uncle and paternal uncle; Heart Disease in his father; High Blood Pressure in his father and mother; Pancreatic Cancer in his maternal aunt and maternal aunt. Problem Relation Age of Onset    High Blood Pressure Mother     Heart Disease Father     High Blood Pressure Father     Asthma Sister     Colon Cancer Paternal Uncle     Colon Cancer Paternal Uncle     Pancreatic Cancer Maternal Aunt     Pancreatic Cancer Maternal Aunt        HOME MEDICATIONS:      Prior to Admission medications    Medication Sig Start Date End Date Taking?  Authorizing Provider   metFORMIN (GLUCOPHAGE) 500 MG tablet Take 1 tablet by mouth 2 times daily (with meals) 1/23/23   Bela Burns MD   metoprolol succinate (TOPROL XL) 100 MG extended release tablet Take 1 tablet by mouth in the morning and at bedtime  Patient taking differently: Take 100 mg by mouth every evening 1/20/23   Adriana Plaza MD   clopidogrel (PLAVIX) 75 MG tablet TAKE 1 TABLET BY MOUTH EVERY DAY 1/19/23   Adriana Plaza MD   lisinopril (PRINIVIL;ZESTRIL) 20 MG tablet Take 1 tablet by mouth daily 1/16/23   Adriana Plaza MD   ranolazine (RANEXA) 500 MG extended release tablet Take 1 tablet by mouth 2 times daily 1/13/23   Bela Burns MD   cetirizine (ZYRTEC) 10 MG tablet Take 1 tablet by mouth daily 1/10/23 2/9/23  Elsie Canas MD   pantoprazole (PROTONIX) 40 MG tablet Take 1 tablet by mouth every morning (before breakfast) 1/10/23   Elsie Canas MD   ALPRAZolam [place]) 0.25 MG tablet Take 0.25 mg by mouth 3 times daily as needed for Sleep. Historical Provider, MD   atorvastatin (LIPITOR) 80 MG tablet Take 1 tablet by mouth daily 12/7/22   Diana Antonio MD   albuterol (ACCUNEB) 0.63 MG/3ML nebulizer solution Take 3 mLs by nebulization every 6 hours as needed for Wheezing 12/1/22   Elsie Canas MD   albuterol (PROVENTIL) (5 MG/ML) 0.5% nebulizer solution Take 1 mL by nebulization 4 times daily as needed for Wheezing  Patient not taking: No sig reported 11/29/22   Elsie Canas MD   albuterol sulfate HFA (VENTOLIN HFA) 108 (90 Base) MCG/ACT inhaler Inhale 2 puffs into the lungs 4 times daily as needed for Wheezing 11/18/22   Elsie Canas MD   fluticasone (FLOVENT HFA) 110 MCG/ACT inhaler Inhale 1 puff into the lungs 2 times daily 11/18/22   Elsie Canas MD   fenofibrate (TRIGLIDE) 160 MG tablet Take 1 tablet by mouth daily 11/18/22   Elsie Canas MD   dilTIAZem (CARDIZEM CD) 120 MG extended release capsule Take 1 capsule by mouth daily 6/1/22   Diana Antonio MD   nitroGLYCERIN (NITROSTAT) 0.4 MG SL tablet Place 1 tablet under the tongue every 5 minutes as needed for Chest pain up to max of 3 total doses. If no relief after 1 dose, call 911. 1/21/22   Diana Antonio MD   aspirin 81 MG EC tablet Take 81 mg by mouth daily. Historical Provider, MD       ALLERGIES:     Patient has no known allergies.       OBJECTIVE:       Vitals:    02/01/23 1854 02/01/23 2337 02/02/23 0456 02/02/23 0458   BP: 129/79 120/74     Pulse: 76 85     Resp: 18 20 18    Temp: 97.5 °F (36.4 °C) 97 °F (36.1 °C)     TempSrc: Temporal Temporal     SpO2: 97% 93%     Weight:    188 lb 11.4 oz (85.6 kg)   Height:             Intake/Output Summary (Last 24 hours) at 2/2/2023 0650  Last data filed at 2/2/2023 0502  Gross per 24 hour   Intake 2577 ml   Output 475 ml   Net 2102 ml       PHYSICAL EXAM:  General Appearance  Alert , awake , not in acute distress  HEENT - Head is normocephalic, atraumatic.  Lungs - Bilateral equal air entry , no wheezes, rales or rhonchi, aeration good  Cardiovascular - Heart sounds are normal.  Regular rhythm, normal rate without murmur, gallop or rub.  Abdomen - Soft, tender to palpation over the LUQ, nondistended, no masses or organomegaly  Neurologic - There are no new focal motor or sensory deficits  Skin - No bruising or bleeding on exposed skin area  Extremities - No cyanosis, clubbing or edema      DIAGNOSTICS:     Laboratory Testing:    See [facility] EMR for lab data    Recent Results (from the past 24 hour(s))   Hemoglobin and Hematocrit, Blood    Collection Time: 02/01/23 12:35 PM   Result Value Ref Range    Hemoglobin 11.9 (L) 13.0 - 17.0 g/dL    Hematocrit 33.6 (L) 40.7 - 50.3 %   Hemoglobin and Hematocrit, Blood    Collection Time: 02/01/23  5:45 PM   Result Value Ref Range    Hemoglobin 11.6 (L) 13.0 - 17.0 g/dL    Hematocrit 33.1 (L) 40.7 - 50.3 %   EKG 12 Lead    Collection Time: 02/01/23  7:06 PM   Result Value Ref Range    Ventricular Rate 85 BPM    Atrial Rate 85 BPM    P-R Interval 150 ms    QRS Duration 100 ms    Q-T Interval 426 ms    QTc Calculation (Bazett) 506 ms    P Axis 24 degrees    R Axis 60 degrees    T Axis 20 degrees   Troponin    Collection Time: 02/01/23  8:14 PM   Result Value Ref Range    Troponin, High Sensitivity 10 0 - 22 ng/L   CBC auto differential    Collection Time: 02/02/23  5:55 AM   Result Value Ref Range    WBC 8.4 3.5 - 11.3 k/uL    RBC 3.72 (L) 4.21 - 5.77 m/uL    Hemoglobin 11.4 (L) 13.0 - 17.0 g/dL    Hematocrit 33.2 (L) 40.7 - 50.3 %    MCV 89.2 82.6 - 102.9 fL    MCH 30.6 25.2 - 33.5 pg    MCHC 34.3 28.4 - 34.8 g/dL    RDW 13.5 11.8 - 14.4 %    Platelets 282 138 - 453 k/uL    MPV 10.6 8.1 - 13.5 fL    NRBC Automated 0.0 0.0 per 100 WBC    Seg Neutrophils 54 36 -  65 %    Lymphocytes 29 24 - 43 %    Monocytes 11 3 - 12 %    Eosinophils % 6 (H) 1 - 4 %    Basophils 0 0 - 2 %    Immature Granulocytes 0 0 %    Segs Absolute 4.48 1.50 - 8.10 k/uL    Absolute Lymph # 2.41 1.10 - 3.70 k/uL    Absolute Mono # 0.92 0.10 - 1.20 k/uL    Absolute Eos # 0.53 (H) 0.00 - 0.44 k/uL    Basophils Absolute <0.03 0.00 - 0.20 k/uL    Absolute Immature Granulocyte <0.03 0.00 - 0.30 k/uL       Current Facility-Administered Medications   Medication Dose Route Frequency Provider Last Rate Last Admin    pantoprazole (PROTONIX) 40 mg in sodium chloride (PF) 0.9 % 10 mL injection  40 mg IntraVENous Daily Devi Davila MD        nitroGLYCERIN (NITROSTAT) SL tablet 0.4 mg  0.4 mg SubLINGual Q5 Min PRN Yudy Emerson MD   0.4 mg at 02/01/23 2112    metoprolol succinate (TOPROL XL) extended release tablet 50 mg  50 mg Oral Daily Yudy Emerson MD   50 mg at 02/01/23 2158    ranolazine (RANEXA) extended release tablet 500 mg  500 mg Oral BID Yudy Emerson MD   500 mg at 02/01/23 2158    atorvastatin (LIPITOR) tablet 80 mg  80 mg Oral Nightly Yudy Emerson MD   80 mg at 02/01/23 2158    famotidine (PEPCID) tablet 20 mg  20 mg Oral BID Devi Davila MD   20 mg at 02/01/23 2158    calcium carbonate (TUMS) chewable tablet 500 mg  500 mg Oral TID PRN Devi Davila MD        0.9 % sodium chloride infusion   IntraVENous Continuous Dipakkumar Grazyna Thurman MD 50 mL/hr at 02/02/23 0126 New Bag at 02/02/23 0126    sodium chloride flush 0.9 % injection 5-40 mL  5-40 mL IntraVENous 2 times per day Ariana Angelo MD   10 mL at 02/01/23 0926    sodium chloride flush 0.9 % injection 5-40 mL  5-40 mL IntraVENous PRN Ariana Angelo MD        0.9 % sodium chloride infusion   IntraVENous PRN Ariana Angelo MD        ond[facility]etron [facility]) injection 4 mg  4 mg IntraVENous Q6H PRN Ariana Angelo MD   4 mg at 02/02/23 0502    metoprolol (LOPRESSOR) injection 5 mg  5 mg IntraVENous Q6H PRN Ariana Angelo MD        HYDROmorphone (DILAUDID) injection 0.25 mg  0.25 mg IntraVENous Q3H PRN Dixie Ferguson MD        Or    HYDROmorphone (DILAUDID) injection 0.5 mg  0.5 mg IntraVENous Q3H PRN Dixie Ferguson MD   0.5 mg at 02/02/23 0456    promethazine (PHENERGAN) injection 6.25 mg  6.25 mg IntraMUSCular Q6H PRN Dixie Ferguson MD   6.25 mg at 02/01/23 1108       ASSESSMENT:     Principal Problem:    Gastrointestinal hemorrhage with hematemesis  Active Problems:    Intractable abdominal pain    History of pancreatitis    Gastritis without bleeding    HTN (hypertension)    S/P CABG (coronary artery bypass graft)  Resolved Problems:    * No resolved hospital problems. *      PLAN:     Primary Problem(s): Gastrointestinal hemorrhage with hematemesis  Differential diagnoses: Gastritis/Enteritis  Condition is improving  Treatment plan: Continue current treatment  Imaging: no further imaging studies ordered today for now, if pain remains persistent may consider repeating the CT Scan today  Medications: Continue with Protonix IV Push, added Pepcid, Tums, GI cocktail x2  Medication Monitoring / High Risk Medications:None   GI was consulted  Cardiology consult pending  Tropx1 this AM  Monitor labs, CBC/CMP  Advanced diet as tolerated  IVF for now  Pain is improving though still severe at this time, wean off IV and start on PO/Tylenol  Cont. Home meds, resumed   Dispo pending clinical status. , advancing diet, Cardiology and GI recommendations      DVT prophylaxis:  SCDs  GI prophylaxis:  Protonix    Above plan discussed with the patient who agreed to the above plan     Discussed care plan with nurse after getting their input. Please note that this chart was generated using voice recognition Dragon dictation software. Although every effort was made to ensure the accuracy of this automated transcription, some errors in transcription may have occurred.     Dmitry Raza MD  2/2/2023 6:50 AM

## 2023-02-02 NOTE — PLAN OF CARE

## 2023-02-02 NOTE — CONSULTS
Patient: Julianna Salazar  : 1975  Date of Visit: 2023    REASON FOR VISIT / CONSULTATION: GI bleed, CAD      History of Present Illness:        I had the pleasure of seeing Mr. Kimberly Estrada today who is a 56 y.o. male in consultation today after he was admitted for an upper GI bleed. Heart cath done on 2022- Severe single vessel disease involving the right coronary arteries. 1 of 1 bypass grafts patent. Normal LVEDP. ER/ Hospitalized on 2022 with chest pains. Echo done on 2022: Global left ventricular systolic function appears moderately reduced with an  estimated ejection fraction of 40%. The left ventricular cavity size is within normal limits and the left ventricular wall thickness is moderately increased. Thinning and hypokinesis of the inferior wall. The inferoseptal wall is abnormal in its motion which is not unusual status post open heart surgery. No significant valvular abnormalities. Mild diastolic dysfunction. CAM done on 2022: Predominant rhythm: Normal sinus rhythm. Atrial tachycardia (AT) 2 episodes, longest 4 beats at average 122 bpm up to 38 bpm, fastest 3 beats at average 137 bpm up to 142 bpm. PAC 0.01%. PVC 2.27%. Seven days and 2 hours recorded. Sinus rhythm with average heart rate of 88 bpm, ranging between 62 and 125 bpm. Rare PACs with two short runs of ectopic atrial tachycardia, the longest is 4 beats at a heart rate of 122 bpm. Occasional PVCs. No ventricular runs. EGD 23: Mild Gastritis: Reports history of stomach ulcer back around 10/2022. Mr. Kimberly Estrada says that he feels much better since admission and although he still has an aching left upper quadrant abdominal pain, it is better today and he would like to try and advance his diet to a cheeseburger. No problems with current medications. He is not drinking any alcohol, ibuprofen, or other NSAIDS.      Bleeding Risks: Mr. Kimberly Estrada denies any current or recent bleeding problems including a history of a GI bleed, ulcers, recent or upcoming surgeries, blood in his stool or black tarry stools or blood in his urine. PAST MEDICAL HISTORY:        Past Medical History:   Diagnosis Date    CAD (coronary artery disease)     Carpal tunnel syndrome     Depressive disorder, not elsewhere classified     Diabetes mellitus ([facility]     Gastrointestinal hemorrhage with hematemesis 01/31/2023    Heart attack ([facility] 08/11/2018    STEMI    History of echocardiogram 01/08/2019    EF 40-45%. LV cavity sixe is mildly increased. Inferior and inferoseptal wall hypokenesis noted. Mild diastolic dysfunction. History of pancreatitis 01/31/2023    Hyperlipidemia     Hypertension 2009    PTSD (post-traumatic stress disorder)     uexpected open heart surgery in 2018       CURRENT ALLERGIES: Patient has no known allergies. REVIEW OF SYSTEMS: 14 systems were reviewed. Pertinent positives and negatives as above, all else negative.      Past Surgical History:   Procedure Laterality Date    CARDIAC CATHETERIZATION Left [facility]/07/2018    [facility]    CARDIAC CATHETERIZATION Left 01/27/2022    Dr Luisa Agudelo/ right ulnar-    COLONOSCOPY N/A 10/04/2022    COLORECTAL CANCER SCREENING, HIGH RISK performed by Karri Freeman MD at [facility]  10/04/2022    -tortuous colon    CORONARY ANGIOPLASTY  08/2018    double bypass, [facility],     CORONARY ARTERY BYPASS GRAFT  2018    ESOPHAGOGASTRODUODENOSCOPY  10/04/2022    -bx(neg H-Pylori,duodenum-normal,mild gastritis)    UPPER GASTROINTESTINAL ENDOSCOPY N/A 10/04/2022    EGD BIOPSY performed by Karri Freeman MD at [id number] [place] [address] N/A 2/1/2023    EGD ESOPHAGOGASTRODUODENOSCOPY performed by Karri Freeman MD at [address] History:  Social History     Tobacco Use    Smoking status: Some Days     Packs/day: 0.25     Years: 30.00     Pack years: 7.50     Types: Cigarettes     Last attempt to quit: 2022     Years since quittin.9    Smokeless tobacco: Never   Vaping Use    Vaping Use: Never used   Substance Use Topics    Alcohol use: Not Currently    Drug use: No        CURRENT MEDICATIONS:        No outpatient medications have been marked as taking for the 23 encounter [facility] Encounter). FAMILY HISTORY: family history includes Asthma in his sister; Colon Cancer in his paternal uncle and paternal uncle; Heart Disease in his father; High Blood Pressure in his father and mother; Pancreatic Cancer in his maternal aunt and maternal aunt. Physical Examination:     /80   Pulse 76   Temp 96.8 °F (36 °C) (Temporal)   Resp 18   Ht 5' 7\" (1.702 m)   Wt 188 lb 11.4 oz (85.6 kg)   SpO2 96%   BMI 29.56 kg/m²  Body mass index is 29.56 kg/m². Constitutional: He is oriented to person, place, and time. He appears well-developed and well-nourished. In no acute distress. HEENT: Normocephalic and atraumatic. No JVD present. Carotid bruit is not present. No mass and no thyromegaly present. No lymphadenopathy present. Cardiovascular: Normal rate, regular rhythm, normal heart sounds. Exam reveals no gallop and no friction rubs. No heart murmur heard. Pulmonary/Chest: Effort normal and breath sounds normal. No respiratory distress. He has no wheezes, rhonchi or rales. Abdominal: Soft, mild right upper quadrant tenderness. Bowel sounds and aorta are normal. He exhibits no organomegaly, mass or bruit. Extremities: No edema. No cyanosis and no clubbing. Pulses are 2+ radial and carotid pulses. 2+ dorsalis pedis and posterior tibial pulses bilaterally. Neurological: He is alert and oriented to person, place, and time. No evidence of gross cranial nerve deficit. Coordination appeared normal.   Skin: Skin is warm and dry. There is no rash or diaphoresis. Psychiatric: He has a normal mood and affect.  His speech is normal and behavior is normal.      MOST RECENT LABS ON RECORD:   Lab Results   Component Value Date    WBC 8.4 02/02/2023    HGB 11.4 (L) 02/02/2023    HCT 33.2 (L) 02/02/2023     02/02/2023    CHOL 98 09/12/2022    TRIG 113 09/12/2022    HDL 21 (L) 09/12/2022    ALT 13 02/02/2023    AST 14 02/02/2023     02/02/2023    K 3.8 02/02/2023     02/02/2023    CREATININE 0.88 02/02/2023    BUN 13 02/02/2023    CO2 23 02/02/2023    TSH 2.35 09/12/2022    INR 1.1 01/31/2023    LABA1C 6.3 (H) 09/12/2022       ASSESSMENT:     Patient Active Problem List    Diagnosis Date Noted    Abnormal cardiovascular stress test 01/27/2022    Palpitations     Gastritis without bleeding 02/01/2023    Hemoptysis 01/31/2023    Nausea and vomiting 01/31/2023    Gastrointestinal hemorrhage with hematemesis 01/31/2023    History of pancreatitis 01/31/2023    Chest wall pain 01/10/2023    Sleeping difficulty 01/10/2023    Lung nodule 01/10/2023    Chronic dyspnea 01/10/2023    Gastroesophageal reflux disease 01/10/2023    Allergic rhinitis due to pollen 01/10/2023    Unstable angina ([address] 12/28/2022    Shortness of breath 12/19/2022    Mild persistent asthma with acute exacerbation 11/29/2022    Bronchitis 11/29/2022    Diabetes mellitus ([address] 11/18/2022    Generalized anxiety disorder 11/18/2022    Primary hypertension 11/18/2022    Hyperlipidemia 11/18/2022    Mild intermittent asthma 11/18/2022    Severe malnutrition ([address] 10/06/2022    Intractable abdominal pain 10/05/2022    Pancreatitis, unspecified pancreatitis type 09/12/2022    Nausea 09/12/2022    Epigastric pain 09/12/2022    ACS (acute coronary syndrome) ([address] 12/26/2021    ASHD (arteriosclerotic heart disease) /  CABG 2018 04/28/2019    Ischemic cardiomyopathy 01/08/2019    Chronic combined systolic and diastolic CHF, NYHA class 2 [facility] 01/08/2019    S/P CABG (coronary artery bypass graft)     Dyslipidemia     Acute coronary syndrome ([facility] 01/07/2019    Mild congestive heart failure ([facility] 01/07/2019    Chest pain at rest 06/02/2015    HTN (hypertension) 06/02/2015    Tobacco abuse 06/02/2015    Family history of premature CAD 06/02/2015    Pain in rib, left lower 05/31/2012      PLAN:        Atherosclerotic Heart Disease: S/P CABG x1 done on August 11, 2018 SVG to RCA. Repeat cardiac cath on 1/26/2023 showed patent SVG to right PDA, otherwise nonobstructive disease of the left coronary artery system. Antiplatelet Agent:  Continue to Hold ASA and Plavix at least for now due to GI bleed. Will likely consider restarting Plavix in as soon as 2-3 weeks especially when Hgb rebounds. Beta Blocker: Continue to Metoprolol succinate (Toprol XL) 50 mg daily. Anti-anginal medications: Continue nitroglycerin 0.4 mg tablets as needed for chest pain. Anti-anginal medications: Continue ranolazine (Ranexa) 500 mg twice daily. Cholesterol Reduction Therapy: Continue Atorvastatin (Lipitor) 40 mg daily. and continue fenofibrate 160 mg daily. Essential Hypertension: Controlled  Beta Blocker: Continue to Metoprolol succinate (Toprol XL) 50 mg daily. ACE Inibitor/ARB: Continue lisinopril 20 mg daily. Calcium Channel Blocker: Continue diltiazem CD (Cardizem CD) 120 mg once daily. Hyperlipidemia: Mixed, LDL done on 9/12/2022 was 54 mg/dL   Cholesterol Reduction Therapy: Continue Atorvastatin (Lipitor) 40 mg daily. and continue fenofibrate 160 mg daily. Once again, thank you for allowing me to participate in this patients care. Please do not hesitate to contact me if I could be of any further assistance. Sincerely,  Ziyad Allen MD, F.A.C.C. [facility] Cardiology Specialist    [address] [facility]], [address] [place] [address]  Phone: [phone], Fax: [phone]     I believe that the risk of significant morbidity and mortality related to the patient's current medical conditions are: intermediate-high.         February 2, 2023

## 2023-02-02 NOTE — PROGRESS NOTES
Writer at bedside for reassessment. Patient sitting up in bed playing on his phone. Vitals obtained and assessment completed, see flowsheet for details. Patient still complaining of constant pain, Patient educated on what time he is able to get pain medications. pt denies further needs at this time. Call light in reach.

## 2023-02-02 NOTE — PROGRESS NOTES
Patient ate all of cheeseburger and fries. Patient calls out and complains of pain in LUQ. \"Feels like world war 3 in there. \" Requesting pain medication.

## 2023-02-02 NOTE — PROGRESS NOTES
Patient continues to complain of chest pain, Dr. Dalia Marie called and notified, also notified of Trop result of 10. New orders given.

## 2023-02-02 NOTE — ADT AUTH CERT
Gastrointestinal Bleeding, Upper - Care Day 3 (2/2/2023) by Vanita Alcantara RN       Review Status Review Entered   Completed 2/2/2023 0845       Created By   Vanita Alcantara RN      Criteria Review      Care Day: 3 Care Date: 2/2/2023 Level of Care: Inpatient Floor    Guideline Day 2    Level Of Care    (X) Floor    2/2/2023 8:45 AM EST by Charmaine West      m/s    (X) Readmission Risk Assessment    2/2/2023 8:45 AM EST by Bradley Box      12%    Clinical Status    (X) * Hypotension absent    2/2/2023 8:45 AM EST by Charmaine West      /80    (X) * Bleeding absent or reduced    2/2/2023 8:45 AM EST by Jonah Box      no issues noted    Activity    (X) Activity as tolerated    2/2/2023 8:45 AM EST by Jonah Box      as mohsen    Routes    (X) * Oral hydration tolerated    2/2/2023 8:45 AM EST by Charmaine West      CLD    (X) * Oral diet tolerated    2/2/2023 8:45 AM EST by Jonah Box      CLD    Interventions    (X) * NG tube absent    2/2/2023 8:45 AM EST by Bradley Box      not noted    (X) Hgb/Hct    2/2/2023 8:45 AM EST by Bradley Box      Hgb 11.4    Medications    (X) Proton pump inhibitor    2/2/2023 8:45 AM EST by Charmaine West      Protonix 40mg IV x1,    * Milestone   Additional Notes   2/2/23       VS- temp 96.8, HR 76, RR 18, /80, O2 96%          Labs- Protein 6.2, Hgb 11.4,           Internal medicine-   Patient seen and examined at the bed side , no new acute events overnight except for chest pain and LUQ abdominal pain. That has been ongoing. Ekg/trop and Cards consult was obtained. Diet was advanced, he was started on Pepcid/Tums and Also provided with a GI cocktail (which provided him some relief of his symptoms). Notes from nursing staff and Consults had been reviewed, and the overnight progress had been checked with the nursing staff as well.        General Appearance  Alert , awake , not in acute distress   HEENT - Head is normocephalic, atraumatic. Lungs - Bilateral equal air entry , no wheezes, rales or rhonchi, aeration good   Cardiovascular - Heart sounds are normal.  Regular rhythm, normal rate without murmur, gallop or rub. Abdomen - Soft, tender to palpation over the LUQ, nondistended, no masses or organomegaly   Neurologic - There are no new focal motor or sensory deficits   Skin - No bruising or bleeding on exposed skin area   Extremities - No cyanosis, clubbing or edema       Primary Problem(s): Gastrointestinal hemorrhage with hematemesisDifferential diagnoses: Gastritis/Enteritis   Condition is improving   Treatment plan: Continue current treatment   Imaging: no further imaging studies ordered today for now, if pain remains persistent may consider repeating the CT Scan today   Medications: Continue with Protonix IV Push, added Pepcid, Tums, GI cocktail x2   Medication Monitoring / High Risk Medications:None    GI was consulted   Cardiology consult pending   Tropx1 this AM   Monitor labs, CBC/CMP   Advanced diet as tolerated   IVF for now   Pain is improving though still severe at this time, wean off IV and start on PO/Tylenol   Cont. Home meds, resumed    Dispo pending clinical status. , advancing diet, Cardiology and GI recommendations           DVT prophylaxis: SCDs   GI prophylaxis: Protonix          Summary- Patient in Med/surg unit          Patient receiving GI cocktail PO x1, Lipitor 80mg PO daily, Pepcid 20mg PO BID, Toprol 50mg PO x1, Protonix 40mg IV x1, Ranexa 500mg PO BID, NS IV Gtt @ 50ml/hr, Dilaudid 0.5mg IV x2 (as of 0456), Zofran 4mg IV x1, Percocet 5-325mg PO x1

## 2023-02-02 NOTE — PROGRESS NOTES
Patient walking around in room out of bathroom. Denies chest pain but complains of stabbing LUQ. GI cocktail given per Dr. Evi Lebron. Lung sounds have end expiratory wheezes throughout. Patient asking when pain medication can be give next. Patient in bed. Will continue to monitor.

## 2023-02-02 NOTE — PROGRESS NOTES
Writer paged Dr. Halima Diaz due to patient chest pain, and hand/facial swelling. Dr. Halima Diaz stated to consult cardiology, call Dr. Elias Patino and to decrease fluids.

## 2023-02-02 NOTE — PROGRESS NOTES
Writer at bedside for shift assessment. Patient sitting up in bed, respirations are even and unlabored while on room air. Patient complains of chest pain, writer obtained an EKG. Vitals obtained and assessment completed, see flowsheet for details. pt denies further needs at this time. Call light in reach.

## 2023-02-03 VITALS
DIASTOLIC BLOOD PRESSURE: 82 MMHG | WEIGHT: 187.8 LBS | HEIGHT: 67 IN | HEART RATE: 75 BPM | TEMPERATURE: 97.7 F | RESPIRATION RATE: 18 BRPM | SYSTOLIC BLOOD PRESSURE: 118 MMHG | BODY MASS INDEX: 29.47 KG/M2 | OXYGEN SATURATION: 97 %

## 2023-02-03 LAB
ABSOLUTE EOS #: 0.74 K/UL (ref 0–0.44)
ABSOLUTE IMMATURE GRANULOCYTE: <0.03 K/UL (ref 0–0.3)
ABSOLUTE LYMPH #: 1.66 K/UL (ref 1.1–3.7)
ABSOLUTE MONO #: 0.54 K/UL (ref 0.1–1.2)
BASOPHILS # BLD: 0 % (ref 0–2)
BASOPHILS ABSOLUTE: 0.03 K/UL (ref 0–0.2)
EOSINOPHILS RELATIVE PERCENT: 10 % (ref 1–4)
HCT VFR BLD AUTO: 34.9 % (ref 40.7–50.3)
HGB BLD-MCNC: 12.1 G/DL (ref 13–17)
IMMATURE GRANULOCYTES: 0 %
LYMPHOCYTES # BLD: 23 % (ref 24–43)
MCH RBC QN AUTO: 30 PG (ref 25.2–33.5)
MCHC RBC AUTO-ENTMCNC: 34.7 G/DL (ref 28.4–34.8)
MCV RBC AUTO: 86.6 FL (ref 82.6–102.9)
MONOCYTES # BLD: 8 % (ref 3–12)
NRBC AUTOMATED: 0 PER 100 WBC
PDW BLD-RTO: 13.2 % (ref 11.8–14.4)
PLATELET # BLD AUTO: 321 K/UL (ref 138–453)
PMV BLD AUTO: 10.2 FL (ref 8.1–13.5)
RBC # BLD: 4.03 M/UL (ref 4.21–5.77)
SEG NEUTROPHILS: 59 % (ref 36–65)
SEGMENTED NEUTROPHILS ABSOLUTE COUNT: 4.22 K/UL (ref 1.5–8.1)
WBC # BLD AUTO: 7.2 K/UL (ref 3.5–11.3)

## 2023-02-03 PROCEDURE — 85025 COMPLETE CBC W/AUTO DIFF WBC: CPT

## 2023-02-03 PROCEDURE — 6370000000 HC RX 637 (ALT 250 FOR IP): Performed by: INTERNAL MEDICINE

## 2023-02-03 PROCEDURE — 94761 N-INVAS EAR/PLS OXIMETRY MLT: CPT

## 2023-02-03 PROCEDURE — 6370000000 HC RX 637 (ALT 250 FOR IP): Performed by: STUDENT IN AN ORGANIZED HEALTH CARE EDUCATION/TRAINING PROGRAM

## 2023-02-03 PROCEDURE — 36415 COLL VENOUS BLD VENIPUNCTURE: CPT

## 2023-02-03 RX ORDER — PANTOPRAZOLE SODIUM 40 MG/1
40 TABLET, DELAYED RELEASE ORAL
Qty: 60 TABLET | Refills: 0 | Status: SHIPPED | OUTPATIENT
Start: 2023-02-03

## 2023-02-03 RX ORDER — METOPROLOL SUCCINATE 50 MG/1
50 TABLET, EXTENDED RELEASE ORAL DAILY
Qty: 30 TABLET | Refills: 3 | Status: SHIPPED | OUTPATIENT
Start: 2023-02-04

## 2023-02-03 RX ORDER — FAMOTIDINE 20 MG/1
20 TABLET, FILM COATED ORAL 2 TIMES DAILY
Qty: 60 TABLET | Refills: 3 | Status: SHIPPED | OUTPATIENT
Start: 2023-02-03

## 2023-02-03 RX ORDER — CLOPIDOGREL BISULFATE 75 MG/1
75 TABLET ORAL DAILY
Qty: 90 TABLET | Refills: 0 | Status: SHIPPED | OUTPATIENT
Start: 2023-02-03

## 2023-02-03 RX ORDER — ONDANSETRON 4 MG/1
4 TABLET, ORALLY DISINTEGRATING ORAL 3 TIMES DAILY PRN
Qty: 21 TABLET | Refills: 0 | Status: SHIPPED | OUTPATIENT
Start: 2023-02-03

## 2023-02-03 RX ADMIN — FAMOTIDINE 20 MG: 20 TABLET, FILM COATED ORAL at 07:21

## 2023-02-03 RX ADMIN — METOPROLOL SUCCINATE 50 MG: 50 TABLET, EXTENDED RELEASE ORAL at 07:21

## 2023-02-03 RX ADMIN — RANOLAZINE 500 MG: 500 TABLET, FILM COATED, EXTENDED RELEASE ORAL at 07:21

## 2023-02-03 RX ADMIN — OXYCODONE HYDROCHLORIDE AND ACETAMINOPHEN 1 TABLET: 5; 325 TABLET ORAL at 09:04

## 2023-02-03 RX ADMIN — OXYCODONE HYDROCHLORIDE AND ACETAMINOPHEN 1 TABLET: 5; 325 TABLET ORAL at 05:54

## 2023-02-03 RX ADMIN — PANTOPRAZOLE SODIUM 40 MG: 40 TABLET, DELAYED RELEASE ORAL at 07:21

## 2023-02-03 ASSESSMENT — PAIN DESCRIPTION - DESCRIPTORS: DESCRIPTORS: ACHING

## 2023-02-03 ASSESSMENT — PAIN DESCRIPTION - ORIENTATION: ORIENTATION: LEFT

## 2023-02-03 ASSESSMENT — PAIN SCALES - GENERAL: PAINLEVEL_OUTOF10: 6

## 2023-02-03 ASSESSMENT — PAIN DESCRIPTION - LOCATION: LOCATION: ABDOMEN

## 2023-02-03 ASSESSMENT — PAIN - FUNCTIONAL ASSESSMENT: PAIN_FUNCTIONAL_ASSESSMENT: ACTIVITIES ARE NOT PREVENTED

## 2023-02-03 NOTE — PROGRESS NOTES
[address], [address]    Progress Note    Date:   2/3/2023  Patient name:  Adeola Golden  Date of admission:  1/31/2023 12:05 PM  MRN:   [id number]  YOB: 1975    SUBJECTIVE/Last 24 hours update:     Patient seen and examined at the bed side , no new acute events overnight except for some abdominal discomfort that worsened with food. No new complains except for some lower leg cramping that occurred overnight. His pain is under good control this morning, rated 6/10 and much better than prior and it is under good control at this time. He feels ready to be discharged home today. Case had been reviewed with Cardiology. Notes from nursing staff and Consults had been reviewed, and the overnight progress had been checked with the nursing staff as well. REVIEW OF SYSTEMS:      CONSTITUTIONAL:  no fevers, no headcahes  EYES: negative for blury vision  HEENT: No headaches, No nasal congestion, no difficulty swallowing  RESPIRATORY:negative for dyspnea, no wheezing, no Cough  CARDIOVASCULAR: negative for chest pain, no palpitations  GASTROINTESTINAL: no nausea, no vomiting, no change in bowel habits,LUQ/epigastric abdominal/flank pain (improving)  GENITOURINARY: negative for dysuria, no hematuria   MUSCULOSKELETAL: no joint pains, no muscle aches, no swelling of joints or extremities  NEUROLOGICAL: No  Weakness or numbness    PAST MEDICAL HISTORY:      has a past medical history of CAD (coronary artery disease), Carpal tunnel syndrome, Depressive disorder, not elsewhere classified, Diabetes mellitus ([address] Gastrointestinal hemorrhage with hematemesis, Heart attack ([address] History of echocardiogram, History of pancreatitis, Hyperlipidemia, Hypertension, and PTSD (post-traumatic stress disorder). PAST SURGICAL HISTORY:      has a past surgical history that includes Coronary angioplasty (08/2018); Cardiac catheterization (Left, 01/07/2018);  Coronary artery bypass graft (2018); Cardiac catheterization (Left, 01/27/2022); Colonoscopy (N/A, 10/04/2022); Upper gastrointestinal endoscopy (N/A, 10/04/2022); Colonoscopy (10/04/2022); Esophagogastroduodenoscopy (10/04/2022); and Upper gastrointestinal endoscopy (N/A, 2/1/2023). SOCIAL HISTORY:      reports that he has been smoking cigarettes. He has a 7.50 pack-year smoking history. He has never used smokeless tobacco. He reports that he does not currently use alcohol. He reports that he does not use drugs. TOBACCO:   reports that he has been smoking cigarettes. He has a 7.50 pack-year smoking history. He has never used smokeless tobacco.  ETOH:   reports that he does not currently use alcohol. FAMILY HISTORY:     family history includes Asthma in his sister; Colon Cancer in his paternal uncle and paternal uncle; Heart Disease in his father; High Blood Pressure in his father and mother; Pancreatic Cancer in his maternal aunt and maternal aunt. Problem Relation Age of Onset    High Blood Pressure Mother     Heart Disease Father     High Blood Pressure Father     Asthma Sister     Colon Cancer Paternal Uncle     Colon Cancer Paternal Uncle     Pancreatic Cancer Maternal Aunt     Pancreatic Cancer Maternal Aunt        HOME MEDICATIONS:      Prior to Admission medications    Medication Sig Start Date End Date Taking?  Authorizing Provider   metFORMIN (GLUCOPHAGE) 500 MG tablet Take 1 tablet by mouth 2 times daily (with meals) 1/23/23   Susana Irvin MD   metoprolol succinate (TOPROL XL) 100 MG extended release tablet Take 1 tablet by mouth in the morning and at bedtime  Patient taking differently: Take 100 mg by mouth every evening 1/20/23   Art Given, MD   clopidogrel (PLAVIX) 75 MG tablet TAKE 1 TABLET BY MOUTH EVERY DAY 1/19/23   Art Given, MD   lisinopril (PRINIVIL;ZESTRIL) 20 MG tablet Take 1 tablet by mouth daily 1/16/23   Art Given, MD   ranolazine (RANEXA) 500 MG extended release tablet Take 1 tablet by mouth 2 times daily 1/13/23   Janeth Green MD   cetirizine (ZYRTEC) 10 MG tablet Take 1 tablet by mouth daily 1/10/23 2/9/23  Janeth Green MD   pantoprazole (PROTONIX) 40 MG tablet Take 1 tablet by mouth every morning (before breakfast) 1/10/23   Janeth Green MD   ALPRAZolam Zohreh Roulette) 0.25 MG tablet Take 0.25 mg by mouth 3 times daily as needed for Sleep. Historical Provider, MD   atorvastatin (LIPITOR) 80 MG tablet Take 1 tablet by mouth daily 12/7/22   Syeda Livingston MD   albuterol (ACCUNEB) 0.63 MG/3ML nebulizer solution Take 3 mLs by nebulization every 6 hours as needed for Wheezing 12/1/22   Janeth Green MD   albuterol (PROVENTIL) (5 MG/ML) 0.5% nebulizer solution Take 1 mL by nebulization 4 times daily as needed for Wheezing  Patient not taking: No sig reported 11/29/22   Janeth Green MD   albuterol sulfate HFA (VENTOLIN HFA) 108 (90 Base) MCG/ACT inhaler Inhale 2 puffs into the lungs 4 times daily as needed for Wheezing 11/18/22   Janeth Green MD   fluticasone (FLOVENT HFA) 110 MCG/ACT inhaler Inhale 1 puff into the lungs 2 times daily 11/18/22   Janeth Green MD   fenofibrate (TRIGLIDE) 160 MG tablet Take 1 tablet by mouth daily 11/18/22   Janeth Green MD   dilTIAZem (CARDIZEM CD) 120 MG extended release capsule Take 1 capsule by mouth daily 6/1/22   Syeda Livingston MD   nitroGLYCERIN (NITROSTAT) 0.4 MG SL tablet Place 1 tablet under the tongue every 5 minutes as needed for Chest pain up to max of 3 total doses. If no relief after 1 dose, call 911. 1/21/22   Syeda Livingston MD   aspirin 81 MG EC tablet Take 81 mg by mouth daily. Historical Provider, MD       ALLERGIES:     Patient has no known allergies.       OBJECTIVE:       Vitals:    02/02/23 2319 02/03/23 0545 02/03/23 0554 02/03/23 0715   BP: 121/77   118/82   Pulse: 78   75   Resp: 16  18 18   Temp: 97.4 °F (36.3 °C)   97.7 °F (36.5 °C)   TempSrc: Temporal   Temporal   SpO2:    97%   Weight:  187 lb 12.8 oz (85.2 kg)     Height: Intake/Output Summary (Last 24 hours) at 2/3/2023 0729  Last data filed at 2/3/2023 0551  Gross per 24 hour   Intake 1564 ml   Output --   Net 1564 ml       PHYSICAL EXAM:  General Appearance  Alert , awake , not in acute distress  HEENT - Head is normocephalic, atraumatic. Lungs - Bilateral equal air entry , no wheezes, rales or rhonchi, aeration good  Cardiovascular - Heart sounds are normal.  Regular rhythm, normal rate without murmur, gallop or rub.   Abdomen - Soft, tender to palpation over the LUQ, nondistended, no masses or organomegaly  Neurologic - There are no new focal motor or sensory deficits  Skin - No bruising or bleeding on exposed skin area  Extremities - No cyanosis, clubbing or edema      DIAGNOSTICS:     Laboratory Testing:    See [employer] EMR for lab data    Recent Results (from the past 24 hour(s))   CBC with Auto Differential    Collection Time: 02/03/23  6:10 AM   Result Value Ref Range    WBC 7.2 3.5 - 11.3 k/uL    RBC 4.03 (L) 4.21 - 5.77 m/uL    Hemoglobin 12.1 (L) 13.0 - 17.0 g/dL    Hematocrit 34.9 (L) 40.7 - 50.3 %    MCV 86.6 82.6 - 102.9 fL    MCH 30.0 25.2 - 33.5 pg    MCHC 34.7 28.4 - 34.8 g/dL    RDW 13.2 11.8 - 14.4 %    Platelets 570 137 - 010 k/uL    MPV 10.2 8.1 - 13.5 fL    NRBC Automated 0.0 0.0 per 100 WBC    Seg Neutrophils 59 36 - 65 %    Lymphocytes 23 (L) 24 - 43 %    Monocytes 8 3 - 12 %    Eosinophils % 10 (H) 1 - 4 %    Basophils 0 0 - 2 %    Immature Granulocytes 0 0 %    Segs Absolute 4.22 1.50 - 8.10 k/uL    Absolute Lymph # 1.66 1.10 - 3.70 k/uL    Absolute Mono # 0.54 0.10 - 1.20 k/uL    Absolute Eos # 0.74 (H) 0.00 - 0.44 k/uL    Basophils Absolute 0.03 0.00 - 0.20 k/uL    Absolute Immature Granulocyte <0.03 0.00 - 0.30 k/uL       Current Facility-Administered Medications   Medication Dose Route Frequency Provider Last Rate Last Admin    acetaminophen (TYLENOL) tablet 650 mg  650 mg Oral Q6H PRN Andrei Maxwell MD        oxyCODONE-acetaminophen (PERCOCET) 5-325 MG per tablet 1 tablet  1 tablet Oral Q4H PRN Susana Irvin MD   1 tablet at 02/03/23 0554    pantoprazole (PROTONIX) tablet 40 mg  40 mg Oral BID AC Susana Irvin MD   40 mg at 02/03/23 0721    nitroGLYCERIN (NITROSTAT) SL tablet 0.4 mg  0.4 mg SubLINGual Q5 Min PRN Art Given, MD   0.4 mg at 02/01/23 2112    metoprolol succinate (TOPROL XL) extended release tablet 50 mg  50 mg Oral Daily Art Given, MD   50 mg at 02/03/23 1765    ranolazine (RANEXA) extended release tablet 500 mg  500 mg Oral BID Art Given, MD   500 mg at 02/03/23 7094    atorvastatin (LIPITOR) tablet 80 mg  80 mg Oral Nightly Art Given, MD   80 mg at 02/02/23 2116    famotidine (PEPCID) tablet 20 mg  20 mg Oral BID Susana Irvin MD   20 mg at 02/03/23 2609    calcium carbonate (TUMS) chewable tablet 500 mg  500 mg Oral TID PRN Susana Irvin MD   500 mg at 02/02/23 2120    sodium chloride flush 0.9 % injection 5-40 mL  5-40 mL IntraVENous 2 times per day Tara Soriano MD   10 mL at 02/02/23 2120    sodium chloride flush 0.9 % injection 5-40 mL  5-40 mL IntraVENous PRN Tara Soriano MD   10 mL at 02/02/23 1411    0.9 % sodium chloride infusion   IntraVENous PRN Tara Soriano MD        ondansetron [facility]) injection 4 mg  4 mg IntraVENous Q6H PRN Tara Soriano MD   4 mg at 02/02/23 0502    metoprolol (LOPRESSOR) injection 5 mg  5 mg IntraVENous Q6H PRN Tara Soriano MD        HYDROmorphone (DILAUDID) injection 0.25 mg  0.25 mg IntraVENous Q3H PRN Tara Soriano MD        Or    HYDROmorphone (DILAUDID) injection 0.5 mg  0.5 mg IntraVENous Q3H PRN Tara Soriano MD   0.5 mg at 02/02/23 1411    promethazine (PHENERGAN) injection 6.25 mg  6.25 mg IntraMUSCular Q6H PRN Tara Soriano MD   6.25 mg at 02/01/23 1108       ASSESSMENT:     Principal Problem:    Gastrointestinal hemorrhage with hematemesis  Active Problems:    Intractable abdominal pain    History of pancreatitis    Gastritis without bleeding    HTN (hypertension)    S/P CABG (coronary artery bypass graft)  Resolved Problems:    * No resolved hospital problems. *      PLAN:     Primary Problem(s): Gastrointestinal hemorrhage with hematemesis  Differential diagnoses: Gastritis/Enteritis  Condition is improving  Treatment plan: Continue current treatment  Imaging: no further imaging studies ordered today   CBC stable  Medications: Protonix, Pepcid, Tums PRN  Medication Monitoring / High Risk Medications:None   GI was consulted  Cardiology was consulted  Holding off ASA/Plavix per Cardiology, plan to resume Plavix in 2-3 weeks. Advanced diet as tolerated  Dispo - Home today with close follow-up. DVT prophylaxis:  SCDs  GI prophylaxis:  Protonix , Pepcid    Above plan discussed with the patient who agreed to the above plan     Discussed care plan with nurse after getting their input. Please note that this chart was generated using voice recognition Dragon dictation software. Although every effort was made to ensure the accuracy of this automated transcription, some errors in transcription may have occurred.     Elizabeth Alegria MD  2/3/2023 7:29 AM

## 2023-02-03 NOTE — ADT AUTH CERT
Gastrointestinal Bleeding, Upper - Care Day 4 (2/3/2023) by Tone Santana RN       Review Status Review Entered   Completed 2/3/2023 2340       Created By   Tone Santana RN      Criteria Review      Care Day: 4 Care Date: 2/3/2023 Level of Care: Inpatient Floor    Guideline Day 3    Level Of Care    (X) Readmission Risk Assessment    2/3/2023 8:52 AM EST by Lavada Heimlich      11%    Clinical Status    (X) * Hemodynamic stability    2/3/2023 8:52 AM EST by Charmaine Farrell      VS- temp 97.7, HR 75, RR 18, /82, O2 97%    (X) * Stable Hgb/Hct    2/3/2023 8:52 AM EST by Lavada Heimlich      no issues noted    (X) * Bleeding absent    2/3/2023 8:52 AM EST by Lavada Heimlich      no issues noted    (X) * Surgical and other acute interventions not needed    2/3/2023 8:52 AM EST by Lavada Heimlich      no issues noted    (X) * Pain absent or managed    2/3/2023 8:52 AM EST by Lavada Heimlich      managed    ( ) * Discharge plans and education understood    Activity    (X) * Ambulatory or acceptable for next level of care    2/3/2023 8:52 AM EST by Lavada Heimlich      as mohsen    Routes    (X) * Oral hydration    2/3/2023 8:52 AM EST by Charmaine Gale      PO diet    ( ) * Oral medications or regimen acceptable for next level of care    2/3/2023 8:52 AM EST by Lavada Heimlich      Lipitor 80mg PO daily, Pepcid 20mg PO BID, Toprol 50mg PO x1, Protonix 40mg PO BID, Ranexa 500mg PO BID, Percocet 5-325mg PO x1    (X) * Oral diet or acceptable for next level of care    2/3/2023 8:52 AM EST by Lavada Heimlich      reg    Interventions    (X) Hgb/Hct    2/3/2023 8:52 AM EST by Charmaine Gale      Hgb 12.1,    Medications    (X) Proton pump inhibitor    2/3/2023 8:52 AM EST by Lavada Heimlich      Protonix 40mg PO BID,    * Milestone   Additional Notes   2/3/23       VS- temp 97.7, HR 75, RR 18, /82, O2 97%          Labs- Hgb 12.1,           Internal medicine-    Patient seen and examined at the bed side , no new acute events overnight except for some abdominal discomfort that worsened with food. No new complains except for some lower leg cramping that occurred overnight. His pain is under good control this morning, rated 6/10 and much better than prior and it is under good control at this time. He feels ready to be discharged home today. Case had been reviewed with Cardiology. Notes from nursing staff and Consults had been reviewed, and the overnight progress had been checked with the nursing staff as well. CONSTITUTIONAL:  no fevers, no headcahes   EYES: negative for blury vision   HEENT: No headaches, No nasal congestion, no difficulty swallowing   RESPIRATORY:negative for dyspnea, no wheezing, no Cough   CARDIOVASCULAR: negative for chest pain, no palpitations   GASTROINTESTINAL: no nausea, no vomiting, no change in bowel habits,LUQ/epigastric abdominal/flank pain (improving)   GENITOURINARY: negative for dysuria, no hematuria    MUSCULOSKELETAL: no joint pains, no muscle aches, no swelling of joints or extremities   NEUROLOGICAL: No  Weakness or numbness       PLAN:   Primary Problem(s): Gastrointestinal hemorrhage with hematemesis   Differential diagnoses: Gastritis/Enteritis   Condition is improving   Treatment plan: Continue current treatment   Imaging: no further imaging studies ordered today   CBC stable   Medications: Protonix, Pepcid, Tums PRN   Medication Monitoring / High Risk Medications:None    GI was consulted   Cardiology was consulted   Holding off ASA/Plavix per Cardiology, plan to resume Plavix in 2-3 weeks. Advanced diet as tolerated   Dispo - Home today with close follow-up.        DVT prophylaxis: SCDs   GI prophylaxis: Protonix , Pepcid             Summary- Patient in Med/surg unit          Patient receiving Lipitor 80mg PO daily, Pepcid 20mg PO BID, Toprol 50mg PO x1, Protonix 40mg PO BID, Ranexa 500mg PO BID, Percocet 5-325mg PO x1

## 2023-02-03 NOTE — PROGRESS NOTES
Writer at bedside for shift assessment. Patient sitting up in bed watching TV. Vitals obtained and assessment completed, see flowsheet for details. pt denies further needs at this time. Pain medication given at this time, see MAR for details. Call light in reach.

## 2023-02-03 NOTE — DISCHARGE SUMMARY
[address], [address]    Discharge Summary      NAME:  Zeeshan Linda  :  1975  MRN:  [id number]    Admit date:  2023  Discharge date:  23      Admitting Physician:  Lyly Peng MD    Primary Diagnosis on Admission:   Present on Admission:   HTN (hypertension)   Intractable abdominal pain   Gastrointestinal hemorrhage with hematemesis   History of pancreatitis   S/P CABG (coronary artery bypass graft)   Gastritis without bleeding      Secondary Diagnoses:  has Palpitations and Abnormal cardiovascular stress test on their pertinent problem list.    Admission Condition:  fair     Discharged Condition: good    Hospital Course: The patient was admitted for the management of concerns regarding upper GI bleeding. Labs and imaging were obtained, including CT. GI was consulted. He was placed NPO and an EGD was completed indicating mild gastritis. CT notable for enteritis and diffuse swelling. He was provided with IVF and pain control via IV route as this is what he tolerated at the time. He had chest pain and Cardiology was consulted. Medications were adjusted. ASA and Plavix were held, with plans to resume Plavix in 2-3 weeks. Today on day of discharge pt feels better with no further complaints. Vitals and Labs are at pts baseline. All consultants involved during this admission are agreeable to d/c.     Consults:  Gi, Cardiology    Significant Diagnostic/theraputic interventions: IVF, CT, EGD, IV Medication      Disposition:   home    Instructions to Patient:      Follow up with Lyly Peng MD in  1-2 weeks    Discharge Medications:       Medication List        START taking these medications      famotidine 20 MG tablet  Commonly known as: PEPCID  Take 1 tablet by mouth 2 times daily     ondansetron 4 MG disintegrating tablet  Commonly known as: ZOFRAN-ODT  Take 1 tablet by mouth 3 times daily as needed for Nausea or Vomiting            CHANGE how you take these medications      * albuterol sulfate  (90 Base) MCG/ACT inhaler  Commonly known as: Ventolin HFA  Inhale 2 puffs into the lungs 4 times daily as needed for Wheezing  What changed: Another medication with the same name was removed. Continue taking this medication, and follow the directions you see here. * albuterol 0.63 MG/3ML nebulizer solution  Commonly known as: ACCUNEB  Take 3 mLs by nebulization every 6 hours as needed for Wheezing  What changed: Another medication with the same name was removed. Continue taking this medication, and follow the directions you see here. clopidogrel 75 MG tablet  Commonly known as: PLAVIX  Take 1 tablet by mouth daily Resume in 2-3 weeks per Cardiology recommendations  What changed: See the new instructions. metoprolol succinate 50 MG extended release tablet  Commonly known as: TOPROL XL  Take 1 tablet by mouth daily  Start taking on: February 4, 2023  What changed:   medication strength  how much to take  when to take this     pantoprazole 40 MG tablet  Commonly known as: PROTONIX  Take 1 tablet by mouth 2 times daily (before meals)  What changed: when to take this           * This list has 2 medication(s) that are the same as other medications prescribed for you. Read the directions carefully, and ask your doctor or other care provider to review them with you.                 CONTINUE taking these medications      atorvastatin 80 MG tablet  Commonly known as: LIPITOR  Take 1 tablet by mouth daily     cetirizine 10 MG tablet  Commonly known as: ZYRTEC  Take 1 tablet by mouth daily     dilTIAZem 120 MG extended release capsule  Commonly known as: CARDIZEM CD  Take 1 capsule by mouth daily     fenofibrate 160 MG tablet  Commonly known as: TRIGLIDE  Take 1 tablet by mouth daily     fluticasone 110 MCG/ACT inhaler  Commonly known as: Flovent HFA  Inhale 1 puff into the lungs 2 times daily     lisinopril 20 MG tablet  Commonly known as: PRINIVIL;ZESTRIL  Take 1 tablet by mouth daily     metFORMIN 500 MG tablet  Commonly known as: GLUCOPHAGE  Take 1 tablet by mouth 2 times daily (with meals)     nitroGLYCERIN 0.4 MG SL tablet  Commonly known as: NITROSTAT  Place 1 tablet under the tongue every 5 minutes as needed for Chest pain up to max of 3 total doses. If no relief after 1 dose, call 911.     ranolazine 500 MG extended release tablet  Commonly known as: RANEXA  Take 1 tablet by mouth 2 times daily            STOP taking these medications      ALPRAZolam 0.25 MG tablet  Commonly known as: XANAX     aspirin 81 MG EC tablet               Where to Get Your Medications        These medications were sent to [facility]/pharmacy #[id number] - [facility] [address] [facility] [place] [place] P [phone] - F [phone]  [address] [place] [id number]      Phone: [phone]   clopidogrel 75 MG tablet  famotidine 20 MG tablet  metoprolol succinate 50 MG extended release tablet  ondansetron 4 MG disintegrating tablet  pantoprazole 40 MG tablet         Send Copies to: Curtis Chavarria MD,     Patient Instructions:   Activity: activity as tolerated  Diet: encourage fluids and advance as tolerated  Wound Care: none needed  Follow up with Curtis Chavarria MD in 1-2 weeks   Follow-up with Cardiology and GI in 1-2 weeks as directed    Total time spent on discharge services: 35 minutes  Including the following activities:  Evaluation and Management of patient  Discussion with patient and/or surrogate about current care plan  Coordination with Case Management and/or   Coordination of care with Consultants (if applicable)   Coordination of care with Receiving Facility Physician (if applicable)  Completion of DME forms (if applicable)  Preparation of Discharge Summary  Preparation of Medication Reconciliation  Preparation of Discharge Prescriptions    Please note that this chart was generated using voice recognition Dragon dictation software. Although every effort was  made to ensure the accuracy of this automated transcription, some errors in transcription may have occurred.     Rebecca Leavitt MD  2/3/2023 7:33 AM

## 2023-02-03 NOTE — PROGRESS NOTES
Discharge medications reviewed with the patient and spouse and appropriate educational materials and side effects teaching were provided. Follow up appointments made, patient requesting pain medication before he leaves. Wife driving home.

## 2023-02-03 NOTE — PROGRESS NOTES
Writer at bedside for reassessment. Patient sitting up in bed, watching TV. Vitals obtained and assessment completed, see flowsheet for details. Patient complaining of pain, medications given. pt denies further needs at this time.  Call light in reach

## 2023-02-03 NOTE — PLAN OF CARE
Problem: Discharge Planning  Goal: Discharge to home or other facility with appropriate resources  Outcome: Adequate for Discharge     Problem: Safety - Adult  Goal: Free from fall injury  Outcome: Adequate for Discharge     Problem: Pain  Goal: Verbalizes/displays adequate comfort level or baseline comfort level  Outcome: Adequate for Discharge     Problem: Chronic Conditions and Co-morbidities  Goal: Patient's chronic conditions and co-morbidity symptoms are monitored and maintained or improved  Outcome: Adequate for Discharge     Problem: Nutrition Deficit:  Goal: Optimize nutritional status  Outcome: Adequate for Discharge

## 2023-02-06 ENCOUNTER — TELEPHONE (OUTPATIENT)
Dept: PRIMARY CARE CLINIC | Age: 48
End: 2023-02-06

## 2023-02-06 RX ORDER — RANOLAZINE 500 MG/1
TABLET, EXTENDED RELEASE ORAL
Qty: 60 TABLET | Refills: 0 | Status: SHIPPED | OUTPATIENT
Start: 2023-02-06

## 2023-02-06 NOTE — TELEPHONE ENCOUNTER
Care Transitions Initial Follow Up Call    Call within 2 business days of discharge: Yes     Patient: Jennifer Jensen Patient : 1975 MRN: [id number]    [unfilled]    RARS: Readmission Risk Score: 11.3       Spoke with: Cherry Kenney    Discharge department/facility: [facility]     Non-face-to-face services provided:  Obtained and reviewed discharge summary and/or continuity of care documents    Reviewed medication changes with patient:     START taking these medications       famotidine 20 MG tablet  Commonly known as: PEPCID  Take 1 tablet by mouth 2 times daily      ondansetron 4 MG disintegrating tablet  Commonly known as: ZOFRAN-ODT  Take 1 tablet by mouth 3 times daily as needed for Nausea or Vomiting                CHANGE how you take these medications       * albuterol sulfate  (90 Base) MCG/ACT inhaler  Commonly known as: Ventolin HFA  Inhale 2 puffs into the lungs 4 times daily as needed for Wheezing  What changed: Another medication with the same name was removed. Continue taking this medication, and follow the directions you see here. * albuterol 0.63 MG/3ML nebulizer solution  Commonly known as: ACCUNEB  Take 3 mLs by nebulization every 6 hours as needed for Wheezing  What changed: Another medication with the same name was removed. Continue taking this medication, and follow the directions you see here. clopidogrel 75 MG tablet  Commonly known as: PLAVIX  Take 1 tablet by mouth daily Resume in 2-3 weeks per Cardiology recommendations  What changed: See the new instructions.       metoprolol succinate 50 MG extended release tablet  Commonly known as: TOPROL XL  Take 1 tablet by mouth daily  Start taking on: 2023  What changed:   medication strength  how much to take  when to take this      pantoprazole 40 MG tablet  Commonly known as: PROTONIX  Take 1 tablet by mouth 2 times daily (before meals)  What changed: when to take this        STOP taking these medications       ALPRAZolam 0.25 MG tablet  Commonly known as: XANAX      aspirin 81 MG EC tablet       Patient verbalizes understanding and is taking the correct medications. Patient reports he continues to have nausea, but the reports the Zofran does help to alleviate the symptom. Confirmed follow up appointment with Dr. Polly Draper and Dr. Amanda De León. Reviewed when to call PCP or go to ED for change in condition. Patient verbalizes understanding of all information.       Follow Up  Future Appointments   Date Time Provider Sarika Jones   2/10/2023  8:40 AM Abraham Murillo MD [facility]   2/13/2023 10:40 AM Cristy Lee MD [facility]   2/27/2023  9:40 AM Cristy Lee MD [facility]   3/13/2023  1:40 PM Abraham Murillo MD [facility]   3/31/2023  2:45 PM Joana Moore MD [facility]       Casi Gaxiola, ASHWIN

## 2023-02-13 ENCOUNTER — OFFICE VISIT (OUTPATIENT)
Dept: PRIMARY CARE CLINIC | Age: 48
End: 2023-02-13

## 2023-02-13 VITALS
SYSTOLIC BLOOD PRESSURE: 116 MMHG | BODY MASS INDEX: 31.71 KG/M2 | WEIGHT: 202 LBS | HEIGHT: 67 IN | DIASTOLIC BLOOD PRESSURE: 68 MMHG

## 2023-02-13 DIAGNOSIS — K21.9 GASTROESOPHAGEAL REFLUX DISEASE, UNSPECIFIED WHETHER ESOPHAGITIS PRESENT: ICD-10-CM

## 2023-02-13 DIAGNOSIS — Z09 HOSPITAL DISCHARGE FOLLOW-UP: Primary | ICD-10-CM

## 2023-02-13 RX ORDER — NITROGLYCERIN 0.4 MG/1
0.4 TABLET SUBLINGUAL EVERY 5 MIN PRN
Qty: 25 TABLET | Refills: 3 | Status: SHIPPED | OUTPATIENT
Start: 2023-02-13

## 2023-02-13 SDOH — ECONOMIC STABILITY: HOUSING INSECURITY
IN THE LAST 12 MONTHS, WAS THERE A TIME WHEN YOU DID NOT HAVE A STEADY PLACE TO SLEEP OR SLEPT IN A SHELTER (INCLUDING NOW)?: NO

## 2023-02-13 SDOH — ECONOMIC STABILITY: FOOD INSECURITY: WITHIN THE PAST 12 MONTHS, YOU WORRIED THAT YOUR FOOD WOULD RUN OUT BEFORE YOU GOT MONEY TO BUY MORE.: NEVER TRUE

## 2023-02-13 SDOH — ECONOMIC STABILITY: FOOD INSECURITY: WITHIN THE PAST 12 MONTHS, THE FOOD YOU BOUGHT JUST DIDN'T LAST AND YOU DIDN'T HAVE MONEY TO GET MORE.: NEVER TRUE

## 2023-02-13 SDOH — ECONOMIC STABILITY: INCOME INSECURITY: HOW HARD IS IT FOR YOU TO PAY FOR THE VERY BASICS LIKE FOOD, HOUSING, MEDICAL CARE, AND HEATING?: NOT HARD AT ALL

## 2023-02-13 NOTE — PROGRESS NOTES
[address], [address] , [facility], [address]   Follow Up      Raleigh Goodson   YOB: 1975    Date of Office Visit:  2/13/2023  Date of Hospital Admission: 1/31/23  Date of Hospital Discharge: 2/3/23  Risk of hospital readmission (high >=14%. Medium >=10%) :Readmission Risk Score: 11.3      Care management risk score Rising risk (score 2-5) and Complex Care (Scores >=6): No Risk Score On File     Non face to face  following discharge, date last encounter closed (first attempt may have been earlier): 02/06/2023    Call initiated 2 business days of discharge: Yes    ASSESSMENT/PLAN:   Hospital discharge follow-up  -     ME DISCHARGE MEDS RECONCILED W/ CURRENT OUTPATIENT MED LIST  -     CBC with Auto Differential; Future  Gastroesophageal reflux disease, unspecified whether esophagitis present    Medical Decision Making: moderate complexity  Return in about 3 months (around 5/13/2023) for F/U Meds/Labs. The pathophysiology of reflux is discussed. Anti-reflux measures such as raising the head of the bed, avoiding tight clothing or belts, avoiding eating late at night and not lying down shortly after mealtime and achieving weight loss are discussed. Avoid ASA, NSAID's, caffeine, peppermints, alcohol and tobacco.    Increase/Advance diet as tolerated. F/U with Specialists as per prior plans. Subjective:   HPI:  Follow up of Hospital problems/diagnosis(es):     Inpatient course: Discharge summary reviewed- see chart. Interval history/Current status: Raleigh Goodson is a 56 y.o. male who complains of a history of chest wall pain. He has not had any prior episodes since about last Tuesday (he had a similar belching episode at the time). He has been trying to have a diet that has been mostly clean/no fat diet. The patient has not been smoking for about 15 days or so.  He is taking Protonix, Pepcid and Carafate at this time. Patient Active Problem List   Diagnosis    Pain in rib, left lower    Chest pain at rest    HTN (hypertension)    Tobacco abuse    Family history of premature CAD    Acute coronary syndrome (HCC)    Mild congestive heart failure (HCC)    S/P CABG (coronary artery bypass graft)    Dyslipidemia    Ischemic cardiomyopathy    Chronic combined systolic and diastolic CHF, NYHA class 2 (HCC)    ASHD (arteriosclerotic heart disease) /  CABG 2018    ACS (acute coronary syndrome) (HCC)    Palpitations    Abnormal cardiovascular stress test    Pancreatitis, unspecified pancreatitis type    Nausea    Epigastric pain    Intractable abdominal pain    Severe malnutrition ([address]    Diabetes mellitus ([address]    Generalized anxiety disorder    Primary hypertension    Hyperlipidemia    Mild intermittent asthma    Mild persistent asthma with acute exacerbation    Bronchitis    Shortness of breath    Unstable angina (HCC)    Chest wall pain    Sleeping difficulty    Lung nodule    Chronic dyspnea    Gastroesophageal reflux disease    Allergic rhinitis due to pollen    Hemoptysis    Nausea and vomiting    Gastrointestinal hemorrhage with hematemesis    History of pancreatitis    Gastritis without bleeding    Hospital discharge follow-up       Medications listed as ordered at the time of discharge from hospital     Medication List            Accurate as of February 13, 2023 11:30 AM. If you have any questions, ask your nurse or doctor.                 CONTINUE taking these medications      * albuterol sulfate  (90 Base) MCG/ACT inhaler  Commonly known as: Ventolin HFA  Inhale 2 puffs into the lungs 4 times daily as needed for Wheezing     * albuterol 0.63 MG/3ML nebulizer solution  Commonly known as: ACCUNEB  Take 3 mLs by nebulization every 6 hours as needed for Wheezing     atorvastatin 80 MG tablet  Commonly known as: LIPITOR  Take 1 tablet by mouth daily     clopidogrel 75 MG tablet  Commonly known as: PLAVIX  Take 1 tablet by mouth daily Resume in 2-3 weeks per Cardiology recommendations     dilTIAZem 120 MG extended release capsule  Commonly known as: CARDIZEM CD  Take 1 capsule by mouth daily     famotidine 20 MG tablet  Commonly known as: PEPCID  Take 1 tablet by mouth 2 times daily     fenofibrate 160 MG tablet  Commonly known as: TRIGLIDE  Take 1 tablet by mouth daily     fluticasone 110 MCG/ACT inhaler  Commonly known as: Flovent HFA  Inhale 1 puff into the lungs 2 times daily     lisinopril 20 MG tablet  Commonly known as: PRINIVIL;ZESTRIL  Take 1 tablet by mouth daily     metFORMIN 500 MG tablet  Commonly known as: GLUCOPHAGE  Take 1 tablet by mouth 2 times daily (with meals)     metoprolol succinate 50 MG extended release tablet  Commonly known as: TOPROL XL  Take 1 tablet by mouth daily     nitroGLYCERIN 0.4 MG SL tablet  Commonly known as: NITROSTAT  Place 1 tablet under the tongue every 5 minutes as needed for Chest pain up to max of 3 total doses. If no relief after 1 dose, call 911. pantoprazole 40 MG tablet  Commonly known as: PROTONIX  Take 1 tablet by mouth 2 times daily (before meals)     ranolazine 500 MG extended release tablet  Commonly known as: RANEXA  TAKE 1 TABLET BY MOUTH TWICE A DAY           * This list has 2 medication(s) that are the same as other medications prescribed for you. Read the directions carefully, and ask your doctor or other care provider to review them with you.                    Where to Get Your Medications        These medications were sent to [facility]/pharmacy [address] - [address]      Phone: [phone]   nitroGLYCERIN 0.4 MG SL tablet           Medications marked \"taking\" at this time  Outpatient Medications Marked as Taking for the 2/13/23 encounter (Office Visit) with Devora Mayo MD   Medication Sig Dispense Refill    nitroGLYCERIN (NITROSTAT) 0.4 MG SL tablet Place 1 tablet under the tongue every 5 minutes as needed for Chest pain up to max of 3 total doses. If no relief after 1 dose, call 911. 25 tablet 3    ranolazine (RANEXA) 500 MG extended release tablet TAKE 1 TABLET BY MOUTH TWICE A DAY 60 tablet 0    famotidine (PEPCID) 20 MG tablet Take 1 tablet by mouth 2 times daily 60 tablet 3    metoprolol succinate (TOPROL XL) 50 MG extended release tablet Take 1 tablet by mouth daily 30 tablet 3    pantoprazole (PROTONIX) 40 MG tablet Take 1 tablet by mouth 2 times daily (before meals) 60 tablet 0    metFORMIN (GLUCOPHAGE) 500 MG tablet Take 1 tablet by mouth 2 times daily (with meals) 180 tablet 3    lisinopril (PRINIVIL;ZESTRIL) 20 MG tablet Take 1 tablet by mouth daily 90 tablet 3    atorvastatin (LIPITOR) 80 MG tablet Take 1 tablet by mouth daily 90 tablet 3    albuterol (ACCUNEB) 0.63 MG/3ML nebulizer solution Take 3 mLs by nebulization every 6 hours as needed for Wheezing 270 mL 3    albuterol sulfate HFA (VENTOLIN HFA) 108 (90 Base) MCG/ACT inhaler Inhale 2 puffs into the lungs 4 times daily as needed for Wheezing 18 g 3    fluticasone (FLOVENT HFA) 110 MCG/ACT inhaler Inhale 1 puff into the lungs 2 times daily 12 g 3    fenofibrate (TRIGLIDE) 160 MG tablet Take 1 tablet by mouth daily 90 tablet 0    dilTIAZem (CARDIZEM CD) 120 MG extended release capsule Take 1 capsule by mouth daily 90 capsule 3        Medications patient taking as of now reconciled against medications ordered at time of hospital discharge: Yes    A comprehensive review of systems was negative except for what was noted in the HPI.  Constitutional: Negative for activity change, appetite change, chills, diaphoresis, fatigue, fever and unexpected weight change.   HENT: Negative for sinus pressure, sinus pain, sore throat and trouble swallowing.    Respiratory: Negative for cough, shortness of breath and wheezing.    Cardiovascular: Negative for chest pain, palpitations and leg swelling.  Gastrointestinal: Negative for abdominal pain, diarrhea, nausea and vomiting. Endocrine: Negative for cold intolerance, polydipsia, polyphagia and polyuria. Genitourinary: Negative for difficulty urinating, flank pain and frequency. Musculoskeletal: Negative for gait problem and joint swelling. Negative for back pain, neck pain and neck stiffness. Skin: Negative for color change and wound. Negative for pallor and rash. Allergic/Immunologic: Negative for environmental allergies and food allergies. Neurological: Negative for light-headedness, numbness and headaches. Psychiatric/Behavioral: Negative for sleep disturbance. Negative for confusion and suicidal ideas.      Objective:    /68   Ht 5' 7\" (1.702 m)   Wt 202 lb (91.6 kg)   BMI 31.64 kg/m²   General Appearance: alert and oriented to person, place and time, well developed and well- nourished, in no acute distress  Skin: warm and dry, no rash or erythema  Head: normocephalic and atraumatic  Eyes: pupils equal, round, and reactive to light, extraocular eye movements intact, conjunctivae normal  ENT: tympanic membrane, external ear and ear canal normal bilaterally, nose without deformity, nasal mucosa and turbinates normal without polyps  Neck: supple and non-tender without mass, no thyromegaly or thyroid nodules, no cervical lymphadenopathy  Pulmonary/Chest: clear to auscultation bilaterally- no wheezes, rales or rhonchi, normal air movement, no respiratory distress  Cardiovascular: normal rate, regular rhythm, normal S1 and S2, no murmurs, rubs, clicks, or gallops, distal pulses intact, no carotid bruits  Abdomen: soft, non-tender, non-distended, normal bowel sounds, no masses or organomegaly  Extremities: no cyanosis, clubbing or edema  Musculoskeletal: normal range of motion, no joint swelling, deformity or tenderness  Neurologic: reflexes normal and symmetric, no cranial nerve deficit, gait, coordination and speech normal      An electronic signature was used to authenticate this note.     Electronically signed by Helga Babinski, MD on 2/13/2023 at 11:30 AM

## 2023-02-22 DIAGNOSIS — E78.5 HYPERLIPIDEMIA, UNSPECIFIED HYPERLIPIDEMIA TYPE: ICD-10-CM

## 2023-02-22 RX ORDER — FENOFIBRATE 160 MG/1
TABLET ORAL
Qty: 90 TABLET | Refills: 0 | Status: SHIPPED | OUTPATIENT
Start: 2023-02-22

## 2023-02-23 RX ORDER — RANOLAZINE 500 MG/1
TABLET, EXTENDED RELEASE ORAL
Qty: 180 TABLET | Refills: 3 | Status: SHIPPED | OUTPATIENT
Start: 2023-02-23

## 2023-02-24 ENCOUNTER — OFFICE VISIT (OUTPATIENT)
Dept: CARDIOLOGY | Age: 48
End: 2023-02-24
Payer: COMMERCIAL

## 2023-02-24 VITALS
OXYGEN SATURATION: 95 % | SYSTOLIC BLOOD PRESSURE: 101 MMHG | WEIGHT: 207.4 LBS | RESPIRATION RATE: 18 BRPM | DIASTOLIC BLOOD PRESSURE: 63 MMHG | HEART RATE: 77 BPM | BODY MASS INDEX: 32.55 KG/M2 | HEIGHT: 67 IN

## 2023-02-24 DIAGNOSIS — E78.2 MIXED HYPERLIPIDEMIA: ICD-10-CM

## 2023-02-24 DIAGNOSIS — R42 DIZZY: ICD-10-CM

## 2023-02-24 DIAGNOSIS — R07.89 ATYPICAL CHEST PAIN: Primary | ICD-10-CM

## 2023-02-24 DIAGNOSIS — I10 ESSENTIAL HYPERTENSION: ICD-10-CM

## 2023-02-24 DIAGNOSIS — R00.2 PALPITATION: ICD-10-CM

## 2023-02-24 DIAGNOSIS — I25.10 ASHD (ARTERIOSCLEROTIC HEART DISEASE): ICD-10-CM

## 2023-02-24 PROCEDURE — 99214 OFFICE O/P EST MOD 30 MIN: CPT | Performed by: INTERNAL MEDICINE

## 2023-02-24 PROCEDURE — 3074F SYST BP LT 130 MM HG: CPT | Performed by: INTERNAL MEDICINE

## 2023-02-24 PROCEDURE — 3078F DIAST BP <80 MM HG: CPT | Performed by: INTERNAL MEDICINE

## 2023-02-24 NOTE — PROGRESS NOTES
Doc Cárdenas am scribing for and in the presence of Cristobal Marion MD, F.A.C.C..    Patient: Fátima Epstein  : 1975  Date of Visit: 2023    REASON FOR VISIT / CONSULTATION: Follow-up (HX: CP, palps, fatigue, CAD, S/P CABGx3, PAF, mod reginald regurg, HTN, HLD, OREN, Obese. Pt is here to follow up from heart cath. Pt stayed in hosp due to feeling like he was having a heart attack with CP and his hemoglobin was low. Rare palps he says they slowed down since he quit smoking. SOB with exertion. Denies any further CP, light headed/dizziness. )      History of Present Illness:        Dear Arsalan Brunson MD    I had the pleasure of seeing Mr. Nydia Ovalle today who is a 56 y.o. male who presents for a follow up following a hospital visit on 2019 with chest pain during activity. He had a stress test on 2018 (inferior wall defect with jazmine-infarct ischemia, ejection fraction 47% with inferior wall hypokinesis). 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 He was taken for an emergent CABG with SVG to RCA. Echo on 2019: EF 40-45%. LV cavity sixe is mildly increased. Inferior and inferoseptal wall hypokenesis noted. Mild diastolic dysfunction. Cardiac Catheterization on 2019: Severe single vessel disease involving the LAD, circumflex and right coronary arteries. 1 of 1 bypass grafts patent. Normal LVEDP. Echo on 2019: EF 40-45%. LV cavity sixe is mildly increased. Inferior and inferoseptal wall hypokenesis noted. Mild diastolic dysfunction    Stress test done on 2021 showed abnormal myocardial perfusion study.  There is a moderate perfusion defect of moderate/severe intensity in the inferolateral, inferior and inferoseptal regions during stress and rest imaging, which is most consistent with an old myocardial infarction with jazmine-infarct ischemia. EF was 43%    Echo done on 12/22/2021 showed the global left ventricular systolic function appears mildly reduced with an estimated ejection fraction of 40-45%. The inferoseptal and inferior walls appear hypokinetic in their motion. The left ventricular cavity size is within normal limits and the left ventricular wall thickness is mildly increased. Holter done on 12/22/2021 showed rhythm was sinus. Average MI interval 0.16, average QRS duration 0.09. Ventricular ectopic beats 6% of test.2. No diary; no symptoms reported. Frequent PVC's comprising 6% of total beats  including a few short ventricular runs, the longest being 7 beats at 83 bpm.     Heart cath done on 1/27/2022- Severe single vessel disease involving the right coronary arteries. 1 of 1 bypass grafts patent. Normal LVEDP. CAM done on 1/27/2022- Predominant rhythm:  Normal sinus rhythm. PAC 0.02%. PVC 0.64%. ER/ Hospitalized on 12/27/2022 with chest pains. Echo done on 12/29/2022: Global left ventricular systolic function appears moderately reduced with an  estimated ejection fraction of 40%. The left ventricular cavity size is within normal limits and the left ventricular wall thickness is moderately increased. Thinning and hypokinesis of the inferior wall. The inferoseptal wall is abnormal in its motion which is not unusual status post open heart surgery. No significant valvular abnormalities. Mild diastolic dysfunction. Stress test done on 12/29/2022: Abnormal myocardial perfusion study. There is a moderate perfusion defect of moderate to severe intensity in the inferior and inferoapical regions during stress and rest imaging, which is most consistent with an old myocardial infarction with mild jazmine-infarct ischemia. Overall, these results are most consistent with an intermediate risk scan. CAM done on 12/29/2022: Predominant rhythm: Normal sinus rhythm.  Atrial tachycardia (AT) 2 episodes, longest 4 beats at average 122 bpm up to 38 bpm, fastest 3 beats at average 137 bpm up to 142 bpm. PAC 0.01%. PVC 2.27%. Seven days and 2 hours recorded. Sinus rhythm with average heart rate of 88 bpm, ranging between 62 and 125 bpm. Rare PACs with two short runs of ectopic atrial tachycardia, the longest is 4 beats at a heart rate of 122 bpm. Occasional PVCs. No ventricular runs. EKG done in office (1/20/2023): Showed no ischemic changes. Cath done on 1/26/2023: Severe single vessel disease involving the right coronary artery. Mild to moderate disease in the LAD and Cx. Normal left ventricular end diastolic pressure. (LVEDP). Mr. Nydia Ovalle is here to follow up from his heart catheterization that he had done on 1/26/23. He was admitted with GI bleeding on 1/31/2023 and had and EGD that showed acute gastritis. He says he is doing fine now and denies any further chest pains, pressure, or tightness. He has shortness of breath with exertion. He did quit smoking over a month ago and he does say that his palpitations are less now that he quit smoking. He has gained 19 pounds since his last visit. He denies any light headed/dizziness. Denies blood in his urine or stool. He has been tested for sleep apnea and this has been negative in the past. He can also get some pain in his left thigh area with exertion. He can also get some nausea however he denied any vomiting. No bleeding issues or issues moving his bowels. No problems with current medications. He is not drinking any alcohol or caffeine beverages at this time. Bleeding Risks: Mr. Nydia Ovalle denies any current or recent bleeding problems including a history of a GI bleed, ulcers, recent or upcoming surgeries, blood in his stool or black tarry stools or blood in his urine.      PAST MEDICAL HISTORY:        Past Medical History:   Diagnosis Date    CAD (coronary artery disease)     Carpal tunnel syndrome     Depressive disorder, not elsewhere classified     Diabetes mellitus [facility])     Gastrointestinal hemorrhage with hematemesis 2023    Heart attack ([address] 2018    STEMI    History of echocardiogram 2019    EF 40-45%. LV cavity sixe is mildly increased. Inferior and inferoseptal wall hypokenesis noted. Mild diastolic dysfunction. History of pancreatitis 2023    Hyperlipidemia     Hypertension 2009    PTSD (post-traumatic stress disorder)     uexpected open heart surgery in 2018       CURRENT ALLERGIES: Patient has no known allergies. REVIEW OF SYSTEMS: 14 systems were reviewed. Pertinent positives and negatives as above, all else negative.      Past Surgical History:   Procedure Laterality Date    CARDIAC [facility]    CARDIAC CATHETERIZATION Left 2022    Dr Richar Agudelo/ right ulnar-    COLONOSCOPY N/A 10/04/2022    COLORECTAL CANCER SCREENING, HIGH RISK performed by Atilio Chandler MD at [id number] [place] [address]  10/04/2022    -tortuous colon    CORONARY ANGIOPLASTY  2018    double bypass, [facility],     CORONARY ARTERY BYPASS GRAFT  2018    ESOPHAGOGASTRODUODENOSCOPY  10/04/2022    -bx(neg H-Pylori,duodenum-normal,mild gastritis)    ESOPHAGOGASTRODUODENOSCOPY  2023    -mild gastritis    UPPER GASTROINTESTINAL ENDOSCOPY N/A 10/04/2022    EGD BIOPSY performed by Atiilo Chandler MD at [address] N/A 2023    EGD ESOPHAGOGASTRODUODENOSCOPY performed by Atilio Chandler MD at [address] [facility] History:  Social History     Tobacco Use    Smoking status: Former     Packs/day: 0.25     Years: 30.00     Pack years: 7.50     Types: Cigarettes     Quit date: 2022     Years since quittin.1    Smokeless tobacco: Never   Vaping Use    Vaping Use: Never used   Substance Use Topics    Alcohol use: Not Currently    Drug use: No        CURRENT MEDICATIONS:        Outpatient Medications Marked as Taking for the 2/24/23 encounter (Office Visit) with Jose Hammond MD   Medication Sig Dispense Refill    ranolazine (RANEXA) 500 MG extended release tablet TAKE 1 TABLET BY MOUTH TWICE A  tablet 3    fenofibrate (TRIGLIDE) 160 MG tablet TAKE 1 TABLET BY MOUTH EVERY DAY 90 tablet 0    nitroGLYCERIN (NITROSTAT) 0.4 MG SL tablet Place 1 tablet under the tongue every 5 minutes as needed for Chest pain up to max of 3 total doses. If no relief after 1 dose, call 911. 25 tablet 3    metoprolol succinate (TOPROL XL) 50 MG extended release tablet Take 1 tablet by mouth daily 30 tablet 3    pantoprazole (PROTONIX) 40 MG tablet Take 1 tablet by mouth 2 times daily (before meals) 60 tablet 0    metFORMIN (GLUCOPHAGE) 500 MG tablet Take 1 tablet by mouth 2 times daily (with meals) 180 tablet 3    lisinopril (PRINIVIL;ZESTRIL) 20 MG tablet Take 1 tablet by mouth daily 90 tablet 3    atorvastatin (LIPITOR) 80 MG tablet Take 1 tablet by mouth daily 90 tablet 3    albuterol (ACCUNEB) 0.63 MG/3ML nebulizer solution Take 3 mLs by nebulization every 6 hours as needed for Wheezing 270 mL 3    albuterol sulfate HFA (VENTOLIN HFA) 108 (90 Base) MCG/ACT inhaler Inhale 2 puffs into the lungs 4 times daily as needed for Wheezing 18 g 3    fluticasone (FLOVENT HFA) 110 MCG/ACT inhaler Inhale 1 puff into the lungs 2 times daily 12 g 3    dilTIAZem (CARDIZEM CD) 120 MG extended release capsule Take 1 capsule by mouth daily 90 capsule 3       FAMILY HISTORY: family history includes Asthma in his sister; Colon Cancer in his paternal uncle and paternal uncle; Heart Disease in his father; High Blood Pressure in his father and mother; Pancreatic Cancer in his maternal aunt and maternal aunt. Physical Examination:     /63 (Site: Left Upper Arm, Position: Sitting, Cuff Size: Large Adult)   Pulse 77   Resp 18   Ht 5' 7\" (1.702 m)   Wt 207 lb 6.4 oz (94.1 kg)   SpO2 95%   BMI 32.48 kg/m²  Body mass index is 32.48 kg/m². Constitutional: He is oriented to person, place, and time. He appears well-developed and well-nourished. In no acute distress. HEENT: Normocephalic and atraumatic. No JVD present. Carotid bruit is not present. No mass and no thyromegaly present. No lymphadenopathy present. Cardiovascular: Normal rate, regular rhythm, normal heart sounds. Exam reveals no gallop and no friction rubs. No heart murmur heard. Pulmonary/Chest: Effort normal and breath sounds normal. No respiratory distress. He has no wheezes, rhonchi or rales. Abdominal: Soft, non-tender. Bowel sounds and aorta are normal. He exhibits no organomegaly, mass or bruit. Extremities: No edema. No cyanosis and no clubbing. Pulses are 2+ radial and carotid pulses. 2+ dorsalis pedis and posterior tibial pulses bilaterally. Neurological: He is alert and oriented to person, place, and time. No evidence of gross cranial nerve deficit. Coordination appeared normal.   Skin: Skin is warm and dry. There is no rash or diaphoresis. Psychiatric: He has a normal mood and affect. His speech is normal and behavior is normal.      MOST RECENT LABS ON RECORD:   Lab Results   Component Value Date    WBC 7.2 02/03/2023    HGB 12.1 (L) 02/03/2023    HCT 34.9 (L) 02/03/2023     02/03/2023    CHOL 98 09/12/2022    TRIG 113 09/12/2022    HDL 21 (L) 09/12/2022    ALT 13 02/02/2023    AST 14 02/02/2023     02/02/2023    K 3.8 02/02/2023     02/02/2023    CREATININE 0.88 02/02/2023    BUN 13 02/02/2023    CO2 23 02/02/2023    TSH 2.35 09/12/2022    INR 1.1 01/31/2023    LABA1C 6.3 (H) 09/12/2022       ASSESSMENT:     1. Atypical chest pain    2. Palpitation    3. Dizzy    4. ASHD (arteriosclerotic heart disease)    5. Essential hypertension    6. Mixed hyperlipidemia      PLAN:        Atypical Chest Pain: He has cardiac cath back in January 2022 showed patent graft to RCA,  otherwise nonobstructive CAD.     His stress test on 12/29/2022: unchanged from prior, evidence of inferior myocardial infarction with minimal jazmine-infarction ischemia. Having said his symptoms were worrisome for ischemic etiology particularly that it is slowly getting worse. Cath done on 1/26/23: Severe single vessel disease involving the right coronary artery with Patent SVG graft to RCA. Mild to moderate disease in the LAD and Cx. Normal left ventricular end diastolic pressure. (LVEDP). Concern for upper GI bleeding 1/31/2023: S/P ECG showed mild gastritis. Currently feeling and doing much better. Denies any chest pain, pressure or tightness. Antiplatelet Agent: Continue aspirin 81 mg daily and clopidogrel (Plavix 75 mg daily. I also reminded him to watch for signs of blood in his stool or black tarry stools and stop the medication immediately if this develops as this could be life threatening. Beta Blocker: Continue Metoprolol succinate (Toprol XL) 50 mg daily. Calcium Channel Blocker: Continue [id number] mg daily     Other Anti-anginal medications: Continue Ranolazine (Ranexa) 500 mg twice daily I discussed the potential side effects of this medication including lightheadedness and dizziness and told him to call the office if this occurs. Statin Therapy: Continue atorvastatin (Lipitor) 80 mg nightly. and continue fenofibrate 160 mg daily. Counseling: I advised Mr. Aldair Vergara to call our office or go to the emergency room if he develops worsening or persistent chest pain or shortness of breath as this could be life threatening. Additional Testing List: None    Recurrent intermittent palpitations: Rate Control Symptomatic. CAM  was done on 12/29/2022: Predominant rhythm: Normal sinus rhythm. Atrial tachycardia (AT) 2 episodes, longest 4 beats at average 122 bpm up to 38 bpm, fastest 3 beats at average 137 bpm up to 142 bpm. PAC 0.01%. PVC 2.27%. Seven days and 2 hours recorded.  Sinus rhythm with average heart rate of 88 bpm, ranging between 62 and 125 bpm. Rare PACs with two short runs of ectopic atrial tachycardia, the longest is 4 beats at a heart rate of 122 bpm. Occasional PVCs. No ventricular runs. No current palpitations, he says these are very rare now that he has quit smoking. Beta Blocker: Continue Metoprolol succinate (Toprol XL) 50 mg daily. Calcium Channel Blocker: Continue diltiazem CD (Cardizem CD) 120 mg once daily. Lightheadedness/dizziness: no current light headedness. No falls or near falls. Nonpharmacologic counseling: Because of his condition, I reminded him to try and keep himself well-hydrated and to take extra time when moving from laying to sitting, sitting to standing and standing to walking. I also explained to him to help improve his symptoms he should include 3 g sodium diet, 1 or 2 L of sports drinks daily, knee-high compressions stockings. Atherosclerotic Heart Disease: S/P CABG x1 done on August 11, 2018 SVG to RCA  Repeat cardiac cath on 1/27/2022 showed patent SVG to right PDA, otherwise nonobstructive disease of the left coronary artery system. Antiplatelet Agent: Continue aspirin 81 mg daily and clopidogrel (Plavix) 75 mg daily. I also reminded him to watch for signs of blood in his stool or black tarry stools and stop the medication immediately if this develops as this could be life threatening. Beta Blocker: Continue Metoprolol succinate (Toprol XL) 50 mg daily. Anti-anginal medications: Continue nitroglycerin 0.4 mg tablets as needed for chest pain. Anti-anginal medications: Continue ranolazine (Ranexa) 500 mg twice daily. Cholesterol Reduction Therapy: Continue Atorvastatin (Lipitor) 80 mg daily. and continue fenofibrate 160 mg daily. Essential Hypertension: Controlled  Beta Blocker: Continue Metoprolol succinate (Toprol XL) 50 mg daily. ACE Inibitor/ARB: Continue lisinopril 20 mg daily. Calcium Channel Blocker: Continue diltiazem CD (Cardizem CD) 120 mg once daily.    I told him to monitor his blood pressure and call us with those.     Hyperlipidemia: Mixed, LDL done on 9/12/2022 was 54 mg/dL   Cholesterol Reduction Therapy: Continue Atorvastatin (Lipitor) 80 mg daily.   and continue fenofibrate 160 mg daily.    In the meantime, I encouraged Mr. Melendez to continue to take his other medications.    FOLLOW UP:   I told Mr. Melendez to call my office if he had any problems, but otherwise I asked him to Return in about 6 months (around 8/24/2023). However, I would be happy to see him sooner should the need arise.     Sincerely,  Elijah Orozco MD, F.A.C.C.  [facility] Specialist    [address] [place] [address]  Phone: [phone], Fax: [phone]     I believe that the risk of significant morbidity and mortality related to the patient's current medical conditions are: intermediate-high. Approximately 40 minutes were spent during prep work, discussion and exam of the patient, and follow up documentation and all of their questions were answered.    The documentation recorded by the scribe, accurately and completely reflects the services I personally performed and the decisions made by me. Elijah Orozco MD, F.A.C.C. February 24, 2023

## 2023-02-24 NOTE — PATIENT INSTRUCTIONS
SURVEY:    You may be receiving a survey from [employer] regarding your visit today. Please complete the survey to enable us to provide the highest quality of care to you and your family. If you cannot score us a very good on any question, please call the office to discuss how we could have made your experience a very good one. Thank you.

## 2023-02-27 ENCOUNTER — APPOINTMENT (OUTPATIENT)
Dept: CT IMAGING | Age: 48
End: 2023-02-27
Payer: COMMERCIAL

## 2023-02-27 ENCOUNTER — HOSPITAL ENCOUNTER (EMERGENCY)
Age: 48
Discharge: HOME | End: 2023-02-27
Payer: COMMERCIAL

## 2023-02-27 VITALS
HEART RATE: 88 BPM | TEMPERATURE: 98.3 F | OXYGEN SATURATION: 96 % | DIASTOLIC BLOOD PRESSURE: 81 MMHG | SYSTOLIC BLOOD PRESSURE: 122 MMHG | RESPIRATION RATE: 16 BRPM

## 2023-02-27 DIAGNOSIS — R10.9 ABDOMINAL PAIN, UNSPECIFIED ABDOMINAL LOCATION: ICD-10-CM

## 2023-02-27 DIAGNOSIS — R11.2 NAUSEA AND VOMITING, UNSPECIFIED VOMITING TYPE: Primary | ICD-10-CM

## 2023-02-27 LAB
ABSOLUTE EOS #: 0.7 K/UL (ref 0–0.44)
ABSOLUTE IMMATURE GRANULOCYTE: 0 K/UL (ref 0–0.3)
ABSOLUTE LYMPH #: 2.28 K/UL (ref 1.1–3.7)
ABSOLUTE MONO #: 1.4 K/UL (ref 0.1–1.2)
ALBUMIN SERPL-MCNC: 4.5 G/DL (ref 3.5–5.2)
ALBUMIN/GLOBULIN RATIO: 1.3 (ref 1–2.5)
ALP SERPL-CCNC: 70 U/L (ref 40–129)
ALT SERPL-CCNC: 24 U/L (ref 5–41)
ANION GAP SERPL CALCULATED.3IONS-SCNC: 12 MMOL/L (ref 9–17)
AST SERPL-CCNC: 22 U/L
BACTERIA: ABNORMAL
BASOPHILS # BLD: 0 % (ref 0–2)
BASOPHILS ABSOLUTE: 0 K/UL (ref 0–0.2)
BILIRUB SERPL-MCNC: 0.4 MG/DL (ref 0.3–1.2)
BILIRUBIN URINE: NEGATIVE
BUN SERPL-MCNC: 28 MG/DL (ref 6–20)
BUN/CREAT BLD: 27 (ref 9–20)
CALCIUM SERPL-MCNC: 9.4 MG/DL (ref 8.6–10.4)
CHLORIDE SERPL-SCNC: 106 MMOL/L (ref 98–107)
CO2 SERPL-SCNC: 24 MMOL/L (ref 20–31)
COLOR: YELLOW
CREAT SERPL-MCNC: 1.02 MG/DL (ref 0.7–1.2)
EOSINOPHILS RELATIVE PERCENT: 4 % (ref 1–4)
EPITHELIAL CELLS UA: ABNORMAL /HPF (ref 0–5)
GFR SERPL CREATININE-BSD FRML MDRD: >60 ML/MIN/1.73M2
GLUCOSE SERPL-MCNC: 135 MG/DL (ref 70–99)
GLUCOSE UR STRIP.AUTO-MCNC: NEGATIVE MG/DL
HCT VFR BLD AUTO: 41.9 % (ref 40.7–50.3)
HGB BLD-MCNC: 14.9 G/DL (ref 13–17)
IMMATURE GRANULOCYTES: 0 %
KETONES UR STRIP.AUTO-MCNC: NEGATIVE MG/DL
LACTATE PLASV-SCNC: 1.7 MMOL/L (ref 0.5–2.2)
LEUKOCYTE ESTERASE UR QL STRIP.AUTO: NEGATIVE
LIPASE SERPL-CCNC: 25 U/L (ref 13–60)
LYMPHOCYTES # BLD: 13 % (ref 24–43)
MCH RBC QN AUTO: 31.2 PG (ref 25.2–33.5)
MCHC RBC AUTO-ENTMCNC: 35.6 G/DL (ref 28.4–34.8)
MCV RBC AUTO: 87.7 FL (ref 82.6–102.9)
MONOCYTES # BLD: 8 % (ref 3–12)
MORPHOLOGY: NORMAL
MUCUS: ABNORMAL
NITRITE UR QL STRIP.AUTO: NEGATIVE
NRBC AUTOMATED: 0 PER 100 WBC
PDW BLD-RTO: 13 % (ref 11.8–14.4)
PLATELET # BLD AUTO: 372 K/UL (ref 138–453)
PMV BLD AUTO: 10.6 FL (ref 8.1–13.5)
POTASSIUM SERPL-SCNC: 4 MMOL/L (ref 3.7–5.3)
PROT SERPL-MCNC: 7.9 G/DL (ref 6.4–8.3)
PROT UR STRIP.AUTO-MCNC: 6 MG/DL (ref 5–9)
PROT UR STRIP.AUTO-MCNC: ABNORMAL MG/DL
RBC # BLD: 4.78 M/UL (ref 4.21–5.77)
RBC CLUMPS #/AREA URNS AUTO: ABNORMAL /HPF (ref 0–2)
SEG NEUTROPHILS: 75 % (ref 36–65)
SEGMENTED NEUTROPHILS ABSOLUTE COUNT: 13.12 K/UL (ref 1.5–8.1)
SODIUM SERPL-SCNC: 142 MMOL/L (ref 135–144)
SPECIFIC GRAVITY UA: >1.03 (ref 1.01–1.02)
TURBIDITY: CLEAR
URINE HGB: NEGATIVE
UROBILINOGEN, URINE: NORMAL
WBC # BLD AUTO: 17.5 K/UL (ref 3.5–11.3)
WBC UA: ABNORMAL /HPF (ref 0–5)

## 2023-02-27 PROCEDURE — 81001 URINALYSIS AUTO W/SCOPE: CPT

## 2023-02-27 PROCEDURE — 85025 COMPLETE CBC W/AUTO DIFF WBC: CPT

## 2023-02-27 PROCEDURE — 96375 TX/PRO/DX INJ NEW DRUG ADDON: CPT

## 2023-02-27 PROCEDURE — 83605 ASSAY OF LACTIC ACID: CPT

## 2023-02-27 PROCEDURE — C9113 INJ PANTOPRAZOLE SODIUM, VIA: HCPCS | Performed by: PHYSICIAN ASSISTANT

## 2023-02-27 PROCEDURE — 2580000003 HC RX 258: Performed by: PHYSICIAN ASSISTANT

## 2023-02-27 PROCEDURE — 6360000002 HC RX W HCPCS: Performed by: PHYSICIAN ASSISTANT

## 2023-02-27 PROCEDURE — 6360000004 HC RX CONTRAST MEDICATION: Performed by: PHYSICIAN ASSISTANT

## 2023-02-27 PROCEDURE — 80053 COMPREHEN METABOLIC PANEL: CPT

## 2023-02-27 PROCEDURE — 83690 ASSAY OF LIPASE: CPT

## 2023-02-27 PROCEDURE — 96365 THER/PROPH/DIAG IV INF INIT: CPT

## 2023-02-27 PROCEDURE — 74177 CT ABD & PELVIS W/CONTRAST: CPT

## 2023-02-27 PROCEDURE — 36415 COLL VENOUS BLD VENIPUNCTURE: CPT

## 2023-02-27 PROCEDURE — 99285 EMERGENCY DEPT VISIT HI MDM: CPT

## 2023-02-27 RX ORDER — ONDANSETRON 2 MG/ML
4 INJECTION INTRAMUSCULAR; INTRAVENOUS ONCE
Status: COMPLETED | OUTPATIENT
Start: 2023-02-27 | End: 2023-02-27

## 2023-02-27 RX ORDER — ONDANSETRON 2 MG/ML
4 INJECTION INTRAMUSCULAR; INTRAVENOUS ONCE
Status: DISCONTINUED | OUTPATIENT
Start: 2023-02-27 | End: 2023-02-27

## 2023-02-27 RX ORDER — SODIUM CHLORIDE 9 MG/ML
INJECTION, SOLUTION INTRAVENOUS
Status: DISCONTINUED
Start: 2023-02-27 | End: 2023-02-27 | Stop reason: HOSPADM

## 2023-02-27 RX ORDER — PANTOPRAZOLE SODIUM 40 MG/10ML
INJECTION, POWDER, LYOPHILIZED, FOR SOLUTION INTRAVENOUS
Status: DISCONTINUED
Start: 2023-02-27 | End: 2023-02-27 | Stop reason: HOSPADM

## 2023-02-27 RX ORDER — MORPHINE SULFATE 2 MG/ML
2 INJECTION, SOLUTION INTRAMUSCULAR; INTRAVENOUS ONCE
Status: COMPLETED | OUTPATIENT
Start: 2023-02-27 | End: 2023-02-27

## 2023-02-27 RX ORDER — METOPROLOL SUCCINATE 50 MG/1
TABLET, EXTENDED RELEASE ORAL
Qty: 30 TABLET | Refills: 3 | Status: SHIPPED | OUTPATIENT
Start: 2023-02-27

## 2023-02-27 RX ORDER — PROCHLORPERAZINE EDISYLATE 5 MG/ML
10 INJECTION INTRAMUSCULAR; INTRAVENOUS ONCE
Status: COMPLETED | OUTPATIENT
Start: 2023-02-27 | End: 2023-02-27

## 2023-02-27 RX ORDER — 0.9 % SODIUM CHLORIDE 0.9 %
1000 INTRAVENOUS SOLUTION INTRAVENOUS ONCE
Status: COMPLETED | OUTPATIENT
Start: 2023-02-27 | End: 2023-02-27

## 2023-02-27 RX ORDER — ONDANSETRON 4 MG/1
4 TABLET, FILM COATED ORAL 3 TIMES DAILY PRN
Qty: 15 TABLET | Refills: 0 | Status: SHIPPED | OUTPATIENT
Start: 2023-02-27

## 2023-02-27 RX ORDER — FAMOTIDINE 20 MG/1
TABLET, FILM COATED ORAL
Qty: 60 TABLET | Refills: 3 | Status: SHIPPED | OUTPATIENT
Start: 2023-02-27

## 2023-02-27 RX ADMIN — SODIUM CHLORIDE 40 MG: 9 INJECTION, SOLUTION INTRAVENOUS at 18:15

## 2023-02-27 RX ADMIN — PROCHLORPERAZINE EDISYLATE 10 MG: 5 INJECTION INTRAMUSCULAR; INTRAVENOUS at 18:02

## 2023-02-27 RX ADMIN — SODIUM CHLORIDE 1000 ML: 9 INJECTION, SOLUTION INTRAVENOUS at 16:25

## 2023-02-27 RX ADMIN — HYDROMORPHONE HYDROCHLORIDE 1 MG: 1 INJECTION, SOLUTION INTRAMUSCULAR; INTRAVENOUS; SUBCUTANEOUS at 16:25

## 2023-02-27 RX ADMIN — ONDANSETRON 4 MG: 2 INJECTION INTRAMUSCULAR; INTRAVENOUS at 16:31

## 2023-02-27 RX ADMIN — IOPAMIDOL 75 ML: 755 INJECTION, SOLUTION INTRAVENOUS at 16:42

## 2023-02-27 RX ADMIN — ONDANSETRON 4 MG: 2 INJECTION INTRAMUSCULAR; INTRAVENOUS at 16:10

## 2023-02-27 RX ADMIN — MORPHINE SULFATE 2 MG: 2 INJECTION, SOLUTION INTRAMUSCULAR; INTRAVENOUS at 18:18

## 2023-02-27 ASSESSMENT — PAIN SCALES - GENERAL
PAINLEVEL_OUTOF10: 5
PAINLEVEL_OUTOF10: 6
PAINLEVEL_OUTOF10: 6
PAINLEVEL_OUTOF10: 3

## 2023-02-27 ASSESSMENT — LIFESTYLE VARIABLES
HOW MANY STANDARD DRINKS CONTAINING ALCOHOL DO YOU HAVE ON A TYPICAL DAY: PATIENT DOES NOT DRINK
HOW OFTEN DO YOU HAVE A DRINK CONTAINING ALCOHOL: NEVER

## 2023-02-27 ASSESSMENT — PAIN DESCRIPTION - ORIENTATION: ORIENTATION: MID;UPPER

## 2023-02-27 ASSESSMENT — ENCOUNTER SYMPTOMS
SHORTNESS OF BREATH: 0
RESPIRATORY NEGATIVE: 1
ABDOMINAL PAIN: 1

## 2023-02-27 ASSESSMENT — PAIN - FUNCTIONAL ASSESSMENT: PAIN_FUNCTIONAL_ASSESSMENT: 0-10

## 2023-02-27 ASSESSMENT — PAIN DESCRIPTION - LOCATION
LOCATION: ABDOMEN
LOCATION: ABDOMEN

## 2023-02-27 NOTE — ED PROVIDER NOTES
[facility]  EMERGENCY DEPARTMENT ENCOUNTER      Pt Name: Echo Santos  MRN: [id number]  [place] 1975  Date of evaluation: 2/27/2023  Provider: Cornelio Rendon PA-C    CHIEF COMPLAINT       Chief Complaint   Patient presents with    Emesis     Started today around 1130 am.      Abdominal Pain     Patient states he had a GI bleed a couple of weeks ago. He was taken off of his blood thinner at that time. He restarted his blood thinner again on Friday. HISTORY OF PRESENT ILLNESS   (Location/Symptom, Timing/Onset, Context/Setting, Quality, Duration, Modifying Factors, Severity)  Note limiting factors. Echo Santos is a 56 y.o. male who presents to the emergency department   For evaluation of sudden onset of epigastric right upper quadrant pain beginning around 11 AM with associated nausea vomiting belching that smells foul as patient describes it as \"sulfur like\" smelling burps. Patient reports history of pancreatitis. Patient denies recent EtOH. Notes some of the vomit has been blood-tinged but no copious amounts of blood at been reported. Patient denies history of fever chest pain acute shortness of breath syncopal events urinary symptoms, blood from below or other complaints. Patient had recent history of GI bleed was taken off his Plavix and aspirin daily but recently restarted his medications this past Friday. Some normally blood-tinged vomitus appreciated in emesis basin no glory blood appreciated. HPI    Nursing Notes were reviewed. REVIEW OF SYSTEMS    (2-9 systems for level 4, 10 or more for level 5)     Review of Systems   Constitutional: Negative. Negative for fever. HENT: Negative. Respiratory: Negative. Negative for shortness of breath. Cardiovascular: Negative. Negative for chest pain. Gastrointestinal:  Positive for abdominal pain. See hpi   Genitourinary: Negative. Musculoskeletal: Negative.       Except as noted above the remainder of the review of systems was reviewed and negative. PAST MEDICAL HISTORY     Past Medical History:   Diagnosis Date    CAD (coronary artery disease)     Carpal tunnel syndrome     Depressive disorder, not elsewhere classified     Diabetes mellitus ([facility] [address]     Gastrointestinal hemorrhage with hematemesis 01/31/2023    Heart attack ([facility] [address] 08/11/2018    STEMI    History of echocardiogram 01/08/2019    EF 40-45%. LV cavity sixe is mildly increased. Inferior and inferoseptal wall hypokenesis noted. Mild diastolic dysfunction.     History of pancreatitis 01/31/2023    Hyperlipidemia     Hypertension 2009    PTSD (post-traumatic stress disorder)     uexpected open heart surgery in 2018         SURGICAL HISTORY       Past Surgical History:   Procedure Laterality Date    CARDIAC CATHETERIZATION Left 01/07/2018    Right Ulnar/[employer]/    CARDIAC CATHETERIZATION Left 01/27/2022    Dr Hiram Agudelo/ right ulnar-    COLONOSCOPY N/A 10/04/2022    COLORECTAL CANCER SCREENING, HIGH RISK performed by Lu Triplett MD at [facility]  10/04/2022    -tortuous colon    CORONARY ANGIOPLASTY  08/2018    double bypass, [facility],     CORONARY ARTERY BYPASS GRAFT  2018    ESOPHAGOGASTRODUODENOSCOPY  10/04/2022    -bx(neg H-Pylori,duodenum-normal,mild gastritis)    ESOPHAGOGASTRODUODENOSCOPY  02/01/2023    -mild gastritis    UPPER GASTROINTESTINAL ENDOSCOPY N/A 10/04/2022    EGD BIOPSY performed by Lu Triplett MD at [place] [address]. 02/01/2023    EGD ESOPHAGOGASTRODUODENOSCOPY performed by Lu Triplett MD at [address] [place] [address]       Previous Medications    ALBUTEROL (ACCUNEB) 0.63 MG/3ML NEBULIZER SOLUTION    Take 3 mLs by nebulization every 6 hours as needed for Wheezing    ALBUTEROL SULFATE HFA (VENTOLIN HFA) 108 (90 BASE) MCG/ACT INHALER    Inhale 2 puffs into the lungs 4 times daily as needed for Wheezing    ATORVASTATIN (LIPITOR) 80 MG TABLET    Take 1 tablet by mouth daily    CLOPIDOGREL (PLAVIX) 75 MG TABLET    Take 1 tablet by mouth daily Resume in 2-3 weeks per Cardiology recommendations    DILTIAZEM (CARDIZEM CD) 120 MG EXTENDED RELEASE CAPSULE    Take 1 capsule by mouth daily    FAMOTIDINE (PEPCID) 20 MG TABLET    TAKE 1 TABLET BY MOUTH TWICE A DAY    FENOFIBRATE (TRIGLIDE) 160 MG TABLET    TAKE 1 TABLET BY MOUTH EVERY DAY    FLUTICASONE (FLOVENT HFA) 110 MCG/ACT INHALER    Inhale 1 puff into the lungs 2 times daily    LISINOPRIL (PRINIVIL;ZESTRIL) 20 MG TABLET    Take 1 tablet by mouth daily    METFORMIN (GLUCOPHAGE) 500 MG TABLET    Take 1 tablet by mouth 2 times daily (with meals)    METOPROLOL SUCCINATE (TOPROL XL) 50 MG EXTENDED RELEASE TABLET    TAKE 1 TABLET BY MOUTH EVERY DAY    NITROGLYCERIN (NITROSTAT) 0.4 MG SL TABLET    Place 1 tablet under the tongue every 5 minutes as needed for Chest pain up to max of 3 total doses. If no relief after 1 dose, call 911. PANTOPRAZOLE (PROTONIX) 40 MG TABLET    Take 1 tablet by mouth 2 times daily (before meals)    RANOLAZINE (RANEXA) 500 MG EXTENDED RELEASE TABLET    TAKE 1 TABLET BY MOUTH TWICE A DAY       ALLERGIES     Patient has no known allergies.     FAMILY HISTORY       Family History   Problem Relation Age of Onset    High Blood Pressure Mother     Heart Disease Father     High Blood Pressure Father     Asthma Sister     Colon Cancer Paternal Uncle     Colon Cancer Paternal Uncle     Pancreatic Cancer Maternal Aunt     Pancreatic Cancer Maternal Aunt           SOCIAL HISTORY       Social History     Socioeconomic History    Marital status:      Spouse name: None    Number of children: None    Years of education: None    Highest education level: None   Tobacco Use    Smoking status: Former     Packs/day: 0.25     Years: 30.00     Pack years: 7.50     Types: Cigarettes     Quit date: 2022     Years since quittin.1    Smokeless tobacco: Never   Vaping Use    Vaping Use: Never used   Substance and Sexual Activity    Alcohol use: Not Currently    Drug use: No    Sexual activity: Yes     Partners: Female     Social Determinants of Health     Financial Resource Strain: Low Risk     Difficulty of Paying Living Expenses: Not hard at all   Food Insecurity: No Food Insecurity    Worried About Running Out of Food in the Last Year: Never true    Ran Out of Food in the Last Year: Never true   Transportation Needs: Unknown    Lack of Transportation (Non-Medical): No   Physical Activity: Insufficiently Active    Days of Exercise per Week: 3 days    Minutes of Exercise per Session: 10 min   Intimate Partner Violence: Not At Risk    Fear of Current or Ex-Partner: No    Emotionally Abused: No    Physically Abused: No    Sexually Abused: No   Housing Stability: Unknown    Unstable Housing in the Last Year: No       SCREENINGS         Chely Coma Scale  Eye Opening: Spontaneous  Best Verbal Response: Oriented  Best Motor Response: Obeys commands  Chely Coma Scale Score: 15                     CIWA Assessment  BP: 122/81  Heart Rate: 88                 PHYSICAL EXAM    (up to 7 for level 4, 8 or more for level 5)     ED Triage Vitals [02/27/23 1408]   BP Temp Temp Source Heart Rate Resp SpO2 Height Weight   (!) 127/91 98.3 °F (36.8 °C) Tympanic 88 18 97 % -- --       Physical Exam  Vitals and nursing note reviewed. Constitutional:       General: He is not in acute distress. Appearance: He is well-developed. He is not ill-appearing, toxic-appearing or diaphoretic. HENT:      Head: Normocephalic and atraumatic. Cardiovascular:      Rate and Rhythm: Normal rate. Heart sounds: Normal heart sounds. Pulmonary:      Effort: Pulmonary effort is normal.      Breath sounds: Normal breath sounds. Abdominal:      General: Bowel sounds are normal.      Palpations: Abdomen is soft.       Comments: Epigastric right upper quadrant tenderness palpation with mild voluntary guarding no rebound or peritoneal sign   Skin:     General: Skin is warm and dry. Capillary Refill: Capillary refill takes less than 2 seconds. Neurological:      General: No focal deficit present. Mental Status: He is alert and oriented to person, place, and time. Psychiatric:         Mood and Affect: Mood normal.         Behavior: Behavior normal.       DIAGNOSTIC RESULTS     EKG: All EKG's are interpreted by the Emergency Department Physician who either signs or Co-signs this chart in the absence of a cardiologist.        RADIOLOGY:   Non-plain film images such as CT, Ultrasound and MRI are read by the radiologist. Plain radiographic images are visualized and preliminarily interpreted by the emergency physician with the below findings:        Interpretation per the Radiologist below, if available at the time of this note:    CT ABDOMEN PELVIS W IV CONTRAST Additional Contrast? None   Final Result   No acute pathology within the abdomen and pelvis. No CT evidence of   pancreatitis. Diverticulosis with no evidence of diverticulitis.                ED BEDSIDE ULTRASOUND:   Performed by ED Physician - none    LABS:  Labs Reviewed   CBC WITH AUTO DIFFERENTIAL - Abnormal; Notable for the following components:       Result Value    WBC 17.5 (*)     MCHC 35.6 (*)     Seg Neutrophils 75 (*)     Lymphocytes 13 (*)     Segs Absolute 13.12 (*)     Absolute Mono # 1.40 (*)     Absolute Eos # 0.70 (*)     All other components within normal limits   COMPREHENSIVE METABOLIC PANEL - Abnormal; Notable for the following components:    Glucose 135 (*)     BUN 28 (*)     Bun/Cre Ratio 27 (*)     All other components within normal limits   URINALYSIS WITH MICROSCOPIC - Abnormal; Notable for the following components:    Specific Gravity, UA >1.030 (*)     Protein, UA TRACE (*)     Bacteria, UA TRACE (*)     Mucus, UA 3+ (*)     All other components within normal limits   LACTIC ACID   LIPASE All other labs were within normal range or not returned as of this dictation. EMERGENCY DEPARTMENT COURSE and DIFFERENTIAL DIAGNOSIS/MDM:   Vitals:    Vitals:    02/27/23 1701 02/27/23 1711 02/27/23 1735 02/27/23 1736   BP: 130/76 127/85  122/81   Pulse:       Resp:   16    Temp:       TempSrc:       SpO2: 95% 96%  96%           Medical Decision Making  Amount and/or Complexity of Data Reviewed  Labs: ordered. Radiology: ordered. Risk  Prescription drug management. 43-year-old nontoxic-appearing male presents to emergency department for evaluation nausea vomiting with epigastric pain somewhat similar to prior pancreatitis per patient. Patient denies history of fever chest pain or shortness of breath. Laboratory studies along with advanced imaging will be obtained to rule out acute abnormality. Laboratory studies reviewed and interpreted along with radiology results of abdomen pelvis CT interpretation    REASSESSMENT      Patient has had uncomplicated ER course patient reevaluated 1846 hrs. patient reports improvement in symptoms at this time. I discussed with patient need to follow-up with primary care doctor. Patient agreement plan return precautions discussed. CRITICAL CARE TIME   Total Critical Care time was  minutes, excluding separately reportable procedures. There was a high probability of clinically significant/life threatening deterioration in the patient's condition which required my urgent intervention. CONSULTS:  None    PROCEDURES:  Unless otherwise noted below, none     Procedures        FINAL IMPRESSION      1. Nausea and vomiting, unspecified vomiting type Stable   2. Abdominal pain, unspecified abdominal location Stable         DISPOSITION/PLAN   DISPOSITION Decision To Discharge 02/27/2023 06:48:44 PM      PATIENT REFERRED TO:  No follow-up provider specified.     DISCHARGE MEDICATIONS:  New Prescriptions    ONDANSETRON (ZOFRAN) 4 MG TABLET    Take 1 tablet by mouth 3 times daily as needed for Nausea or Vomiting     Controlled Substances Monitoring:     RX Monitoring 3/2/2019   Attestation The Prescription Monitoring Report for this patient was reviewed today. Acute Pain Prescriptions Prescription exceeds daily limit for a specific reason. See comments or note. ;Severe pain not adequately treated with lower dose.        (Please note that portions of this note were completed with a voice recognition program.  Efforts were made to edit the dictations but occasionally words are mis-transcribed.)    Na Bey PA-C (electronically signed)  Attending Emergency Physician           Na Bey PA-C  02/27/23 [address], [place]  02/27/23 [address], [place]  02/27/23 5235

## 2023-02-27 NOTE — Clinical Note
Lynn Burk was seen and treated in our emergency department on 2/27/2023. He may return to work on . If you have any questions or concerns, please don't hesitate to call.       Kyle Bearden PA-C

## 2023-02-27 NOTE — DISCHARGE INSTRUCTIONS
Follow-up with primary care doctor 5 to 7 days for reevaluation. Take Zofran for nausea. Take Tylenol as directed for any discomfort. Continue to drink plenty of fluids as tolerated. Promptly return to emergency department for new, changing, worsening of symptoms or other concerns.

## 2023-02-27 NOTE — Clinical Note
Alonzo Arita was seen and treated in our emergency department on 2/27/2023. He may return to work on . If you have any questions or concerns, please don't hesitate to call.       Rima Martinez PA-C

## 2023-02-27 NOTE — Clinical Note
Narcisa Caldwell was seen and treated in our emergency department on 2/27/2023. He may return to work on 03/01/2023. If you have any questions or concerns, please don't hesitate to call.       Jesus Cosme PA-C

## 2023-03-15 PROBLEM — Z09 HOSPITAL DISCHARGE FOLLOW-UP: Status: RESOLVED | Noted: 2023-02-13 | Resolved: 2023-03-15

## 2023-03-22 ENCOUNTER — OFFICE VISIT (OUTPATIENT)
Dept: PRIMARY CARE CLINIC | Age: 48
End: 2023-03-22
Payer: COMMERCIAL

## 2023-03-22 VITALS
BODY MASS INDEX: 32.33 KG/M2 | WEIGHT: 206 LBS | HEART RATE: 72 BPM | DIASTOLIC BLOOD PRESSURE: 70 MMHG | SYSTOLIC BLOOD PRESSURE: 118 MMHG | RESPIRATION RATE: 18 BRPM | TEMPERATURE: 97.9 F | OXYGEN SATURATION: 98 % | HEIGHT: 67 IN

## 2023-03-22 DIAGNOSIS — H57.12 LEFT EYE PAIN: Primary | ICD-10-CM

## 2023-03-22 PROCEDURE — 3078F DIAST BP <80 MM HG: CPT | Performed by: NURSE PRACTITIONER

## 2023-03-22 PROCEDURE — 99214 OFFICE O/P EST MOD 30 MIN: CPT | Performed by: NURSE PRACTITIONER

## 2023-03-22 PROCEDURE — 3074F SYST BP LT 130 MM HG: CPT | Performed by: NURSE PRACTITIONER

## 2023-03-22 RX ORDER — ERYTHROMYCIN 5 MG/G
OINTMENT OPHTHALMIC
Qty: 1 EACH | Refills: 0 | Status: SHIPPED | OUTPATIENT
Start: 2023-03-22 | End: 2023-04-01

## 2023-03-22 ASSESSMENT — ENCOUNTER SYMPTOMS
EYE PAIN: 1
EYE REDNESS: 1
EYE DISCHARGE: 1

## 2023-03-22 NOTE — PROGRESS NOTES
Name: Toan Harding  : 1975         Chief Complaint:     Chief Complaint   Patient presents with    Swelling     Left eye swelling, dry, painful. Started this morning. History of Present Illness:      Toan Harding is a 56 y.o.  male who presents with Swelling (Left eye swelling, dry, painful. Started this morning. )      HPI    The patient presents with left eye swelling, pain, and dryness that began this morning. Pain is only present left eye. Pain described as \"pressure\". Admits left upper eyelid pain and swelling. Denies injury to the eye. Admits pain to the eye with EOMs. He has been using polyvinyl lubricating drops without benefit. Denies fever or chills. Admits abnormal vision baseline d/t prediabetes. However, he states left eye vision has been increasingly blurry within the last day. Admits yellow crusting to the eye this morning. He is following with [facility] and Dr. Milly Pastor. Past Medical History:     Past Medical History:   Diagnosis Date    CAD (coronary artery disease)     Carpal tunnel syndrome     Depressive disorder, not elsewhere classified     Diabetes mellitus ([address]     Gastrointestinal hemorrhage with hematemesis 2023    Heart attack ([facility] [address] 2018    STEMI    History of echocardiogram 2019    EF 40-45%. LV cavity sixe is mildly increased. Inferior and inferoseptal wall hypokenesis noted. Mild diastolic dysfunction.     History of pancreatitis 2023    Hyperlipidemia     Hypertension     PTSD (post-traumatic stress disorder)     uexpected open heart surgery in 2018      Reviewed all health maintenance requirements and ordered appropriate tests  Health Maintenance Due   Topic Date Due    Diabetic foot exam  Never done    Diabetic Alb to Cr ratio (uACR) test  Never done    Diabetic retinal exam  Never done       Past Surgical History:     Past Surgical History:   Procedure Laterality Date    CARDIAC CATHETERIZATION Left 2018

## 2023-03-23 NOTE — PATIENT INSTRUCTIONS
SURVEY:    You may be receiving a survey from [employer] regarding your visit today. Please complete the survey to enable us to provide the highest quality of care to you and your family. If you cannot score us a very good on any question, please call the office to discuss how we could have made your experience a very good one. Thank you.

## 2023-04-24 RX ORDER — PANTOPRAZOLE SODIUM 40 MG/1
TABLET, DELAYED RELEASE ORAL
Qty: 60 TABLET | Refills: 2 | Status: SHIPPED | OUTPATIENT
Start: 2023-04-24

## 2023-05-10 DIAGNOSIS — E78.5 HYPERLIPIDEMIA, UNSPECIFIED HYPERLIPIDEMIA TYPE: ICD-10-CM

## 2023-05-11 RX ORDER — FENOFIBRATE 160 MG/1
TABLET ORAL
Qty: 90 TABLET | Refills: 0 | Status: SHIPPED | OUTPATIENT
Start: 2023-05-11

## 2023-05-15 ENCOUNTER — OFFICE VISIT (OUTPATIENT)
Dept: PRIMARY CARE CLINIC | Age: 48
End: 2023-05-15
Payer: COMMERCIAL

## 2023-05-15 ENCOUNTER — HOSPITAL ENCOUNTER (OUTPATIENT)
Dept: GENERAL RADIOLOGY | Age: 48
Discharge: HOME | End: 2023-05-17
Payer: COMMERCIAL

## 2023-05-15 ENCOUNTER — HOSPITAL ENCOUNTER (OUTPATIENT)
Age: 48
Discharge: HOME | End: 2023-05-17
Payer: COMMERCIAL

## 2023-05-15 VITALS
HEART RATE: 74 BPM | SYSTOLIC BLOOD PRESSURE: 112 MMHG | RESPIRATION RATE: 16 BRPM | WEIGHT: 208.6 LBS | DIASTOLIC BLOOD PRESSURE: 74 MMHG | BODY MASS INDEX: 32.67 KG/M2 | OXYGEN SATURATION: 98 %

## 2023-05-15 DIAGNOSIS — M25.511 ACUTE PAIN OF RIGHT SHOULDER: ICD-10-CM

## 2023-05-15 DIAGNOSIS — M25.511 ACUTE PAIN OF RIGHT SHOULDER: Primary | ICD-10-CM

## 2023-05-15 PROCEDURE — 99214 OFFICE O/P EST MOD 30 MIN: CPT | Performed by: STUDENT IN AN ORGANIZED HEALTH CARE EDUCATION/TRAINING PROGRAM

## 2023-05-15 PROCEDURE — 73030 X-RAY EXAM OF SHOULDER: CPT

## 2023-05-15 PROCEDURE — 3078F DIAST BP <80 MM HG: CPT | Performed by: STUDENT IN AN ORGANIZED HEALTH CARE EDUCATION/TRAINING PROGRAM

## 2023-05-15 PROCEDURE — 3074F SYST BP LT 130 MM HG: CPT | Performed by: STUDENT IN AN ORGANIZED HEALTH CARE EDUCATION/TRAINING PROGRAM

## 2023-05-15 RX ORDER — TIZANIDINE 4 MG/1
4 TABLET ORAL 3 TIMES DAILY PRN
Qty: 42 TABLET | Refills: 0 | Status: SHIPPED | OUTPATIENT
Start: 2023-05-15 | End: 2023-05-29

## 2023-05-15 RX ORDER — ALPRAZOLAM 0.25 MG/1
0.25 TABLET ORAL 2 TIMES DAILY PRN
Qty: 60 TABLET | Refills: 0 | Status: CANCELLED | OUTPATIENT
Start: 2023-05-15 | End: 2023-06-14

## 2023-05-15 RX ORDER — CELECOXIB 100 MG/1
100 CAPSULE ORAL 2 TIMES DAILY
Qty: 60 CAPSULE | Refills: 0 | Status: SHIPPED | OUTPATIENT
Start: 2023-05-15

## 2023-05-15 NOTE — PROGRESS NOTES
[address], [address] , [facility], [address] [place] [address] is a 56 y.o. male with  has a past medical history of CAD (coronary artery disease), Carpal tunnel syndrome, Depressive disorder, not elsewhere classified, Diabetes mellitus ([facility] [address] Gastrointestinal hemorrhage with hematemesis, Heart attack ([facility] [address] History of echocardiogram, History of pancreatitis, Hyperlipidemia, Hypertension, and PTSD (post-traumatic stress disorder). Presented to the office today for:  Chief Complaint   Patient presents with    Chest Pain    Dizziness       Assessment/Plan   1. Acute pain of right shoulder  -     celecoxib (CELEBREX) 100 MG capsule; Take 1 capsule by mouth 2 times daily, Disp-60 capsule, R-0Normal  -     tiZANidine (ZANAFLEX) 4 MG tablet; Take 1 tablet by mouth 3 times daily as needed (muscle spasms), Disp-42 tablet, R-0Normal  -     XR SHOULDER RIGHT (MIN 2 VIEWS); Future  -     [facility] [place]    Return in about 2 months (around 7/15/2023) for F/U Shoulder. We discussed some of the etiologies and natural histories. We discussed the various treatment alternatives including anti-inflammatory medications, physical therapy, injections, further imaging studies and as a last resort surgery. Continue to monitor chest wall pain at this time, resolved. Labs, imaging, EKG deferred at this time per patient preference. F/U with Cardiology per prior plans. All patient questions answered. Pt voiced understanding. There are no discontinued medications. Patient received counseling on the following healthy behaviors: nutrition, exercise and medication adherence. I encouraged and discussed lifestyle modifications including diet and exercise and the patient was agreeable to making positive/beneficial changes to both to help improve their overall health. Discussed use, benefit, and side effects of prescribed medications.   Barriers to medication compliance

## 2023-05-16 ENCOUNTER — TELEPHONE (OUTPATIENT)
Dept: PRIMARY CARE CLINIC | Age: 48
End: 2023-05-16

## 2023-05-16 DIAGNOSIS — M25.511 ACUTE PAIN OF RIGHT SHOULDER: Primary | ICD-10-CM

## 2023-05-16 NOTE — TELEPHONE ENCOUNTER
PATIENT WOULD LIKE YOU TO WRITE A NOTE STATING HE IS OFF OF WORK UNTIL FURTHER NOTICE.          FAX TO :[phone]

## 2023-05-16 NOTE — TELEPHONE ENCOUNTER
Hello, we can provide Franky with this - please have him send the forms. If the pain is this severe, what are his thoughts on being evaluated by Orthopedics - this is my recommendation - please Pend the referral if agreeable, thank you.  Electronically signed by Curtis Chavarria MD on 5/16/2023 at 9:08 AM

## 2023-05-16 NOTE — TELEPHONE ENCOUNTER
Hello, yes please provide this letter, thank you.   Electronically signed by William Dye MD on 5/16/2023 at 4:16 PM

## 2023-05-16 NOTE — TELEPHONE ENCOUNTER
Patient calls in stating that he has lost a huge amount of mobility in his shoulder since his visit yesterday. States he is unable to go to work. Requesting FMLA. Please advise.

## 2023-05-16 NOTE — TELEPHONE ENCOUNTER
Hello, I would want him to be evaluated by Ortho  - please pend the referral, thank you.   Electronically signed by Lex Gaytan MD on 5/16/2023 at 12:50 PM

## 2023-05-18 ENCOUNTER — HOSPITAL ENCOUNTER (OUTPATIENT)
Dept: PHYSICAL THERAPY | Age: 48
Setting detail: THERAPIES SERIES
Discharge: HOME | End: 2023-05-18
Payer: COMMERCIAL

## 2023-05-18 PROCEDURE — 97161 PT EVAL LOW COMPLEX 20 MIN: CPT

## 2023-05-18 PROCEDURE — G0283 ELEC STIM OTHER THAN WOUND: HCPCS

## 2023-05-25 RX ORDER — FAMOTIDINE 20 MG/1
TABLET, FILM COATED ORAL
Qty: 180 TABLET | Refills: 1 | Status: SHIPPED | OUTPATIENT
Start: 2023-05-25

## 2023-05-26 RX ORDER — METOPROLOL SUCCINATE 50 MG/1
TABLET, EXTENDED RELEASE ORAL
Qty: 90 TABLET | Refills: 1 | Status: SHIPPED | OUTPATIENT
Start: 2023-05-26

## 2023-06-05 ENCOUNTER — HOSPITAL ENCOUNTER (OUTPATIENT)
Dept: PHYSICAL THERAPY | Age: 48
Setting detail: THERAPIES SERIES
Discharge: HOME | End: 2023-06-05

## 2023-06-05 NOTE — PROGRESS NOTES
[facility]  Inpatient/Observation/Outpatient Rehabilitation    Date: 2023  Patient Name: Stephen Coon      [x]  Outpatient  : 23 Plan of Care/Recert ends    [x] Pt no showed for scheduled appointment  Pt called for status update. He reports he was put on a steroid dose pack and reports he is starting to feel better. He reports he is continuing with his HEP and rescheduled this appointment for 23.     Keara Cervantes, PT, DPT Date: 2023

## 2023-06-07 ENCOUNTER — HOSPITAL ENCOUNTER (OUTPATIENT)
Dept: PHYSICAL THERAPY | Age: 48
Setting detail: THERAPIES SERIES
Discharge: HOME | End: 2023-06-07

## 2023-06-07 NOTE — PROGRESS NOTES
[facility]  Inpatient/Observation/Outpatient Rehabilitation    Date: 2023  Patient Name: Laury Montana       [] Inpatient Acute/Observation       [x]  Outpatient  : 1975       Plan of Care/Recert ends    [x] Pt no showed for scheduled appointment    [] Pt refused/declined therapy at this time due to:           [] Pt cancelled due to:  [] No Reason Given   [] Sick/ill   [] Other:    Therapist/Assistant will attempt to see this patient, at our earliest opportunity.        Jenifer Covarrubias Date: 2023

## 2023-06-11 DIAGNOSIS — M25.511 ACUTE PAIN OF RIGHT SHOULDER: ICD-10-CM

## 2023-06-12 RX ORDER — CELECOXIB 100 MG/1
100 CAPSULE ORAL 2 TIMES DAILY PRN
Qty: 60 CAPSULE | Refills: 0 | Status: SHIPPED | OUTPATIENT
Start: 2023-06-12

## 2023-06-27 DIAGNOSIS — R94.39 ABNORMAL STRESS TEST: ICD-10-CM

## 2023-06-27 DIAGNOSIS — I10 ESSENTIAL HYPERTENSION: ICD-10-CM

## 2023-06-27 DIAGNOSIS — E78.5 DYSLIPIDEMIA: ICD-10-CM

## 2023-06-27 DIAGNOSIS — R07.89 ATYPICAL CHEST PAIN: ICD-10-CM

## 2023-06-27 DIAGNOSIS — R00.2 PALPITATION: ICD-10-CM

## 2023-06-27 DIAGNOSIS — Z95.1 S/P CABG (CORONARY ARTERY BYPASS GRAFT): ICD-10-CM

## 2023-06-27 DIAGNOSIS — I25.10 ASHD (ARTERIOSCLEROTIC HEART DISEASE): ICD-10-CM

## 2023-06-27 RX ORDER — DILTIAZEM HYDROCHLORIDE 120 MG/1
CAPSULE, COATED, EXTENDED RELEASE ORAL
Qty: 90 CAPSULE | Refills: 3 | Status: SHIPPED | OUTPATIENT
Start: 2023-06-27

## 2023-08-07 ENCOUNTER — HOSPITAL ENCOUNTER (OUTPATIENT)
Age: 48
Setting detail: OBSERVATION
Discharge: HOME | End: 2023-08-09
Attending: EMERGENCY MEDICINE | Admitting: STUDENT IN AN ORGANIZED HEALTH CARE EDUCATION/TRAINING PROGRAM
Payer: COMMERCIAL

## 2023-08-07 ENCOUNTER — APPOINTMENT (OUTPATIENT)
Dept: GENERAL RADIOLOGY | Age: 48
End: 2023-08-07
Payer: COMMERCIAL

## 2023-08-07 ENCOUNTER — OFFICE VISIT (OUTPATIENT)
Dept: PRIMARY CARE CLINIC | Age: 48
End: 2023-08-07
Payer: COMMERCIAL

## 2023-08-07 VITALS
DIASTOLIC BLOOD PRESSURE: 76 MMHG | OXYGEN SATURATION: 96 % | HEART RATE: 88 BPM | WEIGHT: 212.4 LBS | HEIGHT: 67 IN | SYSTOLIC BLOOD PRESSURE: 106 MMHG | BODY MASS INDEX: 33.34 KG/M2

## 2023-08-07 DIAGNOSIS — R07.9 CHEST PAIN, UNSPECIFIED TYPE: Primary | ICD-10-CM

## 2023-08-07 DIAGNOSIS — R07.89 CHEST WALL PAIN: ICD-10-CM

## 2023-08-07 DIAGNOSIS — I20.0 UNSTABLE ANGINA (HCC): ICD-10-CM

## 2023-08-07 DIAGNOSIS — R07.9 CHEST PAIN: ICD-10-CM

## 2023-08-07 LAB
ANION GAP SERPL CALCULATED.3IONS-SCNC: 14 MMOL/L (ref 9–17)
BASOPHILS # BLD: 0.33 K/UL (ref 0–0.2)
BASOPHILS NFR BLD: 2 % (ref 0–2)
BILIRUB UR QL STRIP: NEGATIVE
BUN SERPL-MCNC: 20 MG/DL (ref 6–20)
BUN/CREAT SERPL: 22 (ref 9–20)
CALCIUM SERPL-MCNC: 10 MG/DL (ref 8.6–10.4)
CASTS #/AREA URNS LPF: ABNORMAL /LPF
CHLORIDE SERPL-SCNC: 101 MMOL/L (ref 98–107)
CLARITY UR: CLEAR
CO2 SERPL-SCNC: 22 MMOL/L (ref 20–31)
COLOR UR: YELLOW
CREAT SERPL-MCNC: 0.9 MG/DL (ref 0.7–1.2)
EKG ATRIAL RATE: 70 BPM
EKG P AXIS: 20 DEGREES
EKG P-R INTERVAL: 156 MS
EKG Q-T INTERVAL: 388 MS
EKG QRS DURATION: 94 MS
EKG QTC CALCULATION (BAZETT): 419 MS
EKG R AXIS: 66 DEGREES
EKG T AXIS: 33 DEGREES
EKG VENTRICULAR RATE: 70 BPM
EOSINOPHIL # BLD: 0 K/UL (ref 0–0.44)
EOSINOPHILS RELATIVE PERCENT: 0 % (ref 1–4)
EPI CELLS #/AREA URNS HPF: ABNORMAL /HPF (ref 0–5)
ERYTHROCYTE [DISTWIDTH] IN BLOOD BY AUTOMATED COUNT: 12.3 % (ref 11.8–14.4)
GFR SERPL CREATININE-BSD FRML MDRD: >60 ML/MIN/1.73M2
GLUCOSE SERPL-MCNC: 197 MG/DL (ref 70–99)
GLUCOSE UR STRIP-MCNC: NEGATIVE MG/DL
HCT VFR BLD AUTO: 42 % (ref 40.7–50.3)
HGB BLD-MCNC: 14.8 G/DL (ref 13–17)
HGB UR QL STRIP.AUTO: NEGATIVE
IMM GRANULOCYTES # BLD AUTO: 0 K/UL (ref 0–0.3)
IMM GRANULOCYTES NFR BLD: 0 %
INR PPP: 1
INR PPP: NORMAL
KETONES UR STRIP-MCNC: NEGATIVE MG/DL
LEUKOCYTE ESTERASE UR QL STRIP: NEGATIVE
LYMPHOCYTES NFR BLD: 3.51 K/UL (ref 1.1–3.7)
LYMPHOCYTES RELATIVE PERCENT: 21 % (ref 24–43)
MCH RBC QN AUTO: 30.8 PG (ref 25.2–33.5)
MCHC RBC AUTO-ENTMCNC: 35.2 G/DL (ref 28.4–34.8)
MCV RBC AUTO: 87.3 FL (ref 82.6–102.9)
MONOCYTES NFR BLD: 1.17 K/UL (ref 0.1–1.2)
MONOCYTES NFR BLD: 7 % (ref 3–12)
MORPHOLOGY: NORMAL
MUCOUS THREADS URNS QL MICRO: ABNORMAL
NEUTROPHILS NFR BLD: 70 % (ref 36–65)
NEUTS SEG NFR BLD: 11.69 K/UL (ref 1.5–8.1)
NITRITE UR QL STRIP: NEGATIVE
NRBC BLD-RTO: 0 PER 100 WBC
PH UR STRIP: 5.5 [PH] (ref 5–9)
PLATELET # BLD AUTO: 323 K/UL (ref 138–453)
PMV BLD AUTO: 10.4 FL (ref 8.1–13.5)
POTASSIUM SERPL-SCNC: 4.2 MMOL/L (ref 3.7–5.3)
PROT UR STRIP-MCNC: ABNORMAL MG/DL
PROTHROMBIN TIME: 13.7 SEC (ref 11.9–14.8)
PROTHROMBIN TIME: NORMAL SEC (ref 11.9–14.8)
RBC # BLD AUTO: 4.81 M/UL (ref 4.21–5.77)
RBC #/AREA URNS HPF: ABNORMAL /HPF (ref 0–2)
SODIUM SERPL-SCNC: 137 MMOL/L (ref 135–144)
SP GR UR STRIP: >1.03 (ref 1.01–1.02)
TROPONIN I SERPL HS-MCNC: 10 NG/L (ref 0–22)
TSH SERPL DL<=0.05 MIU/L-ACNC: 0.9 UIU/ML (ref 0.3–5)
UROBILINOGEN UR STRIP-ACNC: NORMAL EU/DL (ref 0–1)
WBC #/AREA URNS HPF: ABNORMAL /HPF (ref 0–5)
WBC OTHER # BLD: 16.7 K/UL (ref 3.5–11.3)

## 2023-08-07 PROCEDURE — 96361 HYDRATE IV INFUSION ADD-ON: CPT

## 2023-08-07 PROCEDURE — 84484 ASSAY OF TROPONIN QUANT: CPT

## 2023-08-07 PROCEDURE — 6370000000 HC RX 637 (ALT 250 FOR IP): Performed by: STUDENT IN AN ORGANIZED HEALTH CARE EDUCATION/TRAINING PROGRAM

## 2023-08-07 PROCEDURE — 94761 N-INVAS EAR/PLS OXIMETRY MLT: CPT

## 2023-08-07 PROCEDURE — G0378 HOSPITAL OBSERVATION PER HR: HCPCS

## 2023-08-07 PROCEDURE — 80048 BASIC METABOLIC PNL TOTAL CA: CPT

## 2023-08-07 PROCEDURE — 84443 ASSAY THYROID STIM HORMONE: CPT

## 2023-08-07 PROCEDURE — 99285 EMERGENCY DEPT VISIT HI MDM: CPT

## 2023-08-07 PROCEDURE — 99214 OFFICE O/P EST MOD 30 MIN: CPT | Performed by: FAMILY MEDICINE

## 2023-08-07 PROCEDURE — 96374 THER/PROPH/DIAG INJ IV PUSH: CPT

## 2023-08-07 PROCEDURE — 96372 THER/PROPH/DIAG INJ SC/IM: CPT

## 2023-08-07 PROCEDURE — 85025 COMPLETE CBC W/AUTO DIFF WBC: CPT

## 2023-08-07 PROCEDURE — 93000 ELECTROCARDIOGRAM COMPLETE: CPT | Performed by: FAMILY MEDICINE

## 2023-08-07 PROCEDURE — 96376 TX/PRO/DX INJ SAME DRUG ADON: CPT

## 2023-08-07 PROCEDURE — 3074F SYST BP LT 130 MM HG: CPT | Performed by: FAMILY MEDICINE

## 2023-08-07 PROCEDURE — 3078F DIAST BP <80 MM HG: CPT | Performed by: FAMILY MEDICINE

## 2023-08-07 PROCEDURE — 6360000002 HC RX W HCPCS: Performed by: EMERGENCY MEDICINE

## 2023-08-07 PROCEDURE — 94664 DEMO&/EVAL PT USE INHALER: CPT

## 2023-08-07 PROCEDURE — 93010 ELECTROCARDIOGRAM REPORT: CPT | Performed by: FAMILY MEDICINE

## 2023-08-07 PROCEDURE — 6370000000 HC RX 637 (ALT 250 FOR IP): Performed by: EMERGENCY MEDICINE

## 2023-08-07 PROCEDURE — 6360000002 HC RX W HCPCS: Performed by: STUDENT IN AN ORGANIZED HEALTH CARE EDUCATION/TRAINING PROGRAM

## 2023-08-07 PROCEDURE — 85610 PROTHROMBIN TIME: CPT

## 2023-08-07 PROCEDURE — 71045 X-RAY EXAM CHEST 1 VIEW: CPT

## 2023-08-07 PROCEDURE — 93005 ELECTROCARDIOGRAM TRACING: CPT | Performed by: EMERGENCY MEDICINE

## 2023-08-07 PROCEDURE — 81001 URINALYSIS AUTO W/SCOPE: CPT

## 2023-08-07 PROCEDURE — 36415 COLL VENOUS BLD VENIPUNCTURE: CPT

## 2023-08-07 PROCEDURE — 6360000002 HC RX W HCPCS: Performed by: INTERNAL MEDICINE

## 2023-08-07 PROCEDURE — 2580000003 HC RX 258: Performed by: STUDENT IN AN ORGANIZED HEALTH CARE EDUCATION/TRAINING PROGRAM

## 2023-08-07 RX ORDER — FLUTICASONE PROPIONATE 110 UG/1
1 AEROSOL, METERED RESPIRATORY (INHALATION) PRN
Status: DISCONTINUED | OUTPATIENT
Start: 2023-08-07 | End: 2023-08-09 | Stop reason: HOSPADM

## 2023-08-07 RX ORDER — POTASSIUM CHLORIDE 20 MEQ/1
40 TABLET, EXTENDED RELEASE ORAL PRN
Status: DISCONTINUED | OUTPATIENT
Start: 2023-08-07 | End: 2023-08-09 | Stop reason: HOSPADM

## 2023-08-07 RX ORDER — TIZANIDINE 4 MG/1
4 TABLET ORAL EVERY 6 HOURS PRN
Status: DISCONTINUED | OUTPATIENT
Start: 2023-08-07 | End: 2023-08-09 | Stop reason: HOSPADM

## 2023-08-07 RX ORDER — NITROGLYCERIN 0.4 MG/1
0.4 TABLET SUBLINGUAL ONCE
Status: COMPLETED | OUTPATIENT
Start: 2023-08-07 | End: 2023-08-07

## 2023-08-07 RX ORDER — NITROGLYCERIN 0.4 MG/1
0.4 TABLET SUBLINGUAL EVERY 5 MIN PRN
Status: DISCONTINUED | OUTPATIENT
Start: 2023-08-07 | End: 2023-08-09 | Stop reason: SDUPTHER

## 2023-08-07 RX ORDER — CLOPIDOGREL BISULFATE 75 MG/1
75 TABLET ORAL DAILY
Status: DISCONTINUED | OUTPATIENT
Start: 2023-08-07 | End: 2023-08-09 | Stop reason: HOSPADM

## 2023-08-07 RX ORDER — MORPHINE SULFATE 2 MG/ML
2 INJECTION, SOLUTION INTRAMUSCULAR; INTRAVENOUS ONCE
Status: COMPLETED | OUTPATIENT
Start: 2023-08-07 | End: 2023-08-07

## 2023-08-07 RX ORDER — DILTIAZEM HYDROCHLORIDE 120 MG/1
120 CAPSULE, COATED, EXTENDED RELEASE ORAL DAILY
Status: DISCONTINUED | OUTPATIENT
Start: 2023-08-07 | End: 2023-08-09 | Stop reason: HOSPADM

## 2023-08-07 RX ORDER — PROMETHAZINE HYDROCHLORIDE 25 MG/ML
6.25 INJECTION, SOLUTION INTRAMUSCULAR; INTRAVENOUS EVERY 6 HOURS PRN
Status: DISCONTINUED | OUTPATIENT
Start: 2023-08-07 | End: 2023-08-09 | Stop reason: HOSPADM

## 2023-08-07 RX ORDER — ALBUTEROL SULFATE 90 UG/1
2 AEROSOL, METERED RESPIRATORY (INHALATION) 4 TIMES DAILY PRN
Status: DISCONTINUED | OUTPATIENT
Start: 2023-08-07 | End: 2023-08-09 | Stop reason: HOSPADM

## 2023-08-07 RX ORDER — ONDANSETRON 4 MG/1
4 TABLET, ORALLY DISINTEGRATING ORAL EVERY 8 HOURS PRN
Status: DISCONTINUED | OUTPATIENT
Start: 2023-08-07 | End: 2023-08-09 | Stop reason: HOSPADM

## 2023-08-07 RX ORDER — MAGNESIUM SULFATE IN WATER 40 MG/ML
2000 INJECTION, SOLUTION INTRAVENOUS PRN
Status: DISCONTINUED | OUTPATIENT
Start: 2023-08-07 | End: 2023-08-09 | Stop reason: HOSPADM

## 2023-08-07 RX ORDER — MORPHINE SULFATE 4 MG/ML
4 INJECTION, SOLUTION INTRAMUSCULAR; INTRAVENOUS
Status: DISCONTINUED | OUTPATIENT
Start: 2023-08-07 | End: 2023-08-09 | Stop reason: HOSPADM

## 2023-08-07 RX ORDER — FENOFIBRATE 160 MG/1
160 TABLET ORAL DAILY
Status: DISCONTINUED | OUTPATIENT
Start: 2023-08-07 | End: 2023-08-09 | Stop reason: HOSPADM

## 2023-08-07 RX ORDER — RANOLAZINE 500 MG/1
500 TABLET, EXTENDED RELEASE ORAL 2 TIMES DAILY
Status: DISCONTINUED | OUTPATIENT
Start: 2023-08-07 | End: 2023-08-09 | Stop reason: HOSPADM

## 2023-08-07 RX ORDER — ONDANSETRON 2 MG/ML
4 INJECTION INTRAMUSCULAR; INTRAVENOUS EVERY 6 HOURS PRN
Status: DISCONTINUED | OUTPATIENT
Start: 2023-08-07 | End: 2023-08-09 | Stop reason: HOSPADM

## 2023-08-07 RX ORDER — ONDANSETRON 2 MG/ML
4 INJECTION INTRAMUSCULAR; INTRAVENOUS ONCE
Status: COMPLETED | OUTPATIENT
Start: 2023-08-07 | End: 2023-08-07

## 2023-08-07 RX ORDER — SODIUM CHLORIDE 9 MG/ML
INJECTION, SOLUTION INTRAVENOUS CONTINUOUS
Status: DISCONTINUED | OUTPATIENT
Start: 2023-08-07 | End: 2023-08-09

## 2023-08-07 RX ORDER — ATORVASTATIN CALCIUM 40 MG/1
80 TABLET, FILM COATED ORAL DAILY
Status: DISCONTINUED | OUTPATIENT
Start: 2023-08-07 | End: 2023-08-09 | Stop reason: HOSPADM

## 2023-08-07 RX ORDER — PANTOPRAZOLE SODIUM 40 MG/1
80 TABLET, DELAYED RELEASE ORAL
Status: DISCONTINUED | OUTPATIENT
Start: 2023-08-07 | End: 2023-08-09 | Stop reason: HOSPADM

## 2023-08-07 RX ORDER — ALBUTEROL SULFATE 2.5 MG/3ML
2.5 SOLUTION RESPIRATORY (INHALATION) EVERY 6 HOURS PRN
Status: DISCONTINUED | OUTPATIENT
Start: 2023-08-07 | End: 2023-08-09 | Stop reason: HOSPADM

## 2023-08-07 RX ORDER — ACETAMINOPHEN 325 MG/1
650 TABLET ORAL EVERY 6 HOURS PRN
Status: DISCONTINUED | OUTPATIENT
Start: 2023-08-07 | End: 2023-08-09 | Stop reason: HOSPADM

## 2023-08-07 RX ORDER — POLYETHYLENE GLYCOL 3350 17 G/17G
17 POWDER, FOR SOLUTION ORAL DAILY PRN
Status: DISCONTINUED | OUTPATIENT
Start: 2023-08-07 | End: 2023-08-09 | Stop reason: HOSPADM

## 2023-08-07 RX ORDER — POTASSIUM CHLORIDE 7.45 MG/ML
10 INJECTION INTRAVENOUS PRN
Status: DISCONTINUED | OUTPATIENT
Start: 2023-08-07 | End: 2023-08-09 | Stop reason: HOSPADM

## 2023-08-07 RX ORDER — SODIUM CHLORIDE 9 MG/ML
INJECTION, SOLUTION INTRAVENOUS PRN
Status: DISCONTINUED | OUTPATIENT
Start: 2023-08-07 | End: 2023-08-09 | Stop reason: HOSPADM

## 2023-08-07 RX ORDER — MORPHINE SULFATE 2 MG/ML
2 INJECTION, SOLUTION INTRAMUSCULAR; INTRAVENOUS
Status: DISCONTINUED | OUTPATIENT
Start: 2023-08-07 | End: 2023-08-09 | Stop reason: HOSPADM

## 2023-08-07 RX ORDER — ASPIRIN 81 MG/1
81 TABLET, CHEWABLE ORAL DAILY
Status: DISCONTINUED | OUTPATIENT
Start: 2023-08-08 | End: 2023-08-09 | Stop reason: HOSPADM

## 2023-08-07 RX ORDER — ONDANSETRON 4 MG/1
4 TABLET, ORALLY DISINTEGRATING ORAL ONCE
Status: COMPLETED | OUTPATIENT
Start: 2023-08-07 | End: 2023-08-07

## 2023-08-07 RX ORDER — SODIUM CHLORIDE 0.9 % (FLUSH) 0.9 %
5-40 SYRINGE (ML) INJECTION EVERY 12 HOURS SCHEDULED
Status: DISCONTINUED | OUTPATIENT
Start: 2023-08-07 | End: 2023-08-09 | Stop reason: HOSPADM

## 2023-08-07 RX ORDER — ACETAMINOPHEN 650 MG/1
650 SUPPOSITORY RECTAL EVERY 6 HOURS PRN
Status: DISCONTINUED | OUTPATIENT
Start: 2023-08-07 | End: 2023-08-09 | Stop reason: HOSPADM

## 2023-08-07 RX ORDER — METOPROLOL SUCCINATE 50 MG/1
50 TABLET, EXTENDED RELEASE ORAL DAILY
Status: DISCONTINUED | OUTPATIENT
Start: 2023-08-07 | End: 2023-08-09 | Stop reason: HOSPADM

## 2023-08-07 RX ORDER — HEPARIN SODIUM 5000 [USP'U]/ML
5000 INJECTION, SOLUTION INTRAVENOUS; SUBCUTANEOUS EVERY 8 HOURS SCHEDULED
Status: DISCONTINUED | OUTPATIENT
Start: 2023-08-07 | End: 2023-08-09 | Stop reason: HOSPADM

## 2023-08-07 RX ORDER — SODIUM CHLORIDE 0.9 % (FLUSH) 0.9 %
5-40 SYRINGE (ML) INJECTION PRN
Status: DISCONTINUED | OUTPATIENT
Start: 2023-08-07 | End: 2023-08-09 | Stop reason: HOSPADM

## 2023-08-07 RX ADMIN — NITROGLYCERIN 0.4 MG: 0.4 TABLET, ORALLY DISINTEGRATING SUBLINGUAL at 16:46

## 2023-08-07 RX ADMIN — ONDANSETRON 4 MG: 4 TABLET, ORALLY DISINTEGRATING ORAL at 16:55

## 2023-08-07 RX ADMIN — MORPHINE SULFATE 2 MG: 2 INJECTION, SOLUTION INTRAMUSCULAR; INTRAVENOUS at 21:34

## 2023-08-07 RX ADMIN — MORPHINE SULFATE 2 MG: 2 INJECTION, SOLUTION INTRAMUSCULAR; INTRAVENOUS at 23:49

## 2023-08-07 RX ADMIN — MORPHINE SULFATE 4 MG: 4 INJECTION, SOLUTION INTRAMUSCULAR; INTRAVENOUS at 19:10

## 2023-08-07 RX ADMIN — NITROGLYCERIN 0.4 MG: 0.4 TABLET SUBLINGUAL at 17:54

## 2023-08-07 RX ADMIN — ONDANSETRON 4 MG: 2 INJECTION INTRAMUSCULAR; INTRAVENOUS at 21:15

## 2023-08-07 RX ADMIN — MORPHINE SULFATE 2 MG: 2 INJECTION, SOLUTION INTRAMUSCULAR; INTRAVENOUS at 16:47

## 2023-08-07 RX ADMIN — SODIUM CHLORIDE: 9 INJECTION, SOLUTION INTRAVENOUS at 17:59

## 2023-08-07 RX ADMIN — HEPARIN SODIUM 5000 UNITS: 5000 INJECTION INTRAVENOUS; SUBCUTANEOUS at 21:35

## 2023-08-07 RX ADMIN — NITROGLYCERIN 0.4 MG: 0.4 TABLET, ORALLY DISINTEGRATING SUBLINGUAL at 16:24

## 2023-08-07 RX ADMIN — Medication: at 19:10

## 2023-08-07 ASSESSMENT — PAIN DESCRIPTION - LOCATION
LOCATION: CHEST

## 2023-08-07 ASSESSMENT — PAIN SCALES - GENERAL
PAINLEVEL_OUTOF10: 6
PAINLEVEL_OUTOF10: 9
PAINLEVEL_OUTOF10: 8
PAINLEVEL_OUTOF10: 7
PAINLEVEL_OUTOF10: 7
PAINLEVEL_OUTOF10: 8

## 2023-08-07 ASSESSMENT — PAIN - FUNCTIONAL ASSESSMENT
PAIN_FUNCTIONAL_ASSESSMENT: 0-10
PAIN_FUNCTIONAL_ASSESSMENT: ACTIVITIES ARE NOT PREVENTED

## 2023-08-07 ASSESSMENT — PAIN DESCRIPTION - ORIENTATION
ORIENTATION: LEFT;MID
ORIENTATION: LEFT;MID
ORIENTATION: LEFT
ORIENTATION: LEFT;MID

## 2023-08-07 ASSESSMENT — PAIN DESCRIPTION - DESCRIPTORS
DESCRIPTORS: ACHING;STABBING
DESCRIPTORS: STABBING;ACHING;PRESSURE
DESCRIPTORS: STABBING
DESCRIPTORS: ACHING;STABBING;PRESSURE
DESCRIPTORS: BURNING;STABBING

## 2023-08-07 ASSESSMENT — PAIN DESCRIPTION - PAIN TYPE: TYPE: ACUTE PAIN

## 2023-08-07 ASSESSMENT — PAIN DESCRIPTION - FREQUENCY: FREQUENCY: CONTINUOUS

## 2023-08-07 ASSESSMENT — HEART SCORE: ECG: 0

## 2023-08-07 NOTE — ED PROVIDER NOTES
[facility] ED  EMERGENCY DEPARTMENT ENCOUNTER      Pt Name: Lucero Dinh  MRN: [id number]  [address] 1975  Date of evaluation: 8/7/2023  Provider: Gregory Porter MD    CHIEF COMPLAINT       Chief Complaint   Patient presents with    Chest Pain     Starting around noon today. Radiating to back. Pt. Reports nausea, dizziness, sob associated with cp . HX CABG in 2018    Dizziness         HISTORY OF PRESENT ILLNESS   (Location/Symptom, Timing/Onset, Context/Setting, Quality, Duration, Modifying Factors, Severity)  Note limiting factors. Lucero Dinh is a 53 y.o. male who presents to the emergency department      71-year-old male with a known history of coronary artery disease status post CABG presented to emergency department for evaluation of sudden onset of chest pain a few hours ago. Patient was at home had sudden onset of left-sided chest pain that radiated towards his shoulder and his neck. Patient does not have nitroglycerin at home. He also reported feeling somewhat nauseous dizzy and short of breath when the pain was most intense. Does have a history of MI. States he had minimal symptoms when that occurred he is following with cardiology. Is having had a heart catheterization earlier this year. He is compliant with his medications which include Plavix. Patient does have a history of diabetes coronary artery disease hyperlipidemia and hypertension. Nursing Notes were reviewed. REVIEW OF SYSTEMS    (2-9 systems for level 4, 10 or more for level 5)     Review of Systems   All other systems reviewed and are negative. Except as noted above the remainder of the review of systems was reviewed and negative.     PAST MEDICAL HISTORY     Past Medical History:   Diagnosis Date    CAD (coronary artery disease)     Carpal tunnel syndrome     Depressive disorder, not elsewhere classified     Diabetes mellitus ([address])     Gastrointestinal hemorrhage with hematemesis 01/31/2023    Heart attack MD  08/07/23 8770

## 2023-08-07 NOTE — H&P
[facility]  [address], [place], [address]    History & Physical Examination Note              Date:   8/7/2023  Patient name:  Eugene Mejia  Date of admission:  8/7/2023  3:19 PM  MRN:   [id number]  YOB: 1975    CHIEF COMPLAINT:       Chief Complaint   Patient presents with    Chest Pain     Starting around noon today. Radiating to back. Pt. Reports nausea, dizziness, sob associated with cp . HX CABG in 2018    Dizziness       History Obtained From:  Patient and chart review. HPI:     The patient is a 53 y.o.  male who presented to the office earlier today with concerns for acute chest wall pain and pressure. The patient was having intermittent mid-chest wall pain that is a pressure like sensation that is severe. It is not associated with any palpitations. No shortness of breath. No known sick contacts and no known respiratory problems recently. No fevers or chills noted. He does have GERD but it has not been exacerbated recently by any food. The patient has been having some nausea as well and has been feeling generalized discomfort in his abdomen, mostly the RUQ. He also did not have more stress than normal. No changes to his medications and he has been taking them as prescribed. The patient was concerned today as similar symptoms had presented when he had a prior MI years ago. In the ED, labs and imaging were obtained and were reviewed. The case was discussed with the ED Provider prior to admission. Cardiology was consulted in the ED which recommended admission for serial troponin's and EKG's.     PAST MEDICAL HISTORY:        has a past medical history of CAD (coronary artery disease), Carpal tunnel syndrome, Depressive disorder, not elsewhere classified, Diabetes mellitus ([address]), Gastrointestinal hemorrhage with hematemesis, Heart attack ([address]), History of echocardiogram, History of pancreatitis, Hyperlipidemia, Hypertension, and PTSD (post-traumatic stress

## 2023-08-07 NOTE — PROGRESS NOTES
[facility]      Patient:  Elon Monday 53 y.o. male     Date of Service: 8/7/23      Chiefcomplaint:   Chief Complaint   Patient presents with    Other     Patient is here today for the feeling of heavy cheast, nausea and ringing in the ears. Pain in lower left abd and straight through the back. Patient stated that it all happened really sudden. History of Present Illness     Patient is a 18-year-old male with acute onset chest pain in office for a same-day evaluation. Patient notes he experienced acute onset chest pain located in the mid chest described as aching/pressure-like in nature. This pain started at noon today. He notes once it was onset and has been constant and unremitting. No radiation was described. The patient does note a significant past medical history of CABG in 2018, follows with primary cardiologist Dr. Rosa Chang. The patient did also note symptoms that included acute onset weakness, dizziness, ringing in the ears at the same time the chest pain was onset. He also noted that he experienced significant dyspnea on exertion on his way to the office today as well. Not associated with food. Allergies:    Patient has no known allergies.     Medication List:    Current Outpatient Medications   Medication Sig Dispense Refill    dilTIAZem (CARDIZEM CD) 120 MG extended release capsule TAKE 1 CAPSULE BY MOUTH EVERY DAY 90 capsule 3    metoprolol succinate (TOPROL XL) 50 MG extended release tablet TAKE 1 TABLET BY MOUTH EVERY DAY 90 tablet 1    fenofibrate (TRIGLIDE) 160 MG tablet TAKE 1 TABLET BY MOUTH EVERY DAY 90 tablet 0    pantoprazole (PROTONIX) 40 MG tablet TAKE 1 TABLET BY MOUTH TWICE A DAY BEFORE MEALS 60 tablet 2    ranolazine (RANEXA) 500 MG extended release tablet TAKE 1 TABLET BY MOUTH TWICE A  tablet 3    clopidogrel (PLAVIX) 75 MG tablet Take 1 tablet by mouth daily Resume in 2-3 weeks per Cardiology recommendations 90 tablet 0    metFORMIN

## 2023-08-07 NOTE — PROGRESS NOTES
Patient resting at this time. States aching is consistent but stabbing is intermittent. Patient denies any other needs at this time. Patient alert & oriented x4 and able to answer all questions appropriately and follow commands. Assessment and vitals as charted. Bed locked, gripper socks on, call light within reach and able to use appropriately. Will continue to monitor this shift.

## 2023-08-08 ENCOUNTER — APPOINTMENT (OUTPATIENT)
Age: 48
End: 2023-08-08
Payer: COMMERCIAL

## 2023-08-08 ENCOUNTER — APPOINTMENT (OUTPATIENT)
Dept: CT IMAGING | Age: 48
End: 2023-08-08
Payer: COMMERCIAL

## 2023-08-08 DIAGNOSIS — R94.39 ABNORMAL STRESS TEST: ICD-10-CM

## 2023-08-08 DIAGNOSIS — R07.89 ATYPICAL CHEST PAIN: Primary | ICD-10-CM

## 2023-08-08 LAB
ANION GAP SERPL CALCULATED.3IONS-SCNC: 11 MMOL/L (ref 9–17)
BASOPHILS # BLD: 0.04 K/UL (ref 0–0.2)
BASOPHILS NFR BLD: 0 % (ref 0–2)
BUN SERPL-MCNC: 30 MG/DL (ref 6–20)
BUN/CREAT SERPL: 30 (ref 9–20)
CALCIUM SERPL-MCNC: 8.4 MG/DL (ref 8.6–10.4)
CHLORIDE SERPL-SCNC: 100 MMOL/L (ref 98–107)
CO2 SERPL-SCNC: 24 MMOL/L (ref 20–31)
CREAT SERPL-MCNC: 1 MG/DL (ref 0.7–1.2)
ECHO AO ROOT DIAM: 3.2 CM
ECHO AO ROOT INDEX: 1.55 CM/M2
ECHO AV ACCELERATION TIME: 72 MS
ECHO AV CUSP MM: 2.2 CM
ECHO AV MEAN GRADIENT: 4 MMHG
ECHO AV MEAN VELOCITY: 1 M/S
ECHO AV PEAK GRADIENT: 6 MMHG
ECHO AV PEAK VELOCITY: 1.3 M/S
ECHO AV VTI: 22 CM
ECHO BSA: 2.11 M2
ECHO LA MAJOR AXIS: 5.4 CM
ECHO LA VOL 2C: 38 ML (ref 18–58)
ECHO LA VOL 4C: 33 ML (ref 18–58)
ECHO LA VOL BP: 36 ML (ref 18–58)
ECHO LA VOL/BSA BIPLANE: 17 ML/M2 (ref 16–34)
ECHO LA VOLUME AREA LENGTH: 18 MILLILITERS PER SQUARE METER
ECHO LA VOLUME AREA LENGTH: 38 ML
ECHO LA VOLUME INDEX A2C: 18 ML/M2 (ref 16–34)
ECHO LA VOLUME INDEX A4C: 16 ML/M2 (ref 16–34)
ECHO LV E' LATERAL VELOCITY: 9 CM/S
ECHO LV EDV A2C: 80 ML
ECHO LV EDV A4C: 83 ML
ECHO LV EDV INDEX A4C: 40 ML/M2
ECHO LV EDV NDEX A2C: 39 ML/M2
ECHO LV EJECTION FRACTION BIPLANE: 43 % (ref 55–100)
ECHO LV ESV A2C: 46 ML
ECHO LV ESV A4C: 51 ML
ECHO LV ESV INDEX A2C: 22 ML/M2
ECHO LV ESV INDEX A4C: 25 ML/M2
ECHO LV FRACTIONAL SHORTENING: 20 % (ref 28–44)
ECHO LV INTERNAL DIMENSION DIASTOLE INDEX: 2.91 CM/M2
ECHO LV INTERNAL DIMENSION DIASTOLIC: 6 CM (ref 4.2–5.9)
ECHO LV INTERNAL DIMENSION SYSTOLIC INDEX: 2.33 CM/M2
ECHO LV INTERNAL DIMENSION SYSTOLIC: 4.8 CM
ECHO LV IVSD: 0.9 CM (ref 0.6–1)
ECHO LV IVSS: 1.1 CM
ECHO LV MASS 2D: 215.7 G (ref 88–224)
ECHO LV MASS INDEX 2D: 104.7 G/M2 (ref 49–115)
ECHO LV POSTERIOR WALL DIASTOLIC: 0.9 CM (ref 0.6–1)
ECHO LV POSTERIOR WALL SYSTOLIC: 1.1 CM
ECHO LV RELATIVE WALL THICKNESS RATIO: 0.3
ECHO LVOT AV VTI INDEX: 0.64
ECHO LVOT MEAN GRADIENT: 1 MMHG
ECHO LVOT VTI: 14 CM
ECHO MV A VELOCITY: 0.81 M/S
ECHO MV E DECELERATION TIME (DT): 169.4 MS
ECHO MV E VELOCITY: 0.97 M/S
ECHO MV E/A RATIO: 1.2
ECHO MV E/E' LATERAL: 10.78
ECHO PV MAX VELOCITY: 1 M/S
ECHO PV PEAK GRADIENT: 4 MMHG
EOSINOPHIL # BLD: 0.22 K/UL (ref 0–0.44)
EOSINOPHILS RELATIVE PERCENT: 2 % (ref 1–4)
ERYTHROCYTE [DISTWIDTH] IN BLOOD BY AUTOMATED COUNT: 12.6 % (ref 11.8–14.4)
EST. AVERAGE GLUCOSE BLD GHB EST-MCNC: 183 MG/DL
GFR SERPL CREATININE-BSD FRML MDRD: >60 ML/MIN/1.73M2
GLUCOSE SERPL-MCNC: 193 MG/DL (ref 70–99)
HBA1C MFR BLD: 8 % (ref 4–6)
HCT VFR BLD AUTO: 39.4 % (ref 40.7–50.3)
HGB BLD-MCNC: 13.8 G/DL (ref 13–17)
IMM GRANULOCYTES # BLD AUTO: 0.03 K/UL (ref 0–0.3)
IMM GRANULOCYTES NFR BLD: 0 %
LIPASE SERPL-CCNC: 160 U/L (ref 13–60)
LYMPHOCYTES NFR BLD: 2.14 K/UL (ref 1.1–3.7)
LYMPHOCYTES RELATIVE PERCENT: 18 % (ref 24–43)
MAGNESIUM SERPL-MCNC: 1.5 MG/DL (ref 1.6–2.6)
MCH RBC QN AUTO: 30.6 PG (ref 25.2–33.5)
MCHC RBC AUTO-ENTMCNC: 35 G/DL (ref 28.4–34.8)
MCV RBC AUTO: 87.4 FL (ref 82.6–102.9)
MONOCYTES NFR BLD: 1.41 K/UL (ref 0.1–1.2)
MONOCYTES NFR BLD: 12 % (ref 3–12)
NEUTROPHILS NFR BLD: 68 % (ref 36–65)
NEUTS SEG NFR BLD: 7.88 K/UL (ref 1.5–8.1)
NRBC BLD-RTO: 0 PER 100 WBC
PLATELET # BLD AUTO: 308 K/UL (ref 138–453)
PMV BLD AUTO: 10.9 FL (ref 8.1–13.5)
POTASSIUM SERPL-SCNC: 4.4 MMOL/L (ref 3.7–5.3)
RBC # BLD AUTO: 4.51 M/UL (ref 4.21–5.77)
SODIUM SERPL-SCNC: 135 MMOL/L (ref 135–144)
TROPONIN I SERPL HS-MCNC: 10 NG/L (ref 0–22)
TROPONIN I SERPL HS-MCNC: 11 NG/L (ref 0–22)
TROPONIN I SERPL HS-MCNC: 11 NG/L (ref 0–22)
WBC OTHER # BLD: 11.7 K/UL (ref 3.5–11.3)

## 2023-08-08 PROCEDURE — 85025 COMPLETE CBC W/AUTO DIFF WBC: CPT

## 2023-08-08 PROCEDURE — 83036 HEMOGLOBIN GLYCOSYLATED A1C: CPT

## 2023-08-08 PROCEDURE — 94761 N-INVAS EAR/PLS OXIMETRY MLT: CPT

## 2023-08-08 PROCEDURE — 71260 CT THORAX DX C+: CPT

## 2023-08-08 PROCEDURE — 96375 TX/PRO/DX INJ NEW DRUG ADDON: CPT

## 2023-08-08 PROCEDURE — 99223 1ST HOSP IP/OBS HIGH 75: CPT | Performed by: INTERNAL MEDICINE

## 2023-08-08 PROCEDURE — 36415 COLL VENOUS BLD VENIPUNCTURE: CPT

## 2023-08-08 PROCEDURE — 97161 PT EVAL LOW COMPLEX 20 MIN: CPT

## 2023-08-08 PROCEDURE — 96361 HYDRATE IV INFUSION ADD-ON: CPT

## 2023-08-08 PROCEDURE — 96376 TX/PRO/DX INJ SAME DRUG ADON: CPT

## 2023-08-08 PROCEDURE — 6360000004 HC RX CONTRAST MEDICATION: Performed by: INTERNAL MEDICINE

## 2023-08-08 PROCEDURE — 84484 ASSAY OF TROPONIN QUANT: CPT

## 2023-08-08 PROCEDURE — 2580000003 HC RX 258: Performed by: STUDENT IN AN ORGANIZED HEALTH CARE EDUCATION/TRAINING PROGRAM

## 2023-08-08 PROCEDURE — 80048 BASIC METABOLIC PNL TOTAL CA: CPT

## 2023-08-08 PROCEDURE — 6360000002 HC RX W HCPCS: Performed by: STUDENT IN AN ORGANIZED HEALTH CARE EDUCATION/TRAINING PROGRAM

## 2023-08-08 PROCEDURE — 93306 TTE W/DOPPLER COMPLETE: CPT

## 2023-08-08 PROCEDURE — 96372 THER/PROPH/DIAG INJ SC/IM: CPT

## 2023-08-08 PROCEDURE — 83690 ASSAY OF LIPASE: CPT

## 2023-08-08 PROCEDURE — 6360000002 HC RX W HCPCS

## 2023-08-08 PROCEDURE — G0378 HOSPITAL OBSERVATION PER HR: HCPCS

## 2023-08-08 PROCEDURE — 83735 ASSAY OF MAGNESIUM: CPT

## 2023-08-08 PROCEDURE — 97165 OT EVAL LOW COMPLEX 30 MIN: CPT

## 2023-08-08 PROCEDURE — 6370000000 HC RX 637 (ALT 250 FOR IP): Performed by: STUDENT IN AN ORGANIZED HEALTH CARE EDUCATION/TRAINING PROGRAM

## 2023-08-08 RX ORDER — KETOROLAC TROMETHAMINE 30 MG/ML
30 INJECTION, SOLUTION INTRAMUSCULAR; INTRAVENOUS EVERY 6 HOURS PRN
Status: DISCONTINUED | OUTPATIENT
Start: 2023-08-08 | End: 2023-08-09 | Stop reason: HOSPADM

## 2023-08-08 RX ADMIN — HEPARIN SODIUM 5000 UNITS: 5000 INJECTION INTRAVENOUS; SUBCUTANEOUS at 05:24

## 2023-08-08 RX ADMIN — KETOROLAC TROMETHAMINE 30 MG: 30 INJECTION, SOLUTION INTRAMUSCULAR; INTRAVENOUS at 13:29

## 2023-08-08 RX ADMIN — HEPARIN SODIUM 5000 UNITS: 5000 INJECTION INTRAVENOUS; SUBCUTANEOUS at 13:30

## 2023-08-08 RX ADMIN — MORPHINE SULFATE 4 MG: 4 INJECTION, SOLUTION INTRAMUSCULAR; INTRAVENOUS at 14:55

## 2023-08-08 RX ADMIN — PANTOPRAZOLE SODIUM 80 MG: 40 TABLET, DELAYED RELEASE ORAL at 14:58

## 2023-08-08 RX ADMIN — MORPHINE SULFATE 4 MG: 4 INJECTION, SOLUTION INTRAMUSCULAR; INTRAVENOUS at 10:44

## 2023-08-08 RX ADMIN — SODIUM CHLORIDE: 9 INJECTION, SOLUTION INTRAVENOUS at 06:46

## 2023-08-08 RX ADMIN — RANOLAZINE 500 MG: 500 TABLET, FILM COATED, EXTENDED RELEASE ORAL at 21:07

## 2023-08-08 RX ADMIN — MORPHINE SULFATE 4 MG: 4 INJECTION, SOLUTION INTRAMUSCULAR; INTRAVENOUS at 08:15

## 2023-08-08 RX ADMIN — MORPHINE SULFATE 2 MG: 2 INJECTION, SOLUTION INTRAMUSCULAR; INTRAVENOUS at 23:50

## 2023-08-08 RX ADMIN — IOPAMIDOL 75 ML: 755 INJECTION, SOLUTION INTRAVENOUS at 08:42

## 2023-08-08 RX ADMIN — HEPARIN SODIUM 5000 UNITS: 5000 INJECTION INTRAVENOUS; SUBCUTANEOUS at 21:07

## 2023-08-08 RX ADMIN — ONDANSETRON 4 MG: 2 INJECTION INTRAMUSCULAR; INTRAVENOUS at 03:16

## 2023-08-08 RX ADMIN — METFORMIN HYDROCHLORIDE 500 MG: 500 TABLET, FILM COATED ORAL at 10:42

## 2023-08-08 RX ADMIN — DILTIAZEM HYDROCHLORIDE 120 MG: 120 CAPSULE, COATED, EXTENDED RELEASE ORAL at 10:42

## 2023-08-08 RX ADMIN — MORPHINE SULFATE 2 MG: 2 INJECTION, SOLUTION INTRAMUSCULAR; INTRAVENOUS at 05:51

## 2023-08-08 RX ADMIN — SODIUM CHLORIDE: 9 INJECTION, SOLUTION INTRAVENOUS at 21:06

## 2023-08-08 RX ADMIN — METOPROLOL SUCCINATE 50 MG: 50 TABLET, EXTENDED RELEASE ORAL at 10:43

## 2023-08-08 RX ADMIN — MORPHINE SULFATE 2 MG: 2 INJECTION, SOLUTION INTRAMUSCULAR; INTRAVENOUS at 18:36

## 2023-08-08 RX ADMIN — ASPIRIN 81 MG: 81 TABLET, CHEWABLE ORAL at 10:43

## 2023-08-08 RX ADMIN — MORPHINE SULFATE 2 MG: 2 INJECTION, SOLUTION INTRAMUSCULAR; INTRAVENOUS at 03:38

## 2023-08-08 RX ADMIN — PROMETHAZINE HYDROCHLORIDE 6.25 MG: 25 INJECTION, SOLUTION INTRAMUSCULAR; INTRAVENOUS at 08:14

## 2023-08-08 RX ADMIN — PANTOPRAZOLE SODIUM 80 MG: 40 TABLET, DELAYED RELEASE ORAL at 06:56

## 2023-08-08 RX ADMIN — ONDANSETRON 4 MG: 2 INJECTION INTRAMUSCULAR; INTRAVENOUS at 14:55

## 2023-08-08 RX ADMIN — METFORMIN HYDROCHLORIDE 500 MG: 500 TABLET, FILM COATED ORAL at 18:05

## 2023-08-08 ASSESSMENT — PAIN SCALES - GENERAL
PAINLEVEL_OUTOF10: 6
PAINLEVEL_OUTOF10: 8
PAINLEVEL_OUTOF10: 6
PAINLEVEL_OUTOF10: 8
PAINLEVEL_OUTOF10: 7
PAINLEVEL_OUTOF10: 6
PAINLEVEL_OUTOF10: 6
PAINLEVEL_OUTOF10: 7

## 2023-08-08 ASSESSMENT — PAIN DESCRIPTION - DESCRIPTORS
DESCRIPTORS: ACHING;PRESSURE
DESCRIPTORS: ACHING;PRESSURE
DESCRIPTORS: ACHING;PRESSURE;STABBING
DESCRIPTORS: ACHING;PRESSURE
DESCRIPTORS: ACHING;PRESSURE;STABBING

## 2023-08-08 ASSESSMENT — PAIN DESCRIPTION - ORIENTATION
ORIENTATION: LEFT

## 2023-08-08 ASSESSMENT — PAIN DESCRIPTION - LOCATION
LOCATION: ABDOMEN
LOCATION: CHEST

## 2023-08-08 ASSESSMENT — PAIN - FUNCTIONAL ASSESSMENT
PAIN_FUNCTIONAL_ASSESSMENT: ACTIVITIES ARE NOT PREVENTED

## 2023-08-08 ASSESSMENT — PAIN DESCRIPTION - PAIN TYPE
TYPE: ACUTE PAIN
TYPE: ACUTE PAIN

## 2023-08-08 ASSESSMENT — PAIN DESCRIPTION - FREQUENCY
FREQUENCY: CONTINUOUS
FREQUENCY: CONTINUOUS

## 2023-08-08 ASSESSMENT — PAIN DESCRIPTION - ONSET: ONSET: ON-GOING

## 2023-08-08 NOTE — CONSULTS
Patient: Jazmine Prow  : 1975  Date of Visit: 2023    REASON FOR VISIT / CONSULTATION: Chest Pain (Starting around noon today. Radiating to back. Pt. Reports nausea, dizziness, sob associated with cp . HX CABG in 2018) and Dizziness        History of Present Illness:        Dear Miguelito Richard MD    I had the pleasure of seeing Mr. Narcisa Farmer today who is a 53 y.o. male who presents for a follow up following a hospital visit on 2019 with chest pain during activity. He had a stress test on 2018 (inferior wall defect with jazmine-infarct ischemia, ejection fraction 47% with inferior wall hypokinesis). 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 He was taken for an emergent CABG with SVG to RCA. Echo on 2019: EF 40-45%. LV cavity sixe is mildly increased. Inferior and inferoseptal wall hypokenesis noted. Mild diastolic dysfunction. Cardiac Catheterization on 2019: Severe single vessel disease involving the LAD, circumflex and right coronary arteries. 1 of 1 bypass grafts patent. Normal LVEDP. Echo on 2019: EF 40-45%. LV cavity sixe is mildly increased. Inferior and inferoseptal wall hypokenesis noted. Mild diastolic dysfunction    Stress test done on 2021 showed abnormal myocardial perfusion study. There is a moderate perfusion defect of moderate/severe intensity in the inferolateral, inferior and inferoseptal regions during stress and rest imaging, which is most consistent with an old myocardial infarction with jazmine-infarct ischemia. EF was 43%    Echo done on 2021 showed the global left ventricular systolic function appears mildly reduced with an estimated ejection fraction of 40-45%. The inferoseptal and inferior walls appear hypokinetic in their motion.  The left ventricular cavity size is within normal rhythm: Normal sinus rhythm. Atrial tachycardia (AT) 2 episodes, longest 4 beats at average 122 bpm up to 38 bpm, fastest 3 beats at average 137 bpm up to 142 bpm. PAC 0.01%. PVC 2.27%. Seven days and 2 hours recorded. Sinus rhythm with average heart rate of 88 bpm, ranging between 62 and 125 bpm. Rare PACs with two short runs of ectopic atrial tachycardia, the longest is 4 beats at a heart rate of 122 bpm. Occasional PVCs. No ventricular runs. No current palpitations, he says these are very rare now that he has quit smoking. Beta Blocker: Continue Metoprolol succinate (Toprol XL) 50 mg daily. Calcium Channel Blocker: Continue diltiazem CD (Cardizem CD) 120 mg once daily. Essential Hypertension: Controlled  Beta Blocker: Continue Metoprolol succinate (Toprol XL) 50 mg daily. ACE Inibitor/ARB: Continue lisinopril 20 mg daily. Calcium Channel Blocker: Continue diltiazem CD (Cardizem CD) 120 mg once daily. I told him to monitor his blood pressure and call us with those. Hyperlipidemia: Mixed, LDL done on 9/12/2022 was 54 mg/dL   Cholesterol Reduction Therapy: Continue Atorvastatin (Lipitor) 80 mg daily. and continue fenofibrate 160 mg daily. Sincerely,  Karon Almanzar MD, F.A.C.C. [facility] Specialist    [address] [place] [address]  Phone: [phone], Fax: [phone]     I believe that the risk of significant morbidity and mortality related to the patient's current medical conditions are: intermediate-high. The documentation recorded by the scribe, accurately and completely reflects the services I personally performed and the decisions made by me. Karon Almanzar MD, F.A.C.C.  August 8, 2023

## 2023-08-08 NOTE — PROGRESS NOTES
Dr. Shadia Gutierrez at bedside, updated on ongoing chest pain and new lower left abdominal pain. Patient describes as stabbing, rates at 8/10 on pain scale. Plan of care discussed with patient, voices understanding. Pain medication routine discussed, patient denies needs at this time but requesting pain medication when able to get it again. Call light in reach.

## 2023-08-08 NOTE — PROGRESS NOTES
Phenergan and Morphine admin per orders and patient request. Patient to CT via w/c at this time. denies pain/discomfort

## 2023-08-08 NOTE — PROGRESS NOTES
Writer called on call Dr. Adri Townsend for any further orders since IM phenergan is unavailable and patient has had two emesis. Awaiting call back for further orders.

## 2023-08-08 NOTE — PROGRESS NOTES
Physical Therapy  Facility/Department: [facility] AT THE [facility] MED SURG  Physical Therapy Initial Assessment    Name: Jose Forbes  : 1975  MRN: [id number]  Date of Service: 2023    Discharge Recommendations:  Continue to assess pending progress   PT Equipment Recommendations  Equipment Needed: No      Patient Diagnosis(es): The primary encounter diagnosis was Chest pain, unspecified type. Diagnoses of Chest wall pain and Unstable angina (HCC) were also pertinent to this visit. Past Medical History:  has a past medical history of CAD (coronary artery disease), Carpal tunnel syndrome, Depressive disorder, not elsewhere classified, Diabetes mellitus ([address]), Gastrointestinal hemorrhage with hematemesis, Heart attack ([address]), History of echocardiogram, History of pancreatitis, Hyperlipidemia, Hypertension, and PTSD (post-traumatic stress disorder). Past Surgical History:  has a past surgical history that includes Coronary angioplasty (2018); Cardiac catheterization (Left, 2018); Coronary artery bypass graft (); Cardiac catheterization (Left, 2022); Colonoscopy (N/A, 10/04/2022); Upper gastrointestinal endoscopy (N/A, 10/04/2022); Colonoscopy (10/04/2022); Esophagogastroduodenoscopy (10/04/2022); Upper gastrointestinal endoscopy (N/A, 2023); and Esophagogastroduodenoscopy (2023). Assessment   Assessment: The patient is a 53 y.o. male who was admitted due to unstable angina. He demonstrates normal ROM, WFL LE strength, and good balance. He reports he has been independent with mobility in his room. He has no current concerns and does not require skilled PT at this time. Therapy Prognosis: Good  Decision Making: Low Complexity  Requires PT Follow-Up: No  Activity Tolerance  Activity Tolerance: Patient tolerated evaluation without incident     Plan   Physcial Therapy Plan  General Plan: Discharge with evaluation only (1x/day on weekends)  Safety Devices  Type of Devices:  All fall risk

## 2023-08-08 NOTE — CARE COORDINATION
Case Management Assessment  Initial Evaluation    Date/Time of Evaluation: 8/8/2023 10:01 AM  Assessment Completed by: JAK Burciaga    If patient is discharged prior to next notation, then this note serves as note for discharge by case management. Patient Name: Pete Fernández                   YOB: 1975  Diagnosis: Unstable angina ([address]) [I20.0]  Chest wall pain [R07.89]  Chest pain, unspecified type [R07.9]                   Date / Time: 8/7/2023  3:19 PM    Patient Admission Status: Observation   Readmission Risk (Low < 19, Mod (19-27), High > 27): Readmission Risk Score: 11.3    Current PCP: Feroz Sloan MD  PCP verified by ? Yes    Chart Reviewed: Yes      History Provided by: Patient  Patient Orientation: Alert and Oriented, Person, Place, Situation, Self    Patient Cognition: Alert    Hospitalization in the last 30 days (Readmission):  No    If yes, Readmission Assessment in  Navigator will be completed. Advance Directives:      Code Status: Full Code   Patient's Primary Decision Maker is: Legal Next of Kin    Primary Decision MakeEne Guerra - Spouse - [phone]    Discharge Planning:    Patient lives with: Spouse/Significant Other Type of Home: House  Primary Care Giver: Self  Patient Support Systems include: Spouse/Significant Other, Children   Current Financial resources: Other (Comment) (commercial)  Current community resources: None  Current services prior to admission: None            Current DME:              Type of Home Care services:       ADLS  Prior functional level: Independent in ADLs/IADLs  Current functional level: Independent in ADLs/IADLs    PT AM-PAC:   /24  OT AM-PAC: 24 /24    Family can provide assistance at DC: Yes  Would you like Case Management to discuss the discharge plan with any other family members/significant others, and if so, who?  No  Plans to Return to Present Housing: Yes  Other Identified Issues/Barriers to RETURNING to current

## 2023-08-08 NOTE — PROGRESS NOTES
Patient resting comfortably at this time. Patient received pain medication for pain and denies any other needs at this time. Patient alert & oriented x4 and able to answer all questions appropriately and follow commands. Assessment and vitals as charted. Bed locked, gripper socks on, call light within reach and able to use appropriately. Will continue to monitor this shift.

## 2023-08-08 NOTE — PROGRESS NOTES
Comprehensive Nutrition Assessment    Type and Reason for Visit:  Initial    Nutrition Recommendations/Plan:  Progress diet to low fat/low cholesterol, DANIEL, high fiber, 4 carbs per meal diet as medically appropriate. F/u for ONS needs. Malnutrition Assessment:  Malnutrition Status: At risk for malnutrition (Comment) (08/08/23 0820)    Context:  Acute Illness     Findings of the 6 clinical characteristics of malnutrition:  Energy Intake:  Mild decrease in energy intake (Comment) (3 days)  Weight Loss:  1% to 2% over 1 week (1.7% since admission)     Body Fat Loss:  No significant body fat loss     Muscle Mass Loss:  No significant muscle mass loss    Fluid Accumulation:  No significant fluid accumulation     Strength:  Not Performed    Nutrition Assessment:    Inadequate oral intakes r/t cardiac dysfunction aeb NPO, weight loss 1.7% since admission. Altered nutrition related labs r/t endocrine dysfunction aeb A1c 6.3/. HDL 21, glucose 193, calcium 8.4. Hx DM, CABG, PTSD, CAD, and GERD. Progress diet to CC 4 carbs per meal low fat, low cholesterol, DANIEL diet as medically appropriate. Pt c/o loss of appetite x 3 days. States he has gained weight. Provided CC Heart Healthy diet instruction materials, did not discuss them at this time due to NPO status. Nutrition Related Findings:    appears well nourished Wound Type: None       Current Nutrition Intake & Therapies:    Average Meal Intake: NPO  Average Supplements Intake: NPO  Diet NPO    Anthropometric Measures:  Height: 5' 7\" (170.2 cm)  Ideal Body Weight (IBW): 148 lbs (67 kg)    Admission Body Weight: 210 lb 6 oz (95.4 kg)  Current Body Weight: 208 lb 9.6 oz (94.6 kg), 140.9 % IBW. Weight Source: Bed Scale  Current BMI (kg/m2): 32.7  Usual Body Weight: 187 lb 12.8 oz (85.2 kg) (2/3/23)  % Weight Change (Calculated): 11.1  Weight Adjustment For: No Adjustment                 BMI Categories: Obese Class 1 (BMI 30.0-34. 9)      Recent Labs

## 2023-08-08 NOTE — PROGRESS NOTES
PO cardiac meds admin. Rest of meds held at this time due to patient NPO and still feeling nauseous. Morphine admin per orders. Patient admits to some effectiveness, but effects do not last long. Continue to await CT results. Patient educated on plan of care, voices understanding. Call light in reach. Denies further needs at this time.

## 2023-08-08 NOTE — PROGRESS NOTES
Occupational Therapy  Facility/Department: [facility] license of [facility] AT THE [facility] MED SURG  Occupational Therapy Initial Assessment    Name: Jared Vences  : 1975  MRN: [id number]  Date of Service: 2023    Discharge Recommendations:  Home with assist PRN          Patient Diagnosis(es): The primary encounter diagnosis was Chest pain, unspecified type. Diagnoses of Chest wall pain and Unstable angina (HCC) were also pertinent to this visit. Past Medical History:  has a past medical history of CAD (coronary artery disease), Carpal tunnel syndrome, Depressive disorder, not elsewhere classified, Diabetes mellitus ([address]), Gastrointestinal hemorrhage with hematemesis, Heart attack ([address]), History of echocardiogram, History of pancreatitis, Hyperlipidemia, Hypertension, and PTSD (post-traumatic stress disorder). Past Surgical History:  has a past surgical history that includes Coronary angioplasty (2018); Cardiac catheterization (Left, 2018); Coronary artery bypass graft (); Cardiac catheterization (Left, 2022); Colonoscopy (N/A, 10/04/2022); Upper gastrointestinal endoscopy (N/A, 10/04/2022); Colonoscopy (10/04/2022); Esophagogastroduodenoscopy (10/04/2022); Upper gastrointestinal endoscopy (N/A, 2023); and Esophagogastroduodenoscopy (2023). Treatment Diagnosis: n/a      Assessment   Assessment: 52 y/o M admitted to [facility] for unstable angina. Patient presents with mod I/Indep with self care with no acute OT needs at this time. Treatment Diagnosis: n/a  Prognosis: Good  Decision Making: Low Complexity  REQUIRES OT FOLLOW-UP: No        Plan   Occupational Therapy Plan  Additional Comments: Evaluation only. Restrictions  Restrictions/Precautions  Restrictions/Precautions: General Precautions    Subjective   Subjective  Subjective: Patient getting ready to head to CT scan, agreeable to OT evaluation.      Social/Functional History  Social/Functional History  Lives With: Daughter  Type of Home: House  ADL

## 2023-08-08 NOTE — PLAN OF CARE
Problem: Pain  Goal: Verbalizes/displays adequate comfort level or baseline comfort level  Outcome: Progressing  Flowsheets  Taken 8/7/2023 2308  Verbalizes/displays adequate comfort level or baseline comfort level:   Encourage patient to monitor pain and request assistance   Assess pain using appropriate pain scale   Administer analgesics based on type and severity of pain and evaluate response   Implement non-pharmacological measures as appropriate and evaluate response  Taken 8/7/2023 1858  Verbalizes/displays adequate comfort level or baseline comfort level: Encourage patient to monitor pain and request assistance  Note: Patient able to state when in pain. Will continue to monitor this shift. Problem: Safety - Adult  Goal: Free from fall injury  Outcome: Progressing  Note: Bed locked, side rails up, gripper socks on, call light within reach and able to use appropriately. Will continue to monitor this shift.

## 2023-08-08 NOTE — PROGRESS NOTES
Patient states unable to take PO medications due to nausea at this time. Writer spoke with pharmacy who states that IM phenergan is only with main pharmacy which can be accessed in am. Patient is aware when next zofran dose can be administered. Patient states morphine did help assist with pain. Patient appears more comfortable at this time.

## 2023-08-09 VITALS
TEMPERATURE: 97.4 F | DIASTOLIC BLOOD PRESSURE: 64 MMHG | HEIGHT: 67 IN | OXYGEN SATURATION: 96 % | HEART RATE: 64 BPM | WEIGHT: 211.6 LBS | RESPIRATION RATE: 19 BRPM | BODY MASS INDEX: 33.21 KG/M2 | SYSTOLIC BLOOD PRESSURE: 109 MMHG

## 2023-08-09 LAB
ANION GAP SERPL CALCULATED.3IONS-SCNC: 10 MMOL/L (ref 9–17)
BASOPHILS # BLD: <0.03 K/UL (ref 0–0.2)
BASOPHILS NFR BLD: 0 % (ref 0–2)
BUN SERPL-MCNC: 22 MG/DL (ref 6–20)
BUN/CREAT SERPL: 22 (ref 9–20)
CALCIUM SERPL-MCNC: 8.3 MG/DL (ref 8.6–10.4)
CHLORIDE SERPL-SCNC: 104 MMOL/L (ref 98–107)
CO2 SERPL-SCNC: 24 MMOL/L (ref 20–31)
CREAT SERPL-MCNC: 1 MG/DL (ref 0.7–1.2)
ECHO BSA: 2.11 M2
EOSINOPHIL # BLD: 0.18 K/UL (ref 0–0.44)
EOSINOPHILS RELATIVE PERCENT: 3 % (ref 1–4)
ERYTHROCYTE [DISTWIDTH] IN BLOOD BY AUTOMATED COUNT: 12.3 % (ref 11.8–14.4)
GFR SERPL CREATININE-BSD FRML MDRD: >60 ML/MIN/1.73M2
GLUCOSE SERPL-MCNC: 146 MG/DL (ref 70–99)
HCT VFR BLD AUTO: 35.1 % (ref 40.7–50.3)
HGB BLD-MCNC: 12.4 G/DL (ref 13–17)
IMM GRANULOCYTES # BLD AUTO: <0.03 K/UL (ref 0–0.3)
IMM GRANULOCYTES NFR BLD: 0 %
LIPASE SERPL-CCNC: 27 U/L (ref 13–60)
LYMPHOCYTES NFR BLD: 2.21 K/UL (ref 1.1–3.7)
LYMPHOCYTES RELATIVE PERCENT: 32 % (ref 24–43)
MCH RBC QN AUTO: 31.2 PG (ref 25.2–33.5)
MCHC RBC AUTO-ENTMCNC: 35.3 G/DL (ref 28.4–34.8)
MCV RBC AUTO: 88.4 FL (ref 82.6–102.9)
MONOCYTES NFR BLD: 0.73 K/UL (ref 0.1–1.2)
MONOCYTES NFR BLD: 11 % (ref 3–12)
NEUTROPHILS NFR BLD: 54 % (ref 36–65)
NEUTS SEG NFR BLD: 3.66 K/UL (ref 1.5–8.1)
NRBC BLD-RTO: 0 PER 100 WBC
PLATELET # BLD AUTO: 271 K/UL (ref 138–453)
PMV BLD AUTO: 10.3 FL (ref 8.1–13.5)
POTASSIUM SERPL-SCNC: 3.9 MMOL/L (ref 3.7–5.3)
RBC # BLD AUTO: 3.97 M/UL (ref 4.21–5.77)
SODIUM SERPL-SCNC: 138 MMOL/L (ref 135–144)
WBC OTHER # BLD: 6.8 K/UL (ref 3.5–11.3)

## 2023-08-09 PROCEDURE — C1894 INTRO/SHEATH, NON-LASER: HCPCS | Performed by: FAMILY MEDICINE

## 2023-08-09 PROCEDURE — 99152 MOD SED SAME PHYS/QHP 5/>YRS: CPT | Performed by: FAMILY MEDICINE

## 2023-08-09 PROCEDURE — 2580000003 HC RX 258: Performed by: FAMILY MEDICINE

## 2023-08-09 PROCEDURE — 6370000000 HC RX 637 (ALT 250 FOR IP): Performed by: FAMILY MEDICINE

## 2023-08-09 PROCEDURE — 7100000011 HC PHASE II RECOVERY - ADDTL 15 MIN: Performed by: FAMILY MEDICINE

## 2023-08-09 PROCEDURE — 93459 L HRT ART/GRFT ANGIO: CPT | Performed by: FAMILY MEDICINE

## 2023-08-09 PROCEDURE — 2709999900 HC NON-CHARGEABLE SUPPLY: Performed by: FAMILY MEDICINE

## 2023-08-09 PROCEDURE — 83690 ASSAY OF LIPASE: CPT

## 2023-08-09 PROCEDURE — 96372 THER/PROPH/DIAG INJ SC/IM: CPT

## 2023-08-09 PROCEDURE — 80048 BASIC METABOLIC PNL TOTAL CA: CPT

## 2023-08-09 PROCEDURE — 6360000004 HC RX CONTRAST MEDICATION: Performed by: FAMILY MEDICINE

## 2023-08-09 PROCEDURE — 6360000002 HC RX W HCPCS: Performed by: STUDENT IN AN ORGANIZED HEALTH CARE EDUCATION/TRAINING PROGRAM

## 2023-08-09 PROCEDURE — 6360000002 HC RX W HCPCS

## 2023-08-09 PROCEDURE — 94761 N-INVAS EAR/PLS OXIMETRY MLT: CPT

## 2023-08-09 PROCEDURE — 2500000003 HC RX 250 WO HCPCS

## 2023-08-09 PROCEDURE — 36415 COLL VENOUS BLD VENIPUNCTURE: CPT

## 2023-08-09 PROCEDURE — 85025 COMPLETE CBC W/AUTO DIFF WBC: CPT

## 2023-08-09 PROCEDURE — 2500000003 HC RX 250 WO HCPCS: Performed by: FAMILY MEDICINE

## 2023-08-09 PROCEDURE — C1769 GUIDE WIRE: HCPCS | Performed by: FAMILY MEDICINE

## 2023-08-09 PROCEDURE — 96361 HYDRATE IV INFUSION ADD-ON: CPT

## 2023-08-09 PROCEDURE — 6360000002 HC RX W HCPCS: Performed by: FAMILY MEDICINE

## 2023-08-09 PROCEDURE — 99153 MOD SED SAME PHYS/QHP EA: CPT | Performed by: FAMILY MEDICINE

## 2023-08-09 PROCEDURE — G0378 HOSPITAL OBSERVATION PER HR: HCPCS

## 2023-08-09 PROCEDURE — 7100000010 HC PHASE II RECOVERY - FIRST 15 MIN: Performed by: FAMILY MEDICINE

## 2023-08-09 RX ORDER — NITROGLYCERIN 20 MG/100ML
INJECTION INTRAVENOUS PRN
Status: DISCONTINUED | OUTPATIENT
Start: 2023-08-09 | End: 2023-08-09 | Stop reason: HOSPADM

## 2023-08-09 RX ORDER — SODIUM CHLORIDE 0.9 % (FLUSH) 0.9 %
5-40 SYRINGE (ML) INJECTION PRN
Status: DISCONTINUED | OUTPATIENT
Start: 2023-08-09 | End: 2023-08-09 | Stop reason: HOSPADM

## 2023-08-09 RX ORDER — SODIUM CHLORIDE 9 MG/ML
INJECTION, SOLUTION INTRAVENOUS PRN
Status: DISCONTINUED | OUTPATIENT
Start: 2023-08-09 | End: 2023-08-09 | Stop reason: HOSPADM

## 2023-08-09 RX ORDER — HEPARIN SODIUM 10000 [USP'U]/ML
INJECTION, SOLUTION INTRAVENOUS; SUBCUTANEOUS PRN
Status: DISCONTINUED | OUTPATIENT
Start: 2023-08-09 | End: 2023-08-09 | Stop reason: HOSPADM

## 2023-08-09 RX ORDER — SODIUM CHLORIDE 0.9 % (FLUSH) 0.9 %
5-40 SYRINGE (ML) INJECTION EVERY 12 HOURS SCHEDULED
Status: DISCONTINUED | OUTPATIENT
Start: 2023-08-09 | End: 2023-08-09 | Stop reason: HOSPADM

## 2023-08-09 RX ORDER — DIPHENHYDRAMINE HCL 25 MG
50 CAPSULE ORAL ONCE
Status: DISCONTINUED | OUTPATIENT
Start: 2023-08-09 | End: 2023-08-09 | Stop reason: HOSPADM

## 2023-08-09 RX ORDER — MIDAZOLAM HYDROCHLORIDE 1 MG/ML
INJECTION INTRAMUSCULAR; INTRAVENOUS PRN
Status: DISCONTINUED | OUTPATIENT
Start: 2023-08-09 | End: 2023-08-09 | Stop reason: HOSPADM

## 2023-08-09 RX ORDER — ACETAMINOPHEN 325 MG/1
650 TABLET ORAL EVERY 4 HOURS PRN
Status: DISCONTINUED | OUTPATIENT
Start: 2023-08-09 | End: 2023-08-09 | Stop reason: SDUPTHER

## 2023-08-09 RX ORDER — SODIUM CHLORIDE 9 MG/ML
INJECTION, SOLUTION INTRAVENOUS PRN
Status: DISCONTINUED | OUTPATIENT
Start: 2023-08-09 | End: 2023-08-09 | Stop reason: SDUPTHER

## 2023-08-09 RX ORDER — NITROGLYCERIN 0.4 MG/1
0.4 TABLET SUBLINGUAL EVERY 5 MIN PRN
Status: DISCONTINUED | OUTPATIENT
Start: 2023-08-09 | End: 2023-08-09 | Stop reason: HOSPADM

## 2023-08-09 RX ORDER — LIDOCAINE HYDROCHLORIDE 10 MG/ML
INJECTION, SOLUTION INFILTRATION; PERINEURAL PRN
Status: DISCONTINUED | OUTPATIENT
Start: 2023-08-09 | End: 2023-08-09 | Stop reason: HOSPADM

## 2023-08-09 RX ORDER — CLOPIDOGREL BISULFATE 75 MG/1
75 TABLET ORAL DAILY
Qty: 90 TABLET | Refills: 0 | Status: SHIPPED | OUTPATIENT
Start: 2023-08-09

## 2023-08-09 RX ORDER — SODIUM CHLORIDE 9 MG/ML
INJECTION, SOLUTION INTRAVENOUS CONTINUOUS
Status: DISCONTINUED | OUTPATIENT
Start: 2023-08-09 | End: 2023-08-09 | Stop reason: HOSPADM

## 2023-08-09 RX ORDER — ONDANSETRON 4 MG/1
4 TABLET, ORALLY DISINTEGRATING ORAL EVERY 8 HOURS PRN
Qty: 12 TABLET | Refills: 0 | Status: SHIPPED | OUTPATIENT
Start: 2023-08-09 | End: 2023-08-12

## 2023-08-09 RX ADMIN — RANOLAZINE 500 MG: 500 TABLET, FILM COATED, EXTENDED RELEASE ORAL at 11:26

## 2023-08-09 RX ADMIN — ATORVASTATIN CALCIUM 80 MG: 40 TABLET, FILM COATED ORAL at 11:27

## 2023-08-09 RX ADMIN — DILTIAZEM HYDROCHLORIDE 120 MG: 120 CAPSULE, COATED, EXTENDED RELEASE ORAL at 11:26

## 2023-08-09 RX ADMIN — CLOPIDOGREL BISULFATE 75 MG: 75 TABLET ORAL at 11:26

## 2023-08-09 RX ADMIN — METOPROLOL SUCCINATE 50 MG: 50 TABLET, EXTENDED RELEASE ORAL at 11:27

## 2023-08-09 RX ADMIN — HEPARIN SODIUM 5000 UNITS: 5000 INJECTION INTRAVENOUS; SUBCUTANEOUS at 05:40

## 2023-08-09 RX ADMIN — FENOFIBRATE 160 MG: 160 TABLET ORAL at 11:27

## 2023-08-09 RX ADMIN — ASPIRIN 81 MG: 81 TABLET, CHEWABLE ORAL at 11:26

## 2023-08-09 RX ADMIN — SODIUM CHLORIDE: 9 INJECTION, SOLUTION INTRAVENOUS at 10:56

## 2023-08-09 RX ADMIN — PANTOPRAZOLE SODIUM 80 MG: 40 TABLET, DELAYED RELEASE ORAL at 11:28

## 2023-08-09 ASSESSMENT — PAIN DESCRIPTION - FREQUENCY
FREQUENCY: INTERMITTENT
FREQUENCY: INTERMITTENT

## 2023-08-09 ASSESSMENT — PAIN SCALES - GENERAL
PAINLEVEL_OUTOF10: 3
PAINLEVEL_OUTOF10: 2

## 2023-08-09 ASSESSMENT — PAIN DESCRIPTION - ORIENTATION
ORIENTATION: LEFT
ORIENTATION: LEFT

## 2023-08-09 ASSESSMENT — PAIN DESCRIPTION - ONSET: ONSET: GRADUAL

## 2023-08-09 ASSESSMENT — PAIN DESCRIPTION - PAIN TYPE: TYPE: ACUTE PAIN

## 2023-08-09 ASSESSMENT — PAIN DESCRIPTION - LOCATION
LOCATION: CHEST
LOCATION: CHEST

## 2023-08-09 ASSESSMENT — PAIN DESCRIPTION - DESCRIPTORS
DESCRIPTORS: ACHING;DISCOMFORT
DESCRIPTORS: ACHING;PRESSURE

## 2023-08-09 ASSESSMENT — PAIN - FUNCTIONAL ASSESSMENT: PAIN_FUNCTIONAL_ASSESSMENT: ACTIVITIES ARE NOT PREVENTED

## 2023-08-09 NOTE — PLAN OF CARE
Problem: Pain  Goal: Verbalizes/displays adequate comfort level or baseline comfort level  Outcome: Progressing  Flowsheets  Taken 8/8/2023 2122  Verbalizes/displays adequate comfort level or baseline comfort level:   Encourage patient to monitor pain and request assistance   Assess pain using appropriate pain scale   Administer analgesics based on type and severity of pain and evaluate response   Implement non-pharmacological measures as appropriate and evaluate response  Taken 8/8/2023 1836  Verbalizes/displays adequate comfort level or baseline comfort level: Encourage patient to monitor pain and request assistance  Note: Patient able to communicate when in pain. Will continue to monitor. Problem: Safety - Adult  Goal: Free from fall injury  Outcome: Progressing  Note: Bed locked, side rails up, gripper socks on, call light within reach and able to use appropriately. Will continue to monitor this shift.       Problem: Chronic Conditions and Co-morbidities  Goal: Patient's chronic conditions and co-morbidity symptoms are monitored and maintained or improved  Outcome: Progressing  Flowsheets  Taken 8/8/2023 2122  Care Plan - Patient's Chronic Conditions and Co-Morbidity Symptoms are Monitored and Maintained or Improved: Monitor and assess patient's chronic conditions and comorbid symptoms for stability, deterioration, or improvement  Taken 8/8/2023 1836  Care Plan - Patient's Chronic Conditions and Co-Morbidity Symptoms are Monitored and Maintained or Improved: Monitor and assess patient's chronic conditions and comorbid symptoms for stability, deterioration, or improvement

## 2023-08-09 NOTE — FLOWSHEET NOTE
Awake, resting in bed. Vitals checked and assessment completed. C/O #3 chest discomfort . Denies need for pain medications. NPO for a heart cath today. Dr Salcido Poles in to see patient.  Continue to monitor

## 2023-08-09 NOTE — PROGRESS NOTES
Writer reviewed discharge instructions with patient and his mom. Patient aware of need to  prescriptions. Patient aware of date/time of follow up appointments. Writer reviewed cath lab instructions with patient and his mom. Pressure dressing to left wrist remains clean, dry, and intact. Patient verbalized understanding. Denies questions. Copy of discharge instructions given to patient.

## 2023-08-09 NOTE — DISCHARGE INSTR - DIET
Good nutrition is important when healing from an illness, injury, or surgery. Follow any nutrition recommendations given to you during your hospital stay. If you were given an oral nutrition supplement while in the hospital, continue to take this supplement at home. You can take it with meals, in-between meals, and/or before bedtime. These supplements can be purchased at most local grocery stores, pharmacies, and chain [employer]. If you have any questions about your diet or nutrition, call the hospital and ask for the dietitian.   Cardiac:  low sodium, low cholesterol, low fat

## 2023-08-09 NOTE — PROGRESS NOTES
today that the patient does not wish or was not able to name a   surrogate decision maker or provide and advance care plan.    [] Hospice care is currently being provided or has been provided within the   calendar year. []  I did NOT confirm today the presence of an [address] [place] [address] or surrogate   decision maker documented within the patient's medical record.    [DOES NOT SATISFY KRUNAL Koenig - CNP , KRUNAL, NP-C  Hospitalist Medicine        8/9/2023, 11:14 AM

## 2023-08-09 NOTE — DISCHARGE SUMMARY
Discharge Summary    Justus Karimi  :  1975  MRN:  [id number]    Admit date:  2023      Discharge date: 2023     Admitting Physician:  Corina Gaston MD    Discharge Diagnoses:    Principal Problem:    Chest wall pain  Active Problems:    Generalized anxiety disorder    Hyperlipidemia    Mild intermittent asthma    Chronic dyspnea    Gastroesophageal reflux disease    HTN (hypertension)    S/P CABG (coronary artery bypass graft)    Dyslipidemia    Ischemic cardiomyopathy    ASHD (arteriosclerotic heart disease) /  CABG   Resolved Problems:    * No resolved hospital problems. *      Hospital Course:   Justus Karimi is a 53 y.o. male admitted with chest pain. He was having intermittent mid chest wall pain that he described as a pressure like sensation and rated as severe. Denied any shortness of breath or palpitations with it. He denied fevers and chills. He does have a history of GERD but states this was different. He is also had some recent right upper quadrant abdominal pain. He does have a history of a CABG in 2018. He was seen in the ER, and had lab work, EKG, and a CXR completed. EKG had showed a normal sinus rhythm with no ST elevation. Initial high-sensitivity troponin was 197. He was admitted to the hospital for further observation. Upon admission, he had increased vomiting, chest pain, and abdominal pain. A CT of his abdomen and lipase were added on. His CT abdomen showed nothing acute in his initial lipase was 160. Lipase normalized this morning. Cardiology was consulted. Patient had a diagnostic cardiac cath this morning. Patient was found to have some mild blockages but will be treated with medication per cardiology. Patient is no longer having any chest pain or abdominal pain. His nausea is controlled. He is eating and drinking well. He feels that he is ready to go home. Patient will be discharged home on his home metoprolol, Ranexa, fenofibrate, baby aspirin.   He will be sent with Zofran to use as needed for nausea. He will follow-up with his primary care physician in 1 week and cardiology. Consultants:  Dr. Luz Gibbs, cardiology    Procedures: heart cath     Complications: none    Discharge Condition: good    Exam:  GEN:  alert and oriented to person, place and time, well-developed and well-nourished, in no acute distress  EYES: No gross abnormalities. and Sclera nonicteric  NECK: normal, supple, normal ROM,  no carotid bruits  PULM: clear to auscultation bilaterally- no wheezes, rales or rhonchi, normal air movement, no respiratory distress  COR: regular rate & rhythm, no murmurs, and no gallops  ABD:  soft, non-tender, non-distended, normal bowel sounds, no masses or organomegaly  EXT:   no cyanosis, clubbing or edema present    NEURO: follows commands, [place], no deficits  SKIN:  no rashes or significant lesions    Significant Diagnostic Studies:   Lab Results   Component Value Date    WBC 6.8 08/09/2023    HGB 12.4 (L) 08/09/2023     08/09/2023       Lab Results   Component Value Date    BUN 22 (H) 08/09/2023    CREATININE 1.0 08/09/2023     08/09/2023    K 3.9 08/09/2023    CALCIUM 8.3 (L) 08/09/2023     08/09/2023    CO2 24 08/09/2023    LABGLOM >60 08/09/2023       Lab Results   Component Value Date    WBCUA None 08/07/2023    RBCUA None 08/07/2023    EPITHUA None 08/07/2023    LEUKOCYTESUR NEGATIVE 08/07/2023    SPECGRAV >1.030 (H) 08/07/2023    GLUCOSEU NEGATIVE 08/07/2023    KETUA NEGATIVE 08/07/2023    PROTEINU 1+ (A) 08/07/2023    HGBUR NEGATIVE 08/07/2023    CASTUA 2 TO 5 HYALINE 08/07/2023    BACTERIA TRACE (A) 02/27/2023       XR CHEST PORTABLE    Result Date: 8/7/2023  EXAMINATION: ONE XRAY VIEW OF THE CHEST 8/7/2023 3:46 pm COMPARISON: Chest radiograph 12/27/2022. CT chest 01/23/2023. HISTORY: ORDERING SYSTEM PROVIDED HISTORY: CP TECHNOLOGIST PROVIDED HISTORY: CP FINDINGS: Status post median sternotomy.   The cardiomediastinal silhouette is

## 2023-08-09 NOTE — DISCHARGE INSTRUCTIONS
beverages for 24 hours after the procedure. Physical Activity   The sedative will make you sleepy. Rest until the effects have worn off. Nausea and vomiting from the sedative is normal and usually does not last long. Ask your doctor when you will be able to return to work. Do not drive, operate machinery, do anything that requires attention to detail, or sign important papers for at least 24 hours or until your doctor says it is safe. Avoid heavy lifting, physically demanding activities, and sexual activity for 2-3 days. Lift nothing over 10 pounds (a gallon of milk is 8 pounds). Do not sit for long periods of time. Try to change positions frequently. Medications   Resume taking your normal medicines as advised. Use acetaminophen (Tylenol) for pain relief. (Avoid anti-inflammatory drugs such as- ibuprofen (Advil, Motrin), naproxen sodium (Aleve), Excedrin for a few days)  If you had to stop taking these medications before the procedure, ask your doctor when you can resume taking them:   Anti-inflammatory drugs   Blood thinners, such as warfarin (Coumadin)   If you are taking medicines, follow these general guidelines:   Take your medicine as directed. Do not change the amount or the schedule. Do not stop taking them without talking to your doctor. Do not share them. Know the side effects and report any to your doctor. Some drugs can be dangerous when mixed. Talk to a doctor or pharmacist if you are taking more than one drug. This includes over-the-counter medicine and herb or dietary supplements. Plan ahead for refills so you don't run out. Follow-up   The test results are available right after the procedure. At that point, the doctor will discuss the findings and suggest appropriate treatment options. In some cases, the results can indicate an immediate need for surgery. Schedule a follow-up appointment as directed by your doctor.    Call Your Doctor If Any of the Following Occurs   Signs of infection- including fever and chills   Redness, swelling, increasing pain, feels warm to touch, red streak forming from site, or any discharge from the procedure site.    Call 911 If Any of the Following Occurs   Drooping facial muscles   Changes in vision or speech   Difficulty walking or using your limbs   Change in sensation, including numbness, feeling cold, or change in color   Extreme sweating, nausea or vomiting   Dizziness or lightheadedness   Chest pain   Rapid, irregular heartbeat   Palpitations   Cough, shortness of breath, or difficulty breathing   Weakness or fainting   If you think you have an emergency, CALL 911

## 2023-08-09 NOTE — PROGRESS NOTES
All discharge instructions given, all questions answered. Patient will return to inpatient room 326 for another hour of his recovery.

## 2023-08-09 NOTE — PLAN OF CARE
Problem: Discharge Planning  Goal: Discharge to home or other facility with appropriate resources  Outcome: Completed     Problem: Pain  Goal: Verbalizes/displays adequate comfort level or baseline comfort level  Outcome: Completed  Flowsheets (Taken 8/9/2023 0350 by Cisco Alvarez RN)  Verbalizes/displays adequate comfort level or baseline comfort level: Encourage patient to monitor pain and request assistance     Problem: Safety - Adult  Goal: Free from fall injury  Outcome: Completed     Problem: Chronic Conditions and Co-morbidities  Goal: Patient's chronic conditions and co-morbidity symptoms are monitored and maintained or improved  Outcome: Completed

## 2023-08-10 DIAGNOSIS — E78.5 HYPERLIPIDEMIA, UNSPECIFIED HYPERLIPIDEMIA TYPE: ICD-10-CM

## 2023-08-10 DIAGNOSIS — M25.511 ACUTE PAIN OF RIGHT SHOULDER: ICD-10-CM

## 2023-08-10 RX ORDER — FENOFIBRATE 160 MG/1
TABLET ORAL
Qty: 90 TABLET | Refills: 0 | Status: SHIPPED | OUTPATIENT
Start: 2023-08-10

## 2023-08-10 RX ORDER — TIZANIDINE 4 MG/1
4 TABLET ORAL 3 TIMES DAILY PRN
Qty: 42 TABLET | Refills: 0 | Status: SHIPPED | OUTPATIENT
Start: 2023-08-10 | End: 2023-08-24

## 2023-08-10 NOTE — TELEPHONE ENCOUNTER
Care Transitions Initial Follow Up Call    Outreach made within 2 business days of discharge: Yes    Patient: Paresh Ospina Patient : 1975   MRN: [id number]  Reason for Admission: There are no discharge diagnoses documented for the most recent discharge.   Discharge Date: 23           Scheduled appointment with PCP within 7-14 days    Follow Up  Future Appointments   Date Time Provider [address] [place] [address]   8/15/2023 10:15 AM Aria Chapa MD [facility]   2023 11:20 AM [facility]       Jewels [place]rah

## 2023-08-15 ENCOUNTER — HOSPITAL ENCOUNTER (OUTPATIENT)
Age: 48
Discharge: HOME | End: 2023-08-15
Payer: COMMERCIAL

## 2023-08-15 ENCOUNTER — OFFICE VISIT (OUTPATIENT)
Dept: PRIMARY CARE CLINIC | Age: 48
End: 2023-08-15

## 2023-08-15 VITALS
WEIGHT: 207 LBS | HEIGHT: 67 IN | SYSTOLIC BLOOD PRESSURE: 124 MMHG | OXYGEN SATURATION: 96 % | HEART RATE: 75 BPM | BODY MASS INDEX: 32.49 KG/M2 | DIASTOLIC BLOOD PRESSURE: 80 MMHG

## 2023-08-15 DIAGNOSIS — Z09 HOSPITAL DISCHARGE FOLLOW-UP: ICD-10-CM

## 2023-08-15 DIAGNOSIS — Z09 HOSPITAL DISCHARGE FOLLOW-UP: Primary | ICD-10-CM

## 2023-08-15 DIAGNOSIS — R07.89 CHEST WALL PAIN: ICD-10-CM

## 2023-08-15 DIAGNOSIS — E11.69 TYPE 2 DIABETES MELLITUS WITH OTHER SPECIFIED COMPLICATION, WITHOUT LONG-TERM CURRENT USE OF INSULIN (HCC): ICD-10-CM

## 2023-08-15 DIAGNOSIS — R60.0 LEG EDEMA: ICD-10-CM

## 2023-08-15 DIAGNOSIS — R07.9 CHEST PAIN, UNSPECIFIED TYPE: ICD-10-CM

## 2023-08-15 LAB
ANION GAP SERPL CALCULATED.3IONS-SCNC: 10 MMOL/L (ref 9–17)
BASOPHILS # BLD: 0.08 K/UL (ref 0–0.2)
BASOPHILS NFR BLD: 1 % (ref 0–2)
BUN SERPL-MCNC: 26 MG/DL (ref 6–20)
BUN/CREAT SERPL: 26 (ref 9–20)
CALCIUM SERPL-MCNC: 9.9 MG/DL (ref 8.6–10.4)
CHLORIDE SERPL-SCNC: 103 MMOL/L (ref 98–107)
CO2 SERPL-SCNC: 25 MMOL/L (ref 20–31)
CREAT SERPL-MCNC: 1 MG/DL (ref 0.7–1.2)
EOSINOPHIL # BLD: 0.33 K/UL (ref 0–0.44)
EOSINOPHILS RELATIVE PERCENT: 4 % (ref 1–4)
ERYTHROCYTE [DISTWIDTH] IN BLOOD BY AUTOMATED COUNT: 12.5 % (ref 11.8–14.4)
GFR SERPL CREATININE-BSD FRML MDRD: >60 ML/MIN/1.73M2
GLUCOSE SERPL-MCNC: 264 MG/DL (ref 70–99)
HCT VFR BLD AUTO: 39.1 % (ref 40.7–50.3)
HGB BLD-MCNC: 13.3 G/DL (ref 13–17)
IMM GRANULOCYTES # BLD AUTO: 0.06 K/UL (ref 0–0.3)
IMM GRANULOCYTES NFR BLD: 1 %
LYMPHOCYTES NFR BLD: 2.73 K/UL (ref 1.1–3.7)
LYMPHOCYTES RELATIVE PERCENT: 33 % (ref 24–43)
MCH RBC QN AUTO: 30.6 PG (ref 25.2–33.5)
MCHC RBC AUTO-ENTMCNC: 34 G/DL (ref 28.4–34.8)
MCV RBC AUTO: 90.1 FL (ref 82.6–102.9)
MONOCYTES NFR BLD: 0.8 K/UL (ref 0.1–1.2)
MONOCYTES NFR BLD: 10 % (ref 3–12)
NEUTROPHILS NFR BLD: 51 % (ref 36–65)
NEUTS SEG NFR BLD: 4.24 K/UL (ref 1.5–8.1)
NRBC BLD-RTO: 0 PER 100 WBC
PLATELET # BLD AUTO: 342 K/UL (ref 138–453)
PMV BLD AUTO: 10.3 FL (ref 8.1–13.5)
POTASSIUM SERPL-SCNC: 5 MMOL/L (ref 3.7–5.3)
RBC # BLD AUTO: 4.34 M/UL (ref 4.21–5.77)
SODIUM SERPL-SCNC: 138 MMOL/L (ref 135–144)
WBC OTHER # BLD: 8.2 K/UL (ref 3.5–11.3)

## 2023-08-15 PROCEDURE — 80048 BASIC METABOLIC PNL TOTAL CA: CPT

## 2023-08-15 PROCEDURE — 36415 COLL VENOUS BLD VENIPUNCTURE: CPT

## 2023-08-15 PROCEDURE — 85025 COMPLETE CBC W/AUTO DIFF WBC: CPT

## 2023-08-15 RX ORDER — PREGABALIN 75 MG/1
75 CAPSULE ORAL 2 TIMES DAILY
Qty: 60 CAPSULE | Refills: 0 | Status: SHIPPED | OUTPATIENT
Start: 2023-08-15 | End: 2023-09-14

## 2023-08-15 RX ORDER — FUROSEMIDE 20 MG/1
20 TABLET ORAL DAILY PRN
Qty: 60 TABLET | Refills: 0 | Status: SHIPPED | OUTPATIENT
Start: 2023-08-15

## 2023-08-15 NOTE — PROGRESS NOTES
problems/diagnosis(es):   Inpatient course: Discharge summary reviewed- see chart. Interval history/Current status:     Pt presents for a hospital follow up; Pt presents for a hospital follow up. He had a heart cath 8/9/2023 - He states since procedure his index is always cold and the others are numb thus far. He has been having some SOB with minimal excretion at times. He had a fall on Saturday left side injured. He said his R ankle gives out at the time. He is seeing Farida Larson in [place] today.  The patient had been on a diuretic in the past.    Patient Active Problem List   Diagnosis    Pain in rib, left lower    Chest pain at rest    HTN (hypertension)    Tobacco abuse    Family history of premature CAD    Acute coronary syndrome (HCC)    Mild congestive heart failure (HCC)    S/P CABG (coronary artery bypass graft)    Dyslipidemia    Ischemic cardiomyopathy    Chronic combined systolic and diastolic CHF, NYHA class 2 (HCC)    ASHD (arteriosclerotic heart disease) /  CABG 2018    ACS (acute coronary syndrome) (HCC)    Palpitations    Abnormal cardiovascular stress test    Pancreatitis, unspecified pancreatitis type    Nausea    Epigastric pain    Intractable abdominal pain    Severe malnutrition (HCC)    Diabetes mellitus ([address])    Generalized anxiety disorder    Primary hypertension    Hyperlipidemia    Mild intermittent asthma    Mild persistent asthma with acute exacerbation    Bronchitis    Shortness of breath    Unstable angina (HCC)    Chest wall pain    Sleeping difficulty    Lung nodule    Chronic dyspnea    Gastroesophageal reflux disease    Allergic rhinitis due to pollen    Hemoptysis    Nausea and vomiting    Gastrointestinal hemorrhage with hematemesis    History of pancreatitis    Gastritis without bleeding    Acute pain of right shoulder    Hospital discharge follow-up    Leg edema       Medications listed as ordered at the time of discharge from hospital     Medication List            Accurate

## 2023-08-29 ENCOUNTER — TELEPHONE (OUTPATIENT)
Dept: CARDIOLOGY | Age: 48
End: 2023-08-29

## 2023-08-29 NOTE — TELEPHONE ENCOUNTER
Pt is going to have rotator cuff surgery next Thursday and needs to know how long to hold aspirin and plavix?

## 2023-08-30 ENCOUNTER — OFFICE VISIT (OUTPATIENT)
Dept: CARDIOLOGY | Age: 48
End: 2023-08-30
Payer: COMMERCIAL

## 2023-08-30 VITALS
BODY MASS INDEX: 33.07 KG/M2 | WEIGHT: 210.7 LBS | HEART RATE: 73 BPM | SYSTOLIC BLOOD PRESSURE: 132 MMHG | RESPIRATION RATE: 18 BRPM | DIASTOLIC BLOOD PRESSURE: 92 MMHG | OXYGEN SATURATION: 97 % | HEIGHT: 67 IN

## 2023-08-30 DIAGNOSIS — E78.2 MIXED HYPERLIPIDEMIA: ICD-10-CM

## 2023-08-30 DIAGNOSIS — R42 DIZZY: ICD-10-CM

## 2023-08-30 DIAGNOSIS — R00.2 PALPITATIONS: ICD-10-CM

## 2023-08-30 DIAGNOSIS — I10 PRIMARY HYPERTENSION: ICD-10-CM

## 2023-08-30 DIAGNOSIS — I25.10 ASHD (ARTERIOSCLEROTIC HEART DISEASE): ICD-10-CM

## 2023-08-30 DIAGNOSIS — Z01.810 PREOP CARDIOVASCULAR EXAM: Primary | ICD-10-CM

## 2023-08-30 DIAGNOSIS — Z95.1 S/P CABG (CORONARY ARTERY BYPASS GRAFT): ICD-10-CM

## 2023-08-30 PROCEDURE — 3080F DIAST BP >= 90 MM HG: CPT | Performed by: PHYSICIAN ASSISTANT

## 2023-08-30 PROCEDURE — 3075F SYST BP GE 130 - 139MM HG: CPT | Performed by: PHYSICIAN ASSISTANT

## 2023-08-30 PROCEDURE — 99214 OFFICE O/P EST MOD 30 MIN: CPT | Performed by: PHYSICIAN ASSISTANT

## 2023-08-30 NOTE — TELEPHONE ENCOUNTER
I prefer him going to surgery while on aspirin but this can be held for 7 days if bleeding risk is unacceptable. Please hold Plavix for 7 days prior to the surgery.

## 2023-08-30 NOTE — PROGRESS NOTES
with guideline directed maximal medical management. History of Chest Pain: He has cardiac cath back in January 2022 showed patent graft to RCA,  otherwise nonobstructive CAD. His stress test on 12/29/2022: unchanged from prior, evidence of inferior myocardial infarction with minimal jazmine-infarction ischemia. Having said his symptoms were worrisome for ischemic etiology particularly that it is slowly getting worse. Cath done on 1/26/23: Severe single vessel disease involving the right coronary artery with Patent SVG graft to RCA. Mild to moderate disease in the LAD and Cx. Normal left ventricular end diastolic pressure. (LVEDP). Concern for upper GI bleeding 1/31/2023: S/P ECG showed mild gastritis. Currently feeling and doing much better. Denies any chest pain, pressure or tightness. Antiplatelet Agent: Continue aspirin 81 mg daily and clopidogrel (Plavix 75 mg daily. I also reminded him to watch for signs of blood in his stool or black tarry stools and stop the medication immediately if this develops as this could be life threatening. Beta Blocker: Continue Metoprolol succinate (Toprol XL) 50 mg daily. Calcium Channel Blocker: Continue [id number] mg daily     Other Anti-anginal medications: Continue Ranolazine (Ranexa) 500 mg twice daily I discussed the potential side effects of this medication including lightheadedness and dizziness and told him to call the office if this occurs. Statin Therapy: Continue atorvastatin (Lipitor) 80 mg nightly. and continue fenofibrate 160 mg daily. Counseling: I advised Mr. Odilon Barba to call our office or go to the emergency room if he develops worsening or persistent chest pain or shortness of breath as this could be life threatening. Additional Testing List: None    Recurrent intermittent palpitations: Rate Control Symptomatic. CAM  was done on 12/29/2022: Predominant rhythm: Normal sinus rhythm.  Atrial tachycardia (AT) 2 episodes, longest 4 beats at average

## 2023-08-30 NOTE — PATIENT INSTRUCTIONS
SURVEY:    You may be receiving a survey from [facility] regarding your visit today. Please complete the survey to enable us to provide the highest quality of care to you and your family. If you cannot score us a very good on any question, please call the office to discuss how we could have made your experience a very good one. Thank you.

## 2023-09-13 DIAGNOSIS — R07.89 CHEST WALL PAIN: ICD-10-CM

## 2023-09-13 DIAGNOSIS — R60.0 LEG EDEMA: ICD-10-CM

## 2023-09-13 RX ORDER — FUROSEMIDE 20 MG/1
20 TABLET ORAL DAILY PRN
Qty: 90 TABLET | Refills: 0 | Status: SHIPPED | OUTPATIENT
Start: 2023-09-13

## 2023-09-14 PROBLEM — Z09 HOSPITAL DISCHARGE FOLLOW-UP: Status: RESOLVED | Noted: 2023-08-15 | Resolved: 2023-09-14

## 2023-09-20 ENCOUNTER — OFFICE VISIT (OUTPATIENT)
Dept: PRIMARY CARE CLINIC | Age: 48
End: 2023-09-20
Payer: COMMERCIAL

## 2023-09-20 VITALS
BODY MASS INDEX: 31.55 KG/M2 | DIASTOLIC BLOOD PRESSURE: 74 MMHG | OXYGEN SATURATION: 98 % | SYSTOLIC BLOOD PRESSURE: 116 MMHG | WEIGHT: 201 LBS | HEIGHT: 67 IN | HEART RATE: 84 BPM

## 2023-09-20 DIAGNOSIS — K21.9 GASTROESOPHAGEAL REFLUX DISEASE, UNSPECIFIED WHETHER ESOPHAGITIS PRESENT: ICD-10-CM

## 2023-09-20 DIAGNOSIS — E11.69 TYPE 2 DIABETES MELLITUS WITH OTHER SPECIFIED COMPLICATION, WITHOUT LONG-TERM CURRENT USE OF INSULIN (HCC): Primary | ICD-10-CM

## 2023-09-20 DIAGNOSIS — E66.9 CLASS 1 OBESITY IN ADULT, UNSPECIFIED BMI, UNSPECIFIED OBESITY TYPE, UNSPECIFIED WHETHER SERIOUS COMORBIDITY PRESENT: ICD-10-CM

## 2023-09-20 PROBLEM — E66.811 CLASS 1 OBESITY IN ADULT: Status: ACTIVE | Noted: 2023-09-20

## 2023-09-20 PROCEDURE — 3052F HG A1C>EQUAL 8.0%<EQUAL 9.0%: CPT | Performed by: STUDENT IN AN ORGANIZED HEALTH CARE EDUCATION/TRAINING PROGRAM

## 2023-09-20 PROCEDURE — 99214 OFFICE O/P EST MOD 30 MIN: CPT | Performed by: STUDENT IN AN ORGANIZED HEALTH CARE EDUCATION/TRAINING PROGRAM

## 2023-09-20 PROCEDURE — 3078F DIAST BP <80 MM HG: CPT | Performed by: STUDENT IN AN ORGANIZED HEALTH CARE EDUCATION/TRAINING PROGRAM

## 2023-09-20 PROCEDURE — 3074F SYST BP LT 130 MM HG: CPT | Performed by: STUDENT IN AN ORGANIZED HEALTH CARE EDUCATION/TRAINING PROGRAM

## 2023-09-20 RX ORDER — TIRZEPATIDE 5 MG/.5ML
5 INJECTION, SOLUTION SUBCUTANEOUS WEEKLY
Qty: 4 ADJUSTABLE DOSE PRE-FILLED PEN SYRINGE | Refills: 2 | Status: SHIPPED | OUTPATIENT
Start: 2023-09-20

## 2023-09-20 RX ORDER — PANTOPRAZOLE SODIUM 40 MG/1
80 TABLET, DELAYED RELEASE ORAL
Qty: 60 TABLET | Refills: 2 | Status: SHIPPED | OUTPATIENT
Start: 2023-09-20

## 2023-10-20 ENCOUNTER — APPOINTMENT (OUTPATIENT)
Dept: GENERAL RADIOLOGY | Age: 48
End: 2023-10-20
Payer: COMMERCIAL

## 2023-10-20 ENCOUNTER — HOSPITAL ENCOUNTER (EMERGENCY)
Age: 48
Discharge: HOME | End: 2023-10-20
Payer: COMMERCIAL

## 2023-10-20 VITALS
SYSTOLIC BLOOD PRESSURE: 141 MMHG | RESPIRATION RATE: 16 BRPM | TEMPERATURE: 97.6 F | OXYGEN SATURATION: 99 % | DIASTOLIC BLOOD PRESSURE: 90 MMHG | HEART RATE: 83 BPM

## 2023-10-20 DIAGNOSIS — M87.052 AVASCULAR NECROSIS OF BONE OF LEFT HIP (HCC): ICD-10-CM

## 2023-10-20 DIAGNOSIS — M25.552 LEFT HIP PAIN: Primary | ICD-10-CM

## 2023-10-20 PROCEDURE — 99283 EMERGENCY DEPT VISIT LOW MDM: CPT

## 2023-10-20 PROCEDURE — 73502 X-RAY EXAM HIP UNI 2-3 VIEWS: CPT

## 2023-10-20 PROCEDURE — 6370000000 HC RX 637 (ALT 250 FOR IP): Performed by: NURSE PRACTITIONER

## 2023-10-20 RX ORDER — HYDROCODONE BITARTRATE AND ACETAMINOPHEN 5; 325 MG/1; MG/1
1 TABLET ORAL EVERY 12 HOURS
Qty: 6 TABLET | Refills: 0 | Status: SHIPPED | OUTPATIENT
Start: 2023-10-20 | End: 2023-10-23

## 2023-10-20 RX ORDER — HYDROCODONE BITARTRATE AND ACETAMINOPHEN 5; 325 MG/1; MG/1
1 TABLET ORAL ONCE
Status: COMPLETED | OUTPATIENT
Start: 2023-10-20 | End: 2023-10-20

## 2023-10-20 RX ADMIN — HYDROCODONE BITARTRATE AND ACETAMINOPHEN 1 TABLET: 5; 325 TABLET ORAL at 17:58

## 2023-10-20 ASSESSMENT — PAIN DESCRIPTION - LOCATION: LOCATION: HIP

## 2023-10-20 ASSESSMENT — PAIN SCALES - GENERAL: PAINLEVEL_OUTOF10: 6

## 2023-10-20 ASSESSMENT — PAIN - FUNCTIONAL ASSESSMENT: PAIN_FUNCTIONAL_ASSESSMENT: 0-10

## 2023-10-20 ASSESSMENT — PAIN DESCRIPTION - ORIENTATION: ORIENTATION: LEFT;RIGHT

## 2023-10-20 NOTE — DISCHARGE INSTRUCTIONS
Norco 5 mg/325 mg 1 by mouth every 12 hours as needed for pain. Do not drive with this medication. No alcohol with this medication.

## 2023-11-05 DIAGNOSIS — E78.5 HYPERLIPIDEMIA, UNSPECIFIED HYPERLIPIDEMIA TYPE: ICD-10-CM

## 2023-11-06 RX ORDER — FENOFIBRATE 160 MG/1
TABLET ORAL
Qty: 90 TABLET | Refills: 0 | Status: SHIPPED | OUTPATIENT
Start: 2023-11-06

## 2023-11-12 DIAGNOSIS — M25.511 ACUTE PAIN OF RIGHT SHOULDER: ICD-10-CM

## 2023-11-13 RX ORDER — TIZANIDINE 4 MG/1
4 TABLET ORAL 3 TIMES DAILY PRN
Qty: 42 TABLET | Refills: 0 | Status: SHIPPED | OUTPATIENT
Start: 2023-11-13 | End: 2023-11-27

## 2023-11-16 NOTE — PROGRESS NOTES
Patient instructed on the pre-operative, intra-operative, and post-operative process. Patient's surgical procedure and day of surgery confirmed. Patient instructed on NPO status. Medication instructions reviewed with patient. Patient states he will hold aspirin and Plavix x 7 days prior to surgery per Dr Ashleigh Spivey. Patient states he just had shoulder surgery in Sept so he is familiar with the process. CHG pre-operative wash instructions reviewed with patient. Pre operative instruction sheet reviewed with patient per PAT phone interview. Patient voiced understanding and denies any questions at this time.

## 2023-11-27 RX ORDER — METOPROLOL SUCCINATE 50 MG/1
TABLET, EXTENDED RELEASE ORAL
Qty: 90 TABLET | Refills: 1 | Status: SHIPPED | OUTPATIENT
Start: 2023-11-27

## 2023-11-29 ENCOUNTER — ANESTHESIA EVENT (OUTPATIENT)
Dept: OPERATING ROOM | Age: 48
End: 2023-11-29
Payer: COMMERCIAL

## 2023-11-30 ENCOUNTER — HOSPITAL ENCOUNTER (OUTPATIENT)
Age: 48
Setting detail: OUTPATIENT SURGERY
Discharge: HOME | End: 2023-11-30
Attending: ORTHOPAEDIC SURGERY | Admitting: ORTHOPAEDIC SURGERY
Payer: COMMERCIAL

## 2023-11-30 ENCOUNTER — APPOINTMENT (OUTPATIENT)
Dept: GENERAL RADIOLOGY | Age: 48
End: 2023-11-30
Attending: ORTHOPAEDIC SURGERY
Payer: COMMERCIAL

## 2023-11-30 ENCOUNTER — ANESTHESIA (OUTPATIENT)
Dept: OPERATING ROOM | Age: 48
End: 2023-11-30
Payer: COMMERCIAL

## 2023-11-30 VITALS
HEART RATE: 95 BPM | TEMPERATURE: 97.2 F | HEIGHT: 67 IN | RESPIRATION RATE: 16 BRPM | SYSTOLIC BLOOD PRESSURE: 108 MMHG | BODY MASS INDEX: 30.61 KG/M2 | DIASTOLIC BLOOD PRESSURE: 69 MMHG | WEIGHT: 195 LBS | OXYGEN SATURATION: 95 %

## 2023-11-30 DIAGNOSIS — R52 PAIN: ICD-10-CM

## 2023-11-30 PROBLEM — M25.551 RIGHT HIP PAIN: Status: ACTIVE | Noted: 2023-11-30

## 2023-11-30 PROBLEM — M25.552 LEFT HIP PAIN: Status: ACTIVE | Noted: 2023-11-30

## 2023-11-30 PROCEDURE — 3700000000 HC ANESTHESIA ATTENDED CARE: Performed by: ORTHOPAEDIC SURGERY

## 2023-11-30 PROCEDURE — 7100000011 HC PHASE II RECOVERY - ADDTL 15 MIN: Performed by: ORTHOPAEDIC SURGERY

## 2023-11-30 PROCEDURE — 3600000002 HC SURGERY LEVEL 2 BASE: Performed by: ORTHOPAEDIC SURGERY

## 2023-11-30 PROCEDURE — 3600000012 HC SURGERY LEVEL 2 ADDTL 15MIN: Performed by: ORTHOPAEDIC SURGERY

## 2023-11-30 PROCEDURE — 6360000002 HC RX W HCPCS: Performed by: ORTHOPAEDIC SURGERY

## 2023-11-30 PROCEDURE — 2709999900 HC NON-CHARGEABLE SUPPLY: Performed by: ORTHOPAEDIC SURGERY

## 2023-11-30 PROCEDURE — 6370000000 HC RX 637 (ALT 250 FOR IP): Performed by: NURSE ANESTHETIST, CERTIFIED REGISTERED

## 2023-11-30 PROCEDURE — 3700000001 HC ADD 15 MINUTES (ANESTHESIA): Performed by: ORTHOPAEDIC SURGERY

## 2023-11-30 PROCEDURE — 7100000010 HC PHASE II RECOVERY - FIRST 15 MIN: Performed by: ORTHOPAEDIC SURGERY

## 2023-11-30 PROCEDURE — 6360000002 HC RX W HCPCS: Performed by: NURSE ANESTHETIST, CERTIFIED REGISTERED

## 2023-11-30 PROCEDURE — 6360000004 HC RX CONTRAST MEDICATION: Performed by: ORTHOPAEDIC SURGERY

## 2023-11-30 PROCEDURE — 2580000003 HC RX 258: Performed by: NURSE ANESTHETIST, CERTIFIED REGISTERED

## 2023-11-30 PROCEDURE — 2500000003 HC RX 250 WO HCPCS: Performed by: NURSE ANESTHETIST, CERTIFIED REGISTERED

## 2023-11-30 RX ORDER — LIDOCAINE HYDROCHLORIDE 20 MG/ML
INJECTION, SOLUTION EPIDURAL; INFILTRATION; INTRACAUDAL; PERINEURAL PRN
Status: DISCONTINUED | OUTPATIENT
Start: 2023-11-30 | End: 2023-11-30 | Stop reason: SDUPTHER

## 2023-11-30 RX ORDER — DIMENHYDRINATE 50 MG
50 TABLET ORAL ONCE
Status: COMPLETED | OUTPATIENT
Start: 2023-11-30 | End: 2023-11-30

## 2023-11-30 RX ORDER — SODIUM CHLORIDE, SODIUM LACTATE, POTASSIUM CHLORIDE, CALCIUM CHLORIDE 600; 310; 30; 20 MG/100ML; MG/100ML; MG/100ML; MG/100ML
INJECTION, SOLUTION INTRAVENOUS CONTINUOUS
Status: DISCONTINUED | OUTPATIENT
Start: 2023-11-30 | End: 2023-11-30 | Stop reason: HOSPADM

## 2023-11-30 RX ORDER — SODIUM CHLORIDE 9 MG/ML
INJECTION, SOLUTION INTRAVENOUS PRN
Status: DISCONTINUED | OUTPATIENT
Start: 2023-11-30 | End: 2023-11-30 | Stop reason: HOSPADM

## 2023-11-30 RX ORDER — SODIUM CHLORIDE 0.9 % (FLUSH) 0.9 %
5-40 SYRINGE (ML) INJECTION PRN
Status: DISCONTINUED | OUTPATIENT
Start: 2023-11-30 | End: 2023-11-30 | Stop reason: HOSPADM

## 2023-11-30 RX ORDER — BUPIVACAINE HYDROCHLORIDE 5 MG/ML
INJECTION, SOLUTION PERINEURAL PRN
Status: DISCONTINUED | OUTPATIENT
Start: 2023-11-30 | End: 2023-11-30 | Stop reason: ALTCHOICE

## 2023-11-30 RX ORDER — PROPOFOL 10 MG/ML
INJECTION, EMULSION INTRAVENOUS CONTINUOUS PRN
Status: DISCONTINUED | OUTPATIENT
Start: 2023-11-30 | End: 2023-11-30 | Stop reason: SDUPTHER

## 2023-11-30 RX ORDER — BUPIVACAINE HYDROCHLORIDE 5 MG/ML
INJECTION, SOLUTION EPIDURAL; INTRACAUDAL PRN
Status: DISCONTINUED | OUTPATIENT
Start: 2023-11-30 | End: 2023-11-30 | Stop reason: ALTCHOICE

## 2023-11-30 RX ORDER — ACETAMINOPHEN 325 MG/1
650 TABLET ORAL ONCE
Status: COMPLETED | OUTPATIENT
Start: 2023-11-30 | End: 2023-11-30

## 2023-11-30 RX ORDER — SODIUM CHLORIDE 0.9 % (FLUSH) 0.9 %
5-40 SYRINGE (ML) INJECTION EVERY 12 HOURS SCHEDULED
Status: DISCONTINUED | OUTPATIENT
Start: 2023-11-30 | End: 2023-11-30 | Stop reason: HOSPADM

## 2023-11-30 RX ADMIN — LIDOCAINE HYDROCHLORIDE 5 ML: 20 INJECTION, SOLUTION EPIDURAL; INFILTRATION; INTRACAUDAL; PERINEURAL at 12:48

## 2023-11-30 RX ADMIN — ACETAMINOPHEN 650 MG: 325 TABLET ORAL at 12:27

## 2023-11-30 RX ADMIN — SODIUM CHLORIDE, POTASSIUM CHLORIDE, SODIUM LACTATE AND CALCIUM CHLORIDE: 600; 310; 30; 20 INJECTION, SOLUTION INTRAVENOUS at 12:29

## 2023-11-30 RX ADMIN — DIMENHYDRINATE 50 MG: 50 TABLET ORAL at 12:27

## 2023-11-30 RX ADMIN — PROPOFOL 250 MCG/KG/MIN: 10 INJECTION, EMULSION INTRAVENOUS at 12:49

## 2023-11-30 ASSESSMENT — ENCOUNTER SYMPTOMS: SHORTNESS OF BREATH: 1

## 2023-11-30 ASSESSMENT — PAIN - FUNCTIONAL ASSESSMENT: PAIN_FUNCTIONAL_ASSESSMENT: NONE - DENIES PAIN

## 2023-11-30 ASSESSMENT — LIFESTYLE VARIABLES: SMOKING_STATUS: 1

## 2023-11-30 NOTE — OP NOTE
Department of Orthopaedic Surgery  Operative Report      Patient:  Jadon Gonsalez    YOB: 1975    MRN:  [id number]    Date of Surgery:  11/30/2023    Pre-operative Diagnosis:  1. Right hip pain  2. Right hip osteoarthritis  3. Left hip pain  4. Left hip osteoarthritis     Post-operative Diagnosis:    1. Right hip pain  2. Right hip osteoarthritis  3. Left hip pain  4. Left hip osteoarthritis     Procedure:    1. Right hip injection  2. Fluoroscopic guided needle placement right hip   3. Left hip injection  4. Fluoroscopic guided needle placement left hip      Surgeon:  Manjinder Garcia MD     Assistant(s): None     Anesthesia: Sedation     Estimated blood loss: 0 mL     Fluids: 400 mL Crystalloid     Urine: 0 mL     Specimens: None     Findings: Successful arthrogram right hip. Successful arthrogram left hip. Complications: None     Condition: Stable     Indications for Surgery: Patient is a 53year-old male who presented for evaluation of bilateral hip pain. Patient had tried nonsurgical management but continued to have pain. Radiographs demonstrate arthritis of the bilateral hips. Discussion was had with patient regarding bilateral hip image guided intra-articular injection in order to help with pain relief. Discussed procedure, risk, benefits, and alternatives include but not limited to bleeding, infection, neurovascular injury, continued pain, need for additional surgery, and risks of anesthesia. Patient understood the risks and elected to proceed with surgery. Procedure:  Patient was identified and greeted in the preoperative holding area. The correct surgical site was identified and marked. Surgical consent was obtained and placed on the chart. Patient was taken to the surgical suite and transferred to the operating room table. Patient received sedation per anesthesia. Anterior right hip and anterior left hip was sterilely prepped and draped.   Surgical timeout was performed

## 2023-11-30 NOTE — FLOWSHEET NOTE
Patient steady on feet. When up walking states it feels like the inj sites are tight internally. No swelling palpated. Relayed it is probably just from the injection itself. Enc to call MD if this persists. Patient voices he is going to wait to take his plavix until tomorrow. Amb off unit with wife.

## 2023-11-30 NOTE — ANESTHESIA POSTPROCEDURE EVALUATION
Department of Anesthesiology  Postprocedure Note    Patient: Olimpia Walker  MRN: [id number]  YOB: 1975  Date of evaluation: 11/30/2023      Procedure Summary     Date: 11/30/23 Room / Location: [facility]    Anesthesia Start: 1790 Anesthesia Stop: 7273    Procedure: INJECTION MEDICATION-INTRICULAR INJECTION HIP (Bilateral) Diagnosis:       Left hip pain      (Left hip pain [M25.552])    Surgeons: Cony Johnston MD Responsible Provider: KRUNAL Carmona CRNA    Anesthesia Type: general, TIVA ASA Status: 3          Anesthesia Type: No value filed.     Yrn Phase I: Yrn Score: 10    Yrn Phase II: Yrn Score: 10      Anesthesia Post Evaluation    Patient location during evaluation: PACU  Patient participation: complete - patient participated  Level of consciousness: awake and alert  Airway patency: patent  Nausea & Vomiting: no nausea and no vomiting  Complications: no  Cardiovascular status: blood pressure returned to baseline and hemodynamically stable  Respiratory status: acceptable and room air  Hydration status: euvolemic  Pain management: adequate

## 2023-11-30 NOTE — DISCHARGE INSTRUCTIONS
Weightbearing as tolerated. Activity as tolerated. May remove bandages this evening       SAME DAY SURGERY DISCHARGE INSTRUCTIONS    1. Do not drive or operate hazardous machinery for 24 hours. 2.  Do not make important personal or business decisions for 24 hours. 3.  Do not drink alcoholic beverages for 24 hours. 4.  Do not smoke tobacco products for 24 hours. 5.  Eat light foods (Jell-O, soups, etc....) and drink plenty of fluids (water, Sprite, etc...) up to 8 glasses per day, as you can tolerate. 6.  If your bandages become soaked with bright red blood, place another dressing pad over your bandages. (DO NOT remove original bandage.)  Call your surgeon for further instructions. A small amount of bright red blood is to be expected. 7.  Limit your activities for 24 hours. Do not engage in heavy work until your surgeon gives you permission. 8.  Patient should not be left alone for 12-24 hours following surgical procedure. 9.  Report the following signs or any questions regarding your physical condition to your surgeon immediately:    Excessive swelling of, or around the wound area. Redness. Temperature of 100 degrees (F) or above. Excessive pain. 10.  Call your surgeon for any questions regarding your surgery.

## 2023-11-30 NOTE — H&P
Orthopaedic Surgery Interval H&P    Since patient's H&P on 11/3/2023 he has noted increasing pain in his right hip in addition to his left hip. We did discuss that he could benefit from bilateral hip injections. Discussed procedure, risk, benefits, and alternatives including but not limited to bleeding, infection, neurovascular injury, continued pain, need for additional surgery, and risks of anesthesia. Patient understood the risks elected to proceed with surgery for bilateral hip injections. Full H&P in paper chart.      Krunal Brooks MD  Orthopaedic Surgeon  [facility] [place] [facility]  11/30/2023  12:31 PM

## 2023-11-30 NOTE — FLOWSHEET NOTE
Discharge Criteria    Inpatients must meet Criteria 1 through 7. All other patients are either YES or N/A. If a NO is chosen then Anesthesia or Surgeon must be notified. 1.  Minimum 30 minutes after last dose of sedative medication. Yes      2. Systolic BP between 90 - 364. Diastolic BP between 60 - 90. Yes      3. Pulse between 60 - 120    Yes      4. Respirations between 8 - 25. Yes      5. SpO2 92% - 100%. Yes      6. Able to cough and swallow or return to baseline function. Yes      7. Alert and oriented or return to baseline mental status. Yes      8. Demonstrates controlled, coordinated movements, ambulates with steady gait, or return to baseline activity function. Yes      9. Minimal or no pain or nausea, or at a level tolerable and acceptable to patient. Yes      10. Takes and retains oral fluids as allowed. Yes      11. Procedural / perioperative site stable. Minimal or no bleeding. Yes          12. If GI endoscopy procedure, minimal or no abdominal distention or passing flatus. N/A      13. Written discharge instructions and emergency telephone number provided. Yes      14. Accompanied by a responsible adult.     Yes

## 2023-12-05 RX ORDER — ATORVASTATIN CALCIUM 80 MG/1
80 TABLET, FILM COATED ORAL DAILY
Qty: 90 TABLET | Refills: 3 | Status: SHIPPED | OUTPATIENT
Start: 2023-12-05

## 2023-12-11 DIAGNOSIS — I25.10 ASHD (ARTERIOSCLEROTIC HEART DISEASE): Primary | ICD-10-CM

## 2023-12-11 DIAGNOSIS — Z95.1 S/P CABG (CORONARY ARTERY BYPASS GRAFT): ICD-10-CM

## 2023-12-11 DIAGNOSIS — E78.2 MIXED HYPERLIPIDEMIA: ICD-10-CM

## 2023-12-11 RX ORDER — CLOPIDOGREL BISULFATE 75 MG/1
75 TABLET ORAL DAILY
Qty: 90 TABLET | Refills: 3 | Status: SHIPPED | OUTPATIENT
Start: 2023-12-11

## 2023-12-13 DIAGNOSIS — R60.0 LEG EDEMA: ICD-10-CM

## 2023-12-13 DIAGNOSIS — R07.89 CHEST WALL PAIN: ICD-10-CM

## 2023-12-13 RX ORDER — FUROSEMIDE 20 MG/1
20 TABLET ORAL DAILY PRN
Qty: 90 TABLET | Refills: 0 | Status: SHIPPED | OUTPATIENT
Start: 2023-12-13

## 2023-12-20 PROBLEM — F41.9 ANXIETY: Status: ACTIVE | Noted: 2023-12-20

## 2023-12-22 DIAGNOSIS — E11.69 TYPE 2 DIABETES MELLITUS WITH OTHER SPECIFIED COMPLICATION, WITHOUT LONG-TERM CURRENT USE OF INSULIN (HCC): ICD-10-CM

## 2023-12-22 RX ORDER — TIRZEPATIDE 5 MG/.5ML
INJECTION, SOLUTION SUBCUTANEOUS
Refills: 2 | OUTPATIENT
Start: 2023-12-22

## 2024-01-25 DIAGNOSIS — E78.5 HYPERLIPIDEMIA, UNSPECIFIED HYPERLIPIDEMIA TYPE: ICD-10-CM

## 2024-01-25 RX ORDER — FENOFIBRATE 160 MG/1
TABLET ORAL
Qty: 90 TABLET | Refills: 3 | Status: SHIPPED | OUTPATIENT
Start: 2024-01-25

## 2024-02-01 DIAGNOSIS — F41.9 ANXIETY: ICD-10-CM

## 2024-02-01 RX ORDER — ALPRAZOLAM 0.5 MG/1
0.5 TABLET ORAL 2 TIMES DAILY PRN
Qty: 60 TABLET | Refills: 0 | Status: SHIPPED | OUTPATIENT
Start: 2024-02-01 | End: 2024-03-02

## 2024-02-07 RX ORDER — RANOLAZINE 500 MG/1
TABLET, EXTENDED RELEASE ORAL
Qty: 180 TABLET | Refills: 3 | Status: SHIPPED | OUTPATIENT
Start: 2024-02-07

## 2024-02-08 ENCOUNTER — OFFICE VISIT (OUTPATIENT)
Dept: PRIMARY CARE CLINIC | Age: 49
End: 2024-02-08
Payer: COMMERCIAL

## 2024-02-08 VITALS
SYSTOLIC BLOOD PRESSURE: 136 MMHG | WEIGHT: 204 LBS | OXYGEN SATURATION: 96 % | BODY MASS INDEX: 32.02 KG/M2 | DIASTOLIC BLOOD PRESSURE: 76 MMHG | HEIGHT: 67 IN | HEART RATE: 76 BPM

## 2024-02-08 DIAGNOSIS — M25.552 LEFT HIP PAIN: Primary | ICD-10-CM

## 2024-02-08 DIAGNOSIS — M25.551 RIGHT HIP PAIN: ICD-10-CM

## 2024-02-08 PROCEDURE — 99214 OFFICE O/P EST MOD 30 MIN: CPT | Performed by: STUDENT IN AN ORGANIZED HEALTH CARE EDUCATION/TRAINING PROGRAM

## 2024-02-08 PROCEDURE — 3078F DIAST BP <80 MM HG: CPT | Performed by: STUDENT IN AN ORGANIZED HEALTH CARE EDUCATION/TRAINING PROGRAM

## 2024-02-08 PROCEDURE — 3075F SYST BP GE 130 - 139MM HG: CPT | Performed by: STUDENT IN AN ORGANIZED HEALTH CARE EDUCATION/TRAINING PROGRAM

## 2024-02-08 RX ORDER — HYDROCODONE BITARTRATE AND ACETAMINOPHEN 5; 325 MG/1; MG/1
1 TABLET ORAL EVERY 6 HOURS PRN
Qty: 28 TABLET | Refills: 0 | Status: SHIPPED | OUTPATIENT
Start: 2024-02-08 | End: 2024-02-15

## 2024-02-08 ASSESSMENT — PATIENT HEALTH QUESTIONNAIRE - PHQ9
SUM OF ALL RESPONSES TO PHQ9 QUESTIONS 1 & 2: 0
2. FEELING DOWN, DEPRESSED OR HOPELESS: 0
DEPRESSION UNABLE TO ASSESS: PT REFUSES
SUM OF ALL RESPONSES TO PHQ QUESTIONS 1-9: 0
1. LITTLE INTEREST OR PLEASURE IN DOING THINGS: 0
SUM OF ALL RESPONSES TO PHQ QUESTIONS 1-9: 0

## 2024-02-16 ENCOUNTER — OFFICE VISIT (OUTPATIENT)
Dept: CARDIOLOGY | Age: 49
End: 2024-02-16
Payer: COMMERCIAL

## 2024-02-16 ENCOUNTER — TELEPHONE (OUTPATIENT)
Dept: CARDIOLOGY | Age: 49
End: 2024-02-16

## 2024-02-16 ENCOUNTER — HOSPITAL ENCOUNTER (OUTPATIENT)
Age: 49
Discharge: HOME | End: 2024-02-16
Payer: COMMERCIAL

## 2024-02-16 VITALS
HEART RATE: 84 BPM | DIASTOLIC BLOOD PRESSURE: 84 MMHG | BODY MASS INDEX: 31.39 KG/M2 | WEIGHT: 200 LBS | HEIGHT: 67 IN | OXYGEN SATURATION: 98 % | RESPIRATION RATE: 18 BRPM | SYSTOLIC BLOOD PRESSURE: 120 MMHG

## 2024-02-16 DIAGNOSIS — R00.2 HEART PALPITATIONS: ICD-10-CM

## 2024-02-16 DIAGNOSIS — I25.10 ASHD (ARTERIOSCLEROTIC HEART DISEASE): ICD-10-CM

## 2024-02-16 DIAGNOSIS — R07.89 CHEST DISCOMFORT: ICD-10-CM

## 2024-02-16 DIAGNOSIS — R42 DIZZY: ICD-10-CM

## 2024-02-16 DIAGNOSIS — I10 PRIMARY HYPERTENSION: ICD-10-CM

## 2024-02-16 DIAGNOSIS — R06.02 SOB (SHORTNESS OF BREATH): ICD-10-CM

## 2024-02-16 DIAGNOSIS — R07.89 CHEST DISCOMFORT: Primary | ICD-10-CM

## 2024-02-16 DIAGNOSIS — E78.2 MIXED HYPERLIPIDEMIA: ICD-10-CM

## 2024-02-16 DIAGNOSIS — Z95.1 S/P CABG (CORONARY ARTERY BYPASS GRAFT): ICD-10-CM

## 2024-02-16 LAB
ANION GAP SERPL CALCULATED.3IONS-SCNC: 13 MMOL/L (ref 9–17)
BUN SERPL-MCNC: 18 MG/DL (ref 6–20)
BUN/CREAT SERPL: 20 (ref 9–20)
CALCIUM SERPL-MCNC: 9.4 MG/DL (ref 8.6–10.4)
CHLORIDE SERPL-SCNC: 104 MMOL/L (ref 98–107)
CO2 SERPL-SCNC: 22 MMOL/L (ref 20–31)
CREAT SERPL-MCNC: 0.9 MG/DL (ref 0.7–1.2)
ECHO BSA: 2.07 M2
ERYTHROCYTE [DISTWIDTH] IN BLOOD BY AUTOMATED COUNT: 12.3 % (ref 11.8–14.4)
GFR SERPL CREATININE-BSD FRML MDRD: >60 ML/MIN/1.73M2
GLUCOSE SERPL-MCNC: 164 MG/DL (ref 70–99)
HCT VFR BLD AUTO: 39.8 % (ref 40.7–50.3)
HGB BLD-MCNC: 14 G/DL (ref 13–17)
MCH RBC QN AUTO: 30.9 PG (ref 25.2–33.5)
MCHC RBC AUTO-ENTMCNC: 35.2 G/DL (ref 28.4–34.8)
MCV RBC AUTO: 87.9 FL (ref 82.6–102.9)
NRBC BLD-RTO: 0 PER 100 WBC
PLATELET # BLD AUTO: 344 K/UL (ref 138–453)
PMV BLD AUTO: 10.3 FL (ref 8.1–13.5)
POTASSIUM SERPL-SCNC: 4 MMOL/L (ref 3.7–5.3)
RBC # BLD AUTO: 4.53 M/UL (ref 4.21–5.77)
SODIUM SERPL-SCNC: 139 MMOL/L (ref 135–144)
TROPONIN I SERPL HS-MCNC: 10 NG/L (ref 0–22)
WBC OTHER # BLD: 8.3 K/UL (ref 3.5–11.3)

## 2024-02-16 PROCEDURE — 80048 BASIC METABOLIC PNL TOTAL CA: CPT

## 2024-02-16 PROCEDURE — 93243 EXT ECG>48HR<7D SCAN A/R: CPT

## 2024-02-16 PROCEDURE — 85027 COMPLETE CBC AUTOMATED: CPT

## 2024-02-16 PROCEDURE — 84484 ASSAY OF TROPONIN QUANT: CPT

## 2024-02-16 PROCEDURE — 3074F SYST BP LT 130 MM HG: CPT | Performed by: PHYSICIAN ASSISTANT

## 2024-02-16 PROCEDURE — 36415 COLL VENOUS BLD VENIPUNCTURE: CPT

## 2024-02-16 PROCEDURE — 93000 ELECTROCARDIOGRAM COMPLETE: CPT | Performed by: INTERNAL MEDICINE

## 2024-02-16 PROCEDURE — 99214 OFFICE O/P EST MOD 30 MIN: CPT | Performed by: PHYSICIAN ASSISTANT

## 2024-02-16 PROCEDURE — 3078F DIAST BP <80 MM HG: CPT | Performed by: PHYSICIAN ASSISTANT

## 2024-02-16 NOTE — PROGRESS NOTES
dry. There is no rash or diaphoresis.   Psychiatric: He has a normal mood and affect. His speech is normal and behavior is normal.      MOST RECENT LABS ON RECORD:   Lab Results   Component Value Date    WBC 8.2 08/15/2023    HGB 13.3 08/15/2023    HCT 39.1 (L) 08/15/2023     08/15/2023    CHOL 98 09/12/2022    TRIG 113 09/12/2022    HDL 21 (L) 09/12/2022    ALT 24 02/27/2023    AST 22 02/27/2023     08/15/2023    K 5.0 08/15/2023     08/15/2023    CREATININE 1.0 08/15/2023    BUN 26 (H) 08/15/2023    CO2 25 08/15/2023    TSH 0.90 08/07/2023    INR 1.0 08/07/2023    LABA1C 8.0 (H) 08/08/2023       ASSESSMENT:     No diagnosis found.    PLAN:        Atherosclerotic Heart Disease: S/P CABG x1 done on August 11, 2018 SVG to RCA  Repeat cardiac cath on 1/27/2022 showed patent SVG to right PDA, otherwise nonobstructive disease of the left coronary artery system.  Heart cath 8/9/2023: Proceed with guideline directed maximal medical management.  History of Chest Pain: He has cardiac cath back in January 2022 showed patent graft to RCA,  otherwise nonobstructive CAD.    His stress test on 12/29/2022: unchanged from prior, evidence of inferior myocardial infarction with minimal jazmine-infarction ischemia.  Having said his symptoms were worrisome for ischemic etiology particularly that it is slowly getting worse.    Cath done on 1/26/23: Severe single vessel disease involving the right coronary artery with Patent SVG graft to RCA. Mild to moderate disease in the LAD and Cx.  Normal left ventricular end diastolic pressure. (LVEDP).   Concern for upper GI bleeding 1/31/2023: S/P ECG showed mild gastritis.  Currently feeling and doing much better. Denies any chest pain, pressure or tightness.  Antiplatelet Agent: Continue aspirin 81 mg daily and clopidogrel (Plavix 75 mg daily. I also reminded him to watch for signs of blood in his stool or black tarry stools and stop the medication immediately if this develops 
JESSEE  [facility] Cardiology Specialist    [address] [place] [id number]  Phone: [phone], Fax: [phone]     I believe that the risk of significant morbidity and mortality related to the patient's current medical conditions are: intermediate-high.  40 minutes    The documentation recorded by the scribe, accurately and completely reflects the services I personally performed and the decisions made by me. Mellissa Rodriguez PA-C February 16, 2024

## 2024-02-16 NOTE — TELEPHONE ENCOUNTER
----- Message from Mellissa Rodriguez PA-C sent at 2/16/2024 10:55 AM EST -----  Please notify patient that their lab results are normal.   Please continue current treatment and follow up.

## 2024-02-28 ENCOUNTER — TELEPHONE (OUTPATIENT)
Dept: CARDIOLOGY | Age: 49
End: 2024-02-28

## 2024-02-28 LAB — ECHO BSA: 2.07 M2

## 2024-02-28 NOTE — TELEPHONE ENCOUNTER
----- Message from Mellissa Rodriguez PA-C sent at 2/28/2024 12:32 PM EST -----  Please let them know that their CAM monitor was overall unremarkable. We will discuss at their follow up appointment.

## 2024-03-07 RX ORDER — LISINOPRIL 20 MG/1
20 TABLET ORAL DAILY
Qty: 90 TABLET | Refills: 3 | Status: SHIPPED | OUTPATIENT
Start: 2024-03-07

## 2024-03-11 DIAGNOSIS — R07.89 CHEST WALL PAIN: ICD-10-CM

## 2024-03-11 DIAGNOSIS — R60.0 LEG EDEMA: ICD-10-CM

## 2024-03-11 RX ORDER — FUROSEMIDE 20 MG/1
20 TABLET ORAL DAILY PRN
Qty: 90 TABLET | Refills: 0 | Status: SHIPPED | OUTPATIENT
Start: 2024-03-11

## 2024-03-22 ENCOUNTER — OFFICE VISIT (OUTPATIENT)
Dept: PRIMARY CARE CLINIC | Age: 49
End: 2024-03-22

## 2024-03-22 VITALS
OXYGEN SATURATION: 98 % | HEIGHT: 67 IN | DIASTOLIC BLOOD PRESSURE: 80 MMHG | WEIGHT: 204 LBS | BODY MASS INDEX: 32.02 KG/M2 | SYSTOLIC BLOOD PRESSURE: 126 MMHG | HEART RATE: 74 BPM

## 2024-03-22 DIAGNOSIS — M25.552 LEFT HIP PAIN: ICD-10-CM

## 2024-03-22 DIAGNOSIS — F41.9 ANXIETY: ICD-10-CM

## 2024-03-22 DIAGNOSIS — G89.4 CHRONIC PAIN SYNDROME: ICD-10-CM

## 2024-03-22 DIAGNOSIS — E11.69 TYPE 2 DIABETES MELLITUS WITH OTHER SPECIFIED COMPLICATION, WITHOUT LONG-TERM CURRENT USE OF INSULIN (HCC): Primary | ICD-10-CM

## 2024-03-22 RX ORDER — HYDROCODONE BITARTRATE AND ACETAMINOPHEN 5; 325 MG/1; MG/1
1 TABLET ORAL EVERY 6 HOURS PRN
Qty: 28 TABLET | Refills: 0 | Status: SHIPPED | OUTPATIENT
Start: 2024-03-22 | End: 2024-03-29

## 2024-03-22 SDOH — ECONOMIC STABILITY: FOOD INSECURITY: WITHIN THE PAST 12 MONTHS, YOU WORRIED THAT YOUR FOOD WOULD RUN OUT BEFORE YOU GOT MONEY TO BUY MORE.: NEVER TRUE

## 2024-03-22 SDOH — ECONOMIC STABILITY: FOOD INSECURITY: WITHIN THE PAST 12 MONTHS, THE FOOD YOU BOUGHT JUST DIDN'T LAST AND YOU DIDN'T HAVE MONEY TO GET MORE.: NEVER TRUE

## 2024-03-22 SDOH — ECONOMIC STABILITY: INCOME INSECURITY: HOW HARD IS IT FOR YOU TO PAY FOR THE VERY BASICS LIKE FOOD, HOUSING, MEDICAL CARE, AND HEATING?: NOT HARD AT ALL

## 2024-03-22 NOTE — PROGRESS NOTES
02/16/2024    HGB 14.0 02/16/2024    HCT 39.8 (L) 02/16/2024     02/16/2024    CHOL 98 09/12/2022    TRIG 113 09/12/2022    HDL 21 (L) 09/12/2022    ALT 24 02/27/2023    AST 22 02/27/2023     02/16/2024    K 4.0 02/16/2024     02/16/2024    CREATININE 0.9 02/16/2024    BUN 18 02/16/2024    CO2 22 02/16/2024    TSH 0.90 08/07/2023    INR 1.0 08/07/2023    LABA1C 8.0 (H) 08/08/2023     Lab Results   Component Value Date    CALCIUM 9.4 02/16/2024     Lab Results   Component Value Date    LDLCHOLESTEROL 54 09/12/2022       Please note that this chart was generated using voice recognition Dragon dictation software. Although every effort was made to ensure the accuracy of this automated transcription, some errors in transcription may have occurred.    Electronically signed by Dr. Curtis Chavarria MD on 3/22/2024 at 8:41 AM

## 2024-04-02 ENCOUNTER — HOSPITAL ENCOUNTER (OUTPATIENT)
Dept: NUCLEAR MEDICINE | Age: 49
Discharge: HOME | End: 2024-04-04
Payer: COMMERCIAL

## 2024-04-02 ENCOUNTER — HOSPITAL ENCOUNTER (OUTPATIENT)
Age: 49
Discharge: HOME | End: 2024-04-04
Payer: COMMERCIAL

## 2024-04-02 VITALS — HEART RATE: 104 BPM | SYSTOLIC BLOOD PRESSURE: 134 MMHG | DIASTOLIC BLOOD PRESSURE: 88 MMHG

## 2024-04-02 VITALS — WEIGHT: 204 LBS | HEIGHT: 67 IN | BODY MASS INDEX: 32.02 KG/M2

## 2024-04-02 DIAGNOSIS — R42 DIZZY: ICD-10-CM

## 2024-04-02 DIAGNOSIS — R07.89 CHEST DISCOMFORT: ICD-10-CM

## 2024-04-02 DIAGNOSIS — I10 PRIMARY HYPERTENSION: ICD-10-CM

## 2024-04-02 DIAGNOSIS — R06.02 SOB (SHORTNESS OF BREATH): ICD-10-CM

## 2024-04-02 DIAGNOSIS — R00.2 HEART PALPITATIONS: ICD-10-CM

## 2024-04-02 DIAGNOSIS — I25.10 ASHD (ARTERIOSCLEROTIC HEART DISEASE): ICD-10-CM

## 2024-04-02 DIAGNOSIS — E78.2 MIXED HYPERLIPIDEMIA: ICD-10-CM

## 2024-04-02 DIAGNOSIS — Z95.1 S/P CABG (CORONARY ARTERY BYPASS GRAFT): ICD-10-CM

## 2024-04-02 PROCEDURE — 78452 HT MUSCLE IMAGE SPECT MULT: CPT

## 2024-04-02 PROCEDURE — A9500 TC99M SESTAMIBI: HCPCS | Performed by: STUDENT IN AN ORGANIZED HEALTH CARE EDUCATION/TRAINING PROGRAM

## 2024-04-02 PROCEDURE — 3430000000 HC RX DIAGNOSTIC RADIOPHARMACEUTICAL: Performed by: STUDENT IN AN ORGANIZED HEALTH CARE EDUCATION/TRAINING PROGRAM

## 2024-04-02 PROCEDURE — 6360000002 HC RX W HCPCS: Performed by: INTERNAL MEDICINE

## 2024-04-02 PROCEDURE — 93306 TTE W/DOPPLER COMPLETE: CPT

## 2024-04-02 PROCEDURE — 2580000003 HC RX 258: Performed by: INTERNAL MEDICINE

## 2024-04-02 PROCEDURE — 93017 CV STRESS TEST TRACING ONLY: CPT

## 2024-04-02 RX ORDER — SODIUM CHLORIDE 0.9 % (FLUSH) 0.9 %
5-40 SYRINGE (ML) INJECTION PRN
Status: ACTIVE | OUTPATIENT
Start: 2024-04-02 | End: 2024-04-02

## 2024-04-02 RX ORDER — TETRAKIS(2-METHOXYISOBUTYLISOCYANIDE)COPPER(I) TETRAFLUOROBORATE 1 MG/ML
30 INJECTION, POWDER, LYOPHILIZED, FOR SOLUTION INTRAVENOUS
Status: COMPLETED | OUTPATIENT
Start: 2024-04-02 | End: 2024-04-02

## 2024-04-02 RX ORDER — TETRAKIS(2-METHOXYISOBUTYLISOCYANIDE)COPPER(I) TETRAFLUOROBORATE 1 MG/ML
10 INJECTION, POWDER, LYOPHILIZED, FOR SOLUTION INTRAVENOUS
Status: COMPLETED | OUTPATIENT
Start: 2024-04-02 | End: 2024-04-02

## 2024-04-02 RX ORDER — REGADENOSON 0.08 MG/ML
0.4 INJECTION, SOLUTION INTRAVENOUS
Status: COMPLETED | OUTPATIENT
Start: 2024-04-02 | End: 2024-04-02

## 2024-04-02 RX ORDER — AMINOPHYLLINE 25 MG/ML
50 INJECTION, SOLUTION INTRAVENOUS PRN
Status: DISPENSED | OUTPATIENT
Start: 2024-04-02 | End: 2024-04-02

## 2024-04-02 RX ADMIN — SODIUM CHLORIDE, PRESERVATIVE FREE 10 ML: 5 INJECTION INTRAVENOUS at 12:03

## 2024-04-02 RX ADMIN — REGADENOSON 0.4 MG: 0.08 INJECTION, SOLUTION INTRAVENOUS at 12:03

## 2024-04-02 RX ADMIN — TETRAKIS(2-METHOXYISOBUTYLISOCYANIDE)COPPER(I) TETRAFLUOROBORATE 30 MILLICURIE: 1 INJECTION, POWDER, LYOPHILIZED, FOR SOLUTION INTRAVENOUS at 12:37

## 2024-04-02 RX ADMIN — TETRAKIS(2-METHOXYISOBUTYLISOCYANIDE)COPPER(I) TETRAFLUOROBORATE 10 MILLICURIE: 1 INJECTION, POWDER, LYOPHILIZED, FOR SOLUTION INTRAVENOUS at 12:37

## 2024-04-03 ENCOUNTER — TELEPHONE (OUTPATIENT)
Dept: CARDIOLOGY | Age: 49
End: 2024-04-03

## 2024-04-03 LAB
ECHO AV CUSP MM: 2.1 CM
ECHO AV PEAK GRADIENT: 12 MMHG
ECHO AV PEAK VELOCITY: 1.7 M/S
ECHO AV VELOCITY RATIO: 0.47
ECHO BSA: 2.09 M2
ECHO BSA: 2.09 M2
ECHO EST RA PRESSURE: 5 MMHG
ECHO LA VOL A-L A4C: 38 ML (ref 18–58)
ECHO LA VOL MOD A4C: 35 ML (ref 18–58)
ECHO LA VOLUME INDEX A-L A4C: 19 ML/M2 (ref 16–34)
ECHO LA VOLUME INDEX MOD A4C: 17 ML/M2 (ref 16–34)
ECHO LV E' LATERAL VELOCITY: 9 CM/S
ECHO LV EDV A4C: 100 ML
ECHO LV EDV INDEX A4C: 49 ML/M2
ECHO LV EJECTION FRACTION A4C: 41 %
ECHO LV ESV A4C: 59 ML
ECHO LV ESV INDEX A4C: 29 ML/M2
ECHO LV INTERNAL DIMENSION DIASTOLIC MMODE: 5.6 CM (ref 4.2–5.9)
ECHO LV INTERNAL DIMENSION SYSTOLIC MMODE: 4.3 CM
ECHO LV IVSD MMODE: 1.2 CM (ref 0.6–1)
ECHO LV IVSS MMODE: 1.4 CM
ECHO LV POSTERIOR WALL DIASTOLIC MMODE: 1.3 CM (ref 0.6–1)
ECHO LV POSTERIOR WALL SYSTOLIC MMODE: 1.5 CM
ECHO LVOT MEAN GRADIENT: 1 MMHG
ECHO LVOT PEAK GRADIENT: 3 MMHG
ECHO LVOT PEAK VELOCITY: 0.8 M/S
ECHO LVOT VTI: 15.4 CM
ECHO MV A VELOCITY: 1 M/S
ECHO MV AREA PHT: 2.1 CM2
ECHO MV E DECELERATION TIME (DT): 364.6 MS
ECHO MV E VELOCITY: 1.14 M/S
ECHO MV E/A RATIO: 1.14
ECHO MV E/E' LATERAL: 12.67
ECHO MV PRESSURE HALF TIME (PHT): 105.7 MS
ECHO PV MAX VELOCITY: 1.2 M/S
ECHO PV PEAK GRADIENT: 6 MMHG
ECHO RIGHT VENTRICULAR SYSTOLIC PRESSURE (RVSP): 14 MMHG
ECHO TV REGURGITANT MAX VELOCITY: 1.53 M/S
ECHO TV REGURGITANT PEAK GRADIENT: 9 MMHG
NUC STRESS DIASTOLIC VOLUME INDEX: 149 ML/M2
NUC STRESS EJECTION FRACTION: 38 %
STRESS TARGET HR: 173 BPM
TID: 1.2

## 2024-04-03 PROCEDURE — 93306 TTE W/DOPPLER COMPLETE: CPT | Performed by: INTERNAL MEDICINE

## 2024-04-03 PROCEDURE — 78452 HT MUSCLE IMAGE SPECT MULT: CPT | Performed by: FAMILY MEDICINE

## 2024-04-03 NOTE — TELEPHONE ENCOUNTER
----- Message from Mellissa Rodriguez PA-C sent at 4/3/2024 11:11 AM EDT -----  Please let them know their echo looks like his EF is slightly better than his prior echo, will discuss at follow up appointment. Thanks.

## 2024-04-03 NOTE — RESULT ENCOUNTER NOTE
Please let them know their echo looks like his EF is slightly better than his prior echo, will discuss at follow up appointment. Thanks.

## 2024-04-04 ENCOUNTER — TELEPHONE (OUTPATIENT)
Dept: CARDIOLOGY | Age: 49
End: 2024-04-04

## 2024-04-04 NOTE — RESULT ENCOUNTER NOTE
Please let them know that their stress test was low to intermediate risk. We will discuss in detail at his appointment on 4/15. Thanks.

## 2024-04-04 NOTE — TELEPHONE ENCOUNTER
----- Message from Mellissa Rodriguez PA-C sent at 4/4/2024  8:12 AM EDT -----  Please let them know that their stress test was low to intermediate risk. We will discuss in detail at his appointment on 4/15. Thanks.

## 2024-04-08 DIAGNOSIS — M25.511 ACUTE PAIN OF RIGHT SHOULDER: ICD-10-CM

## 2024-04-09 RX ORDER — TIZANIDINE 4 MG/1
4 TABLET ORAL 3 TIMES DAILY PRN
Qty: 42 TABLET | Refills: 0 | Status: SHIPPED | OUTPATIENT
Start: 2024-04-09 | End: 2024-04-23

## 2024-04-15 ENCOUNTER — TELEPHONE (OUTPATIENT)
Dept: CARDIOLOGY CLINIC | Age: 49
End: 2024-04-15

## 2024-04-15 ENCOUNTER — OFFICE VISIT (OUTPATIENT)
Dept: CARDIOLOGY | Age: 49
End: 2024-04-15
Payer: COMMERCIAL

## 2024-04-15 VITALS
HEART RATE: 80 BPM | WEIGHT: 208 LBS | OXYGEN SATURATION: 97 % | SYSTOLIC BLOOD PRESSURE: 119 MMHG | HEIGHT: 67 IN | RESPIRATION RATE: 16 BRPM | BODY MASS INDEX: 32.65 KG/M2 | DIASTOLIC BLOOD PRESSURE: 74 MMHG

## 2024-04-15 DIAGNOSIS — R00.2 HEART PALPITATIONS: ICD-10-CM

## 2024-04-15 DIAGNOSIS — E78.2 MIXED HYPERLIPIDEMIA: ICD-10-CM

## 2024-04-15 DIAGNOSIS — I25.10 ASHD (ARTERIOSCLEROTIC HEART DISEASE): ICD-10-CM

## 2024-04-15 DIAGNOSIS — I10 PRIMARY HYPERTENSION: ICD-10-CM

## 2024-04-15 DIAGNOSIS — R94.39 ABNORMAL STRESS TEST: Primary | ICD-10-CM

## 2024-04-15 DIAGNOSIS — Z95.1 S/P CABG (CORONARY ARTERY BYPASS GRAFT): ICD-10-CM

## 2024-04-15 PROCEDURE — 3074F SYST BP LT 130 MM HG: CPT | Performed by: INTERNAL MEDICINE

## 2024-04-15 PROCEDURE — 3078F DIAST BP <80 MM HG: CPT | Performed by: INTERNAL MEDICINE

## 2024-04-15 PROCEDURE — 99215 OFFICE O/P EST HI 40 MIN: CPT | Performed by: INTERNAL MEDICINE

## 2024-04-15 RX ORDER — ISOSORBIDE MONONITRATE 30 MG/1
30 TABLET, EXTENDED RELEASE ORAL DAILY
Qty: 90 TABLET | Refills: 3 | Status: ON HOLD | OUTPATIENT
Start: 2024-04-15

## 2024-04-15 RX ORDER — NITROGLYCERIN 0.4 MG/1
0.4 TABLET SUBLINGUAL EVERY 5 MIN PRN
Qty: 25 TABLET | Refills: 3 | Status: ON HOLD | OUTPATIENT
Start: 2024-04-15

## 2024-04-15 RX ORDER — AMLODIPINE BESYLATE 5 MG/1
5 TABLET ORAL DAILY
Qty: 90 TABLET | Refills: 0 | Status: ON HOLD | OUTPATIENT
Start: 2024-04-15

## 2024-04-15 NOTE — TELEPHONE ENCOUNTER
Orders received from Dr. Beltran to schedule PCI.  Referral from Dr. Orozco.  Spoke with Silvia at [facility] in regards to heart cath order.     Dr. Beltran, please agree.     Mary, can you schedule this PCI with Dr. Beltran?

## 2024-04-15 NOTE — PROGRESS NOTES
MEDICATION INJECTION Bilateral 2023    INJECTION MEDICATION-INTRICULAR INJECTION HIP performed by Elliot Jacobs MD at [facility] OR    SHOULDER ARTHROSCOPY Right 2023    RIGHT SHOULDER ARTHROSCOPY ROTATOR CUFF REPAIR performed by Pavel Mcgrath MD at [facility] OR    UPPER GASTROINTESTINAL ENDOSCOPY N/A 10/04/2022    EGD BIOPSY performed by Brenda Melara MD at [facility] OR    UPPER GASTROINTESTINAL ENDOSCOPY N/A 2023    EGD ESOPHAGOGASTRODUODENOSCOPY performed by Brenda Melara MD at [facility] OR    Social History:  Social History     Tobacco Use    Smoking status: Former     Current packs/day: 0.00     Average packs/day: 0.3 packs/day for 30.0 years (7.5 ttl pk-yrs)     Types: Cigarettes     Start date: 1992     Quit date: 2022     Years since quittin.2    Smokeless tobacco: Never   Vaping Use    Vaping Use: Some days    Substances: Nicotine    Devices: Disposable   Substance Use Topics    Alcohol use: Not Currently     Comment: Socially    Drug use: No        CURRENT MEDICATIONS:        Outpatient Medications Marked as Taking for the 4/15/24 encounter (Office Visit) with Elijah Orozco MD   Medication Sig Dispense Refill    tiZANidine (ZANAFLEX) 4 MG tablet TAKE 1 TABLET BY MOUTH 3 TIMES DAILY AS NEEDED (MUSCLE SPASMS). 42 tablet 0    furosemide (LASIX) 20 MG tablet TAKE 1 TABLET BY MOUTH DAILY AS NEEDED (LEG EDEMA) 90 tablet 0    lisinopril (PRINIVIL;ZESTRIL) 20 MG tablet TAKE 1 TABLET BY MOUTH EVERY DAY 90 tablet 3    ranolazine (RANEXA) 500 MG extended release tablet TAKE 1 TABLET BY MOUTH TWICE A  tablet 3    fenofibrate (TRIGLIDE) 160 MG tablet TAKE 1 TABLET BY MOUTH EVERY DAY 90 tablet 3    metFORMIN (GLUCOPHAGE) 500 MG tablet TAKE 1 TABLET BY MOUTH TWICE A DAY WITH MEALS 180 tablet 3    clopidogrel (PLAVIX) 75 MG tablet Take 1 tablet by mouth daily 90 tablet 3    atorvastatin (LIPITOR) 80 MG tablet TAKE 1 TABLET BY MOUTH EVERY DAY 90 tablet 3    metoprolol succinate (TOPROL XL) 50

## 2024-04-15 NOTE — TELEPHONE ENCOUNTER
PCI scheduled with Dr. Beltran for 5/7/24 at 2:00pm, patient to arrive at 12:00pm. Patient notified of date and time. Went over instructions verbally, also mailed.     On doc's schedule. [facility].

## 2024-04-17 ENCOUNTER — HOSPITAL ENCOUNTER (INPATIENT)
Age: 49
LOS: 5 days | Discharge: HOME | DRG: 384 | End: 2024-04-22
Attending: INTERNAL MEDICINE | Admitting: INTERNAL MEDICINE
Payer: COMMERCIAL

## 2024-04-17 ENCOUNTER — APPOINTMENT (OUTPATIENT)
Dept: GENERAL RADIOLOGY | Age: 49
End: 2024-04-17
Payer: COMMERCIAL

## 2024-04-17 ENCOUNTER — HOSPITAL ENCOUNTER (EMERGENCY)
Age: 49
Discharge: ANOTHER HOSPITAL | End: 2024-04-17
Attending: EMERGENCY MEDICINE
Payer: COMMERCIAL

## 2024-04-17 VITALS
HEIGHT: 67 IN | HEART RATE: 76 BPM | TEMPERATURE: 98.5 F | BODY MASS INDEX: 32.65 KG/M2 | WEIGHT: 208 LBS | DIASTOLIC BLOOD PRESSURE: 61 MMHG | OXYGEN SATURATION: 94 % | SYSTOLIC BLOOD PRESSURE: 133 MMHG | RESPIRATION RATE: 15 BRPM

## 2024-04-17 DIAGNOSIS — I20.0 UNSTABLE ANGINA (HCC): ICD-10-CM

## 2024-04-17 DIAGNOSIS — R07.89 ATYPICAL CHEST PAIN: Primary | ICD-10-CM

## 2024-04-17 DIAGNOSIS — I24.9 ACS (ACUTE CORONARY SYNDROME) (HCC): Primary | ICD-10-CM

## 2024-04-17 DIAGNOSIS — K21.9 GASTROESOPHAGEAL REFLUX DISEASE, UNSPECIFIED WHETHER ESOPHAGITIS PRESENT: ICD-10-CM

## 2024-04-17 DIAGNOSIS — G89.4 CHRONIC PAIN SYNDROME: ICD-10-CM

## 2024-04-17 LAB
ANION GAP SERPL CALC-SCNC: 16 MEQ/L (ref 8–16)
ANION GAP SERPL CALCULATED.3IONS-SCNC: 13 MMOL/L (ref 9–17)
APTT PPP: 43.9 SECONDS (ref 22–38)
BASOPHILS # BLD: 0.05 K/UL (ref 0–0.2)
BASOPHILS NFR BLD: 1 % (ref 0–2)
BUN SERPL-MCNC: 20 MG/DL (ref 7–22)
BUN SERPL-MCNC: 21 MG/DL (ref 6–20)
BUN/CREAT SERPL: 23 (ref 9–20)
CALCIUM SERPL-MCNC: 9.1 MG/DL (ref 8.5–10.5)
CALCIUM SERPL-MCNC: 9.4 MG/DL (ref 8.6–10.4)
CHLORIDE SERPL-SCNC: 101 MMOL/L (ref 98–107)
CHLORIDE SERPL-SCNC: 103 MEQ/L (ref 98–111)
CO2 SERPL-SCNC: 19 MEQ/L (ref 23–33)
CO2 SERPL-SCNC: 22 MMOL/L (ref 20–31)
CREAT SERPL-MCNC: 0.9 MG/DL (ref 0.4–1.2)
CREAT SERPL-MCNC: 0.9 MG/DL (ref 0.7–1.2)
D DIMER PPP FEU-MCNC: 0.33 UG/ML FEU (ref 0–0.59)
EKG ATRIAL RATE: 70 BPM
EKG ATRIAL RATE: 84 BPM
EKG P AXIS: 23 DEGREES
EKG P AXIS: 24 DEGREES
EKG P-R INTERVAL: 152 MS
EKG P-R INTERVAL: 152 MS
EKG Q-T INTERVAL: 366 MS
EKG Q-T INTERVAL: 400 MS
EKG QRS DURATION: 92 MS
EKG QRS DURATION: 94 MS
EKG QTC CALCULATION (BAZETT): 432 MS
EKG QTC CALCULATION (BAZETT): 432 MS
EKG R AXIS: 50 DEGREES
EKG R AXIS: 77 DEGREES
EKG T AXIS: 12 DEGREES
EKG T AXIS: 33 DEGREES
EKG VENTRICULAR RATE: 70 BPM
EKG VENTRICULAR RATE: 84 BPM
EOSINOPHIL # BLD: 0.1 K/UL (ref 0–0.44)
EOSINOPHILS RELATIVE PERCENT: 1 % (ref 1–4)
ERYTHROCYTE [DISTWIDTH] IN BLOOD BY AUTOMATED COUNT: 12.5 % (ref 11.8–14.4)
GFR SERPL CREATININE-BSD FRML MDRD: > 90 ML/MIN/1.73M2
GFR SERPL CREATININE-BSD FRML MDRD: >90 ML/MIN/1.73M2
GLUCOSE BLD STRIP.AUTO-MCNC: 120 MG/DL (ref 70–108)
GLUCOSE SERPL-MCNC: 108 MG/DL (ref 70–108)
GLUCOSE SERPL-MCNC: 290 MG/DL (ref 70–99)
HCT VFR BLD AUTO: 38.7 % (ref 40.7–50.3)
HEPARIN UNFRACTIONATED: 0.31 U/ML (ref 0.3–0.7)
HGB BLD-MCNC: 13.5 G/DL (ref 13–17)
IMM GRANULOCYTES # BLD AUTO: 0.03 K/UL (ref 0–0.3)
IMM GRANULOCYTES NFR BLD: 0 %
INR PPP: 1
INR PPP: 1.02 (ref 0.85–1.13)
LIPASE SERPL-CCNC: 29 U/L (ref 13–60)
LIPASE SERPL-CCNC: 42.4 U/L (ref 5.6–51.3)
LYMPHOCYTES NFR BLD: 2.69 K/UL (ref 1.1–3.7)
LYMPHOCYTES RELATIVE PERCENT: 33 % (ref 24–43)
MCH RBC QN AUTO: 29.9 PG (ref 25.2–33.5)
MCHC RBC AUTO-ENTMCNC: 34.9 G/DL (ref 28.4–34.8)
MCV RBC AUTO: 85.8 FL (ref 82.6–102.9)
MONOCYTES NFR BLD: 0.85 K/UL (ref 0.1–1.2)
MONOCYTES NFR BLD: 11 % (ref 3–12)
NEUTROPHILS NFR BLD: 54 % (ref 36–65)
NEUTS SEG NFR BLD: 4.34 K/UL (ref 1.5–8.1)
NRBC BLD-RTO: 0 PER 100 WBC
PARTIAL THROMBOPLASTIN TIME: 26.1 SEC (ref 26.8–34.8)
PARTIAL THROMBOPLASTIN TIME: 38 SEC (ref 26.8–34.8)
PLATELET # BLD AUTO: 364 K/UL (ref 138–453)
PMV BLD AUTO: 10.2 FL (ref 8.1–13.5)
POTASSIUM SERPL-SCNC: 3.8 MEQ/L (ref 3.5–5.2)
POTASSIUM SERPL-SCNC: 4.3 MMOL/L (ref 3.7–5.3)
PROTHROMBIN TIME: 12.7 SEC (ref 11.7–14.1)
RBC # BLD AUTO: 4.51 M/UL (ref 4.21–5.77)
SODIUM SERPL-SCNC: 136 MMOL/L (ref 135–144)
SODIUM SERPL-SCNC: 138 MEQ/L (ref 135–145)
TROPONIN I SERPL HS-MCNC: 27 NG/L (ref 0–22)
TROPONIN I SERPL HS-MCNC: 9 NG/L (ref 0–22)
TROPONIN I SERPL HS-MCNC: 9 NG/L (ref 0–22)
TROPONIN, HIGH SENSITIVITY: 9 NG/L (ref 0–12)
TROPONIN, HIGH SENSITIVITY: 9 NG/L (ref 0–12)
TSH SERPL DL<=0.005 MIU/L-ACNC: 2.07 UIU/ML (ref 0.4–4.2)
WBC OTHER # BLD: 8.1 K/UL (ref 3.5–11.3)

## 2024-04-17 PROCEDURE — 85730 THROMBOPLASTIN TIME PARTIAL: CPT

## 2024-04-17 PROCEDURE — 96375 TX/PRO/DX INJ NEW DRUG ADDON: CPT

## 2024-04-17 PROCEDURE — 96376 TX/PRO/DX INJ SAME DRUG ADON: CPT

## 2024-04-17 PROCEDURE — 6360000002 HC RX W HCPCS: Performed by: EMERGENCY MEDICINE

## 2024-04-17 PROCEDURE — 6360000002 HC RX W HCPCS

## 2024-04-17 PROCEDURE — 80048 BASIC METABOLIC PNL TOTAL CA: CPT

## 2024-04-17 PROCEDURE — 82948 REAGENT STRIP/BLOOD GLUCOSE: CPT

## 2024-04-17 PROCEDURE — 1200000003 HC TELEMETRY R&B

## 2024-04-17 PROCEDURE — 99223 1ST HOSP IP/OBS HIGH 75: CPT

## 2024-04-17 PROCEDURE — 84443 ASSAY THYROID STIM HORMONE: CPT

## 2024-04-17 PROCEDURE — 84484 ASSAY OF TROPONIN QUANT: CPT

## 2024-04-17 PROCEDURE — 85610 PROTHROMBIN TIME: CPT

## 2024-04-17 PROCEDURE — 93010 ELECTROCARDIOGRAM REPORT: CPT | Performed by: INTERNAL MEDICINE

## 2024-04-17 PROCEDURE — 96365 THER/PROPH/DIAG IV INF INIT: CPT

## 2024-04-17 PROCEDURE — 6370000000 HC RX 637 (ALT 250 FOR IP)

## 2024-04-17 PROCEDURE — 96366 THER/PROPH/DIAG IV INF ADDON: CPT

## 2024-04-17 PROCEDURE — 83690 ASSAY OF LIPASE: CPT

## 2024-04-17 PROCEDURE — 93005 ELECTROCARDIOGRAM TRACING: CPT | Performed by: EMERGENCY MEDICINE

## 2024-04-17 PROCEDURE — 36415 COLL VENOUS BLD VENIPUNCTURE: CPT

## 2024-04-17 PROCEDURE — 85520 HEPARIN ASSAY: CPT

## 2024-04-17 PROCEDURE — 96374 THER/PROPH/DIAG INJ IV PUSH: CPT

## 2024-04-17 PROCEDURE — 71045 X-RAY EXAM CHEST 1 VIEW: CPT

## 2024-04-17 PROCEDURE — 99285 EMERGENCY DEPT VISIT HI MDM: CPT

## 2024-04-17 PROCEDURE — 93005 ELECTROCARDIOGRAM TRACING: CPT

## 2024-04-17 PROCEDURE — 85379 FIBRIN DEGRADATION QUANT: CPT

## 2024-04-17 PROCEDURE — 85025 COMPLETE CBC W/AUTO DIFF WBC: CPT

## 2024-04-17 RX ORDER — FENOFIBRATE 160 MG/1
160 TABLET ORAL DAILY
Status: DISCONTINUED | OUTPATIENT
Start: 2024-04-18 | End: 2024-04-22 | Stop reason: HOSPADM

## 2024-04-17 RX ORDER — ONDANSETRON 4 MG/1
4 TABLET, ORALLY DISINTEGRATING ORAL EVERY 8 HOURS PRN
Status: DISCONTINUED | OUTPATIENT
Start: 2024-04-17 | End: 2024-04-22 | Stop reason: HOSPADM

## 2024-04-17 RX ORDER — ALBUTEROL SULFATE 90 UG/1
2 AEROSOL, METERED RESPIRATORY (INHALATION) 4 TIMES DAILY PRN
Status: DISCONTINUED | OUTPATIENT
Start: 2024-04-17 | End: 2024-04-22 | Stop reason: HOSPADM

## 2024-04-17 RX ORDER — AMLODIPINE BESYLATE 5 MG/1
5 TABLET ORAL DAILY
Status: DISCONTINUED | OUTPATIENT
Start: 2024-04-18 | End: 2024-04-22 | Stop reason: HOSPADM

## 2024-04-17 RX ORDER — ACETAMINOPHEN 325 MG/1
650 TABLET ORAL EVERY 6 HOURS PRN
Status: DISCONTINUED | OUTPATIENT
Start: 2024-04-17 | End: 2024-04-22 | Stop reason: HOSPADM

## 2024-04-17 RX ORDER — DEXTROSE MONOHYDRATE 100 MG/ML
INJECTION, SOLUTION INTRAVENOUS CONTINUOUS PRN
Status: DISCONTINUED | OUTPATIENT
Start: 2024-04-17 | End: 2024-04-22 | Stop reason: HOSPADM

## 2024-04-17 RX ORDER — POTASSIUM CHLORIDE 7.45 MG/ML
10 INJECTION INTRAVENOUS PRN
Status: DISCONTINUED | OUTPATIENT
Start: 2024-04-17 | End: 2024-04-22 | Stop reason: HOSPADM

## 2024-04-17 RX ORDER — RANOLAZINE 500 MG/1
500 TABLET, EXTENDED RELEASE ORAL 2 TIMES DAILY
Status: DISCONTINUED | OUTPATIENT
Start: 2024-04-17 | End: 2024-04-19

## 2024-04-17 RX ORDER — HYDROMORPHONE HYDROCHLORIDE 1 MG/ML
0.5 INJECTION, SOLUTION INTRAMUSCULAR; INTRAVENOUS; SUBCUTANEOUS
Status: DISCONTINUED | OUTPATIENT
Start: 2024-04-17 | End: 2024-04-17 | Stop reason: HOSPADM

## 2024-04-17 RX ORDER — ASPIRIN 81 MG/1
81 TABLET ORAL DAILY
Status: DISCONTINUED | OUTPATIENT
Start: 2024-04-18 | End: 2024-04-22 | Stop reason: HOSPADM

## 2024-04-17 RX ORDER — MORPHINE SULFATE 2 MG/ML
2 INJECTION, SOLUTION INTRAMUSCULAR; INTRAVENOUS EVERY 4 HOURS PRN
Status: DISCONTINUED | OUTPATIENT
Start: 2024-04-17 | End: 2024-04-19

## 2024-04-17 RX ORDER — POTASSIUM CHLORIDE 20 MEQ/1
40 TABLET, EXTENDED RELEASE ORAL PRN
Status: DISCONTINUED | OUTPATIENT
Start: 2024-04-17 | End: 2024-04-22 | Stop reason: HOSPADM

## 2024-04-17 RX ORDER — INSULIN LISPRO 100 [IU]/ML
0-4 INJECTION, SOLUTION INTRAVENOUS; SUBCUTANEOUS NIGHTLY
Status: DISCONTINUED | OUTPATIENT
Start: 2024-04-17 | End: 2024-04-22 | Stop reason: HOSPADM

## 2024-04-17 RX ORDER — TIZANIDINE 4 MG/1
4 TABLET ORAL 3 TIMES DAILY PRN
Status: DISCONTINUED | OUTPATIENT
Start: 2024-04-17 | End: 2024-04-22 | Stop reason: HOSPADM

## 2024-04-17 RX ORDER — MORPHINE SULFATE 2 MG/ML
2 INJECTION, SOLUTION INTRAMUSCULAR; INTRAVENOUS ONCE
Status: COMPLETED | OUTPATIENT
Start: 2024-04-17 | End: 2024-04-17

## 2024-04-17 RX ORDER — CLOPIDOGREL BISULFATE 75 MG/1
75 TABLET ORAL DAILY
Status: DISCONTINUED | OUTPATIENT
Start: 2024-04-18 | End: 2024-04-22 | Stop reason: HOSPADM

## 2024-04-17 RX ORDER — HEPARIN SODIUM 1000 [USP'U]/ML
4000 INJECTION, SOLUTION INTRAVENOUS; SUBCUTANEOUS PRN
Status: DISCONTINUED | OUTPATIENT
Start: 2024-04-17 | End: 2024-04-19 | Stop reason: ALTCHOICE

## 2024-04-17 RX ORDER — HEPARIN SODIUM 10000 [USP'U]/100ML
5-30 INJECTION, SOLUTION INTRAVENOUS CONTINUOUS
Status: DISCONTINUED | OUTPATIENT
Start: 2024-04-17 | End: 2024-04-17 | Stop reason: HOSPADM

## 2024-04-17 RX ORDER — ONDANSETRON 2 MG/ML
4 INJECTION INTRAMUSCULAR; INTRAVENOUS EVERY 6 HOURS PRN
Status: DISCONTINUED | OUTPATIENT
Start: 2024-04-17 | End: 2024-04-22 | Stop reason: HOSPADM

## 2024-04-17 RX ORDER — FUROSEMIDE 20 MG/1
20 TABLET ORAL DAILY PRN
Status: DISCONTINUED | OUTPATIENT
Start: 2024-04-17 | End: 2024-04-22 | Stop reason: HOSPADM

## 2024-04-17 RX ORDER — HEPARIN SODIUM 1000 [USP'U]/ML
2000 INJECTION, SOLUTION INTRAVENOUS; SUBCUTANEOUS PRN
Status: DISCONTINUED | OUTPATIENT
Start: 2024-04-17 | End: 2024-04-19 | Stop reason: ALTCHOICE

## 2024-04-17 RX ORDER — HEPARIN SODIUM 1000 [USP'U]/ML
4000 INJECTION, SOLUTION INTRAVENOUS; SUBCUTANEOUS ONCE
Status: COMPLETED | OUTPATIENT
Start: 2024-04-17 | End: 2024-04-17

## 2024-04-17 RX ORDER — GLUCAGON 1 MG/ML
1 KIT INJECTION PRN
Status: DISCONTINUED | OUTPATIENT
Start: 2024-04-17 | End: 2024-04-22 | Stop reason: HOSPADM

## 2024-04-17 RX ORDER — INSULIN LISPRO 100 [IU]/ML
0-4 INJECTION, SOLUTION INTRAVENOUS; SUBCUTANEOUS
Status: DISCONTINUED | OUTPATIENT
Start: 2024-04-18 | End: 2024-04-22 | Stop reason: HOSPADM

## 2024-04-17 RX ORDER — ATORVASTATIN CALCIUM 80 MG/1
80 TABLET, FILM COATED ORAL NIGHTLY
Status: DISCONTINUED | OUTPATIENT
Start: 2024-04-17 | End: 2024-04-22 | Stop reason: HOSPADM

## 2024-04-17 RX ORDER — LISINOPRIL 20 MG/1
20 TABLET ORAL DAILY
Status: DISCONTINUED | OUTPATIENT
Start: 2024-04-18 | End: 2024-04-22 | Stop reason: HOSPADM

## 2024-04-17 RX ORDER — ACETAMINOPHEN 650 MG/1
650 SUPPOSITORY RECTAL EVERY 6 HOURS PRN
Status: DISCONTINUED | OUTPATIENT
Start: 2024-04-17 | End: 2024-04-22 | Stop reason: HOSPADM

## 2024-04-17 RX ORDER — HEPARIN SODIUM 10000 [USP'U]/100ML
5-30 INJECTION, SOLUTION INTRAVENOUS CONTINUOUS
Status: DISCONTINUED | OUTPATIENT
Start: 2024-04-17 | End: 2024-04-19 | Stop reason: ALTCHOICE

## 2024-04-17 RX ORDER — METOPROLOL SUCCINATE 50 MG/1
50 TABLET, EXTENDED RELEASE ORAL DAILY
Status: DISCONTINUED | OUTPATIENT
Start: 2024-04-18 | End: 2024-04-22 | Stop reason: HOSPADM

## 2024-04-17 RX ORDER — HYDROMORPHONE HYDROCHLORIDE 1 MG/ML
1 INJECTION, SOLUTION INTRAMUSCULAR; INTRAVENOUS; SUBCUTANEOUS ONCE
Status: COMPLETED | OUTPATIENT
Start: 2024-04-17 | End: 2024-04-17

## 2024-04-17 RX ORDER — MAGNESIUM SULFATE IN WATER 40 MG/ML
2000 INJECTION, SOLUTION INTRAVENOUS PRN
Status: DISCONTINUED | OUTPATIENT
Start: 2024-04-17 | End: 2024-04-22 | Stop reason: HOSPADM

## 2024-04-17 RX ORDER — ISOSORBIDE MONONITRATE 30 MG/1
30 TABLET, EXTENDED RELEASE ORAL DAILY
Status: DISCONTINUED | OUTPATIENT
Start: 2024-04-18 | End: 2024-04-22 | Stop reason: HOSPADM

## 2024-04-17 RX ADMIN — HEPARIN SODIUM 18 UNITS/KG/HR: 10000 INJECTION, SOLUTION INTRAVENOUS at 09:09

## 2024-04-17 RX ADMIN — HYDROMORPHONE HYDROCHLORIDE 0.5 MG: 1 INJECTION, SOLUTION INTRAMUSCULAR; INTRAVENOUS; SUBCUTANEOUS at 16:00

## 2024-04-17 RX ADMIN — HYDROMORPHONE HYDROCHLORIDE 0.5 MG: 1 INJECTION, SOLUTION INTRAMUSCULAR; INTRAVENOUS; SUBCUTANEOUS at 13:42

## 2024-04-17 RX ADMIN — HEPARIN SODIUM 4000 UNITS: 1000 INJECTION INTRAVENOUS; SUBCUTANEOUS at 09:06

## 2024-04-17 RX ADMIN — ATORVASTATIN CALCIUM 80 MG: 80 TABLET, FILM COATED ORAL at 22:03

## 2024-04-17 RX ADMIN — MORPHINE SULFATE 2 MG: 2 INJECTION, SOLUTION INTRAMUSCULAR; INTRAVENOUS at 08:06

## 2024-04-17 RX ADMIN — RANOLAZINE 500 MG: 500 TABLET, EXTENDED RELEASE ORAL at 22:03

## 2024-04-17 RX ADMIN — TIZANIDINE 4 MG: 4 TABLET ORAL at 22:03

## 2024-04-17 RX ADMIN — HYDROMORPHONE HYDROCHLORIDE 1 MG: 1 INJECTION, SOLUTION INTRAMUSCULAR; INTRAVENOUS; SUBCUTANEOUS at 09:01

## 2024-04-17 RX ADMIN — HYDROMORPHONE HYDROCHLORIDE 0.5 MG: 1 INJECTION, SOLUTION INTRAMUSCULAR; INTRAVENOUS; SUBCUTANEOUS at 11:20

## 2024-04-17 RX ADMIN — MORPHINE SULFATE 2 MG: 2 INJECTION, SOLUTION INTRAMUSCULAR; INTRAVENOUS at 20:47

## 2024-04-17 ASSESSMENT — PAIN DESCRIPTION - LOCATION
LOCATION: CHEST

## 2024-04-17 ASSESSMENT — PAIN SCALES - GENERAL
PAINLEVEL_OUTOF10: 6
PAINLEVEL_OUTOF10: 5
PAINLEVEL_OUTOF10: 5
PAINLEVEL_OUTOF10: 3
PAINLEVEL_OUTOF10: 6
PAINLEVEL_OUTOF10: 7
PAINLEVEL_OUTOF10: 2
PAINLEVEL_OUTOF10: 5
PAINLEVEL_OUTOF10: 5
PAINLEVEL_OUTOF10: 6

## 2024-04-17 ASSESSMENT — PAIN DESCRIPTION - DESCRIPTORS
DESCRIPTORS: SQUEEZING
DESCRIPTORS: ACHING
DESCRIPTORS: SHOOTING

## 2024-04-17 ASSESSMENT — PAIN DESCRIPTION - DIRECTION: RADIATING_TOWARDS: LEFT SHOULDER

## 2024-04-17 ASSESSMENT — HEART SCORE: ECG: NON-SPECIFC REPOLARIZATION DISTURBANCE/LBTB/PM

## 2024-04-17 ASSESSMENT — PAIN DESCRIPTION - ORIENTATION
ORIENTATION: MID
ORIENTATION: ANTERIOR
ORIENTATION: MID

## 2024-04-17 ASSESSMENT — PAIN DESCRIPTION - FREQUENCY: FREQUENCY: CONTINUOUS

## 2024-04-17 ASSESSMENT — PAIN DESCRIPTION - PAIN TYPE: TYPE: ACUTE PAIN

## 2024-04-17 NOTE — ED NOTES
Contacted [facility] E[facility].  Stated they were going to have to call around as they do not have an ALS crew

## 2024-04-17 NOTE — ED NOTES
APTT result 38.0. Per order direction, if APTT <46.0, give heparin 80units/kg (max 10,000) bolus and then increase infusion by 4units/kg/hr. This was reviewed with Dr Madison who states she does not want rate changed at this time and would like infusion to remain at 1000units/hr.

## 2024-04-17 NOTE — ED NOTES
Contacted [facility] requesting update on ETA for transport.  Stated they were running behind.  New ETA is 1600

## 2024-04-17 NOTE — ED PROVIDER NOTES
86 20 99 % 1.702 m (5' 7\") 94.3 kg (208 lb)       Physical Exam  Vitals and nursing note reviewed.   Constitutional:       Comments: Patient is diaphoretic.  He appears anxious and is in mild pain distress   HENT:      Head: Normocephalic and atraumatic.   Cardiovascular:      Rate and Rhythm: Normal rate and regular rhythm.   Pulmonary:      Effort: Pulmonary effort is normal. No respiratory distress.      Breath sounds: Normal breath sounds.   Abdominal:      General: There is no distension.      Palpations: Abdomen is soft.      Tenderness: There is no abdominal tenderness.   Skin:     Comments: Diaphoresis   Neurological:      General: No focal deficit present.      Mental Status: He is alert and oriented to person, place, and time.         DIAGNOSTIC RESULTS     EKG: All EKG's are interpreted by the Emergency Department Physician who either signs or Co-signs this chart in the absence of a cardiologist.    Sinus rhythm rate of 84 bpm.  Diffuse T wave flattening.  Small Q waves 3 and aVF.  No acute ST segment elevation.  No acute findings of infarct      RADIOLOGY:   Non-plain film images such as CT, Ultrasound and MRI are read by the radiologist. Plain radiographic images are visualized and preliminarily interpreted by the emergency physician with the below findings:      Interpretation per the Radiologist below, if available at the time of this note:    XR CHEST PORTABLE   Final Result   No acute cardiopulmonary process               ED BEDSIDE ULTRASOUND:   Performed by ED Physician - none    LABS:  Labs Reviewed   BASIC METABOLIC PANEL - Abnormal; Notable for the following components:       Result Value    Glucose 290 (*)     BUN 21 (*)     Bun/Cre Ratio 23 (*)     All other components within normal limits   CBC WITH AUTO DIFFERENTIAL - Abnormal; Notable for the following components:    Hematocrit 38.7 (*)     MCHC 34.9 (*)     All other components within normal limits   APTT - Abnormal; Notable for the

## 2024-04-17 NOTE — H&P
Hospitalist - History & Physical      Patient: Franky Melendez    Unit/Bed:8A-02/002-A  YOB: 1975  MRN: [id number]   Acct: [id number]   PCP: Curtis Chavarria MD    Date of Service: Pt seen/examined on 04/17/24  and Admitted to Inpatient with expected LOS greater than two midnights due to medical therapy.     Chief Complaint:  chest pain    Assessment and Plan:  Chest pain, concern for unstable angina:   Patient reports sudden onset substernal chest pain with radiation to left shoulder and numbness and tingling in left hand and arm.  Presents afebrile, hemodynamically stable, nontoxic-appearing with persistent chest pain at this time.  Chest pain not relieved with SL nitro prior to presentation.  Labs largely unremarkable, no acute leukocytosis.  Troponin trend 9-> 27-> 9.  Recheck upon admission x 2, repeat EKG.  Monitor symptoms closely, telemetry.  On heparin gtt. at this time cardiology consulted Dr. Gudino made aware.  Heart score 6.  Check lipid panel, hemoglobin A1c in the AM.  Morphine ordered for pain.    Essential hypertension:   BP stable.  Continue home amlodipine, lisinopril, metoprolol with holding parameters.  Monitor closely.    T2DM:   Not insulin-dependent.  Hold metformin.  Monitor POCT glucose checks, low-dose SSI, hypoglycemia protocol in place.  Monitor daily BMP.    Obesity:   BMI 32.58    Asthma:   Stable, patient reports rarely use of albuterol.  Continue as needed.      DVT prophylaxis: Heparin gtt.    History Of Present Illness:    Franky is an 48-year-old  man with a past medical history of CAD, with CABG x 2, HTN who presents to [facility] via direct admit today for the evaluation of acute onset chest pain.  Patient reports she was at work this morning around 630 had sudden onset substernal sharp chest pain.  Patient reports chest pain radiates to left shoulder, neck and the back of his head, down his left arm with associated numbness and tingling in his hand.  He

## 2024-04-17 NOTE — ED NOTES
[facility] called with [facility] nurse on line to speak with Dr. Madison.

## 2024-04-18 ENCOUNTER — TELEPHONE (OUTPATIENT)
Dept: CARDIOLOGY | Age: 49
End: 2024-04-18

## 2024-04-18 ENCOUNTER — TELEPHONE (OUTPATIENT)
Dept: PRIMARY CARE CLINIC | Age: 49
End: 2024-04-18

## 2024-04-18 LAB
ANION GAP SERPL CALC-SCNC: 13 MEQ/L (ref 8–16)
BASOPHILS ABSOLUTE: 0 THOU/MM3 (ref 0–0.1)
BASOPHILS NFR BLD AUTO: 0.5 %
BUN SERPL-MCNC: 19 MG/DL (ref 7–22)
CALCIUM SERPL-MCNC: 8.6 MG/DL (ref 8.5–10.5)
CHLORIDE SERPL-SCNC: 102 MEQ/L (ref 98–111)
CHOLEST SERPL-MCNC: 135 MG/DL (ref 100–199)
CO2 SERPL-SCNC: 22 MEQ/L (ref 23–33)
CREAT SERPL-MCNC: 0.9 MG/DL (ref 0.4–1.2)
DEPRECATED MEAN GLUCOSE BLD GHB EST-ACNC: 189 MG/DL (ref 70–126)
DEPRECATED RDW RBC AUTO: 40.1 FL (ref 35–45)
EKG ATRIAL RATE: 78 BPM
EKG ATRIAL RATE: 79 BPM
EKG ATRIAL RATE: 83 BPM
EKG P AXIS: 22 DEGREES
EKG P AXIS: 22 DEGREES
EKG P AXIS: 46 DEGREES
EKG P-R INTERVAL: 160 MS
EKG P-R INTERVAL: 162 MS
EKG P-R INTERVAL: 164 MS
EKG Q-T INTERVAL: 388 MS
EKG Q-T INTERVAL: 390 MS
EKG Q-T INTERVAL: 394 MS
EKG QRS DURATION: 90 MS
EKG QRS DURATION: 98 MS
EKG QRS DURATION: 98 MS
EKG QTC CALCULATION (BAZETT): 444 MS
EKG QTC CALCULATION (BAZETT): 451 MS
EKG QTC CALCULATION (BAZETT): 455 MS
EKG R AXIS: 52 DEGREES
EKG R AXIS: 58 DEGREES
EKG R AXIS: 88 DEGREES
EKG T AXIS: 22 DEGREES
EKG T AXIS: 24 DEGREES
EKG T AXIS: 40 DEGREES
EKG VENTRICULAR RATE: 78 BPM
EKG VENTRICULAR RATE: 79 BPM
EKG VENTRICULAR RATE: 83 BPM
EOSINOPHIL NFR BLD AUTO: 2.7 %
EOSINOPHILS ABSOLUTE: 0.2 THOU/MM3 (ref 0–0.4)
ERYTHROCYTE [DISTWIDTH] IN BLOOD BY AUTOMATED COUNT: 12.7 % (ref 11.5–14.5)
GFR SERPL CREATININE-BSD FRML MDRD: > 90 ML/MIN/1.73M2
GLUCOSE BLD STRIP.AUTO-MCNC: 132 MG/DL (ref 70–108)
GLUCOSE BLD STRIP.AUTO-MCNC: 148 MG/DL (ref 70–108)
GLUCOSE BLD STRIP.AUTO-MCNC: 161 MG/DL (ref 70–108)
GLUCOSE BLD STRIP.AUTO-MCNC: 179 MG/DL (ref 70–108)
GLUCOSE SERPL-MCNC: 167 MG/DL (ref 70–108)
HBA1C MFR BLD HPLC: 8.3 % (ref 4.4–6.4)
HCT VFR BLD AUTO: 38.5 % (ref 42–52)
HDLC SERPL-MCNC: 26 MG/DL
HEPARIN UNFRACTIONATED: 0.23 U/ML (ref 0.3–0.7)
HEPARIN UNFRACTIONATED: 0.38 U/ML (ref 0.3–0.7)
HGB BLD-MCNC: 13.6 GM/DL (ref 14–18)
IMM GRANULOCYTES # BLD AUTO: 0.04 THOU/MM3 (ref 0–0.07)
IMM GRANULOCYTES NFR BLD AUTO: 0.5 %
LDLC SERPL CALC-MCNC: 55 MG/DL
LYMPHOCYTES ABSOLUTE: 3.1 THOU/MM3 (ref 1–4.8)
LYMPHOCYTES NFR BLD AUTO: 39.5 %
MCH RBC QN AUTO: 30.8 PG (ref 26–33)
MCHC RBC AUTO-ENTMCNC: 35.3 GM/DL (ref 32.2–35.5)
MCV RBC AUTO: 87.3 FL (ref 80–94)
MONOCYTES ABSOLUTE: 0.9 THOU/MM3 (ref 0.4–1.3)
MONOCYTES NFR BLD AUTO: 11 %
NEUTROPHILS NFR BLD AUTO: 45.8 %
NRBC BLD AUTO-RTO: 0 /100 WBC
PLATELET # BLD AUTO: 331 THOU/MM3 (ref 130–400)
PMV BLD AUTO: 10.3 FL (ref 9.4–12.4)
POTASSIUM SERPL-SCNC: 4.1 MEQ/L (ref 3.5–5.2)
RBC # BLD AUTO: 4.41 MILL/MM3 (ref 4.7–6.1)
SEGMENTED NEUTROPHILS ABSOLUTE COUNT: 3.6 THOU/MM3 (ref 1.8–7.7)
SODIUM SERPL-SCNC: 137 MEQ/L (ref 135–145)
TRIGL SERPL-MCNC: 268 MG/DL (ref 0–199)
TROPONIN, HIGH SENSITIVITY: 9 NG/L (ref 0–12)
WBC # BLD AUTO: 7.8 THOU/MM3 (ref 4.8–10.8)

## 2024-04-18 PROCEDURE — 6360000002 HC RX W HCPCS: Performed by: INTERNAL MEDICINE

## 2024-04-18 PROCEDURE — 6370000000 HC RX 637 (ALT 250 FOR IP)

## 2024-04-18 PROCEDURE — 36415 COLL VENOUS BLD VENIPUNCTURE: CPT

## 2024-04-18 PROCEDURE — 85520 HEPARIN ASSAY: CPT

## 2024-04-18 PROCEDURE — 99152 MOD SED SAME PHYS/QHP 5/>YRS: CPT | Performed by: INTERNAL MEDICINE

## 2024-04-18 PROCEDURE — C1769 GUIDE WIRE: HCPCS | Performed by: INTERNAL MEDICINE

## 2024-04-18 PROCEDURE — 1200000003 HC TELEMETRY R&B

## 2024-04-18 PROCEDURE — 2709999900 HC NON-CHARGEABLE SUPPLY: Performed by: INTERNAL MEDICINE

## 2024-04-18 PROCEDURE — 99223 1ST HOSP IP/OBS HIGH 75: CPT | Performed by: NUCLEAR MEDICINE

## 2024-04-18 PROCEDURE — 93005 ELECTROCARDIOGRAM TRACING: CPT | Performed by: INTERNAL MEDICINE

## 2024-04-18 PROCEDURE — 85025 COMPLETE CBC W/AUTO DIFF WBC: CPT

## 2024-04-18 PROCEDURE — 6360000002 HC RX W HCPCS

## 2024-04-18 PROCEDURE — 6360000004 HC RX CONTRAST MEDICATION: Performed by: INTERNAL MEDICINE

## 2024-04-18 PROCEDURE — B2151ZZ FLUOROSCOPY OF LEFT HEART USING LOW OSMOLAR CONTRAST: ICD-10-PCS | Performed by: INTERNAL MEDICINE

## 2024-04-18 PROCEDURE — 93010 ELECTROCARDIOGRAM REPORT: CPT | Performed by: INTERNAL MEDICINE

## 2024-04-18 PROCEDURE — 93459 L HRT ART/GRFT ANGIO: CPT | Performed by: INTERNAL MEDICINE

## 2024-04-18 PROCEDURE — 2500000003 HC RX 250 WO HCPCS: Performed by: INTERNAL MEDICINE

## 2024-04-18 PROCEDURE — C1760 CLOSURE DEV, VASC: HCPCS | Performed by: INTERNAL MEDICINE

## 2024-04-18 PROCEDURE — C1894 INTRO/SHEATH, NON-LASER: HCPCS | Performed by: INTERNAL MEDICINE

## 2024-04-18 PROCEDURE — G0269 OCCLUSIVE DEVICE IN VEIN ART: HCPCS | Performed by: INTERNAL MEDICINE

## 2024-04-18 PROCEDURE — 82948 REAGENT STRIP/BLOOD GLUCOSE: CPT

## 2024-04-18 PROCEDURE — 84484 ASSAY OF TROPONIN QUANT: CPT

## 2024-04-18 PROCEDURE — 80061 LIPID PANEL: CPT

## 2024-04-18 PROCEDURE — 99232 SBSQ HOSP IP/OBS MODERATE 35: CPT | Performed by: NURSE PRACTITIONER

## 2024-04-18 PROCEDURE — 83036 HEMOGLOBIN GLYCOSYLATED A1C: CPT

## 2024-04-18 PROCEDURE — 4A023N7 MEASUREMENT OF CARDIAC SAMPLING AND PRESSURE, LEFT HEART, PERCUTANEOUS APPROACH: ICD-10-PCS | Performed by: INTERNAL MEDICINE

## 2024-04-18 PROCEDURE — 80048 BASIC METABOLIC PNL TOTAL CA: CPT

## 2024-04-18 PROCEDURE — 6370000000 HC RX 637 (ALT 250 FOR IP): Performed by: NURSE PRACTITIONER

## 2024-04-18 PROCEDURE — 2580000003 HC RX 258: Performed by: INTERNAL MEDICINE

## 2024-04-18 PROCEDURE — 99153 MOD SED SAME PHYS/QHP EA: CPT | Performed by: INTERNAL MEDICINE

## 2024-04-18 PROCEDURE — B2111ZZ FLUOROSCOPY OF MULTIPLE CORONARY ARTERIES USING LOW OSMOLAR CONTRAST: ICD-10-PCS | Performed by: INTERNAL MEDICINE

## 2024-04-18 RX ORDER — ACETAMINOPHEN 325 MG/1
650 TABLET ORAL EVERY 4 HOURS PRN
OUTPATIENT
Start: 2024-04-18

## 2024-04-18 RX ORDER — SODIUM CHLORIDE 0.9 % (FLUSH) 0.9 %
5-40 SYRINGE (ML) INJECTION PRN
OUTPATIENT
Start: 2024-04-18

## 2024-04-18 RX ORDER — SODIUM CHLORIDE 9 MG/ML
INJECTION, SOLUTION INTRAVENOUS PRN
Status: DISCONTINUED | OUTPATIENT
Start: 2024-04-18 | End: 2024-04-22 | Stop reason: HOSPADM

## 2024-04-18 RX ORDER — MIDAZOLAM HYDROCHLORIDE 1 MG/ML
INJECTION INTRAMUSCULAR; INTRAVENOUS PRN
Status: DISCONTINUED | OUTPATIENT
Start: 2024-04-18 | End: 2024-04-18 | Stop reason: HOSPADM

## 2024-04-18 RX ORDER — ASPIRIN 81 MG/1
243 TABLET, CHEWABLE ORAL ONCE
Status: DISCONTINUED | OUTPATIENT
Start: 2024-04-18 | End: 2024-04-22 | Stop reason: HOSPADM

## 2024-04-18 RX ORDER — SODIUM CHLORIDE 0.9 % (FLUSH) 0.9 %
5-40 SYRINGE (ML) INJECTION PRN
Status: DISCONTINUED | OUTPATIENT
Start: 2024-04-18 | End: 2024-04-22 | Stop reason: HOSPADM

## 2024-04-18 RX ORDER — SODIUM CHLORIDE 9 MG/ML
INJECTION, SOLUTION INTRAVENOUS PRN
OUTPATIENT
Start: 2024-04-18

## 2024-04-18 RX ORDER — SODIUM CHLORIDE 0.9 % (FLUSH) 0.9 %
5-40 SYRINGE (ML) INJECTION EVERY 12 HOURS SCHEDULED
OUTPATIENT
Start: 2024-04-18

## 2024-04-18 RX ORDER — SODIUM CHLORIDE 9 MG/ML
INJECTION, SOLUTION INTRAVENOUS CONTINUOUS
OUTPATIENT
Start: 2024-04-18 | End: 2024-04-18

## 2024-04-18 RX ORDER — FUROSEMIDE 10 MG/ML
INJECTION INTRAMUSCULAR; INTRAVENOUS PRN
Status: DISCONTINUED | OUTPATIENT
Start: 2024-04-18 | End: 2024-04-18 | Stop reason: HOSPADM

## 2024-04-18 RX ORDER — FENTANYL CITRATE 50 UG/ML
INJECTION, SOLUTION INTRAMUSCULAR; INTRAVENOUS PRN
Status: DISCONTINUED | OUTPATIENT
Start: 2024-04-18 | End: 2024-04-18 | Stop reason: HOSPADM

## 2024-04-18 RX ORDER — SODIUM CHLORIDE 0.9 % (FLUSH) 0.9 %
5-40 SYRINGE (ML) INJECTION EVERY 12 HOURS SCHEDULED
Status: DISCONTINUED | OUTPATIENT
Start: 2024-04-18 | End: 2024-04-22 | Stop reason: HOSPADM

## 2024-04-18 RX ORDER — NITROGLYCERIN 0.4 MG/1
0.4 TABLET SUBLINGUAL EVERY 5 MIN PRN
Status: DISCONTINUED | OUTPATIENT
Start: 2024-04-18 | End: 2024-04-22 | Stop reason: HOSPADM

## 2024-04-18 RX ORDER — DIPHENHYDRAMINE HYDROCHLORIDE 50 MG/ML
25 INJECTION INTRAMUSCULAR; INTRAVENOUS ONCE
Status: DISCONTINUED | OUTPATIENT
Start: 2024-04-18 | End: 2024-04-19

## 2024-04-18 RX ADMIN — SODIUM CHLORIDE, PRESERVATIVE FREE 10 ML: 5 INJECTION INTRAVENOUS at 20:01

## 2024-04-18 RX ADMIN — LISINOPRIL 20 MG: 20 TABLET ORAL at 08:08

## 2024-04-18 RX ADMIN — HEPARIN SODIUM 2000 UNITS: 1000 INJECTION INTRAVENOUS; SUBCUTANEOUS at 04:42

## 2024-04-18 RX ADMIN — FENOFIBRATE 160 MG: 160 TABLET ORAL at 08:07

## 2024-04-18 RX ADMIN — ALUMINUM HYDROXIDE, MAGNESIUM HYDROXIDE, AND SIMETHICONE: 1200; 120; 1200 SUSPENSION ORAL at 19:57

## 2024-04-18 RX ADMIN — RANOLAZINE 500 MG: 500 TABLET, EXTENDED RELEASE ORAL at 20:05

## 2024-04-18 RX ADMIN — TIZANIDINE 4 MG: 4 TABLET ORAL at 08:09

## 2024-04-18 RX ADMIN — ASPIRIN 81 MG: 81 TABLET, COATED ORAL at 08:07

## 2024-04-18 RX ADMIN — ATORVASTATIN CALCIUM 80 MG: 80 TABLET, FILM COATED ORAL at 19:57

## 2024-04-18 RX ADMIN — TIZANIDINE 4 MG: 4 TABLET ORAL at 19:57

## 2024-04-18 RX ADMIN — MORPHINE SULFATE 2 MG: 2 INJECTION, SOLUTION INTRAMUSCULAR; INTRAVENOUS at 18:46

## 2024-04-18 RX ADMIN — CLOPIDOGREL BISULFATE 75 MG: 75 TABLET, FILM COATED ORAL at 08:07

## 2024-04-18 RX ADMIN — MORPHINE SULFATE 2 MG: 2 INJECTION, SOLUTION INTRAMUSCULAR; INTRAVENOUS at 04:48

## 2024-04-18 RX ADMIN — ISOSORBIDE MONONITRATE 30 MG: 30 TABLET, EXTENDED RELEASE ORAL at 08:08

## 2024-04-18 RX ADMIN — METOPROLOL SUCCINATE 50 MG: 50 TABLET, EXTENDED RELEASE ORAL at 08:08

## 2024-04-18 RX ADMIN — ACETAMINOPHEN 650 MG: 325 TABLET ORAL at 23:21

## 2024-04-18 RX ADMIN — HEPARIN SODIUM 12 UNITS/KG/HR: 10000 INJECTION, SOLUTION INTRAVENOUS at 09:27

## 2024-04-18 RX ADMIN — SODIUM CHLORIDE: 9 INJECTION, SOLUTION INTRAVENOUS at 11:10

## 2024-04-18 RX ADMIN — MORPHINE SULFATE 2 MG: 2 INJECTION, SOLUTION INTRAMUSCULAR; INTRAVENOUS at 09:22

## 2024-04-18 RX ADMIN — RANOLAZINE 500 MG: 500 TABLET, EXTENDED RELEASE ORAL at 08:08

## 2024-04-18 ASSESSMENT — PAIN SCALES - GENERAL
PAINLEVEL_OUTOF10: 7
PAINLEVEL_OUTOF10: 4

## 2024-04-18 ASSESSMENT — PAIN DESCRIPTION - DESCRIPTORS: DESCRIPTORS: SHARP;ACHING

## 2024-04-18 ASSESSMENT — PAIN DESCRIPTION - LOCATION: LOCATION: CHEST;ARM

## 2024-04-18 ASSESSMENT — PAIN DESCRIPTION - ORIENTATION: ORIENTATION: LEFT

## 2024-04-18 NOTE — BRIEF OP NOTE
[facility]  Sedation/Analgesia Post Sedation Record        Pt Name: Franky Melendez  MRN: [id number]  YOB: 1975  Procedure Performed By: Sai Valdez MD MD, TIERA, LOKI, LAURE  Primary Care Physician: Curtis Chavarria MD    POST-PROCEDURE    Sedation/Anesthesia:  Local Anesthesia and IV Conscious Sedation with continuous O2 monitoring    Estimated Blood Loss: 10 cc     Specimens Removed:  [x]None []Other:      Disposition of Specimen:  []Pathology []Other        Complications:   [x]None Immediate []Other:       Procedure performed: Left heart cath    Post Procedure Diagnosis/Findings:    Non-obstructive Coronary Artery Disease   Patent VG to RCA       Recommendations:    Medical treatment  Routine post-cath care.               Sai Valdez MD MD, TIERA, LOKI, LAURE  Electronically signed 4/18/2024 at 7:02 PM

## 2024-04-18 NOTE — CARE COORDINATION
Case Management Assessment Initial Evaluation    Date/Time of Evaluation: 2024 7:21 AM  Assessment Completed by: Yissel Chinchilla RN    If patient is discharged prior to next notation, then this note serves as note for discharge by case management.    Patient Name: Franky Melendez                   YOB: 1975  Diagnosis: Atypical chest pain [R07.89]                   Date / Time: 2024  6:20 PM  Location: [facility]     Patient Admission Status: Inpatient   Readmission Risk Low 0-14, Mod 15-19), High > 20: Readmission Risk Score: 7    Current PCP: Curtis Chavarria MD    Additional Case Management Notes: Transfer from [facility] ER. While at work, patient developed left sided chest pain, not relieved with nitro. Reported associated nausea, lightheadedness, dizziness. At [facility], given Morphine and Dilaudid with minimal relief. Hospitalist and Cardiology following. NPO. Bedrest. Heparin gtt. Plan for Heart Cath today.     Procedures: none    Imagin/17 CXR: Negative    Patient Goals/Plan/Treatment Preferences: Met w/ Franky. He is independent. Plans to return home w/ his wife and daughter. Denies discharge needs.    24 0930   Service Assessment   Patient Orientation Alert and Oriented   Cognition Alert   History Provided By Patient   Primary Caregiver Self   Accompanied By/Relationship none   Support Systems Spouse/Significant Other;Children   Patient's Healthcare Decision Maker is: Patient Declined (Legal Next of Kin Remains as Decision Maker)   PCP Verified by CM Yes   Last Visit to PCP Within last 3 months   Prior Functional Level Independent in ADLs/IADLs   Current Functional Level Independent in ADLs/IADLs   Can patient return to prior living arrangement Yes   Ability to make needs known: Good   Family able to assist with home care needs: Yes   Would you like for me to discuss the discharge plan with any other family members/significant others, and if so, who? No   Financial

## 2024-04-18 NOTE — TELEPHONE ENCOUNTER
[facility] ED Follow up Call      4/18/2024     Pt was admitted into the hosp

## 2024-04-19 ENCOUNTER — APPOINTMENT (OUTPATIENT)
Dept: ULTRASOUND IMAGING | Age: 49
DRG: 384 | End: 2024-04-19
Attending: INTERNAL MEDICINE
Payer: COMMERCIAL

## 2024-04-19 ENCOUNTER — APPOINTMENT (OUTPATIENT)
Dept: CT IMAGING | Age: 49
DRG: 384 | End: 2024-04-19
Attending: INTERNAL MEDICINE
Payer: COMMERCIAL

## 2024-04-19 LAB
ALBUMIN SERPL BCG-MCNC: 4.6 G/DL (ref 3.5–5.1)
ALP SERPL-CCNC: 65 U/L (ref 38–126)
ALT SERPL W/O P-5'-P-CCNC: 36 U/L (ref 11–66)
ANION GAP SERPL CALC-SCNC: 10 MEQ/L (ref 8–16)
AST SERPL-CCNC: 24 U/L (ref 5–40)
BASOPHILS ABSOLUTE: 0 THOU/MM3 (ref 0–0.1)
BASOPHILS NFR BLD AUTO: 0.6 %
BILIRUB CONJ SERPL-MCNC: < 0.2 MG/DL (ref 0–0.3)
BILIRUB SERPL-MCNC: 0.4 MG/DL (ref 0.3–1.2)
BUN SERPL-MCNC: 24 MG/DL (ref 7–22)
CALCIUM SERPL-MCNC: 8.9 MG/DL (ref 8.5–10.5)
CHLORIDE SERPL-SCNC: 103 MEQ/L (ref 98–111)
CO2 SERPL-SCNC: 24 MEQ/L (ref 23–33)
CREAT SERPL-MCNC: 1 MG/DL (ref 0.4–1.2)
DEPRECATED RDW RBC AUTO: 40.6 FL (ref 35–45)
EKG ATRIAL RATE: 89 BPM
EKG P AXIS: 31 DEGREES
EKG P-R INTERVAL: 150 MS
EKG Q-T INTERVAL: 374 MS
EKG QRS DURATION: 96 MS
EKG QTC CALCULATION (BAZETT): 455 MS
EKG R AXIS: 67 DEGREES
EKG T AXIS: 24 DEGREES
EKG VENTRICULAR RATE: 89 BPM
EOSINOPHIL NFR BLD AUTO: 2.4 %
EOSINOPHILS ABSOLUTE: 0.2 THOU/MM3 (ref 0–0.4)
ERYTHROCYTE [DISTWIDTH] IN BLOOD BY AUTOMATED COUNT: 12.6 % (ref 11.5–14.5)
GFR SERPL CREATININE-BSD FRML MDRD: > 90 ML/MIN/1.73M2
GLUCOSE BLD STRIP.AUTO-MCNC: 104 MG/DL (ref 70–108)
GLUCOSE BLD STRIP.AUTO-MCNC: 157 MG/DL (ref 70–108)
GLUCOSE BLD STRIP.AUTO-MCNC: 252 MG/DL (ref 70–108)
GLUCOSE BLD STRIP.AUTO-MCNC: 348 MG/DL (ref 70–108)
GLUCOSE SERPL-MCNC: 171 MG/DL (ref 70–108)
HCT VFR BLD AUTO: 39.2 % (ref 42–52)
HGB BLD-MCNC: 13.6 GM/DL (ref 14–18)
IMM GRANULOCYTES # BLD AUTO: 0.02 THOU/MM3 (ref 0–0.07)
IMM GRANULOCYTES NFR BLD AUTO: 0.3 %
LIPASE SERPL-CCNC: 48.9 U/L (ref 5.6–51.3)
LYMPHOCYTES ABSOLUTE: 2.6 THOU/MM3 (ref 1–4.8)
LYMPHOCYTES NFR BLD AUTO: 36.8 %
MCH RBC QN AUTO: 30.8 PG (ref 26–33)
MCHC RBC AUTO-ENTMCNC: 34.7 GM/DL (ref 32.2–35.5)
MCV RBC AUTO: 88.9 FL (ref 80–94)
MONOCYTES ABSOLUTE: 0.8 THOU/MM3 (ref 0.4–1.3)
MONOCYTES NFR BLD AUTO: 11.9 %
NEUTROPHILS NFR BLD AUTO: 48 %
NRBC BLD AUTO-RTO: 0 /100 WBC
PLATELET # BLD AUTO: 303 THOU/MM3 (ref 130–400)
PMV BLD AUTO: 10.4 FL (ref 9.4–12.4)
POTASSIUM SERPL-SCNC: 4.6 MEQ/L (ref 3.5–5.2)
PROT SERPL-MCNC: 7.3 G/DL (ref 6.1–8)
RBC # BLD AUTO: 4.41 MILL/MM3 (ref 4.7–6.1)
SEGMENTED NEUTROPHILS ABSOLUTE COUNT: 3.4 THOU/MM3 (ref 1.8–7.7)
SODIUM SERPL-SCNC: 137 MEQ/L (ref 135–145)
TROPONIN, HIGH SENSITIVITY: 7 NG/L (ref 0–12)
WBC # BLD AUTO: 7 THOU/MM3 (ref 4.8–10.8)

## 2024-04-19 PROCEDURE — 36415 COLL VENOUS BLD VENIPUNCTURE: CPT

## 2024-04-19 PROCEDURE — 6360000002 HC RX W HCPCS

## 2024-04-19 PROCEDURE — 2580000003 HC RX 258: Performed by: INTERNAL MEDICINE

## 2024-04-19 PROCEDURE — 6370000000 HC RX 637 (ALT 250 FOR IP)

## 2024-04-19 PROCEDURE — 93010 ELECTROCARDIOGRAM REPORT: CPT | Performed by: INTERNAL MEDICINE

## 2024-04-19 PROCEDURE — 6370000000 HC RX 637 (ALT 250 FOR IP): Performed by: INTERNAL MEDICINE

## 2024-04-19 PROCEDURE — 6370000000 HC RX 637 (ALT 250 FOR IP): Performed by: NURSE PRACTITIONER

## 2024-04-19 PROCEDURE — 82948 REAGENT STRIP/BLOOD GLUCOSE: CPT

## 2024-04-19 PROCEDURE — 99232 SBSQ HOSP IP/OBS MODERATE 35: CPT | Performed by: STUDENT IN AN ORGANIZED HEALTH CARE EDUCATION/TRAINING PROGRAM

## 2024-04-19 PROCEDURE — 80048 BASIC METABOLIC PNL TOTAL CA: CPT

## 2024-04-19 PROCEDURE — 6360000004 HC RX CONTRAST MEDICATION: Performed by: NURSE PRACTITIONER

## 2024-04-19 PROCEDURE — 80076 HEPATIC FUNCTION PANEL: CPT

## 2024-04-19 PROCEDURE — 6370000000 HC RX 637 (ALT 250 FOR IP): Performed by: STUDENT IN AN ORGANIZED HEALTH CARE EDUCATION/TRAINING PROGRAM

## 2024-04-19 PROCEDURE — 1200000003 HC TELEMETRY R&B

## 2024-04-19 PROCEDURE — 83690 ASSAY OF LIPASE: CPT

## 2024-04-19 PROCEDURE — 85025 COMPLETE CBC W/AUTO DIFF WBC: CPT

## 2024-04-19 PROCEDURE — 71275 CT ANGIOGRAPHY CHEST: CPT

## 2024-04-19 PROCEDURE — 76705 ECHO EXAM OF ABDOMEN: CPT

## 2024-04-19 PROCEDURE — 84484 ASSAY OF TROPONIN QUANT: CPT

## 2024-04-19 PROCEDURE — 93005 ELECTROCARDIOGRAM TRACING: CPT | Performed by: NURSE PRACTITIONER

## 2024-04-19 PROCEDURE — 99233 SBSQ HOSP IP/OBS HIGH 50: CPT | Performed by: NURSE PRACTITIONER

## 2024-04-19 RX ORDER — RANOLAZINE 500 MG/1
1000 TABLET, EXTENDED RELEASE ORAL 2 TIMES DAILY
Status: DISCONTINUED | OUTPATIENT
Start: 2024-04-19 | End: 2024-04-22 | Stop reason: HOSPADM

## 2024-04-19 RX ORDER — MORPHINE SULFATE 2 MG/ML
2 INJECTION, SOLUTION INTRAMUSCULAR; INTRAVENOUS EVERY 4 HOURS PRN
Status: DISCONTINUED | OUTPATIENT
Start: 2024-04-19 | End: 2024-04-20

## 2024-04-19 RX ORDER — MORPHINE SULFATE 2 MG/ML
1 INJECTION, SOLUTION INTRAMUSCULAR; INTRAVENOUS EVERY 4 HOURS PRN
Status: DISCONTINUED | OUTPATIENT
Start: 2024-04-19 | End: 2024-04-20

## 2024-04-19 RX ORDER — PANTOPRAZOLE SODIUM 40 MG/1
40 TABLET, DELAYED RELEASE ORAL
Status: DISCONTINUED | OUTPATIENT
Start: 2024-04-19 | End: 2024-04-22

## 2024-04-19 RX ORDER — SUCRALFATE 1 G/1
1 TABLET ORAL
Status: DISCONTINUED | OUTPATIENT
Start: 2024-04-19 | End: 2024-04-22

## 2024-04-19 RX ADMIN — ATORVASTATIN CALCIUM 80 MG: 80 TABLET, FILM COATED ORAL at 19:50

## 2024-04-19 RX ADMIN — IOPAMIDOL 80 ML: 755 INJECTION, SOLUTION INTRAVENOUS at 11:37

## 2024-04-19 RX ADMIN — ACETAMINOPHEN 650 MG: 325 TABLET ORAL at 23:53

## 2024-04-19 RX ADMIN — FENOFIBRATE 160 MG: 160 TABLET ORAL at 08:45

## 2024-04-19 RX ADMIN — ISOSORBIDE MONONITRATE 30 MG: 30 TABLET, EXTENDED RELEASE ORAL at 12:18

## 2024-04-19 RX ADMIN — SUCRALFATE 1 G: 1 TABLET ORAL at 21:31

## 2024-04-19 RX ADMIN — TIZANIDINE 4 MG: 4 TABLET ORAL at 17:32

## 2024-04-19 RX ADMIN — MORPHINE SULFATE 2 MG: 2 INJECTION, SOLUTION INTRAMUSCULAR; INTRAVENOUS at 07:53

## 2024-04-19 RX ADMIN — TIZANIDINE 4 MG: 4 TABLET ORAL at 08:35

## 2024-04-19 RX ADMIN — SODIUM CHLORIDE, PRESERVATIVE FREE 10 ML: 5 INJECTION INTRAVENOUS at 19:53

## 2024-04-19 RX ADMIN — SUCRALFATE 1 G: 1 TABLET ORAL at 17:35

## 2024-04-19 RX ADMIN — INSULIN LISPRO 2 UNITS: 100 INJECTION, SOLUTION INTRAVENOUS; SUBCUTANEOUS at 17:31

## 2024-04-19 RX ADMIN — RANOLAZINE 1000 MG: 500 TABLET, FILM COATED, EXTENDED RELEASE ORAL at 19:50

## 2024-04-19 RX ADMIN — ASPIRIN 81 MG: 81 TABLET, COATED ORAL at 08:11

## 2024-04-19 RX ADMIN — ALUMINUM HYDROXIDE, MAGNESIUM HYDROXIDE, AND SIMETHICONE: 1200; 120; 1200 SUSPENSION ORAL at 09:23

## 2024-04-19 RX ADMIN — RANOLAZINE 1000 MG: 500 TABLET, FILM COATED, EXTENDED RELEASE ORAL at 08:31

## 2024-04-19 RX ADMIN — MORPHINE SULFATE 2 MG: 2 INJECTION, SOLUTION INTRAMUSCULAR; INTRAVENOUS at 16:18

## 2024-04-19 RX ADMIN — MORPHINE SULFATE 2 MG: 2 INJECTION, SOLUTION INTRAMUSCULAR; INTRAVENOUS at 20:24

## 2024-04-19 RX ADMIN — SODIUM CHLORIDE, PRESERVATIVE FREE 10 ML: 5 INJECTION INTRAVENOUS at 08:33

## 2024-04-19 RX ADMIN — CLOPIDOGREL BISULFATE 75 MG: 75 TABLET, FILM COATED ORAL at 08:11

## 2024-04-19 RX ADMIN — MORPHINE SULFATE 2 MG: 2 INJECTION, SOLUTION INTRAMUSCULAR; INTRAVENOUS at 12:13

## 2024-04-19 RX ADMIN — NITROGLYCERIN 0.4 MG: 0.4 TABLET, ORALLY DISINTEGRATING SUBLINGUAL at 08:17

## 2024-04-19 RX ADMIN — PANTOPRAZOLE SODIUM 40 MG: 40 TABLET, DELAYED RELEASE ORAL at 08:35

## 2024-04-19 RX ADMIN — MORPHINE SULFATE 2 MG: 2 INJECTION, SOLUTION INTRAMUSCULAR; INTRAVENOUS at 01:26

## 2024-04-19 RX ADMIN — INSULIN LISPRO 3 UNITS: 100 INJECTION, SOLUTION INTRAVENOUS; SUBCUTANEOUS at 11:25

## 2024-04-19 ASSESSMENT — PAIN DESCRIPTION - FREQUENCY
FREQUENCY: CONTINUOUS

## 2024-04-19 ASSESSMENT — PAIN DESCRIPTION - DESCRIPTORS
DESCRIPTORS: SQUEEZING;SHARP
DESCRIPTORS: ACHING
DESCRIPTORS: ACHING;SHARP
DESCRIPTORS: SQUEEZING
DESCRIPTORS: SHARP;SQUEEZING
DESCRIPTORS: SQUEEZING
DESCRIPTORS: ACHING;SHARP
DESCRIPTORS: SQUEEZING

## 2024-04-19 ASSESSMENT — PAIN DESCRIPTION - LOCATION
LOCATION: ABDOMEN
LOCATION: CHEST
LOCATION: ABDOMEN
LOCATION: CHEST
LOCATION: ABDOMEN

## 2024-04-19 ASSESSMENT — PAIN DESCRIPTION - PAIN TYPE
TYPE: ACUTE PAIN

## 2024-04-19 ASSESSMENT — PAIN SCALES - GENERAL
PAINLEVEL_OUTOF10: 3
PAINLEVEL_OUTOF10: 5
PAINLEVEL_OUTOF10: 6
PAINLEVEL_OUTOF10: 7
PAINLEVEL_OUTOF10: 7
PAINLEVEL_OUTOF10: 3
PAINLEVEL_OUTOF10: 7
PAINLEVEL_OUTOF10: 10
PAINLEVEL_OUTOF10: 5
PAINLEVEL_OUTOF10: 6
PAINLEVEL_OUTOF10: 5
PAINLEVEL_OUTOF10: 6
PAINLEVEL_OUTOF10: 4
PAINLEVEL_OUTOF10: 4
PAINLEVEL_OUTOF10: 6
PAINLEVEL_OUTOF10: 7
PAINLEVEL_OUTOF10: 5
PAINLEVEL_OUTOF10: 6

## 2024-04-19 ASSESSMENT — PAIN DESCRIPTION - DIRECTION
RADIATING_TOWARDS: N//A
RADIATING_TOWARDS: L. SHOULDER
RADIATING_TOWARDS: LEFT SHOULDER

## 2024-04-19 ASSESSMENT — PAIN DESCRIPTION - ONSET
ONSET: ON-GOING

## 2024-04-19 ASSESSMENT — PAIN - FUNCTIONAL ASSESSMENT
PAIN_FUNCTIONAL_ASSESSMENT: ACTIVITIES ARE NOT PREVENTED

## 2024-04-19 ASSESSMENT — PAIN DESCRIPTION - ORIENTATION
ORIENTATION: LEFT
ORIENTATION: RIGHT;LEFT;MID
ORIENTATION: LEFT
ORIENTATION: RIGHT;LEFT
ORIENTATION: LEFT
ORIENTATION: MID
ORIENTATION: MID;LEFT;RIGHT
ORIENTATION: MID
ORIENTATION: MID

## 2024-04-19 NOTE — DISCHARGE INSTRUCTIONS
F/u with Dr Orozco in 2-3 weeks.     Discharge Instructions for Heart Catherization    1.  Take it easy for 3-4 days.  2.  No driving for 2 days.  3.  No lifting of 5 lbs or more for 5 days with the affected arm.  4.  Can shower after 24 hours.  5.  Remove arm board after 24 hours.  6.  Apply a band aid to the insertion site daily for 5 days.  May apply antibiotic ointment if desired, but not necessary.  Wash site daily with soap and water.  7.  No creams, ointments, or powders near the insertion site.   8.  No tub baths, swimming, hot tubs, or hand washing dishes for 1 week.  9.  Watch for signs of infection (redness, warmth, swelling, or pus drainage) or coolness of extremity and call physician if this occurs  10.  If bleeding occurs from insertion site, apply pressure and call 911.     GI  1.  Await pathology   2.  Take pantoprazole 40 mg twice a day 30-60\" before breakfast and dinner  3.  Take sucralfate 1000 mg twice a day 5-60\" before lunch and at bedtime  4.  No NSAID's such as motrin, advil, aleve, aspirin, ibuprofen, ketoprofen, etc.  5.  Take tylenol as needed for pain instead.  6.  OK per GI to take norco as prescribed by other providers  7.  Repeat EGD in 8 weeks to confirm healing of ulcers

## 2024-04-19 NOTE — CONSULTS
Provider, MD Roxanna   albuterol sulfate HFA (VENTOLIN HFA) 108 (90 Base) MCG/ACT inhaler Inhale 2 puffs into the lungs 4 times daily as needed for Wheezing 11/18/22   Curtis Chavarria MD       Surgical History:  Past Surgical History:   Procedure Laterality Date    CARDIAC CATHETERIZATION Left 01/07/2018    [facility]    CARDIAC CATHETERIZATION Left 01/27/2022    [facility] [place]-    CARDIAC PROCEDURE N/A 8/9/2023    Left heart cath / coronary angiography performed by Pavel Tidwell MD at [facility] CARDIAC CATH/IR LAB    COLONOSCOPY N/A 10/04/2022    COLORECTAL CANCER SCREENING, HIGH RISK performed by Brenda Melara MD at [facility] OR    COLONOSCOPY  10/04/2022    -tortuous colon    CORONARY ANGIOPLASTY  08/2018    double bypass, [facility],     CORONARY ARTERY BYPASS GRAFT  2018    ESOPHAGOGASTRODUODENOSCOPY  10/04/2022    -bx(neg H-Pylori,duodenum-normal,mild gastritis)    ESOPHAGOGASTRODUODENOSCOPY  02/01/2023    -mild gastritis    MEDICATION INJECTION Bilateral 11/30/2023    INJECTION MEDICATION-INTRICULAR INJECTION HIP performed by Elliot Jacobs MD at [facility] OR    SHOULDER ARTHROSCOPY Right 9/7/2023    RIGHT SHOULDER ARTHROSCOPY ROTATOR CUFF REPAIR performed by Pavel Mcgrath MD at [facility] OR    UPPER GASTROINTESTINAL ENDOSCOPY N/A 10/04/2022    EGD BIOPSY performed by Brenda Melara MD at [facility] OR    UPPER GASTROINTESTINAL ENDOSCOPY N/A 02/01/2023    EGD ESOPHAGOGASTRODUODENOSCOPY performed by Brenda Melara MD at [facility] OR       Family History:  Family History   Problem Relation Age of Onset    High Blood Pressure Mother     Heart Disease Father     High Blood Pressure Father     Asthma Sister     Colon Cancer Paternal Uncle     Colon Cancer Paternal Uncle     Pancreatic Cancer Maternal Aunt     Pancreatic Cancer Maternal Aunt        Past GI History:Dr. Dias  Acute gastrointestinal ulcer 7/20/2022.  Colonoscopy 
  tiZANidine (ZANAFLEX) 4 MG tablet TAKE 1 TABLET BY MOUTH 3 TIMES DAILY AS NEEDED (MUSCLE SPASMS). 4/9/24 4/23/24  Curtis Chavarria MD   furosemide (LASIX) 20 MG tablet TAKE 1 TABLET BY MOUTH DAILY AS NEEDED (LEG EDEMA) 3/11/24   Curtis Chavarria MD   lisinopril (PRINIVIL;ZESTRIL) 20 MG tablet TAKE 1 TABLET BY MOUTH EVERY DAY 3/7/24   Mellissa Rodriguez PA-C   ranolazine (RANEXA) 500 MG extended release tablet TAKE 1 TABLET BY MOUTH TWICE A DAY 2/7/24   Mellissa Rodriguez PA-C   fenofibrate (TRIGLIDE) 160 MG tablet TAKE 1 TABLET BY MOUTH EVERY DAY 1/25/24   Curtis Chavarria MD   metFORMIN (GLUCOPHAGE) 500 MG tablet TAKE 1 TABLET BY MOUTH TWICE A DAY WITH MEALS 1/24/24   Curtis Chavarria MD   clopidogrel (PLAVIX) 75 MG tablet Take 1 tablet by mouth daily 12/11/23   Elijah Orozco MD   atorvastatin (LIPITOR) 80 MG tablet TAKE 1 TABLET BY MOUTH EVERY DAY 12/5/23   Mellissa Rodriguez PA-C   metoprolol succinate (TOPROL XL) 50 MG extended release tablet TAKE 1 TABLET BY MOUTH EVERY DAY 11/27/23   Curtis Chavarria MD   pantoprazole (PROTONIX) 40 MG tablet Take 2 tablets by mouth 2 times daily (before meals)  Patient not taking: Reported on 4/17/2024 9/20/23   Curtis Chavarria MD   aspirin 81 MG EC tablet Take 1 tablet by mouth daily    Provider, MD Roxanna   albuterol sulfate HFA (VENTOLIN HFA) 108 (90 Base) MCG/ACT inhaler Inhale 2 puffs into the lungs 4 times daily as needed for Wheezing 11/18/22   Curtis Chavarria MD   Scheduled Meds:   amLODIPine  5 mg Oral Daily    aspirin  81 mg Oral Daily    atorvastatin  80 mg Oral Nightly    clopidogrel  75 mg Oral Daily    fenofibrate  160 mg Oral Daily    isosorbide mononitrate  30 mg Oral Daily    lisinopril  20 mg Oral Daily    metoprolol succinate  50 mg Oral Daily    ranolazine  500 mg Oral BID    insulin lispro  0-4 Units SubCUTAneous TID     insulin lispro  0-4 Units SubCUTAneous Nightly     Continuous Infusions:   heparin (PORCINE) Infusion 12 Units/kg/hr (04/18/24 6200)

## 2024-04-19 NOTE — CARE COORDINATION
4/19/24, 8:32 AM EDT    DISCHARGE ON GOING EVALUATION    [facility] day: 2  Location: 8A-02/002-A Reason for admit: Atypical chest pain [R07.89]     Procedures: 4/19 Left Heart Cath: Non-obstructive Coronary Artery Disease   Patent VG to RCA     Imaging since last note:   CTA Chest W WO: ordered    Barriers to Discharge: Hospitalist and cardiology following.   Cardiac cath completed yesterday. Continuous chest pain noted by primary RN. CT Chest ordered.     PCP: Curtis Chavarria MD  Readmission Risk Score: 7.4%    Patient Goals/Plan/Treatment Preferences: Planning to return home with his wife. Denies needs.     Potential discharge over the weekend.     4/19/24, 8:35 AM EDT    Patient goals/plan/ treatment preferences discussed by  and .  Patient goals/plan/ treatment preferences reviewed with patient/ family.  Patient/ family verbalize understanding of discharge plan and are in agreement with goal/plan/treatment preferences.  Understanding was demonstrated using the teach back method.  AVS provided by RN at time of discharge, which includes all necessary medical information pertaining to the patients current course of illness, treatment, post-discharge goals of care, and treatment preferences.     Services At/After Discharge: None

## 2024-04-20 LAB
ANION GAP SERPL CALC-SCNC: 14 MEQ/L (ref 8–16)
BASOPHILS ABSOLUTE: 0 THOU/MM3 (ref 0–0.1)
BASOPHILS NFR BLD AUTO: 0.6 %
BUN SERPL-MCNC: 19 MG/DL (ref 7–22)
CALCIUM SERPL-MCNC: 8.8 MG/DL (ref 8.5–10.5)
CHLORIDE SERPL-SCNC: 102 MEQ/L (ref 98–111)
CO2 SERPL-SCNC: 23 MEQ/L (ref 23–33)
CREAT SERPL-MCNC: 0.9 MG/DL (ref 0.4–1.2)
DEPRECATED RDW RBC AUTO: 39.8 FL (ref 35–45)
EOSINOPHIL NFR BLD AUTO: 2.8 %
EOSINOPHILS ABSOLUTE: 0.2 THOU/MM3 (ref 0–0.4)
ERYTHROCYTE [DISTWIDTH] IN BLOOD BY AUTOMATED COUNT: 12.6 % (ref 11.5–14.5)
GFR SERPL CREATININE-BSD FRML MDRD: > 90 ML/MIN/1.73M2
GLUCOSE BLD STRIP.AUTO-MCNC: 161 MG/DL (ref 70–108)
GLUCOSE BLD STRIP.AUTO-MCNC: 171 MG/DL (ref 70–108)
GLUCOSE BLD STRIP.AUTO-MCNC: 183 MG/DL (ref 70–108)
GLUCOSE BLD STRIP.AUTO-MCNC: 275 MG/DL (ref 70–108)
GLUCOSE SERPL-MCNC: 158 MG/DL (ref 70–108)
HCT VFR BLD AUTO: 36.5 % (ref 42–52)
HGB BLD-MCNC: 12.8 GM/DL (ref 14–18)
IMM GRANULOCYTES # BLD AUTO: 0.03 THOU/MM3 (ref 0–0.07)
IMM GRANULOCYTES NFR BLD AUTO: 0.5 %
LYMPHOCYTES ABSOLUTE: 2.2 THOU/MM3 (ref 1–4.8)
LYMPHOCYTES NFR BLD AUTO: 33.3 %
MCH RBC QN AUTO: 30.5 PG (ref 26–33)
MCHC RBC AUTO-ENTMCNC: 35.1 GM/DL (ref 32.2–35.5)
MCV RBC AUTO: 87.1 FL (ref 80–94)
MONOCYTES ABSOLUTE: 0.8 THOU/MM3 (ref 0.4–1.3)
MONOCYTES NFR BLD AUTO: 11.8 %
NEUTROPHILS NFR BLD AUTO: 51 %
NRBC BLD AUTO-RTO: 0 /100 WBC
PLATELET # BLD AUTO: 307 THOU/MM3 (ref 130–400)
PMV BLD AUTO: 10.4 FL (ref 9.4–12.4)
POTASSIUM SERPL-SCNC: 4.2 MEQ/L (ref 3.5–5.2)
RBC # BLD AUTO: 4.19 MILL/MM3 (ref 4.7–6.1)
SEGMENTED NEUTROPHILS ABSOLUTE COUNT: 3.3 THOU/MM3 (ref 1.8–7.7)
SODIUM SERPL-SCNC: 139 MEQ/L (ref 135–145)
WBC # BLD AUTO: 6.5 THOU/MM3 (ref 4.8–10.8)

## 2024-04-20 PROCEDURE — 85025 COMPLETE CBC W/AUTO DIFF WBC: CPT

## 2024-04-20 PROCEDURE — 6370000000 HC RX 637 (ALT 250 FOR IP): Performed by: STUDENT IN AN ORGANIZED HEALTH CARE EDUCATION/TRAINING PROGRAM

## 2024-04-20 PROCEDURE — 6370000000 HC RX 637 (ALT 250 FOR IP)

## 2024-04-20 PROCEDURE — 82948 REAGENT STRIP/BLOOD GLUCOSE: CPT

## 2024-04-20 PROCEDURE — 6370000000 HC RX 637 (ALT 250 FOR IP): Performed by: NURSE PRACTITIONER

## 2024-04-20 PROCEDURE — 6360000002 HC RX W HCPCS

## 2024-04-20 PROCEDURE — 1200000003 HC TELEMETRY R&B

## 2024-04-20 PROCEDURE — 2580000003 HC RX 258: Performed by: INTERNAL MEDICINE

## 2024-04-20 PROCEDURE — 36415 COLL VENOUS BLD VENIPUNCTURE: CPT

## 2024-04-20 PROCEDURE — 99232 SBSQ HOSP IP/OBS MODERATE 35: CPT | Performed by: NURSE PRACTITIONER

## 2024-04-20 PROCEDURE — 80048 BASIC METABOLIC PNL TOTAL CA: CPT

## 2024-04-20 RX ORDER — OXYCODONE HYDROCHLORIDE AND ACETAMINOPHEN 5; 325 MG/1; MG/1
1 TABLET ORAL EVERY 4 HOURS PRN
Status: DISCONTINUED | OUTPATIENT
Start: 2024-04-20 | End: 2024-04-22 | Stop reason: HOSPADM

## 2024-04-20 RX ORDER — MORPHINE SULFATE 4 MG/ML
4 INJECTION, SOLUTION INTRAMUSCULAR; INTRAVENOUS
Status: DISCONTINUED | OUTPATIENT
Start: 2024-04-20 | End: 2024-04-22 | Stop reason: HOSPADM

## 2024-04-20 RX ORDER — MORPHINE SULFATE 2 MG/ML
2 INJECTION, SOLUTION INTRAMUSCULAR; INTRAVENOUS
Status: DISCONTINUED | OUTPATIENT
Start: 2024-04-20 | End: 2024-04-22 | Stop reason: HOSPADM

## 2024-04-20 RX ORDER — OXYCODONE HYDROCHLORIDE AND ACETAMINOPHEN 5; 325 MG/1; MG/1
2 TABLET ORAL EVERY 4 HOURS PRN
Status: DISCONTINUED | OUTPATIENT
Start: 2024-04-20 | End: 2024-04-22 | Stop reason: HOSPADM

## 2024-04-20 RX ADMIN — ONDANSETRON 4 MG: 4 TABLET, ORALLY DISINTEGRATING ORAL at 05:39

## 2024-04-20 RX ADMIN — RANOLAZINE 1000 MG: 500 TABLET, FILM COATED, EXTENDED RELEASE ORAL at 20:07

## 2024-04-20 RX ADMIN — SUCRALFATE 1 G: 1 TABLET ORAL at 17:43

## 2024-04-20 RX ADMIN — AMLODIPINE BESYLATE 5 MG: 5 TABLET ORAL at 10:53

## 2024-04-20 RX ADMIN — SODIUM CHLORIDE, PRESERVATIVE FREE 10 ML: 5 INJECTION INTRAVENOUS at 20:07

## 2024-04-20 RX ADMIN — CLOPIDOGREL BISULFATE 75 MG: 75 TABLET, FILM COATED ORAL at 10:53

## 2024-04-20 RX ADMIN — OXYCODONE HYDROCHLORIDE AND ACETAMINOPHEN 2 TABLET: 5; 325 TABLET ORAL at 12:38

## 2024-04-20 RX ADMIN — SODIUM CHLORIDE, PRESERVATIVE FREE 10 ML: 5 INJECTION INTRAVENOUS at 10:53

## 2024-04-20 RX ADMIN — ISOSORBIDE MONONITRATE 30 MG: 30 TABLET, EXTENDED RELEASE ORAL at 10:53

## 2024-04-20 RX ADMIN — SUCRALFATE 1 G: 1 TABLET ORAL at 11:07

## 2024-04-20 RX ADMIN — MORPHINE SULFATE 2 MG: 2 INJECTION, SOLUTION INTRAMUSCULAR; INTRAVENOUS at 09:53

## 2024-04-20 RX ADMIN — SUCRALFATE 1 G: 1 TABLET ORAL at 20:07

## 2024-04-20 RX ADMIN — OXYCODONE HYDROCHLORIDE AND ACETAMINOPHEN 2 TABLET: 5; 325 TABLET ORAL at 17:43

## 2024-04-20 RX ADMIN — ASPIRIN 81 MG: 81 TABLET, COATED ORAL at 10:53

## 2024-04-20 RX ADMIN — ATORVASTATIN CALCIUM 80 MG: 80 TABLET, FILM COATED ORAL at 20:07

## 2024-04-20 RX ADMIN — SUCRALFATE 1 G: 1 TABLET ORAL at 06:17

## 2024-04-20 RX ADMIN — LISINOPRIL 20 MG: 20 TABLET ORAL at 10:53

## 2024-04-20 RX ADMIN — FENOFIBRATE 160 MG: 160 TABLET ORAL at 10:53

## 2024-04-20 RX ADMIN — PANTOPRAZOLE SODIUM 40 MG: 40 TABLET, DELAYED RELEASE ORAL at 06:17

## 2024-04-20 RX ADMIN — RANOLAZINE 1000 MG: 500 TABLET, FILM COATED, EXTENDED RELEASE ORAL at 10:53

## 2024-04-20 RX ADMIN — MORPHINE SULFATE 2 MG: 2 INJECTION, SOLUTION INTRAMUSCULAR; INTRAVENOUS at 03:39

## 2024-04-20 RX ADMIN — TIZANIDINE 4 MG: 4 TABLET ORAL at 06:17

## 2024-04-20 RX ADMIN — OXYCODONE HYDROCHLORIDE AND ACETAMINOPHEN 2 TABLET: 5; 325 TABLET ORAL at 23:10

## 2024-04-20 RX ADMIN — METOPROLOL SUCCINATE 50 MG: 50 TABLET, EXTENDED RELEASE ORAL at 10:53

## 2024-04-20 ASSESSMENT — PAIN - FUNCTIONAL ASSESSMENT
PAIN_FUNCTIONAL_ASSESSMENT: ACTIVITIES ARE NOT PREVENTED

## 2024-04-20 ASSESSMENT — PAIN DESCRIPTION - LOCATION
LOCATION: ABDOMEN;CHEST
LOCATION: ABDOMEN
LOCATION: ABDOMEN
LOCATION: CHEST;ABDOMEN

## 2024-04-20 ASSESSMENT — PAIN SCALES - GENERAL
PAINLEVEL_OUTOF10: 7
PAINLEVEL_OUTOF10: 1
PAINLEVEL_OUTOF10: 6
PAINLEVEL_OUTOF10: 0
PAINLEVEL_OUTOF10: 6
PAINLEVEL_OUTOF10: 7
PAINLEVEL_OUTOF10: 5
PAINLEVEL_OUTOF10: 6

## 2024-04-20 ASSESSMENT — PAIN DESCRIPTION - ORIENTATION
ORIENTATION: MID
ORIENTATION: MID;UPPER
ORIENTATION: MID
ORIENTATION: MID

## 2024-04-20 ASSESSMENT — PAIN DESCRIPTION - DESCRIPTORS
DESCRIPTORS: ACHING;SHARP
DESCRIPTORS: ACHING
DESCRIPTORS: ACHING
DESCRIPTORS: PRESSURE;SHARP

## 2024-04-20 ASSESSMENT — PAIN DESCRIPTION - ONSET
ONSET: ON-GOING

## 2024-04-20 ASSESSMENT — PAIN DESCRIPTION - PAIN TYPE
TYPE: ACUTE PAIN

## 2024-04-20 ASSESSMENT — PAIN DESCRIPTION - FREQUENCY
FREQUENCY: CONTINUOUS

## 2024-04-20 NOTE — PLAN OF CARE
Problem: Chronic Conditions and Co-morbidities  Goal: Patient's chronic conditions and co-morbidity symptoms are monitored and maintained or improved  Recent Flowsheet Documentation  Taken 4/20/2024 0943 by Nataly Lujan RN  Care Plan - Patient's Chronic Conditions and Co-Morbidity Symptoms are Monitored and Maintained or Improved: Monitor and assess patient's chronic conditions and comorbid symptoms for stability, deterioration, or improvement     Problem: Discharge Planning  Goal: Discharge to home or other facility with appropriate resources  Outcome: Progressing     Problem: Pain  Goal: Verbalizes/displays adequate comfort level or baseline comfort level  Outcome: Progressing  Flowsheets (Taken 4/20/2024 0943)  Verbalizes/displays adequate comfort level or baseline comfort level: Encourage patient to monitor pain and request assistance

## 2024-04-21 LAB
GLUCOSE BLD STRIP.AUTO-MCNC: 149 MG/DL (ref 70–108)
GLUCOSE BLD STRIP.AUTO-MCNC: 224 MG/DL (ref 70–108)
GLUCOSE BLD STRIP.AUTO-MCNC: 228 MG/DL (ref 70–108)
GLUCOSE BLD STRIP.AUTO-MCNC: 271 MG/DL (ref 70–108)

## 2024-04-21 PROCEDURE — 6370000000 HC RX 637 (ALT 250 FOR IP): Performed by: NURSE PRACTITIONER

## 2024-04-21 PROCEDURE — 82948 REAGENT STRIP/BLOOD GLUCOSE: CPT

## 2024-04-21 PROCEDURE — 99232 SBSQ HOSP IP/OBS MODERATE 35: CPT | Performed by: NURSE PRACTITIONER

## 2024-04-21 PROCEDURE — 6370000000 HC RX 637 (ALT 250 FOR IP)

## 2024-04-21 PROCEDURE — 1200000003 HC TELEMETRY R&B

## 2024-04-21 PROCEDURE — 6370000000 HC RX 637 (ALT 250 FOR IP): Performed by: STUDENT IN AN ORGANIZED HEALTH CARE EDUCATION/TRAINING PROGRAM

## 2024-04-21 PROCEDURE — 2580000003 HC RX 258: Performed by: INTERNAL MEDICINE

## 2024-04-21 RX ORDER — SENNOSIDES A AND B 8.6 MG/1
1 TABLET, FILM COATED ORAL NIGHTLY
Status: DISCONTINUED | OUTPATIENT
Start: 2024-04-21 | End: 2024-04-22 | Stop reason: HOSPADM

## 2024-04-21 RX ORDER — POLYETHYLENE GLYCOL 3350 17 G/17G
17 POWDER, FOR SOLUTION ORAL DAILY
Status: DISCONTINUED | OUTPATIENT
Start: 2024-04-21 | End: 2024-04-22 | Stop reason: HOSPADM

## 2024-04-21 RX ORDER — DOCUSATE SODIUM 100 MG/1
100 CAPSULE, LIQUID FILLED ORAL 2 TIMES DAILY
Status: DISCONTINUED | OUTPATIENT
Start: 2024-04-21 | End: 2024-04-22 | Stop reason: HOSPADM

## 2024-04-21 RX ADMIN — INSULIN LISPRO 1 UNITS: 100 INJECTION, SOLUTION INTRAVENOUS; SUBCUTANEOUS at 16:57

## 2024-04-21 RX ADMIN — AMLODIPINE BESYLATE 5 MG: 5 TABLET ORAL at 10:29

## 2024-04-21 RX ADMIN — SUCRALFATE 1 G: 1 TABLET ORAL at 16:57

## 2024-04-21 RX ADMIN — OXYCODONE HYDROCHLORIDE AND ACETAMINOPHEN 1 TABLET: 5; 325 TABLET ORAL at 10:28

## 2024-04-21 RX ADMIN — SODIUM CHLORIDE, PRESERVATIVE FREE 10 ML: 5 INJECTION INTRAVENOUS at 21:01

## 2024-04-21 RX ADMIN — DOCUSATE SODIUM 100 MG: 100 CAPSULE, LIQUID FILLED ORAL at 21:01

## 2024-04-21 RX ADMIN — DOCUSATE SODIUM 100 MG: 100 CAPSULE, LIQUID FILLED ORAL at 14:57

## 2024-04-21 RX ADMIN — OXYCODONE HYDROCHLORIDE AND ACETAMINOPHEN 2 TABLET: 5; 325 TABLET ORAL at 06:28

## 2024-04-21 RX ADMIN — CLOPIDOGREL BISULFATE 75 MG: 75 TABLET, FILM COATED ORAL at 10:29

## 2024-04-21 RX ADMIN — RANOLAZINE 1000 MG: 500 TABLET, FILM COATED, EXTENDED RELEASE ORAL at 10:29

## 2024-04-21 RX ADMIN — PANTOPRAZOLE SODIUM 40 MG: 40 TABLET, DELAYED RELEASE ORAL at 06:26

## 2024-04-21 RX ADMIN — SUCRALFATE 1 G: 1 TABLET ORAL at 21:00

## 2024-04-21 RX ADMIN — LISINOPRIL 20 MG: 20 TABLET ORAL at 10:29

## 2024-04-21 RX ADMIN — ISOSORBIDE MONONITRATE 30 MG: 30 TABLET, EXTENDED RELEASE ORAL at 10:29

## 2024-04-21 RX ADMIN — FENOFIBRATE 160 MG: 160 TABLET ORAL at 10:29

## 2024-04-21 RX ADMIN — SENNOSIDES 8.6 MG: 8.6 TABLET, FILM COATED ORAL at 21:01

## 2024-04-21 RX ADMIN — ASPIRIN 81 MG: 81 TABLET, COATED ORAL at 10:29

## 2024-04-21 RX ADMIN — POLYETHYLENE GLYCOL 3350 17 G: 17 POWDER, FOR SOLUTION ORAL at 14:52

## 2024-04-21 RX ADMIN — OXYCODONE HYDROCHLORIDE AND ACETAMINOPHEN 2 TABLET: 5; 325 TABLET ORAL at 21:01

## 2024-04-21 RX ADMIN — SODIUM CHLORIDE, PRESERVATIVE FREE 10 ML: 5 INJECTION INTRAVENOUS at 10:30

## 2024-04-21 RX ADMIN — METOPROLOL SUCCINATE 50 MG: 50 TABLET, EXTENDED RELEASE ORAL at 10:29

## 2024-04-21 RX ADMIN — ATORVASTATIN CALCIUM 80 MG: 80 TABLET, FILM COATED ORAL at 21:00

## 2024-04-21 RX ADMIN — SUCRALFATE 1 G: 1 TABLET ORAL at 06:26

## 2024-04-21 RX ADMIN — SUCRALFATE 1 G: 1 TABLET ORAL at 10:29

## 2024-04-21 RX ADMIN — OXYCODONE HYDROCHLORIDE AND ACETAMINOPHEN 2 TABLET: 5; 325 TABLET ORAL at 14:57

## 2024-04-21 RX ADMIN — RANOLAZINE 1000 MG: 500 TABLET, FILM COATED, EXTENDED RELEASE ORAL at 21:00

## 2024-04-21 ASSESSMENT — PAIN SCALES - GENERAL
PAINLEVEL_OUTOF10: 7
PAINLEVEL_OUTOF10: 5
PAINLEVEL_OUTOF10: 3
PAINLEVEL_OUTOF10: 5
PAINLEVEL_OUTOF10: 6
PAINLEVEL_OUTOF10: 7
PAINLEVEL_OUTOF10: 1
PAINLEVEL_OUTOF10: 7
PAINLEVEL_OUTOF10: 3

## 2024-04-21 ASSESSMENT — PAIN DESCRIPTION - LOCATION
LOCATION: ABDOMEN

## 2024-04-21 ASSESSMENT — PAIN DESCRIPTION - DESCRIPTORS
DESCRIPTORS: ACHING;SHARP
DESCRIPTORS: SHARP
DESCRIPTORS: STABBING
DESCRIPTORS: STABBING

## 2024-04-21 ASSESSMENT — PAIN DESCRIPTION - ORIENTATION
ORIENTATION: MID
ORIENTATION: MID;LEFT;RIGHT
ORIENTATION: MID
ORIENTATION: MID

## 2024-04-21 ASSESSMENT — PAIN DESCRIPTION - FREQUENCY
FREQUENCY: CONTINUOUS

## 2024-04-21 ASSESSMENT — PAIN - FUNCTIONAL ASSESSMENT
PAIN_FUNCTIONAL_ASSESSMENT: PREVENTS OR INTERFERES SOME ACTIVE ACTIVITIES AND ADLS
PAIN_FUNCTIONAL_ASSESSMENT: PREVENTS OR INTERFERES SOME ACTIVE ACTIVITIES AND ADLS
PAIN_FUNCTIONAL_ASSESSMENT: ACTIVITIES ARE NOT PREVENTED

## 2024-04-21 ASSESSMENT — PAIN DESCRIPTION - PAIN TYPE
TYPE: ACUTE PAIN

## 2024-04-21 NOTE — OP NOTE
[facility]    Patient: Franky Melendez  : 1975  [id number]  Date of evaluation: 2024  Primary Care Physician: Curtis Chavarria MD     Procedure:    [x]EGD    []Enteroscopy    [x]Biopsy   []Dilation      []PEG    []PEG change    []PEG removal  []Variceal banding    []Control of bleeding  []Destruction of lesion by APC    []Stent Placement  []Foreign Body Removal    []Snare Polypectomy  []Other:     []Aborted:        Indication:   epigastric pain, h/o PUD    History:  48 y.o. male with h/o CAD and smoking on ASA and Plavix was admitted 24 for chest pain.  He had cardia cath that was normal.  He has EP and h/o PUD.  He has chronic pain and on norco.  He takes PPI daily, but stopped this ~4 months ago.    Physical Exam:  VITALS: /81   Pulse 73   Temp 98 °F (36.7 °C) (Oral)   Resp 20   Ht 1.702 m (5' 7\")   Wt 92.3 kg (203 lb 6.4 oz)   SpO2 95%   BMI 31.86 kg/m²   The patient is a 48 y.o. male in no acute distress.  HEAD: Normal cephalic/atraumatic. Extra-occular motions intact bilaterally.  NECK: No lymphadenopathy or bruits.  CHEST: Rises equally on inspiration. Clear to auscultation bilaterally.   CARDIOVASCULAR: Regular rate and rhythm without murmurs, rubs or gallops.   ABDOMEN: Soft, nontender, and nondistended with normal bowel sounds. No Hepatosplemomegaly.  UPPER EXTREMITIES: no cyanosis, clubbing, or edema.  DERM: no rash or jaundice.  LOWER EXTREMITIES: no cyanosis, clubbing, or edema.  NEURO: Alert and oriented times four. Patient moves all extremities and has gross sensation in all extremities.    ASA: ASA 3 - Patient with moderate systemic disease with functional limitations    MEDICATION:    MAC/Propofol/Anesthesia:  Yes     Photo:  Yes  Biopsy:  Yes      Description of Procedure:  The risks and benefits of the procedure were described to the patient or their representative if unable to give consent including but not

## 2024-04-21 NOTE — H&P
[facility]    Pre-Endoscopy H&PE      Patient: Franky Melendez    : 1975    Acct#: [id number]  Primary Care Physician: Curtis Chavarria MD   Date of evaluation: 2024    Planned Procedure:    [x]EGD    []Enteroscopy    []PEG    []PEG change    []PEG removal  []Variceal banding    [x]Biopsy   []Dilation      []Control of bleeding  []Destruction of lesion by APC    []Stent Placement  []Foreign Body Removal    []Snare Polypectomy  []Other:       Planned Sedation  []Conscious Sedation [x]MAC/Propofol []Anesthesia    []None    Airway:  Adequate for planned sedation    Indication:   epigastric pain, h/o PUD    History:  48 y.o. male with h/o CAD and smoking on ASA and Plavix was admitted 24 for chest pain.  He had cardia cath that was normal.  He has EP and h/o PUD.  He has chronic pain and is on norco.  He takes PPI daily, but stopped this ~4 months ago.    Physical Exam:  VITALS: /81   Pulse 73   Temp 98 °F (36.7 °C) (Oral)   Resp 20   Ht 1.702 m (5' 7\")   Wt 92.3 kg (203 lb 6.4 oz)   SpO2 95%   BMI 31.86 kg/m²   The patient is a 48 y.o. male in no acute distress.  HEAD: Normal cephalic/atraumatic. Extra-occular motions intact bilaterally.  NECK: No lymphadenopathy or bruits.  CHEST: Rises equally on inspiration. Clear to auscultation bilaterally.   CARDIOVASCULAR: Regular rate and rhythm without murmurs, rubs or gallops.   ABDOMEN: Soft, nontender, and nondistended with normal bowel sounds. No Hepatosplemomegaly.  UPPER EXTREMITIES: no cyanosis, clubbing, or edema.  DERM: no rash or jaundice.  LOWER EXTREMITIES: no cyanosis, clubbing, or edema.  NEURO: Alert and oriented times four. Patient moves all extremities and has gross sensation in all extremities.    ASA: ASA 3 - Patient with moderate systemic disease with functional limitations    See GI consult dated 24    Christophe Thurston MD  11:19 AM 2024

## 2024-04-21 NOTE — PLAN OF CARE
Problem: Chronic Conditions and Co-morbidities  Goal: Patient's chronic conditions and co-morbidity symptoms are monitored and maintained or improved  Recent Flowsheet Documentation  Taken 4/21/2024 1030 by Nataly Lujan RN  Care Plan - Patient's Chronic Conditions and Co-Morbidity Symptoms are Monitored and Maintained or Improved: Monitor and assess patient's chronic conditions and comorbid symptoms for stability, deterioration, or improvement     Problem: Discharge Planning  Goal: Discharge to home or other facility with appropriate resources  Outcome: Progressing     Problem: Pain  Goal: Verbalizes/displays adequate comfort level or baseline comfort level  Outcome: Progressing

## 2024-04-22 ENCOUNTER — ANESTHESIA EVENT (OUTPATIENT)
Dept: ENDOSCOPY | Age: 49
DRG: 384 | End: 2024-04-22
Payer: COMMERCIAL

## 2024-04-22 ENCOUNTER — ANESTHESIA (OUTPATIENT)
Dept: ENDOSCOPY | Age: 49
DRG: 384 | End: 2024-04-22
Payer: COMMERCIAL

## 2024-04-22 VITALS
RESPIRATION RATE: 18 BRPM | TEMPERATURE: 98.3 F | DIASTOLIC BLOOD PRESSURE: 64 MMHG | OXYGEN SATURATION: 100 % | SYSTOLIC BLOOD PRESSURE: 116 MMHG | BODY MASS INDEX: 31.6 KG/M2 | HEIGHT: 67 IN | HEART RATE: 73 BPM | WEIGHT: 201.3 LBS

## 2024-04-22 LAB
GLUCOSE BLD STRIP.AUTO-MCNC: 143 MG/DL (ref 70–108)
GLUCOSE BLD STRIP.AUTO-MCNC: 184 MG/DL (ref 70–108)

## 2024-04-22 PROCEDURE — 2500000003 HC RX 250 WO HCPCS: Performed by: REGISTERED NURSE

## 2024-04-22 PROCEDURE — 6360000002 HC RX W HCPCS: Performed by: REGISTERED NURSE

## 2024-04-22 PROCEDURE — 3609012400 HC EGD TRANSORAL BIOPSY SINGLE/MULTIPLE: Performed by: INTERNAL MEDICINE

## 2024-04-22 PROCEDURE — 0DB68ZX EXCISION OF STOMACH, VIA NATURAL OR ARTIFICIAL OPENING ENDOSCOPIC, DIAGNOSTIC: ICD-10-PCS | Performed by: INTERNAL MEDICINE

## 2024-04-22 PROCEDURE — 82948 REAGENT STRIP/BLOOD GLUCOSE: CPT

## 2024-04-22 PROCEDURE — 3700000000 HC ANESTHESIA ATTENDED CARE: Performed by: INTERNAL MEDICINE

## 2024-04-22 PROCEDURE — 7100000010 HC PHASE II RECOVERY - FIRST 15 MIN: Performed by: INTERNAL MEDICINE

## 2024-04-22 PROCEDURE — 0DB98ZX EXCISION OF DUODENUM, VIA NATURAL OR ARTIFICIAL OPENING ENDOSCOPIC, DIAGNOSTIC: ICD-10-PCS | Performed by: INTERNAL MEDICINE

## 2024-04-22 PROCEDURE — 99239 HOSP IP/OBS DSCHRG MGMT >30: CPT | Performed by: NURSE PRACTITIONER

## 2024-04-22 PROCEDURE — 6370000000 HC RX 637 (ALT 250 FOR IP): Performed by: NURSE PRACTITIONER

## 2024-04-22 PROCEDURE — 6370000000 HC RX 637 (ALT 250 FOR IP)

## 2024-04-22 PROCEDURE — 2580000003 HC RX 258: Performed by: INTERNAL MEDICINE

## 2024-04-22 PROCEDURE — 7100000011 HC PHASE II RECOVERY - ADDTL 15 MIN: Performed by: INTERNAL MEDICINE

## 2024-04-22 PROCEDURE — 3700000001 HC ADD 15 MINUTES (ANESTHESIA): Performed by: INTERNAL MEDICINE

## 2024-04-22 PROCEDURE — 6370000000 HC RX 637 (ALT 250 FOR IP): Performed by: STUDENT IN AN ORGANIZED HEALTH CARE EDUCATION/TRAINING PROGRAM

## 2024-04-22 PROCEDURE — 88305 TISSUE EXAM BY PATHOLOGIST: CPT

## 2024-04-22 RX ORDER — PANTOPRAZOLE SODIUM 40 MG/1
40 TABLET, DELAYED RELEASE ORAL
Status: DISCONTINUED | OUTPATIENT
Start: 2024-04-22 | End: 2024-04-22 | Stop reason: HOSPADM

## 2024-04-22 RX ORDER — POLYETHYLENE GLYCOL 3350 17 G/17G
17 POWDER, FOR SOLUTION ORAL DAILY
Qty: 527 G | Refills: 1 | COMMUNITY
Start: 2024-04-22 | End: 2024-05-22

## 2024-04-22 RX ORDER — PSEUDOEPHEDRINE HCL 30 MG
100 TABLET ORAL 2 TIMES DAILY
COMMUNITY
Start: 2024-04-22

## 2024-04-22 RX ORDER — PROPOFOL 10 MG/ML
INJECTION, EMULSION INTRAVENOUS PRN
Status: DISCONTINUED | OUTPATIENT
Start: 2024-04-22 | End: 2024-04-22 | Stop reason: SDUPTHER

## 2024-04-22 RX ORDER — SUCRALFATE 1 G/1
1 TABLET ORAL EVERY 12 HOURS SCHEDULED
Status: DISCONTINUED | OUTPATIENT
Start: 2024-04-22 | End: 2024-04-22 | Stop reason: HOSPADM

## 2024-04-22 RX ORDER — PANTOPRAZOLE SODIUM 40 MG/1
40 TABLET, DELAYED RELEASE ORAL
Qty: 60 TABLET | Refills: 11 | Status: SHIPPED | OUTPATIENT
Start: 2024-04-22

## 2024-04-22 RX ORDER — LIDOCAINE HYDROCHLORIDE 20 MG/ML
INJECTION, SOLUTION EPIDURAL; INFILTRATION; INTRACAUDAL; PERINEURAL PRN
Status: DISCONTINUED | OUTPATIENT
Start: 2024-04-22 | End: 2024-04-22 | Stop reason: SDUPTHER

## 2024-04-22 RX ORDER — SUCRALFATE 1 G/1
1 TABLET ORAL
Qty: 60 TABLET | Refills: 11 | Status: SHIPPED | OUTPATIENT
Start: 2024-04-22

## 2024-04-22 RX ORDER — HYDROCODONE BITARTRATE AND ACETAMINOPHEN 5; 325 MG/1; MG/1
1 TABLET ORAL EVERY 6 HOURS PRN
Qty: 10 TABLET | Refills: 0 | Status: SHIPPED | OUTPATIENT
Start: 2024-04-22 | End: 2024-04-25

## 2024-04-22 RX ADMIN — METOPROLOL SUCCINATE 50 MG: 50 TABLET, EXTENDED RELEASE ORAL at 08:22

## 2024-04-22 RX ADMIN — SUCRALFATE 1 G: 1 TABLET ORAL at 05:28

## 2024-04-22 RX ADMIN — LISINOPRIL 20 MG: 20 TABLET ORAL at 08:23

## 2024-04-22 RX ADMIN — ASPIRIN 81 MG: 81 TABLET, COATED ORAL at 11:30

## 2024-04-22 RX ADMIN — OXYCODONE HYDROCHLORIDE AND ACETAMINOPHEN 1 TABLET: 5; 325 TABLET ORAL at 02:28

## 2024-04-22 RX ADMIN — LIDOCAINE HYDROCHLORIDE 100 MG: 20 INJECTION, SOLUTION EPIDURAL; INFILTRATION; INTRACAUDAL; PERINEURAL at 10:22

## 2024-04-22 RX ADMIN — PROPOFOL 100 MG: 10 INJECTION, EMULSION INTRAVENOUS at 10:22

## 2024-04-22 RX ADMIN — AMLODIPINE BESYLATE 5 MG: 5 TABLET ORAL at 08:23

## 2024-04-22 RX ADMIN — FENOFIBRATE 160 MG: 160 TABLET ORAL at 08:22

## 2024-04-22 RX ADMIN — DOCUSATE SODIUM 100 MG: 100 CAPSULE, LIQUID FILLED ORAL at 08:22

## 2024-04-22 RX ADMIN — RANOLAZINE 1000 MG: 500 TABLET, FILM COATED, EXTENDED RELEASE ORAL at 08:23

## 2024-04-22 RX ADMIN — ISOSORBIDE MONONITRATE 30 MG: 30 TABLET, EXTENDED RELEASE ORAL at 08:23

## 2024-04-22 RX ADMIN — PANTOPRAZOLE SODIUM 40 MG: 40 TABLET, DELAYED RELEASE ORAL at 05:28

## 2024-04-22 RX ADMIN — SODIUM CHLORIDE: 9 INJECTION, SOLUTION INTRAVENOUS at 10:21

## 2024-04-22 RX ADMIN — CLOPIDOGREL BISULFATE 75 MG: 75 TABLET, FILM COATED ORAL at 11:30

## 2024-04-22 ASSESSMENT — PAIN SCALES - GENERAL
PAINLEVEL_OUTOF10: 3
PAINLEVEL_OUTOF10: 7
PAINLEVEL_OUTOF10: 5

## 2024-04-22 ASSESSMENT — PAIN - FUNCTIONAL ASSESSMENT
PAIN_FUNCTIONAL_ASSESSMENT: 0-10
PAIN_FUNCTIONAL_ASSESSMENT: ACTIVITIES ARE NOT PREVENTED
PAIN_FUNCTIONAL_ASSESSMENT: PREVENTS OR INTERFERES SOME ACTIVE ACTIVITIES AND ADLS

## 2024-04-22 ASSESSMENT — PAIN DESCRIPTION - DESCRIPTORS
DESCRIPTORS: STABBING
DESCRIPTORS: STABBING
DESCRIPTORS: ACHING;DISCOMFORT

## 2024-04-22 ASSESSMENT — PAIN DESCRIPTION - ORIENTATION
ORIENTATION: MID
ORIENTATION: MID

## 2024-04-22 ASSESSMENT — PAIN DESCRIPTION - FREQUENCY
FREQUENCY: CONTINUOUS
FREQUENCY: CONTINUOUS

## 2024-04-22 ASSESSMENT — PAIN DESCRIPTION - PAIN TYPE
TYPE: ACUTE PAIN
TYPE: ACUTE PAIN

## 2024-04-22 ASSESSMENT — PAIN DESCRIPTION - LOCATION
LOCATION: ABDOMEN
LOCATION: ABDOMEN

## 2024-04-22 NOTE — ANESTHESIA PRE PROCEDURE
Department of Anesthesiology  Preprocedure Note       Name:  Franky Melendez   Age:  48 y.o.  :  1975                                          MRN:  [id number]         Date:  2024      Surgeon: Surgeon(s):  Christophe Thurston MD    Procedure: Procedure(s):  EGD    Medications prior to admission:   Prior to Admission medications    Medication Sig Start Date End Date Taking? Authorizing Provider   nitroGLYCERIN (NITROSTAT) 0.4 MG SL tablet Place 1 tablet under the tongue every 5 minutes as needed for Chest pain up to max of 3 total doses. If no relief after 1 dose, call 911. 4/15/24   Elijah Orozco MD   amLODIPine (NORVASC) 5 MG tablet Take 1 tablet by mouth daily 4/15/24   Elijah Orozco MD   isosorbide mononitrate (IMDUR) 30 MG extended release tablet Take 1 tablet by mouth daily 4/15/24   Elijah Orozco MD   tiZANidine (ZANAFLEX) 4 MG tablet TAKE 1 TABLET BY MOUTH 3 TIMES DAILY AS NEEDED (MUSCLE SPASMS). 24  Curtis Chavarria MD   furosemide (LASIX) 20 MG tablet TAKE 1 TABLET BY MOUTH DAILY AS NEEDED (LEG EDEMA) 3/11/24   Curtis Chavarria MD   lisinopril (PRINIVIL;ZESTRIL) 20 MG tablet TAKE 1 TABLET BY MOUTH EVERY DAY 3/7/24   Mellissa Rodriguez PA-C   ranolazine (RANEXA) 500 MG extended release tablet TAKE 1 TABLET BY MOUTH TWICE A DAY 24   Mellissa Rodriguez PA-C   fenofibrate (TRIGLIDE) 160 MG tablet TAKE 1 TABLET BY MOUTH EVERY DAY 24   Curtis Chavarria MD   metFORMIN (GLUCOPHAGE) 500 MG tablet TAKE 1 TABLET BY MOUTH TWICE A DAY WITH MEALS 24   Curtis Chavarria MD   clopidogrel (PLAVIX) 75 MG tablet Take 1 tablet by mouth daily 23   Elijah Orozco MD   atorvastatin (LIPITOR) 80 MG tablet TAKE 1 TABLET BY MOUTH EVERY DAY 23   Mellissa Rodriguez PA-C   metoprolol succinate (TOPROL XL) 50 MG extended release tablet TAKE 1 TABLET BY MOUTH EVERY DAY 23   Curtis Chavarria MD   pantoprazole (PROTONIX) 40 MG tablet Take 2 tablets by mouth 2 times daily (before

## 2024-04-22 NOTE — DISCHARGE SUMMARY
Hospital Medicine Discharge Summary      Patient Identification:   Franky Melendez   : 1975  MRN: [id number]   Account: [id number]      Patient's PCP: Curtis Chavarria MD    Admit Date: 2024     Discharge Date:   2024    Admitting Physician: No admitting provider for patient encounter.     Discharging Nurse Practitioner: KRUNAL Sinha - CNP     Discharge Diagnoses with Assessment/Plan:  Acute chest pain, unknown etiology, likely GI in nature--on aspirin, statin, Plavix, fenofibrate, Imdur, Toprol, Ranexa; appreciate cardiology input; heart catheterization completed on 2024 showed nonobstructive CAD and patent VG to RCA, echo from 2024 revealed an EF 45 to 50%; EKG shows sinus rhythm heart rate 79 with inverted T waves noted in lead II, 3, aVF; lipid panel shows cholesterol 135, LDL 55 and triglycerides 268; troponins x 2 normal, strong premature family history in patient as his father passed away age 46 and uncle at age 54 secondary to CAD; CTA chest did not reveal a pulmonary embolism, no aortic aneurysm or dissection; repeat troponin remains normal, was given GI cocktail with improvement, per GI: Protonix 40 mg twice daily, Carafate twice daily, LFTs and lipase normal, ultrasound of the gallbladder   Many NSAID induced ulcer noted per EGD on 2024--GI recommends Protonix 40 mg twice daily, Carafate twice daily, to avoid all NSAIDs and needs a repeat EGD in 8 weeks, okay for discharge home per GI  Primary hypertension, uncontrolled--on Norvasc, lisinopril, Toprol, Imdur; hold parameters on medications  Diabetes mellitus type 2, uncontrolled--Glucophage 500 mg twice daily  Asthma--stable, on room air, has albuterol inhaler as needed  Chronic heart failure with mildly reduced EF--echo from 2024 revealed an EF 45 to 50%; on beta-blocker and ACE inhibitor  Hepatomegaly and hepatic steatosis--noted per CTA chest, LFT's normal  CAD status post CABG--on

## 2024-04-22 NOTE — ANESTHESIA POSTPROCEDURE EVALUATION
Department of Anesthesiology  Postprocedure Note    Patient: Franky Melendez  MRN: [id number]  YOB: 1975  Date of evaluation: 4/22/2024    Procedure Summary       Date: 04/22/24 Room / Location: [place] [address] / [facility]    Anesthesia Start: 1021 Anesthesia Stop: 1033    Procedure: ESOPHAGOGASTRODUODENOSCOPY BIOPSY Diagnosis:       Epigastric pain      (Epigastric pain [R10.13])    Surgeons: Christophe Thurston MD Responsible Provider: Sahil Beltran MD    Anesthesia Type: MAC ASA Status: 3            Anesthesia Type: No value filed.    Yrn Phase I: Yrn Score: 10    Yrn Phase II:      Anesthesia Post Evaluation    Patient location during evaluation: bedside  Patient participation: complete - patient participated  Level of consciousness: awake and alert  Airway patency: patent  Nausea & Vomiting: no nausea and no vomiting  Cardiovascular status: hemodynamically stable and blood pressure returned to baseline  Respiratory status: acceptable, spontaneous ventilation, nonlabored ventilation and room air  Hydration status: stable  Pain management: adequate        No notable events documented.

## 2024-04-22 NOTE — CARE COORDINATION
4/22/24, 12:33 PM EDT    Patient goals/plan/ treatment preferences discussed by  and .  Patient goals/plan/ treatment preferences reviewed with patient/ family.  Patient/ family verbalize understanding of discharge plan and are in agreement with goal/plan/treatment preferences.  Understanding was demonstrated using the teach back method.  AVS provided by RN at time of discharge, which includes all necessary medical information pertaining to the patients current course of illness, treatment, post-discharge goals of care, and treatment preferences.     Services At/After Discharge: None    Returning home with wife.

## 2024-04-22 NOTE — PLAN OF CARE
Problem: Chronic Conditions and Co-morbidities  Goal: Patient's chronic conditions and co-morbidity symptoms are monitored and maintained or improved  Outcome: Progressing     Problem: Discharge Planning  Goal: Discharge to home or other facility with appropriate resources  4/22/2024 0430 by Sumeet Corrigan RN  Outcome: Progressing  4/21/2024 1900 by Nataly Lujan RN  Outcome: Progressing     Problem: Pain  Goal: Verbalizes/displays adequate comfort level or baseline comfort level  4/22/2024 0430 by Sumeet Corrigan RN  Outcome: Progressing  4/21/2024 1900 by Nataly Lujan, RN  Outcome: Progressing

## 2024-04-22 NOTE — PROGRESS NOTES
Hospitalist Progress Note    Patient:  Franky Melendez      Unit/Bed:8A-02/002-A    YOB: 1975    MRN: [id number]       Acct: [id number]     PCP: Curtis Chavarria MD    Date of Admission: 4/17/2024    Assessment/Plan:    Acute chest pain, unknown etiology, possibly GI in nature--on aspirin, statin, Plavix, fenofibrate, Imdur, Toprol, Ranexa; appreciate cardiology input; heart catheterization completed on April 18, 2024 showed nonobstructive CAD and patent VG to RCA, echo from April 4, 2024 revealed an EF 45 to 50%; EKG shows sinus rhythm heart rate 79 with inverted T waves noted in lead II, 3, aVF; lipid panel shows cholesterol 135, LDL 55 and triglycerides 268; troponins x 2 normal, strong premature family history in patient as his father passed away age 46 and uncle at age 54 secondary to CAD; CTA chest did not reveal a pulmonary embolism, no aortic aneurysm or dissection; repeat troponin remains normal, was given GI cocktail with improvement, Protonix 40 mg daily added~according to home med rec patient was on 80 mg twice daily until 4 months ago when his GI provider in [place] said he did not need it any longer, LFTs and lipase normal, ultrasound of the gallbladder noted and appreciate GI input; planning EGD on Monday per GI note from 4/20  Primary hypertension, uncontrolled--on Norvasc, lisinopril, Toprol, Imdur; hold parameters on medications; monitor  Diabetes mellitus type 2, uncontrolled--on low-dose sliding scale, at home takes Glucophage 500 mg twice daily; monitor  Asthma--stable, on room air, has albuterol inhaler as needed  Chronic heart failure with mildly reduced EF--echo from April 2, 2024 revealed an EF 45 to 50%; on beta-blocker and ACE inhibitor  Hepatomegaly and hepatic steatosis--noted per CTA chest, LFT's normal  CAD status post CABG--on aspirin, Plavix, statin, Toprol  History of GI bleed--appreciate GI input, on aspirin and Plavix, plan is for EGD on 4/22  Obesity 
              Hospitalist Progress Note    Patient:  Franky Melendez      Unit/Bed:8A-02/002-A    YOB: 1975    MRN: [id number]       Acct: [id number]     PCP: Curtis Chavarria MD    Date of Admission: 4/17/2024    Assessment/Plan:    Acute chest pain/possible unstable angina--on aspirin, statin, Plavix, fenofibrate, Imdur, Toprol, Ranexa; was started on heparin drip, appreciate cardiology input and planning heart catheterization, has a plan heart catheterization in early May with Dr. Beltran Per H&P; echo from April 4, 2024 revealed an EF 45 to 50%; EKG shows sinus rhythm heart rate 79 with inverted T waves noted in lead II, 3, aVF; lipid panel shows cholesterol 135, LDL 55 and triglycerides 268; troponins x 2 normal, strong premature family history in patient as his father passed away age 46 and uncle at age 54 secondary to CAD  Primary hypertension, uncontrolled--on Norvasc, lisinopril, Toprol, Imdur; will place hold parameters on medication since blood pressures on the lower side; monitor  Diabetes mellitus type 2, uncontrolled--on low-dose sliding scale, at home takes Glucophage 500 mg twice daily; monitor  Asthma--stable, on room air, has albuterol inhaler as needed  Chronic heart failure with mildly reduced EF--echo from April 2, 2024 revealed an EF 45 to 50%; on beta-blocker and ACE inhibitor  Obesity class I with BMI 31.81      Expected discharge date: Pending clinical course    Disposition:    [x] Home       [] TCU       [] Rehab       [] Psych       [] SNF       [] Long Term Care Facility       [] Other-    Chief Complaint: Chest pain    Hospital Course: Per H&P done 4/17/2024: \"Franky is an 48-year-old  man with a past medical history of CAD, with CABG x 2, HTN who presents to [facility] via direct admit today for the evaluation of acute onset chest pain.  Patient reports she was at work this morning around 630 had sudden onset substernal sharp chest pain.  Patient reports chest pain 
Cardiology Progress Note      Patient:  Franky Melendez  YOB: 1975  MRN: [id number]   Acct: [id number]  Admit Date:  4/17/2024  Primary Cardiologist:  Jammie Orozco  Note per DR Ellis:  \"CHIEF COMPLAINT:        HPI: This is a pleasant 48 y.o. male presents with history of CAD, CABG 2018, HTN, DM, obesity. Presented to the ED complaining of chest pain. The patient has extensive cardiac his with CABG x1 SVG to RCA, multiple heart caths and stress tests. Yesterday he started to complain of substernal chest pain while at work. Patient reports chest pain radiates to left shoulder, neck and the back of his head, down his left arm with associated numbness and tingling in his hand. Pain did not respond to SL nitro.  He reports associated nausea, lightheadedness, dizziness associated with the pain that has since improved, however still mildly present.  He had a stress test earlier this month and was planned for heart cath with Dr Beltran on 5/7/24. He reports that for the past 2 months he has been having increasing chest pain, sometimes occurring at rest. Heart score 6. Previous smoker quit 2 years ago.           Echo: 04/02/24    Left Ventricle: Low normal left ventricular systolic function with a visually estimated EF of 45 - 50%. Left ventricle size is borderline normal.    Right Ventricle: Right ventricle size is normal. Normal wall thickness. RV ESV is normal.    Aortic Valve: Mildly thickened cusp. No significant aortic stenosis.    Mitral Valve: Valve structure is normal. Mild regurgitation.    Tricuspid Valve: Valve structure is normal. No leaflet thickening. Mild regurgitation.    Left Atrium: Left atrial volume index is normal (16-34 mL/m2).    Right Atrium: Right atrium size is normal.    Image quality is fair.        All labs, EKG's, diagnostic testing and images as well as cardiac cath, stress testing were reviewed during this encounter    Subjective (Events in last 24 hours):   Pt awake, alert. 
Discharge teaching and instructions for diagnosis/procedure of chest pain completed with patient using teachback method. AVS reviewed. Printed prescriptions given to patient. Patient voiced understanding regarding prescriptions, follow up appointments, and care of self at home. Discharged ambulatory to  home with support per family    
EGD completed. Photos taken. 2 specimens taken and 2 jars labeled and sent to lab for processing. Pt tolerated procedure well.    Scope #  used  
[facility]  Sedation/Analgesia History & Physical    Pt Name: Franky Melendez  Account number: [id number]  MRN: [id number]  YOB: 1975  Provider Performing Procedure: Sai Valdez MD MD [facility]  Primary Care Physician: Curtis Chavarria MD  Date: 4/18/2024    PRE-PROCEDURE    Code Status: FULL CODE  Brief History/Pre-Procedure Diagnosis: [place]    Consent: : I have discussed with the patient risks, benefits, and alternatives (and relevant risks, benefits, and side effects related to alternatives or not receiving care), and likelihood of the success.   The patient and/or representative appear to understand and agree to proceed.  The discussion encompasses risks, benefits, and side effects related to the alternatives and the risks related to not receiving the proposed care, treatment, and services.       PLANNED PROCEDURE   [x]Cath  [x]PCI                []Pacemaker/AICD  []JONNY             []Cardioversion []Peripheral angiography/PTA  []Other:      VITAL SIGNS   Vitals:    04/18/24 0921   BP: 101/69   Pulse:    Resp:    Temp:    SpO2:        PHYSICAL:   General: No acute distress  HEENT:  Unremarkable for age  Neck: without increased JVD, carotid pulses 2+ bilaterally without bruits  Heart: RRR, S1 & S2 WNL   Lungs: Clear to auscultation    Abdomen: BS present, without HSM, masses, or tenderness    Extremities: without C,C,E.  Pulses 2+ bilaterally  Mental Status: Alert & Oriented    SEDATION  Planned agent:[x]Midazolam []Meperidine []Sublimaze []Morphine  []Diazepam  [x]Other: fentanyl      ASA Classification:  []1 []2 [x]3 []4 []5  Class 1: A normal healthy patient  Class 2: Pt with mild to moderate systemic disease  Class 3: Severe systemic disease or disturbance  Class 4: Severe systemic disorders that are already life threatening.  Class 5: Moribund pt with little chances of survival, for more than 24 hours.  Mallampati I Airway Classification:   []1 []2 [x]3 []4          MEDICAL HISTORY   
Patient ambulated around the unit independently  with family by their side. Voiced no concerns. Tolerated well.  
Patient is in phase 2 passing gas, taking fluids.  discussed findings, plan of care, discharge instructions with pt     Report called to Tiff    
Pt Name: Franky Melendez  MRN: [id number]  [id number]  YOB: 1975  Admit Date: 4/17/2024  6:20 PM  Date of evaluation: 4/20/2024  Primary Care Physician: Curtis Chavarria MD   8A-02/002-A     UYLIANA FLYNN.  Complains of persistent epigastric pain requiring narcotic analgesics.    O.     Vitals:    04/20/24 0943   BP: 118/74   Pulse: 83   Resp: 16   Temp: 98.4 °F (36.9 °C)   SpO2: 97%       Body mass index is 31.73 kg/m².   Awake and alert   clear to auscultation bilaterally   regular rate and rhythm   Soft and nondistended with normal BS, tender in epigastrium   no cyanosis, clubbing or edema present      Current Meds    ranolazine  1,000 mg Oral BID    pantoprazole  40 mg Oral QAM AC    sucralfate  1 g Oral 4x Daily AC & HS    sodium chloride flush  5-40 mL IntraVENous 2 times per day    aspirin  243 mg Oral Once    amLODIPine  5 mg Oral Daily    aspirin  81 mg Oral Daily    atorvastatin  80 mg Oral Nightly    clopidogrel  75 mg Oral Daily    fenofibrate  160 mg Oral Daily    isosorbide mononitrate  30 mg Oral Daily    lisinopril  20 mg Oral Daily    metoprolol succinate  50 mg Oral Daily    insulin lispro  0-4 Units SubCUTAneous TID WC    insulin lispro  0-4 Units SubCUTAneous Nightly      sodium chloride 75 mL/hr at 04/18/24 1110    dextrose       Diet: ADULT DIET; Regular; Low Fat/Low Chol/High Fiber/DANIEL; GI [place] (GERD/Peptic Ulcer); High Fiber    A.  48 y.o. male with h/o CAD and smoking on ASA and Plavix was admitted 4/17/24 for chest pain.  He had cardia cath that was normal.  He has EP and h/o PUD.  He has chronic pain and is on norco.  He takes PPI daily, but stopped this ~4 months ago.    Epigastric pain  H/o PUD with hemorrhage    Sludge on U/S    CAD on ASA and Plavix    H/o pancreatitis Sep 2022    P.      Qd ppi  sucralfate    Hold on HIDA  EGD Monday, high risk given Plavix use but benefits outweigh risks  Continue Plavix for EGD    Christophe Thurston MD  11:10 AM 4/20/2024              
Pt Name: Franky Melendez  MRN: [id number]  [id number]  YOB: 1975  Admit Date: 4/17/2024  6:20 PM  Date of evaluation: 4/21/2024  Primary Care Physician: Curtis Chavarria MD   8A-02/002-A     YULIANA MAN  Complains of persistent epigastric pain requiring narcotic analgesics.  Persists.  Pt had bad night due to pain.    O.     Vitals:    04/21/24 0658   BP:    Pulse:    Resp: 18   Temp:    SpO2:        Body mass index is 31.86 kg/m².   Awake and alert   clear to auscultation bilaterally   regular rate and rhythm   Soft and nondistended with normal BS, tender in epigastrium   no cyanosis, clubbing or edema present      Current Meds    ranolazine  1,000 mg Oral BID    pantoprazole  40 mg Oral QAM AC    sucralfate  1 g Oral 4x Daily AC & HS    sodium chloride flush  5-40 mL IntraVENous 2 times per day    aspirin  243 mg Oral Once    amLODIPine  5 mg Oral Daily    aspirin  81 mg Oral Daily    atorvastatin  80 mg Oral Nightly    clopidogrel  75 mg Oral Daily    fenofibrate  160 mg Oral Daily    isosorbide mononitrate  30 mg Oral Daily    lisinopril  20 mg Oral Daily    metoprolol succinate  50 mg Oral Daily    insulin lispro  0-4 Units SubCUTAneous TID WC    insulin lispro  0-4 Units SubCUTAneous Nightly      sodium chloride 75 mL/hr at 04/18/24 1110    dextrose       Diet: ADULT DIET; Regular; Low Fat/Low Chol/High Fiber/DANIEL; GI [place] (GERD/Peptic Ulcer); High Fiber  Diet NPO Exceptions are: Sips of Water with Meds    A.  48 y.o. male with h/o CAD and smoking on ASA and Plavix was admitted 4/17/24 for chest pain.  He had cardia cath that was normal.  He has EP and h/o PUD.  He has chronic pain and is on norco.  He takes PPI daily, but stopped this ~4 months ago.    Epigastric pain  H/o PUD with hemorrhage    Sludge on U/S    CAD on ASA and Plavix    H/o pancreatitis Sep 2022    P.      Continue ppi qd  Continue sucralfate    Hold on HIDA  EGD Monday, high risk given Plavix use but benefits outweigh risks  Continue 
Pt c/o increased chest pain 7/10. Dr. Valdez notified, states not cardiac related as pt had clean cath today, deferred to hospitalist. Vivian Weston messaged; see orders.   
Pt down for CT scan    
Pt having chest pain, continuously throughout the night.  Pain rated 7/10 squeezing sharp, this RN gave morphine 4mg had initial relief of 4/10 only squeezing. Notified Cardio Dr. Mortimer, and Enrico PRASAD. 0817 gave nitro pain was 7/10 squeezing, sharp, radiating down L. Arm. 0826 BP 99/67 pain currently 6/10 squeezing, not radiating.      0820-Called and notified Resource RN.   0830- Resoursce RN Jahaira at bedside  0831- Ranexa 1,000mg dose given.    0840 - EKG completed   0841- lab at bedside  Gi cocktail ordered  0920- patient got up to bathroom, experienced sharp pain 7/10, patient encouraged to remain bed rest, limit stress.    0923-Gi cocktail given  0940- pain rated at a 4/10 squeezing still L. Side of chest not radiating.  Patient satisfied  10:00 - Cardio Dr. Mortimer at bedside. CT called to reschedule scan in 15 mins.    1128- Patient down for CTA chest  1215- patient c/o pain 5/10 Mid to Left side of chest, still squeezing not radiating.  Morphine given, along with Imdur bp 122/69  1315- patient at pain goal of 3/10 to L. Chest, rated 3/10 denies radiating.        
Transfer  Report from [facility] ER  Referring Physician Dr. Madison  Accepting Physician Dr. Gomez/Dr. Mcmillan  Patient Condition:  Patient of Dr. Madison at [facility] in the ER. Patient has hx of CABG, Diabetes- patient has seen Dr. Beltran in the past and is scheduled for cath on May 7. Today while at work began having left sided chest pain, not relieved with Nitro , at ER gave Morphine minimal relief, given Dilaudid. First EKG AND Trop -. Glucose 290, stated other labs unremarkable. 122/64, HR 82, R 20, 95% RA, 98.4-T. admit to Obs, Tele, Unstable Angina   
Fiber/DANIEL; GI [place] (GERD/Peptic Ulcer); High Fiber    Exam:  /82   Pulse 75   Temp 97.9 °F (36.6 °C) (Oral)   Resp 18   Ht 1.702 m (5' 7\")   Wt 91.9 kg (202 lb 9.6 oz)   SpO2 95%   BMI 31.73 kg/m²     General appearance: No apparent distress, appears stated age and cooperative.  HEENT: Pupils equal, round, and reactive to light. Conjunctivae/corneas clear.  Neck: Supple, with full range of motion. No jugular venous distention. Trachea midline.  Respiratory:  Normal respiratory effort. Clear to auscultation, bilaterally without Rales/Wheezes/Rhonchi.  Cardiovascular: Regular rate and rhythm with normal S1/S2 without murmurs, rubs or gallops.  Abdomen: Soft, (++) tender epigastric, non-distended with normal bowel sounds.  Musculoskeletal: passive and active ROM x 4 extremities.  Skin: Skin color, texture, turgor normal.    Neurologic:  Neurovascularly intact without any focal sensory/motor deficits. Cranial nerves: II-XII intact, grossly non-focal.  Psychiatric: Alert and oriented, thought content appropriate  Capillary Refill: Brisk,< 3 seconds   Peripheral Pulses: +2 palpable, equal bilaterally       Labs:   Recent Labs     04/18/24  0533 04/19/24  0520 04/20/24  0525   WBC 7.8 7.0 6.5   HGB 13.6* 13.6* 12.8*   HCT 38.5* 39.2* 36.5*    303 307       Recent Labs     04/18/24  0533 04/19/24  0520 04/20/24  0525    137 139   K 4.1 4.6 4.2    103 102   CO2 22* 24 23   BUN 19 24* 19   CREATININE 0.9 1.0 0.9   CALCIUM 8.6 8.9 8.8       Recent Labs     04/17/24  0747 04/17/24 2030   INR 1.0 1.02       Microbiology:    None    Radiology:  XR CHEST PORTABLE    Result Date: 4/17/2024  EXAMINATION: ONE XRAY VIEW OF THE CHEST 4/17/2024 7:54 am COMPARISON: 08/07/2023 HISTORY: ORDERING SYSTEM PROVIDED HISTORY: cp TECHNOLOGIST PROVIDED HISTORY: cp FINDINGS: Sternal wires from prior heart surgery.  Stable heart size without evidence of vascular congestion.  Lungs remain essentially clear.  No new 
[x]None  Antibiotics: None  Steroids: None  Labs (still needed?): []Yes / [x]No  IVF (still needed?): []Yes / [x]No    Level of care: []Step Down / [x]Med-Surg  Bed Status: [x]Inpatient / []Observation  PT/OT: []Yes / [x]No    DVT Prophylaxis: [] Lovenox / [] Heparin / [] SCDs / [] Already on Systemic Anticoagulation / [] None       Active Hospital Problems    Diagnosis Date Noted    Atypical chest pain [R07.89] 04/17/2024       Electronically signed by KRUNAL Sinha CNP on 4/19/2024 at 6:46 AM

## 2024-04-23 DIAGNOSIS — M25.551 RIGHT HIP PAIN: Primary | ICD-10-CM

## 2024-04-23 DIAGNOSIS — G89.4 CHRONIC PAIN SYNDROME: ICD-10-CM

## 2024-04-23 RX ORDER — HYDROCODONE BITARTRATE AND ACETAMINOPHEN 5; 325 MG/1; MG/1
1 TABLET ORAL EVERY 6 HOURS PRN
Qty: 10 TABLET | Refills: 0 | Status: CANCELLED | OUTPATIENT
Start: 2024-04-23 | End: 2024-04-26

## 2024-04-23 RX ORDER — HYDROCODONE BITARTRATE AND ACETAMINOPHEN 5; 325 MG/1; MG/1
1 TABLET ORAL EVERY 6 HOURS PRN
Qty: 28 TABLET | Refills: 0 | Status: SHIPPED | OUTPATIENT
Start: 2024-04-24 | End: 2024-04-25

## 2024-04-24 LAB
EKG ATRIAL RATE: 71 BPM
EKG P AXIS: 32 DEGREES
EKG P-R INTERVAL: 172 MS
EKG Q-T INTERVAL: 410 MS
EKG QRS DURATION: 94 MS
EKG QTC CALCULATION (BAZETT): 445 MS
EKG R AXIS: 59 DEGREES
EKG T AXIS: 46 DEGREES
EKG VENTRICULAR RATE: 71 BPM

## 2024-04-25 ENCOUNTER — OFFICE VISIT (OUTPATIENT)
Dept: PRIMARY CARE CLINIC | Age: 49
End: 2024-04-25
Payer: COMMERCIAL

## 2024-04-25 VITALS
RESPIRATION RATE: 16 BRPM | HEIGHT: 67 IN | WEIGHT: 204 LBS | DIASTOLIC BLOOD PRESSURE: 72 MMHG | SYSTOLIC BLOOD PRESSURE: 108 MMHG | BODY MASS INDEX: 32.02 KG/M2 | HEART RATE: 64 BPM

## 2024-04-25 DIAGNOSIS — R11.2 NAUSEA AND VOMITING, UNSPECIFIED VOMITING TYPE: ICD-10-CM

## 2024-04-25 DIAGNOSIS — K21.9 GASTROESOPHAGEAL REFLUX DISEASE, UNSPECIFIED WHETHER ESOPHAGITIS PRESENT: ICD-10-CM

## 2024-04-25 DIAGNOSIS — R10.9 ABDOMINAL PAIN, UNSPECIFIED ABDOMINAL LOCATION: ICD-10-CM

## 2024-04-25 DIAGNOSIS — G89.4 CHRONIC PAIN SYNDROME: ICD-10-CM

## 2024-04-25 DIAGNOSIS — M25.551 RIGHT HIP PAIN: Primary | ICD-10-CM

## 2024-04-25 LAB — ECHO BSA: 2.11 M2

## 2024-04-25 PROCEDURE — G2211 COMPLEX E/M VISIT ADD ON: HCPCS | Performed by: STUDENT IN AN ORGANIZED HEALTH CARE EDUCATION/TRAINING PROGRAM

## 2024-04-25 PROCEDURE — 3074F SYST BP LT 130 MM HG: CPT | Performed by: STUDENT IN AN ORGANIZED HEALTH CARE EDUCATION/TRAINING PROGRAM

## 2024-04-25 PROCEDURE — 3078F DIAST BP <80 MM HG: CPT | Performed by: STUDENT IN AN ORGANIZED HEALTH CARE EDUCATION/TRAINING PROGRAM

## 2024-04-25 PROCEDURE — 99214 OFFICE O/P EST MOD 30 MIN: CPT | Performed by: STUDENT IN AN ORGANIZED HEALTH CARE EDUCATION/TRAINING PROGRAM

## 2024-04-25 RX ORDER — ONDANSETRON 4 MG/1
4 TABLET, ORALLY DISINTEGRATING ORAL 3 TIMES DAILY PRN
Qty: 90 TABLET | Refills: 0 | Status: SHIPPED | OUTPATIENT
Start: 2024-04-25 | End: 2024-05-25

## 2024-04-25 RX ORDER — OXYCODONE HYDROCHLORIDE AND ACETAMINOPHEN 5; 325 MG/1; MG/1
1 TABLET ORAL EVERY 6 HOURS PRN
Qty: 28 TABLET | Refills: 0 | Status: SHIPPED | OUTPATIENT
Start: 2024-05-01 | End: 2024-05-08

## 2024-04-25 NOTE — PROGRESS NOTES
depression    Review of Systems  Constitutional: Negative for activity change, appetite change, chills, diaphoresis, fatigue, fever and unexpected weight change.   HENT: Negative for sinus pressure, sinus pain, sore throat and trouble swallowing.    Respiratory: Negative for cough, shortness of breath and wheezing.    Cardiovascular: Negative for chest pain, palpitations and leg swelling.   Gastrointestinal: Positive for abdominal pain, diarrhea, positive nausea and vomiting. Positive for GERD.  Endocrine: Negative for cold intolerance, polydipsia, polyphagia and polyuria.   Genitourinary: Negative for difficulty urinating, flank pain and frequency.   Musculoskeletal: Negative for gait problem and joint swelling. Positive for back pain, neck pain and neck stiffness.   Skin: Negative for color change and wound. Negative for pallor and rash.   Allergic/Immunologic: Negative for environmental allergies and food allergies.   Neurological: Negative for light-headedness, numbness and headaches.   Psychiatric/Behavioral: Negative for sleep disturbance. Negative for confusion and suicidal ideas.     Objective:    /72   Pulse 64   Resp 16   Ht 1.702 m (5' 7\")   Wt 92.5 kg (204 lb)   BMI 31.95 kg/m²    BP Readings from Last 3 Encounters:   04/25/24 108/72   04/22/24 116/64   04/17/24 133/61     Physical Exam  Constitutional: Patient is oriented to person, place, and time. Patient appears well-developed and well-nourished. No distress.   HENT: Head: Normocephalic and atraumatic.   Eyes: Pupils are equal, round, and reactive to light. Conjunctivae are normal. Right eye exhibits no discharge. Left eye exhibits no discharge.   Cardiovascular: Normal rate, regular rhythm and normal heart sounds.   Pulmonary/Chest: Effort normal and breath sounds normal. No respiratory distress. Patient has no wheezes.   Abdominal: Soft. Bowel sounds are normal. Patient exhibits no distension. There is no tenderness.   Musculoskeletal:

## 2024-05-01 ENCOUNTER — OFFICE VISIT (OUTPATIENT)
Dept: CARDIOLOGY | Age: 49
End: 2024-05-01
Payer: COMMERCIAL

## 2024-05-01 VITALS
SYSTOLIC BLOOD PRESSURE: 107 MMHG | HEART RATE: 80 BPM | DIASTOLIC BLOOD PRESSURE: 64 MMHG | RESPIRATION RATE: 18 BRPM | OXYGEN SATURATION: 97 % | WEIGHT: 203 LBS | HEIGHT: 67 IN | BODY MASS INDEX: 31.86 KG/M2

## 2024-05-01 DIAGNOSIS — E78.2 MIXED HYPERLIPIDEMIA: ICD-10-CM

## 2024-05-01 DIAGNOSIS — I25.10 ASHD (ARTERIOSCLEROTIC HEART DISEASE): Primary | ICD-10-CM

## 2024-05-01 DIAGNOSIS — Z95.1 S/P CABG (CORONARY ARTERY BYPASS GRAFT): ICD-10-CM

## 2024-05-01 DIAGNOSIS — I10 PRIMARY HYPERTENSION: ICD-10-CM

## 2024-05-01 PROCEDURE — 3074F SYST BP LT 130 MM HG: CPT | Performed by: INTERNAL MEDICINE

## 2024-05-01 PROCEDURE — 3078F DIAST BP <80 MM HG: CPT | Performed by: INTERNAL MEDICINE

## 2024-05-01 PROCEDURE — 99214 OFFICE O/P EST MOD 30 MIN: CPT | Performed by: INTERNAL MEDICINE

## 2024-05-01 RX ORDER — LISINOPRIL 10 MG/1
10 TABLET ORAL DAILY
Qty: 90 TABLET | Refills: 3 | Status: SHIPPED | OUTPATIENT
Start: 2024-05-01

## 2024-05-01 NOTE — PROGRESS NOTES
systolic function appears mildly reduced with an estimated ejection fraction of 40-45%. The inferoseptal and inferior walls appear hypokinetic in their motion. The left ventricular cavity size is within normal limits and the left ventricular wall thickness is mildly increased.     Holter done on 12/22/2021 showed rhythm was sinus.Average AR interval 0.16, average QRS duration 0.09.Ventricular ectopic beats 6% of test.2. No diary; no symptoms reported.Frequent PVC's comprising 6% of total beats  including a few short ventricular runs, the longest being 7 beats at 83 bpm.     Heart cath done on 1/27/2022: Severe single vessel disease involving the right coronary arteries. 1 of 1 bypass grafts patent. Normal LVEDP.    CAM done on 1/27/2022: Predominant rhythm:  Normal sinus rhythm. PAC 0.02%. PVC 0.64%.    ER/ Hospitalized on 12/27/2022 with chest pain.     Echo done on 12/29/2022: Global left ventricular systolic function appears moderately reduced with an  estimated ejection fraction of 40%. The left ventricular cavity size is within normal limits and the left ventricular wall thickness is moderately increased. Thinning and hypokinesis of the inferior wall. The inferoseptal wall is abnormal in its motion which is not unusual status post open heart surgery. No significant valvular abnormalities. Mild diastolic dysfunction.    Stress test done on 12/29/2022: Abnormal myocardial perfusion study. There is a moderate perfusion defect of moderate to severe intensity in the inferior and inferoapical regions during stress and rest imaging, which is most consistent with an old myocardial infarction with mild jazmine-infarct ischemia. Overall, these results are most consistent with an intermediate risk scan.    CAM done on 12/29/2022: Predominant rhythm: Normal sinus rhythm. Atrial tachycardia (AT) 2 episodes, longest 4 beats at average 122 bpm up to 38 bpm, fastest 3 beats at average 137 bpm up to 142 bpm. PAC 0.01%. PVC 2.27%.

## 2024-05-06 RX ORDER — METOPROLOL SUCCINATE 50 MG/1
TABLET, EXTENDED RELEASE ORAL
Qty: 90 TABLET | Refills: 1 | Status: SHIPPED | OUTPATIENT
Start: 2024-05-06

## 2024-05-08 ENCOUNTER — HOSPITAL ENCOUNTER (OUTPATIENT)
Dept: NUCLEAR MEDICINE | Age: 49
Discharge: HOME | End: 2024-05-10
Attending: STUDENT IN AN ORGANIZED HEALTH CARE EDUCATION/TRAINING PROGRAM
Payer: COMMERCIAL

## 2024-05-08 VITALS — HEIGHT: 67 IN | WEIGHT: 203 LBS | BODY MASS INDEX: 31.86 KG/M2

## 2024-05-08 DIAGNOSIS — R10.9 ABDOMINAL PAIN, UNSPECIFIED ABDOMINAL LOCATION: ICD-10-CM

## 2024-05-08 DIAGNOSIS — R11.2 NAUSEA AND VOMITING, UNSPECIFIED VOMITING TYPE: ICD-10-CM

## 2024-05-08 PROCEDURE — A9537 TC99M MEBROFENIN: HCPCS | Performed by: STUDENT IN AN ORGANIZED HEALTH CARE EDUCATION/TRAINING PROGRAM

## 2024-05-08 PROCEDURE — 78227 HEPATOBIL SYST IMAGE W/DRUG: CPT

## 2024-05-08 PROCEDURE — 3430000000 HC RX DIAGNOSTIC RADIOPHARMACEUTICAL: Performed by: STUDENT IN AN ORGANIZED HEALTH CARE EDUCATION/TRAINING PROGRAM

## 2024-05-08 PROCEDURE — 6360000002 HC RX W HCPCS: Performed by: STUDENT IN AN ORGANIZED HEALTH CARE EDUCATION/TRAINING PROGRAM

## 2024-05-08 PROCEDURE — 2580000003 HC RX 258: Performed by: STUDENT IN AN ORGANIZED HEALTH CARE EDUCATION/TRAINING PROGRAM

## 2024-05-08 RX ADMIN — Medication 5 MILLICURIE: at 09:06

## 2024-05-08 RX ADMIN — SINCALIDE 1.84 MCG: 5 INJECTION, POWDER, LYOPHILIZED, FOR SOLUTION INTRAVENOUS at 09:06

## 2024-05-17 ENCOUNTER — OFFICE VISIT (OUTPATIENT)
Dept: PRIMARY CARE CLINIC | Age: 49
End: 2024-05-17

## 2024-05-17 VITALS
HEIGHT: 67 IN | SYSTOLIC BLOOD PRESSURE: 106 MMHG | HEART RATE: 74 BPM | BODY MASS INDEX: 31.86 KG/M2 | WEIGHT: 203 LBS | DIASTOLIC BLOOD PRESSURE: 66 MMHG | OXYGEN SATURATION: 96 %

## 2024-05-17 DIAGNOSIS — G89.4 CHRONIC PAIN SYNDROME: ICD-10-CM

## 2024-05-17 DIAGNOSIS — R10.11 RUQ ABDOMINAL PAIN: Primary | ICD-10-CM

## 2024-05-17 DIAGNOSIS — M25.551 RIGHT HIP PAIN: ICD-10-CM

## 2024-05-17 DIAGNOSIS — R11.2 NAUSEA AND VOMITING, UNSPECIFIED VOMITING TYPE: ICD-10-CM

## 2024-05-17 RX ORDER — HYDROCODONE BITARTRATE AND ACETAMINOPHEN 5; 325 MG/1; MG/1
1 TABLET ORAL EVERY 6 HOURS PRN
Qty: 28 TABLET | Refills: 0 | Status: SHIPPED | OUTPATIENT
Start: 2024-05-17 | End: 2024-05-24

## 2024-05-17 NOTE — PROGRESS NOTES
was generated using voice recognition Dragon dictation software. Although every effort was made to ensure the accuracy of this automated transcription, some errors in transcription may have occurred.    Electronically signed by Dr. Curtis Chavarria MD on 5/17/2024 at 4:33 PM    
Improved

## 2024-05-28 ENCOUNTER — TELEPHONE (OUTPATIENT)
Dept: PRIMARY CARE CLINIC | Age: 49
End: 2024-05-28

## 2024-05-28 DIAGNOSIS — G89.4 CHRONIC PAIN SYNDROME: ICD-10-CM

## 2024-05-28 DIAGNOSIS — M25.551 RIGHT HIP PAIN: ICD-10-CM

## 2024-05-28 RX ORDER — HYDROCODONE BITARTRATE AND ACETAMINOPHEN 5; 325 MG/1; MG/1
1 TABLET ORAL EVERY 6 HOURS PRN
Qty: 28 TABLET | Refills: 0 | Status: SHIPPED | OUTPATIENT
Start: 2024-05-28 | End: 2024-06-04

## 2024-05-28 NOTE — TELEPHONE ENCOUNTER
Patient calls in stating that his pain is persisting and was bedridden two days ago. Requesting a refill on pain med. He also states that he is not able to get in with Dr. Crowley until 06/10 and doesn't think he can wait that long. Please advise.

## 2024-05-28 NOTE — TELEPHONE ENCOUNTER
Spoke with Ashley at Dr. Crowley's office and she was able to offer Thursday 05/30, @ 1:30 in [place] with Dr. Crowley. Patient was offered this appointment and accepted it. Patient also notified of new Rx at Pharmacy.

## 2024-05-30 ENCOUNTER — ANESTHESIA EVENT (OUTPATIENT)
Dept: OPERATING ROOM | Age: 49
End: 2024-05-30
Payer: COMMERCIAL

## 2024-05-30 ENCOUNTER — TELEPHONE (OUTPATIENT)
Dept: SURGERY | Age: 49
End: 2024-05-30

## 2024-05-30 ENCOUNTER — TELEPHONE (OUTPATIENT)
Dept: PREADMISSION TESTING | Age: 49
End: 2024-05-30

## 2024-05-30 ENCOUNTER — OFFICE VISIT (OUTPATIENT)
Dept: SURGERY | Age: 49
End: 2024-05-30
Payer: COMMERCIAL

## 2024-05-30 VITALS
WEIGHT: 203 LBS | HEART RATE: 82 BPM | SYSTOLIC BLOOD PRESSURE: 130 MMHG | BODY MASS INDEX: 31.79 KG/M2 | DIASTOLIC BLOOD PRESSURE: 91 MMHG

## 2024-05-30 DIAGNOSIS — K80.20 CALCULUS OF GALLBLADDER WITHOUT CHOLECYSTITIS WITHOUT OBSTRUCTION: Primary | ICD-10-CM

## 2024-05-30 DIAGNOSIS — Z87.19 HISTORY OF PANCREATITIS: ICD-10-CM

## 2024-05-30 PROCEDURE — 3080F DIAST BP >= 90 MM HG: CPT | Performed by: SURGERY

## 2024-05-30 PROCEDURE — 99204 OFFICE O/P NEW MOD 45 MIN: CPT | Performed by: SURGERY

## 2024-05-30 PROCEDURE — 3075F SYST BP GE 130 - 139MM HG: CPT | Performed by: SURGERY

## 2024-05-30 ASSESSMENT — ENCOUNTER SYMPTOMS
ABDOMINAL PAIN: 1
NAUSEA: 1
COUGH: 0
SHORTNESS OF BREATH: 0
ABDOMINAL DISTENTION: 1
DIARRHEA: 1
HEMATOCHEZIA: 1
VOMITING: 1
VOICE CHANGE: 0
CHOKING: 0
TROUBLE SWALLOWING: 0
WHEEZING: 0
BACK PAIN: 0
SORE THROAT: 0
CHEST TIGHTNESS: 0
BLOOD IN STOOL: 1

## 2024-05-30 ASSESSMENT — CROHNS DISEASE ACTIVITY INDEX (CDAI): CDAI SCORE: 0

## 2024-05-30 NOTE — PROGRESS NOTES
Abdominal Pain  This is a new problem. The current episode started more than 1 month ago. The problem occurs daily (Every time he eats). The pain is located in the RUQ. The quality of the pain is sharp. The abdominal pain radiates to the back. Associated symptoms include anorexia, arthralgias (hips), diarrhea (loose stool several times a day after eating), frequency, hematochezia (occassional), nausea, vomiting and weight loss (4 - 5 lbs). Pertinent negatives include no dysuria or headaches. The pain is aggravated by eating (Also occurs when he wakes up in the morning). The pain is relieved by Nothing. He has tried proton pump inhibitors (eating bland diet) for the symptoms. The treatment provided mild relief. Prior diagnostic workup includes CT scan, upper endoscopy, GI consult and ultrasound. His past medical history is significant for gallstones (most recent ultrasound suggests sludge), GERD, pancreatitis and PUD. There is no history of abdominal surgery, Crohn's disease or ulcerative colitis.     Had 3 episodes of pancreatitis last year. Hospitalized with each.  No gall stones noted during those hospitalization.  Had MRCP in October which was normal      Past Medical History:   Diagnosis Date    CAD (coronary artery disease)     Carpal tunnel syndrome     Depressive disorder, not elsewhere classified     Diabetes mellitus (HCC)     Gastrointestinal hemorrhage with hematemesis 01/31/2023    Heart attack (HCC) 08/11/2018    STEMI    History of echocardiogram 01/08/2019    EF 40-45%. LV cavity sixe is mildly increased. Inferior and inferoseptal wall hypokenesis noted. Mild diastolic dysfunction.    History of pancreatitis 01/31/2023    Hyperlipidemia     Hypertension 2009    PTSD (post-traumatic stress disorder)     uexpected open heart surgery in 2018     Past Surgical History:   Procedure Laterality Date    CARDIAC CATHETERIZATION Left 01/07/2018    Right [facility]/    CARDIAC

## 2024-05-30 NOTE — PROGRESS NOTES
Patient instructed on the pre-operative, intra-operative, and post-operative process. Patient instructed on NPO status. Medication instructions and pre operative instruction sheet reviewed with the patient. Pt may take Imdur,Ranexa,Toprol, Norvasc,Protonix with a small sip of water AM of surgery. Pt was instructed by Dr. Crowley to hold ASA and Plavix.

## 2024-05-30 NOTE — TELEPHONE ENCOUNTER
Please review chart. Pt has extensive health history and scheduled for surgery with Dr. Crowley on 6/4/24. Thanks.

## 2024-05-30 NOTE — TELEPHONE ENCOUNTER
Franky WESLY Al     1975        male    [address] [facility] [address]         [id number]           Legal Guardian No  If yes, Name:       Skilled Facility No     If yes, Name:                                             Home Phone: [phone]         Cell Phone:    Telephone Information:   Mobile [phone]                                           Surgeon: Dr. Crowley Surgery Date: 6/4/24                    Time: TBD    Procedure:  Robotic/laparoscopic cholecystectomy  Duration:    Diagnosis: cholelithiasis   CPT Codes:75292    Important Medical History:  In Epic    First Assistant O  Special Inst/Equip/Implants: Regular    Nickel allergy  No  Latex Allergy: NO      Cardiac Device:  No  If yes, need most recent pacemaker interrogation from Cardiologist:  Type of pacemaker:    Anesthesia:    General                       Admission Type:  Same Day                        Admit Prior to Day of Surgery: No    Case Location:  Ambulatory            Preadmission Testing:  Phone Call             PAT Date and Time: NA    Need Preop Cardiac Clearance: YES*  Need Pre-op/Medical Clearance:NO    Does Patient have Cardiologist/physician? Name of Physician:    Dr. bond    Special Needs Communication:  Raul Lift NO    needed NO

## 2024-06-03 PROBLEM — E43 SEVERE MALNUTRITION (HCC): Status: RESOLVED | Noted: 2022-10-06 | Resolved: 2024-06-03

## 2024-06-03 ASSESSMENT — ENCOUNTER SYMPTOMS
CHEST TIGHTNESS: 0
HEMATOCHEZIA: 1
BLOOD IN STOOL: 1
BACK PAIN: 0
ABDOMINAL PAIN: 1
WHEEZING: 0
SHORTNESS OF BREATH: 0
NAUSEA: 1
DIARRHEA: 1
VOICE CHANGE: 0
SORE THROAT: 0
ABDOMINAL DISTENTION: 1
CHOKING: 0
TROUBLE SWALLOWING: 0
COUGH: 0
VOMITING: 1

## 2024-06-03 ASSESSMENT — CROHNS DISEASE ACTIVITY INDEX (CDAI): CDAI SCORE: 0

## 2024-06-03 NOTE — TELEPHONE ENCOUNTER
Writer spoke with Dr. Crowley regarding surgery. Was advised that surgery can be done robotically or laparoscopically. Surgery sheet addended to say both.

## 2024-06-04 ENCOUNTER — APPOINTMENT (OUTPATIENT)
Dept: CT IMAGING | Age: 49
End: 2024-06-04
Payer: COMMERCIAL

## 2024-06-04 ENCOUNTER — HOSPITAL ENCOUNTER (OUTPATIENT)
Age: 49
Setting detail: OBSERVATION
Discharge: HOME | End: 2024-06-05
Attending: STUDENT IN AN ORGANIZED HEALTH CARE EDUCATION/TRAINING PROGRAM | Admitting: STUDENT IN AN ORGANIZED HEALTH CARE EDUCATION/TRAINING PROGRAM
Payer: COMMERCIAL

## 2024-06-04 ENCOUNTER — HOSPITAL ENCOUNTER (OUTPATIENT)
Age: 49
Setting detail: OUTPATIENT SURGERY
Discharge: HOME | End: 2024-06-04
Attending: SURGERY | Admitting: SURGERY
Payer: COMMERCIAL

## 2024-06-04 ENCOUNTER — ANESTHESIA (OUTPATIENT)
Dept: OPERATING ROOM | Age: 49
End: 2024-06-04
Payer: COMMERCIAL

## 2024-06-04 VITALS
DIASTOLIC BLOOD PRESSURE: 68 MMHG | TEMPERATURE: 97.1 F | HEART RATE: 80 BPM | OXYGEN SATURATION: 95 % | WEIGHT: 200 LBS | HEIGHT: 67 IN | SYSTOLIC BLOOD PRESSURE: 116 MMHG | RESPIRATION RATE: 16 BRPM | BODY MASS INDEX: 31.39 KG/M2

## 2024-06-04 DIAGNOSIS — K80.20 CALCULUS OF GALLBLADDER WITHOUT CHOLECYSTITIS WITHOUT OBSTRUCTION: Primary | ICD-10-CM

## 2024-06-04 DIAGNOSIS — K80.80 BILIARY CALCULUS OF OTHER SITE WITHOUT OBSTRUCTION: ICD-10-CM

## 2024-06-04 DIAGNOSIS — G89.18 POST-OPERATIVE PAIN: Primary | ICD-10-CM

## 2024-06-04 LAB
ALBUMIN SERPL-MCNC: 4.5 G/DL (ref 3.5–5.2)
ALBUMIN/GLOB SERPL: 1.5 {RATIO} (ref 1–2.5)
ALP SERPL-CCNC: 72 U/L (ref 40–129)
ALT SERPL-CCNC: 49 U/L (ref 5–41)
ANION GAP SERPL CALCULATED.3IONS-SCNC: 14 MMOL/L (ref 9–17)
AST SERPL-CCNC: 43 U/L
BASOPHILS # BLD: <0.03 K/UL (ref 0–0.2)
BASOPHILS NFR BLD: 0 % (ref 0–2)
BILIRUB SERPL-MCNC: 0.5 MG/DL (ref 0.3–1.2)
BUN SERPL-MCNC: 25 MG/DL (ref 6–20)
BUN/CREAT SERPL: 23 (ref 9–20)
CALCIUM SERPL-MCNC: 9.1 MG/DL (ref 8.6–10.4)
CHLORIDE SERPL-SCNC: 99 MMOL/L (ref 98–107)
CO2 SERPL-SCNC: 22 MMOL/L (ref 20–31)
CREAT SERPL-MCNC: 1.1 MG/DL (ref 0.7–1.2)
EOSINOPHIL # BLD: <0.03 K/UL (ref 0–0.44)
EOSINOPHILS RELATIVE PERCENT: 0 % (ref 1–4)
ERYTHROCYTE [DISTWIDTH] IN BLOOD BY AUTOMATED COUNT: 12.6 % (ref 11.8–14.4)
GFR, ESTIMATED: 83 ML/MIN/1.73M2
GLUCOSE BLD-MCNC: 223 MG/DL (ref 74–100)
GLUCOSE SERPL-MCNC: 348 MG/DL (ref 70–99)
HCT VFR BLD AUTO: 38.1 % (ref 40.7–50.3)
HGB BLD-MCNC: 13.4 G/DL (ref 13–17)
IMM GRANULOCYTES # BLD AUTO: <0.03 K/UL (ref 0–0.3)
IMM GRANULOCYTES NFR BLD: 0 %
LYMPHOCYTES NFR BLD: 0.7 K/UL (ref 1.1–3.7)
LYMPHOCYTES RELATIVE PERCENT: 9 % (ref 24–43)
MCH RBC QN AUTO: 30.5 PG (ref 25.2–33.5)
MCHC RBC AUTO-ENTMCNC: 35.2 G/DL (ref 28.4–34.8)
MCV RBC AUTO: 86.8 FL (ref 82.6–102.9)
MONOCYTES NFR BLD: 0.16 K/UL (ref 0.1–1.2)
MONOCYTES NFR BLD: 2 % (ref 3–12)
NEUTROPHILS NFR BLD: 89 % (ref 36–65)
NEUTS SEG NFR BLD: 7.31 K/UL (ref 1.5–8.1)
NRBC BLD-RTO: 0 PER 100 WBC
PLATELET # BLD AUTO: 352 K/UL (ref 138–453)
PMV BLD AUTO: 10.4 FL (ref 8.1–13.5)
POTASSIUM SERPL-SCNC: 4.1 MMOL/L (ref 3.7–5.3)
PROT SERPL-MCNC: 7.6 G/DL (ref 6.4–8.3)
RBC # BLD AUTO: 4.39 M/UL (ref 4.21–5.77)
SODIUM SERPL-SCNC: 135 MMOL/L (ref 135–144)
WBC OTHER # BLD: 8.2 K/UL (ref 3.5–11.3)

## 2024-06-04 PROCEDURE — G0378 HOSPITAL OBSERVATION PER HR: HCPCS

## 2024-06-04 PROCEDURE — 74177 CT ABD & PELVIS W/CONTRAST: CPT

## 2024-06-04 PROCEDURE — 6370000000 HC RX 637 (ALT 250 FOR IP)

## 2024-06-04 PROCEDURE — A4216 STERILE WATER/SALINE, 10 ML: HCPCS | Performed by: NURSE ANESTHETIST, CERTIFIED REGISTERED

## 2024-06-04 PROCEDURE — 64488 TAP BLOCK BI INJECTION: CPT

## 2024-06-04 PROCEDURE — 82947 ASSAY GLUCOSE BLOOD QUANT: CPT

## 2024-06-04 PROCEDURE — 2580000003 HC RX 258

## 2024-06-04 PROCEDURE — 6360000002 HC RX W HCPCS: Performed by: NURSE ANESTHETIST, CERTIFIED REGISTERED

## 2024-06-04 PROCEDURE — 2709999900 HC NON-CHARGEABLE SUPPLY: Performed by: SURGERY

## 2024-06-04 PROCEDURE — 96375 TX/PRO/DX INJ NEW DRUG ADDON: CPT

## 2024-06-04 PROCEDURE — 6360000002 HC RX W HCPCS: Performed by: SURGERY

## 2024-06-04 PROCEDURE — 7100000000 HC PACU RECOVERY - FIRST 15 MIN: Performed by: SURGERY

## 2024-06-04 PROCEDURE — 2500000003 HC RX 250 WO HCPCS

## 2024-06-04 PROCEDURE — 7100000010 HC PHASE II RECOVERY - FIRST 15 MIN: Performed by: SURGERY

## 2024-06-04 PROCEDURE — 3700000000 HC ANESTHESIA ATTENDED CARE: Performed by: SURGERY

## 2024-06-04 PROCEDURE — 2580000003 HC RX 258: Performed by: NURSE ANESTHETIST, CERTIFIED REGISTERED

## 2024-06-04 PROCEDURE — 2500000003 HC RX 250 WO HCPCS: Performed by: SURGERY

## 2024-06-04 PROCEDURE — 3600000019 HC SURGERY ROBOT ADDTL 15MIN: Performed by: SURGERY

## 2024-06-04 PROCEDURE — 3600000009 HC SURGERY ROBOT BASE: Performed by: SURGERY

## 2024-06-04 PROCEDURE — 2720000010 HC SURG SUPPLY STERILE: Performed by: SURGERY

## 2024-06-04 PROCEDURE — 3700000001 HC ADD 15 MINUTES (ANESTHESIA): Performed by: SURGERY

## 2024-06-04 PROCEDURE — C1889 IMPLANT/INSERT DEVICE, NOC: HCPCS | Performed by: SURGERY

## 2024-06-04 PROCEDURE — 6360000002 HC RX W HCPCS: Performed by: STUDENT IN AN ORGANIZED HEALTH CARE EDUCATION/TRAINING PROGRAM

## 2024-06-04 PROCEDURE — 99285 EMERGENCY DEPT VISIT HI MDM: CPT

## 2024-06-04 PROCEDURE — 7100000011 HC PHASE II RECOVERY - ADDTL 15 MIN: Performed by: SURGERY

## 2024-06-04 PROCEDURE — 2580000003 HC RX 258: Performed by: STUDENT IN AN ORGANIZED HEALTH CARE EDUCATION/TRAINING PROGRAM

## 2024-06-04 PROCEDURE — 96374 THER/PROPH/DIAG INJ IV PUSH: CPT

## 2024-06-04 PROCEDURE — 80053 COMPREHEN METABOLIC PANEL: CPT

## 2024-06-04 PROCEDURE — 7100000001 HC PACU RECOVERY - ADDTL 15 MIN: Performed by: SURGERY

## 2024-06-04 PROCEDURE — 6360000004 HC RX CONTRAST MEDICATION: Performed by: STUDENT IN AN ORGANIZED HEALTH CARE EDUCATION/TRAINING PROGRAM

## 2024-06-04 PROCEDURE — 6360000002 HC RX W HCPCS

## 2024-06-04 PROCEDURE — 85025 COMPLETE CBC W/AUTO DIFF WBC: CPT

## 2024-06-04 PROCEDURE — 6370000000 HC RX 637 (ALT 250 FOR IP): Performed by: NURSE ANESTHETIST, CERTIFIED REGISTERED

## 2024-06-04 PROCEDURE — 88304 TISSUE EXAM BY PATHOLOGIST: CPT

## 2024-06-04 PROCEDURE — S2900 ROBOTIC SURGICAL SYSTEM: HCPCS | Performed by: SURGERY

## 2024-06-04 DEVICE — CLIP INT L POLYMER LOK LIG HEM O LOK (6EA/PK): Type: IMPLANTABLE DEVICE | Site: ABDOMEN | Status: FUNCTIONAL

## 2024-06-04 RX ORDER — HYDROCODONE BITARTRATE AND ACETAMINOPHEN 5; 325 MG/1; MG/1
1 TABLET ORAL EVERY 6 HOURS PRN
Status: DISCONTINUED | OUTPATIENT
Start: 2024-06-04 | End: 2024-06-04 | Stop reason: HOSPADM

## 2024-06-04 RX ORDER — ROCURONIUM BROMIDE 10 MG/ML
INJECTION, SOLUTION INTRAVENOUS PRN
Status: DISCONTINUED | OUTPATIENT
Start: 2024-06-04 | End: 2024-06-04 | Stop reason: SDUPTHER

## 2024-06-04 RX ORDER — SODIUM CHLORIDE 0.9 % (FLUSH) 0.9 %
5-40 SYRINGE (ML) INJECTION PRN
Status: DISCONTINUED | OUTPATIENT
Start: 2024-06-04 | End: 2024-06-04 | Stop reason: HOSPADM

## 2024-06-04 RX ORDER — POTASSIUM CHLORIDE 20 MEQ/1
40 TABLET, EXTENDED RELEASE ORAL PRN
Status: DISCONTINUED | OUTPATIENT
Start: 2024-06-04 | End: 2024-06-05 | Stop reason: HOSPADM

## 2024-06-04 RX ORDER — ONDANSETRON 4 MG/1
4 TABLET, ORALLY DISINTEGRATING ORAL EVERY 8 HOURS PRN
Status: DISCONTINUED | OUTPATIENT
Start: 2024-06-04 | End: 2024-06-05 | Stop reason: HOSPADM

## 2024-06-04 RX ORDER — PROPOFOL 10 MG/ML
INJECTION, EMULSION INTRAVENOUS PRN
Status: DISCONTINUED | OUTPATIENT
Start: 2024-06-04 | End: 2024-06-04 | Stop reason: SDUPTHER

## 2024-06-04 RX ORDER — ALBUTEROL SULFATE 90 UG/1
2 AEROSOL, METERED RESPIRATORY (INHALATION) 4 TIMES DAILY PRN
Status: DISCONTINUED | OUTPATIENT
Start: 2024-06-04 | End: 2024-06-05 | Stop reason: HOSPADM

## 2024-06-04 RX ORDER — FENTANYL CITRATE 50 UG/ML
50 INJECTION, SOLUTION INTRAMUSCULAR; INTRAVENOUS ONCE
Status: COMPLETED | OUTPATIENT
Start: 2024-06-04 | End: 2024-06-04

## 2024-06-04 RX ORDER — POLYETHYLENE GLYCOL 3350 17 G/17G
17 POWDER, FOR SOLUTION ORAL DAILY PRN
Status: DISCONTINUED | OUTPATIENT
Start: 2024-06-04 | End: 2024-06-05 | Stop reason: HOSPADM

## 2024-06-04 RX ORDER — FENTANYL CITRATE 50 UG/ML
50 INJECTION, SOLUTION INTRAMUSCULAR; INTRAVENOUS EVERY 5 MIN PRN
Status: COMPLETED | OUTPATIENT
Start: 2024-06-04 | End: 2024-06-04

## 2024-06-04 RX ORDER — ONDANSETRON 2 MG/ML
INJECTION INTRAMUSCULAR; INTRAVENOUS PRN
Status: DISCONTINUED | OUTPATIENT
Start: 2024-06-04 | End: 2024-06-04 | Stop reason: SDUPTHER

## 2024-06-04 RX ORDER — MORPHINE SULFATE 4 MG/ML
4 INJECTION, SOLUTION INTRAMUSCULAR; INTRAVENOUS ONCE
Status: COMPLETED | OUTPATIENT
Start: 2024-06-04 | End: 2024-06-04

## 2024-06-04 RX ORDER — RANOLAZINE 500 MG/1
500 TABLET, EXTENDED RELEASE ORAL 2 TIMES DAILY
Status: DISCONTINUED | OUTPATIENT
Start: 2024-06-04 | End: 2024-06-05 | Stop reason: HOSPADM

## 2024-06-04 RX ORDER — ACETAMINOPHEN 650 MG/1
650 SUPPOSITORY RECTAL EVERY 6 HOURS PRN
Status: DISCONTINUED | OUTPATIENT
Start: 2024-06-04 | End: 2024-06-05 | Stop reason: HOSPADM

## 2024-06-04 RX ORDER — SODIUM CHLORIDE 0.9 % (FLUSH) 0.9 %
5-40 SYRINGE (ML) INJECTION PRN
Status: DISCONTINUED | OUTPATIENT
Start: 2024-06-04 | End: 2024-06-05 | Stop reason: HOSPADM

## 2024-06-04 RX ORDER — HYDROCODONE BITARTRATE AND ACETAMINOPHEN 5; 325 MG/1; MG/1
1-2 TABLET ORAL EVERY 6 HOURS PRN
Qty: 30 TABLET | Refills: 0 | Status: ON HOLD | OUTPATIENT
Start: 2024-06-04 | End: 2024-06-05 | Stop reason: HOSPADM

## 2024-06-04 RX ORDER — SODIUM CHLORIDE 9 MG/ML
INJECTION, SOLUTION INTRAVENOUS PRN
Status: DISCONTINUED | OUTPATIENT
Start: 2024-06-04 | End: 2024-06-04 | Stop reason: HOSPADM

## 2024-06-04 RX ORDER — INDOCYANINE GREEN AND WATER 25 MG
5 KIT INJECTION ONCE
Status: COMPLETED | OUTPATIENT
Start: 2024-06-04 | End: 2024-06-04

## 2024-06-04 RX ORDER — CEFAZOLIN SODIUM IN 0.9 % NACL 2 G/100 ML
2000 PLASTIC BAG, INJECTION (ML) INTRAVENOUS
Status: COMPLETED | OUTPATIENT
Start: 2024-06-04 | End: 2024-06-04

## 2024-06-04 RX ORDER — MORPHINE SULFATE 4 MG/ML
4 INJECTION, SOLUTION INTRAMUSCULAR; INTRAVENOUS
Status: DISCONTINUED | OUTPATIENT
Start: 2024-06-04 | End: 2024-06-04

## 2024-06-04 RX ORDER — FENOFIBRATE 160 MG/1
160 TABLET ORAL DAILY
Status: DISCONTINUED | OUTPATIENT
Start: 2024-06-05 | End: 2024-06-04

## 2024-06-04 RX ORDER — SODIUM CHLORIDE 9 MG/ML
INJECTION, SOLUTION INTRAVENOUS PRN
Status: DISCONTINUED | OUTPATIENT
Start: 2024-06-04 | End: 2024-06-05 | Stop reason: HOSPADM

## 2024-06-04 RX ORDER — DIMENHYDRINATE 50 MG
50 TABLET ORAL ONCE
Status: COMPLETED | OUTPATIENT
Start: 2024-06-04 | End: 2024-06-04

## 2024-06-04 RX ORDER — DEXAMETHASONE SODIUM PHOSPHATE 4 MG/ML
INJECTION, SOLUTION INTRA-ARTICULAR; INTRALESIONAL; INTRAMUSCULAR; INTRAVENOUS; SOFT TISSUE PRN
Status: DISCONTINUED | OUTPATIENT
Start: 2024-06-04 | End: 2024-06-04 | Stop reason: SDUPTHER

## 2024-06-04 RX ORDER — AMLODIPINE BESYLATE 5 MG/1
5 TABLET ORAL DAILY
Status: DISCONTINUED | OUTPATIENT
Start: 2024-06-05 | End: 2024-06-05 | Stop reason: HOSPADM

## 2024-06-04 RX ORDER — SODIUM CHLORIDE, SODIUM LACTATE, POTASSIUM CHLORIDE, CALCIUM CHLORIDE 600; 310; 30; 20 MG/100ML; MG/100ML; MG/100ML; MG/100ML
INJECTION, SOLUTION INTRAVENOUS CONTINUOUS
Status: DISCONTINUED | OUTPATIENT
Start: 2024-06-04 | End: 2024-06-04 | Stop reason: HOSPADM

## 2024-06-04 RX ORDER — OXYCODONE HYDROCHLORIDE 5 MG/1
10 TABLET ORAL EVERY 4 HOURS PRN
Status: DISCONTINUED | OUTPATIENT
Start: 2024-06-04 | End: 2024-06-05 | Stop reason: HOSPADM

## 2024-06-04 RX ORDER — METOPROLOL SUCCINATE 50 MG/1
50 TABLET, EXTENDED RELEASE ORAL NIGHTLY
Status: DISCONTINUED | OUTPATIENT
Start: 2024-06-05 | End: 2024-06-05 | Stop reason: HOSPADM

## 2024-06-04 RX ORDER — GABAPENTIN 300 MG/1
300 CAPSULE ORAL ONCE
Status: COMPLETED | OUTPATIENT
Start: 2024-06-04 | End: 2024-06-04

## 2024-06-04 RX ORDER — 0.9 % SODIUM CHLORIDE 0.9 %
1000 INTRAVENOUS SOLUTION INTRAVENOUS ONCE
Status: COMPLETED | OUTPATIENT
Start: 2024-06-04 | End: 2024-06-04

## 2024-06-04 RX ORDER — OXYCODONE HYDROCHLORIDE 5 MG/1
5 TABLET ORAL EVERY 4 HOURS PRN
Status: DISCONTINUED | OUTPATIENT
Start: 2024-06-04 | End: 2024-06-05 | Stop reason: HOSPADM

## 2024-06-04 RX ORDER — SUCRALFATE 1 G/1
1 TABLET ORAL
Status: DISCONTINUED | OUTPATIENT
Start: 2024-06-05 | End: 2024-06-05 | Stop reason: HOSPADM

## 2024-06-04 RX ORDER — SODIUM CHLORIDE 0.9 % (FLUSH) 0.9 %
5-40 SYRINGE (ML) INJECTION EVERY 12 HOURS SCHEDULED
Status: DISCONTINUED | OUTPATIENT
Start: 2024-06-04 | End: 2024-06-05 | Stop reason: HOSPADM

## 2024-06-04 RX ORDER — MAGNESIUM SULFATE IN WATER 40 MG/ML
2000 INJECTION, SOLUTION INTRAVENOUS PRN
Status: DISCONTINUED | OUTPATIENT
Start: 2024-06-04 | End: 2024-06-05 | Stop reason: HOSPADM

## 2024-06-04 RX ORDER — HYDROMORPHONE HYDROCHLORIDE 1 MG/ML
0.5 INJECTION, SOLUTION INTRAMUSCULAR; INTRAVENOUS; SUBCUTANEOUS
Status: DISCONTINUED | OUTPATIENT
Start: 2024-06-04 | End: 2024-06-05

## 2024-06-04 RX ORDER — ACETAMINOPHEN 325 MG/1
650 TABLET ORAL ONCE
Status: COMPLETED | OUTPATIENT
Start: 2024-06-04 | End: 2024-06-04

## 2024-06-04 RX ORDER — LISINOPRIL 10 MG/1
10 TABLET ORAL DAILY
Status: DISCONTINUED | OUTPATIENT
Start: 2024-06-05 | End: 2024-06-05 | Stop reason: HOSPADM

## 2024-06-04 RX ORDER — HYDROMORPHONE HYDROCHLORIDE 1 MG/ML
0.25 INJECTION, SOLUTION INTRAMUSCULAR; INTRAVENOUS; SUBCUTANEOUS
Status: DISCONTINUED | OUTPATIENT
Start: 2024-06-04 | End: 2024-06-05

## 2024-06-04 RX ORDER — FENTANYL CITRATE 50 UG/ML
INJECTION, SOLUTION INTRAMUSCULAR; INTRAVENOUS PRN
Status: DISCONTINUED | OUTPATIENT
Start: 2024-06-04 | End: 2024-06-04 | Stop reason: SDUPTHER

## 2024-06-04 RX ORDER — LIDOCAINE HYDROCHLORIDE 20 MG/ML
INJECTION, SOLUTION EPIDURAL; INFILTRATION; INTRACAUDAL; PERINEURAL PRN
Status: DISCONTINUED | OUTPATIENT
Start: 2024-06-04 | End: 2024-06-04 | Stop reason: SDUPTHER

## 2024-06-04 RX ORDER — METOPROLOL SUCCINATE 50 MG/1
50 TABLET, EXTENDED RELEASE ORAL DAILY
Status: DISCONTINUED | OUTPATIENT
Start: 2024-06-05 | End: 2024-06-04

## 2024-06-04 RX ORDER — SODIUM CHLORIDE, SODIUM LACTATE, POTASSIUM CHLORIDE, CALCIUM CHLORIDE 600; 310; 30; 20 MG/100ML; MG/100ML; MG/100ML; MG/100ML
INJECTION, SOLUTION INTRAVENOUS CONTINUOUS
Status: DISCONTINUED | OUTPATIENT
Start: 2024-06-04 | End: 2024-06-05

## 2024-06-04 RX ORDER — OXYCODONE HYDROCHLORIDE 5 MG/1
5 TABLET ORAL ONCE
Status: COMPLETED | OUTPATIENT
Start: 2024-06-04 | End: 2024-06-04

## 2024-06-04 RX ORDER — PANTOPRAZOLE SODIUM 40 MG/1
40 TABLET, DELAYED RELEASE ORAL
Status: DISCONTINUED | OUTPATIENT
Start: 2024-06-05 | End: 2024-06-05 | Stop reason: HOSPADM

## 2024-06-04 RX ORDER — ROPIVACAINE HYDROCHLORIDE 5 MG/ML
INJECTION, SOLUTION EPIDURAL; INFILTRATION; PERINEURAL PRN
Status: DISCONTINUED | OUTPATIENT
Start: 2024-06-04 | End: 2024-06-04 | Stop reason: SDUPTHER

## 2024-06-04 RX ORDER — POTASSIUM CHLORIDE 7.45 MG/ML
10 INJECTION INTRAVENOUS PRN
Status: DISCONTINUED | OUTPATIENT
Start: 2024-06-04 | End: 2024-06-05 | Stop reason: HOSPADM

## 2024-06-04 RX ORDER — MORPHINE SULFATE 2 MG/ML
2 INJECTION, SOLUTION INTRAMUSCULAR; INTRAVENOUS
Status: DISCONTINUED | OUTPATIENT
Start: 2024-06-04 | End: 2024-06-04

## 2024-06-04 RX ORDER — ATORVASTATIN CALCIUM 40 MG/1
80 TABLET, FILM COATED ORAL NIGHTLY
Status: DISCONTINUED | OUTPATIENT
Start: 2024-06-04 | End: 2024-06-05 | Stop reason: HOSPADM

## 2024-06-04 RX ORDER — ACETAMINOPHEN 325 MG/1
650 TABLET ORAL EVERY 6 HOURS PRN
Status: DISCONTINUED | OUTPATIENT
Start: 2024-06-04 | End: 2024-06-05 | Stop reason: HOSPADM

## 2024-06-04 RX ORDER — SODIUM CHLORIDE 9 MG/ML
INJECTION, SOLUTION INTRAMUSCULAR; INTRAVENOUS; SUBCUTANEOUS PRN
Status: DISCONTINUED | OUTPATIENT
Start: 2024-06-04 | End: 2024-06-04 | Stop reason: SDUPTHER

## 2024-06-04 RX ORDER — KETOROLAC TROMETHAMINE 30 MG/ML
INJECTION, SOLUTION INTRAMUSCULAR; INTRAVENOUS PRN
Status: DISCONTINUED | OUTPATIENT
Start: 2024-06-04 | End: 2024-06-04 | Stop reason: SDUPTHER

## 2024-06-04 RX ORDER — MIDAZOLAM HYDROCHLORIDE 1 MG/ML
INJECTION INTRAMUSCULAR; INTRAVENOUS PRN
Status: DISCONTINUED | OUTPATIENT
Start: 2024-06-04 | End: 2024-06-04 | Stop reason: SDUPTHER

## 2024-06-04 RX ORDER — ISOSORBIDE MONONITRATE 30 MG/1
30 TABLET, EXTENDED RELEASE ORAL DAILY
Status: DISCONTINUED | OUTPATIENT
Start: 2024-06-05 | End: 2024-06-05 | Stop reason: HOSPADM

## 2024-06-04 RX ORDER — FENOFIBRATE 160 MG/1
160 TABLET ORAL NIGHTLY
Status: DISCONTINUED | OUTPATIENT
Start: 2024-06-05 | End: 2024-06-05 | Stop reason: HOSPADM

## 2024-06-04 RX ORDER — DEXAMETHASONE SODIUM PHOSPHATE 10 MG/ML
INJECTION, SOLUTION INTRAMUSCULAR; INTRAVENOUS PRN
Status: DISCONTINUED | OUTPATIENT
Start: 2024-06-04 | End: 2024-06-04 | Stop reason: SDUPTHER

## 2024-06-04 RX ORDER — ONDANSETRON 2 MG/ML
4 INJECTION INTRAMUSCULAR; INTRAVENOUS EVERY 6 HOURS PRN
Status: DISCONTINUED | OUTPATIENT
Start: 2024-06-04 | End: 2024-06-05 | Stop reason: HOSPADM

## 2024-06-04 RX ORDER — CLOPIDOGREL BISULFATE 75 MG/1
75 TABLET ORAL DAILY
Status: DISCONTINUED | OUTPATIENT
Start: 2024-06-05 | End: 2024-06-05 | Stop reason: HOSPADM

## 2024-06-04 RX ORDER — SODIUM CHLORIDE 0.9 % (FLUSH) 0.9 %
5-40 SYRINGE (ML) INJECTION EVERY 12 HOURS SCHEDULED
Status: DISCONTINUED | OUTPATIENT
Start: 2024-06-04 | End: 2024-06-04 | Stop reason: HOSPADM

## 2024-06-04 RX ADMIN — ROCURONIUM BROMIDE 10 MG: 50 INJECTION INTRAVENOUS at 08:48

## 2024-06-04 RX ADMIN — FENTANYL CITRATE 50 MCG: 50 INJECTION INTRAMUSCULAR; INTRAVENOUS at 09:43

## 2024-06-04 RX ADMIN — FENTANYL CITRATE 25 MCG: 50 INJECTION INTRAMUSCULAR; INTRAVENOUS at 09:20

## 2024-06-04 RX ADMIN — INDOCYANINE GREEN AND WATER 5 MG: KIT at 07:50

## 2024-06-04 RX ADMIN — SODIUM CHLORIDE, PRESERVATIVE FREE 10 ML: 5 INJECTION INTRAVENOUS at 22:42

## 2024-06-04 RX ADMIN — ROPIVACAINE HYDROCHLORIDE 50 ML: 5 INJECTION EPIDURAL; INFILTRATION; PERINEURAL at 08:12

## 2024-06-04 RX ADMIN — HYDROCODONE BITARTRATE AND ACETAMINOPHEN 1 TABLET: 5; 325 TABLET ORAL at 10:16

## 2024-06-04 RX ADMIN — METOPROLOL SUCCINATE 50 MG: 50 TABLET, FILM COATED, EXTENDED RELEASE ORAL at 23:41

## 2024-06-04 RX ADMIN — HYDROMORPHONE HYDROCHLORIDE 0.5 MG: 1 INJECTION, SOLUTION INTRAMUSCULAR; INTRAVENOUS; SUBCUTANEOUS at 23:09

## 2024-06-04 RX ADMIN — SODIUM CHLORIDE, POTASSIUM CHLORIDE, SODIUM LACTATE AND CALCIUM CHLORIDE: 600; 310; 30; 20 INJECTION, SOLUTION INTRAVENOUS at 06:53

## 2024-06-04 RX ADMIN — IOPAMIDOL 75 ML: 755 INJECTION, SOLUTION INTRAVENOUS at 20:28

## 2024-06-04 RX ADMIN — ROCURONIUM BROMIDE 10 MG: 50 INJECTION INTRAVENOUS at 08:51

## 2024-06-04 RX ADMIN — DIMENHYDRINATE 50 MG: 50 TABLET ORAL at 06:39

## 2024-06-04 RX ADMIN — FENOFIBRATE 160 MG: 160 TABLET ORAL at 23:41

## 2024-06-04 RX ADMIN — ROCURONIUM BROMIDE 50 MG: 50 INJECTION INTRAVENOUS at 08:07

## 2024-06-04 RX ADMIN — ONDANSETRON 4 MG: 2 INJECTION INTRAMUSCULAR; INTRAVENOUS at 09:17

## 2024-06-04 RX ADMIN — SUGAMMADEX 25 MG: 100 INJECTION, SOLUTION INTRAVENOUS at 09:13

## 2024-06-04 RX ADMIN — FENTANYL CITRATE 50 MCG: 50 INJECTION INTRAMUSCULAR; INTRAVENOUS at 08:06

## 2024-06-04 RX ADMIN — SUGAMMADEX 25 MG: 100 INJECTION, SOLUTION INTRAVENOUS at 09:10

## 2024-06-04 RX ADMIN — OXYCODONE HYDROCHLORIDE 5 MG: 5 TABLET ORAL at 10:40

## 2024-06-04 RX ADMIN — DEXAMETHASONE SODIUM PHOSPHATE 4 MG: 4 INJECTION INTRA-ARTICULAR; INTRALESIONAL; INTRAMUSCULAR; INTRAVENOUS; SOFT TISSUE at 08:06

## 2024-06-04 RX ADMIN — SODIUM CHLORIDE, POTASSIUM CHLORIDE, SODIUM LACTATE AND CALCIUM CHLORIDE: 600; 310; 30; 20 INJECTION, SOLUTION INTRAVENOUS at 22:39

## 2024-06-04 RX ADMIN — FENTANYL CITRATE 50 MCG: 50 INJECTION INTRAMUSCULAR; INTRAVENOUS at 21:31

## 2024-06-04 RX ADMIN — ATORVASTATIN CALCIUM 80 MG: 40 TABLET, FILM COATED ORAL at 23:41

## 2024-06-04 RX ADMIN — FENTANYL CITRATE 50 MCG: 50 INJECTION INTRAMUSCULAR; INTRAVENOUS at 09:37

## 2024-06-04 RX ADMIN — MORPHINE SULFATE 4 MG: 4 INJECTION, SOLUTION INTRAMUSCULAR; INTRAVENOUS at 20:02

## 2024-06-04 RX ADMIN — SUGAMMADEX 50 MG: 100 INJECTION, SOLUTION INTRAVENOUS at 09:15

## 2024-06-04 RX ADMIN — GABAPENTIN 300 MG: 300 CAPSULE ORAL at 06:39

## 2024-06-04 RX ADMIN — PROPOFOL 180 MG: 10 INJECTION, EMULSION INTRAVENOUS at 08:06

## 2024-06-04 RX ADMIN — LIDOCAINE HYDROCHLORIDE 100 MG: 20 INJECTION, SOLUTION EPIDURAL; INFILTRATION; INTRACAUDAL; PERINEURAL at 08:06

## 2024-06-04 RX ADMIN — SUGAMMADEX 100 MG: 100 INJECTION, SOLUTION INTRAVENOUS at 09:17

## 2024-06-04 RX ADMIN — DEXAMETHASONE SODIUM PHOSPHATE 10 MG: 10 INJECTION INTRAMUSCULAR; INTRAVENOUS at 08:12

## 2024-06-04 RX ADMIN — SODIUM CHLORIDE 40 ML: 9 INJECTION, SOLUTION INTRAMUSCULAR; INTRAVENOUS; SUBCUTANEOUS at 08:12

## 2024-06-04 RX ADMIN — RANOLAZINE 500 MG: 500 TABLET, FILM COATED, EXTENDED RELEASE ORAL at 23:41

## 2024-06-04 RX ADMIN — SODIUM CHLORIDE 1000 ML: 9 INJECTION, SOLUTION INTRAVENOUS at 20:04

## 2024-06-04 RX ADMIN — Medication 2000 MG: at 07:56

## 2024-06-04 RX ADMIN — KETOROLAC TROMETHAMINE 15 MG: 30 INJECTION, SOLUTION INTRAMUSCULAR at 09:06

## 2024-06-04 RX ADMIN — FENTANYL CITRATE 25 MCG: 50 INJECTION INTRAMUSCULAR; INTRAVENOUS at 09:17

## 2024-06-04 RX ADMIN — ACETAMINOPHEN 650 MG: 325 TABLET ORAL at 06:38

## 2024-06-04 RX ADMIN — MIDAZOLAM 2 MG: 1 INJECTION INTRAMUSCULAR; INTRAVENOUS at 08:02

## 2024-06-04 ASSESSMENT — PAIN DESCRIPTION - LOCATION
LOCATION: ABDOMEN

## 2024-06-04 ASSESSMENT — PAIN DESCRIPTION - DESCRIPTORS
DESCRIPTORS: STABBING;BURNING
DESCRIPTORS: SORE
DESCRIPTORS: SORE
DESCRIPTORS: ACHING
DESCRIPTORS: STABBING
DESCRIPTORS: SORE;TIGHTNESS;PRESSURE
DESCRIPTORS: SORE

## 2024-06-04 ASSESSMENT — LIFESTYLE VARIABLES
HOW MANY STANDARD DRINKS CONTAINING ALCOHOL DO YOU HAVE ON A TYPICAL DAY: PATIENT DOES NOT DRINK
SMOKING_STATUS: 0
HOW OFTEN DO YOU HAVE A DRINK CONTAINING ALCOHOL: NEVER

## 2024-06-04 ASSESSMENT — PAIN DESCRIPTION - ORIENTATION
ORIENTATION: MID

## 2024-06-04 ASSESSMENT — PAIN SCALES - GENERAL
PAINLEVEL_OUTOF10: 9
PAINLEVEL_OUTOF10: 7
PAINLEVEL_OUTOF10: 5
PAINLEVEL_OUTOF10: 9
PAINLEVEL_OUTOF10: 9
PAINLEVEL_OUTOF10: 4
PAINLEVEL_OUTOF10: 9
PAINLEVEL_OUTOF10: 7
PAINLEVEL_OUTOF10: 9
PAINLEVEL_OUTOF10: 7
PAINLEVEL_OUTOF10: 8

## 2024-06-04 ASSESSMENT — PAIN - FUNCTIONAL ASSESSMENT
PAIN_FUNCTIONAL_ASSESSMENT: FACE, LEGS, ACTIVITY, CRY, AND CONSOLABILITY (FLACC)
PAIN_FUNCTIONAL_ASSESSMENT: 0-10
PAIN_FUNCTIONAL_ASSESSMENT: FACE, LEGS, ACTIVITY, CRY, AND CONSOLABILITY (FLACC)
PAIN_FUNCTIONAL_ASSESSMENT: 0-10
PAIN_FUNCTIONAL_ASSESSMENT: PREVENTS OR INTERFERES SOME ACTIVE ACTIVITIES AND ADLS
PAIN_FUNCTIONAL_ASSESSMENT: 0-10
PAIN_FUNCTIONAL_ASSESSMENT: 0-10
PAIN_FUNCTIONAL_ASSESSMENT: FACE, LEGS, ACTIVITY, CRY, AND CONSOLABILITY (FLACC)
PAIN_FUNCTIONAL_ASSESSMENT: 0-10
PAIN_FUNCTIONAL_ASSESSMENT: PREVENTS OR INTERFERES SOME ACTIVE ACTIVITIES AND ADLS
PAIN_FUNCTIONAL_ASSESSMENT: 0-10

## 2024-06-04 ASSESSMENT — PAIN DESCRIPTION - ONSET: ONSET: ON-GOING

## 2024-06-04 ASSESSMENT — ENCOUNTER SYMPTOMS: SHORTNESS OF BREATH: 0

## 2024-06-04 ASSESSMENT — PAIN DESCRIPTION - FREQUENCY: FREQUENCY: CONTINUOUS

## 2024-06-04 ASSESSMENT — PAIN DESCRIPTION - PAIN TYPE: TYPE: SURGICAL PAIN

## 2024-06-04 NOTE — DISCHARGE INSTRUCTIONS
medication.  You may gradually increase you activity as tolerated.    If you had to stop taking medicines before the procedure, ask your doctor when you can resume taking them. Medicines that are commonly stopped include:   Anti-inflammatory drugs (eg, aspirin )   Blood thinners, such as clopidogrel (Plavix) or warfarin (Coumadin)   You may be taking pain killers. It is important to take them exactly as your doctor told you. You should not take them with alcohol. You should not drive or operate machines while taking them.   When taking medicines, it's important to:   Take your medicine as directednot more, not less, not at a different time.   Do not stop taking them without consulting your healthcare provider.   Don't share them with anyone else.   Know what effects and side effects to expect, and report them to your healthcare provider.   If you are taking more than one drug, even if it is an over-the-counter medicine, herb, or dietary supplement, be sure to check with a physician or pharmacist about drug interactions.   Plan ahead for refills so you do not run out.   Lifestyle Changes    You and your doctor will plan lifestyle changes that will aid in your recovery.   Although you will have incisional pain after your surgery, you should no longer be experiencing pain from your gallstones or gallbladder disease   Follow-up   Schedule a follow-up appointment as directed by your doctor.    Call Your Doctor If Any of the Following Occurs   Monitor your recovery once you leave the hospital. As soon as you have a problem, alert your doctor. If any of the following occur, call your doctor:   Signs of infection, including fever and chills   Redness, swelling, increasing pain, excessive bleeding, or discharge at the incision site   Cough, shortness of breath, chest pain   Increased abdominal pain   Pain that you cannot control with the medicines you have been given   Blood in the stool   Nausea and/or vomiting that you

## 2024-06-04 NOTE — H&P
and retained common bile duct stone were also reviewed.  Additionally, Franky Alfredoman was given the opportunity to ask questions and clarifications.  he does want to proceed at this time. This discuss applies to both robotic and laparoscopic cholecystectomy.    Update  Patient has had continued symptoms.  Last Plavix and aspirin was taken one week ago today.

## 2024-06-04 NOTE — ANESTHESIA PRE PROCEDURE
Temp:   36.1 °C (97 °F)   TempSrc:   Temporal   SpO2:   96%   Weight: 92.1 kg (203 lb) 90.7 kg (200 lb)    Height: 1.702 m (5' 7\") 1.702 m (5' 7\")                                               BP Readings from Last 3 Encounters:   06/04/24 135/78   05/30/24 (!) 130/91   05/17/24 106/66       NPO Status: Time of last liquid consumption: 1800                        Time of last solid consumption: 1800                        Date of last liquid consumption: 06/03/24                        Date of last solid food consumption: 06/03/24    BMI:   Wt Readings from Last 3 Encounters:   06/04/24 90.7 kg (200 lb)   05/30/24 92.1 kg (203 lb)   05/17/24 92.1 kg (203 lb)     Body mass index is 31.32 kg/m².    CBC:   Lab Results   Component Value Date/Time    WBC 6.5 04/20/2024 05:25 AM    RBC 4.19 04/20/2024 05:25 AM    RBC 4.74 05/07/2012 08:14 AM    HGB 12.8 04/20/2024 05:25 AM    HCT 36.5 04/20/2024 05:25 AM    MCV 87.1 04/20/2024 05:25 AM    RDW 12.5 04/17/2024 07:38 AM     04/20/2024 05:25 AM     05/07/2012 08:14 AM       CMP:   Lab Results   Component Value Date/Time     04/20/2024 05:25 AM    K 4.2 04/20/2024 05:25 AM     04/20/2024 05:25 AM    CO2 23 04/20/2024 05:25 AM    BUN 19 04/20/2024 05:25 AM    CREATININE 0.9 04/20/2024 05:25 AM    GFRAA >60 09/27/2022 12:28 PM    LABGLOM >90 04/20/2024 05:25 AM    GLUCOSE 158 04/20/2024 05:25 AM    GLUCOSE 104 05/07/2012 08:14 AM    PROT 8.8 07/11/2012 09:55 AM    CALCIUM 8.8 04/20/2024 05:25 AM    BILITOT 0.4 04/19/2024 08:42 AM    ALKPHOS 65 04/19/2024 08:42 AM    AST 24 04/19/2024 08:42 AM    ALT 36 04/19/2024 08:42 AM       POC Tests:   No results for input(s): \"POCGLU\", \"POCNA\", \"POCK\", \"POCCL\", \"POCBUN\", \"POCHEMO\", \"POCHCT\" in the last 72 hours.    Coags:   Lab Results   Component Value Date/Time    PROTIME 12.7 04/17/2024 07:47 AM    INR 1.02 04/17/2024 08:30 PM    APTT 43.9 04/17/2024 08:30 PM       HCG (If Applicable): No results found for:

## 2024-06-04 NOTE — ANESTHESIA POSTPROCEDURE EVALUATION
Department of Anesthesiology  Postprocedure Note    Patient: Franky Melendez  MRN: [id number]  YOB: 1975  Date of evaluation: 6/4/2024    Procedure Summary       Date: 06/04/24 Room / Location: [address]    Anesthesia Start: 0802 Anesthesia Stop: 0929    Procedure: CHOLECYSTECTOMY LAPAROSCOPIC ROBOTIC (Abdomen) Diagnosis:       Biliary calculus of other site without obstruction      (Biliary calculus of other site without obstruction [K80.80])    Surgeons: Adi Crowley MD Responsible Provider: Xiomara Dorsey APRN - CRNA    Anesthesia Type: general ASA Status: 3            Anesthesia Type: No value filed.    Yrn Phase I: Yrn Score: 10    Yrn Phase II: Yrn Score: 10    Anesthesia Post Evaluation    Patient location during evaluation: PACU  Patient participation: complete - patient participated  Level of consciousness: awake  Airway patency: patent  Nausea & Vomiting: no vomiting and no nausea  Cardiovascular status: blood pressure returned to baseline and hemodynamically stable  Respiratory status: acceptable, spontaneous ventilation and room air  Hydration status: stable  Multimodal analgesia pain management approach  Pain management: satisfactory to patient        No notable events documented.

## 2024-06-04 NOTE — OP NOTE
Operative Note      Patient: Franky Melendez  YOB: 1975  MRN: [id number]    Date of Procedure: 6/4/2024    Pre-operative Diagnosis: Cholelithiasis, and history of recurrent pancreatitis    Post-Op Diagnosis: Same       Procedure(s):  CHOLECYSTECTOMY LAPAROSCOPIC ROBOTIC    Surgeon(s):  Adi Crowley MD    Assistant:   First Assistant: Kristine Dawson    Anesthesia: General    Estimated Blood Loss (mL): less than 50     Complications: None    Specimens:   ID Type Source Tests Collected by Time Destination   A : gallbladder Tissue Gallbladder SURGICAL PATHOLOGY Adi Crowley MD 6/4/2024 0840        Implants:  Implant Name Type Inv. Item Serial No.  Lot No. LRB No. Used Action   CLIP INT L POLYMER SERGE LIG HEM O SERGE (6EA/PK) - [id number]  CLIP INT L POLYMER SERGE LIG HEM O SERGE (6EA/PK)  [employer]  N/A 6 Implanted         Drains: * No LDAs found *    Patient was brought to the operating room and general anesthesia was induced a tap block was placed by anesthesia as well.  His abdomen is carefully prepped and draped.  Peritoneal access was gained through an open technique with the balloon port through an infraumbilical incision.  Once into the abdomen was insufflated with carbon dioxide.  Under direct vision additional robotic ports were placed.  1 in the left upper just off the midline in the subxiphoid position and 2 more about the level of the umbilicus on the patient's right side the abdomen and roughly the anterior axillary line and midclavicular line.  The table was positioned correctly and the robot was brought onto the field.  It was targeted then docked.  Under direct vision operating instruments were inserted.  At this point I broke scrub and went to the console to complete that portion the operation.    Patient's gallbladder is fatty infiltrated and there were some filmy adhesion between the omentum and the transverse mesocolon which came down with a little bit of

## 2024-06-04 NOTE — PROGRESS NOTES
Discharge Criteria    Inpatients must meet Criteria 1 through 7. All other patients are either YES or N/A. If a NO is chosen then Anesthesia or Surgeon must be notified.      1.  Minimum 30 minutes after last dose of sedative medication.    Yes      2.  Systolic BP between 90 - 160. Diastolic BP between 60 - 90.    Yes      3.  Pulse between 60 - 120    Yes      4.  Respirations between 8 - 25.    Yes      5.  SpO2 92% - 100%.    Yes      6.  Able to cough and swallow or return to baseline function.    Yes      7.  Alert and oriented or return to baseline mental status.    Yes      8.  Demonstrates controlled, coordinated movements, ambulates with steady gait, or return to baseline activity function.    Yes      9.  Minimal or no pain or nausea, or at a level tolerable and acceptable to patient.    Yes      10. Takes and retains oral fluids as allowed.    Yes      11. Procedural / perioperative site stable.  Minimal or no bleeding.    Yes          12. If GI endoscopy procedure, minimal or no abdominal distention or passing flatus.    N/A      13. Written discharge instructions and emergency telephone number provided.    Yes      14. Accompanied by a responsible adult.    Yes             Problem: Gas Exchange - Impaired  Goal: *Absence of hypoxia  Outcome: Progressing Towards Goal     Problem: Patient Education: Go to Patient Education Activity  Goal: Patient/Family Education  Outcome: Progressing Towards Goal     Problem: Pressure Injury - Risk of  Goal: *Prevention of pressure injury  Description: Document Julio Cesar Scale and appropriate interventions in the flowsheet. Outcome: Progressing Towards Goal  Note: Pressure Injury Interventions:  Sensory Interventions: Assess changes in LOC,Minimize linen layers,Monitor skin under medical devices    Moisture Interventions: Absorbent underpads,Internal/External urinary devices,Minimize layers    Activity Interventions: Increase time out of bed,Pressure redistribution bed/mattress(bed type),PT/OT evaluation    Mobility Interventions: Pressure redistribution bed/mattress (bed type),PT/OT evaluation    Nutrition Interventions: Document food/fluid/supplement intake,Offer support with meals,snacks and hydration    Friction and Shear Interventions: Minimize layers                Problem: Patient Education: Go to Patient Education Activity  Goal: Patient/Family Education  Outcome: Progressing Towards Goal     Problem: Falls - Risk of  Goal: *Absence of Falls  Description: Document Beronica Fall Risk and appropriate interventions in the flowsheet.   Outcome: Progressing Towards Goal  Note: Fall Risk Interventions:  Mobility Interventions: Communicate number of staff needed for ambulation/transfer,Patient to call before getting OOB    Mentation Interventions: Adequate sleep, hydration, pain control    Medication Interventions: Patient to call before getting OOB,Teach patient to arise slowly    Elimination Interventions: Call light in reach,Patient to call for help with toileting needs              Problem: Patient Education: Go to Patient Education Activity  Goal: Patient/Family Education  Outcome: Progressing Towards Goal     Problem: Patient Education: Go to Subjective:       Xochitl Adan is a 16 y.o. female who presents for a school sports physical exam. Patient/parent deny any current health related concerns.  She plans to participate in bowling and soft ball.     Immunization History   Administered Date(s) Administered    COVID-19, MRNA, LN-S, PF (Pfizer) (Gray Cap) 08/12/2022    DTaP / Hep B / IPV 2007, 2007, 2007    DTaP / HiB 01/30/2008    DTaP / IPV 04/29/2011    HPV 9-Valent 08/02/2016, 12/12/2016, 01/12/2018    Hepatitis A, Pediatric/Adolescent, 2 Dose 01/30/2008, 08/06/2008    Hepatitis B, Pediatric/Adolescent 2007    HiB PRP-T 2007, 2007, 2007    Influenza (Flumist) - Quadrivalent - Intranasal *Preferred* (2-49 years old) 10/23/2015    Influenza - Quadrivalent - PF *Preferred* (6 months and older) 2007, 2007, 10/03/2008, 10/21/2009, 10/12/2010, 12/12/2016, 01/05/2018, 11/12/2018, 09/24/2019, 08/14/2020    Influenza A (H1N1) 2009 Monovalent - IM 10/21/2009, 04/27/2010    MMR 01/30/2008, 04/29/2011    Meningococcal Conjugate (MCV4P) 03/14/2018    Pneumococcal Conjugate - 13 Valent 04/27/2010    Pneumococcal Conjugate - 7 Valent 2007, 2007, 2007, 01/30/2008    Rotavirus Pentavalent 2007, 2007, 2007    Tdap 03/14/2018    Varicella 01/30/2008, 04/29/2011     The following portions of the patient's history were reviewed and updated as appropriate: allergies, current medications, past family history, past medical history, past social history, past surgical history, and problem list.    Review of Systems  Constitutional: negative  Eyes: negative  Ears, nose, mouth, throat, and face: negative  Respiratory: negative  Cardiovascular: negative  Gastrointestinal: negative  Genitourinary:negative  Hematologic/lymphatic: negative  Musculoskeletal:negative  Neurological: negative  Behavioral/Psych: negative  Allergic/Immunologic: negative      Objective:      /72 (BP  "Location: Left arm, Patient Position: Sitting, BP Method: Medium (Automatic))   Pulse 79   Temp 98.3 °F (36.8 °C) (Oral)   Resp 14   Ht 5' 6" (1.676 m)   Wt 54.9 kg (121 lb)   LMP 08/11/2022 Comment: nexplanon  SpO2 98%   BMI 19.53 kg/m²     General Appearance:  Alert, cooperative, no distress, appropriate for age                             Head:  Normocephalic, without obvious abnormality                              Eyes:  PERRL, EOM's intact, conjunctiva and cornea clear, fundi benign, both eyes                              Ears:  TM pearly gray color and semitransparent, external ear canals normal, both ears                             Nose:  Nares symmetrical, septum midline, mucosa pink, clear watery discharge; no sinus tenderness                           Throat:  Lips, tongue, and mucosa are moist, pink, and intact; teeth intact                              Neck:  Supple; symmetrical, trachea midline, no adenopathy; thyroid: no enlargement, symmetric, no tenderness/mass/nodules; no carotid bruit, no JVD                              Back:  Symmetrical, no curvature, ROM normal, no CVA tenderness                Chest/Breast:  No mass, tenderness, or discharge                            Lungs:  Clear to auscultation bilaterally, respirations unlabored                              Heart:  Normal PMI, regular rate & rhythm, S1 and S2 normal, no murmurs, rubs, or gallops                      Abdomen:  Soft, non-tender, bowel sounds active all four quadrants, no mass or organomegaly               Genitourinary:  Genitalia intact, no discharge, swelling, or pain          Musculoskeletal:  Tone and strength strong and symmetrical, all extremities; no joint pain or edema                                        Lymphatic:  No adenopathy              Skin/Hair/Nails:  Skin warm, dry and intact, no rashes or abnormal dyspigmentation                    Neurologic:  Alert and oriented x3, no cranial nerve deficits, " Patient Education Activity  Goal: Patient/Family Education  Outcome: Progressing Towards Goal normal strength and tone, gait steady     Assessment:      Satisfactory school sports physical exam.       Plan:      Permission granted to participate in athletics without restrictions. Form signed and returned to patient.  Anticipatory guidance:   - Gave handout on well-child issues at this age  - Importance of regular dental care, importance of regular exercise, importance of varied diet, limit TV, media violence, minimize junk food, puberty, seat belts, and sex; STD and pregnancy prevention (nexplanon implant, use of protective barrier).  - NO firearms ar home

## 2024-06-04 NOTE — ANESTHESIA PROCEDURE NOTES
Peripheral Block    Patient location during procedure: OR  Reason for block: post-op pain management and at surgeon's request  Start time: 6/4/2024 8:08 AM  End time: 6/4/2024 8:12 AM  Staffing  Performed: resident/CRNA   Resident/CRNA: Bill Arango APRN - CRNA  Performed by: Bill Arango APRN - CRNA  Authorized by: Xiomara Dorsey APRN - CRNA    Preanesthetic Checklist  Completed: patient identified, IV checked, site marked, risks and benefits discussed, surgical/procedural consents, equipment checked, pre-op evaluation, timeout performed, anesthesia consent given, oxygen available and monitors applied/VS acknowledged  Peripheral Block   Patient position: supine  Prep: ChloraPrep  Provider prep: mask and sterile gloves  Patient monitoring: continuous pulse ox, IV access, cardiac monitor, continuous capnometry, frequent blood pressure checks and oxygen  Block type: TAP and Rectus sheath  Laterality: bilateral  Injection technique: single-shot  Guidance: ultrasound guided  Local infiltration: ropivacaine and decadron (See MAR for details.)  Local infiltration: ropivacaine and decadron (See MAR for details.)    Needle   Needle type: Other   Needle gauge: 22 G  Needle localization: ultrasound guidance  Needle length: 11 cmOther needle type: pajunk  Assessment   Injection assessment: no paresthesia on injection, local visualized surrounding nerve on ultrasound, negative aspiration for heme and no intravascular symptoms  Paresthesia pain: none  Slow fractionated injection: yes  Hemodynamics: stable  Outcomes: patient tolerated procedure well and uncomplicated

## 2024-06-04 NOTE — ED PROVIDER NOTES
[facility]  Emergency Department Encounter  Emergency Medicine Attending     Pt Name:Franky Melendez  MRN: [id number]  Birthdate 1975  Date of evaluation: 6/4/24  PCP:  Curtis Chavarria MD  Note Started: 7:44 PM EDT      CHIEF COMPLAINT       Chief Complaint   Patient presents with    Post-op Problem     Had laparoscopic cholecystectomy today by Dr. Crowley. Noticed bleeding to one incision with increase  pain about one hour ago. Took norco at 1600 with no relief.        HISTORY OF PRESENT ILLNESS  (Location/Symptom, Timing/Onset, Context/Setting, Quality, Duration, Modifying Factors, Severity.)      Franky Melendez is a 49 y.o. male who presents with postoperative pain.  Patient had a laparoscopic cholecystectomy completed today by one of our local surgeons.  Patient was discharged and was feeling fine.  Noticed that he had having some bright red blood coming from the umbilical site and then after he began noticing that began having a tightening squeezing-like sensation in his abdomen and is now rotated over to the right side of his abdomen.  Patient states the pain comes in waves,    PAST MEDICAL / SURGICAL / SOCIAL / FAMILY HISTORY      has a past medical history of CAD (coronary artery disease), Carpal tunnel syndrome, Depressive disorder, not elsewhere classified, Diabetes mellitus (HCC), Gastrointestinal hemorrhage with hematemesis, Heart attack (HCC), History of echocardiogram, History of pancreatitis, Hyperlipidemia, Hypertension, and PTSD (post-traumatic stress disorder).       has a past surgical history that includes Coronary angioplasty (08/2018); Cardiac catheterization (Left, 01/07/2018); Coronary artery bypass graft (2018); Cardiac catheterization (Left, 01/27/2022); Colonoscopy (N/A, 10/04/2022); Upper gastrointestinal endoscopy (N/A, 10/04/2022); Colonoscopy (10/04/2022); Esophagogastroduodenoscopy (10/04/2022); Upper gastrointestinal endoscopy (N/A, 02/01/2023);

## 2024-06-05 VITALS
HEART RATE: 80 BPM | DIASTOLIC BLOOD PRESSURE: 87 MMHG | BODY MASS INDEX: 32.75 KG/M2 | OXYGEN SATURATION: 98 % | TEMPERATURE: 97.1 F | HEIGHT: 67 IN | SYSTOLIC BLOOD PRESSURE: 135 MMHG | RESPIRATION RATE: 18 BRPM | WEIGHT: 208.7 LBS

## 2024-06-05 LAB
ALBUMIN SERPL-MCNC: 4.2 G/DL (ref 3.5–5.2)
ALBUMIN/GLOB SERPL: 1.6 {RATIO} (ref 1–2.5)
ALP SERPL-CCNC: 59 U/L (ref 40–129)
ALT SERPL-CCNC: 45 U/L (ref 5–41)
ANION GAP SERPL CALCULATED.3IONS-SCNC: 12 MMOL/L (ref 9–17)
AST SERPL-CCNC: 36 U/L
BASOPHILS # BLD: <0.03 K/UL (ref 0–0.2)
BASOPHILS NFR BLD: 0 % (ref 0–2)
BILIRUB SERPL-MCNC: 0.4 MG/DL (ref 0.3–1.2)
BUN SERPL-MCNC: 19 MG/DL (ref 6–20)
BUN/CREAT SERPL: 19 (ref 9–20)
CALCIUM SERPL-MCNC: 8.7 MG/DL (ref 8.6–10.4)
CHLORIDE SERPL-SCNC: 102 MMOL/L (ref 98–107)
CO2 SERPL-SCNC: 25 MMOL/L (ref 20–31)
CREAT SERPL-MCNC: 1 MG/DL (ref 0.7–1.2)
EOSINOPHIL # BLD: <0.03 K/UL (ref 0–0.44)
EOSINOPHILS RELATIVE PERCENT: 0 % (ref 1–4)
ERYTHROCYTE [DISTWIDTH] IN BLOOD BY AUTOMATED COUNT: 12.9 % (ref 11.8–14.4)
GFR, ESTIMATED: >90 ML/MIN/1.73M2
GLUCOSE BLD-MCNC: 164 MG/DL (ref 74–100)
GLUCOSE SERPL-MCNC: 181 MG/DL (ref 70–99)
HCT VFR BLD AUTO: 34.8 % (ref 40.7–50.3)
HGB BLD-MCNC: 12.1 G/DL (ref 13–17)
IMM GRANULOCYTES # BLD AUTO: 0.04 K/UL (ref 0–0.3)
IMM GRANULOCYTES NFR BLD: 0 %
LYMPHOCYTES NFR BLD: 2.41 K/UL (ref 1.1–3.7)
LYMPHOCYTES RELATIVE PERCENT: 22 % (ref 24–43)
MCH RBC QN AUTO: 30.8 PG (ref 25.2–33.5)
MCHC RBC AUTO-ENTMCNC: 34.8 G/DL (ref 28.4–34.8)
MCV RBC AUTO: 88.5 FL (ref 82.6–102.9)
MONOCYTES NFR BLD: 0.99 K/UL (ref 0.1–1.2)
MONOCYTES NFR BLD: 9 % (ref 3–12)
NEUTROPHILS NFR BLD: 69 % (ref 36–65)
NEUTS SEG NFR BLD: 7.63 K/UL (ref 1.5–8.1)
NRBC BLD-RTO: 0 PER 100 WBC
PLATELET # BLD AUTO: 317 K/UL (ref 138–453)
PMV BLD AUTO: 10.1 FL (ref 8.1–13.5)
POTASSIUM SERPL-SCNC: 4 MMOL/L (ref 3.7–5.3)
PROT SERPL-MCNC: 6.8 G/DL (ref 6.4–8.3)
RBC # BLD AUTO: 3.93 M/UL (ref 4.21–5.77)
SODIUM SERPL-SCNC: 139 MMOL/L (ref 135–144)
SURGICAL PATHOLOGY REPORT: NORMAL
WBC OTHER # BLD: 11.1 K/UL (ref 3.5–11.3)

## 2024-06-05 PROCEDURE — 6360000002 HC RX W HCPCS

## 2024-06-05 PROCEDURE — 2580000003 HC RX 258: Performed by: STUDENT IN AN ORGANIZED HEALTH CARE EDUCATION/TRAINING PROGRAM

## 2024-06-05 PROCEDURE — 82947 ASSAY GLUCOSE BLOOD QUANT: CPT

## 2024-06-05 PROCEDURE — 6370000000 HC RX 637 (ALT 250 FOR IP): Performed by: STUDENT IN AN ORGANIZED HEALTH CARE EDUCATION/TRAINING PROGRAM

## 2024-06-05 PROCEDURE — 6370000000 HC RX 637 (ALT 250 FOR IP)

## 2024-06-05 PROCEDURE — 80053 COMPREHEN METABOLIC PANEL: CPT

## 2024-06-05 PROCEDURE — 85025 COMPLETE CBC W/AUTO DIFF WBC: CPT

## 2024-06-05 PROCEDURE — 94664 DEMO&/EVAL PT USE INHALER: CPT

## 2024-06-05 PROCEDURE — 36415 COLL VENOUS BLD VENIPUNCTURE: CPT

## 2024-06-05 PROCEDURE — 96376 TX/PRO/DX INJ SAME DRUG ADON: CPT

## 2024-06-05 PROCEDURE — 94761 N-INVAS EAR/PLS OXIMETRY MLT: CPT

## 2024-06-05 PROCEDURE — 2500000003 HC RX 250 WO HCPCS

## 2024-06-05 PROCEDURE — 96375 TX/PRO/DX INJ NEW DRUG ADDON: CPT

## 2024-06-05 PROCEDURE — G0378 HOSPITAL OBSERVATION PER HR: HCPCS

## 2024-06-05 RX ORDER — KETOROLAC TROMETHAMINE 15 MG/ML
15 INJECTION, SOLUTION INTRAMUSCULAR; INTRAVENOUS ONCE
Status: COMPLETED | OUTPATIENT
Start: 2024-06-05 | End: 2024-06-05

## 2024-06-05 RX ORDER — OXYCODONE HYDROCHLORIDE AND ACETAMINOPHEN 5; 325 MG/1; MG/1
1 TABLET ORAL EVERY 6 HOURS PRN
Qty: 12 TABLET | Refills: 0 | Status: SHIPPED | OUTPATIENT
Start: 2024-06-05 | End: 2024-06-08

## 2024-06-05 RX ADMIN — HYDROMORPHONE HYDROCHLORIDE 0.5 MG: 1 INJECTION, SOLUTION INTRAMUSCULAR; INTRAVENOUS; SUBCUTANEOUS at 02:30

## 2024-06-05 RX ADMIN — SUCRALFATE 1 G: 1 TABLET ORAL at 08:32

## 2024-06-05 RX ADMIN — PANTOPRAZOLE SODIUM 40 MG: 40 TABLET, DELAYED RELEASE ORAL at 08:32

## 2024-06-05 RX ADMIN — CLOPIDOGREL BISULFATE 75 MG: 75 TABLET ORAL at 08:33

## 2024-06-05 RX ADMIN — ISOSORBIDE MONONITRATE 30 MG: 30 TABLET, EXTENDED RELEASE ORAL at 08:32

## 2024-06-05 RX ADMIN — SODIUM CHLORIDE, PRESERVATIVE FREE 10 ML: 5 INJECTION INTRAVENOUS at 08:34

## 2024-06-05 RX ADMIN — HYDROMORPHONE HYDROCHLORIDE 0.5 MG: 1 INJECTION, SOLUTION INTRAMUSCULAR; INTRAVENOUS; SUBCUTANEOUS at 06:21

## 2024-06-05 RX ADMIN — AMLODIPINE BESYLATE 5 MG: 5 TABLET ORAL at 08:32

## 2024-06-05 RX ADMIN — KETOROLAC TROMETHAMINE 15 MG: 15 INJECTION, SOLUTION INTRAMUSCULAR; INTRAVENOUS at 01:08

## 2024-06-05 RX ADMIN — RANOLAZINE 500 MG: 500 TABLET, FILM COATED, EXTENDED RELEASE ORAL at 08:33

## 2024-06-05 RX ADMIN — METFORMIN HYDROCHLORIDE 500 MG: 500 TABLET ORAL at 08:33

## 2024-06-05 RX ADMIN — OXYCODONE 10 MG: 5 TABLET ORAL at 10:35

## 2024-06-05 RX ADMIN — LISINOPRIL 10 MG: 10 TABLET ORAL at 08:32

## 2024-06-05 ASSESSMENT — PAIN DESCRIPTION - LOCATION
LOCATION: ABDOMEN

## 2024-06-05 ASSESSMENT — PAIN SCALES - GENERAL
PAINLEVEL_OUTOF10: 8
PAINLEVEL_OUTOF10: 8
PAINLEVEL_OUTOF10: 5
PAINLEVEL_OUTOF10: 9
PAINLEVEL_OUTOF10: 5
PAINLEVEL_OUTOF10: 10
PAINLEVEL_OUTOF10: 5

## 2024-06-05 ASSESSMENT — PAIN - FUNCTIONAL ASSESSMENT
PAIN_FUNCTIONAL_ASSESSMENT: PREVENTS OR INTERFERES SOME ACTIVE ACTIVITIES AND ADLS
PAIN_FUNCTIONAL_ASSESSMENT: PREVENTS OR INTERFERES SOME ACTIVE ACTIVITIES AND ADLS
PAIN_FUNCTIONAL_ASSESSMENT: ACTIVITIES ARE NOT PREVENTED
PAIN_FUNCTIONAL_ASSESSMENT: PREVENTS OR INTERFERES SOME ACTIVE ACTIVITIES AND ADLS

## 2024-06-05 ASSESSMENT — PAIN DESCRIPTION - ORIENTATION
ORIENTATION: MID
ORIENTATION: MID
ORIENTATION: RIGHT
ORIENTATION: MID
ORIENTATION: MID

## 2024-06-05 ASSESSMENT — PAIN DESCRIPTION - PAIN TYPE
TYPE: SURGICAL PAIN

## 2024-06-05 ASSESSMENT — PAIN DESCRIPTION - DESCRIPTORS
DESCRIPTORS: ACHING;CRAMPING
DESCRIPTORS: STABBING

## 2024-06-05 ASSESSMENT — PAIN DESCRIPTION - ONSET
ONSET: ON-GOING

## 2024-06-05 ASSESSMENT — PAIN DESCRIPTION - FREQUENCY
FREQUENCY: CONTINUOUS

## 2024-06-05 NOTE — PLAN OF CARE
Problem: Discharge Planning  Goal: Discharge to home or other facility with appropriate resources  Outcome: Progressing     Problem: Pain  Goal: Verbalizes/displays adequate comfort level or baseline comfort level  Outcome: Progressing  Flowsheets (Taken 6/5/2024 0241)  Verbalizes/displays adequate comfort level or baseline comfort level:   Encourage patient to monitor pain and request assistance   Administer analgesics based on type and severity of pain and evaluate response   Implement non-pharmacological measures as appropriate and evaluate response   Assess pain using appropriate pain scale     Problem: Skin/Tissue Integrity - Adult  Goal: Skin integrity remains intact  Outcome: Progressing  Flowsheets (Taken 6/5/2024 0241)  Skin Integrity Remains Intact:   Monitor for areas of redness and/or skin breakdown   Assess vascular access sites hourly     Problem: Skin/Tissue Integrity - Adult  Goal: Incisions, wounds, or drain sites healing without S/S of infection  Outcome: Progressing  Flowsheets (Taken 6/5/2024 0241)  Incisions, Wounds, or Drain Sites Healing Without Sign and Symptoms of Infection:   ADMISSION and DAILY: Assess and document risk factors for pressure ulcer development   TWICE DAILY: Assess and document skin integrity   TWICE DAILY: Assess and document dressing/incision, wound bed, drain sites and surrounding tissue     Problem: Metabolic/Fluid and Electrolytes - Adult  Goal: Electrolytes maintained within normal limits  Outcome: Progressing  Flowsheets (Taken 6/5/2024 0241)  Electrolytes maintained within normal limits:   Monitor labs and assess patient for signs and symptoms of electrolyte imbalances   Administer electrolyte replacement as ordered   Monitor response to electrolyte replacements, including repeat lab results as appropriate

## 2024-06-05 NOTE — PLAN OF CARE
Problem: Discharge Planning  Goal: Discharge to home or other facility with appropriate resources  6/5/2024 1250 by Leigh Castle RN  Outcome: Adequate for Discharge  6/5/2024 0241 by Kandi Al RN  Outcome: Progressing     Problem: Pain  Goal: Verbalizes/displays adequate comfort level or baseline comfort level  6/5/2024 1250 by Leigh Castle RN  Outcome: Adequate for Discharge  6/5/2024 0241 by Kandi Al RN  Outcome: Progressing  Flowsheets (Taken 6/5/2024 0241)  Verbalizes/displays adequate comfort level or baseline comfort level:   Encourage patient to monitor pain and request assistance   Administer analgesics based on type and severity of pain and evaluate response   Implement non-pharmacological measures as appropriate and evaluate response   Assess pain using appropriate pain scale     Problem: Skin/Tissue Integrity - Adult  Goal: Skin integrity remains intact  6/5/2024 1250 by Leigh Castle RN  Outcome: Adequate for Discharge  6/5/2024 0241 by Kandi Al RN  Outcome: Progressing  Flowsheets (Taken 6/5/2024 0241)  Skin Integrity Remains Intact:   Monitor for areas of redness and/or skin breakdown   Assess vascular access sites hourly  Goal: Incisions, wounds, or drain sites healing without S/S of infection  6/5/2024 1250 by Leigh Castle RN  Outcome: Adequate for Discharge  6/5/2024 0241 by Kandi Al RN  Outcome: Progressing  Flowsheets (Taken 6/5/2024 0241)  Incisions, Wounds, or Drain Sites Healing Without Sign and Symptoms of Infection:   ADMISSION and DAILY: Assess and document risk factors for pressure ulcer development   TWICE DAILY: Assess and document skin integrity   TWICE DAILY: Assess and document dressing/incision, wound bed, drain sites and surrounding tissue     Problem: Metabolic/Fluid and Electrolytes - Adult  Goal: Electrolytes maintained within normal limits  6/5/2024 1250 by Leigh Castle RN  Outcome: Adequate for Discharge  6/5/2024 0241 by

## 2024-06-05 NOTE — PROGRESS NOTES
Consult called to Dr. Crowley at this time.   
Nutrition Assessment     Type and Reason for Visit: Initial, Patient Education    Nutrition Recommendations/Plan:   Low fat education post choly.      Malnutrition Assessment:  Malnutrition Status: No malnutrition    Nutrition Assessment:  Predicted suboptimal nutrient intakes r/t altered GI status, AEB post op choly with low PO pta. Feeling better today per interview and up walking earlier this morning. Diet progressing and education provided for low fat post choly.  Discussed healthier choices overall with carbohydrate control with known diabetes of fair control (A1C 8.3).    Nutrition Related Findings:   no malnutrition. + b/s and flatus. no edema. Wound Type: Surgical Incision    Current Nutrition Therapies:    ADULT DIET; Regular; Low Fat/Low Chol/High Fiber/2 gm Na    Anthropometric Measures:  Height: 170.2 cm (5' 7\")  Current Body Wt: 94.7 kg (208 lb 12.8 oz)   BMI: 32.7    Nutrition Diagnosis:   Predicted inadequate energy intake related to altered GI function as evidenced by poor intake prior to admission    Lab Results   Component Value Date     06/05/2024    K 4.0 06/05/2024     06/05/2024    CO2 25 06/05/2024    BUN 19 06/05/2024    CREATININE 1.0 06/05/2024    GLUCOSE 181 (H) 06/05/2024    CALCIUM 8.7 06/05/2024    PROT 8.8 (H) 07/11/2012    BILITOT 0.4 06/05/2024    ALKPHOS 59 06/05/2024    AST 36 06/05/2024    ALT 45 (H) 06/05/2024    LABGLOM >90 06/05/2024    GFRAA >60 09/27/2022     Hemoglobin A1C   Date Value Ref Range Status   04/18/2024 8.3 (H) 4.4 - 6.4 % Final     No results found for: \"VITD25\"    Nutrition Interventions:   Food and/or Nutrient Delivery: Continue Current Diet  Nutrition Education/Counseling: Education initiated  Coordination of Nutrition Care: Continue to monitor while inpatient  Plan of Care discussed with: patient    Goals:     Goals: Meet at least 75% of estimated needs       Nutrition Monitoring and Evaluation:   Behavioral-Environmental Outcomes: None 
Patient called out requesting more prn pain medication. Pt encouraged to ambulate prior to administration. Pt ambulated down hallway and back to room, tolerated well. VS rechecked. PRN Dilaudid administered. I&O recorded. Pt denies any other needs at this time and has call light within reach.   
Pt arrived to floor from ED at 2225, admitted for intractable abdominal pain by Dr. Chavarria. Pt ambulated to bed upon arrival and oriented to room and call light. VS and assessment as charted. Pt is A&Ox4. Pain rated \"9\" on a 0-10 pain scale at this time. Dressings to abdomen remain clean, dry and intact with only lower mid dressing with noted drainage. IV fluids started. Admission navigator complete. Orders and plan of care reviewed with patient. Pt denies any questions at this time. Call light and bedside table are within reach, will continue to monitor.   
Pt was able to sleep for a couple hours, states he has passed some gas. Pain increased at this time. Ambulating in hallway. PRN Dilaudid administered afterward at this time. Pt denies any other needs at this time.   
RESPIRATORY ASSESSMENT PROTOCOL                                                                                              Patient Name: Franky Melendez Room#: 0333/0333-01 : 1975     Admitting diagnosis: Intractable abdominal pain [R10.9]       Medical History:   Past Medical History:   Diagnosis Date    CAD (coronary artery disease)     Carpal tunnel syndrome     Depressive disorder, not elsewhere classified     Diabetes mellitus (HCC)     Gastrointestinal hemorrhage with hematemesis 2023    Heart attack (HCC) 2018    STEMI    History of echocardiogram 2019    EF 40-45%. LV cavity sixe is mildly increased. Inferior and inferoseptal wall hypokenesis noted. Mild diastolic dysfunction.    History of pancreatitis 2023    Hyperlipidemia     Hypertension     PTSD (post-traumatic stress disorder)     uexpected open heart surgery in 2018       PATIENT ASSESSMENT    LABORATORY DATA  Hematology:   Lab Results   Component Value Date/Time    WBC 8.2 2024 07:46 PM    RBC 4.39 2024 07:46 PM    RBC 4.74 2012 08:14 AM    HGB 13.4 2024 07:46 PM    HCT 38.1 2024 07:46 PM     2024 07:46 PM     2012 08:14 AM     Chemistry:  No results found for: \"PHART\", \"JVL4IRA\", \"PO2ART\", \"A2PSDVRR\", \"IOZ5DRC\", \"PBEA\", \"NBEA\"    VITALS  Pulse: 80   Respirations: 18  BP: (!) 148/97  SpO2: 97 % O2 Device: None (Room air)  Temp: 98.6 °F (37 °C)    SKIN COLOR  [x] Normal  [] Pale  [] Dusky  [] Cyanotic    RESPIRATORY PATTERN  [x] Normal  [] Dyspnea  [] Cheyne-Bowden  [] Kussmaul  [] Biots    AMBULATORY  [x] Yes  [] No  [] With Assistance    PEAK FLOW  Predicted:     Personal Best:            Patient Acuity 0 1 2 3 4 Score   Level of Consciousness (LOC) [x]  Alert & Oriented or Pt normal LOC []  Confused;follows directions []  Confused & uncooper-ative []  Obtunded []  Comatose 0   Respiratory Rate  (RR) [x]  Reg. rate & pattern. 12 - 20 bpm  []  Increased RR. 
Reviewed discharge instructions with patient and patient's daughter Shellie.  Patient aware of need to  prescription.  Reviewed new medication and side effects to monitor for.   Patient aware of date/time of follow up appointment.  Instructed to follow a low fat, low cholesterol diet.  Educational handout given on Gallbladder Removal Surgery: What to Expect at home and low fat diet.   Patient denies questions.  Verbalizes understanding.  Copy of discharge instructions given to patient.  
Writer to bedside to complete morning assessment. Upon entry to room, pt awake and lying in bed, respirations even and unlabored while on room air. Vitals obtained and assessment completed, see flow sheet for details. Pt has ambulated in the hallway without difficulty. Pt denies needs from writer at this time. Call light in reach. Care ongoing.    
Writer updated Debby ARNETT regarding patient's increased pain but too soon for next prn Dilaudid. Order received for one time dose of Toradol which was administered at this time. Pt is also going to try ambulating in room to see if pain is alleviated. Denies any other needs at this time and has call light within reach, will continue to monitor.   
AM

## 2024-06-05 NOTE — CARE COORDINATION
Housing: (P) Yes  Other Identified Issues/Barriers to RETURNING to current housing: none.  Potential Assistance needed at discharge: (P) N/A            Potential DME:    Patient expects to discharge to: (P) House  Plan for transportation at discharge: (P) Family    Financial    Payor: BCBS / Plan: BCBS OUT OF STATE / Product Type: *No Product type* /     Does insurance require precert for SNF: Yes    Potential assistance Purchasing Medications: (P) No  Meds-to-Beds request:        [facility] [place], [place] - [address] - P [phone] - F [phone]  [address] [address]  Phone: [phone] Fax: [phone]    [id number] - [place], [place] - [address] -  [phone] - F [phone]  [address] [place] [address]  Phone: [phone] Fax: [phone]      Notes:    Factors facilitating achievement of predicted outcomes: Family support, Friend support, Motivated, Cooperative, Pleasant, Sense of humor.    Barriers to discharge: Pain.    Additional Case Management Notes: Patient lives at home with his wife and daughter. He works full time, drives, and is independent in his ADL's. Patient has insurance that covers his medications. The plan is to discharge home with his family with no outside services.    The Plan for Transition of Care is related to the following treatment goals of Intractable abdominal pain [R10.9]    IF APPLICABLE: The Patient and/or patient representative Franky and his family were provided with a choice of provider and agrees with the discharge plan. Freedom of choice list with basic dialogue that supports the patient's individualized plan of care/goals and shares the quality data associated with the providers was provided to:     Patient Representative Name:       The Patient and/or Patient Representative Agree with the Discharge Plan?  Yes    Michael Chinchilla RN  Case Management Department  Ph: [phone]

## 2024-06-05 NOTE — DISCHARGE INSTR - DIET
Good nutrition is important when healing from an illness, injury, or surgery.  Follow any nutrition recommendations given to you during your hospital stay.   If you were given an oral nutrition supplement while in the hospital, continue to take this supplement at home.  You can take it with meals, in-between meals, and/or before bedtime. These supplements can be purchased at most local grocery stores, pharmacies, and chain [employer]-stores.   If you have any questions about your diet or nutrition, call the hospital and ask for the dietitian.  Low fat, low cholesterol

## 2024-06-05 NOTE — H&P
PT/INR:  Lab Results   Component Value Date/Time    PROTIME 12.7 04/17/2024 07:47 AM    INR 1.02 04/17/2024 08:30 PM       High Sensitivity Troponin:  No results for input(s): \"TROPHS\" in the last 72 hours.    ABGs:   No results found for: \"PHART\", \"PH\", \"SSE9QKK\", \"PCO2\", \"PO2ART\", \"PO2\", \"KJJ6UGU\", \"HCO3\", \"BEART\", \"BE\", \"THGBART\", \"THB\", \"FWN0NZV\", \"I3NFDEHM\", \"O2SAT\", \"FIO2\"        CT ABDOMEN PELVIS W IV CONTRAST Additional Contrast? None   Preliminary Result   Status post cholecystectomy with mild inflammatory changes in the gallbladder   fossa. No evidence of abscess bleeding or biliary leak.                 Assessment:    Principal Problem:    Intractable abdominal pain  Active Problems:    HTN (hypertension)    S/P CABG (coronary artery bypass graft)    ASHD (arteriosclerotic heart disease) /  CABG 2018  Resolved Problems:    * No resolved hospital problems. *      Patient Active Problem List    Diagnosis Date Noted    Abnormal cardiovascular stress test 01/27/2022    Palpitations     Gastritis without bleeding 02/01/2023    Hemoptysis 01/31/2023    Nausea and vomiting 01/31/2023    Gastrointestinal hemorrhage with hematemesis 01/31/2023    History of pancreatitis 01/31/2023    Chest wall pain 01/10/2023    Sleeping difficulty 01/10/2023    Lung nodule 01/10/2023    Chronic dyspnea 01/10/2023    Gastroesophageal reflux disease 01/10/2023    Allergic rhinitis due to pollen 01/10/2023    Unstable angina (HCC) 12/28/2022    Shortness of breath 12/19/2022    Mild persistent asthma with acute exacerbation 11/29/2022    Bronchitis 11/29/2022    Diabetes mellitus (HCC) 11/18/2022    Generalized anxiety disorder 11/18/2022    Primary hypertension 11/18/2022    Hyperlipidemia 11/18/2022    Mild intermittent asthma 11/18/2022    Intractable abdominal pain 10/05/2022    Pancreatitis, unspecified pancreatitis type 09/12/2022    Nausea 09/12/2022    Epigastric pain 09/12/2022    Calculus of gallbladder without

## 2024-06-05 NOTE — DISCHARGE SUMMARY
Discharge Summary    Franky Melendez  :  1975  MRN:  [id number]    Admit date:  2024      Discharge date: 2024     Admitting Physician:  Curtis Chavarria MD    Discharge Diagnoses:    Principal Problem:    Intractable abdominal pain  Active Problems:    HTN (hypertension)    S/P CABG (coronary artery bypass graft)    ASHD (arteriosclerotic heart disease) /  CABG   Resolved Problems:    * No resolved hospital problems. *      Hospital Course:   Farnky Melendez is a 49 y.o. male admitted with intractable abdominal pain.  He presented with complaints of abdominal pain.  Patient had laparoscopic cholecystectomy earlier in the day by Dr. Crowley.  He stated he was doing well upon discharge and denied nausea and was able to eat without difficulty when he got home.  He reported around 5 PM he developed severe sharp epigastric pain and some increased bleeding from the lower incision.  He denied fever or chills.  He denied chest pain.  He denies shortness of breath.  He states he has been passing flatus.  He does have history of CAD, CABG and multiple episodes of pancreatitis.  He also has history of hypertension and hyperlipidemia.  CT abdomen pelvis was completed and showed no acute findings only showed mild inflammatory changes in the gallbladder fossa but no evidence of abscess, bleeding or biliary leak.  ALT and AST were slightly elevated at 49 and 43.  During patient's admission labs were monitored electrolytes replaced.  He was given pain control and tolerated well.  He is ambulated in the chan he is passing gas and overall is doing better.  He does have some old drainage at the umbilical site but nothing else acute.  Plan will be to discharge patient back home today and he will follow-up with Dr. Crowley as an outpatient.  I will also stop the Norco and place him on Percocet.    Consultants:   Dr. Crowley    Procedures: none    Complications: none    Discharge Condition: fair    Exam:  GEN:    Awake, alert

## 2024-06-06 ENCOUNTER — OFFICE VISIT (OUTPATIENT)
Dept: PRIMARY CARE CLINIC | Age: 49
End: 2024-06-06

## 2024-06-06 VITALS
RESPIRATION RATE: 16 BRPM | SYSTOLIC BLOOD PRESSURE: 140 MMHG | WEIGHT: 208 LBS | HEIGHT: 67 IN | BODY MASS INDEX: 32.65 KG/M2 | HEART RATE: 68 BPM | DIASTOLIC BLOOD PRESSURE: 90 MMHG

## 2024-06-06 DIAGNOSIS — I25.10 ASHD (ARTERIOSCLEROTIC HEART DISEASE): ICD-10-CM

## 2024-06-06 DIAGNOSIS — R00.2 PALPITATION: ICD-10-CM

## 2024-06-06 DIAGNOSIS — R07.89 CHEST WALL PAIN: ICD-10-CM

## 2024-06-06 DIAGNOSIS — R07.89 ATYPICAL CHEST PAIN: ICD-10-CM

## 2024-06-06 DIAGNOSIS — R10.84 GENERALIZED ABDOMINAL PAIN: ICD-10-CM

## 2024-06-06 DIAGNOSIS — Z90.49 S/P CHOLECYSTECTOMY: ICD-10-CM

## 2024-06-06 DIAGNOSIS — R94.39 ABNORMAL STRESS TEST: ICD-10-CM

## 2024-06-06 DIAGNOSIS — I10 ESSENTIAL HYPERTENSION: ICD-10-CM

## 2024-06-06 DIAGNOSIS — R60.0 LEG EDEMA: ICD-10-CM

## 2024-06-06 DIAGNOSIS — Z95.1 S/P CABG (CORONARY ARTERY BYPASS GRAFT): ICD-10-CM

## 2024-06-06 DIAGNOSIS — E78.5 DYSLIPIDEMIA: ICD-10-CM

## 2024-06-06 DIAGNOSIS — Z09 HOSPITAL DISCHARGE FOLLOW-UP: Primary | ICD-10-CM

## 2024-06-06 RX ORDER — FUROSEMIDE 20 MG/1
20 TABLET ORAL DAILY PRN
Qty: 90 TABLET | Refills: 0 | Status: SHIPPED | OUTPATIENT
Start: 2024-06-06

## 2024-06-06 RX ORDER — OXYCODONE HYDROCHLORIDE AND ACETAMINOPHEN 5; 325 MG/1; MG/1
2 TABLET ORAL EVERY 8 HOURS PRN
Qty: 42 TABLET | Refills: 0 | Status: SHIPPED | OUTPATIENT
Start: 2024-06-06 | End: 2024-06-13

## 2024-06-06 RX ORDER — DILTIAZEM HYDROCHLORIDE 120 MG/1
CAPSULE, COATED, EXTENDED RELEASE ORAL
Qty: 90 CAPSULE | Refills: 3 | OUTPATIENT
Start: 2024-06-06

## 2024-06-06 NOTE — PATIENT INSTRUCTIONS
[email or web address]

## 2024-06-06 NOTE — PROGRESS NOTES
change, chills, diaphoresis, fatigue, fever and unexpected weight change.   HENT: Negative for sinus pressure, sinus pain, sore throat and trouble swallowing.    Respiratory: Negative for cough, shortness of breath and wheezing.    Cardiovascular: Negative for chest pain, palpitations and leg swelling.   Gastrointestinal: Negative for abdominal pain, diarrhea, nausea and vomiting.   Endocrine: Negative for cold intolerance, polydipsia, polyphagia and polyuria.   Genitourinary: Negative for difficulty urinating, flank pain and frequency.   Musculoskeletal: Negative for gait problem and joint swelling. Negative for back pain, neck pain and neck stiffness.   Skin: Negative for color change and wound. Negative for pallor and rash.   Allergic/Immunologic: Negative for environmental allergies and food allergies.   Neurological: Negative for light-headedness, numbness and headaches.   Psychiatric/Behavioral: Negative for sleep disturbance. Negative for confusion and suicidal ideas.     Objective:    BP (!) 140/90   Pulse 68   Resp 16   Ht 1.702 m (5' 7\")   Wt 94.3 kg (208 lb)   BMI 32.58 kg/m²   General Appearance: alert and oriented to person, place and time, well developed and well- nourished, in no acute distress  Skin: warm and dry, no rash or erythema  Head: normocephalic and atraumatic  Eyes: pupils equal, round, and reactive to light, extraocular eye movements intact, conjunctivae normal  ENT: tympanic membrane, external ear and ear canal normal bilaterally, nose without deformity, nasal mucosa and turbinates normal without polyps  Neck: supple and non-tender without mass, no thyromegaly or thyroid nodules, no cervical lymphadenopathy  Pulmonary/Chest: clear to auscultation bilaterally- no wheezes, rales or rhonchi, normal air movement, no respiratory distress  Cardiovascular: normal rate, regular rhythm, normal S1 and S2, no murmurs, rubs, clicks, or gallops, distal pulses intact, no carotid bruits  Abdomen:

## 2024-06-06 NOTE — TELEPHONE ENCOUNTER
Care Transitions Initial Follow Up Call    Outreach made within 2 business days of discharge: Yes    Patient: Franky Melendez Patient : 1975   MRN: [id number]  Reason for Admission: There are no discharge diagnoses documented for the most recent discharge.  Discharge Date: 24       Left message to call the office    Shelly Ortiz

## 2024-06-10 ENCOUNTER — TELEPHONE (OUTPATIENT)
Dept: SURGERY | Age: 49
End: 2024-06-10

## 2024-06-25 ENCOUNTER — OFFICE VISIT (OUTPATIENT)
Dept: PRIMARY CARE CLINIC | Age: 49
End: 2024-06-25
Payer: COMMERCIAL

## 2024-06-25 VITALS
HEIGHT: 67 IN | DIASTOLIC BLOOD PRESSURE: 78 MMHG | OXYGEN SATURATION: 95 % | HEART RATE: 66 BPM | BODY MASS INDEX: 31.86 KG/M2 | WEIGHT: 203 LBS | SYSTOLIC BLOOD PRESSURE: 128 MMHG

## 2024-06-25 DIAGNOSIS — J45.20 MILD INTERMITTENT ASTHMA, UNSPECIFIED WHETHER COMPLICATED: ICD-10-CM

## 2024-06-25 DIAGNOSIS — M25.511 ACUTE PAIN OF RIGHT SHOULDER: ICD-10-CM

## 2024-06-25 DIAGNOSIS — K21.9 GASTROESOPHAGEAL REFLUX DISEASE, UNSPECIFIED WHETHER ESOPHAGITIS PRESENT: Primary | ICD-10-CM

## 2024-06-25 PROCEDURE — 99214 OFFICE O/P EST MOD 30 MIN: CPT | Performed by: STUDENT IN AN ORGANIZED HEALTH CARE EDUCATION/TRAINING PROGRAM

## 2024-06-25 PROCEDURE — 3078F DIAST BP <80 MM HG: CPT | Performed by: STUDENT IN AN ORGANIZED HEALTH CARE EDUCATION/TRAINING PROGRAM

## 2024-06-25 PROCEDURE — 3074F SYST BP LT 130 MM HG: CPT | Performed by: STUDENT IN AN ORGANIZED HEALTH CARE EDUCATION/TRAINING PROGRAM

## 2024-06-25 RX ORDER — TIZANIDINE 4 MG/1
4 TABLET ORAL 3 TIMES DAILY PRN
Qty: 42 TABLET | Refills: 0 | Status: SHIPPED | OUTPATIENT
Start: 2024-06-25 | End: 2024-07-09

## 2024-06-25 RX ORDER — ALBUTEROL SULFATE 90 UG/1
2 AEROSOL, METERED RESPIRATORY (INHALATION) 4 TIMES DAILY PRN
Qty: 18 G | Refills: 5 | Status: SHIPPED | OUTPATIENT
Start: 2024-06-25

## 2024-06-25 NOTE — PROGRESS NOTES
Detail Level: Zone Note Text (......Xxx Chief Complaint.): This diagnosis correlates with the and wheezing.    Cardiovascular: Negative for chest pain, palpitations and leg swelling.   Gastrointestinal: Negative for abdominal pain, diarrhea, nausea and vomiting. Positive for heartburn.  Endocrine: Negative for cold intolerance, polydipsia, polyphagia and polyuria.   Genitourinary: Negative for difficulty urinating, flank pain and frequency.   Musculoskeletal: Negative for gait problem and joint swelling. Negative for back pain, neck pain and neck stiffness. Some hip pain.  Skin: Negative for color change and wound. Negative for pallor and rash.   Allergic/Immunologic: Negative for environmental allergies and food allergies.   Neurological: Negative for light-headedness, numbness and headaches.   Psychiatric/Behavioral: Negative for sleep disturbance. Negative for confusion and suicidal ideas.     Objective:    /78   Pulse 66   Ht 1.702 m (5' 7\")   Wt 92.1 kg (203 lb)   SpO2 95%   BMI 31.79 kg/m²    BP Readings from Last 3 Encounters:   06/25/24 128/78   06/06/24 (!) 140/90   06/05/24 135/87     Physical Exam  Constitutional: Patient is oriented to person, place, and time. Patient appears well-developed and well-nourished. No distress.   HENT: Head: Normocephalic and atraumatic.   Eyes: Pupils are equal, round, and reactive to light. Conjunctivae are normal. Right eye exhibits no discharge. Left eye exhibits no discharge.   Cardiovascular: Normal rate, regular rhythm and normal heart sounds.   Pulmonary/Chest: Effort normal and breath sounds normal. No respiratory distress. Patient has no wheezes.   Abdominal: Soft. Bowel sounds are normal. Patient exhibits no distension. There is no tenderness.   Musculoskeletal:  Patient exhibits no edema and tenderness. Patient exhibits no deformity.   Neurological: Patient is alert and oriented to person, place, and time.   Skin: Skin is warm and dry. Patient is not diaphoretic.   Psychiatric: Patient's speech is normal and behavior is normal. Thought content

## 2024-07-06 PROBLEM — Z09 HOSPITAL DISCHARGE FOLLOW-UP: Status: RESOLVED | Noted: 2024-06-06 | Resolved: 2024-07-06

## 2024-07-08 DIAGNOSIS — M25.552 LEFT HIP PAIN: Primary | ICD-10-CM

## 2024-07-08 RX ORDER — HYDROCODONE BITARTRATE AND ACETAMINOPHEN 5; 325 MG/1; MG/1
1 TABLET ORAL EVERY 6 HOURS PRN
Qty: 28 TABLET | Refills: 0 | Status: SHIPPED | OUTPATIENT
Start: 2024-07-08 | End: 2024-07-15

## 2024-07-08 NOTE — TELEPHONE ENCOUNTER
Patient called requesting refill on his norco. States he is waiting on hip injections. Order pended please verified quantity and frequency. Pharmacy verified.

## 2024-07-15 ENCOUNTER — TELEPHONE (OUTPATIENT)
Dept: PRIMARY CARE CLINIC | Age: 49
End: 2024-07-15

## 2024-07-15 DIAGNOSIS — E11.9 TYPE 2 DIABETES MELLITUS WITHOUT COMPLICATION, UNSPECIFIED WHETHER LONG TERM INSULIN USE (HCC): Primary | ICD-10-CM

## 2024-07-15 NOTE — TELEPHONE ENCOUNTER
Pt will be having hip replacement surgery sometime this fall but surgeon wants him to get his A1C down. Pt is scheduled for a follow up to discuss this with you but wants to know if you'd like him to do labs prior to his appt on 7/26/24.

## 2024-07-16 ENCOUNTER — HOSPITAL ENCOUNTER (OUTPATIENT)
Age: 49
Discharge: HOME | End: 2024-07-16
Payer: COMMERCIAL

## 2024-07-16 DIAGNOSIS — E11.9 TYPE 2 DIABETES MELLITUS WITHOUT COMPLICATION, UNSPECIFIED WHETHER LONG TERM INSULIN USE (HCC): ICD-10-CM

## 2024-07-16 LAB
EST. AVERAGE GLUCOSE BLD GHB EST-MCNC: 203 MG/DL
HBA1C MFR BLD: 8.7 % (ref 4–6)

## 2024-07-16 PROCEDURE — 83036 HEMOGLOBIN GLYCOSYLATED A1C: CPT

## 2024-07-16 PROCEDURE — 36415 COLL VENOUS BLD VENIPUNCTURE: CPT

## 2024-07-18 ENCOUNTER — TELEPHONE (OUTPATIENT)
Dept: PRIMARY CARE CLINIC | Age: 49
End: 2024-07-18

## 2024-07-18 NOTE — TELEPHONE ENCOUNTER
Started CGM monitor and changed diet starting 07/15/2024. He reports that he hasn't been able to get his glucose below 220. Even fasting readings are over 200, no matter what he does. The highest reading he has seen since starting was 262. He is currently taking metformin 500 mg bid. Please advise.

## 2024-07-19 NOTE — TELEPHONE ENCOUNTER
Curtis Chavarria MD  7/18/2024  6:56 PM EDT Back to Top      Rx signed  Electronically signed by Curtis Chavarria MD on 7/18/2024 at 6:56 PM    Dianna Renee MA  7/18/2024 11:22 AM EDT       Patient is agreeable to starting Metformin 1000 MG BID. Medication pended for provider.    Curtis Chavarria MD  7/17/2024  6:31 PM EDT       Helmimi, what are Franky's thoughts on starting additional meds for this? For now I would increase the metformin to 1000mg BID, thank you.  Electronically signed by Curtis Chavarria MD on 7/17/2024 at 6:31 PM    Bailee Nguyen MA  7/17/2024 10:12 AM EDT       Spoke with patient. Informed him of results. His current dose of metformin is 500 mg bid. He does not take any other meds for diabetes.    Laisha Jeffries MA  7/17/2024 10:07 AM EDT       Left message for patient to return call.    Curtis Chavarria MD  7/16/2024  5:15 PM EDT       Please notify the patient that their lab results are reviewed, A1c 8.7  Can you please review current use of metformin /  other meds for diabetes? Thank you.  Electronically signed by Curtis Chavarria MD on 7/16/2024 at 5:15 PM

## 2024-07-26 ENCOUNTER — OFFICE VISIT (OUTPATIENT)
Dept: PRIMARY CARE CLINIC | Age: 49
End: 2024-07-26
Payer: COMMERCIAL

## 2024-07-26 VITALS
OXYGEN SATURATION: 97 % | HEART RATE: 88 BPM | SYSTOLIC BLOOD PRESSURE: 136 MMHG | HEIGHT: 67 IN | WEIGHT: 199 LBS | BODY MASS INDEX: 31.23 KG/M2 | DIASTOLIC BLOOD PRESSURE: 102 MMHG

## 2024-07-26 DIAGNOSIS — M25.552 LEFT HIP PAIN: ICD-10-CM

## 2024-07-26 DIAGNOSIS — G89.4 CHRONIC PAIN SYNDROME: ICD-10-CM

## 2024-07-26 DIAGNOSIS — E11.9 TYPE 2 DIABETES MELLITUS WITHOUT COMPLICATION, UNSPECIFIED WHETHER LONG TERM INSULIN USE (HCC): Primary | ICD-10-CM

## 2024-07-26 PROCEDURE — 3080F DIAST BP >= 90 MM HG: CPT | Performed by: STUDENT IN AN ORGANIZED HEALTH CARE EDUCATION/TRAINING PROGRAM

## 2024-07-26 PROCEDURE — 3052F HG A1C>EQUAL 8.0%<EQUAL 9.0%: CPT | Performed by: STUDENT IN AN ORGANIZED HEALTH CARE EDUCATION/TRAINING PROGRAM

## 2024-07-26 PROCEDURE — G2211 COMPLEX E/M VISIT ADD ON: HCPCS | Performed by: STUDENT IN AN ORGANIZED HEALTH CARE EDUCATION/TRAINING PROGRAM

## 2024-07-26 PROCEDURE — 3075F SYST BP GE 130 - 139MM HG: CPT | Performed by: STUDENT IN AN ORGANIZED HEALTH CARE EDUCATION/TRAINING PROGRAM

## 2024-07-26 PROCEDURE — 99214 OFFICE O/P EST MOD 30 MIN: CPT | Performed by: STUDENT IN AN ORGANIZED HEALTH CARE EDUCATION/TRAINING PROGRAM

## 2024-07-26 RX ORDER — HYDROCODONE BITARTRATE AND ACETAMINOPHEN 5; 325 MG/1; MG/1
1 TABLET ORAL EVERY 6 HOURS PRN
Qty: 28 TABLET | Refills: 0 | Status: SHIPPED | OUTPATIENT
Start: 2024-07-26 | End: 2024-08-02

## 2024-07-26 RX ORDER — TIRZEPATIDE 5 MG/.5ML
5 INJECTION, SOLUTION SUBCUTANEOUS WEEKLY
Qty: 4 ADJUSTABLE DOSE PRE-FILLED PEN SYRINGE | Refills: 2 | Status: SHIPPED | OUTPATIENT
Start: 2024-07-26

## 2024-07-26 RX ORDER — HYDROCODONE BITARTRATE AND ACETAMINOPHEN 5; 325 MG/1; MG/1
1 TABLET ORAL EVERY 6 HOURS PRN
Qty: 28 TABLET | Refills: 0 | Status: SHIPPED | OUTPATIENT
Start: 2024-07-26 | End: 2024-07-26 | Stop reason: SDUPTHER

## 2024-07-26 RX ORDER — INSULIN GLARGINE 100 [IU]/ML
10 INJECTION, SOLUTION SUBCUTANEOUS NIGHTLY
Qty: 5 ADJUSTABLE DOSE PRE-FILLED PEN SYRINGE | Refills: 3 | Status: SHIPPED | OUTPATIENT
Start: 2024-07-26

## 2024-07-26 NOTE — PROGRESS NOTES
[facility]  [address] [place] [address] , [address]  [place], [place], [address]    Franky Melendez is a 49 y.o. male with  has a past medical history of CAD (coronary artery disease), Carpal tunnel syndrome, Depressive disorder, not elsewhere classified, Diabetes mellitus ([facility]), Gastrointestinal hemorrhage with hematemesis, Heart attack ([facility]), History of echocardiogram, History of pancreatitis, Hyperlipidemia, Hypertension, and PTSD (post-traumatic stress disorder).  Presented to the office today for:  Chief Complaint   Patient presents with    Diabetes    Weight Management       Assessment/Plan   1. Type 2 diabetes mellitus without complication, unspecified whether long term insulin use (HCC)  -     insulin glargine (LANTUS SOLOSTAR) 100 UNIT/ML injection pen; Inject 10 Units into the skin nightly, Disp-5 Adjustable Dose Pre-filled Pen Syringe, R-3Normal  -     Insulin Pen Needle 32G X 4 MM MISC; DAILY Starting Fri 7/26/2024, Disp-100 each, R-3, Normal  -     Tirzepatide (MOUNJARO) 5 MG/0.5ML SOPN SC injection; Inject 0.5 mLs into the skin once a week, Disp-4 Adjustable Dose Pre-filled Pen Syringe, R-2Normal  2. Left hip pain  -     HYDROcodone-acetaminophen (NORCO) 5-325 MG per tablet; Take 1 tablet by mouth every 6 hours as needed for Pain for up to 7 days. Intended supply: 3 days. Take lowest dose possible to manage pain Max Daily Amount: 4 tablets, Disp-28 tablet, R-0Normal  3. Chronic pain syndrome  -     HYDROcodone-acetaminophen (NORCO) 5-325 MG per tablet; Take 1 tablet by mouth every 6 hours as needed for Pain for up to 7 days. Intended supply: 3 days. Take lowest dose possible to manage pain Max Daily Amount: 4 tablets, Disp-28 tablet, R-0Normal  Return if symptoms worsen or fail to improve.    We discussed some of the etiologies and natural histories. We discussed the various treatment alternatives including anti-inflammatory medications, physical therapy, injections, further imaging

## 2024-07-26 NOTE — TELEPHONE ENCOUNTER
Norco is on back order at [facility]. Pt would like medication resent to [facility] in [place]. Script pending

## 2024-07-27 DIAGNOSIS — Z95.1 S/P CABG (CORONARY ARTERY BYPASS GRAFT): ICD-10-CM

## 2024-07-27 DIAGNOSIS — E78.2 MIXED HYPERLIPIDEMIA: ICD-10-CM

## 2024-07-27 DIAGNOSIS — I10 PRIMARY HYPERTENSION: ICD-10-CM

## 2024-07-27 DIAGNOSIS — I25.10 ASHD (ARTERIOSCLEROTIC HEART DISEASE): ICD-10-CM

## 2024-07-27 DIAGNOSIS — R00.2 HEART PALPITATIONS: ICD-10-CM

## 2024-07-29 RX ORDER — AMLODIPINE BESYLATE 5 MG/1
5 TABLET ORAL DAILY
Qty: 90 TABLET | Refills: 3 | Status: SHIPPED | OUTPATIENT
Start: 2024-07-29

## 2024-08-06 DIAGNOSIS — I25.10 ASHD (ARTERIOSCLEROTIC HEART DISEASE): ICD-10-CM

## 2024-08-06 DIAGNOSIS — I10 PRIMARY HYPERTENSION: ICD-10-CM

## 2024-08-06 DIAGNOSIS — R00.2 HEART PALPITATIONS: ICD-10-CM

## 2024-08-06 DIAGNOSIS — Z95.1 S/P CABG (CORONARY ARTERY BYPASS GRAFT): ICD-10-CM

## 2024-08-06 DIAGNOSIS — E78.2 MIXED HYPERLIPIDEMIA: ICD-10-CM

## 2024-08-06 RX ORDER — CLOPIDOGREL BISULFATE 75 MG/1
75 TABLET ORAL DAILY
Qty: 90 TABLET | Refills: 3 | Status: SHIPPED | OUTPATIENT
Start: 2024-08-06

## 2024-08-06 RX ORDER — RANOLAZINE 500 MG/1
TABLET, EXTENDED RELEASE ORAL
Qty: 90 TABLET | Refills: 3 | Status: SHIPPED | OUTPATIENT
Start: 2024-08-06

## 2024-08-06 RX ORDER — METOPROLOL SUCCINATE 50 MG/1
50 TABLET, EXTENDED RELEASE ORAL DAILY
Qty: 90 TABLET | Refills: 3 | Status: SHIPPED | OUTPATIENT
Start: 2024-08-06

## 2024-08-06 RX ORDER — AMLODIPINE BESYLATE 5 MG/1
5 TABLET ORAL DAILY
Qty: 90 TABLET | Refills: 3 | Status: SHIPPED | OUTPATIENT
Start: 2024-08-06

## 2024-08-07 DIAGNOSIS — G89.4 CHRONIC PAIN SYNDROME: ICD-10-CM

## 2024-08-07 DIAGNOSIS — M25.552 LEFT HIP PAIN: Primary | ICD-10-CM

## 2024-08-07 DIAGNOSIS — E78.5 HYPERLIPIDEMIA, UNSPECIFIED HYPERLIPIDEMIA TYPE: ICD-10-CM

## 2024-08-07 RX ORDER — HYDROCODONE BITARTRATE AND ACETAMINOPHEN 5; 325 MG/1; MG/1
1 TABLET ORAL EVERY 6 HOURS PRN
Qty: 28 TABLET | Refills: 0 | Status: SHIPPED | OUTPATIENT
Start: 2024-08-07 | End: 2024-08-08 | Stop reason: SDUPTHER

## 2024-08-07 NOTE — TELEPHONE ENCOUNTER
Patient called for refill on his norco. Was only given 7 days supply on 7/26. Order pended for approval or denial.  Pharmacy verified.

## 2024-08-08 DIAGNOSIS — G89.4 CHRONIC PAIN SYNDROME: ICD-10-CM

## 2024-08-08 DIAGNOSIS — M25.552 LEFT HIP PAIN: ICD-10-CM

## 2024-08-08 RX ORDER — SODIUM CHLORIDE, SODIUM LACTATE, POTASSIUM CHLORIDE, CALCIUM CHLORIDE 600; 310; 30; 20 MG/100ML; MG/100ML; MG/100ML; MG/100ML
INJECTION, SOLUTION INTRAVENOUS CONTINUOUS
Status: DISCONTINUED | OUTPATIENT
Start: 2024-08-08 | End: 2024-08-09 | Stop reason: HOSPADM

## 2024-08-08 RX ORDER — HYDROCODONE BITARTRATE AND ACETAMINOPHEN 5; 325 MG/1; MG/1
1 TABLET ORAL EVERY 6 HOURS PRN
Qty: 28 TABLET | Refills: 0 | Status: SHIPPED | OUTPATIENT
Start: 2024-08-08 | End: 2024-08-15

## 2024-08-08 RX ORDER — FENOFIBRATE 160 MG/1
160 TABLET ORAL DAILY
Qty: 30 TABLET | Refills: 2 | Status: SHIPPED | OUTPATIENT
Start: 2024-08-08

## 2024-08-08 NOTE — H&P
[facility]  Sedation/Analgesia History & Physical    Patient: Franky Melendez : 1975  Med Rec#: [id number] Acc#: [id number]   Provider Performing Procedure: Selene Dias MD  Primary Care Physician: Curtis Chavarria MD    PRE-PROCEDURE   Brief History/Pre-Procedure Diagnosis:The patient is a 49 y.o.,  male with significant past medical history of gastric ulcer, surveillance of healing.          MEDICAL HISTORY  []CAD/Valve  []Liver Disease  []Lung Disease []Diabetes  []Hypertension []Renal Disease  [x]Additional information:       has a past medical history of CAD (coronary artery disease), Carpal tunnel syndrome, Depressive disorder, not elsewhere classified, Diabetes mellitus (HCC), Gastrointestinal hemorrhage with hematemesis, Heart attack (HCC), History of echocardiogram, History of pancreatitis, Hyperlipidemia, Hypertension, and PTSD (post-traumatic stress disorder).    SURGICAL HISTORY   has a past surgical history that includes Coronary angioplasty (2018); Cardiac catheterization (Left, 2018); Coronary artery bypass graft (); Cardiac catheterization (Left, 2022); Colonoscopy (N/A, 10/04/2022); Upper gastrointestinal endoscopy (N/A, 10/04/2022); Colonoscopy (10/04/2022); Esophagogastroduodenoscopy (10/04/2022); Upper gastrointestinal endoscopy (N/A, 2023); Esophagogastroduodenoscopy (2023); Cardiac procedure (N/A, 2023); Shoulder arthroscopy (Right, 2023); Medication Injection (Bilateral, 2023); Upper gastrointestinal endoscopy (N/A, 2024); Cardiac procedure (N/A, 2024); and Cholecystectomy, laparoscopic (N/A, 2024).  Additional information:       ALLERGIES   Allergies as of 2024    (No Known Allergies)     Additional information:       MEDICATIONS       Current Facility-Administered Medications:     lactated ringers IV soln infusion, , IntraVENous, Continuous, Selene Dias MD, Last Rate: 75 mL/hr at 24 0933,

## 2024-08-08 NOTE — TELEPHONE ENCOUNTER
[facility] does not have Norco in stock and is not sure when they will. Please resend to [facility] [place]. They have a limited supply. Medication pending

## 2024-08-08 NOTE — PROCEDURES
[facility] Endoscopy     EGD Report    Patient: Franky Melendez  : 1975  Acct#: [id number]     BRIEF HISTORY AND INDICATIONS:    The patient is a 49 y.o.,  male with significant past medical history of gastric ulcer.  Surveillance for healing.    PREMEDICATION:  TIVA anesthesia was used, see Anesthesia note for details.    INSTRUMENT:  Olympus GIF H-180 gastroscope    The risks and benefits of upper endoscopy with biopsy and dilation were  described to the patient, including but not limited to bleeding, infection, poking a hole someplace requiring surgery to fix it, having reaction to medication, and death. The patient understood these risks and provided informed consent.  The patient was placed in the left lateral decubitus position.  Conscious sedation was administered .  The patient was continuously monitored to ensure adequate sedation and patient safety.     A forward-viewing Olympus endoscope was lubricated and inserted through the mouth into the oropharynx. Under direct visualization, the upper esophagus was intubated.  The scope was advanced to the esophagus and stomach to second portion of duodenum.  Scope was slowly withdrawn with good views of mucosal surfaces.  The scope was retroflexed in the fundus. Findings and maneuvers are listed in impression below. The patient tolerated the procedure well.  The scope was removed. The patient was removed to the recovery area.       IMPRESSION:      1.  There were no more gastric ulcers noted .      2.  Biopsies taken of the stomach rule out H. pylori  .      3.  There was undigested food in the stomach ,suspect Gastroparesis.      4.  Otherwise, normal upper endoscopy to the 2nd portion of the duodenum,     RECOMMENDATIONS:    1. Resume all  medications.   2. Will need a Gastric Emptying Study as outpatient to evaluate for Gastroparesis.   Pt. Is now on Monjaro for diabetes, and even though he did hold this x 1 week, the gastric emptying may

## 2024-08-09 ENCOUNTER — HOSPITAL ENCOUNTER (OUTPATIENT)
Age: 49
Setting detail: OUTPATIENT SURGERY
Discharge: HOME | End: 2024-08-09
Attending: INTERNAL MEDICINE | Admitting: INTERNAL MEDICINE
Payer: COMMERCIAL

## 2024-08-09 ENCOUNTER — ANESTHESIA (OUTPATIENT)
Dept: ENDOSCOPY | Age: 49
End: 2024-08-09
Payer: COMMERCIAL

## 2024-08-09 ENCOUNTER — ANESTHESIA EVENT (OUTPATIENT)
Dept: ENDOSCOPY | Age: 49
End: 2024-08-09
Payer: COMMERCIAL

## 2024-08-09 VITALS
WEIGHT: 189 LBS | OXYGEN SATURATION: 97 % | DIASTOLIC BLOOD PRESSURE: 58 MMHG | BODY MASS INDEX: 29.66 KG/M2 | TEMPERATURE: 97.8 F | SYSTOLIC BLOOD PRESSURE: 111 MMHG | HEIGHT: 67 IN | RESPIRATION RATE: 14 BRPM | HEART RATE: 78 BPM

## 2024-08-09 DIAGNOSIS — K31.84 GASTROPARESIS: Primary | ICD-10-CM

## 2024-08-09 PROCEDURE — 2500000003 HC RX 250 WO HCPCS: Performed by: NURSE ANESTHETIST, CERTIFIED REGISTERED

## 2024-08-09 PROCEDURE — 3700000001 HC ADD 15 MINUTES (ANESTHESIA): Performed by: INTERNAL MEDICINE

## 2024-08-09 PROCEDURE — 2580000003 HC RX 258: Performed by: INTERNAL MEDICINE

## 2024-08-09 PROCEDURE — 6360000002 HC RX W HCPCS: Performed by: NURSE ANESTHETIST, CERTIFIED REGISTERED

## 2024-08-09 PROCEDURE — 3609012400 HC EGD TRANSORAL BIOPSY SINGLE/MULTIPLE: Performed by: INTERNAL MEDICINE

## 2024-08-09 PROCEDURE — 7100000010 HC PHASE II RECOVERY - FIRST 15 MIN: Performed by: INTERNAL MEDICINE

## 2024-08-09 PROCEDURE — 88305 TISSUE EXAM BY PATHOLOGIST: CPT

## 2024-08-09 PROCEDURE — 3700000000 HC ANESTHESIA ATTENDED CARE: Performed by: INTERNAL MEDICINE

## 2024-08-09 RX ORDER — PROPOFOL 10 MG/ML
INJECTION, EMULSION INTRAVENOUS PRN
Status: DISCONTINUED | OUTPATIENT
Start: 2024-08-09 | End: 2024-08-09 | Stop reason: SDUPTHER

## 2024-08-09 RX ORDER — LIDOCAINE HYDROCHLORIDE 20 MG/ML
INJECTION, SOLUTION INFILTRATION; PERINEURAL PRN
Status: DISCONTINUED | OUTPATIENT
Start: 2024-08-09 | End: 2024-08-09 | Stop reason: SDUPTHER

## 2024-08-09 RX ADMIN — PROPOFOL 50 MG: 10 INJECTION, EMULSION INTRAVENOUS at 10:53

## 2024-08-09 RX ADMIN — LIDOCAINE HYDROCHLORIDE 100 MG: 20 INJECTION, SOLUTION INFILTRATION; PERINEURAL at 10:49

## 2024-08-09 RX ADMIN — SODIUM CHLORIDE, POTASSIUM CHLORIDE, SODIUM LACTATE AND CALCIUM CHLORIDE: 600; 310; 30; 20 INJECTION, SOLUTION INTRAVENOUS at 09:33

## 2024-08-09 RX ADMIN — PROPOFOL 100 MG: 10 INJECTION, EMULSION INTRAVENOUS at 10:49

## 2024-08-09 RX ADMIN — PROPOFOL 100 MG: 10 INJECTION, EMULSION INTRAVENOUS at 10:51

## 2024-08-09 ASSESSMENT — PAIN - FUNCTIONAL ASSESSMENT
PAIN_FUNCTIONAL_ASSESSMENT: 0-10
PAIN_FUNCTIONAL_ASSESSMENT: NONE - DENIES PAIN
PAIN_FUNCTIONAL_ASSESSMENT: WONG-BAKER FACES

## 2024-08-09 NOTE — DISCHARGE INSTRUCTIONS
IMPRESSION:      1.  There were no more gastric ulcers noted .      2.  Biopsies taken of the stomach rule out H. pylori  .      3.  There was undigested food in the stomach ,suspect Gastroparesis.      4.  Otherwise, normal upper endoscopy to the 2nd portion of the duodenum,     RECOMMENDATIONS:    1. Resume all  medications.   2. Will need a Gastric Emptying Study as outpatient to evaluate for Gastroparesis  3. Follow up in office with MISTY Norton CNP in 3 months otherwise.

## 2024-08-09 NOTE — ANESTHESIA PRE PROCEDURE
Department of Anesthesiology  Preprocedure Note       Name:  Franky Melendez   Age:  49 y.o.  :  1975                                          MRN:  [id number]         Date:  2024      Surgeon: Surgeon(s):  Selene Dias MD    Procedure: Procedure(s):  EGD    Medications prior to admission:   Prior to Admission medications    Medication Sig Start Date End Date Taking? Authorizing Provider   HYDROcodone-acetaminophen (NORCO) 5-325 MG per tablet Take 1 tablet by mouth every 6 hours as needed for Pain for up to 7 days. Intended supply: 3 days. Take lowest dose possible to manage pain Max Daily Amount: 4 tablets 8/8/24 8/15/24  Curtis Chavarria MD   fenofibrate (TRIGLIDE) 160 MG tablet Take 1 tablet by mouth daily 24   Elijah Orozco MD   metoprolol succinate (TOPROL XL) 50 MG extended release tablet Take 1 tablet by mouth daily 24   Elijah Orozco MD   amLODIPine (NORVASC) 5 MG tablet Take 1 tablet by mouth daily 24   Elijah Orozco MD   clopidogrel (PLAVIX) 75 MG tablet Take 1 tablet by mouth daily 24   Elijah Orozco MD   ranolazine (RANEXA) 500 MG extended release tablet  24   Elijah Orozco MD   insulin glargine (LANTUS SOLOSTAR) 100 UNIT/ML injection pen Inject 10 Units into the skin nightly 24   Curtis Chavarria MD   Insulin Pen Needle 32G X 4 MM MISC 1 each by Does not apply route daily 24   Curtis Chavarria MD   Tirzepatide (MOUNJARO) 5 MG/0.5ML SOPN SC injection Inject 0.5 mLs into the skin once a week 24   Curtis Chavarria MD   metFORMIN (GLUCOPHAGE) 1000 MG tablet Take 1 tablet by mouth 2 times daily (with meals) 24   Curtis Chavarria MD   albuterol sulfate HFA (VENTOLIN HFA) 108 (90 Base) MCG/ACT inhaler Inhale 2 puffs into the lungs 4 times daily as needed for Wheezing or Shortness of Breath 24   Curtis Chavarria MD   furosemide (LASIX) 20 MG tablet TAKE 1 TABLET BY MOUTH DAILY AS NEEDED (LEG EDEMA) 24   Curtis Chavarria MD   lisinopril

## 2024-08-09 NOTE — PROGRESS NOTES
Recovery mode. Denies discomfort, passing gas, taking fluids. Dr. Dias discussed findings and plan of care with patient and . Discharge instructions provided, understanding verbalized.

## 2024-08-09 NOTE — ANESTHESIA POSTPROCEDURE EVALUATION
Department of Anesthesiology  Postprocedure Note    Patient: Franky Melendez  MRN: [id number]  YOB: 1975  Date of evaluation: 8/9/2024    Procedure Summary       Date: 08/09/24 Room / Location: [place] [address] / [facility]    Anesthesia Start: 1045 Anesthesia Stop: 1059    Procedure: ESOPHAGOGASTRODUODENOSCOPY BIOPSY Diagnosis:       Gastric ulcer, unspecified chronicity, unspecified whether gastric ulcer hemorrhage or perforation present      (Gastric ulcer, unspecified chronicity, unspecified whether gastric ulcer hemorrhage or perforation present [K25.9])    Surgeons: Selene Dias MD Responsible Provider: Emery Moreira DO    Anesthesia Type: MAC ASA Status: 3            Anesthesia Type: MAC    Yrn Phase I: Yrn Score: 10    Yrn Phase II:      Anesthesia Post Evaluation    Patient location during evaluation: bedside  Patient participation: complete - patient participated  Level of consciousness: sleepy but conscious  Airway patency: patent  Nausea & Vomiting: no nausea and no vomiting  Cardiovascular status: hemodynamically stable  Respiratory status: acceptable and spontaneous ventilation  Hydration status: stable  Pain management: adequate        No notable events documented.

## 2024-08-10 RX ORDER — LISINOPRIL 20 MG/1
20 TABLET ORAL DAILY
Qty: 30 TABLET | Refills: 2 | Status: SHIPPED | OUTPATIENT
Start: 2024-08-10

## 2024-08-12 DIAGNOSIS — E78.5 HYPERLIPIDEMIA, UNSPECIFIED HYPERLIPIDEMIA TYPE: ICD-10-CM

## 2024-08-12 RX ORDER — FENOFIBRATE 160 MG/1
160 TABLET ORAL DAILY
Qty: 90 TABLET | Refills: 3 | Status: SHIPPED | OUTPATIENT
Start: 2024-08-12

## 2024-08-14 RX ORDER — RANOLAZINE 500 MG/1
TABLET, EXTENDED RELEASE ORAL
Qty: 90 TABLET | Refills: 3 | Status: CANCELLED | OUTPATIENT
Start: 2024-08-14

## 2024-08-15 DIAGNOSIS — R07.89 CHEST DISCOMFORT: Primary | ICD-10-CM

## 2024-08-15 RX ORDER — RANOLAZINE 500 MG/1
TABLET, EXTENDED RELEASE ORAL
Qty: 180 TABLET | Refills: 3 | Status: SHIPPED | OUTPATIENT
Start: 2024-08-15

## 2024-08-15 RX ORDER — RANOLAZINE 500 MG/1
TABLET, EXTENDED RELEASE ORAL
Qty: 90 TABLET | Refills: 3 | OUTPATIENT
Start: 2024-08-15

## 2024-08-20 ENCOUNTER — HOSPITAL ENCOUNTER (OUTPATIENT)
Age: 49
Discharge: HOME | End: 2024-08-20
Payer: COMMERCIAL

## 2024-08-20 ENCOUNTER — OFFICE VISIT (OUTPATIENT)
Dept: PRIMARY CARE CLINIC | Age: 49
End: 2024-08-20
Payer: COMMERCIAL

## 2024-08-20 VITALS
RESPIRATION RATE: 18 BRPM | WEIGHT: 189 LBS | HEART RATE: 68 BPM | DIASTOLIC BLOOD PRESSURE: 68 MMHG | SYSTOLIC BLOOD PRESSURE: 138 MMHG | BODY MASS INDEX: 29.66 KG/M2 | HEIGHT: 67 IN

## 2024-08-20 DIAGNOSIS — G89.4 CHRONIC PAIN SYNDROME: ICD-10-CM

## 2024-08-20 DIAGNOSIS — E11.9 TYPE 2 DIABETES MELLITUS WITHOUT COMPLICATION, UNSPECIFIED WHETHER LONG TERM INSULIN USE (HCC): ICD-10-CM

## 2024-08-20 DIAGNOSIS — E11.9 TYPE 2 DIABETES MELLITUS WITHOUT COMPLICATION, UNSPECIFIED WHETHER LONG TERM INSULIN USE (HCC): Primary | ICD-10-CM

## 2024-08-20 DIAGNOSIS — M25.552 LEFT HIP PAIN: ICD-10-CM

## 2024-08-20 DIAGNOSIS — E86.0 DEHYDRATION: ICD-10-CM

## 2024-08-20 LAB
EST. AVERAGE GLUCOSE BLD GHB EST-MCNC: 151 MG/DL
HBA1C MFR BLD: 6.9 % (ref 4–6)

## 2024-08-20 PROCEDURE — 3075F SYST BP GE 130 - 139MM HG: CPT | Performed by: STUDENT IN AN ORGANIZED HEALTH CARE EDUCATION/TRAINING PROGRAM

## 2024-08-20 PROCEDURE — 3078F DIAST BP <80 MM HG: CPT | Performed by: STUDENT IN AN ORGANIZED HEALTH CARE EDUCATION/TRAINING PROGRAM

## 2024-08-20 PROCEDURE — 83036 HEMOGLOBIN GLYCOSYLATED A1C: CPT

## 2024-08-20 PROCEDURE — 36415 COLL VENOUS BLD VENIPUNCTURE: CPT

## 2024-08-20 PROCEDURE — 3052F HG A1C>EQUAL 8.0%<EQUAL 9.0%: CPT | Performed by: STUDENT IN AN ORGANIZED HEALTH CARE EDUCATION/TRAINING PROGRAM

## 2024-08-20 PROCEDURE — G2211 COMPLEX E/M VISIT ADD ON: HCPCS | Performed by: STUDENT IN AN ORGANIZED HEALTH CARE EDUCATION/TRAINING PROGRAM

## 2024-08-20 PROCEDURE — 99214 OFFICE O/P EST MOD 30 MIN: CPT | Performed by: STUDENT IN AN ORGANIZED HEALTH CARE EDUCATION/TRAINING PROGRAM

## 2024-08-20 RX ORDER — HYDROCODONE BITARTRATE AND ACETAMINOPHEN 5; 325 MG/1; MG/1
1 TABLET ORAL EVERY 6 HOURS PRN
Qty: 28 TABLET | Refills: 0 | Status: SHIPPED | OUTPATIENT
Start: 2024-08-20 | End: 2024-08-27

## 2024-08-20 RX ORDER — ONDANSETRON 4 MG/1
TABLET, ORALLY DISINTEGRATING ORAL
COMMUNITY
Start: 2024-07-26

## 2024-08-20 RX ORDER — INSULIN GLARGINE-YFGN 100 [IU]/ML
10 INJECTION, SOLUTION SUBCUTANEOUS NIGHTLY
COMMUNITY
Start: 2024-07-26

## 2024-08-20 NOTE — PROGRESS NOTES
Maternal Aunt     Pancreatic Cancer Maternal Aunt     Colon Cancer Maternal Uncle     Colon Cancer Maternal Uncle     Anesth Problems Neg Hx     Bleeding Prob Neg Hx            2/8/2024    11:52 AM 1/10/2023     1:27 PM 11/18/2022     1:16 PM 1/10/2019    10:55 AM   PHQ Scores   PHQ2 Score 0 0 4 0   PHQ9 Score 0 0 14 0     Interpretation of Total Score Depression Severity: 1-4 = Minimal depression, 5-9 = Mild depression, 10-14 = Moderate depression, 15-19 = Moderately severe depression, 20-27 = Severe depression    Review of Systems  Constitutional: Negative for activity change, appetite change, chills, diaphoresis, fatigue, fever and unexpected weight change.   HENT: Negative for sinus pressure, sinus pain, sore throat and trouble swallowing.    Respiratory: Negative for cough, shortness of breath and wheezing.    Cardiovascular: Negative for chest pain, palpitations and leg swelling.   Gastrointestinal: Negative for abdominal pain, diarrhea, nausea and vomiting.   Endocrine: Negative for cold intolerance, polydipsia, polyphagia and polyuria.   Genitourinary: Negative for difficulty urinating, flank pain and frequency.   Musculoskeletal: Negative for gait problem and joint swelling. Negative for back pain, neck pain and neck stiffness. Hip pain  Skin: Negative for color change and wound. Negative for pallor and rash.   Allergic/Immunologic: Negative for environmental allergies and food allergies.   Neurological: Negative for light-headedness, numbness and headaches.   Psychiatric/Behavioral: Negative for sleep disturbance. Negative for confusion and suicidal ideas.     Objective:    /68   Pulse 68   Resp 18   Ht 1.702 m (5' 7\")   Wt 85.7 kg (189 lb)   BMI 29.60 kg/m²    BP Readings from Last 3 Encounters:   08/20/24 138/68   08/09/24 (!) 111/58   07/26/24 (!) 136/102     Physical Exam  Constitutional: Patient is oriented to person, place, and time. Patient appears well-developed and well-nourished. No

## 2024-08-27 DIAGNOSIS — R60.0 LEG EDEMA: ICD-10-CM

## 2024-08-27 DIAGNOSIS — R07.89 CHEST WALL PAIN: ICD-10-CM

## 2024-08-27 RX ORDER — FUROSEMIDE 20 MG/1
20 TABLET ORAL DAILY PRN
Qty: 90 TABLET | Refills: 0 | Status: SHIPPED | OUTPATIENT
Start: 2024-08-27

## 2024-08-29 ENCOUNTER — TELEPHONE (OUTPATIENT)
Dept: PRIMARY CARE CLINIC | Age: 49
End: 2024-08-29

## 2024-08-29 DIAGNOSIS — M25.552 LEFT HIP PAIN: ICD-10-CM

## 2024-08-29 DIAGNOSIS — G89.4 CHRONIC PAIN SYNDROME: ICD-10-CM

## 2024-08-29 DIAGNOSIS — E11.9 TYPE 2 DIABETES MELLITUS WITHOUT COMPLICATION, UNSPECIFIED WHETHER LONG TERM INSULIN USE (HCC): ICD-10-CM

## 2024-08-29 RX ORDER — HYDROCODONE BITARTRATE AND ACETAMINOPHEN 5; 325 MG/1; MG/1
1 TABLET ORAL EVERY 6 HOURS PRN
Qty: 28 TABLET | Refills: 0 | Status: SHIPPED | OUTPATIENT
Start: 2024-08-29 | End: 2024-09-05

## 2024-08-29 NOTE — TELEPHONE ENCOUNTER
Patients relative had an appt today and states patient was in a lot of pain from hip. Was requesting pain medication refill

## 2024-08-30 RX ORDER — INSULIN GLARGINE-YFGN 100 [IU]/ML
10 INJECTION, SOLUTION SUBCUTANEOUS NIGHTLY
Qty: 100 ML | Refills: 0 | Status: SHIPPED | OUTPATIENT
Start: 2024-08-30

## 2024-08-30 RX ORDER — PEN NEEDLE, DIABETIC 32GX 5/32"
1 NEEDLE, DISPOSABLE MISCELLANEOUS DAILY
Qty: 100 EACH | Refills: 3 | Status: SHIPPED | OUTPATIENT
Start: 2024-08-30

## 2024-09-10 DIAGNOSIS — G89.4 CHRONIC PAIN SYNDROME: ICD-10-CM

## 2024-09-10 DIAGNOSIS — M25.552 LEFT HIP PAIN: Primary | ICD-10-CM

## 2024-09-10 RX ORDER — HYDROCODONE BITARTRATE AND ACETAMINOPHEN 5; 325 MG/1; MG/1
1 TABLET ORAL EVERY 6 HOURS PRN
Qty: 28 TABLET | Refills: 0 | Status: SHIPPED | OUTPATIENT
Start: 2024-09-10 | End: 2024-09-17

## 2024-09-18 DIAGNOSIS — I25.10 ASHD (ARTERIOSCLEROTIC HEART DISEASE): ICD-10-CM

## 2024-09-18 DIAGNOSIS — I10 PRIMARY HYPERTENSION: Primary | ICD-10-CM

## 2024-09-19 PROBLEM — E86.0 DEHYDRATION: Status: RESOLVED | Noted: 2024-08-20 | Resolved: 2024-09-19

## 2024-09-19 RX ORDER — LISINOPRIL 20 MG/1
20 TABLET ORAL DAILY
Qty: 90 TABLET | Refills: 3 | OUTPATIENT
Start: 2024-09-19

## 2024-09-19 RX ORDER — LISINOPRIL 20 MG/1
20 TABLET ORAL DAILY
Qty: 90 TABLET | Refills: 3 | Status: SHIPPED | OUTPATIENT
Start: 2024-09-19

## 2024-09-23 DIAGNOSIS — G89.4 CHRONIC PAIN SYNDROME: ICD-10-CM

## 2024-09-23 DIAGNOSIS — M25.552 LEFT HIP PAIN: ICD-10-CM

## 2024-09-23 RX ORDER — HYDROCODONE BITARTRATE AND ACETAMINOPHEN 5; 325 MG/1; MG/1
1 TABLET ORAL EVERY 6 HOURS PRN
Qty: 28 TABLET | Refills: 0 | Status: SHIPPED | OUTPATIENT
Start: 2024-09-23 | End: 2024-09-30

## 2024-09-27 ENCOUNTER — HOSPITAL ENCOUNTER (OUTPATIENT)
Age: 49
Discharge: HOME | End: 2024-09-29
Payer: COMMERCIAL

## 2024-09-27 ENCOUNTER — HOSPITAL ENCOUNTER (OUTPATIENT)
Dept: GENERAL RADIOLOGY | Age: 49
Discharge: HOME | End: 2024-09-29
Payer: COMMERCIAL

## 2024-09-27 ENCOUNTER — HOSPITAL ENCOUNTER (OUTPATIENT)
Age: 49
Discharge: HOME | End: 2024-09-27
Payer: COMMERCIAL

## 2024-09-27 ENCOUNTER — OFFICE VISIT (OUTPATIENT)
Dept: CARDIOLOGY | Age: 49
End: 2024-09-27
Payer: COMMERCIAL

## 2024-09-27 VITALS
HEIGHT: 67 IN | SYSTOLIC BLOOD PRESSURE: 103 MMHG | HEART RATE: 92 BPM | OXYGEN SATURATION: 96 % | DIASTOLIC BLOOD PRESSURE: 66 MMHG | RESPIRATION RATE: 18 BRPM | WEIGHT: 170 LBS | BODY MASS INDEX: 26.68 KG/M2

## 2024-09-27 DIAGNOSIS — Z95.1 S/P CABG (CORONARY ARTERY BYPASS GRAFT): ICD-10-CM

## 2024-09-27 DIAGNOSIS — E78.5 DYSLIPIDEMIA: ICD-10-CM

## 2024-09-27 DIAGNOSIS — I10 PRIMARY HYPERTENSION: ICD-10-CM

## 2024-09-27 DIAGNOSIS — R94.31 ABNORMAL ECG: ICD-10-CM

## 2024-09-27 DIAGNOSIS — I25.10 ASHD (ARTERIOSCLEROTIC HEART DISEASE): Primary | ICD-10-CM

## 2024-09-27 DIAGNOSIS — Z01.818 PREOPERATIVE CLEARANCE: ICD-10-CM

## 2024-09-27 DIAGNOSIS — E78.2 MIXED HYPERLIPIDEMIA: ICD-10-CM

## 2024-09-27 DIAGNOSIS — I25.10 ASHD (ARTERIOSCLEROTIC HEART DISEASE): ICD-10-CM

## 2024-09-27 LAB
ANION GAP SERPL CALCULATED.3IONS-SCNC: 14 MMOL/L (ref 9–16)
BNP SERPL-MCNC: 177 PG/ML (ref 0–125)
BUN SERPL-MCNC: 20 MG/DL (ref 6–20)
BUN/CREAT SERPL: 22 (ref 9–20)
CALCIUM SERPL-MCNC: 9.8 MG/DL (ref 8.6–10.4)
CHLORIDE SERPL-SCNC: 101 MMOL/L (ref 98–107)
CO2 SERPL-SCNC: 24 MMOL/L (ref 20–31)
CREAT SERPL-MCNC: 0.9 MG/DL (ref 0.7–1.2)
ERYTHROCYTE [DISTWIDTH] IN BLOOD BY AUTOMATED COUNT: 14 % (ref 11.8–14.4)
GFR, ESTIMATED: >90 ML/MIN/1.73M2
GLUCOSE SERPL-MCNC: 84 MG/DL (ref 74–99)
HCT VFR BLD AUTO: 41.3 % (ref 40.7–50.3)
HGB BLD-MCNC: 14.5 G/DL (ref 13–17)
MCH RBC QN AUTO: 30.5 PG (ref 25.2–33.5)
MCHC RBC AUTO-ENTMCNC: 35.1 G/DL (ref 28.4–34.8)
MCV RBC AUTO: 86.9 FL (ref 82.6–102.9)
NRBC BLD-RTO: 0 PER 100 WBC
PLATELET # BLD AUTO: 348 K/UL (ref 138–453)
PMV BLD AUTO: 10 FL (ref 8.1–13.5)
POTASSIUM SERPL-SCNC: 3.9 MMOL/L (ref 3.7–5.3)
RBC # BLD AUTO: 4.75 M/UL (ref 4.21–5.77)
SODIUM SERPL-SCNC: 139 MMOL/L (ref 136–145)
WBC OTHER # BLD: 7.8 K/UL (ref 3.5–11.3)

## 2024-09-27 PROCEDURE — 93000 ELECTROCARDIOGRAM COMPLETE: CPT | Performed by: INTERNAL MEDICINE

## 2024-09-27 PROCEDURE — 83880 ASSAY OF NATRIURETIC PEPTIDE: CPT

## 2024-09-27 PROCEDURE — 85027 COMPLETE CBC AUTOMATED: CPT

## 2024-09-27 PROCEDURE — 3078F DIAST BP <80 MM HG: CPT | Performed by: INTERNAL MEDICINE

## 2024-09-27 PROCEDURE — 80048 BASIC METABOLIC PNL TOTAL CA: CPT

## 2024-09-27 PROCEDURE — 99214 OFFICE O/P EST MOD 30 MIN: CPT | Performed by: INTERNAL MEDICINE

## 2024-09-27 PROCEDURE — 71046 X-RAY EXAM CHEST 2 VIEWS: CPT

## 2024-09-27 PROCEDURE — 3074F SYST BP LT 130 MM HG: CPT | Performed by: INTERNAL MEDICINE

## 2024-09-27 PROCEDURE — 36415 COLL VENOUS BLD VENIPUNCTURE: CPT

## 2024-09-30 ENCOUNTER — TELEPHONE (OUTPATIENT)
Dept: CARDIOLOGY | Age: 49
End: 2024-09-30

## 2024-09-30 NOTE — TELEPHONE ENCOUNTER
----- Message from Dr. Elijah Orozco MD sent at 9/28/2024  5:08 PM EDT -----  Blood work and chest x-ray are stable. Thank you

## 2024-10-02 ENCOUNTER — OFFICE VISIT (OUTPATIENT)
Dept: PRIMARY CARE CLINIC | Age: 49
End: 2024-10-02

## 2024-10-02 VITALS
HEART RATE: 67 BPM | DIASTOLIC BLOOD PRESSURE: 64 MMHG | OXYGEN SATURATION: 97 % | WEIGHT: 168 LBS | SYSTOLIC BLOOD PRESSURE: 110 MMHG | HEIGHT: 67 IN | BODY MASS INDEX: 26.37 KG/M2

## 2024-10-02 DIAGNOSIS — Z01.818 PREOPERATIVE CLEARANCE: ICD-10-CM

## 2024-10-02 DIAGNOSIS — M25.552 LEFT HIP PAIN: Primary | ICD-10-CM

## 2024-10-02 DIAGNOSIS — E11.9 TYPE 2 DIABETES MELLITUS WITHOUT COMPLICATION, UNSPECIFIED WHETHER LONG TERM INSULIN USE (HCC): ICD-10-CM

## 2024-10-02 DIAGNOSIS — G89.4 CHRONIC PAIN SYNDROME: ICD-10-CM

## 2024-10-02 LAB — HBA1C MFR BLD: 5.1 %

## 2024-10-02 RX ORDER — DULOXETIN HYDROCHLORIDE 30 MG/1
30 CAPSULE, DELAYED RELEASE ORAL DAILY
Qty: 30 CAPSULE | Refills: 0 | Status: SHIPPED | OUTPATIENT
Start: 2024-10-02

## 2024-10-02 RX ORDER — HYDROCODONE BITARTRATE AND ACETAMINOPHEN 5; 325 MG/1; MG/1
1 TABLET ORAL EVERY 6 HOURS PRN
Qty: 28 TABLET | Refills: 0 | Status: SHIPPED | OUTPATIENT
Start: 2024-10-02 | End: 2024-10-09

## 2024-10-02 RX ORDER — PREGABALIN 75 MG/1
75 CAPSULE ORAL 2 TIMES DAILY
Qty: 60 CAPSULE | Refills: 0 | Status: SHIPPED | OUTPATIENT
Start: 2024-10-02 | End: 2024-10-02 | Stop reason: CLARIF

## 2024-10-02 RX ORDER — HYDROCODONE BITARTRATE AND ACETAMINOPHEN 5; 325 MG/1; MG/1
1 TABLET ORAL EVERY 6 HOURS PRN
Qty: 28 TABLET | Refills: 0 | Status: SHIPPED | OUTPATIENT
Start: 2024-10-02 | End: 2024-10-02 | Stop reason: CLARIF

## 2024-10-02 RX ORDER — DULOXETIN HYDROCHLORIDE 30 MG/1
30 CAPSULE, DELAYED RELEASE ORAL DAILY
Qty: 30 CAPSULE | Refills: 0 | Status: SHIPPED | OUTPATIENT
Start: 2024-10-02 | End: 2024-10-02 | Stop reason: CLARIF

## 2024-10-02 RX ORDER — PREGABALIN 75 MG/1
75 CAPSULE ORAL 2 TIMES DAILY
Qty: 60 CAPSULE | Refills: 0 | Status: SHIPPED | OUTPATIENT
Start: 2024-10-02 | End: 2024-11-01

## 2024-10-02 RX ORDER — PREGABALIN 75 MG/1
75 CAPSULE ORAL 2 TIMES DAILY
COMMUNITY

## 2024-10-02 NOTE — PROGRESS NOTES
The patient recalls no new injury.  The patient has tried rest, ice, Norco with improvement.  The pain is described as sharp.  There is  pain on weightbearing.    There is is  painful popping and clicking.  The hip does catch or lock.  It has given out.  It is  stiff upon arising from sitting.  It is  painful lying on the affected side. He would like stay on the [place] PRN for now. DME for F2F for walker and shower chair gait belt  He has been having some anxiety a times. His glucose has been in the 80s throughout the day.    Revised Cardiac Risk Index  1. History of ischemic heart disease - yes  2. History of congestive heart failure - no  3. History of cerebrovascular disease (stroke or transient ischemic attack) - no  4. History of diabetes requiring preoperative insulin use - no  5. Chronic kidney disease (creatinine > 2 mg/dL) - no  6. Undergoing suprainguinal vascular, intraperitoneal, or intrathoracic surgery - no  (Risk for cardiac death, nonfatal myocardial infarction, and nonfatal cardiac arrest:  0 predictors = 0.4%, 1 predictor = 0.9%, 2 predictors = 6.6%, =3 predictors = >11%)  Moderate (4 to 7 METs)    Health Maintenance -   Alcohol/Substance use History - None/Minimal    Tobacco Use      Smoking status: Former        Packs/day: 0.00        Years: 0.3 packs/day for 30.0 years (7.5 ttl pk-yrs)        Types: Cigarettes        Start date: 1992        Quit date: 2022        Years since quittin.7      Smokeless tobacco: Never    Family History   Problem Relation Age of Onset    High Blood Pressure Mother         No colon cancer or polyps    Heart Disease Father     High Blood Pressure Father     Asthma Sister         No history of colon cancer or colon polyps    Pancreatic Cancer Maternal Aunt     Pancreatic Cancer Maternal Aunt     Colon Cancer Maternal Uncle     Colon Cancer Maternal Uncle     Anesth Problems Neg Hx     Bleeding Prob Neg Hx            2024    11:52 AM 1/10/2023     1:27

## 2024-10-08 ENCOUNTER — HOSPITAL ENCOUNTER (OUTPATIENT)
Dept: PREADMISSION TESTING | Age: 49
Discharge: HOME | End: 2024-10-12
Attending: INTERNAL MEDICINE | Admitting: ORTHOPAEDIC SURGERY
Payer: COMMERCIAL

## 2024-10-08 ENCOUNTER — HOSPITAL ENCOUNTER (OUTPATIENT)
Age: 49
Discharge: HOME | End: 2024-10-10
Attending: INTERNAL MEDICINE
Payer: COMMERCIAL

## 2024-10-08 VITALS
BODY MASS INDEX: 26.06 KG/M2 | OXYGEN SATURATION: 100 % | WEIGHT: 166 LBS | HEART RATE: 83 BPM | RESPIRATION RATE: 14 BRPM | SYSTOLIC BLOOD PRESSURE: 127 MMHG | DIASTOLIC BLOOD PRESSURE: 82 MMHG | TEMPERATURE: 97.9 F | HEIGHT: 67 IN

## 2024-10-08 DIAGNOSIS — R94.31 ABNORMAL ECG: ICD-10-CM

## 2024-10-08 DIAGNOSIS — I10 PRIMARY HYPERTENSION: ICD-10-CM

## 2024-10-08 DIAGNOSIS — I25.10 ASHD (ARTERIOSCLEROTIC HEART DISEASE): ICD-10-CM

## 2024-10-08 DIAGNOSIS — Z95.1 S/P CABG (CORONARY ARTERY BYPASS GRAFT): ICD-10-CM

## 2024-10-08 DIAGNOSIS — E78.5 DYSLIPIDEMIA: ICD-10-CM

## 2024-10-08 DIAGNOSIS — Z01.818 PREOPERATIVE CLEARANCE: ICD-10-CM

## 2024-10-08 DIAGNOSIS — E78.2 MIXED HYPERLIPIDEMIA: ICD-10-CM

## 2024-10-08 LAB
ABO + RH BLD: NORMAL
ALBUMIN SERPL-MCNC: 4.6 G/DL (ref 3.5–5.2)
ALBUMIN/GLOB SERPL: 1.8 {RATIO} (ref 1–2.5)
ALP SERPL-CCNC: 55 U/L (ref 40–129)
ALT SERPL-CCNC: 29 U/L (ref 10–50)
ANION GAP SERPL CALCULATED.3IONS-SCNC: 12 MMOL/L (ref 9–16)
ARM BAND NUMBER: NORMAL
AST SERPL-CCNC: 30 U/L (ref 10–50)
BACTERIA URNS QL MICRO: ABNORMAL
BILIRUB SERPL-MCNC: 0.4 MG/DL (ref 0–1.2)
BILIRUB UR QL STRIP: NEGATIVE
BLOOD BANK SAMPLE EXPIRATION: NORMAL
BLOOD GROUP ANTIBODIES SERPL: NEGATIVE
BUN SERPL-MCNC: 12 MG/DL (ref 6–20)
BUN/CREAT SERPL: 17 (ref 9–20)
CALCIUM SERPL-MCNC: 9.3 MG/DL (ref 8.6–10.4)
CHLORIDE SERPL-SCNC: 103 MMOL/L (ref 98–107)
CLARITY UR: CLEAR
CO2 SERPL-SCNC: 25 MMOL/L (ref 20–31)
COLOR UR: YELLOW
CREAT SERPL-MCNC: 0.7 MG/DL (ref 0.7–1.2)
ECHO AO SINUS VALSALVA DIAM: 3 CM
ECHO AO SINUS VALSALVA INDEX: 1.6 CM/M2
ECHO AO ST JNCT DIAM: 2.1 CM
ECHO AV CUSP MM: 2.2 CM
ECHO AV MEAN GRADIENT: 3 MMHG
ECHO AV MEAN VELOCITY: 0.8 M/S
ECHO AV PEAK GRADIENT: 5 MMHG
ECHO AV PEAK VELOCITY: 1.1 M/S
ECHO AV VELOCITY RATIO: 0.73
ECHO AV VTI: 20 CM
ECHO BSA: 1.89 M2
ECHO LA AREA 2C: 13.2 CM2
ECHO LA AREA 4C: 12.9 CM2
ECHO LA MAJOR AXIS: 5 CM
ECHO LA MINOR AXIS: 5 CM
ECHO LA VOL BP: 28 ML (ref 18–58)
ECHO LA VOL MOD A2C: 28 ML (ref 18–58)
ECHO LA VOL MOD A4C: 27 ML (ref 18–58)
ECHO LA VOL/BSA BIPLANE: 15 ML/M2 (ref 16–34)
ECHO LA VOLUME INDEX MOD A2C: 15 ML/M2 (ref 16–34)
ECHO LA VOLUME INDEX MOD A4C: 14 ML/M2 (ref 16–34)
ECHO LV E' LATERAL VELOCITY: 9.4 CM/S
ECHO LV EDV A2C: 71 ML
ECHO LV EDV A4C: 67 ML
ECHO LV EDV INDEX A4C: 36 ML/M2
ECHO LV EDV NDEX A2C: 38 ML/M2
ECHO LV EJECTION FRACTION A2C: 38 %
ECHO LV EJECTION FRACTION A4C: 34 %
ECHO LV EJECTION FRACTION BIPLANE: 39 % (ref 55–100)
ECHO LV ESV A2C: 44 ML
ECHO LV ESV A4C: 44 ML
ECHO LV ESV INDEX A2C: 24 ML/M2
ECHO LV ESV INDEX A4C: 24 ML/M2
ECHO LV FRACTIONAL SHORTENING: 17 % (ref 28–44)
ECHO LV INTERNAL DIMENSION DIASTOLE INDEX: 2.78 CM/M2
ECHO LV INTERNAL DIMENSION DIASTOLIC: 5.2 CM (ref 4.2–5.9)
ECHO LV INTERNAL DIMENSION SYSTOLIC INDEX: 2.3 CM/M2
ECHO LV INTERNAL DIMENSION SYSTOLIC: 4.3 CM
ECHO LV IVSD: 0.9 CM (ref 0.6–1)
ECHO LV MASS 2D: 169 G (ref 88–224)
ECHO LV MASS INDEX 2D: 90.4 G/M2 (ref 49–115)
ECHO LV POSTERIOR WALL DIASTOLIC: 0.9 CM (ref 0.6–1)
ECHO LV RELATIVE WALL THICKNESS RATIO: 0.35
ECHO LVOT AV VTI INDEX: 0.81
ECHO LVOT MEAN GRADIENT: 2 MMHG
ECHO LVOT PEAK GRADIENT: 3 MMHG
ECHO LVOT PEAK VELOCITY: 0.8 M/S
ECHO LVOT VTI: 16.2 CM
ECHO MV A VELOCITY: 0.84 M/S
ECHO MV E DECELERATION TIME (DT): 172 MS
ECHO MV E VELOCITY: 0.79 M/S
ECHO MV E/A RATIO: 0.94
ECHO MV E/E' LATERAL: 8.4
ECHO PV MAX VELOCITY: 0.9 M/S
ECHO PV PEAK GRADIENT: 3 MMHG
EPI CELLS #/AREA URNS HPF: ABNORMAL /HPF (ref 0–5)
GFR, ESTIMATED: >90 ML/MIN/1.73M2
GLUCOSE SERPL-MCNC: 77 MG/DL (ref 74–99)
GLUCOSE UR STRIP-MCNC: NEGATIVE MG/DL
HGB UR QL STRIP.AUTO: NEGATIVE
INR PPP: 1.1
KETONES UR STRIP-MCNC: NEGATIVE MG/DL
LEUKOCYTE ESTERASE UR QL STRIP: NEGATIVE
NITRITE UR QL STRIP: NEGATIVE
PARTIAL THROMBOPLASTIN TIME: 27.1 SEC (ref 26.8–34.8)
PH UR STRIP: 6 [PH] (ref 5–9)
POTASSIUM SERPL-SCNC: 3.8 MMOL/L (ref 3.7–5.3)
PROT SERPL-MCNC: 7.1 G/DL (ref 6.6–8.7)
PROT UR STRIP-MCNC: NEGATIVE MG/DL
PROTHROMBIN TIME: 13.5 SEC (ref 11.7–14.1)
RBC #/AREA URNS HPF: ABNORMAL /HPF (ref 0–2)
SODIUM SERPL-SCNC: 140 MMOL/L (ref 136–145)
SP GR UR STRIP: 1.01 (ref 1.01–1.02)
UROBILINOGEN UR STRIP-ACNC: NORMAL EU/DL (ref 0–1)
WBC #/AREA URNS HPF: ABNORMAL /HPF (ref 0–5)

## 2024-10-08 PROCEDURE — 85610 PROTHROMBIN TIME: CPT

## 2024-10-08 PROCEDURE — 86901 BLOOD TYPING SEROLOGIC RH(D): CPT

## 2024-10-08 PROCEDURE — 36415 COLL VENOUS BLD VENIPUNCTURE: CPT

## 2024-10-08 PROCEDURE — 87641 MR-STAPH DNA AMP PROBE: CPT

## 2024-10-08 PROCEDURE — 80053 COMPREHEN METABOLIC PANEL: CPT

## 2024-10-08 PROCEDURE — 86850 RBC ANTIBODY SCREEN: CPT

## 2024-10-08 PROCEDURE — 93306 TTE W/DOPPLER COMPLETE: CPT | Performed by: INTERNAL MEDICINE

## 2024-10-08 PROCEDURE — 85730 THROMBOPLASTIN TIME PARTIAL: CPT

## 2024-10-08 PROCEDURE — 93306 TTE W/DOPPLER COMPLETE: CPT

## 2024-10-08 PROCEDURE — 81001 URINALYSIS AUTO W/SCOPE: CPT

## 2024-10-08 PROCEDURE — 86900 BLOOD TYPING SEROLOGIC ABO: CPT

## 2024-10-08 ASSESSMENT — PAIN DESCRIPTION - LOCATION: LOCATION: HIP

## 2024-10-08 ASSESSMENT — PAIN DESCRIPTION - DESCRIPTORS: DESCRIPTORS: SHARP

## 2024-10-08 ASSESSMENT — PAIN DESCRIPTION - FREQUENCY: FREQUENCY: CONTINUOUS

## 2024-10-08 ASSESSMENT — PAIN SCALES - GENERAL: PAINLEVEL_OUTOF10: 8

## 2024-10-08 ASSESSMENT — PAIN DESCRIPTION - ORIENTATION: ORIENTATION: LEFT

## 2024-10-08 ASSESSMENT — PAIN DESCRIPTION - PAIN TYPE: TYPE: CHRONIC PAIN

## 2024-10-08 NOTE — PROGRESS NOTES
Patient instructed on the pre-operative, intra-operative, and post-operative process. Patient instructed on NPO status. Medication instructions and pre operative instruction sheet reviewed with the patient. CHG skin prep instructions reviewed with patient.   
Status Reinforced: Yes  Ride and Caregiver Arranged: Yes  Ride Caregiver Provider: Lore Hensley  CHG Bottle/Wipes provided with instructions for use: Yes         Patient instructed on the pre-operative, intra-operative, and post-operative process?   Yes  Medication instructions reviewed with patient?  Yes  Pre operative instruction sheet reviewed and given to patient in PAT?  Yes

## 2024-10-09 LAB
MRSA, DNA, NASAL: NEGATIVE
SPECIMEN DESCRIPTION: NORMAL

## 2024-10-11 ENCOUNTER — TELEPHONE (OUTPATIENT)
Dept: CARDIOLOGY | Age: 49
End: 2024-10-11

## 2024-10-11 NOTE — TELEPHONE ENCOUNTER
----- Message from Dr. Elijah Orozco MD sent at 10/11/2024  2:29 PM EDT -----  Echo didn't change from before.

## 2024-10-12 DIAGNOSIS — K21.9 GASTROESOPHAGEAL REFLUX DISEASE, UNSPECIFIED WHETHER ESOPHAGITIS PRESENT: ICD-10-CM

## 2024-10-14 DIAGNOSIS — M25.552 LEFT HIP PAIN: ICD-10-CM

## 2024-10-14 DIAGNOSIS — G89.4 CHRONIC PAIN SYNDROME: ICD-10-CM

## 2024-10-14 RX ORDER — PANTOPRAZOLE SODIUM 40 MG/1
TABLET, DELAYED RELEASE ORAL
Qty: 180 TABLET | OUTPATIENT
Start: 2024-10-14

## 2024-10-14 RX ORDER — HYDROCODONE BITARTRATE AND ACETAMINOPHEN 5; 325 MG/1; MG/1
1 TABLET ORAL EVERY 6 HOURS PRN
Qty: 28 TABLET | Refills: 0 | Status: SHIPPED | OUTPATIENT
Start: 2024-10-14 | End: 2024-10-21

## 2024-10-21 ENCOUNTER — OFFICE VISIT (OUTPATIENT)
Dept: PRIMARY CARE CLINIC | Age: 49
End: 2024-10-21
Payer: COMMERCIAL

## 2024-10-21 ENCOUNTER — HOSPITAL ENCOUNTER (OUTPATIENT)
Dept: GENERAL RADIOLOGY | Age: 49
Discharge: HOME | End: 2024-10-23
Payer: COMMERCIAL

## 2024-10-21 ENCOUNTER — HOSPITAL ENCOUNTER (OUTPATIENT)
Age: 49
Discharge: HOME | End: 2024-10-23
Payer: COMMERCIAL

## 2024-10-21 VITALS
SYSTOLIC BLOOD PRESSURE: 116 MMHG | WEIGHT: 165 LBS | DIASTOLIC BLOOD PRESSURE: 80 MMHG | OXYGEN SATURATION: 97 % | BODY MASS INDEX: 25.84 KG/M2 | HEART RATE: 97 BPM

## 2024-10-21 DIAGNOSIS — M25.552 ACUTE HIP PAIN, LEFT: Primary | ICD-10-CM

## 2024-10-21 DIAGNOSIS — M25.552 ACUTE HIP PAIN, LEFT: ICD-10-CM

## 2024-10-21 DIAGNOSIS — G89.4 CHRONIC PAIN SYNDROME: ICD-10-CM

## 2024-10-21 DIAGNOSIS — M25.552 LEFT HIP PAIN: ICD-10-CM

## 2024-10-21 PROCEDURE — 72100 X-RAY EXAM L-S SPINE 2/3 VWS: CPT

## 2024-10-21 PROCEDURE — G2211 COMPLEX E/M VISIT ADD ON: HCPCS | Performed by: FAMILY MEDICINE

## 2024-10-21 PROCEDURE — 73502 X-RAY EXAM HIP UNI 2-3 VIEWS: CPT

## 2024-10-21 PROCEDURE — 99214 OFFICE O/P EST MOD 30 MIN: CPT | Performed by: FAMILY MEDICINE

## 2024-10-21 PROCEDURE — 3079F DIAST BP 80-89 MM HG: CPT | Performed by: FAMILY MEDICINE

## 2024-10-21 PROCEDURE — 3074F SYST BP LT 130 MM HG: CPT | Performed by: FAMILY MEDICINE

## 2024-10-21 RX ORDER — HYDROCODONE BITARTRATE AND ACETAMINOPHEN 5; 325 MG/1; MG/1
1 TABLET ORAL EVERY 6 HOURS PRN
Qty: 28 TABLET | Refills: 0 | Status: SHIPPED | OUTPATIENT
Start: 2024-10-21 | End: 2024-10-21

## 2024-10-21 RX ORDER — HYDROCODONE BITARTRATE AND ACETAMINOPHEN 5; 325 MG/1; MG/1
1 TABLET ORAL EVERY 6 HOURS PRN
Qty: 28 TABLET | Refills: 0 | Status: SHIPPED | OUTPATIENT
Start: 2024-10-21 | End: 2024-10-28

## 2024-10-21 NOTE — PROGRESS NOTES
[facility]      Patient:  Frakny Melendez 49 y.o. male     Date of Service: 10/21/24        Chief Complaint:   Chief Complaint   Patient presents with    Hip Pain     Left hip pain- all the way to feet with pain  Started  this morning when he stated he fell and tripped.         History of Present Illness     History of Present Illness  The patient presents for evaluation of left hip pain after a fall.    He is scheduled for a total hip replacement next Thursday due to end-stage osteoarthritis. This morning, he experienced a popping sensation in his left hip, followed by severe pain, while attempting to use the bathroom. His wife assisted him to the living room where he uses an electric power chair. However, an incident involving a trash can lid and their [place] Coon cats caused him to fall.    He reports pain extending from his buttocks to his groin and thigh, occasionally accompanied by numbness in his feet. This numbness is a new symptom. He does not report any loss of bladder or bowel control and maintains sensation in his genitals. The pain originates in his left buttock and radiates down the side of his leg to his foot. His knee is unaffected.    Despite taking hydrocodone, which usually provides relief, he experienced no relief this morning. He takes hydrocodone three times a day: morning, afternoon, and evening. He requests time off work until his surgery and a refill of his pain medication.    His surgeon is Dr. Elliot Jacobs. He has been working from home for the past 2 months and finds walking difficult, requiring assistance. He cannot bear weight on his left leg for extended periods, a condition that fluctuates but has become more persistent recently. He describes the pain as deep and shooting, located in the bone area.        Allergies:    Patient has no known allergies.    Medication List:    Current Outpatient Medications   Medication Sig Dispense Refill    HYDROcodone-acetaminophen

## 2024-10-28 ENCOUNTER — HOSPITAL ENCOUNTER (OUTPATIENT)
Dept: PHYSICAL THERAPY | Age: 49
Setting detail: THERAPIES SERIES
Discharge: HOME | End: 2024-10-28
Payer: COMMERCIAL

## 2024-10-28 PROCEDURE — 97116 GAIT TRAINING THERAPY: CPT

## 2024-10-28 PROCEDURE — 97110 THERAPEUTIC EXERCISES: CPT

## 2024-10-28 RX ORDER — ACYCLOVIR 400 MG/1
1 TABLET ORAL ONCE
Qty: 1 EACH | Refills: 4 | Status: SHIPPED | OUTPATIENT
Start: 2024-10-28 | End: 2024-10-28

## 2024-10-28 RX ORDER — ACYCLOVIR 400 MG/1
1 TABLET ORAL
Qty: 10 EACH | Refills: 3 | Status: SHIPPED | OUTPATIENT
Start: 2024-10-28 | End: 2024-10-29 | Stop reason: SDUPTHER

## 2024-10-28 NOTE — PROGRESS NOTES
Phone: [phone]        Fax: [phone]  [facility]  Physical Therapy  Gait Training/Discharge Summary  Date: 10/28/2024    Patient Name: Franky Melendez          : 1975  (49 y.o.)  CSN #: [id number]    Referring Physician: Elliot Jacobs MD     Diagnosis: Pre-op testing, Z01.812    Reason for Referral:  [] Crutch Training   [x] Walker Training   [] Other:    Objective Assessment:    [x]  Strength of uninvolved extremities are all within functional limits   []  Other:      Weight Bearing Status:  [] Right Extremity  [x] Left Extremity  [] NWB  [x] PWB    [x] WBAT  [x] TTWB    Treatment:  [x] Instructed in appropriate gait with crutches/walker  [x]   Crutches/walker fitted to patient at accurate height  [x] Instructed on gait training at level ground  [x] Instructed on gait training with use of stairs  [x] Instructed on sit to stand utilizing crutches/walker  []   Other:     Home Exercise Program - Instructed Patient:   [x] Handout given regarding LE ROM / strengthening exercises      Treatment Goals:  [x]  Met []  Not Met 10/28/2024   [x] Patient will be independent crutch/walker training    Treatment Plan:   [x]  Gait training, as noted above    [x]  Exercise education, as stated above     Rehab Potential: []  Poor   []  Fair   [x]  Good   []  Excellent     If there are any questions regarding the plan of care, please do not hesitate to contact the center.   Thank you for your referral.      Therapist’s Signature:  Shirley Juárez PT, DPT            Date: 10/28/2024        To be completed by the referring physicians   By signing below, I agree to the above treatment plan.      Physician’s Signature:                        Date: 10/28/2024

## 2024-10-29 RX ORDER — ACYCLOVIR 400 MG/1
1 TABLET ORAL
Qty: 10 EACH | Refills: 4 | Status: SHIPPED | OUTPATIENT
Start: 2024-10-29

## 2024-10-29 NOTE — PROGRESS NOTES
Patient called to ask whether he should take his long acting insulin the night before his surgery. I reminded patient he would be NPO after midnight, except for water. I instructed him to hold the long acting insulin the night before the procedure. Patient verbalized understanding.

## 2024-10-30 ENCOUNTER — ANESTHESIA EVENT (OUTPATIENT)
Dept: OPERATING ROOM | Age: 49
End: 2024-10-30
Payer: COMMERCIAL

## 2024-10-30 RX ORDER — ATORVASTATIN CALCIUM 80 MG/1
80 TABLET, FILM COATED ORAL NIGHTLY
Qty: 90 TABLET | Refills: 3 | Status: SHIPPED | OUTPATIENT
Start: 2024-10-30

## 2024-10-31 ENCOUNTER — HOSPITAL ENCOUNTER (OUTPATIENT)
Age: 49
Setting detail: OBSERVATION
Discharge: HOME | End: 2024-11-01
Attending: ORTHOPAEDIC SURGERY | Admitting: ORTHOPAEDIC SURGERY
Payer: COMMERCIAL

## 2024-10-31 ENCOUNTER — APPOINTMENT (OUTPATIENT)
Dept: GENERAL RADIOLOGY | Age: 49
End: 2024-10-31
Attending: ORTHOPAEDIC SURGERY
Payer: COMMERCIAL

## 2024-10-31 ENCOUNTER — ANESTHESIA (OUTPATIENT)
Dept: OPERATING ROOM | Age: 49
End: 2024-10-31
Payer: COMMERCIAL

## 2024-10-31 DIAGNOSIS — Z96.642 S/P TOTAL LEFT HIP ARTHROPLASTY: ICD-10-CM

## 2024-10-31 DIAGNOSIS — R52 PAIN: Primary | ICD-10-CM

## 2024-10-31 PROBLEM — E11.9 TYPE 2 DIABETES MELLITUS (HCC): Status: ACTIVE | Noted: 2024-10-31

## 2024-10-31 LAB
GLUCOSE BLD-MCNC: 115 MG/DL (ref 74–100)
GLUCOSE BLD-MCNC: 288 MG/DL (ref 74–100)

## 2024-10-31 PROCEDURE — 82947 ASSAY GLUCOSE BLOOD QUANT: CPT

## 2024-10-31 PROCEDURE — 72170 X-RAY EXAM OF PELVIS: CPT

## 2024-10-31 PROCEDURE — 6360000002 HC RX W HCPCS

## 2024-10-31 PROCEDURE — 94761 N-INVAS EAR/PLS OXIMETRY MLT: CPT

## 2024-10-31 PROCEDURE — 6370000000 HC RX 637 (ALT 250 FOR IP): Performed by: PHYSICIAN ASSISTANT

## 2024-10-31 PROCEDURE — 6370000000 HC RX 637 (ALT 250 FOR IP)

## 2024-10-31 PROCEDURE — 2500000003 HC RX 250 WO HCPCS: Performed by: PHYSICIAN ASSISTANT

## 2024-10-31 PROCEDURE — G0378 HOSPITAL OBSERVATION PER HR: HCPCS

## 2024-10-31 PROCEDURE — 76942 ECHO GUIDE FOR BIOPSY: CPT | Performed by: NURSE ANESTHETIST, CERTIFIED REGISTERED

## 2024-10-31 PROCEDURE — 6360000002 HC RX W HCPCS: Performed by: NURSE ANESTHETIST, CERTIFIED REGISTERED

## 2024-10-31 PROCEDURE — 2580000003 HC RX 258: Performed by: PHYSICIAN ASSISTANT

## 2024-10-31 PROCEDURE — 6360000002 HC RX W HCPCS: Performed by: PHYSICIAN ASSISTANT

## 2024-10-31 PROCEDURE — 2580000003 HC RX 258

## 2024-10-31 PROCEDURE — 2500000003 HC RX 250 WO HCPCS

## 2024-10-31 PROCEDURE — 2500000003 HC RX 250 WO HCPCS: Performed by: NURSE ANESTHETIST, CERTIFIED REGISTERED

## 2024-10-31 PROCEDURE — 94664 DEMO&/EVAL PT USE INHALER: CPT

## 2024-10-31 RX ORDER — SODIUM CHLORIDE 9 MG/ML
INJECTION, SOLUTION INTRAVENOUS PRN
Status: DISCONTINUED | OUTPATIENT
Start: 2024-10-31 | End: 2024-10-31 | Stop reason: HOSPADM

## 2024-10-31 RX ORDER — HYDRALAZINE HYDROCHLORIDE 20 MG/ML
10 INJECTION INTRAMUSCULAR; INTRAVENOUS
Status: DISCONTINUED | OUTPATIENT
Start: 2024-10-31 | End: 2024-10-31 | Stop reason: HOSPADM

## 2024-10-31 RX ORDER — SODIUM CHLORIDE 0.9 % (FLUSH) 0.9 %
5-40 SYRINGE (ML) INJECTION EVERY 12 HOURS SCHEDULED
Status: DISCONTINUED | OUTPATIENT
Start: 2024-10-31 | End: 2024-11-01 | Stop reason: HOSPADM

## 2024-10-31 RX ORDER — KETOROLAC TROMETHAMINE 30 MG/ML
INJECTION, SOLUTION INTRAMUSCULAR; INTRAVENOUS
Status: DISCONTINUED | OUTPATIENT
Start: 2024-10-31 | End: 2024-10-31 | Stop reason: SDUPTHER

## 2024-10-31 RX ORDER — ATORVASTATIN CALCIUM 40 MG/1
80 TABLET, FILM COATED ORAL NIGHTLY
Status: DISCONTINUED | OUTPATIENT
Start: 2024-10-31 | End: 2024-11-01 | Stop reason: HOSPADM

## 2024-10-31 RX ORDER — METOCLOPRAMIDE HYDROCHLORIDE 5 MG/ML
10 INJECTION INTRAMUSCULAR; INTRAVENOUS
Status: DISCONTINUED | OUTPATIENT
Start: 2024-10-31 | End: 2024-10-31 | Stop reason: HOSPADM

## 2024-10-31 RX ORDER — FENTANYL CITRATE 50 UG/ML
INJECTION, SOLUTION INTRAMUSCULAR; INTRAVENOUS
Status: DISCONTINUED | OUTPATIENT
Start: 2024-10-31 | End: 2024-10-31 | Stop reason: SDUPTHER

## 2024-10-31 RX ORDER — POLYETHYLENE GLYCOL 3350 17 G/17G
17 POWDER, FOR SOLUTION ORAL DAILY
Status: DISCONTINUED | OUTPATIENT
Start: 2024-10-31 | End: 2024-11-01 | Stop reason: HOSPADM

## 2024-10-31 RX ORDER — ROPIVACAINE HYDROCHLORIDE 5 MG/ML
INJECTION, SOLUTION EPIDURAL; INFILTRATION; PERINEURAL
Status: DISCONTINUED | OUTPATIENT
Start: 2024-10-31 | End: 2024-10-31 | Stop reason: SDUPTHER

## 2024-10-31 RX ORDER — SUCRALFATE 1 G/1
1 TABLET ORAL
Status: DISCONTINUED | OUTPATIENT
Start: 2024-10-31 | End: 2024-11-01 | Stop reason: HOSPADM

## 2024-10-31 RX ORDER — ASPIRIN 325 MG
325 TABLET, DELAYED RELEASE (ENTERIC COATED) ORAL DAILY
Status: DISCONTINUED | OUTPATIENT
Start: 2024-11-01 | End: 2024-11-01 | Stop reason: HOSPADM

## 2024-10-31 RX ORDER — PANTOPRAZOLE SODIUM 40 MG/1
40 TABLET, DELAYED RELEASE ORAL
Status: DISCONTINUED | OUTPATIENT
Start: 2024-10-31 | End: 2024-11-01 | Stop reason: HOSPADM

## 2024-10-31 RX ORDER — SODIUM CHLORIDE 0.9 % (FLUSH) 0.9 %
5-40 SYRINGE (ML) INJECTION PRN
Status: DISCONTINUED | OUTPATIENT
Start: 2024-10-31 | End: 2024-10-31 | Stop reason: HOSPADM

## 2024-10-31 RX ORDER — LISINOPRIL 20 MG/1
20 TABLET ORAL DAILY
Status: DISCONTINUED | OUTPATIENT
Start: 2024-10-31 | End: 2024-11-01 | Stop reason: HOSPADM

## 2024-10-31 RX ORDER — NALOXONE HYDROCHLORIDE 0.4 MG/ML
INJECTION, SOLUTION INTRAMUSCULAR; INTRAVENOUS; SUBCUTANEOUS PRN
Status: DISCONTINUED | OUTPATIENT
Start: 2024-10-31 | End: 2024-10-31 | Stop reason: HOSPADM

## 2024-10-31 RX ORDER — FENTANYL CITRATE 50 UG/ML
50 INJECTION, SOLUTION INTRAMUSCULAR; INTRAVENOUS EVERY 5 MIN PRN
Status: COMPLETED | OUTPATIENT
Start: 2024-10-31 | End: 2024-10-31

## 2024-10-31 RX ORDER — CLOPIDOGREL BISULFATE 75 MG/1
75 TABLET ORAL DAILY
Status: DISCONTINUED | OUTPATIENT
Start: 2024-11-01 | End: 2024-11-01 | Stop reason: HOSPADM

## 2024-10-31 RX ORDER — DEXAMETHASONE SODIUM PHOSPHATE 10 MG/ML
INJECTION INTRAMUSCULAR; INTRAVENOUS
Status: DISCONTINUED | OUTPATIENT
Start: 2024-10-31 | End: 2024-10-31 | Stop reason: SDUPTHER

## 2024-10-31 RX ORDER — BUPIVACAINE/KETOROLAC/KETAMINE 150-60/50
SYRINGE (ML) INJECTION
Status: DISPENSED
Start: 2024-10-31 | End: 2024-10-31

## 2024-10-31 RX ORDER — ALBUTEROL SULFATE 90 UG/1
2 INHALANT RESPIRATORY (INHALATION) 4 TIMES DAILY PRN
Status: DISCONTINUED | OUTPATIENT
Start: 2024-10-31 | End: 2024-11-01 | Stop reason: HOSPADM

## 2024-10-31 RX ORDER — ONDANSETRON 2 MG/ML
INJECTION INTRAMUSCULAR; INTRAVENOUS
Status: DISCONTINUED | OUTPATIENT
Start: 2024-10-31 | End: 2024-10-31 | Stop reason: SDUPTHER

## 2024-10-31 RX ORDER — SODIUM CHLORIDE, SODIUM LACTATE, POTASSIUM CHLORIDE, CALCIUM CHLORIDE 600; 310; 30; 20 MG/100ML; MG/100ML; MG/100ML; MG/100ML
INJECTION, SOLUTION INTRAVENOUS CONTINUOUS
Status: DISCONTINUED | OUTPATIENT
Start: 2024-10-31 | End: 2024-10-31 | Stop reason: HOSPADM

## 2024-10-31 RX ORDER — SODIUM CHLORIDE 0.9 % (FLUSH) 0.9 %
5-40 SYRINGE (ML) INJECTION EVERY 12 HOURS SCHEDULED
Status: DISCONTINUED | OUTPATIENT
Start: 2024-10-31 | End: 2024-10-31 | Stop reason: HOSPADM

## 2024-10-31 RX ORDER — PREGABALIN 75 MG/1
75 CAPSULE ORAL EVERY 12 HOURS PRN
Qty: 28 CAPSULE | Refills: 0 | Status: SHIPPED | OUTPATIENT
Start: 2024-10-31 | End: 2024-11-14

## 2024-10-31 RX ORDER — LIDOCAINE HYDROCHLORIDE 20 MG/ML
INJECTION, SOLUTION EPIDURAL; INFILTRATION; INTRACAUDAL; PERINEURAL
Status: DISCONTINUED | OUTPATIENT
Start: 2024-10-31 | End: 2024-10-31 | Stop reason: SDUPTHER

## 2024-10-31 RX ORDER — BISACODYL 5 MG/1
5 TABLET, DELAYED RELEASE ORAL DAILY PRN
Status: DISCONTINUED | OUTPATIENT
Start: 2024-10-31 | End: 2024-11-01 | Stop reason: HOSPADM

## 2024-10-31 RX ORDER — ONDANSETRON 4 MG/1
4 TABLET, ORALLY DISINTEGRATING ORAL EVERY 8 HOURS PRN
Status: DISCONTINUED | OUTPATIENT
Start: 2024-10-31 | End: 2024-11-01 | Stop reason: HOSPADM

## 2024-10-31 RX ORDER — ONDANSETRON 2 MG/ML
4 INJECTION INTRAMUSCULAR; INTRAVENOUS
Status: DISCONTINUED | OUTPATIENT
Start: 2024-10-31 | End: 2024-10-31 | Stop reason: HOSPADM

## 2024-10-31 RX ORDER — TIRZEPATIDE 5 MG/.5ML
5 INJECTION, SOLUTION SUBCUTANEOUS WEEKLY
Status: DISCONTINUED | OUTPATIENT
Start: 2024-11-03 | End: 2024-11-01 | Stop reason: HOSPADM

## 2024-10-31 RX ORDER — SODIUM CHLORIDE 0.9 % (FLUSH) 0.9 %
5-40 SYRINGE (ML) INJECTION PRN
Status: DISCONTINUED | OUTPATIENT
Start: 2024-10-31 | End: 2024-11-01 | Stop reason: HOSPADM

## 2024-10-31 RX ORDER — SODIUM CHLORIDE 9 MG/ML
INJECTION, SOLUTION INTRAVENOUS PRN
Status: DISCONTINUED | OUTPATIENT
Start: 2024-10-31 | End: 2024-11-01 | Stop reason: HOSPADM

## 2024-10-31 RX ORDER — ONDANSETRON 2 MG/ML
4 INJECTION INTRAMUSCULAR; INTRAVENOUS EVERY 6 HOURS PRN
Status: DISCONTINUED | OUTPATIENT
Start: 2024-10-31 | End: 2024-11-01 | Stop reason: HOSPADM

## 2024-10-31 RX ORDER — ACETAMINOPHEN 325 MG/1
650 TABLET ORAL ONCE
Status: DISCONTINUED | OUTPATIENT
Start: 2024-10-31 | End: 2024-10-31 | Stop reason: HOSPADM

## 2024-10-31 RX ORDER — DIPHENHYDRAMINE HCL 25 MG
25 CAPSULE ORAL EVERY 6 HOURS PRN
Status: DISCONTINUED | OUTPATIENT
Start: 2024-10-31 | End: 2024-11-01 | Stop reason: HOSPADM

## 2024-10-31 RX ORDER — SENNOSIDES A AND B 8.6 MG/1
1 TABLET, FILM COATED ORAL DAILY PRN
Status: DISCONTINUED | OUTPATIENT
Start: 2024-10-31 | End: 2024-11-01 | Stop reason: HOSPADM

## 2024-10-31 RX ORDER — AMLODIPINE BESYLATE 5 MG/1
5 TABLET ORAL DAILY
Status: DISCONTINUED | OUTPATIENT
Start: 2024-11-01 | End: 2024-11-01 | Stop reason: HOSPADM

## 2024-10-31 RX ORDER — SODIUM CHLORIDE 9 MG/ML
INJECTION, SOLUTION INTRAVENOUS CONTINUOUS
Status: DISCONTINUED | OUTPATIENT
Start: 2024-10-31 | End: 2024-10-31 | Stop reason: HOSPADM

## 2024-10-31 RX ORDER — FENTANYL CITRATE 50 UG/ML
25 INJECTION, SOLUTION INTRAMUSCULAR; INTRAVENOUS EVERY 5 MIN PRN
Status: DISCONTINUED | OUTPATIENT
Start: 2024-10-31 | End: 2024-10-31 | Stop reason: HOSPADM

## 2024-10-31 RX ORDER — DEXAMETHASONE SODIUM PHOSPHATE 4 MG/ML
INJECTION, SOLUTION INTRA-ARTICULAR; INTRALESIONAL; INTRAMUSCULAR; INTRAVENOUS; SOFT TISSUE
Status: DISCONTINUED | OUTPATIENT
Start: 2024-10-31 | End: 2024-10-31 | Stop reason: SDUPTHER

## 2024-10-31 RX ORDER — DEXMEDETOMIDINE HYDROCHLORIDE 100 UG/ML
INJECTION, SOLUTION INTRAVENOUS
Status: DISCONTINUED | OUTPATIENT
Start: 2024-10-31 | End: 2024-10-31 | Stop reason: SDUPTHER

## 2024-10-31 RX ORDER — PROPOFOL 10 MG/ML
INJECTION, EMULSION INTRAVENOUS
Status: DISCONTINUED | OUTPATIENT
Start: 2024-10-31 | End: 2024-10-31 | Stop reason: SDUPTHER

## 2024-10-31 RX ORDER — SODIUM CHLORIDE 0.9 % (FLUSH) 0.9 %
5-40 SYRINGE (ML) INJECTION PRN
Status: CANCELLED | OUTPATIENT
Start: 2024-10-31

## 2024-10-31 RX ORDER — CELECOXIB 200 MG/1
200 CAPSULE ORAL ONCE
Status: COMPLETED | OUTPATIENT
Start: 2024-10-31 | End: 2024-10-31

## 2024-10-31 RX ORDER — DIMENHYDRINATE 50 MG
50 TABLET ORAL ONCE
Status: COMPLETED | OUTPATIENT
Start: 2024-10-31 | End: 2024-10-31

## 2024-10-31 RX ORDER — OXYCODONE HYDROCHLORIDE 5 MG/1
5 TABLET ORAL EVERY 4 HOURS PRN
Status: DISCONTINUED | OUTPATIENT
Start: 2024-10-31 | End: 2024-11-01 | Stop reason: HOSPADM

## 2024-10-31 RX ORDER — DIPHENHYDRAMINE HYDROCHLORIDE 50 MG/ML
25 INJECTION INTRAMUSCULAR; INTRAVENOUS EVERY 6 HOURS PRN
Status: DISCONTINUED | OUTPATIENT
Start: 2024-10-31 | End: 2024-11-01 | Stop reason: HOSPADM

## 2024-10-31 RX ORDER — ISOSORBIDE MONONITRATE 30 MG/1
30 TABLET, EXTENDED RELEASE ORAL DAILY
Status: DISCONTINUED | OUTPATIENT
Start: 2024-10-31 | End: 2024-11-01 | Stop reason: HOSPADM

## 2024-10-31 RX ORDER — FUROSEMIDE 20 MG/1
20 TABLET ORAL DAILY PRN
Status: DISCONTINUED | OUTPATIENT
Start: 2024-10-31 | End: 2024-11-01 | Stop reason: HOSPADM

## 2024-10-31 RX ORDER — ACETAMINOPHEN 325 MG/1
650 TABLET ORAL EVERY 6 HOURS
Status: DISCONTINUED | OUTPATIENT
Start: 2024-10-31 | End: 2024-11-01 | Stop reason: HOSPADM

## 2024-10-31 RX ORDER — HYDROMORPHONE HYDROCHLORIDE 1 MG/ML
0.5 INJECTION, SOLUTION INTRAMUSCULAR; INTRAVENOUS; SUBCUTANEOUS EVERY 5 MIN PRN
Status: CANCELLED | OUTPATIENT
Start: 2024-10-31

## 2024-10-31 RX ORDER — RANOLAZINE 500 MG/1
500 TABLET, EXTENDED RELEASE ORAL 2 TIMES DAILY
Status: DISCONTINUED | OUTPATIENT
Start: 2024-10-31 | End: 2024-11-01 | Stop reason: HOSPADM

## 2024-10-31 RX ORDER — ASPIRIN 325 MG
325 TABLET ORAL DAILY
Qty: 30 TABLET | Refills: 0 | Status: SHIPPED | OUTPATIENT
Start: 2024-10-31 | End: 2024-11-30

## 2024-10-31 RX ORDER — MIDAZOLAM HYDROCHLORIDE 1 MG/ML
INJECTION, SOLUTION INTRAMUSCULAR; INTRAVENOUS
Status: DISCONTINUED | OUTPATIENT
Start: 2024-10-31 | End: 2024-10-31 | Stop reason: SDUPTHER

## 2024-10-31 RX ORDER — SODIUM CHLORIDE 9 MG/ML
INJECTION, SOLUTION INTRAVENOUS PRN
Status: CANCELLED | OUTPATIENT
Start: 2024-10-31

## 2024-10-31 RX ORDER — SODIUM CHLORIDE 0.9 % (FLUSH) 0.9 %
5-40 SYRINGE (ML) INJECTION EVERY 12 HOURS SCHEDULED
Status: CANCELLED | OUTPATIENT
Start: 2024-10-31

## 2024-10-31 RX ORDER — METOPROLOL SUCCINATE 50 MG/1
50 TABLET, EXTENDED RELEASE ORAL DAILY
Status: DISCONTINUED | OUTPATIENT
Start: 2024-10-31 | End: 2024-11-01 | Stop reason: HOSPADM

## 2024-10-31 RX ORDER — ACETAMINOPHEN 500 MG
1000 TABLET ORAL ONCE
Status: COMPLETED | OUTPATIENT
Start: 2024-10-31 | End: 2024-10-31

## 2024-10-31 RX ORDER — OXYCODONE HYDROCHLORIDE 5 MG/1
10 TABLET ORAL EVERY 4 HOURS PRN
Status: DISCONTINUED | OUTPATIENT
Start: 2024-10-31 | End: 2024-11-01 | Stop reason: HOSPADM

## 2024-10-31 RX ORDER — SODIUM CHLORIDE 9 MG/ML
INJECTION, SOLUTION INTRAVENOUS CONTINUOUS
Status: DISCONTINUED | OUTPATIENT
Start: 2024-10-31 | End: 2024-11-01

## 2024-10-31 RX ORDER — NITROGLYCERIN 0.4 MG/1
0.4 TABLET SUBLINGUAL EVERY 5 MIN PRN
Status: DISCONTINUED | OUTPATIENT
Start: 2024-10-31 | End: 2024-11-01 | Stop reason: HOSPADM

## 2024-10-31 RX ORDER — SODIUM CHLORIDE, SODIUM LACTATE, POTASSIUM CHLORIDE, CALCIUM CHLORIDE 600; 310; 30; 20 MG/100ML; MG/100ML; MG/100ML; MG/100ML
INJECTION, SOLUTION INTRAVENOUS
Status: DISCONTINUED | OUTPATIENT
Start: 2024-10-31 | End: 2024-10-31 | Stop reason: SDUPTHER

## 2024-10-31 RX ORDER — LABETALOL HYDROCHLORIDE 5 MG/ML
10 INJECTION, SOLUTION INTRAVENOUS
Status: DISCONTINUED | OUTPATIENT
Start: 2024-10-31 | End: 2024-10-31 | Stop reason: HOSPADM

## 2024-10-31 RX ORDER — NALOXONE HYDROCHLORIDE 0.4 MG/ML
INJECTION, SOLUTION INTRAMUSCULAR; INTRAVENOUS; SUBCUTANEOUS PRN
Status: CANCELLED | OUTPATIENT
Start: 2024-10-31

## 2024-10-31 RX ORDER — OXYCODONE HYDROCHLORIDE 5 MG/1
5 TABLET ORAL EVERY 6 HOURS PRN
Qty: 20 TABLET | Refills: 0 | Status: SHIPPED | OUTPATIENT
Start: 2024-10-31 | End: 2024-11-05 | Stop reason: SDUPTHER

## 2024-10-31 RX ORDER — ROCURONIUM BROMIDE 10 MG/ML
INJECTION, SOLUTION INTRAVENOUS
Status: DISCONTINUED | OUTPATIENT
Start: 2024-10-31 | End: 2024-10-31 | Stop reason: SDUPTHER

## 2024-10-31 RX ORDER — PREGABALIN 75 MG/1
75 CAPSULE ORAL EVERY 12 HOURS PRN
Status: DISCONTINUED | OUTPATIENT
Start: 2024-10-31 | End: 2024-11-01 | Stop reason: HOSPADM

## 2024-10-31 RX ORDER — INSULIN GLARGINE 100 [IU]/ML
12 INJECTION, SOLUTION SUBCUTANEOUS NIGHTLY
Status: DISCONTINUED | OUTPATIENT
Start: 2024-10-31 | End: 2024-11-01 | Stop reason: HOSPADM

## 2024-10-31 RX ORDER — FENOFIBRATE 160 MG/1
160 TABLET ORAL DAILY
Status: DISCONTINUED | OUTPATIENT
Start: 2024-10-31 | End: 2024-11-01 | Stop reason: HOSPADM

## 2024-10-31 RX ADMIN — PREGABALIN 75 MG: 75 CAPSULE ORAL at 18:23

## 2024-10-31 RX ADMIN — ROPIVACAINE HYDROCHLORIDE 20 ML: 5 INJECTION, SOLUTION EPIDURAL; INFILTRATION; PERINEURAL at 15:35

## 2024-10-31 RX ADMIN — DIMENHYDRINATE 50 MG: 50 TABLET ORAL at 09:33

## 2024-10-31 RX ADMIN — SUCRALFATE 1 G: 1 TABLET ORAL at 16:17

## 2024-10-31 RX ADMIN — TRANEXAMIC ACID 1000 MG: 100 INJECTION, SOLUTION INTRAVENOUS at 11:14

## 2024-10-31 RX ADMIN — SODIUM CHLORIDE, POTASSIUM CHLORIDE, SODIUM LACTATE AND CALCIUM CHLORIDE: 600; 310; 30; 20 INJECTION, SOLUTION INTRAVENOUS at 11:05

## 2024-10-31 RX ADMIN — FENOFIBRATE 160 MG: 160 TABLET ORAL at 16:17

## 2024-10-31 RX ADMIN — ROCURONIUM BROMIDE 20 MG: 10 INJECTION, SOLUTION INTRAVENOUS at 12:07

## 2024-10-31 RX ADMIN — LIDOCAINE HYDROCHLORIDE 20 MG: 20 INJECTION, SOLUTION EPIDURAL; INFILTRATION; INTRACAUDAL; PERINEURAL at 11:10

## 2024-10-31 RX ADMIN — METFORMIN HYDROCHLORIDE 1000 MG: 500 TABLET ORAL at 16:17

## 2024-10-31 RX ADMIN — SODIUM CHLORIDE, POTASSIUM CHLORIDE, SODIUM LACTATE AND CALCIUM CHLORIDE: 600; 310; 30; 20 INJECTION, SOLUTION INTRAVENOUS at 10:00

## 2024-10-31 RX ADMIN — OXYCODONE 10 MG: 5 TABLET ORAL at 20:09

## 2024-10-31 RX ADMIN — DEXMEDETOMIDINE HCL 12 MCG: 100 INJECTION INTRAVENOUS at 13:22

## 2024-10-31 RX ADMIN — OXYCODONE 10 MG: 5 TABLET ORAL at 23:56

## 2024-10-31 RX ADMIN — INSULIN GLARGINE 12 UNITS: 100 INJECTION, SOLUTION SUBCUTANEOUS at 20:09

## 2024-10-31 RX ADMIN — METOPROLOL SUCCINATE 50 MG: 50 TABLET, EXTENDED RELEASE ORAL at 16:17

## 2024-10-31 RX ADMIN — CELECOXIB 200 MG: 200 CAPSULE ORAL at 09:30

## 2024-10-31 RX ADMIN — ONDANSETRON 4 MG: 2 INJECTION, SOLUTION INTRAMUSCULAR; INTRAVENOUS at 13:56

## 2024-10-31 RX ADMIN — RANOLAZINE 500 MG: 500 TABLET, FILM COATED, EXTENDED RELEASE ORAL at 20:09

## 2024-10-31 RX ADMIN — FENTANYL CITRATE 100 MCG: 50 INJECTION INTRAMUSCULAR; INTRAVENOUS at 11:08

## 2024-10-31 RX ADMIN — MIDAZOLAM 2 MG: 1 INJECTION INTRAMUSCULAR; INTRAVENOUS at 11:08

## 2024-10-31 RX ADMIN — ACETAMINOPHEN 650 MG: 325 TABLET ORAL at 16:17

## 2024-10-31 RX ADMIN — LISINOPRIL 20 MG: 20 TABLET ORAL at 16:17

## 2024-10-31 RX ADMIN — ROCURONIUM BROMIDE 30 MG: 10 INJECTION, SOLUTION INTRAVENOUS at 11:58

## 2024-10-31 RX ADMIN — OXYCODONE 10 MG: 5 TABLET ORAL at 16:17

## 2024-10-31 RX ADMIN — PROPOFOL 200 MG: 10 INJECTION, EMULSION INTRAVENOUS at 11:10

## 2024-10-31 RX ADMIN — FENTANYL CITRATE 50 MCG: 0.05 INJECTION, SOLUTION INTRAMUSCULAR; INTRAVENOUS at 14:25

## 2024-10-31 RX ADMIN — SUGAMMADEX 200 MG: 100 INJECTION, SOLUTION INTRAVENOUS at 13:53

## 2024-10-31 RX ADMIN — ROCURONIUM BROMIDE 20 MG: 10 INJECTION, SOLUTION INTRAVENOUS at 13:08

## 2024-10-31 RX ADMIN — ROCURONIUM BROMIDE 50 MG: 10 INJECTION, SOLUTION INTRAVENOUS at 11:10

## 2024-10-31 RX ADMIN — ATORVASTATIN CALCIUM 80 MG: 40 TABLET, FILM COATED ORAL at 20:09

## 2024-10-31 RX ADMIN — DEXAMETHASONE SODIUM PHOSPHATE 10 MG: 10 INJECTION INTRAMUSCULAR; INTRAVENOUS at 15:35

## 2024-10-31 RX ADMIN — FENTANYL CITRATE 50 MCG: 0.05 INJECTION, SOLUTION INTRAMUSCULAR; INTRAVENOUS at 14:18

## 2024-10-31 RX ADMIN — WATER 2000 MG: 1 INJECTION INTRAMUSCULAR; INTRAVENOUS; SUBCUTANEOUS at 11:09

## 2024-10-31 RX ADMIN — DEXAMETHASONE SODIUM PHOSPHATE 4 MG: 4 INJECTION, SOLUTION INTRAMUSCULAR; INTRAVENOUS at 13:55

## 2024-10-31 RX ADMIN — ISOSORBIDE MONONITRATE 30 MG: 30 TABLET, EXTENDED RELEASE ORAL at 16:17

## 2024-10-31 RX ADMIN — SODIUM CHLORIDE, PRESERVATIVE FREE 10 ML: 5 INJECTION INTRAVENOUS at 20:12

## 2024-10-31 RX ADMIN — ACETAMINOPHEN 1000 MG: 500 TABLET ORAL at 09:30

## 2024-10-31 RX ADMIN — KETOROLAC TROMETHAMINE 30 MG: 30 INJECTION, SOLUTION INTRAMUSCULAR at 13:57

## 2024-10-31 RX ADMIN — WATER 2000 MG: 1 INJECTION INTRAMUSCULAR; INTRAVENOUS; SUBCUTANEOUS at 18:23

## 2024-10-31 RX ADMIN — PANTOPRAZOLE SODIUM 40 MG: 40 TABLET, DELAYED RELEASE ORAL at 16:17

## 2024-10-31 ASSESSMENT — PAIN - FUNCTIONAL ASSESSMENT
PAIN_FUNCTIONAL_ASSESSMENT: ACTIVITIES ARE NOT PREVENTED
PAIN_FUNCTIONAL_ASSESSMENT: 0-10
PAIN_FUNCTIONAL_ASSESSMENT: PREVENTS OR INTERFERES SOME ACTIVE ACTIVITIES AND ADLS
PAIN_FUNCTIONAL_ASSESSMENT: PREVENTS OR INTERFERES SOME ACTIVE ACTIVITIES AND ADLS
PAIN_FUNCTIONAL_ASSESSMENT: 0-10
PAIN_FUNCTIONAL_ASSESSMENT: PREVENTS OR INTERFERES SOME ACTIVE ACTIVITIES AND ADLS
PAIN_FUNCTIONAL_ASSESSMENT: 0-10
PAIN_FUNCTIONAL_ASSESSMENT: PREVENTS OR INTERFERES SOME ACTIVE ACTIVITIES AND ADLS
PAIN_FUNCTIONAL_ASSESSMENT: 0-10
PAIN_FUNCTIONAL_ASSESSMENT: ACTIVITIES ARE NOT PREVENTED

## 2024-10-31 ASSESSMENT — PAIN DESCRIPTION - DESCRIPTORS
DESCRIPTORS: ACHING;SHARP
DESCRIPTORS: ACHING
DESCRIPTORS: ACHING;DISCOMFORT;SHARP
DESCRIPTORS: ACHING
DESCRIPTORS: ACHING
DESCRIPTORS: ACHING;SHARP
DESCRIPTORS: ACHING
DESCRIPTORS: ACHING;THROBBING;PRESSURE
DESCRIPTORS: ACHING
DESCRIPTORS: ACHING;SHARP
DESCRIPTORS: SHARP
DESCRIPTORS: ACHING;SHARP

## 2024-10-31 ASSESSMENT — PAIN DESCRIPTION - PAIN TYPE
TYPE: SURGICAL PAIN
TYPE: CHRONIC PAIN

## 2024-10-31 ASSESSMENT — PAIN DESCRIPTION - LOCATION
LOCATION: HIP

## 2024-10-31 ASSESSMENT — ENCOUNTER SYMPTOMS: SHORTNESS OF BREATH: 0

## 2024-10-31 ASSESSMENT — PAIN DESCRIPTION - FREQUENCY
FREQUENCY: CONTINUOUS
FREQUENCY: INTERMITTENT

## 2024-10-31 ASSESSMENT — PAIN SCALES - GENERAL
PAINLEVEL_OUTOF10: 10
PAINLEVEL_OUTOF10: 9
PAINLEVEL_OUTOF10: 6
PAINLEVEL_OUTOF10: 9
PAINLEVEL_OUTOF10: 8
PAINLEVEL_OUTOF10: 9
PAINLEVEL_OUTOF10: 8
PAINLEVEL_OUTOF10: 7

## 2024-10-31 ASSESSMENT — LIFESTYLE VARIABLES
SMOKING_STATUS: 0
HOW MANY STANDARD DRINKS CONTAINING ALCOHOL DO YOU HAVE ON A TYPICAL DAY: PATIENT DOES NOT DRINK
HOW OFTEN DO YOU HAVE A DRINK CONTAINING ALCOHOL: NEVER

## 2024-10-31 ASSESSMENT — PAIN DESCRIPTION - ORIENTATION
ORIENTATION: LEFT

## 2024-10-31 ASSESSMENT — PAIN DESCRIPTION - ONSET
ONSET: ON-GOING

## 2024-10-31 NOTE — CONSULTS
for up to 14 days. Max Daily Amount: 150 mg 10/31/24 11/14/24 Yes Kenia Monetrroso PA   aspirin (DONOVAN ASPIRIN) 325 MG tablet Take 1 tablet by mouth daily 10/31/24 11/30/24 Yes Kenia Monterroso PA   atorvastatin (LIPITOR) 80 MG tablet Take 1 tablet by mouth at bedtime 10/30/24  Yes Mellissa Rodriguez PA-C   Continuous Glucose  (DEXCOM G7 ) AARON Inject 1 each into the skin every 10 days 10/29/24  Yes Curtis Chavarria MD   metFORMIN (GLUCOPHAGE) 1000 MG tablet Take 1 tablet by mouth 2 times daily (with meals) 10/28/24 1/26/25 Yes Curtis Chavarria MD   lisinopril (PRINIVIL;ZESTRIL) 20 MG tablet Take 1 tablet by mouth daily 9/19/24  Yes Elijah Orozco MD   Insulin Pen Needle (BD PEN NEEDLE AP 2ND GEN) 32G X 4 MM MISC 1 each by Does not apply route daily 8/30/24  Yes Curtis Chavarria MD   furosemide (LASIX) 20 MG tablet TAKE 1 TABLET BY MOUTH DAILY AS NEEDED (LEG EDEMA) 8/27/24  Yes Curtis Chavarria MD   ondansetron (ZOFRAN-ODT) 4 MG disintegrating tablet DISSOLVE 1 TABLET UNDER TONGUE THREE TIMES DAILY AS NEEDED FOR NAUSEA OR VOMITING 7/26/24  Yes Roxanna Hernandez MD   ranolazine (RANEXA) 500 MG extended release tablet Take twice daily 8/15/24  Yes Elijah Orozco MD   fenofibrate (TRIGLIDE) 160 MG tablet Take 1 tablet by mouth daily 8/12/24  Yes Elijah Orozco MD   metoprolol succinate (TOPROL XL) 50 MG extended release tablet Take 1 tablet by mouth daily 8/6/24  Yes Elijah Orozco MD   amLODIPine (NORVASC) 5 MG tablet Take 1 tablet by mouth daily 8/6/24  Yes Elijah Orozco MD   clopidogrel (PLAVIX) 75 MG tablet Take 1 tablet by mouth daily 8/6/24  Yes Elijah Orozco MD   sucralfate (CARAFATE) 1 GM tablet Take 1 tablet by mouth 2 times daily (before meals) 5-60\" before lunch and at bedtime 4/22/24  Yes Christophe Thurston MD   pantoprazole (PROTONIX) 40 MG tablet Take 1 tablet by mouth 2 times daily (before meals) 30-60\" before breakfast and dinner 4/22/24  Yes Christophe Thurston MD   isosorbide  x-ray: The hip demonstrates normal alignment. No evidence of acute fracture.  No focal osseus lesion.   Pelvis is intact. Lumbar spine x-ray: Normal lateral alignment of the lumbar spine.  Suggestion of mild superior endplate compression at L1 of indeterminate acuity.  No spondylolisthesis or spondylolysis.  Sacrum appears intact.     1. No acute abnormality of the left hip. 2. Suggestion of mild superior endplate compression at L1 of indeterminate acuity. Correlate with point tenderness. RECOMMENDATION: If suspect acute lumbar spine injury lumbar MRI with STIR sequence would be helpful.     Echo (TTE) complete (PRN contrast/bubble/strain/3D)    Result Date: 10/8/2024    Left Ventricle: Moderately reduced left ventricular systolic function with a visually estimated EF of 40 - 45%. Left ventricle size is normal. Normal wall thickness. Hypokinesis of the inferior and inferoseptal walls noted. Grade I diastolic dysfunction with normal LAP.   Right Ventricle: Right ventricle size is normal. Normal systolic function.   Aortic Valve: Trileaflet valve. Mildly thickened cusps. No regurgitation. No stenosis.   Aorta: Normal sized aortic root.   Pericardium: The pericardium is normal. No pericardial effusion. When compared with prior study on 4/2/2024, no significant changes noted.         ASSESSMENT:       Principal Problem:    S/P total left hip arthroplasty  Active Problems:    Generalized anxiety disorder    Mild persistent asthma with acute exacerbation    Unstable angina (HCC)    Gastrointestinal hemorrhage with hematemesis    Gastritis without bleeding    HTN (hypertension)    Mild congestive heart failure (HCC)    S/P CABG (coronary artery bypass graft)    Dyslipidemia    Ischemic cardiomyopathy    ASHD (arteriosclerotic heart disease) /  CABG 2018    Type 2 diabetes mellitus (HCC)  Resolved Problems:    * No resolved hospital problems. *      PLAN:     Patient status: Admit the patient    MEDICAL DECISION

## 2024-10-31 NOTE — ANESTHESIA POSTPROCEDURE EVALUATION
Department of Anesthesiology  Postprocedure Note    Patient: Franky Melendez  MRN: [id number]  YOB: 1975  Date of evaluation: 10/31/2024    Procedure Summary       Date: 10/31/24 Room / Location: [address]    Anesthesia Start: 1108 Anesthesia Stop: 1403    Procedure: HIP TOTAL ARTHROPLASTY ANTERIOR APPROACH (Left) Diagnosis:       Left hip pain      Primary osteoarthritis of left hip      (Left hip pain [M25.552])      (Primary osteoarthritis of left hip [M16.12])    Surgeons: Elliot Jacobs MD Responsible Provider: Kip Younger APRN - CRNA    Anesthesia Type: general ASA Status: 3            Anesthesia Type: No value filed.    Yrn Phase I: Yrn Score: 10    Yrn Phase II:      Anesthesia Post Evaluation    Patient location during evaluation: PACU  Patient participation: complete - patient participated  Level of consciousness: awake  Airway patency: patent  Nausea & Vomiting: no vomiting and no nausea  Cardiovascular status: blood pressure returned to baseline and hemodynamically stable  Respiratory status: acceptable, spontaneous ventilation and room air  Hydration status: stable  Multimodal analgesia pain management approach  Pain management: satisfactory to patient    No notable events documented.

## 2024-10-31 NOTE — ANESTHESIA PRE PROCEDURE
intravenous.    MIPS: Postoperative opioids intended and Prophylactic antiemetics administered.  Anesthetic plan and risks discussed with patient.    Use of blood products discussed with patient whom consented to blood products.    Plan discussed with CRNA.                    KRUNAL Felipe - CRNA   10/31/2024

## 2024-10-31 NOTE — DISCHARGE INSTRUCTIONS
Total Joint Replacement  Discharge Instructions    To prevent Clot formation, you have been placed on an anticoagulant  Surgical Site Care:  Dressing to remain clean, dry, and intact until follow up in office in 2 weeks  Sutures may be present and will likely be removed at 2 week follow up   Showering is permitted with use of CHG soap over dressing as long as dressing remains fully intact. Do no shower if dressing is peeling or compromised  Physical Therapy:  Full Weight Bearing Status  Precautions   Per Physical Therapy handout  Pain Medications  You were given pain medication  Wean off pain medications as you deem appropriate as long as pain is under control  Cold packs/Ice packs/Machine  May be used 3 times daily for 15-30 minutes as necessary  Be sure to have a barrier (cloth, clothing, towel) between the site and the ice pack to prevent frostbite  Contact [facility] office if  Increased redness, swelling, drainage of any kind, and/or pain to surgery site.  As well as new onset fevers and or chills.  These could signify an infection.  Calf or thigh tenderness to touch as well as increased swelling or redness.  This could signify a clot formation.  Numbness or tingling to an area around the incision site or below the incision site (toes).  Any rash appears, increased  or new onset nausea/vomiting occur.  This may indicate a reaction to a medication.   Phone # [phone] - [facility]  Follow up with Surgeon at scheduled appointment time.   Continue to use incentive spirometer frequently during the day.

## 2024-10-31 NOTE — PROGRESS NOTES
Spiritual Services Interventions  [facility] OR POOL/NONE   10/31/2024  Joe Melendez  49 y.o. year old male    Encounter Summary  Encounter Overview/Reason: (P) Pre-Procedural  Service Provided For: (P) Family  Referral/Consult From: (P) Rounding  Support System: (P) Parent  Last Encounter : (P) 10/31/24  Complexity of Encounter: (P) Moderate  Begin Time: (P) 0900  End Time : (P) 0915  Total Time Calculated: (P) 15 min     Spiritual/Emotional needs  Type: (P) Spiritual Support                    Assessment/Intervention/Outcome  Assessment: (P) Calm  Intervention: (P) Discussed belief system/Gnosticist practices/keyana, Active listening  Outcome: (P) Encouraged, Engaged in conversation, Acceptance, Comfort

## 2024-10-31 NOTE — PROGRESS NOTES
RESPIRATORY ASSESSMENT PROTOCOL                                                                                              Patient Name: Franky Melendez Room#: 0331/0331-01 : 1975     Admitting diagnosis: Left hip pain [M25.552]  Primary osteoarthritis of left hip [M16.12]  S/P total left hip arthroplasty [Z96.642]       Medical History:   Past Medical History:   Diagnosis Date    CAD (coronary artery disease)     Carpal tunnel syndrome     Depressive disorder, not elsewhere classified     Diabetes mellitus (HCC)     Gastrointestinal hemorrhage with hematemesis 2023    Heart attack (HCC) 2018    STEMI    History of echocardiogram 2019    EF 40-45%. LV cavity sixe is mildly increased. Inferior and inferoseptal wall hypokenesis noted. Mild diastolic dysfunction.    History of pancreatitis 2023    Hyperlipidemia     Hypertension     PTSD (post-traumatic stress disorder)     uexpected open heart surgery in 2018       PATIENT ASSESSMENT    LABORATORY DATA  Hematology:   Lab Results   Component Value Date/Time    WBC 7.8 2024 11:32 AM    RBC 4.75 2024 11:32 AM    RBC 4.74 2012 08:14 AM    HGB 14.5 2024 11:32 AM    HCT 41.3 2024 11:32 AM     2024 11:32 AM     2012 08:14 AM     Chemistry:  No results found for: \"PHART\", \"ROS7NWH\", \"PO2ART\", \"P0ANLKRV\", \"BLF2KWP\", \"PBEA\", \"NBEA\"    VITALS  Pulse: 80   Respirations: 17  BP: 122/76  SpO2: 98 % O2 Device: None (Room air)  Temp: 97.7 °F (36.5 °C)    SKIN COLOR  [x] Normal  [] Pale  [] Dusky  [] Cyanotic    RESPIRATORY PATTERN  [x] Normal  [] Dyspnea  [] Cheyne-Bowden  [] Kussmaul  [] Biots    AMBULATORY  [] Yes  [] No  [x] With Assistance    PEAK FLOW  Predicted:     Personal Best:            Patient Acuity 0 1 2 3 4 Score   Level of Consciousness (LOC) [x]  Alert & Oriented or Pt normal LOC []  Confused;follows directions []  Confused & uncooper-ative []  Obtunded []  Comatose 0

## 2024-10-31 NOTE — PROGRESS NOTES
Pt arrives to room 331 via bed. A/O x4, no chest pain, pain to LLE. VS and assessment complete. Admission done with patients assistance. Family is  at bedside. L hip dressing d/I. Oriented to room and call light. All questions answered. Bed alarm on and call light in reach.

## 2024-10-31 NOTE — OP NOTE
Department of Orthopaedic Surgery  Operative Report      Patient:  Franky Melendez    YOB: 1975    MRN:  [id number]    Date of Surgery:  10/31/2024    Pre-operative Diagnosis:    1) Left hip osteoarthritis  2) Left hip pain     Post-operative Diagnosis:    1) Left hip osteoarthritis  2) Left hip pain     Procedure: Left total hip arthroplasty     Surgeon:  Elliot Jacobs MD    Assistant(s):   First Assistant: Kenia Monterroso PA-C, assisted throughout the procedure with positioning, draping, retraction, wound closure, and dressing application.    Anesthesia: General     Estimated blood loss:  300 ml     Specimens:  Femoral head and acetabulum reamings disposed of     Fluids: 700 ml crystalloid     Findings:  Bone on bone osteoarthritis, superolateral wear.     Complications: None     Condition:  Stable     Indications for Surgery:  Patient is a 50 y/o male who presented with continued left hip pain with ambulation despite nonoperative interventions. Pain was affecting his quality of life and ADLs. Radiographs demonstrated end-stage osteoarthritis of the left hip. Given patient's continued pain, discussion was had with patient regarding total hip arthroplasty.  Discussed procedure, risks, benefits, and alternatives including but not limited to bleeding, infection, neurovascular injury, hardware failure, dislocation, leg length discrepancy, fracture, continued pain, DVT, need for additional surgery and risk of anesthesia. Patient understood the risks and consented to proceed with surgery.       Implants:    Biomet Echo micro 11 high offset femur  52 mm G7 acetabular shell  Neutral liner  36mm x +0mm head ceramic  6.5 mm screw x2     Procedure:  Patient was identified and greeted in the preoperative holding area. The correct surgical site was identified and marked. Surgical consent was obtained and placed on the chart. Patient was taken to the surgical suite and transferred to the operating room table. Patient

## 2024-10-31 NOTE — PROGRESS NOTES
Dr. Jacobs states tranexamic acid to be administered as ordered, after patient cardiac history reveiwed with doctor and Radha from pharmacy.

## 2024-10-31 NOTE — ANESTHESIA PROCEDURE NOTES
Peripheral Block    Patient location during procedure: pre-op  Reason for block: post-op pain management and at surgeon's request  Start time: 10/31/2024 3:20 PM  End time: 10/31/2024 3:35 PM  Staffing  Performed: resident/CRNA   Resident/CRNA: Xiomara Dorsey APRN - CRNA  Performed by: Xiomara Dorsey APRN - CRNA  Authorized by: Kip Younger APRN - CRNA    Preanesthetic Checklist  Completed: patient identified, IV checked, site marked, risks and benefits discussed, surgical/procedural consents, equipment checked, pre-op evaluation, timeout performed, anesthesia consent given, oxygen available, monitors applied/VS acknowledged and blood product R/B/A discussed and consented  Peripheral Block   Patient position: supine  Prep: ChloraPrep  Provider prep: mask and sterile gloves  Patient monitoring: continuous pulse ox, IV access and responsive to questions  Block type: Fascia iliaca  Laterality: left  Injection technique: single-shot  Guidance: ultrasound guided  Local infiltration: ropivacaine and decadron  Infiltration strength: 0.5 %  Local infiltration: ropivacaine and decadron  Dose: 20 mLDose: 1 mL    Needle   Needle type: Other   Needle gauge: 22 G  Needle localization: ultrasound guidance  Needle length: 8 cmOther needle type: PAJUNK  Assessment   Injection assessment: negative aspiration for heme, no paresthesia on injection, local visualized surrounding nerve on ultrasound, no intravascular symptoms and low pressure verified by pressure monitor  Paresthesia pain: none  Slow fractionated injection: yes  Hemodynamics: stable and unstable  Outcomes: uncomplicated and patient tolerated procedure well    Additional Notes  Fascia Iliaca: 20 mL 0.5% ropivacaine- 10mL PFNS- 10 mg decadron    Technically difficult block d/t dressing site

## 2024-11-01 VITALS
TEMPERATURE: 97.1 F | DIASTOLIC BLOOD PRESSURE: 68 MMHG | SYSTOLIC BLOOD PRESSURE: 113 MMHG | OXYGEN SATURATION: 97 % | RESPIRATION RATE: 18 BRPM | BODY MASS INDEX: 26.01 KG/M2 | HEIGHT: 67 IN | HEART RATE: 62 BPM | WEIGHT: 165.7 LBS

## 2024-11-01 PROBLEM — Z01.818 PREOPERATIVE CLEARANCE: Status: RESOLVED | Noted: 2024-10-02 | Resolved: 2024-11-01

## 2024-11-01 LAB
ANION GAP SERPL CALCULATED.3IONS-SCNC: 10 MMOL/L (ref 9–16)
BUN SERPL-MCNC: 16 MG/DL (ref 6–20)
BUN/CREAT SERPL: 16 (ref 9–20)
CALCIUM SERPL-MCNC: 8.6 MG/DL (ref 8.6–10.4)
CHLORIDE SERPL-SCNC: 106 MMOL/L (ref 98–107)
CO2 SERPL-SCNC: 25 MMOL/L (ref 20–31)
CREAT SERPL-MCNC: 1 MG/DL (ref 0.7–1.2)
GFR, ESTIMATED: 90 ML/MIN/1.73M2
GLUCOSE BLD-MCNC: 135 MG/DL (ref 74–100)
GLUCOSE BLD-MCNC: 197 MG/DL (ref 74–100)
GLUCOSE SERPL-MCNC: 178 MG/DL (ref 74–99)
POTASSIUM SERPL-SCNC: 3.7 MMOL/L (ref 3.7–5.3)
SODIUM SERPL-SCNC: 141 MMOL/L (ref 136–145)

## 2024-11-01 PROCEDURE — G0378 HOSPITAL OBSERVATION PER HR: HCPCS

## 2024-11-01 PROCEDURE — 97110 THERAPEUTIC EXERCISES: CPT

## 2024-11-01 PROCEDURE — 2580000003 HC RX 258: Performed by: PHYSICIAN ASSISTANT

## 2024-11-01 PROCEDURE — 97166 OT EVAL MOD COMPLEX 45 MIN: CPT

## 2024-11-01 PROCEDURE — 6360000002 HC RX W HCPCS: Performed by: PHYSICIAN ASSISTANT

## 2024-11-01 PROCEDURE — 97162 PT EVAL MOD COMPLEX 30 MIN: CPT

## 2024-11-01 PROCEDURE — 97535 SELF CARE MNGMENT TRAINING: CPT

## 2024-11-01 PROCEDURE — 6370000000 HC RX 637 (ALT 250 FOR IP): Performed by: PHYSICIAN ASSISTANT

## 2024-11-01 PROCEDURE — 97530 THERAPEUTIC ACTIVITIES: CPT

## 2024-11-01 PROCEDURE — 94761 N-INVAS EAR/PLS OXIMETRY MLT: CPT

## 2024-11-01 PROCEDURE — 36415 COLL VENOUS BLD VENIPUNCTURE: CPT

## 2024-11-01 PROCEDURE — 80048 BASIC METABOLIC PNL TOTAL CA: CPT

## 2024-11-01 PROCEDURE — 82947 ASSAY GLUCOSE BLOOD QUANT: CPT

## 2024-11-01 RX ORDER — GLUCAGON 1 MG/ML
1 KIT INJECTION PRN
Status: DISCONTINUED | OUTPATIENT
Start: 2024-11-01 | End: 2024-11-01 | Stop reason: HOSPADM

## 2024-11-01 RX ORDER — DEXTROSE MONOHYDRATE 100 MG/ML
INJECTION, SOLUTION INTRAVENOUS CONTINUOUS PRN
Status: DISCONTINUED | OUTPATIENT
Start: 2024-11-01 | End: 2024-11-01 | Stop reason: HOSPADM

## 2024-11-01 RX ADMIN — SUCRALFATE 1 G: 1 TABLET ORAL at 07:03

## 2024-11-01 RX ADMIN — FENOFIBRATE 160 MG: 160 TABLET ORAL at 08:10

## 2024-11-01 RX ADMIN — SODIUM CHLORIDE, PRESERVATIVE FREE 10 ML: 5 INJECTION INTRAVENOUS at 08:09

## 2024-11-01 RX ADMIN — ACETAMINOPHEN 650 MG: 325 TABLET ORAL at 12:10

## 2024-11-01 RX ADMIN — CLOPIDOGREL BISULFATE 75 MG: 75 TABLET ORAL at 08:09

## 2024-11-01 RX ADMIN — LISINOPRIL 20 MG: 20 TABLET ORAL at 08:10

## 2024-11-01 RX ADMIN — RANOLAZINE 500 MG: 500 TABLET, FILM COATED, EXTENDED RELEASE ORAL at 08:09

## 2024-11-01 RX ADMIN — OXYCODONE 10 MG: 5 TABLET ORAL at 08:09

## 2024-11-01 RX ADMIN — METOPROLOL SUCCINATE 50 MG: 50 TABLET, EXTENDED RELEASE ORAL at 08:10

## 2024-11-01 RX ADMIN — PANTOPRAZOLE SODIUM 40 MG: 40 TABLET, DELAYED RELEASE ORAL at 07:03

## 2024-11-01 RX ADMIN — METFORMIN HYDROCHLORIDE 1000 MG: 500 TABLET ORAL at 08:10

## 2024-11-01 RX ADMIN — WATER 2000 MG: 1 INJECTION INTRAMUSCULAR; INTRAVENOUS; SUBCUTANEOUS at 11:04

## 2024-11-01 RX ADMIN — ACETAMINOPHEN 650 MG: 325 TABLET ORAL at 05:59

## 2024-11-01 RX ADMIN — AMLODIPINE BESYLATE 5 MG: 5 TABLET ORAL at 08:09

## 2024-11-01 RX ADMIN — ISOSORBIDE MONONITRATE 30 MG: 30 TABLET, EXTENDED RELEASE ORAL at 08:09

## 2024-11-01 RX ADMIN — OXYCODONE 10 MG: 5 TABLET ORAL at 04:25

## 2024-11-01 RX ADMIN — POLYETHYLENE GLYCOL 3350 17 G: 17 POWDER, FOR SOLUTION ORAL at 08:09

## 2024-11-01 RX ADMIN — WATER 2000 MG: 1 INJECTION INTRAMUSCULAR; INTRAVENOUS; SUBCUTANEOUS at 04:24

## 2024-11-01 RX ADMIN — ASPIRIN 325 MG: 325 TABLET, COATED ORAL at 08:10

## 2024-11-01 RX ADMIN — OXYCODONE 10 MG: 5 TABLET ORAL at 12:11

## 2024-11-01 ASSESSMENT — PAIN - FUNCTIONAL ASSESSMENT
PAIN_FUNCTIONAL_ASSESSMENT: ACTIVITIES ARE NOT PREVENTED

## 2024-11-01 ASSESSMENT — PAIN DESCRIPTION - FREQUENCY
FREQUENCY: CONTINUOUS

## 2024-11-01 ASSESSMENT — PAIN DESCRIPTION - ORIENTATION
ORIENTATION: LEFT

## 2024-11-01 ASSESSMENT — PAIN SCALES - GENERAL
PAINLEVEL_OUTOF10: 6
PAINLEVEL_OUTOF10: 7
PAINLEVEL_OUTOF10: 0
PAINLEVEL_OUTOF10: 8

## 2024-11-01 ASSESSMENT — PAIN DESCRIPTION - LOCATION
LOCATION: HIP

## 2024-11-01 ASSESSMENT — PAIN DESCRIPTION - ONSET
ONSET: ON-GOING

## 2024-11-01 ASSESSMENT — PAIN DESCRIPTION - PAIN TYPE
TYPE: SURGICAL PAIN

## 2024-11-01 ASSESSMENT — PAIN DESCRIPTION - DESCRIPTORS
DESCRIPTORS: ACHING

## 2024-11-01 NOTE — PROGRESS NOTES
Pt resting in bed watching TV. Assessment and vital signs as charted. Pt is A & O x 4. Dressing to left hip remains clean, dry and intact. Ice pack to left hip. Pain medications given as ordered. Pt denies any other needs. Call light in reach. Will continue to monitor.

## 2024-11-01 NOTE — PROGRESS NOTES
[facility]                       Inpatient Medication Education Note                                 Patient admitted for total left hip replacement    Medications reviewed with the patient include:  aspirin, oxycodone, acetaminophen, pregabalin, docusate, miralax      Patient education provided when necessary to include potential medication related side effects with acknowledgement of understanding.      Suze Jauregui [facility], 11/1/2024, 12:31 PM

## 2024-11-01 NOTE — PROGRESS NOTES
Progress Note    Subjective:     Post-Operative Day: 1 Status Post left Total Hip Arthroplasty    Patient resting comfortably in bed. Ambulating well with assistance. Voiding without difficulty. Denies chest pain, shortness of breath, nausea, lightheadedness, dizziness, calf pain, or numbness and tingling in her toes and feet.    Objective:   VITALS:  /72   Pulse 70   Temp 97.8 °F (36.6 °C) (Temporal)   Resp 18   Ht 1.702 m (5' 7\")   Wt 75.2 kg (165 lb 11.2 oz)   SpO2 96%   BMI 25.95 kg/m²   24HR INTAKE/OUTPUT:    Intake/Output Summary (Last 24 hours) at 2024 0643  Last data filed at 2024 0400  Gross per 24 hour   Intake 3360 ml   Output --   Net 3360 ml     TEMPERATURE:  Current - Temp: 97.8 °F (36.6 °C); Max - Temp  Av.6 °F (36.4 °C)  Min: 96.9 °F (36.1 °C)  Max: 97.8 °F (36.6 °C)  RESPIRATIONS RANGE: Resp  Av.9  Min: 12  Max: 20  PULSE RANGE: Pulse  Av.6  Min: 70  Max: 84  BLOOD PRESSURE RANGE:  Systolic (24hrs), Av , Min:118 , Max:147  ; Diastolic (24hrs), Av, Min:55, Max:119   PULSE OXIMETRY RANGE: SpO2  Av.8 %  Min: 95 %  Max: 98 %    General: alert, appears stated age, cooperative, and no distress   Wound: Wound clean and dry no evidence of infection., No Erythema, No Drainage, and Wound Intact   DVT Exam: No evidence of DVT seen on physical exam.  Negative Nu's sign.  No cords or calf tenderness.  No significant calf/ankle edema.     Mild edema to thigh. Thigh remains soft and compressible. Motor intact quad, hamstring, TA, GSC, EHL, FHL. SILT SPN, DPN, sural, saphenous, tibial nerve distribution. 2+ DP pulse. Toes warm and well perfused.      Data Review  CBC:   Lab Results   Component Value Date/Time    WBC 7.8 2024 11:32 AM    RBC 4.75 2024 11:32 AM    RBC 4.74 2012 08:14 AM    HGB 14.5 2024 11:32 AM    HCT 41.3 2024 11:32 AM     2024 11:32 AM     2012 08:14 AM    INR 1.1 10/08/2024 10:15 AM

## 2024-11-01 NOTE — PROGRESS NOTES
Physical Therapy  Facility/Department: [facility] MED SURG  Physical Therapy Initial Assessment    Name: Franky Melendez  : 1975  MRN: [id number]  Date of Service: 2024    Discharge Recommendations:  Therapy recommended at discharge, Outpatient PT (OP PT ALREADY SCHEDULED.24.)          Patient Diagnosis(es): The primary encounter diagnosis was Pain. A diagnosis of S/P total left hip arthroplasty was also pertinent to this visit.  Past Medical History:  has a past medical history of CAD (coronary artery disease), Carpal tunnel syndrome, Depressive disorder, not elsewhere classified, Diabetes mellitus (HCC), Gastrointestinal hemorrhage with hematemesis, Heart attack (HCC), History of echocardiogram, History of pancreatitis, Hyperlipidemia, Hypertension, and PTSD (post-traumatic stress disorder).  Past Surgical History:  has a past surgical history that includes Coronary angioplasty (2018); Cardiac catheterization (Left, 2018); Coronary artery bypass graft (2018); Cardiac catheterization (Left, 2022); Colonoscopy (N/A, 10/04/2022); Upper gastrointestinal endoscopy (N/A, 10/04/2022); Colonoscopy (10/04/2022); Esophagogastroduodenoscopy (10/04/2022); Upper gastrointestinal endoscopy (N/A, 2023); Esophagogastroduodenoscopy (2023); Cardiac procedure (N/A, 2023); Shoulder arthroscopy (Right, 2023); Medication Injection (Bilateral, 2023); Upper gastrointestinal endoscopy (N/A, 2024); Cardiac procedure (N/A, 2024); Cholecystectomy, laparoscopic (N/A, 2024); and Upper gastrointestinal endoscopy (N/A, 2024).    Assessment  Body Structures, Functions, Activity Limitations Requiring Skilled Therapeutic Intervention: Decreased functional mobility ;Decreased ADL status;Decreased strength;Decreased balance;Decreased high-level IADLs  Assessment: DX L THR WBAT ANTERIOR APPROACH.MIN ASSIST BED MOBILITY,SBA TRANSFERS,SBA GAIT 100 FT  2WW.FAIR BALANCE.SBA 4 STAIRS  Goal 1: IND GAIT 2WW 100F T  Short Term Goal 2: IND TRANSFERS.  Short Term Goal 3: SUPERVISION STAIRS.  Long Term Goals  Time Frame for Long Term Goals : 4 WEEKS  Long Term Goal 1: IND GAIT ST CANE.  Patient Goals   Patient Goals : RETURN HOME WITH ASSISTANCE.       Education  Patient Education  Education Given To: Patient  Education Provided: Role of Therapy;Plan of Care;Transfer Training;Precautions (L THR ANTERIOR APPROACH.)  Education Method: Demonstration  Barriers to Learning: None  Education Outcome: Verbalized understanding      Therapy Time   Individual Concurrent Group Co-treatment   Time In 0650         Time Out 0720         Minutes 30         Timed Code Treatment Minutes: 30 Minutes       Branden Vergara, PT

## 2024-11-01 NOTE — PROGRESS NOTES
Reviewed discharge instructions with patient and mom.   Patient aware of need to  prescriptions.   Reviewed new medications and side effects to monitor for.   Reviewed home medications and when next dose is due.   Patient informed of date/time of PT appointment and follow up appointment.   Patient is to continue with a diabetic and low sodium diet.  Reviewed Dr. Jacobs's Total Joint Replacement Discharge instructions such as surgical site care, patient aware that he may shower with use of CHG soap as long as waterproof dressing remains intact. Bottle of CHG soap sent home with patient.  It also included S/S of infection to monitor for and provided Dr. Jacobs's office number in case of need.  Incentive spirometer also sent home with patient to continue to use frequently.  Educational handout given on Preventing Surgical Site infections.   Questions answered.   Verbalizes understanding.   Copy of discharge instructions given to patient.

## 2024-11-01 NOTE — PLAN OF CARE
Problem: Pain  Goal: Verbalizes/displays adequate comfort level or baseline comfort level  Outcome: Progressing  Flowsheets (Taken 10/31/2024 2055)  Verbalizes/displays adequate comfort level or baseline comfort level: Assess pain using appropriate pain scale  Note: Pain meds given as needed & as ordered. Will continue to assess.     Problem: Safety - Adult  Goal: Free from fall injury  Outcome: Progressing  Flowsheets (Taken 10/31/2024 2055)  Free From Fall Injury: Instruct family/caregiver on patient safety  Note: Pt is at high risk for falls. Non skid socks on. Bed in low position and wheels locked. Fall sign posted and bracelet on. Siderails up x 2. Bed alarm on. Call light within reach. Will continue to assess.

## 2024-11-01 NOTE — CONSULTS
Germania Peggy, APRN-CNP -- Hospitalist  Progress Note 11/1/24    SUBJECTIVE:    Patient seen for f/u of post op medical management of HTN, CHF, Cardiomyopathy.   He is POD # 1 s/p left THR.  His pain is well controlled.  He denies nausea and vomiting.  Last BM was prior to surgery.  He denies any chest pain, palpitations or shortness of breath.  He is doing well with PT    ROS:   Constitutional: negative  for fevers, and negative for chills.  Respiratory: negative for shortness of breath, negative for cough, and negative for wheezing  Cardiovascular: negative for chest pain, and negative for palpitations  Gastrointestinal: negative for abdominal pain, negative for nausea,negative for vomiting, negative for diarrhea, and negative for constipation    OBJECTIVE:    Vitals:   Temp: 97.8 °F (36.6 °C)  BP: 120/72  Respirations: 18  Pulse: 70  SpO2: 96 %    24HR INTAKE/OUTPUT:    Intake/Output Summary (Last 24 hours) at 11/1/2024 0641  Last data filed at 11/1/2024 0400  Gross per 24 hour   Intake 3360 ml   Output --   Net 3360 ml      -----------------------------------------------------------------    Exam:  GEN:    Awake, alert and oriented x3.   EYES:  EOMI, pupils equal   NECK: Supple. No lymphadenopathy.  No carotid bruit  CVS:    regular rate and rhythm, no audible murmur  PULM:  CTA, no wheezes, rales or rhonchi, no acute respiratory distress  ABD:    Bowels sounds normal.  Abdomen is soft.  No distention.  no tenderness to palpation.   EXT:   no edema bilaterally .  No calf tenderness.   NEURO: Moves all extremities.  Motor and sensory are grossly intact   SKIN:  Incision is dressed with minimal drainage.     Diagnostic Data:  Lab Results   Component Value Date    HGB 14.5 09/27/2024      Lab Results   Component Value Date    GLUCOSE 178 (H) 11/01/2024    BUN 16 11/01/2024    CREATININE 1.0 11/01/2024     11/01/2024    K 3.7 11/01/2024    CALCIUM 8.6 11/01/2024     11/01/2024    CO2 25 11/01/2024

## 2024-11-01 NOTE — DISCHARGE SUMMARY
Orthopaedic Discharge Summary      Patient Identification:   Franky Melendez   : 1975  MRN: [id number]   Account: [id number]      Patient's PCP: Curtis Chavarria MD    Admit Date: 10/31/2024     Discharge Date:   2024    Admitting Physician: Elliot Jacobs MD     Discharge Physician: WILMER Wallace     Discharge Diagnoses:    Active Hospital Problems    Diagnosis Date Noted    Gastritis without bleeding [K29.70] 2023     Priority: Medium    Gastrointestinal hemorrhage with hematemesis [K92.0] 2023     Priority: Medium    Unstable angina (HCC) [I20.0] 2022     Priority: Medium    Mild persistent asthma with acute exacerbation [J45.31] 2022     Priority: Medium    Generalized anxiety disorder [F41.1] 2022     Priority: Medium    S/P total left hip arthroplasty [Z96.642] 10/31/2024    Type 2 diabetes mellitus (HCC) [E11.9] 10/31/2024    ASHD (arteriosclerotic heart disease) /  CABG 2018 [I25.10] 2019    Ischemic cardiomyopathy [I25.5] 2019    S/P CABG (coronary artery bypass graft) [Z95.1]     Dyslipidemia [E78.5]     Mild congestive heart failure (HCC) [I50.9] 2019    HTN (hypertension) [I10] 2015       The patient was seen and examined on day of discharge and this discharge summary is in conjunction with any daily progress note from day of discharge.    Operation Performed:  left total hip arthroplasty    Hospital Course:   Franky Melendez is a 49 y.o. male admitted to [facility] on 10/31/2024 for pain control and IV antibiotics s/p left SUSAN.    Post-operatively the patient’s diet was advanced as tolerated and their dressing remained clean, dry and intact with no signs of infection.  The patient remained neurovascularly intact in the lower extremity and had intact pulses distally.  Patient’s calf remained soft and showed no evidence of DVT.  The patient was able to move their leg and ankle/foot without any problems post-operatively.  Physical

## 2024-11-01 NOTE — PROGRESS NOTES
Occupational Therapy  Facility/Department: [facility] MED SURG  Occupational Therapy Initial Assessment    Name: Franky Melendez  : 1975  MRN: [id number]  Date of Service: 2024    Discharge Recommendations:  Home with assist PRN (plans on OP PT)      Patient Diagnosis(es): The primary encounter diagnosis was Pain. A diagnosis of S/P total left hip arthroplasty was also pertinent to this visit.  Past Medical History:  has a past medical history of CAD (coronary artery disease), Carpal tunnel syndrome, Depressive disorder, not elsewhere classified, Diabetes mellitus (HCC), Gastrointestinal hemorrhage with hematemesis, Heart attack (HCC), History of echocardiogram, History of pancreatitis, Hyperlipidemia, Hypertension, and PTSD (post-traumatic stress disorder).    Past Surgical History:  has a past surgical history that includes Coronary angioplasty (2018); Cardiac catheterization (Left, 2018); Coronary artery bypass graft (); Cardiac catheterization (Left, 2022); Colonoscopy (N/A, 10/04/2022); Upper gastrointestinal endoscopy (N/A, 10/04/2022); Colonoscopy (10/04/2022); Esophagogastroduodenoscopy (10/04/2022); Upper gastrointestinal endoscopy (N/A, 2023); Esophagogastroduodenoscopy (2023); Cardiac procedure (N/A, 2023); Shoulder arthroscopy (Right, 2023); Medication Injection (Bilateral, 2023); Upper gastrointestinal endoscopy (N/A, 2024); Cardiac procedure (N/A, 2024); Cholecystectomy, laparoscopic (N/A, 2024); and Upper gastrointestinal endoscopy (N/A, 2024).    Treatment Diagnosis: Generalized Weakness      Assessment  Performance deficits / Impairments: Decreased functional mobility ;Decreased ADL status;Decreased strength;Decreased endurance;Decreased balance;Decreased high-level IADLs  Treatment Diagnosis: Generalized Weakness  Prognosis: Good  Decision Making: Medium Complexity  REQUIRES OT FOLLOW-UP: Yes  Activity Tolerance  Activity  assistance  Toileting: Stand by assistance;Contact guard assistance  Functional Mobility: Stand by assistance     Activity Tolerance  Activity Tolerance: Patient tolerated treatment well     Vision  Vision: Impaired  Vision Exceptions: Wears glasses at all times  Hearing  Hearing: Within functional limits  Cognition  Overall Cognitive Status: WNL  Orientation  Overall Orientation Status: Within Normal Limits     Education Given To: Patient;Family  Education Provided: Role of Therapy;Plan of Care;Precautions;Transfer Training;Fall Prevention Strategies  Education Provided Comments: Provided patient with discharge folder. Educated patient on anterior hip precautions, demo'd blue easy slide to jacobo DALILA hose, educated on DME/AE recommendations, and fall prevention. Pt. patient plans to get RTS for home, also discussed portable tub grab bar for increased safety with bathing tasks. Patient and family verbalized G understanding of all education, no further questions/concerns at this time.  Education Method: Demonstration;Verbal  Barriers to Learning: None  Education Outcome: Verbalized understanding;Demonstrated understanding  LUE AROM (degrees)  LUE AROM : WNL  RUE AROM (degrees)  RUE AROM : WNL     Goals  Short Term Goals  Time Frame for Short Term Goals: 20 days  Short Term Goal 1: Pt. to demo standing tolerance x 10 mins w/o LOB or increased pain to increase safety/participation with ADL's.  Short Term Goal 2: Pt. to complete ADL routine using AE PRN with mod I and G safety.  Short Term Goal 3: Pt. to bed educated on hip precautions, AE/DME recommendations, fall prevention. and safe transfer technique for safe return home.      Therapy Time   Individual Concurrent Group Co-treatment   Time In 0940         Time Out 1010         Minutes 30                 Pili Staples OT

## 2024-11-01 NOTE — PROGRESS NOTES
Pt resting in bed visiting with family. Assessment and vital signs as charted. Pt c/o pain and pain medication to be given. Pt is A & O x 4. Surgical dressing clean, dry and intact to left hip. No drainage noted. Pt denies any other needs. Call light in reach. Bed alarm on. Will continue to monitor.

## 2024-11-01 NOTE — PROGRESS NOTES
Patient vitals and assessment completed, see flowsheet. Patient A&Ox4, breathing normal. Pt says pain is 8/10, will give prn pain med with morning meds. The patient denies any further needs, call light within reach, will continue to monitor.

## 2024-11-01 NOTE — PROGRESS NOTES
Physical Therapy  Facility/Department: [facility] MED SURG  Daily Treatment Note  NAME: Franky Melendez  : 1975  MRN: [id number]    Date of Service: 2024    Discharge Recommendations:  Therapy recommended at discharge, Outpatient PT     Patient Diagnosis(es): The primary encounter diagnosis was Pain. A diagnosis of S/P total left hip arthroplasty was also pertinent to this visit.    Assessment  Assessment: Pt. in bed upon arrival, reviewed HEP and Hip precautions. Pt. reporting that he did stairs this morning and felt comfortable with performing them, declined ambulation at this time d/t just getting up to use the bathroom. Pt. with no further questions or concerns at this time. Education given on icing and elevating. Pt. to d/c here shortly.  Activity Tolerance: Patient tolerated treatment well    Plan  Physical Therapy Plan  General Plan: 2 times a day 7 days a week  Specific Instructions for Next Treatment: 1x/ daily on weekends and holidays  Current Treatment Recommendations: Strengthening;ROM;Balance training;Functional mobility training;Transfer training;Therapeutic activities;Gait training;Stair training;Neuromuscular re-education;Home exercise program;Safety education & training;Patient/Caregiver education & training    Restrictions  Restrictions/Precautions  Restrictions/Precautions: General Precautions, Bed Alarm, Surgical Protocols, Weight Bearing (L THR anterior precautions)  Lower Extremity Weight Bearing Restrictions  Right Lower Extremity Weight Bearing: Weight Bearing As Tolerated  Left Lower Extremity Weight Bearing: Weight Bearing As Tolerated  Position Activity Restriction  Hip Precautions: Anterior hip precautions     Subjective   Subjective  Subjective: Pt. in bed upon arrival, reviewed HEP and hip precautions  Pain: didn't report  Orientation  Overall Orientation Status: Within Normal Limits  Cognition  Overall Cognitive Status: WNL    Objective  Bed Mobility Training  Bed Mobility

## 2024-11-01 NOTE — PROGRESS NOTES
Pt laying awake in bed. Assessment and vital signs as charted. Pt is A & O x 4. Ice pack remains in use to left hip. Pt denies any other needs. Call light in reach. Will continue to monitor.

## 2024-11-01 NOTE — DISCHARGE INSTR - DIET
Good nutrition is important when healing from an illness, injury, or surgery.  Follow any nutrition recommendations given to you during your hospital stay.   If you were given an oral nutrition supplement while in the hospital, continue to take this supplement at home.  You can take it with meals, in-between meals, and/or before bedtime. These supplements can be purchased at most local grocery stores, pharmacies, and chain [employer]-stores.   If you have any questions about your diet or nutrition, call the hospital and ask for the dietitian.  Diabetic, low salt diet

## 2024-11-01 NOTE — CARE COORDINATION
Case Management Assessment  Initial Evaluation    Date/Time of Evaluation: 11/1/2024 10:01 AM  Assessment Completed by: Michael Chinchilla RN    If patient is discharged prior to next notation, then this note serves as note for discharge by case management.    Patient Name: Franky Melendez                   YOB: 1975  Diagnosis: Left hip pain [M25.552]  Primary osteoarthritis of left hip [M16.12]  S/P total left hip arthroplasty [Z96.642]                   Date / Time: 10/31/2024  8:42 AM    Patient Admission Status: Observation   Readmission Risk (Low < 19, Mod (19-27), High > 27): Readmission Risk Score: 6.7    Current PCP: Curtis Chavarria MD  PCP verified by CM? (P) Yes    Chart Reviewed: Yes      History Provided by: (P) Patient  Patient Orientation: (P) Alert and Oriented, Person, Place, Situation    Patient Cognition: (P) Alert    Hospitalization in the last 30 days (Readmission):  No    If yes, Readmission Assessment in  Navigator will be completed.    Advance Directives:      Code Status: Full Code   Patient's Primary Decision Maker is: (P) Legal Next of Kin    Primary Decision Maker: Bailee Melendez - Spouse - [phone]    Discharge Planning:    Patient lives with: (P) Spouse/Significant Other, Children Type of Home: (P) House  Primary Care Giver: (P) Self  Patient Support Systems include: (P) Family Members, Children, Friends/Neighbors, Spouse/Significant Other   Current Financial resources: (P) Other (Comment) (commercial insurance.)  Current community resources: (P) None  Current services prior to admission: (P) None            Current DME:              Type of Home Care services:  (P) None    ADLS  Prior functional level: (P) Independent in ADLs/IADLs  Current functional level: (P) Assistance with the following:, Cooking, Housework, Shopping    PT AM-PAC:   /24  OT AM-PAC:   /24    Family can provide assistance at DC: (P) Yes  Would you like Case Management to discuss the discharge plan

## 2024-11-04 ENCOUNTER — TELEPHONE (OUTPATIENT)
Dept: PRIMARY CARE CLINIC | Age: 49
End: 2024-11-04

## 2024-11-04 ENCOUNTER — HOSPITAL ENCOUNTER (OUTPATIENT)
Dept: PHYSICAL THERAPY | Age: 49
Setting detail: THERAPIES SERIES
Discharge: HOME | End: 2024-11-04
Payer: COMMERCIAL

## 2024-11-04 PROCEDURE — 97161 PT EVAL LOW COMPLEX 20 MIN: CPT

## 2024-11-04 NOTE — PROGRESS NOTES
[facility]           Phone: [phone]             Outpatient Physical Therapy  Fax: [phone]                                           Date: 2024  Patient: Franky Melendez : 1975 CSN #: [id number]   Referring Physician: Elliot Jacobs MD      [x] Plan of Care   [] Updated Plan of Care    Dates of Service to Include: 2024 to 24    Diagnosis:  M25.552 (ICD-10-CM) - Left hip pain  M16.12 (ICD-10-CM) - Primary osteoarthritis of left hip     Rehab (Treatment) Diagnosis:  L LE weakness, s/p SUSAN         Onset Date:   10/31/24    Attendance  Total # of Visits to Date: 1 No Show: 0 Canceled Appointment: 0    Assessment  Body Structures, Functions, Activity Limitations Requiring Skilled Therapeutic Intervention: Decreased functional mobility , Decreased ROM, Decreased strength, Increased pain  Assessment: Pt is a 49 y.o. male s/p L SUSAN on 10/31. Upon exam pt is WBAT an able to ambulate ~20ft at a time with minimal foot clearance d/t L hip weakness. Pt has mod-high pain levels that are worse with laying flat. Pt demos severe ROM limiatations with L hip flex at 65* with pain and abd at 20* with pain. L hip strength is 3+/5 grossly. Pt would benefit from skilled therapy in order to address these deficits and return to PLOF.      Goals  Short Term Goals  Time Frame for Short Term Goals: 2 weeks  Short Term Goal 1: Pt will be inititated with HEP.  Short Term Goal 2: Pt will tolerate 30-40 minutes of ther ex to improve function with ADL's.  Long Term Goals  Time Frame for Long Term Goals : 6 weeks  Long Term Goal 1: Pt will independant and compliant with HEP to maintain functional gains made at therapy.  Long Term Goal 2: Pt will report 2/10 or less L hip pain on average to facilitate completion of ADL's.  Long Term Goal 3: Pt to improve L hip abd/flex from 20*/65* to 38*/108* or better for improved gait

## 2024-11-04 NOTE — PROGRESS NOTES
Phone: [phone]                       [facility]          Fax: [phone]                      Outpatient Physical Therapy                                                                      Evaluation  Date: 2024  Patient: Franky Melendez  : 1975  CSN #: [id number]    Referring Physician: Elliot Jacobs MD    Medical Diagnosis: M25.552 (ICD-10-CM) - Left hip pain  M16.12 (ICD-10-CM) - Primary osteoarthritis of left hip    Treatment Diagnosis: L LE weakness, s/p SUSAN  PT Insurance Information: BCBS  Total # of Visits Approved: 12   Total # of Visits to Date: 1  No Show: 0  Canceled Appointment: 0    [x] This writer acknowledges review of patient history form     Subjective  Subjective: Pt is s/p L SUSAN on 10/31. Pain ranges from 9/10 at work and 7/10 at best. Pain is in the groin and inner thigh.         Objective    AROM       AROM LLE (degrees)  L Hip Flexion (0-125): 65  L Hip ABduction (0-45): 20    Right AROM      AROM RLE (degrees)  R Hip Flexion (0-125): 108*  R Hip ABduction (0-45): 38*              Strength       Strength LLE  L Hip Flexion: 3+/5  L Hip ABduction: 3+/5    Right Strength         Strength RLE  R Hip Flexion: 4/5  R Hip ABduction: 4/5  R Knee Flexion: 4+/5  R Knee Extension: 4+/5              Exercises:  Exercise 1: HEP: supine heel slide, supine hip abd, glute set, hamstring set  Exercise 2: * no SLR for 6 weeks, no combined ER/flex    Functional Outcome Measures      Pt self report impairment: 100%         Assessment  Body Structures, Functions, Activity Limitations Requiring Skilled Therapeutic Intervention: Decreased functional mobility , Decreased ROM, Decreased strength, Increased pain  Assessment: Pt is a 49 y.o. male s/p L SUSAN on 10/31. Upon exam pt is WBAT an able to ambulate ~20ft at a time with minimal foot clearance d/t L hip weakness. Pt has mod-high pain levels that are worse with laying flat. Pt demos severe ROM limiatations with L hip flex at 65*

## 2024-11-04 NOTE — TELEPHONE ENCOUNTER
Care Transitions Initial Follow Up Call    Outreach made within 2 business days of discharge: Yes    Patient: Franky Melendez Patient : 1975   MRN: [id number]  Reason for Admission: L SUSAN  Discharge Date: 24       Spoke with: Franky    Discharge department/facility: [facility]    TCM Interactive Patient Contact:  Was patient able to fill all prescriptions: Yes  Was patient instructed to bring all medications to the follow-up visit: Yes  Is patient taking all medications as directed in the discharge summary? No, taking oxycodone per inpatient dosing and had ran out due to pain.   Does patient understand their discharge instructions: Yes  Does patient have questions or concerns that need addressed prior to 7-14 day follow up office visit: yes - coming in tomorrow to discuss, see below.     Additional needs identified to be addressed with provider  Standard priority: taking oxycodone per inpatient dosing and had ran out due to pain.  Surgeon called in another Rx but pharmacy states they can't fill until  due to the duration on the previous Rx. Will discuss at 8 am hosp f/u appt.              Scheduled appointment with PCP within 7-14 days    Follow Up  Future Appointments   Date Time Provider Department Center   2024  9:45 AM Lalo Hercules PTA [facility]   2024  9:00 AM Elliot Page PT [facility]   2024 10:30 AM Elliot Page, PT [facility]   2024  9:15 AM Laisha Leggett, PTA [facility]   11/15/2024 10:15 AM Elba Longoria PTA [facility]   2024  9:45 AM Laisha Leggett PTA [facility]   2024  9:45 AM Elliot Easton PT [facility]   2024  9:15 AM Lalo Hercules PTA [facility]   2024 10:30 AM Lalo Hercules PTA [facility]   2024 10:00 AM Laisha Leggett PTA [facility]   2024  9:45 AM Elliot Easton PT [facility]   3/27/2025  8:40 AM Elijah Orozco MD [facility]       Wendy,

## 2024-11-05 ENCOUNTER — OFFICE VISIT (OUTPATIENT)
Dept: PRIMARY CARE CLINIC | Age: 49
End: 2024-11-05

## 2024-11-05 VITALS — HEIGHT: 67 IN | RESPIRATION RATE: 18 BRPM | WEIGHT: 165 LBS | BODY MASS INDEX: 25.9 KG/M2

## 2024-11-05 DIAGNOSIS — G89.4 CHRONIC PAIN SYNDROME: ICD-10-CM

## 2024-11-05 DIAGNOSIS — M25.552 LEFT HIP PAIN: ICD-10-CM

## 2024-11-05 DIAGNOSIS — M25.552 ACUTE HIP PAIN, LEFT: Primary | ICD-10-CM

## 2024-11-05 DIAGNOSIS — Z96.642 S/P TOTAL LEFT HIP ARTHROPLASTY: ICD-10-CM

## 2024-11-05 DIAGNOSIS — Z09 HOSPITAL DISCHARGE FOLLOW-UP: ICD-10-CM

## 2024-11-05 RX ORDER — OXYCODONE HYDROCHLORIDE 5 MG/1
5 TABLET ORAL EVERY 4 HOURS PRN
Qty: 30 TABLET | Refills: 0 | Status: SHIPPED | OUTPATIENT
Start: 2024-11-05 | End: 2024-11-09

## 2024-11-05 RX ORDER — ACYCLOVIR 400 MG/1
TABLET ORAL
COMMUNITY
Start: 2024-10-28

## 2024-11-05 RX ORDER — TIRZEPATIDE 5 MG/.5ML
INJECTION, SOLUTION SUBCUTANEOUS
COMMUNITY
Start: 2024-11-01

## 2024-11-05 NOTE — PROGRESS NOTES
CURRENT OUTPATIENT MED LIST    Medical Decision Making: high complexity  Return if symptoms worsen or fail to improve, for F/U per prior plans.      We discussed some of the etiologies and natural histories. We discussed the various treatment alternatives including anti-inflammatory medications, physical therapy, injections, further imaging studies and as a last resort surgery.    Pharmacy called/updated Rx with the timing.       Subjective:   HPI:  Follow up of Hospital problems/diagnosis(es):   Inpatient course: Discharge summary reviewed- see chart.  Interval history/Current status:     Hospital Follow Up   Patient presents today for hospital follow up. He was admitted at [facility] on 10/31/24 for total left hip arthroplasty. Patient states he does feel well. Patient states that he has been having ongoing Pain and had contacted his Surgeon had been evaluated. He has been off his oxycodone for several days due to running out sooner. He has been doing PT as well.     Patient Active Problem List   Diagnosis    Pain in rib, left lower    Chest pain at rest    HTN (hypertension)    Tobacco abuse    Family history of premature CAD    Acute coronary syndrome (HCC)    Mild congestive heart failure (HCC)    S/P CABG (coronary artery bypass graft)    Dyslipidemia    Ischemic cardiomyopathy    Chronic combined systolic and diastolic CHF, NYHA class 2 (HCC)    ASHD (arteriosclerotic heart disease) /  CABG 2018    ACS (acute coronary syndrome) ([facility])    Palpitations    Abnormal cardiovascular stress test    Pancreatitis, unspecified pancreatitis type    Nausea    Epigastric pain    Intractable abdominal pain    Diabetes mellitus (HCC)    Generalized anxiety disorder    Primary hypertension    Hyperlipidemia    Mild intermittent asthma    Mild persistent asthma with acute exacerbation    Bronchitis    Shortness of breath    Unstable angina (HCC)    Chest wall pain    Sleeping difficulty    Lung nodule    Chronic

## 2024-11-06 ENCOUNTER — HOSPITAL ENCOUNTER (OUTPATIENT)
Dept: PHYSICAL THERAPY | Age: 49
Setting detail: THERAPIES SERIES
Discharge: HOME | End: 2024-11-06
Payer: COMMERCIAL

## 2024-11-06 PROCEDURE — 97110 THERAPEUTIC EXERCISES: CPT

## 2024-11-06 PROCEDURE — 97140 MANUAL THERAPY 1/> REGIONS: CPT

## 2024-11-06 NOTE — PROGRESS NOTES
with HEP to maintain functional gains made at therapy.  Long Term Goal 2: Pt will report 2/10 or less L hip pain on average to facilitate completion of ADL's.  Long Term Goal 3: Pt to improve L hip abd/flex from 20*/65* to 38*/108* or better for improved gait mechanics.  Long Term Goal 4: Pt to improve L hip strength to 4+/5 grossly for ease with transfres/ambulation.  Long Term Goal 5: Pt to ambulate community distances with least restrictive AD for return to PLOF.  Long term goal 6: Pt to improve self report impairment from 100% to 20% or less for increased QOL.    Minutes Tracking:  Time In: 0947  Time Out: 1030  Minutes: 43  Timed Code Treatment Minutes: 41 Minutes    Lalo Hercules PTA     Date: 11/6/2024

## 2024-11-08 ENCOUNTER — HOSPITAL ENCOUNTER (OUTPATIENT)
Dept: PHYSICAL THERAPY | Age: 49
Setting detail: THERAPIES SERIES
Discharge: HOME | End: 2024-11-08
Payer: COMMERCIAL

## 2024-11-08 PROCEDURE — 97110 THERAPEUTIC EXERCISES: CPT

## 2024-11-08 NOTE — PROGRESS NOTES
further clarification.       Post Treatment Pain:  6/10      Plan  Plan Frequency: 3  Plan weeks: 4       Goals  (Total # of Visits to Date: 3)      Short Term Goals  Time Frame for Short Term Goals: 2 weeks  Short Term Goal 1: Pt will be inititated with HEP -MET  Short Term Goal 2: Pt will tolerate 30-40 minutes of ther ex to improve function with ADL's.    Long Term Goals  Time Frame for Long Term Goals : 6 weeks  Long Term Goal 1: Pt will independant and compliant with HEP to maintain functional gains made at therapy.  Long Term Goal 2: Pt will report 2/10 or less L hip pain on average to facilitate completion of ADL's.  Long Term Goal 3: Pt to improve L hip abd/flex from 20*/65* to 38*/108* or better for improved gait mechanics.  Long Term Goal 4: Pt to improve L hip strength to 4+/5 grossly for ease with transfres/ambulation.  Long Term Goal 5: Pt to ambulate community distances with least restrictive AD for return to PLOF.  Long term goal 6: Pt to improve self report impairment from 100% to 20% or less for increased QOL.    Minutes Tracking:  Time In: 0900  Time Out: 0939  Minutes: 39  Timed Code Treatment Minutes: 38 Minutes      Elliot Page, PT, DPT, OCS, Cert. DN      Date: 11/8/2024

## 2024-11-11 ENCOUNTER — HOSPITAL ENCOUNTER (OUTPATIENT)
Dept: PHYSICAL THERAPY | Age: 49
Setting detail: THERAPIES SERIES
Discharge: HOME | End: 2024-11-11
Payer: COMMERCIAL

## 2024-11-11 PROCEDURE — 97110 THERAPEUTIC EXERCISES: CPT

## 2024-11-11 RX ORDER — TIRZEPATIDE 5 MG/.5ML
5 INJECTION, SOLUTION SUBCUTANEOUS WEEKLY
Qty: 4 ML | Refills: 5 | Status: SHIPPED | OUTPATIENT
Start: 2024-11-11 | End: 2024-11-18

## 2024-11-11 NOTE — PROGRESS NOTES
Phone: [phone]                 [place]           Fax: [phone]                           Outpatient Physical Therapy                                                                            Daily Note    Patient: Franky Melendez : 1975  CSN #: [id number]   Referring Physician: Elliot Jacobs MD  Date: 2024       Treatment Diagnosis: L LE weakness, s/p SUSAN    PT Insurance Information: BCBS  Total # of Visits Approved: 12 Per Physician Order  Total # of Visits to Date: 4  No Show: 0  Canceled Appointment: 0    24 Plan of Care/Recert Due    Pre-Treatment Pain:  -6/10  Subjective: Patient reprots 5-6/10 L hip pain coming into therapy.  He reports he felt like he overdid it last Thursday and Friday shopping for cars and PT.  He reports he walked and performed ankle pumps but has not been completing his HEP.    Exercises:  Exercise 1: HEP: Quad sets 10 sec hold x10, twice per day, R SKTC 2x30 seconds, twice per day, ankle pumps x20 every hour he's awake  Exercise 3: SciFit bike 7 mins  Exercise 5: Sink ex to tolerance (no ext)- today hip abduction, heel raises, hamstring curls 10x ea  Exercise 6: supine R sktc for L hip flexor 3x30  Exercise 7: LAQ 2x10  Exercise 8: Supine quad set 10 sec hold x10, ankle pumps x20 ea  Exercise 9: Posterior pelvic tilt 10 sec hold 2x10  Exercise 10: Bridge 2x5  Exercise 11: Sit to stands 2x5      Assessment  Body Structures, Functions, Activity Limitations Requiring Skilled Therapeutic Intervention: Decreased functional mobility , Decreased ROM, Decreased strength, Increased pain  Assessment: Patient attended PT with his FWW today and was able to ambulate with a step-through gait pattern.  Patient was progressed with sit to stands to work toward his strength goals.  He continues to report stretching through his quads and hip flexors when extending to neutral.  He was able to improve ROM with bridging today, but he did report catching with a

## 2024-11-13 ENCOUNTER — HOSPITAL ENCOUNTER (OUTPATIENT)
Dept: PHYSICAL THERAPY | Age: 49
Setting detail: THERAPIES SERIES
Discharge: HOME | End: 2024-11-13
Payer: COMMERCIAL

## 2024-11-13 PROCEDURE — 97140 MANUAL THERAPY 1/> REGIONS: CPT

## 2024-11-13 PROCEDURE — 97110 THERAPEUTIC EXERCISES: CPT

## 2024-11-13 NOTE — FLOWSHEET NOTE
completed post. Occ sharp pain down LE but able to tolerate ex, fatigued post. Will cont to monitor and progress as tolerable.    Activity Tolerance  Activity Tolerance: Patient tolerated treatment well    Patient Education  Patient Education: Updated HEP  Pt verbalized/demonstrated good understanding:     [x] Yes         [] No, pt required further clarification.       Post Treatment Pain:  no rating given/10      Plan  Plan Frequency: 3  Plan weeks: 4       Goals  (Total # of Visits to Date: 5)      Short Term Goals  Time Frame for Short Term Goals: 2 weeks  Short Term Goal 1: Pt will be inititated with HEP -MET  Short Term Goal 2: Pt will tolerate 30-40 minutes of ther ex to improve function with ADL's.    Long Term Goals  Time Frame for Long Term Goals : 6 weeks  Long Term Goal 1: Pt will independant and compliant with HEP to maintain functional gains made at therapy.  Long Term Goal 2: Pt will report 2/10 or less L hip pain on average to facilitate completion of ADL's.  Long Term Goal 3: Pt to improve L hip abd/flex from 20*/65* to 38*/108* or better for improved gait mechanics.  Long Term Goal 4: Pt to improve L hip strength to 4+/5 grossly for ease with transfers/ambulation.  Long Term Goal 5: Pt to ambulate community distances with least restrictive AD for return to PLOF.  Long term goal 6: Pt to improve self report impairment from 100% to 20% or less for increased QOL.    Minutes Tracking:  Time In: 0915  Time Out: 1000  Minutes: 45         Laisha Leggett, SEBAS     Date: 11/13/2024

## 2024-11-15 ENCOUNTER — HOSPITAL ENCOUNTER (OUTPATIENT)
Dept: PHYSICAL THERAPY | Age: 49
Setting detail: THERAPIES SERIES
Discharge: HOME | End: 2024-11-15
Payer: COMMERCIAL

## 2024-11-15 PROCEDURE — 97140 MANUAL THERAPY 1/> REGIONS: CPT

## 2024-11-15 PROCEDURE — 97110 THERAPEUTIC EXERCISES: CPT

## 2024-11-15 NOTE — PROGRESS NOTES
Phone: [phone]                 [facility]           Fax: [phone]                           Outpatient Physical Therapy                                                                            Daily Note    Patient: Franky Melendez : 1975  CSN #: [id number]   Referring Physician: Elliot Jacobs MD  Date: 11/15/2024       Treatment Diagnosis: L LE weakness, s/p SUSAN    PT Insurance Information: BCBS  Total # of Visits Approved: 12 Per Physician Order  Total # of Visits to Date: 6  No Show: 0  Canceled Appointment: 0    24 Plan of Care/Recert Due    Pre-Treatment Pain:  1/10  Subjective: Patient reports some days are good and some are bad. Pt states he walked through the house without a device yesterday by accident with no issues.    Exercises:  Exercise 1: HEP: Quad sets 10 sec hold x10, twice per day, R SKTC 2x30 seconds, twice per day, ankle pumps x20 every hour he's awake  Exercise 2: * no SLR for 6 weeks, no combined ER/flex  Exercise 3: SciFit bike 8 mins L2  Exercise 4: 2 big loop RW  Exercise 5: Sink ex to tolerance (no ext)- today hip flexion, abduction, marches, hamstring curls 15x ea  Exercise 6: supine R sktc for L hip flexor 3x30  Exercise 7: LAQ 2x10  Exercise 8: Supine quad set 10 sec hold x10, ankle pumps x20 ea  Exercise 9: Posterior pelvic tilt with TA contraction 10 sec hold 2x10  Exercise 10: Bridge 2x8  Exercise 11: Sit to stands 2x8    Manual:  Soft Tissue Mobilizaton: gentle stm to L quad and hip flexor    Assessment  Body Structures, Functions, Activity Limitations Requiring Skilled Therapeutic Intervention: Decreased functional mobility , Decreased ROM, Decreased strength, Increased pain  Assessment: Continued with charted exercises with increased reps to improve strength and endurance. Pt reports intermittent sharp shooting pains down L quad. Manual STM to L quad / IT band to reduce muscle tone and discomfort. Pt politely declined modalitites stating he ices

## 2024-11-17 ENCOUNTER — PATIENT MESSAGE (OUTPATIENT)
Dept: PRIMARY CARE CLINIC | Age: 49
End: 2024-11-17

## 2024-11-18 ENCOUNTER — HOSPITAL ENCOUNTER (OUTPATIENT)
Dept: PHYSICAL THERAPY | Age: 49
Setting detail: THERAPIES SERIES
Discharge: HOME | End: 2024-11-18
Payer: COMMERCIAL

## 2024-11-18 PROCEDURE — 97140 MANUAL THERAPY 1/> REGIONS: CPT

## 2024-11-18 PROCEDURE — 97110 THERAPEUTIC EXERCISES: CPT

## 2024-11-18 RX ORDER — TIRZEPATIDE 7.5 MG/.5ML
7.5 INJECTION, SOLUTION SUBCUTANEOUS WEEKLY
Qty: 4 ML | Refills: 2 | Status: SHIPPED | OUTPATIENT
Start: 2024-11-18

## 2024-11-18 NOTE — PROGRESS NOTES
Phone: [phone]                 [facility]           Fax: [phone]                           Outpatient Physical Therapy                                                                            Daily Note    Patient: Franky Melendez : 1975  CSN #: [id number]   Referring Physician: Elliot Jacobs MD  Date: 2024     Treatment Diagnosis: L LE weakness, s/p SUSAN    PT Insurance Information: BCBS  Total # of Visits Approved: 12 Per Physician Order  Total # of Visits to Date: 7  No Show: 0  Canceled Appointment: 0    24 Plan of Care/Recert Due    Pre-Treatment Pain:  3/10  Subjective: Pt reports he still has increased anterior hip pain but he feels its muscular.  He states he walked arounf the other day with a cane and definetly overdid it.  Pt reports current pain is a 2-3/10.    Exercises:  Exercise 3: SciFit bike 8 mins L2  Exercise 5: Sink ex to tolerance (no ext)- today hip flexion, abduction, marches, hamstring curls 15x ea  Exercise 6: supine R sktc for L hip flexor 3x30  Exercise 9: Posterior pelvic tilt with TA contraction 10 sec hold 2x10  Exercise 10: Bridge 2x8  Exercise 11: Sit to stands 2x8  Exercise 12: FSU 4 inch 8x    Manual:  Soft Tissue Mobilizaton: gentle stm to L quad and hip flexor (distal quad today)    Assessment  Assessment: Pt continues to display tenderness throughout ant thigh today and wilda in quad region. Continued manual techniques to ant thigh with relief noted.  Will continue.    Activity Tolerance  Activity Tolerance: Patient tolerated treatment well    Patient Education  Patient Education: HEP  Pt verbalized/demonstrated good understanding:     [x] Yes         [] No, pt required further clarification.     Post Treatment Pain:  2/10    Plan  Plan Frequency: 3  Plan weeks: 4     Goals  (Total # of Visits to Date: 7)      Short Term Goals  Time Frame for Short Term Goals: 2 weeks  Short Term Goal 1: Pt will be inititated with HEP -MET  Short Term Goal

## 2024-11-18 NOTE — TELEPHONE ENCOUNTER
Hello, yes please pend a 7.5mg of Mounjaro /weekly, thank you.  Electronically signed by Curtis Chavarria MD on 11/18/2024 at 8:51 AM

## 2024-11-20 ENCOUNTER — HOSPITAL ENCOUNTER (OUTPATIENT)
Dept: PHYSICAL THERAPY | Age: 49
Setting detail: THERAPIES SERIES
Discharge: HOME | End: 2024-11-20
Payer: COMMERCIAL

## 2024-11-20 PROCEDURE — 97140 MANUAL THERAPY 1/> REGIONS: CPT

## 2024-11-20 PROCEDURE — 97110 THERAPEUTIC EXERCISES: CPT

## 2024-11-20 NOTE — PROGRESS NOTES
weeks: 4       Goals  (Total # of Visits to Date: 8)      Short Term Goals  Time Frame for Short Term Goals: 2 weeks  Short Term Goal 1: Pt will be inititated with HEP -MET  Short Term Goal 2: Pt will tolerate 30-40 minutes of ther ex to improve function with ADL's.    Long Term Goals  Time Frame for Long Term Goals : 6 weeks  Long Term Goal 1: Pt will independant and compliant with HEP to maintain functional gains made at therapy.  Long Term Goal 2: Pt will report 2/10 or less L hip pain on average to facilitate completion of ADL's.  Long Term Goal 3: Pt to improve L hip abd/flex from 20*/65* to 38*/108* or better for improved gait mechanics.  Long Term Goal 4: Pt to improve L hip strength to 4+/5 grossly for ease with transfers/ambulation.  Long Term Goal 5: Pt to ambulate community distances with least restrictive AD for return to PLOF.  Long term goal 6: Pt to improve self report impairment from 100% to 20% or less for increased QOL.    Minutes Tracking:  Time In: 0945  Time Out: 1030  Minutes: 45  Timed Code Treatment Minutes: 43 Minutes    DEE LOCKE PT, DPT     Date: 11/20/2024

## 2024-11-22 ENCOUNTER — HOSPITAL ENCOUNTER (OUTPATIENT)
Dept: PHYSICAL THERAPY | Age: 49
Setting detail: THERAPIES SERIES
Discharge: HOME | End: 2024-11-22
Payer: COMMERCIAL

## 2024-11-22 PROCEDURE — 97140 MANUAL THERAPY 1/> REGIONS: CPT

## 2024-11-22 PROCEDURE — 97110 THERAPEUTIC EXERCISES: CPT

## 2024-11-22 NOTE — PROGRESS NOTES
Goal 1: Pt will be inititated with HEP -MET  Short Term Goal 2: Pt will tolerate 30-40 minutes of ther ex to improve function with ADL's.    Long Term Goals  Time Frame for Long Term Goals : 6 weeks  Long Term Goal 1: Pt will independant and compliant with HEP to maintain functional gains made at therapy.  Long Term Goal 2: Pt will report 2/10 or less L hip pain on average to facilitate completion of ADL's.  Long Term Goal 3: Pt to improve L hip abd/flex from 20*/65* to 38*/108* or better for improved gait mechanics.  Long Term Goal 4: Pt to improve L hip strength to 4+/5 grossly for ease with transfers/ambulation.  Long Term Goal 5: Pt to ambulate community distances with least restrictive AD for return to PLOF.  Long term goal 6: Pt to improve self report impairment from 100% to 20% or less for increased QOL.    Minutes Tracking:  Time In: 0917  Time Out: 1000  Minutes: 43  Timed Code Treatment Minutes: 41 Minutes    Lalo Hercules PTA     Date: 11/22/2024

## 2024-11-25 ENCOUNTER — HOSPITAL ENCOUNTER (OUTPATIENT)
Dept: PHYSICAL THERAPY | Age: 49
Setting detail: THERAPIES SERIES
Discharge: HOME | End: 2024-11-25
Payer: COMMERCIAL

## 2024-11-25 PROCEDURE — 97140 MANUAL THERAPY 1/> REGIONS: CPT

## 2024-11-25 PROCEDURE — 97110 THERAPEUTIC EXERCISES: CPT

## 2024-11-25 NOTE — PROGRESS NOTES
Phone: [phone]                 [facility]           Fax: [phone]                           Outpatient Physical Therapy                                                                            Daily Note    Patient: Franky Melendez : 1975  CSN #: [id number]   Referring Physician: Elliot Jacobs MD  Date: 2024       Treatment Diagnosis: L LE weakness, s/p SUSAN    PT Insurance Information: BCBS  Total # of Visits Approved: 12 Per Physician Order  Total # of Visits to Date: 10  No Show: 0  Canceled Appointment: 0    24 Plan of Care/Recert Due    Pre-Treatment Pain:  5/10  Subjective: Pt reports he slept in bed last night so his pain is a little worse today. Pt rates current pain a 5/10.    Exercises:  Exercise 3: SciFit bike 8 mins L3.0  Exercise 4: 2 big loop RW  Exercise 6: supine R sktc for L hip flexor 3x30  Exercise 9: Posterior pelvic tilt with TA contraction 10 sec hold 2x10  Exercise 10: Bridge x 10 YTB // supine alt march x 10 each  Exercise 11: Sit to stands 2x8  Exercise 12: FSU 6 inch x 15 // LSU x 15  Exercise 13: side ways walking 3 laps (OTB)    Manual:  Soft Tissue Mobilizaton: gentle stm to L quad and hip flexor    Assessment  Assessment: Continued manual techniques to L hip flexor region.  Pt able to step up 6 inch step with light HHA.    Activity Tolerance  Activity Tolerance: Patient tolerated treatment well    Patient Education  Patient Education: HEP  Pt verbalized/demonstrated good understanding:     [x] Yes         [] No, pt required further clarification.     Post Treatment Pain:  4/10    Plan  Plan Frequency: 3  Plan weeks: 4     Goals  (Total # of Visits to Date: 10)      Short Term Goals  Time Frame for Short Term Goals: 2 weeks  Short Term Goal 1: Pt will be inititated with HEP -MET  Short Term Goal 2: Pt will tolerate 30-40 minutes of ther ex to improve function with ADL's.    Long Term Goals  Time Frame for Long Term Goals : 6 weeks  Long Term

## 2024-11-27 ENCOUNTER — HOSPITAL ENCOUNTER (OUTPATIENT)
Dept: PHYSICAL THERAPY | Age: 49
Setting detail: THERAPIES SERIES
Discharge: HOME | End: 2024-11-27
Payer: COMMERCIAL

## 2024-11-27 PROCEDURE — 97140 MANUAL THERAPY 1/> REGIONS: CPT

## 2024-11-27 PROCEDURE — 97110 THERAPEUTIC EXERCISES: CPT

## 2024-11-27 NOTE — FLOWSHEET NOTE
Phone: [phone]                 [facility]           Fax: [phone]                           Outpatient Physical Therapy                                                                            Daily Note    Patient: Franky Melendez : 1975  CSN #: [id number]   Referring Physician: Elliot Jacobs MD  Date: 2024    Diagnosis: M25.552 (ICD-10-CM) - Left hip pain  M16.12 (ICD-10-CM) - Primary osteoarthritis of left hip  Treatment Diagnosis: L LE weakness, s/p SUSAN    PT Insurance Information: BCBS  Total # of Visits Approved: 12 Per Physician Order  Total # of Visits to Date: 11  No Show: 0  Canceled Appointment: 0    24 Plan of Care/Recert Due    Pre-Treatment Pain:  no rating given/10  Subjective: Pt reports he has gone back to sleeping in recliner. Was able to try working, made it 7 hours but painful post d/t inc sitting.    Manual: releases to L hip flexor and quad     Exercises:  Exercise 3: SciFit bike 8 mins L3.0  Exercise 5: Sink ex to tolerance (no ext)- today hip flexion, abduction, 2x15 ea, SLS 3x20\"  Exercise 6: supine modified rosario stretch for L hip flexor 3x30  Exercise 13: side ways walking 3 laps (LTB)    Assessment  Body Structures, Functions, Activity Limitations Requiring Skilled Therapeutic Intervention: Decreased functional mobility , Decreased ROM, Decreased strength, Increased pain  Assessment: Continued manual techniques to L hip flexor region and L lateral quad. Inc reps for standing ex with good tolerance.    Activity Tolerance  Activity Tolerance: Patient tolerated treatment well    Patient Education  Patient Education: HEP  Pt verbalized/demonstrated good understanding:     [x] Yes         [] No, pt required further clarification.       Post Treatment Pain:  no rating given/10      Plan  Plan Frequency: 3  Plan weeks: 4       Goals  (Total # of Visits to Date: 11)      Short Term Goals  Time Frame for Short Term Goals: 2 weeks  Short Term Goal 1: Pt

## 2024-11-28 DIAGNOSIS — M25.511 ACUTE PAIN OF RIGHT SHOULDER: ICD-10-CM

## 2024-11-28 DIAGNOSIS — K21.9 GASTROESOPHAGEAL REFLUX DISEASE, UNSPECIFIED WHETHER ESOPHAGITIS PRESENT: ICD-10-CM

## 2024-11-29 ENCOUNTER — APPOINTMENT (OUTPATIENT)
Dept: PHYSICAL THERAPY | Age: 49
End: 2024-11-29
Payer: COMMERCIAL

## 2024-12-02 ENCOUNTER — OFFICE VISIT (OUTPATIENT)
Dept: PRIMARY CARE CLINIC | Age: 49
End: 2024-12-02

## 2024-12-02 VITALS — SYSTOLIC BLOOD PRESSURE: 110 MMHG | DIASTOLIC BLOOD PRESSURE: 76 MMHG

## 2024-12-02 DIAGNOSIS — G89.29 CHRONIC RIGHT HIP PAIN: ICD-10-CM

## 2024-12-02 DIAGNOSIS — M25.551 CHRONIC RIGHT HIP PAIN: ICD-10-CM

## 2024-12-02 DIAGNOSIS — G89.4 CHRONIC PAIN SYNDROME: Primary | ICD-10-CM

## 2024-12-02 RX ORDER — PANTOPRAZOLE SODIUM 40 MG/1
TABLET, DELAYED RELEASE ORAL
Qty: 180 TABLET | Refills: 0 | Status: SHIPPED | OUTPATIENT
Start: 2024-12-02 | End: 2025-01-08 | Stop reason: SDUPTHER

## 2024-12-02 RX ORDER — HYDROCODONE BITARTRATE AND ACETAMINOPHEN 5; 325 MG/1; MG/1
1 TABLET ORAL EVERY 6 HOURS PRN
Qty: 28 TABLET | Refills: 0 | Status: SHIPPED | OUTPATIENT
Start: 2024-12-02 | End: 2024-12-09

## 2024-12-02 NOTE — PROGRESS NOTES
and reactive to light. Conjunctivae are normal. Right eye exhibits no discharge. Left eye exhibits no discharge.   Cardiovascular: Normal rate, regular rhythm and normal heart sounds.   Pulmonary/Chest: Effort normal and breath sounds normal. No respiratory distress. Patient has no wheezes.   Abdominal: Soft. Bowel sounds are normal. Patient exhibits no distension. There is no tenderness.   Musculoskeletal:  Patient exhibits no edema and tenderness. Patient exhibits no deformity.   Neurological: Patient is alert and oriented to person, place, and time.   Skin: Skin is warm and dry. Patient is not diaphoretic.   Psychiatric: Patient's speech is normal and behavior is normal. Thought content normal.   Vitals reviewed.      Lab Results   Component Value Date    WBC 7.8 09/27/2024    HGB 14.5 09/27/2024    HCT 41.3 09/27/2024     09/27/2024    CHOL 135 04/18/2024    TRIG 268 (H) 04/18/2024    HDL 26 04/18/2024    ALT 29 10/08/2024    AST 30 10/08/2024     11/01/2024    K 3.7 11/01/2024     11/01/2024    CREATININE 1.0 11/01/2024    BUN 16 11/01/2024    CO2 25 11/01/2024    TSH 2.070 04/17/2024    INR 1.1 10/08/2024    LABA1C 5.1 10/02/2024     Lab Results   Component Value Date    CALCIUM 8.6 11/01/2024     No results found for: \"LDLDIRECT\"    Please note that this chart was generated using voice recognition Dragon dictation software. Although every effort was made to ensure the accuracy of this automated transcription, some errors in transcription may have occurred.    The patient (or guardian, if applicable) and other individuals in attendance with the patient were advised that Artificial Intelligence will be utilized during this visit to record, process the conversation to generate a clinical note, and support improvement of the AI technology. The patient (or guardian, if applicable) and other individuals in attendance at the appointment consented to the use of AI, including the recording.

## 2024-12-06 ENCOUNTER — HOSPITAL ENCOUNTER (OUTPATIENT)
Dept: PHYSICAL THERAPY | Age: 49
Setting detail: THERAPIES SERIES
Discharge: HOME | End: 2024-12-06
Payer: COMMERCIAL

## 2024-12-06 PROCEDURE — 97110 THERAPEUTIC EXERCISES: CPT

## 2024-12-06 PROCEDURE — 97140 MANUAL THERAPY 1/> REGIONS: CPT

## 2024-12-06 NOTE — PROGRESS NOTES
Problem: Patient Centered Care  Goal: Patient preferences are identified and integrated in the patient's plan of care  Description: Interventions:  - What would you like us to know as we care for you? From home alone  - Provide timely, complete, and accurate information to patient/family  - Incorporate patient and family knowledge, values, beliefs, and cultural backgrounds into the planning and delivery of care  - Encourage patient/family to participate in care and decision-making at the level they choose  - Honor patient and family perspectives and choices  Outcome: Progressing     Problem: CARDIOVASCULAR - ADULT  Goal: Maintains optimal cardiac output and hemodynamic stability  Description: INTERVENTIONS:  - Monitor vital signs, rhythm, and trends  - Monitor for bleeding, hypotension and signs of decreased cardiac output  - Evaluate effectiveness of vasoactive medications to optimize hemodynamic stability  - Monitor arterial and/or venous puncture sites for bleeding and/or hematoma  - Assess quality of pulses, skin color and temperature  - Assess for signs of decreased coronary artery perfusion - ex. Angina  - Evaluate fluid balance, assess for edema, trend weights  Outcome: Progressing     Problem: RESPIRATORY - ADULT  Goal: Achieves optimal ventilation and oxygenation  Description: INTERVENTIONS:  - Assess for changes in respiratory status  - Assess for changes in mentation and behavior  - Position to facilitate oxygenation and minimize respiratory effort  - Oxygen supplementation based on oxygen saturation or ABGs  - Provide Smoking Cessation handout, if applicable  - Encourage broncho-pulmonary hygiene including cough, deep breathe, Incentive Spirometry  - Assess the need for suctioning and perform as needed  - Assess and instruct to report SOB or any respiratory difficulty  - Respiratory Therapy support as indicated  - Manage/alleviate anxiety  - Monitor for signs/symptoms of CO2 retention  Outcome:  EGD complete, 1 biopsy taken and 1 jar sent to lab. photos taken, pt tolerated procedure well. Scope Number  used.       Progressing     Problem: GASTROINTESTINAL - ADULT  Goal: Minimal or absence of nausea and vomiting  Description: INTERVENTIONS:  - Maintain adequate hydration with IV or PO as ordered and tolerated  - Nasogastric tube to low intermittent suction as ordered  - Evaluate effectiveness of ordered antiemetic medications  - Provide nonpharmacologic comfort measures as appropriate  - Advance diet as tolerated, if ordered  - Obtain nutritional consult as needed  - Evaluate fluid balance  Outcome: Progressing     Problem: GENITOURINARY - ADULT  Goal: Absence of urinary retention  Description: INTERVENTIONS:  - Assess patient’s ability to void and empty bladder  - Monitor intake/output and perform bladder scan as needed  - Follow urinary retention protocol/standard of care  - Consider collaborating with pharmacy to review patient's medication profile  - Implement strategies to promote bladder emptying  Outcome: Progressing     Problem: METABOLIC/FLUID AND ELECTROLYTES - ADULT  Goal: Glucose maintained within prescribed range  Description: INTERVENTIONS:  - Monitor Blood Glucose as ordered  - Assess for signs and symptoms of hyperglycemia and hypoglycemia  - Administer ordered medications to maintain glucose within target range  - Assess barriers to adequate nutritional intake and initiate nutrition consult as needed  - Instruct patient on self management of diabetes  Outcome: Progressing  Goal: Electrolytes maintained within normal limits  Description: INTERVENTIONS:  - Monitor labs and rhythm and assess patient for signs and symptoms of electrolyte imbalances  - Administer electrolyte replacement as ordered  - Monitor response to electrolyte replacements, including rhythm and repeat lab results as appropriate  - Fluid restriction as ordered  - Instruct patient on fluid and nutrition restrictions as appropriate  Outcome: Progressing     Problem: SKIN/TISSUE INTEGRITY - ADULT  Goal: Skin integrity remains intact  Description:  INTERVENTIONS  - Assess and document risk factors for pressure ulcer development  - Assess and document skin integrity  - Monitor for areas of redness and/or skin breakdown  - Initiate interventions, skin care algorithm/standards of care as needed  Outcome: Progressing  Goal: Incision(s), wounds(s) or drain site(s) healing without S/S of infection  Description: INTERVENTIONS:  - Assess and document risk factors for pressure ulcer development  - Assess and document skin integrity  - Assess and document dressing/incision, wound bed, drain sites and surrounding tissue  - Implement wound care per orders  - Initiate isolation precautions as appropriate  - Initiate Pressure Ulcer prevention bundle as indicated  Outcome: Progressing     Problem: MUSCULOSKELETAL - ADULT  Goal: Return mobility to safest level of function  Description: INTERVENTIONS:  - Assess patient stability and activity tolerance for standing, transferring and ambulating w/ or w/o assistive devices  - Assist with transfers and ambulation using safe patient handling equipment as needed  - Ensure adequate protection for wounds/incisions during mobilization  - Obtain PT/OT consults as needed  - Advance activity as appropriate  - Communicate ordered activity level and limitations with patient/family  Outcome: Progressing     Problem: Impaired Functional Mobility  Goal: Achieve highest/safest level of mobility/gait  Description: Interventions:  - Assess patient's functional ability and stability  - Promote increasing activity/tolerance for mobility and gait  - Educate and engage patient/family in tolerated activity level and precautions  - Recommend use of  RW for transfers and ambulation  - Recommend patient transfer to bedside chair toward strongest side  - When transferring patient, block weaker knee for safety  - Recommend use of chair position in bed 3 times per day  Outcome: Progressing     Problem: Impaired Activities of Daily Living  Goal: Achieve  highest/safest level of independence in self care  Description: Interventions:  - Assess ability and encourage patient to participate in ADLs to maximize function  - Promote sitting position while performing ADLs such as feeding, grooming, and bathing  - Educate and encourage patient/family in tolerated functional activity level and precautions during self-care  Outcome: Progressing     Problem: PAIN - ADULT  Goal: Verbalizes/displays adequate comfort level or patient's stated pain goal  Description: INTERVENTIONS:  - Encourage pt to monitor pain and request assistance  - Assess pain using appropriate pain scale  - Administer analgesics based on type and severity of pain and evaluate response  - Implement non-pharmacological measures as appropriate and evaluate response  - Consider cultural and social influences on pain and pain management  - Manage/alleviate anxiety  - Utilize distraction and/or relaxation techniques  - Monitor for opioid side effects  - Notify MD/LIP if interventions unsuccessful or patient reports new pain  - Anticipate increased pain with activity and pre-medicate as appropriate  Outcome: Progressing     Problem: SAFETY ADULT - FALL  Goal: Free from fall injury  Description: INTERVENTIONS:  - Assess pt frequently for physical needs  - Identify cognitive and physical deficits and behaviors that affect risk of falls.  - [place] fall precautions as indicated by assessment.  - Educate pt/family on patient safety including physical limitations  - Instruct pt to call for assistance with activity based on assessment  - Modify environment to reduce risk of injury  - Provide assistive devices as appropriate  - Consider OT/PT consult to assist with strengthening/mobility  - Encourage toileting schedule  Outcome: Progressing     Problem: DISCHARGE PLANNING  Goal: Discharge to home or other facility with appropriate resources  Description: INTERVENTIONS:  - Identify barriers to discharge w/pt and  caregiver  - Include patient/family/discharge partner in discharge planning  - Arrange for needed discharge resources and transportation as appropriate  - Identify discharge learning needs (meds, wound care, etc)  - Arrange for interpreters to assist at discharge as needed  - Consider post-discharge preferences of patient/family/discharge partner  - Complete POLST form as appropriate  - Assess patient's ability to be responsible for managing their own health  - Refer to Case Management Department for coordinating discharge planning if the patient needs post-hospital services based on physician/LIP order or complex needs related to functional status, cognitive ability or social support system  Outcome: Progressing     Problem: Patient/Family Goals  Goal: Patient/Family Long Term Goal  Description: Patient's Long Term Goal: Discharge from the hospital    Interventions:  - Monitor vital signs  - Monitor appropriate labs  - Monitor blood glucose levels  - Pain management  - Administer medications per order  - Follow MD orders  - Diagnostics per order  - Update / inform patient and family on plan of care  - Discharge planning  - See additional Care Plan goals for specific interventions  Outcome: Progressing  Goal: Patient/Family Short Term Goal  Description: Patient's Short Term Goal: Improve redness, swelling, and pain to left lower extremity     Interventions:   - Monitor vital signs  - Monitor appropriate labs  - Monitor blood glucose levels  - Pain management  - Administer medications per order  - Follow MD orders  - Diagnostics per order  - Update / inform patient and family on plan of care  - See additional Care Plan goals for specific interventions  Outcome: Progressing     Problem: Diabetes/Glucose Control  Goal: Glucose maintained within prescribed range  Description: INTERVENTIONS:  - Monitor Blood Glucose as ordered  - Assess for signs and symptoms of hyperglycemia and hypoglycemia  - Administer ordered  medications to maintain glucose within target range  - Assess barriers to adequate nutritional intake and initiate nutrition consult as needed  - Instruct patient on self management of diabetes  Outcome: Progressing     Problem: HEMATOLOGIC - ADULT  Goal: Free from bleeding injury  Description: (Example usage: patient with low platelets)  INTERVENTIONS:  - Avoid intramuscular injections, enemas and rectal medication administration  - Ensure safe mobilization of patient  - Hold pressure on venipuncture sites to achieve adequate hemostasis  - Assess for signs and symptoms of internal bleeding  - Monitor lab trends  - Patient is to report abnormal signs of bleeding to staff  - Avoid use of toothpicks and dental floss  - Use electric shaver for shaving  - Use soft bristle tooth brush  - Limit straining and forceful nose blowing  Outcome: Progressing

## 2024-12-11 ENCOUNTER — HOSPITAL ENCOUNTER (OUTPATIENT)
Dept: PHYSICAL THERAPY | Age: 49
Setting detail: THERAPIES SERIES
Discharge: HOME | End: 2024-12-11
Payer: COMMERCIAL

## 2024-12-11 PROCEDURE — 97110 THERAPEUTIC EXERCISES: CPT

## 2024-12-11 PROCEDURE — 97140 MANUAL THERAPY 1/> REGIONS: CPT

## 2024-12-11 NOTE — FLOWSHEET NOTE
Phone: [phone]                 [facility]           Fax: [phone]                           Outpatient Physical Therapy                                                                            Daily Note    Patient: Franky Melendez : 1975  CSN #: [id number]   Referring Physician: Elliot Jacobs MD  Date: 2024    Diagnosis: M25.552 (ICD-10-CM) - Left hip pain  M16.12 (ICD-10-CM) - Primary osteoarthritis of left hip  Treatment Diagnosis: L LE weakness, s/p SUSAN    PT Insurance Information: BCBS    Per Physician Order  Total # of Visits to Date: 13  No Show: 0  Canceled Appointment: 0    25 Plan of Care/Recert Due    Pre-Treatment Pain:  1/10  Subjective: Pt reports he still isn't able to sleep in bed without inc in sx.    Exercises:  Exercise 2: * no SLR for 6 weeks (), no combined ER/flex  Exercise 3: SciFit bike 8 mins L3.0  Exercise 5: Sink ex to tolerance (no ext)- hip flexion, abduction, 2x10 ea BTB  Exercise 6: supine modified rosario stretch for L hip flexor 3x30, prone quad strap stretch 3x30\"  Exercise 7: LAQ 2x10, HS curl 2x10 BTB  Exercise 8: SAQ 3x10, SLR 2x5  Exercise 10: Bridge 2x10 // supine alt march x 10 each // SL hip abduction 2x10  Exercise 13: side ways walking 3 laps (BTB), SLS blue disc 3x30\"    Manual:  Soft Tissue Mobilizaton: gentle stm to L quad and hip flexor    Assessment  Body Structures, Functions, Activity Limitations Requiring Skilled Therapeutic Intervention: Decreased functional mobility , Decreased ROM, Decreased strength, Increased pain    Activity Tolerance  Activity Tolerance: Patient tolerated treatment well    Patient Education  Patient Education: HEP  Pt verbalized/demonstrated good understanding:     [x] Yes         [] No, pt required further clarification.       Post Treatment Pain:  0/10      Plan  Plan Frequency: 3  Plan weeks: 4       Goals  (Total # of Visits to Date: 13)      Short Term Goals  Time Frame for Short Term Goals:

## 2024-12-18 ENCOUNTER — HOSPITAL ENCOUNTER (OUTPATIENT)
Dept: PHYSICAL THERAPY | Age: 49
Setting detail: THERAPIES SERIES
Discharge: HOME | End: 2024-12-18
Payer: COMMERCIAL

## 2024-12-18 PROCEDURE — 97110 THERAPEUTIC EXERCISES: CPT

## 2024-12-18 NOTE — FLOWSHEET NOTE
Phone: [phone]                 [facility]           Fax: [phone]                           Outpatient Physical Therapy                                                                            Daily Note    Patient: Franky Melendez : 1975  CSN #: [id number]   Referring Physician: Elliot Jacobs MD  Date: 2024    Diagnosis: M25.552 (ICD-10-CM) - Left hip pain  M16.12 (ICD-10-CM) - Primary osteoarthritis of left hip  Treatment Diagnosis: L LE weakness, s/p SUSAN    PT Insurance Information: BCBS  Total # of Visits Approved: 15 Per Physician Order  Total # of Visits to Date: 14  No Show: 0  Canceled Appointment: 0    25 Plan of Care/Recert Due    Pre-Treatment Pain:  0/10  Subjective: Pt reports he is sleeping in his bed for the past few nights with no issue. R hip is becoming more problematic than L at this point in terms of pain and endurance.    Exercises:  Exercise 2: * no SLR for 6 weeks (), no combined ER/flex  Exercise 3: SciFit bike 10 mins L3.0  Exercise 6: supine modified rosario stretch for L hip flexor 3x30, prone quad strap stretch 3x30\"  Exercise 7: SL clamshell 2x10, SL hip abduction 2x10 3#, HS curl 2x10 3#, hip extension knee bent 2x10 3#  Exercise 8: Adductor squeeze 2x10 3\", SAQ 2x10 3#, SLR 2x5  Exercise 9: Posterior pelvic tilt with TA contraction 5 sec hold 2x10  Exercise 10: Bridge 2x10 // supine alt march x 15 L Le only // heel slide x15 L only // hooklying heel walk x15 L only  Exercise 11: Sit to stands 2x15 (not today) // LAQ 2x10 3#    Assessment  Body Structures, Functions, Activity Limitations Requiring Skilled Therapeutic Intervention: Decreased functional mobility , Decreased ROM, Decreased strength, Increased pain  Assessment: Cont to progress strengthening focusing on mat ex to avoid exacerbation of R hip sx. Gen shaky with adduction, inc in R hip pain post. Will cont to progress as tolerable.    Activity Tolerance  Activity Tolerance: Patient

## 2024-12-19 ENCOUNTER — OFFICE VISIT (OUTPATIENT)
Dept: PRIMARY CARE CLINIC | Age: 49
End: 2024-12-19

## 2024-12-19 VITALS
DIASTOLIC BLOOD PRESSURE: 68 MMHG | BODY MASS INDEX: 25.58 KG/M2 | OXYGEN SATURATION: 99 % | HEART RATE: 83 BPM | SYSTOLIC BLOOD PRESSURE: 114 MMHG | WEIGHT: 163 LBS | HEIGHT: 67 IN

## 2024-12-19 DIAGNOSIS — M25.551 PAIN OF RIGHT HIP: Primary | ICD-10-CM

## 2024-12-19 DIAGNOSIS — M25.551 CHRONIC RIGHT HIP PAIN: ICD-10-CM

## 2024-12-19 DIAGNOSIS — G89.4 CHRONIC PAIN SYNDROME: ICD-10-CM

## 2024-12-19 DIAGNOSIS — E11.9 TYPE 2 DIABETES MELLITUS WITHOUT COMPLICATION, UNSPECIFIED WHETHER LONG TERM INSULIN USE (HCC): ICD-10-CM

## 2024-12-19 DIAGNOSIS — G89.29 CHRONIC RIGHT HIP PAIN: ICD-10-CM

## 2024-12-19 PROBLEM — G47.9 SLEEPING DIFFICULTY: Status: RESOLVED | Noted: 2023-01-10 | Resolved: 2024-12-19

## 2024-12-19 PROBLEM — R06.02 SHORTNESS OF BREATH: Status: RESOLVED | Noted: 2022-12-19 | Resolved: 2024-12-19

## 2024-12-19 PROBLEM — J40 BRONCHITIS: Status: RESOLVED | Noted: 2022-11-29 | Resolved: 2024-12-19

## 2024-12-19 PROBLEM — J45.31 MILD PERSISTENT ASTHMA WITH ACUTE EXACERBATION: Status: RESOLVED | Noted: 2022-11-29 | Resolved: 2024-12-19

## 2024-12-19 PROBLEM — J45.20 MILD INTERMITTENT ASTHMA: Status: RESOLVED | Noted: 2022-11-18 | Resolved: 2024-12-19

## 2024-12-19 RX ORDER — METHYLPREDNISOLONE 4 MG/1
4 TABLET ORAL SEE ADMIN INSTRUCTIONS
COMMUNITY
Start: 2024-11-04

## 2024-12-19 RX ORDER — HYDROCODONE BITARTRATE AND ACETAMINOPHEN 5; 325 MG/1; MG/1
2 TABLET ORAL EVERY 6 HOURS PRN
Qty: 28 TABLET | Refills: 0 | Status: SHIPPED | OUTPATIENT
Start: 2024-12-19 | End: 2024-12-26

## 2024-12-19 RX ORDER — HYDROCODONE BITARTRATE AND ACETAMINOPHEN 5; 325 MG/1; MG/1
2 TABLET ORAL EVERY 6 HOURS PRN
COMMUNITY
Start: 2024-12-02 | End: 2024-12-19 | Stop reason: SDUPTHER

## 2024-12-19 NOTE — PROGRESS NOTES
[facility] PRIMARY CARE  [address] [place] [address] , [address]  [place], [place], [address]    Franky Melendez is a 49 y.o. male with  has a past medical history of CAD (coronary artery disease), Carpal tunnel syndrome, Depressive disorder, not elsewhere classified, Diabetes mellitus ([facility]), Gastrointestinal hemorrhage with hematemesis, GERD (gastroesophageal reflux disease), Heart attack ([facility]), History of echocardiogram, History of pancreatitis, Hyperlipidemia, Hypertension, and PTSD (post-traumatic stress disorder).  Presented to the office today for:  Chief Complaint   Patient presents with    Hip Pain     Assessment/Plan   1. Pain of right hip [M25.551]  -     HYDROcodone-acetaminophen (NORCO) 5-325 MG per tablet; Take 2 tablets by mouth every 6 hours as needed for Pain for up to 7 days. Max Daily Amount: 8 tablets, Disp-28 tablet, R-0Normal  2. Chronic right hip pain  -     HYDROcodone-acetaminophen (NORCO) 5-325 MG per tablet; Take 2 tablets by mouth every 6 hours as needed for Pain for up to 7 days. Max Daily Amount: 8 tablets, Disp-28 tablet, R-0Normal  3. Chronic pain syndrome  -     HYDROcodone-acetaminophen (NORCO) 5-325 MG per tablet; Take 2 tablets by mouth every 6 hours as needed for Pain for up to 7 days. Max Daily Amount: 8 tablets, Disp-28 tablet, R-0Normal  4. Type 2 diabetes mellitus without complication, unspecified whether long term insulin use ([facility])  -     metFORMIN (GLUCOPHAGE) 1000 MG tablet; Take 1 tablet by mouth 2 times daily (with meals), Disp-180 tablet, R-0Normal  Return if symptoms worsen or fail to improve.  Assessment & Plan  We discussed some of the etiologies and natural histories. We discussed the various treatment alternatives including anti-inflammatory medications, physical therapy, injections, further imaging studies and as a last resort surgery.    Drug therapy requiring intensive monitoring for toxicity   Norco PRN to be continued at this time, RBA discussed, including

## 2024-12-23 ENCOUNTER — HOSPITAL ENCOUNTER (OUTPATIENT)
Dept: PHYSICAL THERAPY | Age: 49
Setting detail: THERAPIES SERIES
Discharge: HOME | End: 2024-12-23
Payer: COMMERCIAL

## 2024-12-23 PROCEDURE — 97140 MANUAL THERAPY 1/> REGIONS: CPT

## 2024-12-23 PROCEDURE — 97110 THERAPEUTIC EXERCISES: CPT

## 2024-12-23 NOTE — PROGRESS NOTES
Phone: [phone]                 [facility]           Fax: [phone]                           Outpatient Physical Therapy                                                                            Daily Note    Patient: Franky Melendez : 1975  CSN #: [id number]   Referring Physician: Elliot Jacobs MD  Date: 2024     Treatment Diagnosis: L LE weakness, s/p SUSAN    PT Insurance Information: BCBS  Total # of Visits Approved: 15 Per Physician Order  Total # of Visits to Date: 15  No Show: 0  Canceled Appointment: 0    25 Plan of Care/Recert Due    Pre-Treatment Pain:  0/10  Subjective: Pt reports overall his L hip is doing well,still numb a little and sore in It band area but R hip is giving him more problems. Pt reports current pain 0/10.    Exercises:  Exercise 3: SciFit bike 10 mins L3.0  Exercise 6: supine modified rosario stretch for L hip flexor 3x30, prone quad strap stretch 3x30\"  Exercise 9: Posterior pelvic tilt with TA contraction 5 sec hold 2x10  Exercise 10: Bridge 2x10 // supine alt march x 15 L Le only // heel slide x15 L only // hooklying heel walk x15 L only  Exercise 11: Sit to stands 2x15  // LAQ 2x10 3#  Exercise 12: [employer] 6 inch x 15 // [employer] 6 x 15  Exercise 13: side ways walking 3 laps (BTB), SLS blue disc 3x30\"    Manual:  Other: THermoprobe to L IT band (hot)    Assessment  Assessment: COntinued working on stregthening exercises with fair tolerance but discomfort reported in hip flexor/ IT band.  Pt with min to no antalgic gait pattern noted today. THermoprobe to L IT band today to decrease discomfort.    Activity Tolerance  Activity Tolerance: Patient limited by pain    Patient Education  Patient Education: HEP  Pt verbalized/demonstrated good understanding:     [x] Yes         [] No, pt required further clarification.     Post Treatment Pain:  0/10    Plan  Plan Frequency: 3  Plan weeks: 4     Goals  (Total # of Visits to Date: 15)      Short Term Goals  Time

## 2024-12-25 ENCOUNTER — HOSPITAL ENCOUNTER (OUTPATIENT)
Age: 49
Setting detail: OBSERVATION
Discharge: HOME | End: 2024-12-27
Attending: STUDENT IN AN ORGANIZED HEALTH CARE EDUCATION/TRAINING PROGRAM | Admitting: INTERNAL MEDICINE
Payer: COMMERCIAL

## 2024-12-25 ENCOUNTER — APPOINTMENT (OUTPATIENT)
Dept: GENERAL RADIOLOGY | Age: 49
End: 2024-12-25
Payer: COMMERCIAL

## 2024-12-25 DIAGNOSIS — G89.29 CHRONIC RIGHT HIP PAIN: ICD-10-CM

## 2024-12-25 DIAGNOSIS — R07.9 CHEST PAIN, UNSPECIFIED: ICD-10-CM

## 2024-12-25 DIAGNOSIS — M25.551 CHRONIC RIGHT HIP PAIN: ICD-10-CM

## 2024-12-25 DIAGNOSIS — R07.9 CHEST PAIN, UNSPECIFIED TYPE: Primary | ICD-10-CM

## 2024-12-25 DIAGNOSIS — M25.551 PAIN OF RIGHT HIP: ICD-10-CM

## 2024-12-25 DIAGNOSIS — G89.4 CHRONIC PAIN SYNDROME: ICD-10-CM

## 2024-12-25 LAB
ALBUMIN SERPL-MCNC: 4.7 G/DL (ref 3.5–5.2)
ALBUMIN/GLOB SERPL: 1.6 {RATIO} (ref 1–2.5)
ALP SERPL-CCNC: 74 U/L (ref 40–129)
ALT SERPL-CCNC: 30 U/L (ref 10–50)
ANION GAP SERPL CALCULATED.3IONS-SCNC: 12 MMOL/L (ref 9–16)
AST SERPL-CCNC: 21 U/L (ref 10–50)
BASOPHILS # BLD: 0.05 K/UL (ref 0–0.2)
BASOPHILS NFR BLD: 1 % (ref 0–2)
BILIRUB DIRECT SERPL-MCNC: <0.2 MG/DL (ref 0–0.3)
BILIRUB INDIRECT SERPL-MCNC: NORMAL MG/DL (ref 0–1)
BILIRUB SERPL-MCNC: 0.3 MG/DL (ref 0–1.2)
BUN SERPL-MCNC: 19 MG/DL (ref 6–20)
BUN/CREAT SERPL: 17 (ref 9–20)
CALCIUM SERPL-MCNC: 9.6 MG/DL (ref 8.6–10.4)
CHLORIDE SERPL-SCNC: 104 MMOL/L (ref 98–107)
CO2 SERPL-SCNC: 22 MMOL/L (ref 20–31)
CREAT SERPL-MCNC: 1.1 MG/DL (ref 0.7–1.2)
EOSINOPHIL # BLD: 0.14 K/UL (ref 0–0.44)
EOSINOPHILS RELATIVE PERCENT: 2 % (ref 1–4)
ERYTHROCYTE [DISTWIDTH] IN BLOOD BY AUTOMATED COUNT: 12.8 % (ref 11.8–14.4)
GFR, ESTIMATED: 88 ML/MIN/1.73M2
GLUCOSE SERPL-MCNC: 114 MG/DL (ref 74–99)
HCT VFR BLD AUTO: 39.5 % (ref 40.7–50.3)
HGB BLD-MCNC: 13.9 G/DL (ref 13–17)
IMM GRANULOCYTES # BLD AUTO: <0.03 K/UL (ref 0–0.3)
IMM GRANULOCYTES NFR BLD: 0 %
LIPASE SERPL-CCNC: 79 U/L (ref 13–60)
LYMPHOCYTES NFR BLD: 3.08 K/UL (ref 1.1–3.7)
LYMPHOCYTES RELATIVE PERCENT: 36 % (ref 24–43)
MCH RBC QN AUTO: 30.5 PG (ref 25.2–33.5)
MCHC RBC AUTO-ENTMCNC: 35.2 G/DL (ref 28.4–34.8)
MCV RBC AUTO: 86.6 FL (ref 82.6–102.9)
MONOCYTES NFR BLD: 0.86 K/UL (ref 0.1–1.2)
MONOCYTES NFR BLD: 10 % (ref 3–12)
NEUTROPHILS NFR BLD: 51 % (ref 36–65)
NEUTS SEG NFR BLD: 4.43 K/UL (ref 1.5–8.1)
NRBC BLD-RTO: 0 PER 100 WBC
PLATELET # BLD AUTO: 400 K/UL (ref 138–453)
PMV BLD AUTO: 9.6 FL (ref 8.1–13.5)
POTASSIUM SERPL-SCNC: 4 MMOL/L (ref 3.7–5.3)
PROT SERPL-MCNC: 7.6 G/DL (ref 6.6–8.7)
RBC # BLD AUTO: 4.56 M/UL (ref 4.21–5.77)
SODIUM SERPL-SCNC: 138 MMOL/L (ref 136–145)
TROPONIN I SERPL HS-MCNC: 12 NG/L (ref 0–22)
TROPONIN I SERPL HS-MCNC: 14 NG/L (ref 0–22)
WBC OTHER # BLD: 8.6 K/UL (ref 3.5–11.3)

## 2024-12-25 PROCEDURE — G0378 HOSPITAL OBSERVATION PER HR: HCPCS

## 2024-12-25 PROCEDURE — 2580000003 HC RX 258: Performed by: STUDENT IN AN ORGANIZED HEALTH CARE EDUCATION/TRAINING PROGRAM

## 2024-12-25 PROCEDURE — 80076 HEPATIC FUNCTION PANEL: CPT

## 2024-12-25 PROCEDURE — 6370000000 HC RX 637 (ALT 250 FOR IP): Performed by: STUDENT IN AN ORGANIZED HEALTH CARE EDUCATION/TRAINING PROGRAM

## 2024-12-25 PROCEDURE — 93005 ELECTROCARDIOGRAM TRACING: CPT | Performed by: STUDENT IN AN ORGANIZED HEALTH CARE EDUCATION/TRAINING PROGRAM

## 2024-12-25 PROCEDURE — 2500000003 HC RX 250 WO HCPCS: Performed by: INTERNAL MEDICINE

## 2024-12-25 PROCEDURE — 96374 THER/PROPH/DIAG INJ IV PUSH: CPT

## 2024-12-25 PROCEDURE — 6370000000 HC RX 637 (ALT 250 FOR IP): Performed by: INTERNAL MEDICINE

## 2024-12-25 PROCEDURE — 94761 N-INVAS EAR/PLS OXIMETRY MLT: CPT

## 2024-12-25 PROCEDURE — 6360000002 HC RX W HCPCS: Performed by: INTERNAL MEDICINE

## 2024-12-25 PROCEDURE — 83690 ASSAY OF LIPASE: CPT

## 2024-12-25 PROCEDURE — 84484 ASSAY OF TROPONIN QUANT: CPT

## 2024-12-25 PROCEDURE — 99285 EMERGENCY DEPT VISIT HI MDM: CPT

## 2024-12-25 PROCEDURE — 6360000002 HC RX W HCPCS: Performed by: STUDENT IN AN ORGANIZED HEALTH CARE EDUCATION/TRAINING PROGRAM

## 2024-12-25 PROCEDURE — 80048 BASIC METABOLIC PNL TOTAL CA: CPT

## 2024-12-25 PROCEDURE — 85025 COMPLETE CBC W/AUTO DIFF WBC: CPT

## 2024-12-25 PROCEDURE — 96372 THER/PROPH/DIAG INJ SC/IM: CPT

## 2024-12-25 PROCEDURE — 71045 X-RAY EXAM CHEST 1 VIEW: CPT

## 2024-12-25 PROCEDURE — 94664 DEMO&/EVAL PT USE INHALER: CPT

## 2024-12-25 RX ORDER — ENOXAPARIN SODIUM 100 MG/ML
40 INJECTION SUBCUTANEOUS DAILY
Status: DISCONTINUED | OUTPATIENT
Start: 2024-12-25 | End: 2024-12-27 | Stop reason: SDUPTHER

## 2024-12-25 RX ORDER — SODIUM CHLORIDE 0.9 % (FLUSH) 0.9 %
10 SYRINGE (ML) INJECTION PRN
Status: DISCONTINUED | OUTPATIENT
Start: 2024-12-25 | End: 2024-12-27 | Stop reason: HOSPADM

## 2024-12-25 RX ORDER — FENOFIBRATE 160 MG/1
160 TABLET ORAL DAILY
Status: DISCONTINUED | OUTPATIENT
Start: 2024-12-25 | End: 2024-12-27 | Stop reason: HOSPADM

## 2024-12-25 RX ORDER — CLOPIDOGREL BISULFATE 75 MG/1
75 TABLET ORAL DAILY
Status: DISCONTINUED | OUTPATIENT
Start: 2024-12-25 | End: 2024-12-27 | Stop reason: HOSPADM

## 2024-12-25 RX ORDER — HYDROCODONE BITARTRATE AND ACETAMINOPHEN 5; 325 MG/1; MG/1
2 TABLET ORAL EVERY 6 HOURS PRN
Status: DISCONTINUED | OUTPATIENT
Start: 2024-12-25 | End: 2024-12-27 | Stop reason: HOSPADM

## 2024-12-25 RX ORDER — PANTOPRAZOLE SODIUM 40 MG/1
40 TABLET, DELAYED RELEASE ORAL
Status: DISCONTINUED | OUTPATIENT
Start: 2024-12-26 | End: 2024-12-27 | Stop reason: HOSPADM

## 2024-12-25 RX ORDER — ACETAMINOPHEN 650 MG/1
650 SUPPOSITORY RECTAL EVERY 6 HOURS PRN
Status: DISCONTINUED | OUTPATIENT
Start: 2024-12-25 | End: 2024-12-27 | Stop reason: HOSPADM

## 2024-12-25 RX ORDER — INSULIN GLARGINE 100 [IU]/ML
12 INJECTION, SOLUTION SUBCUTANEOUS NIGHTLY
Status: DISCONTINUED | OUTPATIENT
Start: 2024-12-25 | End: 2024-12-27 | Stop reason: HOSPADM

## 2024-12-25 RX ORDER — SUCRALFATE 1 G/1
1 TABLET ORAL
Status: DISCONTINUED | OUTPATIENT
Start: 2024-12-26 | End: 2024-12-27 | Stop reason: HOSPADM

## 2024-12-25 RX ORDER — ONDANSETRON 4 MG/1
4 TABLET, ORALLY DISINTEGRATING ORAL EVERY 8 HOURS PRN
Status: DISCONTINUED | OUTPATIENT
Start: 2024-12-25 | End: 2024-12-27 | Stop reason: HOSPADM

## 2024-12-25 RX ORDER — PREGABALIN 75 MG/1
75 CAPSULE ORAL EVERY 12 HOURS PRN
Status: DISCONTINUED | OUTPATIENT
Start: 2024-12-25 | End: 2024-12-27 | Stop reason: HOSPADM

## 2024-12-25 RX ORDER — ASPIRIN 81 MG/1
243 TABLET, CHEWABLE ORAL ONCE
Status: COMPLETED | OUTPATIENT
Start: 2024-12-25 | End: 2024-12-25

## 2024-12-25 RX ORDER — POTASSIUM CHLORIDE 7.45 MG/ML
10 INJECTION INTRAVENOUS PRN
Status: DISCONTINUED | OUTPATIENT
Start: 2024-12-25 | End: 2024-12-27 | Stop reason: HOSPADM

## 2024-12-25 RX ORDER — MAGNESIUM SULFATE IN WATER 40 MG/ML
2000 INJECTION, SOLUTION INTRAVENOUS PRN
Status: DISCONTINUED | OUTPATIENT
Start: 2024-12-25 | End: 2024-12-27 | Stop reason: HOSPADM

## 2024-12-25 RX ORDER — SODIUM CHLORIDE 0.9 % (FLUSH) 0.9 %
5-40 SYRINGE (ML) INJECTION EVERY 12 HOURS SCHEDULED
Status: DISCONTINUED | OUTPATIENT
Start: 2024-12-25 | End: 2024-12-27 | Stop reason: HOSPADM

## 2024-12-25 RX ORDER — ACETAMINOPHEN 325 MG/1
650 TABLET ORAL EVERY 4 HOURS PRN
Status: DISCONTINUED | OUTPATIENT
Start: 2024-12-25 | End: 2024-12-27 | Stop reason: SDUPTHER

## 2024-12-25 RX ORDER — POLYETHYLENE GLYCOL 3350 17 G/17G
17 POWDER, FOR SOLUTION ORAL DAILY PRN
Status: DISCONTINUED | OUTPATIENT
Start: 2024-12-25 | End: 2024-12-27 | Stop reason: HOSPADM

## 2024-12-25 RX ORDER — FENTANYL CITRATE 50 UG/ML
50 INJECTION, SOLUTION INTRAMUSCULAR; INTRAVENOUS ONCE
Status: COMPLETED | OUTPATIENT
Start: 2024-12-25 | End: 2024-12-25

## 2024-12-25 RX ORDER — NITROGLYCERIN 0.4 MG/1
0.4 TABLET SUBLINGUAL EVERY 5 MIN PRN
Status: DISCONTINUED | OUTPATIENT
Start: 2024-12-25 | End: 2024-12-25

## 2024-12-25 RX ORDER — SODIUM CHLORIDE 9 MG/ML
INJECTION, SOLUTION INTRAVENOUS PRN
Status: DISCONTINUED | OUTPATIENT
Start: 2024-12-25 | End: 2024-12-27 | Stop reason: HOSPADM

## 2024-12-25 RX ORDER — POTASSIUM CHLORIDE 1500 MG/1
40 TABLET, EXTENDED RELEASE ORAL PRN
Status: DISCONTINUED | OUTPATIENT
Start: 2024-12-25 | End: 2024-12-27 | Stop reason: HOSPADM

## 2024-12-25 RX ORDER — METOPROLOL SUCCINATE 50 MG/1
50 TABLET, EXTENDED RELEASE ORAL DAILY
Status: DISCONTINUED | OUTPATIENT
Start: 2024-12-25 | End: 2024-12-27 | Stop reason: HOSPADM

## 2024-12-25 RX ORDER — NITROGLYCERIN 0.4 MG/1
0.4 TABLET SUBLINGUAL EVERY 5 MIN PRN
Status: DISCONTINUED | OUTPATIENT
Start: 2024-12-25 | End: 2024-12-27 | Stop reason: HOSPADM

## 2024-12-25 RX ORDER — SODIUM CHLORIDE 0.9 % (FLUSH) 0.9 %
5-40 SYRINGE (ML) INJECTION PRN
Status: DISCONTINUED | OUTPATIENT
Start: 2024-12-25 | End: 2024-12-27 | Stop reason: HOSPADM

## 2024-12-25 RX ORDER — FUROSEMIDE 20 MG/1
20 TABLET ORAL DAILY PRN
Status: DISCONTINUED | OUTPATIENT
Start: 2024-12-25 | End: 2024-12-27 | Stop reason: HOSPADM

## 2024-12-25 RX ORDER — 0.9 % SODIUM CHLORIDE 0.9 %
1000 INTRAVENOUS SOLUTION INTRAVENOUS ONCE
Status: COMPLETED | OUTPATIENT
Start: 2024-12-25 | End: 2024-12-25

## 2024-12-25 RX ORDER — ATORVASTATIN CALCIUM 40 MG/1
80 TABLET, FILM COATED ORAL NIGHTLY
Status: DISCONTINUED | OUTPATIENT
Start: 2024-12-25 | End: 2024-12-27 | Stop reason: HOSPADM

## 2024-12-25 RX ORDER — ALBUTEROL SULFATE 90 UG/1
2 INHALANT RESPIRATORY (INHALATION) 4 TIMES DAILY PRN
Status: DISCONTINUED | OUTPATIENT
Start: 2024-12-25 | End: 2024-12-27 | Stop reason: HOSPADM

## 2024-12-25 RX ORDER — RANOLAZINE 500 MG/1
500 TABLET, EXTENDED RELEASE ORAL 2 TIMES DAILY
Status: DISCONTINUED | OUTPATIENT
Start: 2024-12-25 | End: 2024-12-27 | Stop reason: HOSPADM

## 2024-12-25 RX ORDER — ONDANSETRON 4 MG/1
4 TABLET, ORALLY DISINTEGRATING ORAL EVERY 8 HOURS PRN
Status: DISCONTINUED | OUTPATIENT
Start: 2024-12-25 | End: 2024-12-27 | Stop reason: SDUPTHER

## 2024-12-25 RX ORDER — ONDANSETRON 4 MG/1
4 TABLET, ORALLY DISINTEGRATING ORAL PRN
Status: DISCONTINUED | OUTPATIENT
Start: 2024-12-25 | End: 2024-12-27 | Stop reason: HOSPADM

## 2024-12-25 RX ORDER — ASPIRIN 325 MG
325 TABLET ORAL DAILY
Status: DISCONTINUED | OUTPATIENT
Start: 2024-12-25 | End: 2024-12-27 | Stop reason: HOSPADM

## 2024-12-25 RX ORDER — ENOXAPARIN SODIUM 100 MG/ML
40 INJECTION SUBCUTANEOUS DAILY
Status: DISCONTINUED | OUTPATIENT
Start: 2024-12-25 | End: 2024-12-27 | Stop reason: HOSPADM

## 2024-12-25 RX ORDER — ONDANSETRON 2 MG/ML
4 INJECTION INTRAMUSCULAR; INTRAVENOUS EVERY 6 HOURS PRN
Status: DISCONTINUED | OUTPATIENT
Start: 2024-12-25 | End: 2024-12-27 | Stop reason: HOSPADM

## 2024-12-25 RX ORDER — ACETAMINOPHEN 325 MG/1
650 TABLET ORAL EVERY 6 HOURS PRN
Status: DISCONTINUED | OUTPATIENT
Start: 2024-12-25 | End: 2024-12-27 | Stop reason: HOSPADM

## 2024-12-25 RX ORDER — ISOSORBIDE MONONITRATE 30 MG/1
30 TABLET, EXTENDED RELEASE ORAL DAILY
Status: DISCONTINUED | OUTPATIENT
Start: 2024-12-25 | End: 2024-12-27 | Stop reason: HOSPADM

## 2024-12-25 RX ORDER — ONDANSETRON 2 MG/ML
4 INJECTION INTRAMUSCULAR; INTRAVENOUS EVERY 6 HOURS PRN
Status: DISCONTINUED | OUTPATIENT
Start: 2024-12-25 | End: 2024-12-27 | Stop reason: SDUPTHER

## 2024-12-25 RX ORDER — AMLODIPINE BESYLATE 5 MG/1
5 TABLET ORAL DAILY
Status: DISCONTINUED | OUTPATIENT
Start: 2024-12-25 | End: 2024-12-27

## 2024-12-25 RX ORDER — LISINOPRIL 20 MG/1
20 TABLET ORAL DAILY
Status: DISCONTINUED | OUTPATIENT
Start: 2024-12-25 | End: 2024-12-27 | Stop reason: HOSPADM

## 2024-12-25 RX ADMIN — FENOFIBRATE 160 MG: 160 TABLET ORAL at 20:43

## 2024-12-25 RX ADMIN — NITROGLYCERIN 0.4 MG: 0.4 TABLET SUBLINGUAL at 17:12

## 2024-12-25 RX ADMIN — ENOXAPARIN SODIUM 40 MG: 100 INJECTION SUBCUTANEOUS at 20:43

## 2024-12-25 RX ADMIN — HYDROCODONE BITARTRATE AND ACETAMINOPHEN 2 TABLET: 5; 325 TABLET ORAL at 20:44

## 2024-12-25 RX ADMIN — SODIUM CHLORIDE 1000 ML: 9 INJECTION, SOLUTION INTRAVENOUS at 17:12

## 2024-12-25 RX ADMIN — SODIUM CHLORIDE, PRESERVATIVE FREE 10 ML: 5 INJECTION INTRAVENOUS at 21:58

## 2024-12-25 RX ADMIN — ASPIRIN 243 MG: 81 TABLET, CHEWABLE ORAL at 16:06

## 2024-12-25 RX ADMIN — ATORVASTATIN CALCIUM 80 MG: 40 TABLET, FILM COATED ORAL at 20:43

## 2024-12-25 RX ADMIN — CLOPIDOGREL BISULFATE 75 MG: 75 TABLET ORAL at 20:43

## 2024-12-25 RX ADMIN — RANOLAZINE 500 MG: 500 TABLET, FILM COATED, EXTENDED RELEASE ORAL at 20:43

## 2024-12-25 RX ADMIN — INSULIN GLARGINE 12 UNITS: 100 INJECTION, SOLUTION SUBCUTANEOUS at 20:44

## 2024-12-25 RX ADMIN — FENTANYL CITRATE 50 MCG: 50 INJECTION INTRAMUSCULAR; INTRAVENOUS at 18:08

## 2024-12-25 RX ADMIN — METFORMIN HYDROCHLORIDE 1000 MG: 500 TABLET ORAL at 20:43

## 2024-12-25 ASSESSMENT — LIFESTYLE VARIABLES
HOW OFTEN DO YOU HAVE A DRINK CONTAINING ALCOHOL: NEVER
HOW OFTEN DO YOU HAVE A DRINK CONTAINING ALCOHOL: NEVER
HOW MANY STANDARD DRINKS CONTAINING ALCOHOL DO YOU HAVE ON A TYPICAL DAY: PATIENT DOES NOT DRINK
HOW MANY STANDARD DRINKS CONTAINING ALCOHOL DO YOU HAVE ON A TYPICAL DAY: PATIENT DOES NOT DRINK

## 2024-12-25 ASSESSMENT — PAIN DESCRIPTION - LOCATION
LOCATION: NECK
LOCATION: SHOULDER;HIP
LOCATION: CHEST

## 2024-12-25 ASSESSMENT — PAIN DESCRIPTION - ORIENTATION
ORIENTATION: RIGHT;LEFT
ORIENTATION: MID

## 2024-12-25 ASSESSMENT — PAIN SCALES - GENERAL
PAINLEVEL_OUTOF10: 6
PAINLEVEL_OUTOF10: 7
PAINLEVEL_OUTOF10: 5
PAINLEVEL_OUTOF10: 5

## 2024-12-25 ASSESSMENT — PAIN DESCRIPTION - DESCRIPTORS
DESCRIPTORS: STABBING
DESCRIPTORS: STABBING

## 2024-12-25 ASSESSMENT — PAIN DESCRIPTION - PAIN TYPE
TYPE: ACUTE PAIN
TYPE: ACUTE PAIN

## 2024-12-25 ASSESSMENT — HEART SCORE: ECG: NON-SPECIFC REPOLARIZATION DISTURBANCE/LBTB/PM

## 2024-12-25 ASSESSMENT — PAIN DESCRIPTION - FREQUENCY
FREQUENCY: CONTINUOUS
FREQUENCY: CONTINUOUS

## 2024-12-25 ASSESSMENT — PAIN - FUNCTIONAL ASSESSMENT: PAIN_FUNCTIONAL_ASSESSMENT: 0-10

## 2024-12-25 NOTE — ED PROVIDER NOTES
[facility] EMERGENCY DEPARTMENT     Pt Name: Franky Melendez  MRN: [id number]  Birthdate 1975  Date of evaluation: 12/25/2024  Physician: Ken Otero MD      Note to patient: The 21st Century Cures Act requires that medical notes like this one to be available to patients in the interest of transparency. Please be advised, this is a medical document. It is intended as [id number] communication. It is written in medical language and may contain abbreviations and verbiage that may be unfamiliar. It may appear to read blunt or direct. Medical documents are intended to carry relevant medical information, facts as evident and the clinical opinion of the practitioner.     CHIEF COMPLAINT       Chief Complaint   Patient presents with    Chest Pain     CHEST PAIN THAT RADIATES TO BACK STARTED 4HR PTA.    Back Pain     HISTORY OF PRESENT ILLNESS   Franky Melendez is a 49 y.o. male with PMHx of ASHD status post CABG  who presents to the emergency department for evaluation of chest tightness, dyspnea, palpitations and presyncope.  Patient reports this morning he felt very fatigued.  States that approximately an hour ago he was bending forward and developed tunnel vision as the servicing was going black.  He sat down this resolved but he felt he was going to pass out.  States this was accompanied by chest tightness and shortness of breath.  Patient states that he does have a chest pain however he did not have any chest pain during his left heart attack.  Patient states this feels very similar to the last time he was admitted and required a CABG.  He denies any nausea, vomiting or diaphoresis.  He denies any leg swelling.  Patient took a baby aspirin this morning      Dr. Orozco is cardiologist.  Per chart review patient had a heart cath performed 4/25/2024 with nonobstructive CAD with a patent VG to RCA and EF 45 to 50%.  He had a negative stress test on 4/2/2024.  Patient had a echo performed on 10/8/2024 without any acute

## 2024-12-26 PROBLEM — R07.9 CHEST PAIN: Status: ACTIVE | Noted: 2024-12-26

## 2024-12-26 LAB
ALBUMIN SERPL-MCNC: 4.2 G/DL (ref 3.5–5.2)
ALBUMIN/GLOB SERPL: 1.8 {RATIO} (ref 1–2.5)
ALP SERPL-CCNC: 60 U/L (ref 40–129)
ALT SERPL-CCNC: 26 U/L (ref 10–50)
ANION GAP SERPL CALCULATED.3IONS-SCNC: 10 MMOL/L (ref 9–16)
AST SERPL-CCNC: 22 U/L (ref 10–50)
BASOPHILS # BLD: <0.03 K/UL (ref 0–0.2)
BASOPHILS NFR BLD: 0 % (ref 0–2)
BILIRUB SERPL-MCNC: 0.3 MG/DL (ref 0–1.2)
BUN SERPL-MCNC: 14 MG/DL (ref 6–20)
BUN/CREAT SERPL: 16 (ref 9–20)
CALCIUM SERPL-MCNC: 9.1 MG/DL (ref 8.6–10.4)
CHLORIDE SERPL-SCNC: 108 MMOL/L (ref 98–107)
CO2 SERPL-SCNC: 24 MMOL/L (ref 20–31)
CREAT SERPL-MCNC: 0.9 MG/DL (ref 0.7–1.2)
ECHO BSA: 1.85 M2
EKG ATRIAL RATE: 79 BPM
EKG ATRIAL RATE: 93 BPM
EKG ATRIAL RATE: 94 BPM
EKG P AXIS: 27 DEGREES
EKG P AXIS: 39 DEGREES
EKG P AXIS: 45 DEGREES
EKG P-R INTERVAL: 130 MS
EKG P-R INTERVAL: 136 MS
EKG P-R INTERVAL: 156 MS
EKG Q-T INTERVAL: 356 MS
EKG Q-T INTERVAL: 366 MS
EKG Q-T INTERVAL: 420 MS
EKG QRS DURATION: 102 MS
EKG QRS DURATION: 96 MS
EKG QRS DURATION: 98 MS
EKG QTC CALCULATION (BAZETT): 442 MS
EKG QTC CALCULATION (BAZETT): 457 MS
EKG QTC CALCULATION (BAZETT): 481 MS
EKG R AXIS: 64 DEGREES
EKG R AXIS: 72 DEGREES
EKG R AXIS: 75 DEGREES
EKG T AXIS: -12 DEGREES
EKG T AXIS: -26 DEGREES
EKG T AXIS: 77 DEGREES
EKG VENTRICULAR RATE: 79 BPM
EKG VENTRICULAR RATE: 93 BPM
EKG VENTRICULAR RATE: 94 BPM
EOSINOPHIL # BLD: 0.17 K/UL (ref 0–0.44)
EOSINOPHILS RELATIVE PERCENT: 3 % (ref 1–4)
ERYTHROCYTE [DISTWIDTH] IN BLOOD BY AUTOMATED COUNT: 13 % (ref 11.8–14.4)
GFR, ESTIMATED: >90 ML/MIN/1.73M2
GLUCOSE BLD-MCNC: 136 MG/DL (ref 74–100)
GLUCOSE BLD-MCNC: 79 MG/DL (ref 74–100)
GLUCOSE SERPL-MCNC: 78 MG/DL (ref 74–99)
HCT VFR BLD AUTO: 35.9 % (ref 40.7–50.3)
HGB BLD-MCNC: 12.2 G/DL (ref 13–17)
IMM GRANULOCYTES # BLD AUTO: <0.03 K/UL (ref 0–0.3)
IMM GRANULOCYTES NFR BLD: 0 %
LYMPHOCYTES NFR BLD: 2.59 K/UL (ref 1.1–3.7)
LYMPHOCYTES RELATIVE PERCENT: 45 % (ref 24–43)
MCH RBC QN AUTO: 29.8 PG (ref 25.2–33.5)
MCHC RBC AUTO-ENTMCNC: 34 G/DL (ref 28.4–34.8)
MCV RBC AUTO: 87.8 FL (ref 82.6–102.9)
MONOCYTES NFR BLD: 0.71 K/UL (ref 0.1–1.2)
MONOCYTES NFR BLD: 12 % (ref 3–12)
NEUTROPHILS NFR BLD: 39 % (ref 36–65)
NEUTS SEG NFR BLD: 2.23 K/UL (ref 1.5–8.1)
NRBC BLD-RTO: 0 PER 100 WBC
PLATELET # BLD AUTO: 325 K/UL (ref 138–453)
PMV BLD AUTO: 9.9 FL (ref 8.1–13.5)
POTASSIUM SERPL-SCNC: 3.7 MMOL/L (ref 3.7–5.3)
PROT SERPL-MCNC: 6.5 G/DL (ref 6.6–8.7)
RBC # BLD AUTO: 4.09 M/UL (ref 4.21–5.77)
SODIUM SERPL-SCNC: 142 MMOL/L (ref 136–145)
TROPONIN I SERPL HS-MCNC: 10 NG/L (ref 0–22)
WBC OTHER # BLD: 5.7 K/UL (ref 3.5–11.3)

## 2024-12-26 PROCEDURE — 7100000010 HC PHASE II RECOVERY - FIRST 15 MIN: Performed by: FAMILY MEDICINE

## 2024-12-26 PROCEDURE — 6370000000 HC RX 637 (ALT 250 FOR IP): Performed by: INTERNAL MEDICINE

## 2024-12-26 PROCEDURE — 85025 COMPLETE CBC W/AUTO DIFF WBC: CPT

## 2024-12-26 PROCEDURE — 94761 N-INVAS EAR/PLS OXIMETRY MLT: CPT

## 2024-12-26 PROCEDURE — 84484 ASSAY OF TROPONIN QUANT: CPT

## 2024-12-26 PROCEDURE — 93459 L HRT ART/GRFT ANGIO: CPT | Performed by: FAMILY MEDICINE

## 2024-12-26 PROCEDURE — 7100000011 HC PHASE II RECOVERY - ADDTL 15 MIN: Performed by: FAMILY MEDICINE

## 2024-12-26 PROCEDURE — 2580000003 HC RX 258: Performed by: FAMILY MEDICINE

## 2024-12-26 PROCEDURE — 36415 COLL VENOUS BLD VENIPUNCTURE: CPT

## 2024-12-26 PROCEDURE — C1894 INTRO/SHEATH, NON-LASER: HCPCS | Performed by: FAMILY MEDICINE

## 2024-12-26 PROCEDURE — 6360000002 HC RX W HCPCS: Performed by: FAMILY MEDICINE

## 2024-12-26 PROCEDURE — 82947 ASSAY GLUCOSE BLOOD QUANT: CPT

## 2024-12-26 PROCEDURE — 93010 ELECTROCARDIOGRAM REPORT: CPT | Performed by: INTERNAL MEDICINE

## 2024-12-26 PROCEDURE — 80053 COMPREHEN METABOLIC PANEL: CPT

## 2024-12-26 PROCEDURE — 93005 ELECTROCARDIOGRAM TRACING: CPT | Performed by: INTERNAL MEDICINE

## 2024-12-26 PROCEDURE — C1769 GUIDE WIRE: HCPCS | Performed by: FAMILY MEDICINE

## 2024-12-26 PROCEDURE — 93005 ELECTROCARDIOGRAM TRACING: CPT

## 2024-12-26 PROCEDURE — 2709999900 HC NON-CHARGEABLE SUPPLY: Performed by: FAMILY MEDICINE

## 2024-12-26 PROCEDURE — 6360000004 HC RX CONTRAST MEDICATION: Performed by: FAMILY MEDICINE

## 2024-12-26 PROCEDURE — 99153 MOD SED SAME PHYS/QHP EA: CPT | Performed by: FAMILY MEDICINE

## 2024-12-26 PROCEDURE — G0378 HOSPITAL OBSERVATION PER HR: HCPCS

## 2024-12-26 PROCEDURE — 99152 MOD SED SAME PHYS/QHP 5/>YRS: CPT | Performed by: FAMILY MEDICINE

## 2024-12-26 PROCEDURE — 2500000003 HC RX 250 WO HCPCS: Performed by: INTERNAL MEDICINE

## 2024-12-26 RX ORDER — SODIUM CHLORIDE 9 MG/ML
INJECTION, SOLUTION INTRAVENOUS CONTINUOUS
Status: DISCONTINUED | OUTPATIENT
Start: 2024-12-26 | End: 2024-12-27 | Stop reason: HOSPADM

## 2024-12-26 RX ORDER — SODIUM CHLORIDE 9 MG/ML
INJECTION, SOLUTION INTRAVENOUS CONTINUOUS PRN
Status: COMPLETED | OUTPATIENT
Start: 2024-12-26 | End: 2024-12-26

## 2024-12-26 RX ORDER — GLUCAGON 1 MG/ML
1 KIT INJECTION PRN
Status: DISCONTINUED | OUTPATIENT
Start: 2024-12-26 | End: 2024-12-27 | Stop reason: HOSPADM

## 2024-12-26 RX ORDER — IOPAMIDOL 755 MG/ML
INJECTION, SOLUTION INTRAVASCULAR PRN
Status: DISCONTINUED | OUTPATIENT
Start: 2024-12-26 | End: 2024-12-26 | Stop reason: HOSPADM

## 2024-12-26 RX ORDER — MIDAZOLAM HYDROCHLORIDE 1 MG/ML
INJECTION, SOLUTION INTRAMUSCULAR; INTRAVENOUS PRN
Status: DISCONTINUED | OUTPATIENT
Start: 2024-12-26 | End: 2024-12-26 | Stop reason: HOSPADM

## 2024-12-26 RX ORDER — NITROGLYCERIN 0.4 MG/1
0.4 TABLET SUBLINGUAL EVERY 5 MIN PRN
Status: DISCONTINUED | OUTPATIENT
Start: 2024-12-26 | End: 2024-12-27 | Stop reason: SDUPTHER

## 2024-12-26 RX ORDER — DEXTROSE MONOHYDRATE 100 MG/ML
INJECTION, SOLUTION INTRAVENOUS CONTINUOUS PRN
Status: DISCONTINUED | OUTPATIENT
Start: 2024-12-26 | End: 2024-12-27 | Stop reason: HOSPADM

## 2024-12-26 RX ORDER — LIDOCAINE HYDROCHLORIDE 10 MG/ML
INJECTION, SOLUTION INFILTRATION; PERINEURAL PRN
Status: DISCONTINUED | OUTPATIENT
Start: 2024-12-26 | End: 2024-12-26 | Stop reason: HOSPADM

## 2024-12-26 RX ORDER — SODIUM CHLORIDE 0.9 % (FLUSH) 0.9 %
5-40 SYRINGE (ML) INJECTION PRN
Status: DISCONTINUED | OUTPATIENT
Start: 2024-12-26 | End: 2024-12-27 | Stop reason: HOSPADM

## 2024-12-26 RX ORDER — ACETAMINOPHEN 325 MG/1
650 TABLET ORAL EVERY 4 HOURS PRN
Status: DISCONTINUED | OUTPATIENT
Start: 2024-12-26 | End: 2024-12-27 | Stop reason: SDUPTHER

## 2024-12-26 RX ORDER — SODIUM CHLORIDE 0.9 % (FLUSH) 0.9 %
5-40 SYRINGE (ML) INJECTION EVERY 12 HOURS SCHEDULED
Status: DISCONTINUED | OUTPATIENT
Start: 2024-12-26 | End: 2024-12-27 | Stop reason: HOSPADM

## 2024-12-26 RX ORDER — SODIUM CHLORIDE 9 MG/ML
INJECTION, SOLUTION INTRAVENOUS PRN
Status: DISCONTINUED | OUTPATIENT
Start: 2024-12-26 | End: 2024-12-27 | Stop reason: HOSPADM

## 2024-12-26 RX ORDER — DIPHENHYDRAMINE HCL 25 MG
50 CAPSULE ORAL ONCE
Status: DISCONTINUED | OUTPATIENT
Start: 2024-12-26 | End: 2024-12-27 | Stop reason: HOSPADM

## 2024-12-26 RX ORDER — NITROGLYCERIN 20 MG/100ML
INJECTION INTRAVENOUS PRN
Status: DISCONTINUED | OUTPATIENT
Start: 2024-12-26 | End: 2024-12-26 | Stop reason: HOSPADM

## 2024-12-26 RX ADMIN — FENTANYL CITRATE 25 MCG: 50 INJECTION INTRAMUSCULAR; INTRAVENOUS at 14:30

## 2024-12-26 RX ADMIN — RANOLAZINE 500 MG: 500 TABLET, FILM COATED, EXTENDED RELEASE ORAL at 11:26

## 2024-12-26 RX ADMIN — HYDROCODONE BITARTRATE AND ACETAMINOPHEN 2 TABLET: 5; 325 TABLET ORAL at 12:38

## 2024-12-26 RX ADMIN — METFORMIN HYDROCHLORIDE 1000 MG: 500 TABLET ORAL at 17:12

## 2024-12-26 RX ADMIN — NITROGLYCERIN 0.4 MG: 0.4 TABLET SUBLINGUAL at 14:32

## 2024-12-26 RX ADMIN — AMLODIPINE BESYLATE 5 MG: 5 TABLET ORAL at 11:25

## 2024-12-26 RX ADMIN — SODIUM CHLORIDE: 9 INJECTION, SOLUTION INTRAVENOUS at 14:30

## 2024-12-26 RX ADMIN — NITROGLYCERIN 0.4 MG: 0.4 TABLET SUBLINGUAL at 12:26

## 2024-12-26 RX ADMIN — CLOPIDOGREL BISULFATE 75 MG: 75 TABLET ORAL at 20:40

## 2024-12-26 RX ADMIN — ISOSORBIDE MONONITRATE 30 MG: 30 TABLET, EXTENDED RELEASE ORAL at 11:25

## 2024-12-26 RX ADMIN — HYDROCODONE BITARTRATE AND ACETAMINOPHEN 2 TABLET: 5; 325 TABLET ORAL at 07:10

## 2024-12-26 RX ADMIN — PANTOPRAZOLE SODIUM 40 MG: 40 TABLET, DELAYED RELEASE ORAL at 11:25

## 2024-12-26 RX ADMIN — FENOFIBRATE 160 MG: 160 TABLET ORAL at 20:40

## 2024-12-26 RX ADMIN — ASPIRIN 325 MG: 325 TABLET ORAL at 17:12

## 2024-12-26 RX ADMIN — PANTOPRAZOLE SODIUM 40 MG: 40 TABLET, DELAYED RELEASE ORAL at 17:12

## 2024-12-26 RX ADMIN — RANOLAZINE 500 MG: 500 TABLET, FILM COATED, EXTENDED RELEASE ORAL at 20:31

## 2024-12-26 RX ADMIN — PREGABALIN 75 MG: 75 CAPSULE ORAL at 02:53

## 2024-12-26 RX ADMIN — HYDROCODONE BITARTRATE AND ACETAMINOPHEN 2 TABLET: 5; 325 TABLET ORAL at 20:31

## 2024-12-26 RX ADMIN — SUCRALFATE 1 G: 1 TABLET ORAL at 17:12

## 2024-12-26 RX ADMIN — ATORVASTATIN CALCIUM 80 MG: 40 TABLET, FILM COATED ORAL at 20:31

## 2024-12-26 RX ADMIN — INSULIN GLARGINE 12 UNITS: 100 INJECTION, SOLUTION SUBCUTANEOUS at 20:36

## 2024-12-26 RX ADMIN — SODIUM CHLORIDE, PRESERVATIVE FREE 10 ML: 5 INJECTION INTRAVENOUS at 20:40

## 2024-12-26 ASSESSMENT — PAIN DESCRIPTION - ORIENTATION
ORIENTATION: LEFT
ORIENTATION: RIGHT;LEFT
ORIENTATION: LEFT
ORIENTATION: ANTERIOR
ORIENTATION: LEFT
ORIENTATION: LEFT

## 2024-12-26 ASSESSMENT — PAIN DESCRIPTION - DESCRIPTORS
DESCRIPTORS: STABBING
DESCRIPTORS: PRESSURE
DESCRIPTORS: ACHING;TIGHTNESS
DESCRIPTORS: PRESSURE
DESCRIPTORS: TIGHTNESS;PRESSURE
DESCRIPTORS: PRESSURE
DESCRIPTORS: DISCOMFORT;ACHING
DESCRIPTORS: POUNDING;PRESSURE;SQUEEZING

## 2024-12-26 ASSESSMENT — PAIN DESCRIPTION - LOCATION
LOCATION: CHEST
LOCATION: NECK;HIP
LOCATION: CHEST

## 2024-12-26 ASSESSMENT — PAIN SCALES - GENERAL
PAINLEVEL_OUTOF10: 9
PAINLEVEL_OUTOF10: 4
PAINLEVEL_OUTOF10: 6
PAINLEVEL_OUTOF10: 4
PAINLEVEL_OUTOF10: 7
PAINLEVEL_OUTOF10: 6
PAINLEVEL_OUTOF10: 6
PAINLEVEL_OUTOF10: 8
PAINLEVEL_OUTOF10: 4
PAINLEVEL_OUTOF10: 3
PAINLEVEL_OUTOF10: 10
PAINLEVEL_OUTOF10: 8
PAINLEVEL_OUTOF10: 6

## 2024-12-26 ASSESSMENT — PAIN DESCRIPTION - PAIN TYPE
TYPE: ACUTE PAIN

## 2024-12-26 ASSESSMENT — PAIN DESCRIPTION - FREQUENCY
FREQUENCY: CONTINUOUS

## 2024-12-26 ASSESSMENT — PAIN - FUNCTIONAL ASSESSMENT: PAIN_FUNCTIONAL_ASSESSMENT: ACTIVITIES ARE NOT PREVENTED

## 2024-12-26 NOTE — RT PROTOCOL NOTE
RESPIRATORY ASSESSMENT PROTOCOL                                                                                              Patient Name: Franky Melendez Room#: 0301/0301-01 : 1975     Admitting diagnosis: Chest pain in adult [R07.9]  Chest pain, unspecified type [R07.9]       Medical History:   Past Medical History:   Diagnosis Date    CAD (coronary artery disease)     Carpal tunnel syndrome     Depressive disorder, not elsewhere classified     Diabetes mellitus (HCC)     Gastrointestinal hemorrhage with hematemesis 2023    GERD (gastroesophageal reflux disease) 2022    Heart attack (HCC) 2018    STEMI    History of echocardiogram 2019    EF 40-45%. LV cavity sixe is mildly increased. Inferior and inferoseptal wall hypokenesis noted. Mild diastolic dysfunction.    History of pancreatitis 2023    Hyperlipidemia     Hypertension     PTSD (post-traumatic stress disorder)     uexpected open heart surgery in 2018       PATIENT ASSESSMENT    LABORATORY DATA  Hematology:   Lab Results   Component Value Date/Time    WBC 8.6 2024 03:53 PM    RBC 4.56 2024 03:53 PM    RBC 4.74 2012 08:14 AM    HGB 13.9 2024 03:53 PM    HCT 39.5 2024 03:53 PM     2024 03:53 PM     2012 08:14 AM     Chemistry:  No results found for: \"PHART\", \"EGG6OWE\", \"PO2ART\", \"M8ZCFWSS\", \"KVG9HYD\", \"PBEA\", \"NBEA\"    VITALS  Pulse: 87   Respirations: 18  BP: 118/76  SpO2: 95 % O2 Device: None (Room air)  Temp: 97.6 °F (36.4 °C)    SKIN COLOR  [x] Normal  [] Pale  [] Dusky  [] Cyanotic    RESPIRATORY PATTERN  [x] Normal  [] Dyspnea  [] Cheyne-Bowden  [] Kussmaul  [] Biots    AMBULATORY  [x] Yes  [] No  [] With Assistance    PEAK FLOW  Predicted:     Personal Best:            Patient Acuity 0 1 2 3 4 Score   Level of Consciousness (LOC) [x]  Alert & Oriented or Pt normal LOC []  Confused;follows directions []  Confused & uncooper-ative []  Obtunded []  Comatose 0

## 2024-12-26 NOTE — DISCHARGE INSTRUCTIONS
to flush the x-ray dye from your system.   Return to your normal diet.   No alcoholic beverages for 24 hours after the procedure.  Physical Activity   The sedative will make you sleepy. Rest until the effects have worn off.   Nausea and vomiting from the sedative is normal and usually does not last long.  Ask your doctor when you will be able to return to work.   Do not drive, operate machinery, do anything that requires attention to detail, or sign important papers for at least 24 hours or until your doctor says it is safe.   Avoid heavy lifting, physically demanding activities, and sexual activity for 2-3 days. Lift nothing over 10 pounds (a gallon of milk is 8 pounds).  Do not sit for long periods of time. Try to change positions frequently.   Medications   Resume taking your normal medicines as advised.   Use acetaminophen (Tylenol) for pain relief.  (Avoid anti-inflammatory drugs such as- ibuprofen (Advil, Motrin), naproxen sodium (Aleve), Excedrin for a few days)  If you had to stop taking these medications before the procedure, ask your doctor when you can resume taking them:   Anti-inflammatory drugs   Blood thinners, such as warfarin (Coumadin)   If you are taking medicines, follow these general guidelines:   Take your medicine as directed. Do not change the amount or the schedule.   Do not stop taking them without talking to your doctor.   Do not share them.   Know the side effects and report any to your doctor.   Some drugs can be dangerous when mixed. Talk to a doctor or pharmacist if you are taking more than one drug. This includes over-the-counter medicine and herb or dietary supplements.   Plan ahead for refills so you don't run out.   Follow-up   The test results are available right after the procedure. At that point, the doctor will discuss the findings and suggest appropriate treatment options. In some cases, the results can indicate an immediate need for surgery. Schedule a follow-up appointment

## 2024-12-26 NOTE — CARE COORDINATION
Case Management Assessment  Initial Evaluation    Date/Time of Evaluation: 12/26/2024 10:24 AM  Assessment Completed by: Michael Chinchilla RN    If patient is discharged prior to next notation, then this note serves as note for discharge by case management.    Patient Name: Franky Melendez                   YOB: 1975  Diagnosis: Chest pain in adult [R07.9]  Chest pain, unspecified type [R07.9]                   Date / Time: 12/25/2024  3:45 PM    Patient Admission Status: Observation   Readmission Risk (Low < 19, Mod (19-27), High > 27): Readmission Risk Score: 6.7    Current PCP: Curtis Chavarria MD  PCP verified by CM? (P) Yes    Chart Reviewed: Yes      History Provided by: (P) Patient  Patient Orientation: (P) Alert and Oriented, Person, Place, Situation    Patient Cognition: (P) Alert    Hospitalization in the last 30 days (Readmission):  No    If yes, Readmission Assessment in  Navigator will be completed.    Advance Directives:      Code Status: Full Code   Patient's Primary Decision Maker is: (P) Legal Next of Kin    Primary Decision Maker: Bailee Melendez - Spouse - [phone]    Discharge Planning:    Patient lives with: (P) Spouse/Significant Other, Children Type of Home: (P) House  Primary Care Giver: (P) Self  Patient Support Systems include: (P) Spouse/Significant Other, Children, Family Members, Friends/Neighbors   Current Financial resources: (P) Other (Comment) (commercial insurance.)  Current community resources: (P) None  Current services prior to admission: (P) None            Current DME:              Type of Home Care services:  (P) None    ADLS  Prior functional level: (P) Independent in ADLs/IADLs  Current functional level: (P) Independent in ADLs/IADLs    PT AM-PAC:   /24  OT AM-PAC:   /24    Family can provide assistance at DC: (P) Yes  Would you like Case Management to discuss the discharge plan with any other family members/significant others, and if so, who? (P) No  Plans

## 2024-12-26 NOTE — PLAN OF CARE
Problem: Chronic Conditions and Co-morbidities  Goal: Patient's chronic conditions and co-morbidity symptoms are monitored and maintained or improved  12/26/2024 0717 by Kayley Burrows RN  Outcome: Progressing     Problem: Discharge Planning  Goal: Discharge to home or other facility with appropriate resources  12/26/2024 0717 by Kayley Burrows RN  Outcome: Progressing     Problem: Pain  Goal: Verbalizes/displays adequate comfort level or baseline comfort level  12/26/2024 0717 by Kayley Burrows RN  Outcome: Progressing

## 2024-12-26 NOTE — PLAN OF CARE
Problem: Chronic Conditions and Co-morbidities  Goal: Patient's chronic conditions and co-morbidity symptoms are monitored and maintained or improved  Outcome: Progressing  Flowsheets (Taken 12/25/2024 1945)  Care Plan - Patient's Chronic Conditions and Co-Morbidity Symptoms are Monitored and Maintained or Improved: Monitor and assess patient's chronic conditions and comorbid symptoms for stability, deterioration, or improvement     Problem: Discharge Planning  Goal: Discharge to home or other facility with appropriate resources  Outcome: Progressing  Flowsheets (Taken 12/25/2024 1945)  Discharge to home or other facility with appropriate resources: Identify barriers to discharge with patient and caregiver     Problem: Pain  Goal: Verbalizes/displays adequate comfort level or baseline comfort level  Outcome: Progressing  Flowsheets (Taken 12/25/2024 1945)  Verbalizes/displays adequate comfort level or baseline comfort level: Encourage patient to monitor pain and request assistance

## 2024-12-26 NOTE — CONSULTS
Patient: Franky Melendez  : 1975  Date of Visit: 2024    REASON FOR VISIT / CONSULTATION: Chest Pain (CHEST PAIN THAT RADIATES TO BACK STARTED 4HR PTA.) and Back Pain    History of Present Illness:        I had the pleasure of seeing Mr. Melendez today who is a 49 y.o. male who presents for a follow up following a hospital visit on 2019 with chest pain during activity.     He had a stress test on 2018 (inferior wall defect with jazmine-infarct ischemia, ejection fraction 47% with inferior wall hypokinesis).     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  He was taken for an emergent CABG with SVG to RCA.    Echo on 2019: EF 40-45%. LV cavity sixe is mildly increased. Inferior and inferoseptal wall hypokenesis noted. Mild diastolic dysfunction.    Cardiac Catheterization on 2019: Severe single vessel disease involving the LAD, circumflex and right coronary arteries. 1 of 1 bypass grafts patent. Normal LVEDP.    Echo on 2019: EF 40-45%. LV cavity sixe is mildly increased. Inferior and inferoseptal wall hypokenesis noted. Mild diastolic dysfunction    Stress test done on 2021 showed abnormal myocardial perfusion study. There is a moderate perfusion defect of moderate/severe intensity in the inferolateral, inferior and inferoseptal regions during stress and rest imaging, which is most consistent with an old myocardial infarction with jazmine-infarct ischemia. EF was 43%    Echo done on 2021 showed the global left ventricular systolic function appears mildly reduced with an estimated ejection fraction of 40-45%. The inferoseptal and inferior walls appear hypokinetic in their motion. The left ventricular cavity size is within normal limits and the left ventricular wall thickness is mildly increased.     Holter done on 2021

## 2024-12-26 NOTE — H&P
History and Physical    Patient:  Franky Melendez  MRN: [id number]    Chief Complaint: Chest pain    History Obtained From:  patient, electronic medical record    PCP: Curtis Chavarria MD    History of Present Illness:   The patient is a 49 y.o. male who presents with chest pain, shortness of breath and fatigue.  Patient states he has felt fatigued for the last couple days.  He has been more short of breath with exertion since yesterday.  States today he bent over to put a partner in the oven and became dizzy.  He states his vision went black for a few seconds.  His chest felt tight.  He states this felt somewhat similar to when he had his previous heart attack.  Patient is a past medical history includes a MI with CABG, type 2 diabetes, hypertension, hyperlipidemia, and depression.  Last echo October showed an EF of 40 to 45%, hypokinesis of the inferior and inferoseptal walls, and grade 1 diastolic dysfunction with normal LAP.  His last heart cath from 8/9/2023 showed a 40% mid LAD lesion, 40% left circumflex lesion and proximal 100% RCA lesion with medical management at that time.  EKG shows sinus rhythm with T wave inversion in the inferior and lateral leads.  Troponin 12 and 14.  Chest x-ray showed no acute process.  Patient currently denies any chest pain.  He states he feels better than he did on arrival.        Past Medical History:        Diagnosis Date    CAD (coronary artery disease)     Carpal tunnel syndrome     Depressive disorder, not elsewhere classified     Diabetes mellitus (HCC)     Gastrointestinal hemorrhage with hematemesis 01/31/2023    GERD (gastroesophageal reflux disease) 03/2022    Heart attack (HCC) 08/11/2018    STEMI    History of echocardiogram 01/08/2019    EF 40-45%. LV cavity sixe is mildly increased. Inferior and inferoseptal wall hypokenesis noted. Mild diastolic dysfunction.    History of pancreatitis 01/31/2023    Hyperlipidemia     Hypertension 2009    PTSD (post-traumatic stress

## 2024-12-27 VITALS
BODY MASS INDEX: 25.12 KG/M2 | DIASTOLIC BLOOD PRESSURE: 76 MMHG | SYSTOLIC BLOOD PRESSURE: 110 MMHG | WEIGHT: 160.05 LBS | TEMPERATURE: 97.3 F | HEIGHT: 67 IN | RESPIRATION RATE: 18 BRPM | HEART RATE: 83 BPM | OXYGEN SATURATION: 99 %

## 2024-12-27 LAB
ALBUMIN SERPL-MCNC: 4.3 G/DL (ref 3.5–5.2)
ALBUMIN/GLOB SERPL: 1.9 {RATIO} (ref 1–2.5)
ALP SERPL-CCNC: 61 U/L (ref 40–129)
ALT SERPL-CCNC: 29 U/L (ref 10–50)
ANION GAP SERPL CALCULATED.3IONS-SCNC: 10 MMOL/L (ref 9–16)
AST SERPL-CCNC: 26 U/L (ref 10–50)
BASOPHILS # BLD: 0.03 K/UL (ref 0–0.2)
BASOPHILS NFR BLD: 0 % (ref 0–2)
BILIRUB SERPL-MCNC: 0.3 MG/DL (ref 0–1.2)
BUN SERPL-MCNC: 13 MG/DL (ref 6–20)
BUN/CREAT SERPL: 14 (ref 9–20)
CALCIUM SERPL-MCNC: 9 MG/DL (ref 8.6–10.4)
CHLORIDE SERPL-SCNC: 105 MMOL/L (ref 98–107)
CO2 SERPL-SCNC: 24 MMOL/L (ref 20–31)
CREAT SERPL-MCNC: 0.9 MG/DL (ref 0.7–1.2)
EOSINOPHIL # BLD: 0.15 K/UL (ref 0–0.44)
EOSINOPHILS RELATIVE PERCENT: 2 % (ref 1–4)
ERYTHROCYTE [DISTWIDTH] IN BLOOD BY AUTOMATED COUNT: 13 % (ref 11.8–14.4)
GFR, ESTIMATED: >90 ML/MIN/1.73M2
GLUCOSE BLD-MCNC: 82 MG/DL (ref 74–100)
GLUCOSE SERPL-MCNC: 81 MG/DL (ref 74–99)
HCT VFR BLD AUTO: 35.2 % (ref 40.7–50.3)
HGB BLD-MCNC: 12 G/DL (ref 13–17)
IMM GRANULOCYTES # BLD AUTO: <0.03 K/UL (ref 0–0.3)
IMM GRANULOCYTES NFR BLD: 0 %
LYMPHOCYTES NFR BLD: 2.25 K/UL (ref 1.1–3.7)
LYMPHOCYTES RELATIVE PERCENT: 32 % (ref 24–43)
MCH RBC QN AUTO: 29.9 PG (ref 25.2–33.5)
MCHC RBC AUTO-ENTMCNC: 34.1 G/DL (ref 28.4–34.8)
MCV RBC AUTO: 87.8 FL (ref 82.6–102.9)
MONOCYTES NFR BLD: 0.55 K/UL (ref 0.1–1.2)
MONOCYTES NFR BLD: 8 % (ref 3–12)
NEUTROPHILS NFR BLD: 58 % (ref 36–65)
NEUTS SEG NFR BLD: 4 K/UL (ref 1.5–8.1)
NRBC BLD-RTO: 0 PER 100 WBC
PLATELET # BLD AUTO: 318 K/UL (ref 138–453)
PMV BLD AUTO: 9.7 FL (ref 8.1–13.5)
POTASSIUM SERPL-SCNC: 3.7 MMOL/L (ref 3.7–5.3)
PROT SERPL-MCNC: 6.6 G/DL (ref 6.6–8.7)
RBC # BLD AUTO: 4.01 M/UL (ref 4.21–5.77)
SODIUM SERPL-SCNC: 139 MMOL/L (ref 136–145)
WBC OTHER # BLD: 7 K/UL (ref 3.5–11.3)

## 2024-12-27 PROCEDURE — 82947 ASSAY GLUCOSE BLOOD QUANT: CPT

## 2024-12-27 PROCEDURE — 96372 THER/PROPH/DIAG INJ SC/IM: CPT

## 2024-12-27 PROCEDURE — 80053 COMPREHEN METABOLIC PANEL: CPT

## 2024-12-27 PROCEDURE — 85025 COMPLETE CBC W/AUTO DIFF WBC: CPT

## 2024-12-27 PROCEDURE — 36415 COLL VENOUS BLD VENIPUNCTURE: CPT

## 2024-12-27 PROCEDURE — 94761 N-INVAS EAR/PLS OXIMETRY MLT: CPT

## 2024-12-27 PROCEDURE — G0378 HOSPITAL OBSERVATION PER HR: HCPCS

## 2024-12-27 PROCEDURE — 6370000000 HC RX 637 (ALT 250 FOR IP): Performed by: INTERNAL MEDICINE

## 2024-12-27 PROCEDURE — 6360000002 HC RX W HCPCS: Performed by: INTERNAL MEDICINE

## 2024-12-27 RX ORDER — HYDROCODONE BITARTRATE AND ACETAMINOPHEN 5; 325 MG/1; MG/1
2 TABLET ORAL EVERY 6 HOURS PRN
Qty: 28 TABLET | Refills: 0
Start: 2024-12-27 | End: 2025-01-03

## 2024-12-27 RX ORDER — INSULIN GLARGINE-YFGN 100 [IU]/ML
12 INJECTION, SOLUTION SUBCUTANEOUS NIGHTLY
Qty: 3 ML | Refills: 0
Start: 2024-12-27

## 2024-12-27 RX ORDER — AMLODIPINE BESYLATE 5 MG/1
5 TABLET ORAL NIGHTLY
Qty: 30 TABLET | Refills: 3 | Status: SHIPPED | OUTPATIENT
Start: 2024-12-27

## 2024-12-27 RX ORDER — AMLODIPINE BESYLATE 5 MG/1
5 TABLET ORAL NIGHTLY
Status: DISCONTINUED | OUTPATIENT
Start: 2024-12-27 | End: 2024-12-27 | Stop reason: HOSPADM

## 2024-12-27 RX ADMIN — HYDROCODONE BITARTRATE AND ACETAMINOPHEN 2 TABLET: 5; 325 TABLET ORAL at 02:43

## 2024-12-27 RX ADMIN — ENOXAPARIN SODIUM 40 MG: 100 INJECTION SUBCUTANEOUS at 09:33

## 2024-12-27 RX ADMIN — SUCRALFATE 1 G: 1 TABLET ORAL at 07:14

## 2024-12-27 RX ADMIN — FENOFIBRATE 160 MG: 160 TABLET ORAL at 09:34

## 2024-12-27 RX ADMIN — ASPIRIN 325 MG: 325 TABLET ORAL at 09:33

## 2024-12-27 RX ADMIN — ENOXAPARIN SODIUM 40 MG: 100 INJECTION SUBCUTANEOUS at 09:46

## 2024-12-27 RX ADMIN — LISINOPRIL 20 MG: 20 TABLET ORAL at 09:34

## 2024-12-27 RX ADMIN — METOPROLOL SUCCINATE 50 MG: 50 TABLET, EXTENDED RELEASE ORAL at 09:34

## 2024-12-27 RX ADMIN — PANTOPRAZOLE SODIUM 40 MG: 40 TABLET, DELAYED RELEASE ORAL at 07:14

## 2024-12-27 RX ADMIN — CLOPIDOGREL BISULFATE 75 MG: 75 TABLET ORAL at 09:34

## 2024-12-27 RX ADMIN — METFORMIN HYDROCHLORIDE 1000 MG: 500 TABLET ORAL at 07:15

## 2024-12-27 RX ADMIN — ISOSORBIDE MONONITRATE 30 MG: 30 TABLET, EXTENDED RELEASE ORAL at 09:33

## 2024-12-27 RX ADMIN — RANOLAZINE 500 MG: 500 TABLET, FILM COATED, EXTENDED RELEASE ORAL at 09:33

## 2024-12-27 RX ADMIN — HYDROCODONE BITARTRATE AND ACETAMINOPHEN 2 TABLET: 5; 325 TABLET ORAL at 09:31

## 2024-12-27 ASSESSMENT — PAIN - FUNCTIONAL ASSESSMENT
PAIN_FUNCTIONAL_ASSESSMENT: ACTIVITIES ARE NOT PREVENTED

## 2024-12-27 ASSESSMENT — PAIN DESCRIPTION - LOCATION
LOCATION: CHEST
LOCATION: BACK;CHEST
LOCATION: SHOULDER

## 2024-12-27 ASSESSMENT — PAIN SCALES - GENERAL
PAINLEVEL_OUTOF10: 6
PAINLEVEL_OUTOF10: 4
PAINLEVEL_OUTOF10: 7
PAINLEVEL_OUTOF10: 2
PAINLEVEL_OUTOF10: 3

## 2024-12-27 ASSESSMENT — PAIN DESCRIPTION - DESCRIPTORS
DESCRIPTORS: SHARP
DESCRIPTORS: ACHING;DISCOMFORT
DESCRIPTORS: ACHING

## 2024-12-27 ASSESSMENT — PAIN DESCRIPTION - ORIENTATION
ORIENTATION: LEFT
ORIENTATION: LEFT
ORIENTATION: LEFT;UPPER

## 2024-12-27 ASSESSMENT — PAIN DESCRIPTION - FREQUENCY: FREQUENCY: CONTINUOUS

## 2024-12-27 ASSESSMENT — PAIN DESCRIPTION - PAIN TYPE: TYPE: ACUTE PAIN

## 2024-12-27 NOTE — PLAN OF CARE
Post Heart Cath-Spoke with nurse. Patient is doing well, dressing is off and dry. Arm is slightly swollen but not concerning at this time. Patient has been discharged.

## 2024-12-27 NOTE — PROGRESS NOTES
Assessment completed with pt in bed. Pt reports continuous chest pressure that radiates to left shoulder and arm and SOB with exertion. Pt educated on the POC and awaiting cardiac consult for further testing orders. Pt in agreement with POC. Call light in reach.   
Called cardiology office to confirm they received the consult and to make sure they were aware that he was NPO for a possible heart cath or stress test today.   
Eport given to Kayley CHRISTOPHER.  Back to room 301.  
Last piece of pressure bandage removed at this time.  
Orthostatic BP and HR completed. Pt began having chest pain and pressure after standing that required nitro administration. Pt becomes tachycardic with activity. .   
Patient's room picked up and organized. Trash and linen changed. Patient denies any further needs at this time. Call light is within reach, care is ongoing.     
Patients assessment and vitals initiated, call light within reach, whiteboard updated  
Patients instructions, medications and appointments reviewed with daughter and patient, all questions answered, Dressing is dry on removed IV site, telemetry discontinued, to exit per ambulatory per patient request,  
Progress Note    SUBJECTIVE:    Patient seen for f/u of Chest pain in adult.  He resting in bed no distress. Some dizziness when getting up .  No other complaints     ROS:   Constitutional: negative  for fevers, and negative for chills.  Respiratory: negative for shortness of breath, negative for cough, and negative for wheezing  Cardiovascular: negative for chest pain, and negative for palpitations  Gastrointestinal: negative for abdominal pain, negative for nausea,negative for vomiting, negative for diarrhea, and negative for constipation     All other systems were reviewed with the patient and are negative unless otherwise stated in HPI      OBJECTIVE:      Vitals:   Vitals:    12/27/24 0645   BP: 110/76   Pulse: 83   Resp: 18   Temp: 97.3 °F (36.3 °C)   SpO2: 99%     Weight - Scale: 72.6 kg (160 lb 0.9 oz)   Height: 170.2 cm (5' 7\")     Weight  Wt Readings from Last 3 Encounters:   12/27/24 72.6 kg (160 lb 0.9 oz)   12/19/24 73.9 kg (163 lb)   11/05/24 74.8 kg (165 lb)     Body mass index is 25.07 kg/m².    24HR INTAKE/OUTPUT:      Intake/Output Summary (Last 24 hours) at 12/27/2024 0743  Last data filed at 12/27/2024 0454  Gross per 24 hour   Intake 880 ml   Output 5 ml   Net 875 ml     -----------------------------------------------------------------  Exam:    GEN:    Awake, alert and oriented x3.   EYES:  EOMI, pupils equal   NECK: Supple. No lymphadenopathy.  No carotid bruit  CVS:    regular rate and rhythm, no audible murmur  PULM:  CTA, no wheezes, rales or rhonchi, no acute respiratory distress  ABD:    Bowels sounds normal.  Abdomen is soft.  No distention.  no tenderness to palpation.   EXT:   no edema bilaterally .  No calf tenderness.   NEURO: Moves all extremities.  Motor and sensory are grossly intact  SKIN:  No rashes.  No skin lesions.    -----------------------------------------------------------------    Diagnostic Data:      Complete Blood Count:   Recent Labs     12/25/24  1553 12/26/24  0535 
Pt arrived to room 301 via ER cart. Ambulated to room, tolerated well. Pt admission vitals and assessment completed at this time, see flowsheet for more details. Pt A&Ox4, remains on RA. Pt c/o left sided neck pain as well as bilateral hip pain at this time. See MAR. Home medications completed at this time, navigator completed as well. All needs met at this time, call light within reach. Care ongoing.  
Pt educated on the POC for today. Medications given per pt home schedule. Pt takes most in the evening. BP has improved some since this am. Will continue to monitor. Heart cath scheduled this afternoon. NPO.   
Pt having chest pain rated 10/10 into left shouder. EKG done. Pt previously medicated with nitro and refused further dosing due to headache. Pt down for heart cath at this time. EKG reading sent with pt for Dr. Tidwell to review.   
Reinforced bilateral pressure dressings to wrists at this time.   
Writer at bedside to complete evening assessment. Upon entry to room, pt awake and in bed, respirations normal and unlabored while on room air. Vitals obtained and assessment completed, see flow sheet for details. Pt denies needs from writer at this time. Call light in reach. Care is ongoing.     
(H) 09/27/2024       XR CHEST PORTABLE    Result Date: 12/25/2024  EXAMINATION: ONE XRAY VIEW OF THE CHEST 12/25/2024 4:07 pm COMPARISON: 09/27/2024 HISTORY: ORDERING SYSTEM PROVIDED HISTORY: CHEST PAIN TECHNOLOGIST PROVIDED HISTORY: CHEST PAIN FINDINGS: Status post median sternotomy. The lungs are without acute focal process.  There is no effusion or pneumothorax. The cardiomediastinal silhouette is stable. The osseous structures are stable.     No acute process.       ASSESSMENT:    Principal Problem:    Chest pain in adult  Active Problems:    Primary hypertension    Mild congestive heart failure (HCC)    Dyslipidemia    Ischemic cardiomyopathy    ASHD (arteriosclerotic heart disease) /  CABG 2018    Type 2 diabetes mellitus (HCC)  Resolved Problems:    * No resolved hospital problems. *      Patient Active Problem List    Diagnosis Date Noted    Abnormal cardiovascular stress test 01/27/2022    Gastritis without bleeding 02/01/2023    Hemoptysis 01/31/2023    Nausea and vomiting 01/31/2023    Gastrointestinal hemorrhage with hematemesis 01/31/2023    History of pancreatitis 01/31/2023    Chest wall pain 01/10/2023    Lung nodule 01/10/2023    Chronic dyspnea 01/10/2023    Gastroesophageal reflux disease 01/10/2023    Allergic rhinitis due to pollen 01/10/2023    Unstable angina (HCC) 12/28/2022    Diabetes mellitus (HCC) 11/18/2022    Generalized anxiety disorder 11/18/2022    Primary hypertension 11/18/2022    Hyperlipidemia 11/18/2022    Intractable abdominal pain 10/05/2022    Pancreatitis, unspecified pancreatitis type 09/12/2022    Nausea 09/12/2022    Epigastric pain 09/12/2022    Chest pain in adult 12/25/2024    Pain of right hip [M25.551] 12/19/2024    Chronic right hip pain 12/02/2024    S/P total left hip arthroplasty 10/31/2024    Type 2 diabetes mellitus (HCC) 10/31/2024    S/P cholecystectomy 06/06/2024    Calculus of gallbladder without cholecystitis without obstruction 05/30/2024    RUQ abdominal

## 2024-12-27 NOTE — DISCHARGE INSTR - DIET

## 2024-12-27 NOTE — DISCHARGE SUMMARY
NA    Total time spent on discharge services: 40 minutes    Including the following activities:  Evaluation and Management of patient  Discussion with patient and/or surrogate about current care plan  Coordination with Case Management and/or   Coordination of care with Consultants (if applicable)   Coordination of care with Receiving Facility Physician (if applicable)  Completion of DME forms (if applicable)  Preparation of Discharge Summary  Preparation of Medication Reconciliation  Preparation of Discharge Prescriptions    Signed:  KRUNAL Alex - CNP, KRUNAL, NP-C  12/27/2024, 9:02 AM

## 2024-12-27 NOTE — PLAN OF CARE
Problem: Chronic Conditions and Co-morbidities  Goal: Patient's chronic conditions and co-morbidity symptoms are monitored and maintained or improved  12/27/2024 1039 by Ryanne Aponte RN  Outcome: Completed     Problem: Discharge Planning  Goal: Discharge to home or other facility with appropriate resources  12/27/2024 1039 by Ryanne Aponte, RN  Outcome: Completed     Problem: Pain  Goal: Verbalizes/displays adequate comfort level or baseline comfort level  12/27/2024 1039 by Ryanne Aponte RN  Outcome: Completed     Problem: Safety - Adult  Goal: Free from fall injury  Outcome: Completed

## 2024-12-30 ENCOUNTER — HOSPITAL ENCOUNTER (OUTPATIENT)
Dept: PHYSICAL THERAPY | Age: 49
Setting detail: THERAPIES SERIES
Discharge: HOME | End: 2024-12-30
Payer: COMMERCIAL

## 2024-12-30 ENCOUNTER — CARE COORDINATION (OUTPATIENT)
Dept: CARE COORDINATION | Age: 49
End: 2024-12-30

## 2024-12-30 DIAGNOSIS — G89.4 CHRONIC PAIN SYNDROME: ICD-10-CM

## 2024-12-30 DIAGNOSIS — M25.551 CHRONIC RIGHT HIP PAIN: ICD-10-CM

## 2024-12-30 DIAGNOSIS — M25.551 PAIN OF RIGHT HIP: ICD-10-CM

## 2024-12-30 DIAGNOSIS — G89.29 CHRONIC RIGHT HIP PAIN: ICD-10-CM

## 2024-12-30 PROCEDURE — 97140 MANUAL THERAPY 1/> REGIONS: CPT

## 2024-12-30 PROCEDURE — 97110 THERAPEUTIC EXERCISES: CPT

## 2024-12-30 RX ORDER — HYDROCODONE BITARTRATE AND ACETAMINOPHEN 5; 325 MG/1; MG/1
2 TABLET ORAL EVERY 6 HOURS PRN
Qty: 28 TABLET | Refills: 0 | Status: SHIPPED | OUTPATIENT
Start: 2024-12-30 | End: 2025-01-06

## 2024-12-30 NOTE — TELEPHONE ENCOUNTER
Patient came into the office stating that he was in the hospital for heart related stuff, and he was told in the hospital to continue to take Norco for his hip pain until he is able to get his hip replaced in February. They are trying to move his surgery up but he is requesting a refill on this medication to get him to surgery. Pharmacy confirmed, and medication pended for provider.

## 2024-12-30 NOTE — CARE COORDINATION
Care Transitions Note    Initial Call - Call within 2 business days of discharge: Yes    Attempted to reach patient for transitions of care follow up. Unable to reach patient.    Outreach Attempts:   HIPAA compliant voicemail left for patient.     Patient: Franky Melendez    Patient : 1975   MRN: [id number]    Reason for Admission: CP  Discharge Date: 24  RURS: Readmission Risk Score: 6.7    Last Discharge Facility       Date Complaint Diagnosis Description Type Department Provider    24 Chest Pain; Back Pain Chest pain, unspecified type ... ED to Hosp-Admission (Discharged) (ADMITTED) [facility] Branden Holliday MD; Rashmi Otero...          # 1 attempt-Attempted initial 24 hour hospital follow up call. Left a Hipaa compliant message with name and call back information. Requested return call to [phone].   Was this an external facility discharge? No    Follow Up Appointment:   Patient does not have a follow up appointment scheduled at time of call.     Future Appointments         Provider Specialty Dept Phone    2025 11:00 AM [facility] PRE ADMIT TEST  Pre-Admission Testing [phone]    2025 8:40 AM Elijah Orozco MD Cardiology [phone]    2025 9:45 AM Curtis Chavarria MD Primary Care [phone]    3/27/2025 8:40 AM Elijah Orozco MD Cardiology [phone]            Plan for follow-up on next business day. 2nd attempt 24 hr HFU call     Deanne Ren RN

## 2024-12-31 ENCOUNTER — CARE COORDINATION (OUTPATIENT)
Dept: CARE COORDINATION | Age: 49
End: 2024-12-31

## 2024-12-31 NOTE — PROGRESS NOTES
Phone: [phone]                 [facility]           Fax: [phone]                           Outpatient Physical Therapy                                                                            Daily Note    Patient: Franky Melendez : 1975  CSN #: [id number]   Referring Physician: Elliot Jacobs MD  Date: 2024     Treatment Diagnosis: L LE weakness, s/p SUSAN    PT Insurance Information: BCBS  Total # of Visits Approved: 15 Per Physician Order  Total # of Visits to Date: 16  No Show: 0  Canceled Appointment: 0    25 Plan of Care/Recert Due    Pre-Treatment Pain:  0/10  Subjective: Pt reports he passed out last week and had to go to the hospital for a short stay.  Pt reports he feels his meds were a little high d/t weight loss and they think his BP dropped.  Pt denies current hip pain.    Exercises:  Exercise 3: [employer] bike 10 mins L3.0  Exercise 11: Sit to stands 2x15  // LAQ 2x10 3#  Exercise 12: FSU 6 inch x 15 // LSU 6 x 15  Exercise 14: -measurments for PT update    Manual:  Soft Tissue Mobilizaton: gentle stm to L quad and hip flexor    Assessment  Assessment: Pt reports approx 95% overall improvement since starting therapy in regards to function and strength with pain averaging a 0-3/10 on day to day basis depending on activity.  Pt able to ascend<>descend 4 6 inch steps with no IE assist and sit<>stand from 18 inch surface with no UE assist.  CUrrent hip AROM 0-105* and 115* hip flexion achieved with Pt overpressure.  Current ABD PROM 38*.  Pt states he is no longer using his AD unless the other side flares up.  Pt to have other hip done in Feb at latest.  Pt will go on hold at this time to continue with HEP.    Activity Tolerance  Activity Tolerance: Patient limited by pain    Patient Education  Patient Education: HEP  Pt verbalized/demonstrated good understanding:     [x] Yes         [] No, pt required further clarification.     Post Treatment Pain:

## 2024-12-31 NOTE — CARE COORDINATION
Care Transitions Note    Initial Call - Call within 2 business days of discharge: Yes    Attempted to reach patient for transitions of care follow up. Unable to reach patient.    Outreach Attempts:   Multiple attempts to contact patient at phone numbers on file.     Patient: Franky Melendez    Patient : 1975   MRN: [id number]    Reason for Admission: cp  Discharge Date: 24  RURS: Readmission Risk Score: 6.7    Last Discharge Facility       Date Complaint Diagnosis Description Type Department Provider    24 Chest Pain; Back Pain Chest pain, unspecified type ... ED to Hosp-Admission (Discharged) (ADMITTED) [facility] Branden Holliday MD; Rashmi Otero...          # 2 attempt-Attempted initial 24 hour hospital follow up call. Left a Hipaa compliant message with name and call back information. Requested return call to [phone].     2 unsuccessful attempts to reach patient, CTN episode resolved//JU  Was this an external facility discharge? No    Follow Up Appointment:   Patient does not have a follow up appointment scheduled at time of call.     Future Appointments         Provider Specialty Dept Phone    2025 11:00 AM [facility] PRE ADMIT TEST RM Pre-Admission Testing [phone]    2025 8:40 AM Elijah Orozco MD Cardiology [phone]    2025 12:00 PM Curtis Chavarria MD Primary Care [phone]    3/27/2025 8:40 AM Elijah Orozco MD Cardiology [phone]            No further follow-up call indicated     Deanne Ren RN

## 2025-01-04 ENCOUNTER — APPOINTMENT (OUTPATIENT)
Dept: CT IMAGING | Age: 50
End: 2025-01-04
Payer: COMMERCIAL

## 2025-01-04 ENCOUNTER — HOSPITAL ENCOUNTER (EMERGENCY)
Age: 50
Discharge: HOME | End: 2025-01-05
Attending: STUDENT IN AN ORGANIZED HEALTH CARE EDUCATION/TRAINING PROGRAM
Payer: COMMERCIAL

## 2025-01-04 VITALS
RESPIRATION RATE: 16 BRPM | OXYGEN SATURATION: 99 % | WEIGHT: 162 LBS | SYSTOLIC BLOOD PRESSURE: 94 MMHG | TEMPERATURE: 98.8 F | HEART RATE: 93 BPM | DIASTOLIC BLOOD PRESSURE: 76 MMHG | BODY MASS INDEX: 25.37 KG/M2

## 2025-01-04 DIAGNOSIS — S60.212A HEMATOMA OF LEFT WRIST: ICD-10-CM

## 2025-01-04 DIAGNOSIS — I70.208 RADIAL ARTERY OCCLUSION, LEFT (HCC): Primary | ICD-10-CM

## 2025-01-04 LAB
ALBUMIN SERPL-MCNC: 4.8 G/DL (ref 3.5–5.2)
ALBUMIN/GLOB SERPL: 1.9 {RATIO} (ref 1–2.5)
ALP SERPL-CCNC: 68 U/L (ref 40–129)
ALT SERPL-CCNC: 20 U/L (ref 10–50)
ANION GAP SERPL CALCULATED.3IONS-SCNC: 12 MMOL/L (ref 9–16)
AST SERPL-CCNC: 20 U/L (ref 10–50)
BASOPHILS # BLD: 0.05 K/UL (ref 0–0.2)
BASOPHILS NFR BLD: 1 % (ref 0–2)
BILIRUB SERPL-MCNC: 0.3 MG/DL (ref 0–1.2)
BUN SERPL-MCNC: 25 MG/DL (ref 6–20)
BUN/CREAT SERPL: 23 (ref 9–20)
CALCIUM SERPL-MCNC: 9.6 MG/DL (ref 8.6–10.4)
CHLORIDE SERPL-SCNC: 106 MMOL/L (ref 98–107)
CO2 SERPL-SCNC: 23 MMOL/L (ref 20–31)
CREAT SERPL-MCNC: 1.1 MG/DL (ref 0.7–1.2)
EOSINOPHIL # BLD: 0.13 K/UL (ref 0–0.44)
EOSINOPHILS RELATIVE PERCENT: 1 % (ref 1–4)
ERYTHROCYTE [DISTWIDTH] IN BLOOD BY AUTOMATED COUNT: 13.3 % (ref 11.8–14.4)
GFR, ESTIMATED: 86 ML/MIN/1.73M2
GLUCOSE SERPL-MCNC: 94 MG/DL (ref 74–99)
HCT VFR BLD AUTO: 37 % (ref 40.7–50.3)
HGB BLD-MCNC: 13 G/DL (ref 13–17)
IMM GRANULOCYTES # BLD AUTO: <0.03 K/UL (ref 0–0.3)
IMM GRANULOCYTES NFR BLD: 0 %
LYMPHOCYTES NFR BLD: 3.34 K/UL (ref 1.1–3.7)
LYMPHOCYTES RELATIVE PERCENT: 37 % (ref 24–43)
MCH RBC QN AUTO: 30.4 PG (ref 25.2–33.5)
MCHC RBC AUTO-ENTMCNC: 35.1 G/DL (ref 28.4–34.8)
MCV RBC AUTO: 86.4 FL (ref 82.6–102.9)
MONOCYTES NFR BLD: 0.81 K/UL (ref 0.1–1.2)
MONOCYTES NFR BLD: 9 % (ref 3–12)
NEUTROPHILS NFR BLD: 52 % (ref 36–65)
NEUTS SEG NFR BLD: 4.8 K/UL (ref 1.5–8.1)
NRBC BLD-RTO: 0 PER 100 WBC
PLATELET # BLD AUTO: 369 K/UL (ref 138–453)
PMV BLD AUTO: 9.8 FL (ref 8.1–13.5)
POTASSIUM SERPL-SCNC: 4.1 MMOL/L (ref 3.7–5.3)
PROT SERPL-MCNC: 7.4 G/DL (ref 6.6–8.7)
RBC # BLD AUTO: 4.28 M/UL (ref 4.21–5.77)
SODIUM SERPL-SCNC: 141 MMOL/L (ref 136–145)
WBC OTHER # BLD: 9.2 K/UL (ref 3.5–11.3)

## 2025-01-04 PROCEDURE — 99285 EMERGENCY DEPT VISIT HI MDM: CPT

## 2025-01-04 PROCEDURE — 6370000000 HC RX 637 (ALT 250 FOR IP): Performed by: STUDENT IN AN ORGANIZED HEALTH CARE EDUCATION/TRAINING PROGRAM

## 2025-01-04 PROCEDURE — 96375 TX/PRO/DX INJ NEW DRUG ADDON: CPT

## 2025-01-04 PROCEDURE — 80053 COMPREHEN METABOLIC PANEL: CPT

## 2025-01-04 PROCEDURE — 96374 THER/PROPH/DIAG INJ IV PUSH: CPT

## 2025-01-04 PROCEDURE — 96376 TX/PRO/DX INJ SAME DRUG ADON: CPT

## 2025-01-04 PROCEDURE — 73206 CT ANGIO UPR EXTRM W/O&W/DYE: CPT

## 2025-01-04 PROCEDURE — 85025 COMPLETE CBC W/AUTO DIFF WBC: CPT

## 2025-01-04 PROCEDURE — 6360000002 HC RX W HCPCS: Performed by: STUDENT IN AN ORGANIZED HEALTH CARE EDUCATION/TRAINING PROGRAM

## 2025-01-04 PROCEDURE — 6360000004 HC RX CONTRAST MEDICATION: Performed by: STUDENT IN AN ORGANIZED HEALTH CARE EDUCATION/TRAINING PROGRAM

## 2025-01-04 RX ORDER — IOPAMIDOL 755 MG/ML
75 INJECTION, SOLUTION INTRAVASCULAR
Status: COMPLETED | OUTPATIENT
Start: 2025-01-04 | End: 2025-01-04

## 2025-01-04 RX ORDER — OXYCODONE HYDROCHLORIDE 5 MG/1
5 TABLET ORAL ONCE
Status: COMPLETED | OUTPATIENT
Start: 2025-01-05 | End: 2025-01-04

## 2025-01-04 RX ORDER — MORPHINE SULFATE 4 MG/ML
4 INJECTION, SOLUTION INTRAMUSCULAR; INTRAVENOUS ONCE
Status: COMPLETED | OUTPATIENT
Start: 2025-01-04 | End: 2025-01-04

## 2025-01-04 RX ORDER — ONDANSETRON 2 MG/ML
4 INJECTION INTRAMUSCULAR; INTRAVENOUS ONCE
Status: COMPLETED | OUTPATIENT
Start: 2025-01-04 | End: 2025-01-04

## 2025-01-04 RX ORDER — MORPHINE SULFATE 2 MG/ML
2 INJECTION, SOLUTION INTRAMUSCULAR; INTRAVENOUS ONCE
Status: DISCONTINUED | OUTPATIENT
Start: 2025-01-05 | End: 2025-01-04

## 2025-01-04 RX ORDER — MORPHINE SULFATE 2 MG/ML
2 INJECTION, SOLUTION INTRAMUSCULAR; INTRAVENOUS ONCE
Status: COMPLETED | OUTPATIENT
Start: 2025-01-04 | End: 2025-01-04

## 2025-01-04 RX ADMIN — IOPAMIDOL 75 ML: 755 INJECTION, SOLUTION INTRAVENOUS at 22:00

## 2025-01-04 RX ADMIN — MORPHINE SULFATE 2 MG: 2 INJECTION, SOLUTION INTRAMUSCULAR; INTRAVENOUS at 22:35

## 2025-01-04 RX ADMIN — MORPHINE SULFATE 4 MG: 4 INJECTION, SOLUTION INTRAMUSCULAR; INTRAVENOUS at 21:10

## 2025-01-04 RX ADMIN — OXYCODONE HYDROCHLORIDE 5 MG: 5 TABLET ORAL at 23:56

## 2025-01-04 RX ADMIN — ONDANSETRON 4 MG: 2 INJECTION, SOLUTION INTRAMUSCULAR; INTRAVENOUS at 21:10

## 2025-01-04 ASSESSMENT — PAIN SCALES - GENERAL
PAINLEVEL_OUTOF10: 7

## 2025-01-04 ASSESSMENT — PAIN DESCRIPTION - PAIN TYPE: TYPE: ACUTE PAIN

## 2025-01-04 ASSESSMENT — PAIN DESCRIPTION - LOCATION
LOCATION: ARM
LOCATION: WRIST
LOCATION: ARM

## 2025-01-04 ASSESSMENT — PAIN - FUNCTIONAL ASSESSMENT: PAIN_FUNCTIONAL_ASSESSMENT: 0-10

## 2025-01-04 ASSESSMENT — PAIN DESCRIPTION - ORIENTATION
ORIENTATION: LEFT

## 2025-01-05 NOTE — ED PROVIDER NOTES
medications were discussed with the patient/family present. Strict return precautions and follow up instructions were discussed with the patient prior to discharge, with which the patient agrees. The patient and any family have had the opportunity to ask questions and they have been answered to the best of my ability. Instructions have been given to follow up with the vascular surgeon for re-evaluation and possibly further testing           DISCHARGE PRESCRIPTIONS: (None if blank)  New Prescriptions    No medications on file         This transcription was electronically signed. Parts of this transcriptions may have been dictated by use of voice recognition software and electronically transcribed, and parts may have been transcribed with the assistance of an ED scribe. The transcription may contain errors not detected in proofreading.  Please refer to my supervising physician's documentation if my documentation differs.    Electronically Signed: Ken Otero MD, 01/04/25, 11:52 PM        Ken Otero MD  01/04/25 9926

## 2025-01-05 NOTE — DISCHARGE INSTRUCTIONS
Make sure that you ice the bruise tonight.    Over the next couple days a week and apply warm compresses to the areas of bruising    By an exercise ball and I want you to squeeze that every day as an exercise    Call Dr. Fitzpatrick's office on Monday      Return to the Emergency Department if you develop any new or concerning symptoms or if your are getting worse

## 2025-01-07 ENCOUNTER — TELEPHONE (OUTPATIENT)
Dept: PRIMARY CARE CLINIC | Age: 50
End: 2025-01-07

## 2025-01-07 ENCOUNTER — HOSPITAL ENCOUNTER (OUTPATIENT)
Dept: PREADMISSION TESTING | Age: 50
Discharge: HOME | End: 2025-01-11
Attending: ORTHOPAEDIC SURGERY | Admitting: ORTHOPAEDIC SURGERY
Payer: COMMERCIAL

## 2025-01-07 VITALS
WEIGHT: 152 LBS | HEIGHT: 67 IN | TEMPERATURE: 97.7 F | RESPIRATION RATE: 16 BRPM | HEART RATE: 89 BPM | SYSTOLIC BLOOD PRESSURE: 109 MMHG | DIASTOLIC BLOOD PRESSURE: 75 MMHG | OXYGEN SATURATION: 99 % | BODY MASS INDEX: 23.86 KG/M2

## 2025-01-07 DIAGNOSIS — M25.551 PAIN OF RIGHT HIP: ICD-10-CM

## 2025-01-07 DIAGNOSIS — G89.29 CHRONIC RIGHT HIP PAIN: ICD-10-CM

## 2025-01-07 DIAGNOSIS — M25.551 CHRONIC RIGHT HIP PAIN: ICD-10-CM

## 2025-01-07 DIAGNOSIS — G89.4 CHRONIC PAIN SYNDROME: Primary | ICD-10-CM

## 2025-01-07 LAB
ABO + RH BLD: NORMAL
ARM BAND NUMBER: NORMAL
BILIRUB UR QL STRIP: NEGATIVE
BLOOD BANK SAMPLE EXPIRATION: NORMAL
BLOOD GROUP ANTIBODIES SERPL: NEGATIVE
CLARITY UR: CLEAR
COLOR UR: YELLOW
CRYSTALS URNS MICRO: ABNORMAL /HPF
EPI CELLS #/AREA URNS HPF: ABNORMAL /HPF (ref 0–5)
GLUCOSE UR STRIP-MCNC: NEGATIVE MG/DL
HGB UR QL STRIP.AUTO: NEGATIVE
INR PPP: 1.1
KETONES UR STRIP-MCNC: NEGATIVE MG/DL
LEUKOCYTE ESTERASE UR QL STRIP: NEGATIVE
MUCOUS THREADS URNS QL MICRO: ABNORMAL
NITRITE UR QL STRIP: NEGATIVE
PARTIAL THROMBOPLASTIN TIME: 27.6 SEC (ref 26.8–34.8)
PH UR STRIP: 6 [PH] (ref 5–9)
PROT UR STRIP-MCNC: NEGATIVE MG/DL
PROTHROMBIN TIME: 14.2 SEC (ref 11.7–14.1)
RBC #/AREA URNS HPF: ABNORMAL /HPF (ref 0–2)
SP GR UR STRIP: >1.03 (ref 1.01–1.02)
UROBILINOGEN UR STRIP-ACNC: NORMAL EU/DL (ref 0–1)
WBC #/AREA URNS HPF: ABNORMAL /HPF (ref 0–5)

## 2025-01-07 PROCEDURE — 85730 THROMBOPLASTIN TIME PARTIAL: CPT

## 2025-01-07 PROCEDURE — 81001 URINALYSIS AUTO W/SCOPE: CPT

## 2025-01-07 PROCEDURE — 86900 BLOOD TYPING SEROLOGIC ABO: CPT

## 2025-01-07 PROCEDURE — 87641 MR-STAPH DNA AMP PROBE: CPT

## 2025-01-07 PROCEDURE — 85610 PROTHROMBIN TIME: CPT

## 2025-01-07 PROCEDURE — 86901 BLOOD TYPING SEROLOGIC RH(D): CPT

## 2025-01-07 PROCEDURE — 86850 RBC ANTIBODY SCREEN: CPT

## 2025-01-07 PROCEDURE — 36415 COLL VENOUS BLD VENIPUNCTURE: CPT

## 2025-01-07 RX ORDER — HYDROCODONE BITARTRATE AND ACETAMINOPHEN 5; 325 MG/1; MG/1
1 TABLET ORAL EVERY 6 HOURS PRN
Qty: 28 TABLET | Refills: 0 | Status: CANCELLED | OUTPATIENT
Start: 2025-01-07 | End: 2025-01-14

## 2025-01-07 RX ORDER — HYDROCODONE BITARTRATE AND ACETAMINOPHEN 10; 325 MG/1; MG/1
1 TABLET ORAL EVERY 6 HOURS PRN
Qty: 28 TABLET | Refills: 0 | Status: SHIPPED | OUTPATIENT
Start: 2025-01-07 | End: 2025-01-14

## 2025-01-07 ASSESSMENT — PAIN SCALES - GENERAL: PAINLEVEL_OUTOF10: 7

## 2025-01-07 ASSESSMENT — PAIN DESCRIPTION - PAIN TYPE: TYPE: CHRONIC PAIN

## 2025-01-07 ASSESSMENT — PAIN DESCRIPTION - LOCATION: LOCATION: HIP

## 2025-01-07 ASSESSMENT — PAIN DESCRIPTION - ORIENTATION: ORIENTATION: RIGHT

## 2025-01-07 NOTE — PROGRESS NOTES
[facility]   Preadmission Testing    Name: Franky Melendez  : 1975  Patient Phone: [phone] (home)     Procedure Right SUSAN  Date of Procedure: 25  Surgeon: Elliot Jacobs MD    Ht:  170.2 cm (5' 7\")  Wt: 68.9 kg (152 lb)  Wt method: Stated    Allergies: No Known Allergies    Peanut allergy: No         Vitals:    25 1051   BP: 109/75   Pulse: 89   Resp: 16   Temp: 97.7 °F (36.5 °C)   SpO2: 99%       No LMP for male patient.    Do you take blood thinners?   [x] Yes    [] No         Instructed to stop blood thinners prior to procedure?    [x] Yes    [] No      [] N/A   Do you have sleep apnea?   [] Yes    [x] No     Instructed to bring CPAP machine?   [] Yes    [] No    [x] N/A   Do you have acid reflux ?   [] Yes    [x] No     Do you have  hiatal hernia?   [] Yes    [] No    Do you ever experience motion sickness?   [] Yes    [x] No     Have you had a respiratory infection or sore throat in last 4 weeks before surgery?    [] Yes    [x] No     Do you have poorly controlled asthma or COPD?   [] Yes    [x] No     Do you have a history of angina in the last month or symptomatic arrhythmia?   [x] Yes    [] No     Do you have significant central nervous system disease?   [] Yes    [x] No     Have you had an EKG, labs, or chest xray in last 12 months?  If yes provide copies to anesthesia   [x] Yes    [] No       [x] Lab    [x] EKG    [x] CXR     Have you had a stress test?     [x] Yes    [] No    When/where:[place]    Was it normal?    [] Yes    [x] No   Do you or your family have a history of Malignant Hyperthermia? [] Yes    [x] No     Patient instructed on:   [x] NPO Status   [x] Meds to Take Day of Surgery  [x] Ride Home  [x]No Jewelry/Contact Lenses/Dentures day of surgery   [x] Chlorhexidene             PAT Call/Visit Questions  Person Interviewed: Franky  Relationship to Patient: Patient  Surgery Time Verified: Yes  Surgery Location Verified: Yes  NPO Status Reinforced: Yes  Ride and

## 2025-01-07 NOTE — TELEPHONE ENCOUNTER
Patient came in requesting a refill for his pain medication. He also made a follow up for an ED visit tomorrow, but stated he does not have enough to get him till then.

## 2025-01-07 NOTE — TELEPHONE ENCOUNTER
[facility] [place] [facility] ED Follow up Call    Reason for ED visit:  left radial artery occlusion     1/7/2025     VOICEMAIL DOCUMENTATION - ERASE IF NOT USED  Hi, this message is for Franky.  This is Bailee Nguyen MA from [facility] office.  Just calling to see how you are doing after your recent visit to the Emergency Room.  [facility] wants to make sure you were able to fill any prescriptions and that you understand your discharge instructions.  Please return our call if you need to make a follow up appointment with your provider or have any further needs.   Our phone number is [phone].  Have a great day.

## 2025-01-08 ENCOUNTER — OFFICE VISIT (OUTPATIENT)
Dept: PRIMARY CARE CLINIC | Age: 50
End: 2025-01-08

## 2025-01-08 VITALS
HEART RATE: 98 BPM | DIASTOLIC BLOOD PRESSURE: 70 MMHG | BODY MASS INDEX: 25.06 KG/M2 | WEIGHT: 160 LBS | SYSTOLIC BLOOD PRESSURE: 106 MMHG | OXYGEN SATURATION: 99 % | RESPIRATION RATE: 16 BRPM

## 2025-01-08 DIAGNOSIS — I50.9 HEART FAILURE, UNSPECIFIED HF CHRONICITY, UNSPECIFIED HEART FAILURE TYPE (HCC): ICD-10-CM

## 2025-01-08 DIAGNOSIS — K21.9 GASTROESOPHAGEAL REFLUX DISEASE, UNSPECIFIED WHETHER ESOPHAGITIS PRESENT: Primary | ICD-10-CM

## 2025-01-08 DIAGNOSIS — M79.602 LEFT ARM PAIN: ICD-10-CM

## 2025-01-08 DIAGNOSIS — E11.69 TYPE 2 DIABETES MELLITUS WITH OTHER SPECIFIED COMPLICATION, WITHOUT LONG-TERM CURRENT USE OF INSULIN (HCC): ICD-10-CM

## 2025-01-08 LAB
MRSA, DNA, NASAL: NEGATIVE
SPECIMEN DESCRIPTION: NORMAL

## 2025-01-08 RX ORDER — PANTOPRAZOLE SODIUM 40 MG/1
40 TABLET, DELAYED RELEASE ORAL DAILY
Qty: 180 TABLET | Refills: 0 | Status: SHIPPED | OUTPATIENT
Start: 2025-01-08

## 2025-01-08 ASSESSMENT — PATIENT HEALTH QUESTIONNAIRE - PHQ9
2. FEELING DOWN, DEPRESSED OR HOPELESS: NOT AT ALL
SUM OF ALL RESPONSES TO PHQ QUESTIONS 1-9: 0
SUM OF ALL RESPONSES TO PHQ9 QUESTIONS 1 & 2: 0
1. LITTLE INTEREST OR PLEASURE IN DOING THINGS: NOT AT ALL

## 2025-01-08 NOTE — PROGRESS NOTES
[facility] PRIMARY CARE  [address] [place] [address] , [address]  [place], [place], [address]    Franky Melendez is a 49 y.o. male with  has a past medical history of CAD (coronary artery disease), Carpal tunnel syndrome, Depressive disorder, not elsewhere classified, Diabetes mellitus ([facility]), Gastrointestinal hemorrhage with hematemesis, GERD (gastroesophageal reflux disease), Heart attack (HCC), History of echocardiogram, History of pancreatitis, Hyperlipidemia, Hypertension, and PTSD (post-traumatic stress disorder).  Presented to the office today for:  Chief Complaint   Patient presents with    ED Follow-up       Assessment/Plan   1. Gastroesophageal reflux disease, unspecified whether esophagitis present  -     pantoprazole (PROTONIX) 40 MG tablet; Take 1 tablet by mouth daily, Disp-180 tablet, R-0Normal  2. Type 2 diabetes mellitus with other specified complication, without long-term current use of insulin ([facility])  3. Heart failure, unspecified HF chronicity, unspecified heart failure type ([facility])  4. Left arm pain  -     Vascular duplex upper extremity arteries left; Future    Return if symptoms worsen or fail to improve.  Assessment & Plan  GERD - continue w/ PPI at the current dose  T2DM - stable and at goal, continue w/ mounjaro at 7.5mg weekly for now  We discussed some of the etiologies and natural histories. We discussed the various treatment alternatives including anti-inflammatory medications, physical therapy, injections, further imaging studies and as a last resort surgery.    CHF / stable at this time, f/u with Cardiology. Lisinopril 20mg and Lasix 20mg PO PRN as discussed  VL arterial in 1 week's time     All patient questions answered.  Pt voiced understanding.   Medications Discontinued During This Encounter   Medication Reason    pregabalin (LYRICA) 75 MG capsule LIST CLEANUP    pantoprazole (PROTONIX) 40 MG tablet REORDER       Patient received counseling on the following healthy behaviors:

## 2025-01-16 ENCOUNTER — OFFICE VISIT (OUTPATIENT)
Dept: CARDIOLOGY | Age: 50
End: 2025-01-16
Payer: COMMERCIAL

## 2025-01-16 ENCOUNTER — OFFICE VISIT (OUTPATIENT)
Dept: PRIMARY CARE CLINIC | Age: 50
End: 2025-01-16

## 2025-01-16 VITALS
OXYGEN SATURATION: 98 % | WEIGHT: 160 LBS | HEART RATE: 82 BPM | RESPIRATION RATE: 18 BRPM | HEIGHT: 67 IN | BODY MASS INDEX: 25.11 KG/M2 | SYSTOLIC BLOOD PRESSURE: 108 MMHG | DIASTOLIC BLOOD PRESSURE: 64 MMHG

## 2025-01-16 VITALS
SYSTOLIC BLOOD PRESSURE: 103 MMHG | WEIGHT: 161.2 LBS | RESPIRATION RATE: 18 BRPM | BODY MASS INDEX: 25.3 KG/M2 | HEIGHT: 67 IN | HEART RATE: 78 BPM | DIASTOLIC BLOOD PRESSURE: 65 MMHG

## 2025-01-16 DIAGNOSIS — I10 PRIMARY HYPERTENSION: ICD-10-CM

## 2025-01-16 DIAGNOSIS — G89.29 CHRONIC RIGHT HIP PAIN: ICD-10-CM

## 2025-01-16 DIAGNOSIS — I25.10 ASHD (ARTERIOSCLEROTIC HEART DISEASE): ICD-10-CM

## 2025-01-16 DIAGNOSIS — Z01.810 PREOP CARDIOVASCULAR EXAM: Primary | ICD-10-CM

## 2025-01-16 DIAGNOSIS — M25.551 CHRONIC RIGHT HIP PAIN: ICD-10-CM

## 2025-01-16 DIAGNOSIS — E78.2 MIXED HYPERLIPIDEMIA: ICD-10-CM

## 2025-01-16 DIAGNOSIS — G89.4 CHRONIC PAIN SYNDROME: ICD-10-CM

## 2025-01-16 DIAGNOSIS — Z95.1 S/P CABG (CORONARY ARTERY BYPASS GRAFT): ICD-10-CM

## 2025-01-16 DIAGNOSIS — R00.2 HEART PALPITATIONS: ICD-10-CM

## 2025-01-16 DIAGNOSIS — Z01.818 PRE-OP EXAMINATION: Primary | ICD-10-CM

## 2025-01-16 DIAGNOSIS — M25.551 PAIN OF RIGHT HIP: ICD-10-CM

## 2025-01-16 PROCEDURE — 3078F DIAST BP <80 MM HG: CPT | Performed by: INTERNAL MEDICINE

## 2025-01-16 PROCEDURE — 3074F SYST BP LT 130 MM HG: CPT | Performed by: INTERNAL MEDICINE

## 2025-01-16 PROCEDURE — 99214 OFFICE O/P EST MOD 30 MIN: CPT | Performed by: INTERNAL MEDICINE

## 2025-01-16 PROCEDURE — 93000 ELECTROCARDIOGRAM COMPLETE: CPT | Performed by: INTERNAL MEDICINE

## 2025-01-16 RX ORDER — HYDROCODONE BITARTRATE AND ACETAMINOPHEN 10; 325 MG/1; MG/1
1 TABLET ORAL EVERY 6 HOURS PRN
Qty: 56 TABLET | Refills: 0 | Status: SHIPPED | OUTPATIENT
Start: 2025-01-16 | End: 2025-01-30

## 2025-01-16 RX ORDER — ISOSORBIDE MONONITRATE 30 MG/1
30 TABLET, EXTENDED RELEASE ORAL DAILY
Qty: 90 TABLET | Refills: 3 | Status: SHIPPED | OUTPATIENT
Start: 2025-01-16

## 2025-01-16 RX ORDER — ISOSORBIDE MONONITRATE 30 MG/1
30 TABLET, EXTENDED RELEASE ORAL DAILY
Qty: 90 TABLET | Refills: 3 | Status: SHIPPED | OUTPATIENT
Start: 2025-01-16 | End: 2025-01-16 | Stop reason: SDUPTHER

## 2025-01-16 NOTE — PROGRESS NOTES
I, Cady Ontiveros am scribing for and in the presence of Elijah Orozco MD, F.A.C.C..    Patient: Franky Melendez  : 1975  Date of Visit: 2025    REASON FOR VISIT / CONSULTATION: Cardiac Clearance (HX: ASHD, S/P stent, HTN, HLD. Pt is here for cardiac clearance for surgery on . Pt states he is doing well. Denies cp, palps, sob, dizziness. )    History of Present Illness:        Dear Curtis Chavarria MD    I had the pleasure of seeing Mr. Melendez today who is a 49 y.o. male who presents for a follow up following a hospital visit on 2019 with chest pain during activity.     He had a stress test on 2018 (inferior wall defect with jazmine-infarct ischemia, ejection fraction 47% with inferior wall hypokinesis).     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  He was taken for an emergent CABG with SVG to RCA.    Echo on 2019: EF 40-45%. LV cavity sixe is mildly increased. Inferior and inferoseptal wall hypokenesis noted. Mild diastolic dysfunction.    Cardiac Catheterization on 2019: Severe single vessel disease involving the LAD, circumflex and right coronary arteries. 1 of 1 bypass grafts patent. Normal LVEDP.    Echo on 2019: EF 40-45%. LV cavity sixe is mildly increased. Inferior and inferoseptal wall hypokenesis noted. Mild diastolic dysfunction    Stress test done on 2021 showed abnormal myocardial perfusion study. There is a moderate perfusion defect of moderate/severe intensity in the inferolateral, inferior and inferoseptal regions during stress and rest imaging, which is most consistent with an old myocardial infarction with jazmine-infarct ischemia. EF was 43%    Echo done on 2021 showed the global left ventricular systolic function appears mildly reduced with an estimated ejection fraction of 40-45%. The inferoseptal and

## 2025-01-16 NOTE — PROGRESS NOTES
[facility] PRIMARY CARE  [address] [place] [address] , [address]  [place], [place], [address]    Franky Melendez is a 49 y.o. male with  has a past medical history of CAD (coronary artery disease), Carpal tunnel syndrome, Depressive disorder, not elsewhere classified, Diabetes mellitus (HCC), Gastrointestinal hemorrhage with hematemesis, GERD (gastroesophageal reflux disease), Heart attack (HCC), History of echocardiogram, History of pancreatitis, Hyperlipidemia, Hypertension, and PTSD (post-traumatic stress disorder).  Presented to the office today for:  Chief Complaint   Patient presents with    Surgical Clearance       Assessment/Plan   1. Pre-op examination  2. Chronic pain syndrome  -     HYDROcodone-acetaminophen (NORCO)  MG per tablet; Take 1 tablet by mouth every 6 hours as needed for Pain for up to 14 days. Intended supply: 30 days Max Daily Amount: 4 tablets, Disp-56 tablet, R-0Normal  3. Pain of right hip  -     HYDROcodone-acetaminophen (NORCO)  MG per tablet; Take 1 tablet by mouth every 6 hours as needed for Pain for up to 14 days. Intended supply: 30 days Max Daily Amount: 4 tablets, Disp-56 tablet, R-0Normal  4. Chronic right hip pain  -     HYDROcodone-acetaminophen (NORCO)  MG per tablet; Take 1 tablet by mouth every 6 hours as needed for Pain for up to 14 days. Intended supply: 30 days Max Daily Amount: 4 tablets, Disp-56 tablet, R-0Normal  Return if symptoms worsen or fail to improve.  Assessment & Plan  He has 0.4% risk of cardiac complications according to RCRI and moderate METs. Can proceed for surgery with moderate risk. Not on AC or antiplatelets   We discussed some of the etiologies and natural histories. We discussed the various treatment alternatives including anti-inflammatory medications, physical therapy, injections, further imaging studies and as a last resort surgery.  Patient had held mounjaro for 3 weeks.  Norco PRN to be continued     All patient questions

## 2025-01-29 ENCOUNTER — ANESTHESIA EVENT (OUTPATIENT)
Dept: OPERATING ROOM | Age: 50
End: 2025-01-29
Payer: COMMERCIAL

## 2025-01-30 ENCOUNTER — APPOINTMENT (OUTPATIENT)
Dept: GENERAL RADIOLOGY | Age: 50
End: 2025-01-30
Attending: ORTHOPAEDIC SURGERY
Payer: COMMERCIAL

## 2025-01-30 ENCOUNTER — ANESTHESIA (OUTPATIENT)
Dept: OPERATING ROOM | Age: 50
End: 2025-01-30
Payer: COMMERCIAL

## 2025-01-30 ENCOUNTER — HOSPITAL ENCOUNTER (OUTPATIENT)
Age: 50
Setting detail: OBSERVATION
Discharge: HOME | End: 2025-01-31
Attending: ORTHOPAEDIC SURGERY | Admitting: ORTHOPAEDIC SURGERY
Payer: COMMERCIAL

## 2025-01-30 DIAGNOSIS — Z96.641 S/P TOTAL RIGHT HIP ARTHROPLASTY: Primary | ICD-10-CM

## 2025-01-30 LAB — GLUCOSE BLD-MCNC: 216 MG/DL (ref 74–100)

## 2025-01-30 PROCEDURE — 3700000000 HC ANESTHESIA ATTENDED CARE: Performed by: ORTHOPAEDIC SURGERY

## 2025-01-30 PROCEDURE — 3700000001 HC ADD 15 MINUTES (ANESTHESIA): Performed by: ORTHOPAEDIC SURGERY

## 2025-01-30 PROCEDURE — 6370000000 HC RX 637 (ALT 250 FOR IP): Performed by: NURSE ANESTHETIST, CERTIFIED REGISTERED

## 2025-01-30 PROCEDURE — G0378 HOSPITAL OBSERVATION PER HR: HCPCS

## 2025-01-30 PROCEDURE — 97530 THERAPEUTIC ACTIVITIES: CPT

## 2025-01-30 PROCEDURE — 2580000003 HC RX 258: Performed by: NURSE ANESTHETIST, CERTIFIED REGISTERED

## 2025-01-30 PROCEDURE — 6360000002 HC RX W HCPCS: Performed by: NURSE ANESTHETIST, CERTIFIED REGISTERED

## 2025-01-30 PROCEDURE — 2500000003 HC RX 250 WO HCPCS: Performed by: ORTHOPAEDIC SURGERY

## 2025-01-30 PROCEDURE — 2580000003 HC RX 258: Performed by: PHYSICIAN ASSISTANT

## 2025-01-30 PROCEDURE — 97162 PT EVAL MOD COMPLEX 30 MIN: CPT

## 2025-01-30 PROCEDURE — 6360000002 HC RX W HCPCS: Performed by: PHYSICIAN ASSISTANT

## 2025-01-30 PROCEDURE — C1776 JOINT DEVICE (IMPLANTABLE): HCPCS | Performed by: ORTHOPAEDIC SURGERY

## 2025-01-30 PROCEDURE — 3600000015 HC SURGERY LEVEL 5 ADDTL 15MIN: Performed by: ORTHOPAEDIC SURGERY

## 2025-01-30 PROCEDURE — 2709999900 HC NON-CHARGEABLE SUPPLY: Performed by: ORTHOPAEDIC SURGERY

## 2025-01-30 PROCEDURE — 97166 OT EVAL MOD COMPLEX 45 MIN: CPT

## 2025-01-30 PROCEDURE — 3600000005 HC SURGERY LEVEL 5 BASE: Performed by: ORTHOPAEDIC SURGERY

## 2025-01-30 PROCEDURE — 6370000000 HC RX 637 (ALT 250 FOR IP): Performed by: PHYSICIAN ASSISTANT

## 2025-01-30 PROCEDURE — 94761 N-INVAS EAR/PLS OXIMETRY MLT: CPT

## 2025-01-30 PROCEDURE — 7100000001 HC PACU RECOVERY - ADDTL 15 MIN: Performed by: ORTHOPAEDIC SURGERY

## 2025-01-30 PROCEDURE — 94664 DEMO&/EVAL PT USE INHALER: CPT

## 2025-01-30 PROCEDURE — 72170 X-RAY EXAM OF PELVIS: CPT

## 2025-01-30 PROCEDURE — 94150 VITAL CAPACITY TEST: CPT

## 2025-01-30 PROCEDURE — 7100000000 HC PACU RECOVERY - FIRST 15 MIN: Performed by: ORTHOPAEDIC SURGERY

## 2025-01-30 PROCEDURE — 6370000000 HC RX 637 (ALT 250 FOR IP): Performed by: ORTHOPAEDIC SURGERY

## 2025-01-30 PROCEDURE — 82947 ASSAY GLUCOSE BLOOD QUANT: CPT

## 2025-01-30 PROCEDURE — 2500000003 HC RX 250 WO HCPCS: Performed by: PHYSICIAN ASSISTANT

## 2025-01-30 PROCEDURE — 2500000003 HC RX 250 WO HCPCS: Performed by: NURSE ANESTHETIST, CERTIFIED REGISTERED

## 2025-01-30 DEVICE — BONE SCREW 6.5X30 SELF-TAP: Type: IMPLANTABLE DEVICE | Site: HIP | Status: FUNCTIONAL

## 2025-01-30 DEVICE — IMPLANTABLE DEVICE: Type: IMPLANTABLE DEVICE | Site: HIP | Status: FUNCTIONAL

## 2025-01-30 DEVICE — G7 VIT E NEUTRAL LNR 36MM E: Type: IMPLANTABLE DEVICE | Site: HIP | Status: FUNCTIONAL

## 2025-01-30 DEVICE — G7 PPS LTD ACET SHELL 52E: Type: IMPLANTABLE DEVICE | Site: HIP | Status: FUNCTIONAL

## 2025-01-30 DEVICE — HEAD FEM STD 0 MM 36 MM HIP MOD BIOLOX DELT CERM: Type: IMPLANTABLE DEVICE | Site: HIP | Status: FUNCTIONAL

## 2025-01-30 RX ORDER — OXYCODONE HYDROCHLORIDE 5 MG/1
5 TABLET ORAL EVERY 6 HOURS PRN
Qty: 20 TABLET | Refills: 0 | Status: SHIPPED | OUTPATIENT
Start: 2025-01-30 | End: 2025-02-04

## 2025-01-30 RX ORDER — GABAPENTIN 300 MG/1
300 CAPSULE ORAL ONCE
Status: DISCONTINUED | OUTPATIENT
Start: 2025-01-30 | End: 2025-01-30 | Stop reason: HOSPADM

## 2025-01-30 RX ORDER — SODIUM CHLORIDE 0.9 % (FLUSH) 0.9 %
5-40 SYRINGE (ML) INJECTION PRN
Status: DISCONTINUED | OUTPATIENT
Start: 2025-01-30 | End: 2025-01-30 | Stop reason: HOSPADM

## 2025-01-30 RX ORDER — SODIUM CHLORIDE 9 MG/ML
INJECTION, SOLUTION INTRAVENOUS CONTINUOUS
Status: DISCONTINUED | OUTPATIENT
Start: 2025-01-30 | End: 2025-01-30 | Stop reason: HOSPADM

## 2025-01-30 RX ORDER — METOPROLOL SUCCINATE 50 MG/1
50 TABLET, EXTENDED RELEASE ORAL DAILY
Status: DISCONTINUED | OUTPATIENT
Start: 2025-01-30 | End: 2025-01-31 | Stop reason: HOSPADM

## 2025-01-30 RX ORDER — SUCRALFATE 1 G/1
1 TABLET ORAL
Status: DISCONTINUED | OUTPATIENT
Start: 2025-01-30 | End: 2025-01-31 | Stop reason: HOSPADM

## 2025-01-30 RX ORDER — RANOLAZINE 500 MG/1
500 TABLET, EXTENDED RELEASE ORAL 2 TIMES DAILY
Status: DISCONTINUED | OUTPATIENT
Start: 2025-01-30 | End: 2025-01-31 | Stop reason: HOSPADM

## 2025-01-30 RX ORDER — BISACODYL 5 MG/1
5 TABLET, DELAYED RELEASE ORAL DAILY PRN
Status: DISCONTINUED | OUTPATIENT
Start: 2025-01-30 | End: 2025-01-31 | Stop reason: HOSPADM

## 2025-01-30 RX ORDER — ISOSORBIDE MONONITRATE 30 MG/1
30 TABLET, EXTENDED RELEASE ORAL DAILY
Status: DISCONTINUED | OUTPATIENT
Start: 2025-01-30 | End: 2025-01-31 | Stop reason: HOSPADM

## 2025-01-30 RX ORDER — PROPOFOL 10 MG/ML
INJECTION, EMULSION INTRAVENOUS
Status: DISCONTINUED | OUTPATIENT
Start: 2025-01-30 | End: 2025-01-30 | Stop reason: SDUPTHER

## 2025-01-30 RX ORDER — TRANEXAMIC ACID 100 MG/ML
INJECTION, SOLUTION INTRAVENOUS
Status: DISCONTINUED | OUTPATIENT
Start: 2025-01-30 | End: 2025-01-30

## 2025-01-30 RX ORDER — ACETAMINOPHEN 325 MG/1
650 TABLET ORAL EVERY 6 HOURS
Status: DISCONTINUED | OUTPATIENT
Start: 2025-01-30 | End: 2025-01-31 | Stop reason: HOSPADM

## 2025-01-30 RX ORDER — POLYETHYLENE GLYCOL 3350 17 G/17G
17 POWDER, FOR SOLUTION ORAL DAILY
Status: DISCONTINUED | OUTPATIENT
Start: 2025-01-30 | End: 2025-01-31 | Stop reason: HOSPADM

## 2025-01-30 RX ORDER — TRANEXAMIC ACID 100 MG/ML
INJECTION, SOLUTION INTRAVENOUS
Status: DISCONTINUED | OUTPATIENT
Start: 2025-01-30 | End: 2025-01-30 | Stop reason: SDUPTHER

## 2025-01-30 RX ORDER — SODIUM CHLORIDE, SODIUM LACTATE, POTASSIUM CHLORIDE, CALCIUM CHLORIDE 600; 310; 30; 20 MG/100ML; MG/100ML; MG/100ML; MG/100ML
INJECTION, SOLUTION INTRAVENOUS
Status: DISCONTINUED | OUTPATIENT
Start: 2025-01-30 | End: 2025-01-30 | Stop reason: SDUPTHER

## 2025-01-30 RX ORDER — OXYCODONE HYDROCHLORIDE 5 MG/1
5 TABLET ORAL EVERY 4 HOURS PRN
Status: DISCONTINUED | OUTPATIENT
Start: 2025-01-30 | End: 2025-01-31 | Stop reason: HOSPADM

## 2025-01-30 RX ORDER — SODIUM CHLORIDE 9 MG/ML
INJECTION, SOLUTION INTRAVENOUS PRN
Status: DISCONTINUED | OUTPATIENT
Start: 2025-01-30 | End: 2025-01-30 | Stop reason: HOSPADM

## 2025-01-30 RX ORDER — ACETAMINOPHEN 325 MG/1
650 TABLET ORAL ONCE
Status: DISCONTINUED | OUTPATIENT
Start: 2025-01-30 | End: 2025-01-30 | Stop reason: HOSPADM

## 2025-01-30 RX ORDER — LIDOCAINE HYDROCHLORIDE 20 MG/ML
INJECTION, SOLUTION EPIDURAL; INFILTRATION; INTRACAUDAL; PERINEURAL
Status: DISCONTINUED | OUTPATIENT
Start: 2025-01-30 | End: 2025-01-30 | Stop reason: SDUPTHER

## 2025-01-30 RX ORDER — ASPIRIN 81 MG/1
81 TABLET, CHEWABLE ORAL DAILY
Status: ON HOLD | COMMUNITY
End: 2025-01-30 | Stop reason: HOSPADM

## 2025-01-30 RX ORDER — ACETAMINOPHEN 500 MG
1000 TABLET ORAL ONCE
Status: COMPLETED | OUTPATIENT
Start: 2025-01-30 | End: 2025-01-30

## 2025-01-30 RX ORDER — OXYCODONE HYDROCHLORIDE 5 MG/1
10 TABLET ORAL EVERY 4 HOURS PRN
Status: DISCONTINUED | OUTPATIENT
Start: 2025-01-30 | End: 2025-01-31 | Stop reason: HOSPADM

## 2025-01-30 RX ORDER — SODIUM CHLORIDE 0.9 % (FLUSH) 0.9 %
5-40 SYRINGE (ML) INJECTION EVERY 12 HOURS SCHEDULED
Status: DISCONTINUED | OUTPATIENT
Start: 2025-01-30 | End: 2025-01-30 | Stop reason: HOSPADM

## 2025-01-30 RX ORDER — SODIUM CHLORIDE 9 MG/ML
INJECTION, SOLUTION INTRAVENOUS PRN
Status: DISCONTINUED | OUTPATIENT
Start: 2025-01-30 | End: 2025-01-31 | Stop reason: HOSPADM

## 2025-01-30 RX ORDER — ONDANSETRON 2 MG/ML
INJECTION INTRAMUSCULAR; INTRAVENOUS
Status: DISCONTINUED | OUTPATIENT
Start: 2025-01-30 | End: 2025-01-30 | Stop reason: SDUPTHER

## 2025-01-30 RX ORDER — SODIUM CHLORIDE 9 MG/ML
INJECTION, SOLUTION INTRAVENOUS CONTINUOUS
Status: DISCONTINUED | OUTPATIENT
Start: 2025-01-30 | End: 2025-01-31

## 2025-01-30 RX ORDER — ONDANSETRON 4 MG/1
4 TABLET, ORALLY DISINTEGRATING ORAL EVERY 8 HOURS PRN
Status: DISCONTINUED | OUTPATIENT
Start: 2025-01-30 | End: 2025-01-31 | Stop reason: HOSPADM

## 2025-01-30 RX ORDER — SODIUM CHLORIDE, SODIUM LACTATE, POTASSIUM CHLORIDE, CALCIUM CHLORIDE 600; 310; 30; 20 MG/100ML; MG/100ML; MG/100ML; MG/100ML
INJECTION, SOLUTION INTRAVENOUS CONTINUOUS
Status: DISCONTINUED | OUTPATIENT
Start: 2025-01-30 | End: 2025-01-30 | Stop reason: HOSPADM

## 2025-01-30 RX ORDER — FUROSEMIDE 20 MG/1
20 TABLET ORAL DAILY PRN
Status: DISCONTINUED | OUTPATIENT
Start: 2025-01-30 | End: 2025-01-31 | Stop reason: HOSPADM

## 2025-01-30 RX ORDER — ROCURONIUM BROMIDE 10 MG/ML
INJECTION, SOLUTION INTRAVENOUS
Status: DISCONTINUED | OUTPATIENT
Start: 2025-01-30 | End: 2025-01-30 | Stop reason: SDUPTHER

## 2025-01-30 RX ORDER — ASPIRIN 325 MG
325 TABLET, DELAYED RELEASE (ENTERIC COATED) ORAL DAILY
Status: DISCONTINUED | OUTPATIENT
Start: 2025-01-31 | End: 2025-01-31 | Stop reason: HOSPADM

## 2025-01-30 RX ORDER — FENOFIBRATE 160 MG/1
160 TABLET ORAL DAILY
Status: DISCONTINUED | OUTPATIENT
Start: 2025-01-30 | End: 2025-01-31 | Stop reason: HOSPADM

## 2025-01-30 RX ORDER — SODIUM CHLORIDE 0.9 % (FLUSH) 0.9 %
5-40 SYRINGE (ML) INJECTION PRN
Status: DISCONTINUED | OUTPATIENT
Start: 2025-01-30 | End: 2025-01-31 | Stop reason: HOSPADM

## 2025-01-30 RX ORDER — PREGABALIN 75 MG/1
75 CAPSULE ORAL EVERY 12 HOURS PRN
Qty: 28 CAPSULE | Refills: 0 | Status: SHIPPED | OUTPATIENT
Start: 2025-01-30 | End: 2025-02-13

## 2025-01-30 RX ORDER — CEFAZOLIN SODIUM/WATER 2 G/20 ML
2000 SYRINGE (ML) INTRAVENOUS EVERY 8 HOURS
Status: COMPLETED | OUTPATIENT
Start: 2025-01-30 | End: 2025-01-31

## 2025-01-30 RX ORDER — MIDAZOLAM HYDROCHLORIDE 1 MG/ML
INJECTION, SOLUTION INTRAMUSCULAR; INTRAVENOUS
Status: DISCONTINUED | OUTPATIENT
Start: 2025-01-30 | End: 2025-01-30 | Stop reason: SDUPTHER

## 2025-01-30 RX ORDER — PANTOPRAZOLE SODIUM 40 MG/1
40 TABLET, DELAYED RELEASE ORAL DAILY
Status: DISCONTINUED | OUTPATIENT
Start: 2025-01-30 | End: 2025-01-31 | Stop reason: HOSPADM

## 2025-01-30 RX ORDER — HYDROMORPHONE HYDROCHLORIDE 1 MG/ML
0.5 INJECTION, SOLUTION INTRAMUSCULAR; INTRAVENOUS; SUBCUTANEOUS EVERY 5 MIN PRN
Status: COMPLETED | OUTPATIENT
Start: 2025-01-30 | End: 2025-01-30

## 2025-01-30 RX ORDER — NITROGLYCERIN 0.4 MG/1
0.4 TABLET SUBLINGUAL EVERY 5 MIN PRN
Status: DISCONTINUED | OUTPATIENT
Start: 2025-01-30 | End: 2025-01-31 | Stop reason: HOSPADM

## 2025-01-30 RX ORDER — CEFAZOLIN SODIUM/WATER 2 G/20 ML
2000 SYRINGE (ML) INTRAVENOUS
Status: COMPLETED | OUTPATIENT
Start: 2025-01-30 | End: 2025-01-30

## 2025-01-30 RX ORDER — AMLODIPINE BESYLATE 5 MG/1
5 TABLET ORAL DAILY
Status: DISCONTINUED | OUTPATIENT
Start: 2025-01-30 | End: 2025-01-31 | Stop reason: HOSPADM

## 2025-01-30 RX ORDER — DIMENHYDRINATE 50 MG
50 TABLET ORAL ONCE
Status: COMPLETED | OUTPATIENT
Start: 2025-01-30 | End: 2025-01-30

## 2025-01-30 RX ORDER — FENTANYL CITRATE 50 UG/ML
INJECTION, SOLUTION INTRAMUSCULAR; INTRAVENOUS
Status: DISCONTINUED | OUTPATIENT
Start: 2025-01-30 | End: 2025-01-30 | Stop reason: SDUPTHER

## 2025-01-30 RX ORDER — BUPIVACAINE/KETOROLAC/KETAMINE 150-60/50
SYRINGE (ML) INJECTION
Status: DISPENSED
Start: 2025-01-30 | End: 2025-01-30

## 2025-01-30 RX ORDER — DIPHENHYDRAMINE HYDROCHLORIDE 50 MG/ML
25 INJECTION INTRAMUSCULAR; INTRAVENOUS EVERY 6 HOURS PRN
Status: DISCONTINUED | OUTPATIENT
Start: 2025-01-30 | End: 2025-01-31 | Stop reason: HOSPADM

## 2025-01-30 RX ORDER — PREGABALIN 75 MG/1
75 CAPSULE ORAL EVERY 12 HOURS PRN
Status: DISCONTINUED | OUTPATIENT
Start: 2025-01-30 | End: 2025-01-31 | Stop reason: HOSPADM

## 2025-01-30 RX ORDER — INSULIN GLARGINE 100 [IU]/ML
12 INJECTION, SOLUTION SUBCUTANEOUS NIGHTLY
Status: DISCONTINUED | OUTPATIENT
Start: 2025-01-30 | End: 2025-01-31 | Stop reason: HOSPADM

## 2025-01-30 RX ORDER — ONDANSETRON 2 MG/ML
4 INJECTION INTRAMUSCULAR; INTRAVENOUS EVERY 6 HOURS PRN
Status: DISCONTINUED | OUTPATIENT
Start: 2025-01-30 | End: 2025-01-31 | Stop reason: HOSPADM

## 2025-01-30 RX ORDER — AMLODIPINE BESYLATE 5 MG/1
5 TABLET ORAL NIGHTLY
Status: DISCONTINUED | OUTPATIENT
Start: 2025-01-30 | End: 2025-01-30

## 2025-01-30 RX ORDER — DIPHENHYDRAMINE HCL 25 MG
25 CAPSULE ORAL EVERY 6 HOURS PRN
Status: DISCONTINUED | OUTPATIENT
Start: 2025-01-30 | End: 2025-01-31 | Stop reason: HOSPADM

## 2025-01-30 RX ORDER — LISINOPRIL 20 MG/1
20 TABLET ORAL DAILY
Status: DISCONTINUED | OUTPATIENT
Start: 2025-01-30 | End: 2025-01-31 | Stop reason: HOSPADM

## 2025-01-30 RX ORDER — SENNOSIDES A AND B 8.6 MG/1
1 TABLET, FILM COATED ORAL DAILY PRN
Status: DISCONTINUED | OUTPATIENT
Start: 2025-01-30 | End: 2025-01-31 | Stop reason: HOSPADM

## 2025-01-30 RX ORDER — DEXAMETHASONE SODIUM PHOSPHATE 4 MG/ML
INJECTION, SOLUTION INTRA-ARTICULAR; INTRALESIONAL; INTRAMUSCULAR; INTRAVENOUS; SOFT TISSUE
Status: DISCONTINUED | OUTPATIENT
Start: 2025-01-30 | End: 2025-01-30 | Stop reason: SDUPTHER

## 2025-01-30 RX ORDER — NALOXONE HYDROCHLORIDE 0.4 MG/ML
INJECTION, SOLUTION INTRAMUSCULAR; INTRAVENOUS; SUBCUTANEOUS PRN
Status: DISCONTINUED | OUTPATIENT
Start: 2025-01-30 | End: 2025-01-30 | Stop reason: HOSPADM

## 2025-01-30 RX ORDER — CELECOXIB 200 MG/1
200 CAPSULE ORAL ONCE
Status: COMPLETED | OUTPATIENT
Start: 2025-01-30 | End: 2025-01-30

## 2025-01-30 RX ORDER — SODIUM CHLORIDE 0.9 % (FLUSH) 0.9 %
5-40 SYRINGE (ML) INJECTION EVERY 12 HOURS SCHEDULED
Status: DISCONTINUED | OUTPATIENT
Start: 2025-01-30 | End: 2025-01-31 | Stop reason: HOSPADM

## 2025-01-30 RX ORDER — ATORVASTATIN CALCIUM 40 MG/1
80 TABLET, FILM COATED ORAL NIGHTLY
Status: DISCONTINUED | OUTPATIENT
Start: 2025-01-30 | End: 2025-01-31 | Stop reason: HOSPADM

## 2025-01-30 RX ORDER — CLOPIDOGREL BISULFATE 75 MG/1
75 TABLET ORAL DAILY
Status: DISCONTINUED | OUTPATIENT
Start: 2025-01-30 | End: 2025-01-31 | Stop reason: HOSPADM

## 2025-01-30 RX ORDER — ONDANSETRON 2 MG/ML
4 INJECTION INTRAMUSCULAR; INTRAVENOUS
Status: DISCONTINUED | OUTPATIENT
Start: 2025-01-30 | End: 2025-01-30 | Stop reason: HOSPADM

## 2025-01-30 RX ORDER — BUPIVACAINE/KETOROLAC/KETAMINE 150-60/50
SYRINGE (ML) INJECTION PRN
Status: DISCONTINUED | OUTPATIENT
Start: 2025-01-30 | End: 2025-01-30 | Stop reason: ALTCHOICE

## 2025-01-30 RX ORDER — ASPIRIN 325 MG
325 TABLET ORAL DAILY
Qty: 30 TABLET | Refills: 0 | Status: SHIPPED | OUTPATIENT
Start: 2025-01-30 | End: 2025-03-01

## 2025-01-30 RX ORDER — ALBUTEROL SULFATE 90 UG/1
2 INHALANT RESPIRATORY (INHALATION) 4 TIMES DAILY PRN
Status: DISCONTINUED | OUTPATIENT
Start: 2025-01-30 | End: 2025-01-31 | Stop reason: HOSPADM

## 2025-01-30 RX ADMIN — OXYCODONE 10 MG: 5 TABLET ORAL at 12:05

## 2025-01-30 RX ADMIN — OXYCODONE 10 MG: 5 TABLET ORAL at 20:24

## 2025-01-30 RX ADMIN — DIMENHYDRINATE 50 MG: 50 TABLET ORAL at 06:51

## 2025-01-30 RX ADMIN — ONDANSETRON 4 MG: 2 INJECTION, SOLUTION INTRAMUSCULAR; INTRAVENOUS at 09:45

## 2025-01-30 RX ADMIN — DEXAMETHASONE SODIUM PHOSPHATE 4 MG: 4 INJECTION INTRA-ARTICULAR; INTRALESIONAL; INTRAMUSCULAR; INTRAVENOUS; SOFT TISSUE at 09:42

## 2025-01-30 RX ADMIN — RANOLAZINE 500 MG: 500 TABLET, FILM COATED, EXTENDED RELEASE ORAL at 20:25

## 2025-01-30 RX ADMIN — ROCURONIUM BROMIDE 50 MG: 10 INJECTION, SOLUTION INTRAVENOUS at 07:41

## 2025-01-30 RX ADMIN — FENTANYL CITRATE 100 MCG: 50 INJECTION INTRAMUSCULAR; INTRAVENOUS at 07:40

## 2025-01-30 RX ADMIN — CELECOXIB 200 MG: 200 CAPSULE ORAL at 06:51

## 2025-01-30 RX ADMIN — OXYCODONE 10 MG: 5 TABLET ORAL at 16:10

## 2025-01-30 RX ADMIN — AMLODIPINE BESYLATE 5 MG: 5 TABLET ORAL at 12:31

## 2025-01-30 RX ADMIN — Medication 1000 MG: at 09:55

## 2025-01-30 RX ADMIN — ACETAMINOPHEN 650 MG: 325 TABLET ORAL at 12:31

## 2025-01-30 RX ADMIN — SUGAMMADEX 200 MG: 100 INJECTION, SOLUTION INTRAVENOUS at 09:51

## 2025-01-30 RX ADMIN — ATORVASTATIN CALCIUM 80 MG: 40 TABLET, FILM COATED ORAL at 20:23

## 2025-01-30 RX ADMIN — ISOSORBIDE MONONITRATE 30 MG: 30 TABLET, EXTENDED RELEASE ORAL at 12:31

## 2025-01-30 RX ADMIN — SODIUM CHLORIDE: 9 INJECTION, SOLUTION INTRAVENOUS at 12:37

## 2025-01-30 RX ADMIN — SODIUM CHLORIDE, POTASSIUM CHLORIDE, SODIUM LACTATE AND CALCIUM CHLORIDE: 600; 310; 30; 20 INJECTION, SOLUTION INTRAVENOUS at 07:30

## 2025-01-30 RX ADMIN — ROCURONIUM BROMIDE 50 MG: 10 INJECTION, SOLUTION INTRAVENOUS at 08:40

## 2025-01-30 RX ADMIN — PROPOFOL 200 MG: 10 INJECTION, EMULSION INTRAVENOUS at 07:40

## 2025-01-30 RX ADMIN — LISINOPRIL 20 MG: 20 TABLET ORAL at 12:31

## 2025-01-30 RX ADMIN — ACETAMINOPHEN 650 MG: 325 TABLET ORAL at 17:00

## 2025-01-30 RX ADMIN — CLOPIDOGREL BISULFATE 75 MG: 75 TABLET ORAL at 20:24

## 2025-01-30 RX ADMIN — METFORMIN HYDROCHLORIDE 1000 MG: 500 TABLET ORAL at 12:31

## 2025-01-30 RX ADMIN — Medication 2000 MG: at 07:46

## 2025-01-30 RX ADMIN — INSULIN GLARGINE 12 UNITS: 100 INJECTION, SOLUTION SUBCUTANEOUS at 20:25

## 2025-01-30 RX ADMIN — PREGABALIN 75 MG: 75 CAPSULE ORAL at 22:47

## 2025-01-30 RX ADMIN — HYDROMORPHONE HYDROCHLORIDE 0.5 MG: 1 INJECTION, SOLUTION INTRAMUSCULAR; INTRAVENOUS; SUBCUTANEOUS at 10:39

## 2025-01-30 RX ADMIN — PANTOPRAZOLE SODIUM 40 MG: 40 TABLET, DELAYED RELEASE ORAL at 12:31

## 2025-01-30 RX ADMIN — FENTANYL CITRATE 100 MCG: 50 INJECTION INTRAMUSCULAR; INTRAVENOUS at 09:43

## 2025-01-30 RX ADMIN — Medication 2000 MG: at 17:01

## 2025-01-30 RX ADMIN — TRANEXAMIC ACID 1000 MG: 100 INJECTION, SOLUTION INTRAVENOUS at 10:00

## 2025-01-30 RX ADMIN — FENOFIBRATE 160 MG: 160 TABLET ORAL at 20:23

## 2025-01-30 RX ADMIN — METFORMIN HYDROCHLORIDE 1000 MG: 500 TABLET ORAL at 17:01

## 2025-01-30 RX ADMIN — TRANEXAMIC ACID 1000 MG: 100 INJECTION, SOLUTION INTRAVENOUS at 07:50

## 2025-01-30 RX ADMIN — ACETAMINOPHEN 1000 MG: 500 TABLET ORAL at 06:51

## 2025-01-30 RX ADMIN — METOPROLOL SUCCINATE 50 MG: 50 TABLET, EXTENDED RELEASE ORAL at 20:23

## 2025-01-30 RX ADMIN — LIDOCAINE HYDROCHLORIDE 40 MG: 20 INJECTION, SOLUTION EPIDURAL; INFILTRATION; INTRACAUDAL; PERINEURAL at 07:40

## 2025-01-30 RX ADMIN — HYDROMORPHONE HYDROCHLORIDE 0.5 MG: 1 INJECTION, SOLUTION INTRAMUSCULAR; INTRAVENOUS; SUBCUTANEOUS at 10:18

## 2025-01-30 RX ADMIN — MIDAZOLAM 2 MG: 1 INJECTION INTRAMUSCULAR; INTRAVENOUS at 07:40

## 2025-01-30 RX ADMIN — SODIUM CHLORIDE, PRESERVATIVE FREE 10 ML: 5 INJECTION INTRAVENOUS at 21:47

## 2025-01-30 RX ADMIN — SUCRALFATE 1 G: 1 TABLET ORAL at 17:01

## 2025-01-30 RX ADMIN — RANOLAZINE 500 MG: 500 TABLET, FILM COATED, EXTENDED RELEASE ORAL at 12:36

## 2025-01-30 ASSESSMENT — PAIN DESCRIPTION - FREQUENCY
FREQUENCY: CONTINUOUS

## 2025-01-30 ASSESSMENT — PAIN DESCRIPTION - DESCRIPTORS
DESCRIPTORS: SHARP
DESCRIPTORS: SHARP
DESCRIPTORS: SHARP;SPASM
DESCRIPTORS: SHARP
DESCRIPTORS: ACHING
DESCRIPTORS: SHARP
DESCRIPTORS: SHARP;SHOOTING
DESCRIPTORS: SHARP
DESCRIPTORS: SHARP;SHOOTING
DESCRIPTORS: SHARP
DESCRIPTORS: SHARP
DESCRIPTORS: SHOOTING;SHARP
DESCRIPTORS: SHARP
DESCRIPTORS: SHARP;SHOOTING

## 2025-01-30 ASSESSMENT — PAIN - FUNCTIONAL ASSESSMENT
PAIN_FUNCTIONAL_ASSESSMENT: ACTIVITIES ARE NOT PREVENTED
PAIN_FUNCTIONAL_ASSESSMENT: 0-10
PAIN_FUNCTIONAL_ASSESSMENT: 0-10
PAIN_FUNCTIONAL_ASSESSMENT: ACTIVITIES ARE NOT PREVENTED
PAIN_FUNCTIONAL_ASSESSMENT: 0-10
PAIN_FUNCTIONAL_ASSESSMENT: 0-10
PAIN_FUNCTIONAL_ASSESSMENT: ACTIVITIES ARE NOT PREVENTED
PAIN_FUNCTIONAL_ASSESSMENT: 0-10

## 2025-01-30 ASSESSMENT — PAIN DESCRIPTION - ORIENTATION
ORIENTATION: RIGHT

## 2025-01-30 ASSESSMENT — PAIN DESCRIPTION - ONSET
ONSET: ON-GOING

## 2025-01-30 ASSESSMENT — PAIN DESCRIPTION - LOCATION
LOCATION: HIP

## 2025-01-30 ASSESSMENT — PAIN SCALES - GENERAL
PAINLEVEL_OUTOF10: 8
PAINLEVEL_OUTOF10: 7
PAINLEVEL_OUTOF10: 7
PAINLEVEL_OUTOF10: 8
PAINLEVEL_OUTOF10: 6
PAINLEVEL_OUTOF10: 8
PAINLEVEL_OUTOF10: 6
PAINLEVEL_OUTOF10: 9
PAINLEVEL_OUTOF10: 8

## 2025-01-30 ASSESSMENT — PAIN DESCRIPTION - PAIN TYPE
TYPE: SURGICAL PAIN
TYPE: CHRONIC PAIN
TYPE: SURGICAL PAIN

## 2025-01-30 ASSESSMENT — ENCOUNTER SYMPTOMS: SHORTNESS OF BREATH: 1

## 2025-01-30 NOTE — OP NOTE
Department of Orthopaedic Surgery  Operative Report      Patient:  Franky Melendez    YOB: 1975    MRN:  [id number]    Date of Surgery:  1/30/2025    Pre-operative Diagnosis:    1) Right hip osteoarthritis  2) Right hip pain     Post-operative Diagnosis:    1) Right hip osteoarthritis  2) Right hip pain     Procedure: Right total hip arthroplasty     Surgeon:  Elliot Jacobs MD    Assistant(s):   First Assistant: Kenia Monterroso PA-C, assisted throughout the procedure with positioning, draping, retraction, wound closure, and dressing application.    Anesthesia: General     Estimated blood loss:  300 ml     Specimens:  Femoral head and acetabulum reamings disposed of     Fluids: 1000 ml crystalloid     Findings:  Bone on bone osteoarthritis, superolateral wear.     Complications: None     Condition:  Stable     Indications for Surgery:  Patient is a 50 y/o male who presented with continued right hip pain with ambulation despite nonoperative interventions. Pain was affecting his quality of life and ADLs. Radiographs demonstrated end-stage osteoarthritis of the right hip. Given patient's continued pain, discussion was had with patient regarding total hip arthroplasty.  Discussed procedure, risks, benefits, and alternatives including but not limited to bleeding, infection, neurovascular injury, hardware failure, dislocation, leg length discrepancy, fracture, continued pain, DVT, need for additional surgery and risk of anesthesia. Patient understood the risks and consented to proceed with surgery.       Implants:    Biomet Echo micro 11 high offset femur  52 mm G7 acetabular shell  Neutral liner  36mm x +0mm head ceramic  6.5 mm screw x2     Procedure:  Patient was identified and greeted in the preoperative holding area. The correct surgical site was identified and marked. Surgical consent was obtained and placed on the chart. Patient was taken to the surgical suite and transferred to the operating room table.

## 2025-01-30 NOTE — ANESTHESIA PRE PROCEDURE
Department of Anesthesiology  Preprocedure Note       Name:  Franky Melendez   Age:  49 y.o.  :  1975                                          MRN:  [id number]         Date:  2025      Surgeon: Surgeon(s):  Elliot Jacobs MD    Procedure: Procedure(s):  HIP TOTAL ARTHROPLASTY ANTERIOR APPROACH    Medications prior to admission:   Prior to Admission medications    Medication Sig Start Date End Date Taking? Authorizing Provider   aspirin 81 MG chewable tablet Take 1 tablet by mouth daily   Yes ProviderRoxanna MD   isosorbide mononitrate (IMDUR) 30 MG extended release tablet Take 1 tablet by mouth daily 25  Yes Elijah Orozco MD   pantoprazole (PROTONIX) 40 MG tablet Take 1 tablet by mouth daily 25  Yes Curtis Chavarria MD   amLODIPine (NORVASC) 5 MG tablet Take 1 tablet by mouth nightly 24  Yes Germania Mane, APRN - CNP   atorvastatin (LIPITOR) 80 MG tablet Take 1 tablet by mouth at bedtime 10/30/24  Yes Mellissa Rodriguez PA-C   lisinopril (PRINIVIL;ZESTRIL) 20 MG tablet Take 1 tablet by mouth daily 24  Yes Elijah Orozco MD   ranolazine (RANEXA) 500 MG extended release tablet Take twice daily 8/15/24  Yes Elijah Orozco MD   fenofibrate (TRIGLIDE) 160 MG tablet Take 1 tablet by mouth daily 24  Yes Elijah Orozco MD   metoprolol succinate (TOPROL XL) 50 MG extended release tablet Take 1 tablet by mouth daily 24  Yes Elijah Orozco MD   sucralfate (CARAFATE) 1 GM tablet Take 1 tablet by mouth 2 times daily (before meals) 5-60\" before lunch and at bedtime 24  Yes Christophe Thurston MD   HYDROcodone-acetaminophen (NORCO)  MG per tablet Take 1 tablet by mouth every 6 hours as needed for Pain for up to 14 days. Intended supply: 30 days Max Daily Amount: 4 tablets 25  Curtis Chavarria MD   Insulin Glargine-yfgn (SEMGLEE, YFGN,) 100 UNIT/ML SOPN Inject 12 Units into the skin at bedtime 24   Germania Mane, APRN - CNP   metFORMIN

## 2025-01-30 NOTE — ANESTHESIA PRE PROCEDURE
Department of Anesthesiology  Preprocedure Note       Name:  Franky Melendez   Age:  49 y.o.  :  1975                                          MRN:  [id number]         Date:  2025      Surgeon: Surgeon(s):  Elliot Jacobs MD    Procedure: Procedure(s):  HIP TOTAL ARTHROPLASTY ANTERIOR APPROACH    Medications prior to admission:   Prior to Admission medications    Medication Sig Start Date End Date Taking? Authorizing Provider   aspirin 81 MG chewable tablet Take 1 tablet by mouth daily   Yes ProviderRoxanna MD   isosorbide mononitrate (IMDUR) 30 MG extended release tablet Take 1 tablet by mouth daily 25  Yes Elijah Orozco MD   pantoprazole (PROTONIX) 40 MG tablet Take 1 tablet by mouth daily 25  Yes Curtis Chavarria MD   amLODIPine (NORVASC) 5 MG tablet Take 1 tablet by mouth nightly 24  Yes Germania Mane, APRN - CNP   atorvastatin (LIPITOR) 80 MG tablet Take 1 tablet by mouth at bedtime 10/30/24  Yes Mellissa Rodriguez PA-C   lisinopril (PRINIVIL;ZESTRIL) 20 MG tablet Take 1 tablet by mouth daily 24  Yes Elijah Orozco MD   ranolazine (RANEXA) 500 MG extended release tablet Take twice daily 8/15/24  Yes Elijah Orozco MD   fenofibrate (TRIGLIDE) 160 MG tablet Take 1 tablet by mouth daily 24  Yes Elijah Orozco MD   metoprolol succinate (TOPROL XL) 50 MG extended release tablet Take 1 tablet by mouth daily 24  Yes Elijah Orozco MD   sucralfate (CARAFATE) 1 GM tablet Take 1 tablet by mouth 2 times daily (before meals) 5-60\" before lunch and at bedtime 24  Yes Christophe Thurston MD   HYDROcodone-acetaminophen (NORCO)  MG per tablet Take 1 tablet by mouth every 6 hours as needed for Pain for up to 14 days. Intended supply: 30 days Max Daily Amount: 4 tablets 25  Curtis Chavarria MD   Insulin Glargine-yfgn (SEMGLEE, YFGN,) 100 UNIT/ML SOPN Inject 12 Units into the skin at bedtime 24   Germania Mane, APRN - CNP   metFORMIN

## 2025-01-30 NOTE — ANESTHESIA POSTPROCEDURE EVALUATION
Department of Anesthesiology  Postprocedure Note    Patient: Franky Melendez  MRN: [id number]  YOB: 1975  Date of evaluation: 1/30/2025    Procedure Summary       Date: 01/30/25 Room / Location: [address]    Anesthesia Start: 0744 Anesthesia Stop: 1010    Procedure: HIP TOTAL ARTHROPLASTY ANTERIOR APPROACH (Right: Hip) Diagnosis:       Right hip pain      (Right hip pain [M25.551])    Surgeons: Elliot Jacobs MD Responsible Provider: Kip Younger APRN - CRNA    Anesthesia Type: Not recorded ASA Status: Not recorded            Anesthesia Type: No value filed.    Yrn Phase I: Yrn Score: 10    Yrn Phase II:      Anesthesia Post Evaluation    Patient location during evaluation: bedside  Patient participation: complete - patient participated  Level of consciousness: awake  Pain score: 4  Airway patency: patent  Nausea & Vomiting: no nausea and no vomiting  Cardiovascular status: blood pressure returned to baseline  Respiratory status: acceptable  Hydration status: stable  Pain management: adequate    No notable events documented.

## 2025-01-30 NOTE — PROGRESS NOTES
Patient transported via bed to [facility] Room 311 with 2 RNs and belongings. Bedside report given to ASHWIN Murphy. Vitals stable, questions answered.        Discharge Criteria    Inpatients must meet Criteria 1 through 7. All other patients are either YES or N/A. If a NO is chosen then Anesthesia or Surgeon must be notified.      1.  Minimum 30 minutes after last dose of sedative medication.    Yes      2.  Systolic BP between 90 - 160. Diastolic BP between 60 - 90.    Yes      3.  Pulse between 60 - 120    Yes      4.  Respirations between 8 - 25.    Yes      5.  SpO2 92% - 100%.    Yes      6.  Able to cough and swallow or return to baseline function.    Yes      7.  Alert and oriented or return to baseline mental status.    Yes      8.  Demonstrates controlled, coordinated movements, ambulates with steady gait, or return to baseline activity function.    N/A      9.  Minimal or no pain or nausea, or at a level tolerable and acceptable to patient.    N/A      10. Takes and retains oral fluids as allowed.    N/A      11. Procedural / perioperative site stable.  Minimal or no bleeding.    N/A          12. If GI endoscopy procedure, minimal or no abdominal distention or passing flatus.    N/A      13. Written discharge instructions and emergency telephone number provided.    N/A      14. Accompanied by a responsible adult.    N/A

## 2025-01-30 NOTE — PROGRESS NOTES
Occupational Therapy  Facility/Department: [facility] MED SURG  Occupational Therapy Initial Assessment    Name: Franky Melendez  : 1975  MRN: [id number]  Date of Service: 2025    Discharge Recommendations:  Continue to assess pending progress          Patient Diagnosis(es): The encounter diagnosis was S/P total right hip arthroplasty.  Past Medical History:  has a past medical history of CAD (coronary artery disease), Carpal tunnel syndrome, Depressive disorder, not elsewhere classified, Diabetes mellitus (HCC), Gastrointestinal hemorrhage with hematemesis, GERD (gastroesophageal reflux disease), Heart attack (HCC), History of echocardiogram, History of pancreatitis, Hyperlipidemia, Hypertension, and PTSD (post-traumatic stress disorder).  Past Surgical History:  has a past surgical history that includes Coronary angioplasty (2018); Cardiac catheterization (Left, 2018); Coronary artery bypass graft (2018); Cardiac catheterization (Left, 2022); Colonoscopy (N/A, 10/04/2022); Upper gastrointestinal endoscopy (N/A, 10/04/2022); Colonoscopy (10/04/2022); Esophagogastroduodenoscopy (10/04/2022); Upper gastrointestinal endoscopy (N/A, 2023); Esophagogastroduodenoscopy (2023); Cardiac procedure (N/A, 2023); Shoulder arthroscopy (Right, 2023); Medication Injection (Bilateral, 2023); Upper gastrointestinal endoscopy (N/A, 2024); Cardiac procedure (N/A, 2024); Cholecystectomy, laparoscopic (N/A, 2024); Upper gastrointestinal endoscopy (N/A, 2024); Total hip arthroplasty (Left, 10/31/2024); Cardiac catheterization (Left, 2024); and Cardiac procedure (N/A, 2024).    Treatment Diagnosis: Weakness      Assessment  Performance deficits / Impairments: Decreased functional mobility ;Decreased endurance;Decreased ADL status;Decreased safe awareness;Decreased high-level IADLs;Decreased balance;Decreased strength  Assessment: Pt is 50 yo male

## 2025-01-30 NOTE — ADDENDUM NOTE
Addendum  created 01/30/25 1100 by Jes Gómez APRN - KALIA    Attestation recorded in Intraprocedure, Intraprocedure Attestations filed

## 2025-01-30 NOTE — PROGRESS NOTES
Physical Therapy  Facility/Department: [facility] MED SURG  Physical Therapy Initial Assessment    Name: Franky Melendez  : 1975  MRN: [id number]  Date of Service: 2025    Discharge Recommendations:  Continue to assess pending progress, Outpatient PT, Home with assist PRN   PT Equipment Recommendations  Equipment Needed: No      Patient Diagnosis(es): The encounter diagnosis was S/P total right hip arthroplasty.  Past Medical History:  has a past medical history of CAD (coronary artery disease), Carpal tunnel syndrome, Depressive disorder, not elsewhere classified, Diabetes mellitus (HCC), Gastrointestinal hemorrhage with hematemesis, GERD (gastroesophageal reflux disease), Heart attack (HCC), History of echocardiogram, History of pancreatitis, Hyperlipidemia, Hypertension, and PTSD (post-traumatic stress disorder).  Past Surgical History:  has a past surgical history that includes Coronary angioplasty (2018); Cardiac catheterization (Left, 2018); Coronary artery bypass graft (); Cardiac catheterization (Left, 2022); Colonoscopy (N/A, 10/04/2022); Upper gastrointestinal endoscopy (N/A, 10/04/2022); Colonoscopy (10/04/2022); Esophagogastroduodenoscopy (10/04/2022); Upper gastrointestinal endoscopy (N/A, 2023); Esophagogastroduodenoscopy (2023); Cardiac procedure (N/A, 2023); Shoulder arthroscopy (Right, 2023); Medication Injection (Bilateral, 2023); Upper gastrointestinal endoscopy (N/A, 2024); Cardiac procedure (N/A, 2024); Cholecystectomy, laparoscopic (N/A, 2024); Upper gastrointestinal endoscopy (N/A, 2024); Total hip arthroplasty (Left, 10/31/2024); Cardiac catheterization (Left, 2024); and Cardiac procedure (N/A, 2024).    Assessment  Body Structures, Functions, Activity Limitations Requiring Skilled Therapeutic Intervention: Decreased functional mobility ;Decreased ADL status;Decreased ROM;Decreased strength;Decreased

## 2025-01-30 NOTE — ANESTHESIA PRE PROCEDURE
Department of Anesthesiology  Preprocedure Note       Name:  Franky Melendez   Age:  49 y.o.  :  1975                                          MRN:  [id number]         Date:  2025      Surgeon: Surgeon(s):  Elliot Jacobs MD    Procedure: Procedure(s):  HIP TOTAL ARTHROPLASTY ANTERIOR APPROACH    Medications prior to admission:   Prior to Admission medications    Medication Sig Start Date End Date Taking? Authorizing Provider   aspirin 81 MG chewable tablet Take 1 tablet by mouth daily   Yes ProviderRoxanna MD   isosorbide mononitrate (IMDUR) 30 MG extended release tablet Take 1 tablet by mouth daily 25  Yes Elijah Orozco MD   pantoprazole (PROTONIX) 40 MG tablet Take 1 tablet by mouth daily 25  Yes Curtis Chavarria MD   amLODIPine (NORVASC) 5 MG tablet Take 1 tablet by mouth nightly 24  Yes Germania Mane, APRN - CNP   atorvastatin (LIPITOR) 80 MG tablet Take 1 tablet by mouth at bedtime 10/30/24  Yes Mellissa Rodriguez PA-C   lisinopril (PRINIVIL;ZESTRIL) 20 MG tablet Take 1 tablet by mouth daily 24  Yes Elijah Orozco MD   ranolazine (RANEXA) 500 MG extended release tablet Take twice daily 8/15/24  Yes Elijah Orozco MD   fenofibrate (TRIGLIDE) 160 MG tablet Take 1 tablet by mouth daily 24  Yes Elijah Orozco MD   metoprolol succinate (TOPROL XL) 50 MG extended release tablet Take 1 tablet by mouth daily 24  Yes Elijah Orozco MD   sucralfate (CARAFATE) 1 GM tablet Take 1 tablet by mouth 2 times daily (before meals) 5-60\" before lunch and at bedtime 24  Yes Christophe Thurston MD   HYDROcodone-acetaminophen (NORCO)  MG per tablet Take 1 tablet by mouth every 6 hours as needed for Pain for up to 14 days. Intended supply: 30 days Max Daily Amount: 4 tablets 25  Curtis Chavarria MD   Insulin Glargine-yfgn (SEMGLEE, YFGN,) 100 UNIT/ML SOPN Inject 12 Units into the skin at bedtime 24   Germania Maen, APRN - CNP   metFORMIN

## 2025-01-30 NOTE — DISCHARGE INSTRUCTIONS
Total Joint Replacement  Discharge Instructions    To prevent Clot formation, you have been placed on an anticoagulant  Surgical Site Care:  Dressing to remain clean, dry, and intact until follow up in office in 2 weeks  Sutures may be present and will likely be removed at 2 week follow up   Showering is permitted with use of CHG soap over dressing as long as dressing remains fully intact. Do no shower if dressing is peeling or compromised  Physical Therapy:  Full Weight Bearing Status  Precautions   Per Physical Therapy handout  Pain Medications  You were given pain medication  Wean off pain medications as you deem appropriate as long as pain is under control  Cold packs/Ice packs/Machine  May be used 3 times daily for 15-30 minutes as necessary  Be sure to have a barrier (cloth, clothing, towel) between the site and the ice pack to prevent frostbite  Contact [facility] office if  Increased redness, swelling, drainage of any kind, and/or pain to surgery site.  As well as new onset fevers and or chills.  These could signify an infection.  Calf or thigh tenderness to touch as well as increased swelling or redness.  This could signify a clot formation.  Numbness or tingling to an area around the incision site or below the incision site (toes).  Any rash appears, increased  or new onset nausea/vomiting occur.  This may indicate a reaction to a medication.   Phone # [phone] - [facility]  Continue to use incentive spirometer frequently during the day  Follow up with Surgeon at scheduled appointment time.

## 2025-01-31 VITALS
RESPIRATION RATE: 18 BRPM | HEIGHT: 67 IN | SYSTOLIC BLOOD PRESSURE: 106 MMHG | OXYGEN SATURATION: 97 % | BODY MASS INDEX: 24.83 KG/M2 | TEMPERATURE: 97.6 F | WEIGHT: 158.2 LBS | DIASTOLIC BLOOD PRESSURE: 71 MMHG | HEART RATE: 82 BPM

## 2025-01-31 LAB
ANION GAP SERPL CALCULATED.3IONS-SCNC: 9 MMOL/L (ref 9–16)
BUN SERPL-MCNC: 17 MG/DL (ref 6–20)
BUN/CREAT SERPL: 19 (ref 9–20)
CALCIUM SERPL-MCNC: 8.2 MG/DL (ref 8.6–10.4)
CHLORIDE SERPL-SCNC: 107 MMOL/L (ref 98–107)
CO2 SERPL-SCNC: 25 MMOL/L (ref 20–31)
CREAT SERPL-MCNC: 0.9 MG/DL (ref 0.7–1.2)
GFR, ESTIMATED: >90 ML/MIN/1.73M2
GLUCOSE BLD-MCNC: 118 MG/DL (ref 74–100)
GLUCOSE SERPL-MCNC: 180 MG/DL (ref 74–99)
HCT VFR BLD AUTO: 29.8 % (ref 40.7–50.3)
HGB BLD-MCNC: 10.1 G/DL (ref 13–17)
POTASSIUM SERPL-SCNC: 3.5 MMOL/L (ref 3.7–5.3)
SODIUM SERPL-SCNC: 141 MMOL/L (ref 136–145)

## 2025-01-31 PROCEDURE — 96374 THER/PROPH/DIAG INJ IV PUSH: CPT

## 2025-01-31 PROCEDURE — 85018 HEMOGLOBIN: CPT

## 2025-01-31 PROCEDURE — 97530 THERAPEUTIC ACTIVITIES: CPT

## 2025-01-31 PROCEDURE — 82947 ASSAY GLUCOSE BLOOD QUANT: CPT

## 2025-01-31 PROCEDURE — 6360000002 HC RX W HCPCS: Performed by: PHYSICIAN ASSISTANT

## 2025-01-31 PROCEDURE — G0378 HOSPITAL OBSERVATION PER HR: HCPCS

## 2025-01-31 PROCEDURE — 97116 GAIT TRAINING THERAPY: CPT

## 2025-01-31 PROCEDURE — 6370000000 HC RX 637 (ALT 250 FOR IP): Performed by: STUDENT IN AN ORGANIZED HEALTH CARE EDUCATION/TRAINING PROGRAM

## 2025-01-31 PROCEDURE — 80048 BASIC METABOLIC PNL TOTAL CA: CPT

## 2025-01-31 PROCEDURE — 2500000003 HC RX 250 WO HCPCS: Performed by: PHYSICIAN ASSISTANT

## 2025-01-31 PROCEDURE — 6370000000 HC RX 637 (ALT 250 FOR IP): Performed by: ORTHOPAEDIC SURGERY

## 2025-01-31 PROCEDURE — 85014 HEMATOCRIT: CPT

## 2025-01-31 PROCEDURE — 36415 COLL VENOUS BLD VENIPUNCTURE: CPT

## 2025-01-31 PROCEDURE — 6370000000 HC RX 637 (ALT 250 FOR IP): Performed by: PHYSICIAN ASSISTANT

## 2025-01-31 RX ORDER — GLUCAGON 1 MG/ML
1 KIT INJECTION PRN
Status: DISCONTINUED | OUTPATIENT
Start: 2025-01-31 | End: 2025-01-31 | Stop reason: HOSPADM

## 2025-01-31 RX ORDER — KETOROLAC TROMETHAMINE 15 MG/ML
15 INJECTION, SOLUTION INTRAMUSCULAR; INTRAVENOUS EVERY 6 HOURS
Status: DISCONTINUED | OUTPATIENT
Start: 2025-01-31 | End: 2025-01-31 | Stop reason: HOSPADM

## 2025-01-31 RX ORDER — POTASSIUM CHLORIDE 1500 MG/1
40 TABLET, EXTENDED RELEASE ORAL ONCE
Status: COMPLETED | OUTPATIENT
Start: 2025-01-31 | End: 2025-01-31

## 2025-01-31 RX ORDER — KETOROLAC TROMETHAMINE 15 MG/ML
15 INJECTION, SOLUTION INTRAMUSCULAR; INTRAVENOUS EVERY 6 HOURS
Status: DISCONTINUED | OUTPATIENT
Start: 2025-01-31 | End: 2025-01-31

## 2025-01-31 RX ORDER — DEXTROSE MONOHYDRATE 100 MG/ML
INJECTION, SOLUTION INTRAVENOUS CONTINUOUS PRN
Status: DISCONTINUED | OUTPATIENT
Start: 2025-01-31 | End: 2025-01-31 | Stop reason: HOSPADM

## 2025-01-31 RX ADMIN — SUCRALFATE 1 G: 1 TABLET ORAL at 08:10

## 2025-01-31 RX ADMIN — METOPROLOL SUCCINATE 50 MG: 50 TABLET, EXTENDED RELEASE ORAL at 08:12

## 2025-01-31 RX ADMIN — OXYCODONE 10 MG: 5 TABLET ORAL at 10:16

## 2025-01-31 RX ADMIN — ACETAMINOPHEN 650 MG: 325 TABLET ORAL at 11:07

## 2025-01-31 RX ADMIN — OXYCODONE 10 MG: 5 TABLET ORAL at 05:40

## 2025-01-31 RX ADMIN — Medication 2000 MG: at 01:16

## 2025-01-31 RX ADMIN — SODIUM CHLORIDE, PRESERVATIVE FREE 10 ML: 5 INJECTION INTRAVENOUS at 11:09

## 2025-01-31 RX ADMIN — PANTOPRAZOLE SODIUM 40 MG: 40 TABLET, DELAYED RELEASE ORAL at 08:13

## 2025-01-31 RX ADMIN — ASPIRIN 325 MG: 325 TABLET, COATED ORAL at 08:12

## 2025-01-31 RX ADMIN — RANOLAZINE 500 MG: 500 TABLET, FILM COATED, EXTENDED RELEASE ORAL at 08:10

## 2025-01-31 RX ADMIN — POTASSIUM CHLORIDE 40 MEQ: 1500 TABLET, EXTENDED RELEASE ORAL at 08:09

## 2025-01-31 RX ADMIN — METFORMIN HYDROCHLORIDE 1000 MG: 500 TABLET ORAL at 08:16

## 2025-01-31 RX ADMIN — OXYCODONE 10 MG: 5 TABLET ORAL at 01:12

## 2025-01-31 RX ADMIN — LISINOPRIL 20 MG: 20 TABLET ORAL at 08:11

## 2025-01-31 RX ADMIN — CLOPIDOGREL BISULFATE 75 MG: 75 TABLET ORAL at 08:13

## 2025-01-31 RX ADMIN — Medication 2000 MG: at 11:09

## 2025-01-31 RX ADMIN — AMLODIPINE BESYLATE 5 MG: 5 TABLET ORAL at 08:11

## 2025-01-31 RX ADMIN — SODIUM CHLORIDE, PRESERVATIVE FREE 10 ML: 5 INJECTION INTRAVENOUS at 08:17

## 2025-01-31 RX ADMIN — ISOSORBIDE MONONITRATE 30 MG: 30 TABLET, EXTENDED RELEASE ORAL at 08:11

## 2025-01-31 RX ADMIN — FENOFIBRATE 160 MG: 160 TABLET ORAL at 08:10

## 2025-01-31 RX ADMIN — KETOROLAC TROMETHAMINE 15 MG: 15 INJECTION, SOLUTION INTRAMUSCULAR; INTRAVENOUS at 08:17

## 2025-01-31 ASSESSMENT — PAIN - FUNCTIONAL ASSESSMENT
PAIN_FUNCTIONAL_ASSESSMENT: ACTIVITIES ARE NOT PREVENTED

## 2025-01-31 ASSESSMENT — PAIN SCALES - GENERAL
PAINLEVEL_OUTOF10: 8
PAINLEVEL_OUTOF10: 8
PAINLEVEL_OUTOF10: 7
PAINLEVEL_OUTOF10: 8
PAINLEVEL_OUTOF10: 9

## 2025-01-31 ASSESSMENT — PAIN DESCRIPTION - ORIENTATION
ORIENTATION: RIGHT

## 2025-01-31 ASSESSMENT — PAIN DESCRIPTION - DESCRIPTORS
DESCRIPTORS: SHARP;SHOOTING
DESCRIPTORS: SHOOTING;STABBING;ACHING
DESCRIPTORS: SHARP
DESCRIPTORS: SHARP
DESCRIPTORS: ACHING;SHARP

## 2025-01-31 ASSESSMENT — PAIN DESCRIPTION - LOCATION
LOCATION: HIP

## 2025-01-31 ASSESSMENT — PAIN DESCRIPTION - FREQUENCY: FREQUENCY: CONTINUOUS

## 2025-01-31 ASSESSMENT — PAIN DESCRIPTION - ONSET: ONSET: ON-GOING

## 2025-01-31 ASSESSMENT — PAIN DESCRIPTION - PAIN TYPE: TYPE: SURGICAL PAIN

## 2025-01-31 NOTE — DISCHARGE SUMMARY
Orthopaedic Discharge Summary      Patient Identification:   Franky Melendez   : 1975  MRN: [id number]   Account: [id number]      Patient's PCP: Curtis Chavarria MD    Admit Date: 2025     Discharge Date:   2025    Admitting Physician: Elliot Jacobs MD     Discharge Physician: WILMER Wallace     Discharge Diagnoses:    Active Hospital Problems    Diagnosis Date Noted    S/P total right hip arthroplasty [Z96.641] 2025    Type 2 diabetes mellitus (HCC) [E11.9] 10/31/2024    ASHD (arteriosclerotic heart disease) /  CABG  [I25.10] 2019    Dyslipidemia [E78.5]     S/P CABG (coronary artery bypass graft) [Z95.1]     HTN (hypertension) [I10] 2015       The patient was seen and examined on day of discharge and this discharge summary is in conjunction with any daily progress note from day of discharge.    Operation Performed:  right Total hip arthroplasty    Hospital Course:   Franky Melendez is a 49 y.o. male admitted to [facility] on 2025 for pain control and IV antibiotics s/p right total hip arthroplasty.    Post-operatively the patient’s diet was advanced as tolerated and their dressing remained clean, dry and intact with no signs of infection.  The patient remained neurovascularly intact in the lower extremity and had intact pulses distally.  Patient’s calf remained soft and showed no evidence of DVT.  The patient was able to move their leg and ankle/foot without any problems post-operatively.  Physical therapy and occupational therapy were consulted and began working with the patient post-operatively.  The patient progressed with PT/OT as would be expected and continued to improve through their stay.  The patients pain was initially controlled with IV medications but we were able to transition to oral pain medications soon after arrival to the floor and their pain remained under good control through their hospital stay.  From a medical standpoint the patient

## 2025-01-31 NOTE — PROGRESS NOTES
Physical Therapy  Facility/Department: [facility] MED SURG  Daily Treatment Note  NAME: Franky Melendez  : 1975  MRN: [id number]    Date of Service: 2025    Discharge Recommendations:  Continue to assess pending progress, Outpatient PT, Home with assist PRN     Patient Diagnosis(es): The encounter diagnosis was S/P total right hip arthroplasty.    Assessment  Assessment: Pt. ambulated [id number] with FWW and SBA for safety. Slow cadance with decreased R step length noted. No LOB or RB needed throughout. Pt. completed stair training of 4 steps x1 with B HR and SBA for safety--good carryover noted. Bed mobility:SBA/Stephane--for LE's d/t pain. Transfers:SBA. Reviewed HEP, icing and elevation with good understanding. No further questions or concerns at this time.  Activity Tolerance: Patient tolerated treatment well    Plan  Physical Therapy Plan  General Plan: 2 times a day 7 days a week  Specific Instructions for Next Treatment: 1x/ daily on weekends  Current Treatment Recommendations: Strengthening;ROM;Balance training;Functional mobility training;Transfer training;ADL/Self-care training;IADL training;Manual;Neuromuscular re-education;Stair training;Gait training;Endurance training;Pain management;Home exercise program;Safety education & training;Patient/Caregiver education & training;Therapeutic activities;Positioning;Modalities    Restrictions  Restrictions/Precautions  Restrictions/Precautions: Weight Bearing, Fall Risk, General Precautions  Activity Level: Up with Assist  Required Braces or Orthoses?: No  Lower Extremity Weight Bearing Restrictions  Right Lower Extremity Weight Bearing: Weight Bearing As Tolerated     Subjective   Subjective  Subjective: Pt. in bed upon arrival, agreeable to therapy at this time  Pain: 7/10 R hip pain    Objective  Bed Mobility Training  Bed Mobility Training: Yes  Overall Level of Assistance: Minimum assistance;Stand-by assistance;Assist X1 (assistance needed with LE's d/t

## 2025-01-31 NOTE — DISCHARGE INSTR - PHARMACY
Please take aspirin 325mg tablet every day. Do not take \"baby aspirin 81mg\" until you finish the 325mg daily dosing.    Oxycodone may be taken every 6 hours as needed. You may take acetaminophen (Tylenol) along with this medication.  This medication may cause drowsiness, constipation. Please use caution when moving around the house.  Consider taking a stool softener to help prevent constipation while taking pain medications    Pregabalin (Lyrica) may be taken every 12 hours as needed for breakthrough/nerve pain

## 2025-01-31 NOTE — DISCHARGE INSTR - DIET
Good nutrition is important when healing from an illness, injury, or surgery.  Follow any nutrition recommendations given to you during your hospital stay.   If you were given an oral nutrition supplement while in the hospital, continue to take this supplement at home.  You can take it with meals, in-between meals, and/or before bedtime. These supplements can be purchased at most local grocery stores, pharmacies, and chain [employer]-stores.   If you have any questions about your diet or nutrition, call the hospital and ask for the dietitian.    Regular 3 carb diet (45gm/meal)

## 2025-01-31 NOTE — PROGRESS NOTES
[facility]                       Inpatient Medication Education Note                                 Patient admitted for total hip    Medications reviewed with the patient include:  oxycodone, pregabalin, aspirin, stool softeners      Patient education provided when necessary to include potential medication related side effects with acknowledgement of understanding.      Suze Jauregui formerly [facility], 1/31/2025, 10:28 AM

## 2025-01-31 NOTE — CARE COORDINATION
01/31/25 1030   Service Assessment   Patient Orientation Alert and Oriented;Person;Place;Situation   Cognition Alert   History Provided By Patient   Primary Caregiver Self   Accompanied By/Relationship daughter, mom   Support Systems Spouse/Significant Other;Children;Family Members;Friends/Neighbors   Patient's Healthcare Decision Maker is: Legal Next of Kin   PCP Verified by CM Yes   Last Visit to PCP Within last 3 months   Prior Functional Level Independent in ADLs/IADLs   Current Functional Level Independent in ADLs/IADLs   Can patient return to prior living arrangement Yes   Ability to make needs known: Good   Family able to assist with home care needs: Yes   Would you like for me to discuss the discharge plan with any other family members/significant others, and if so, who? No   Financial Resources Other (Comment)  (commercial.)   Community Resources None   Discharge Planning   Type of Residence House   Living Arrangements Spouse/Significant Other;Children   Current Services Prior To Admission None   Potential Assistance Needed Durable Medical Equipment   Potential DME Needed Walker;Shower Chair  (Patient has walker and shower chair for post-op use at home.)   DME Ordered? No   Potential Assistance Purchasing Medications No   Type of Home Care Services None   Patient expects to be discharged to: House   Services At/After Discharge   Transition of Care Consult (CM Consult) Discharge Planning   Services At/After Discharge OT;PT;Outpatient   [place] Resource Information Provided? No   Mode of Transport at Discharge Other (see comment)  (family to provide transportation home.)   Confirm Follow Up Transport Family     Patient denies any unmet needs. Will start outpatient therapy 2/3/25. The plan is discharge home with family and no additional services.

## 2025-01-31 NOTE — PLAN OF CARE
Problem: Discharge Planning  Goal: Discharge to home or other facility with appropriate resources  Outcome: Progressing  Flowsheets (Taken 1/31/2025 0435)  Discharge to home or other facility with appropriate resources:   Identify barriers to discharge with patient and caregiver   Identify discharge learning needs (meds, wound care, etc)   Refer to discharge planning if patient needs post-hospital services based on physician order or complex needs related to functional status, cognitive ability or social support system   Arrange for needed discharge resources and transportation as appropriate   Arrange for interpreters to assist at discharge as needed     Problem: Pain  Goal: Verbalizes/displays adequate comfort level or baseline comfort level  Outcome: Progressing  Flowsheets (Taken 1/31/2025 0435)  Verbalizes/displays adequate comfort level or baseline comfort level:   Encourage patient to monitor pain and request assistance   Administer analgesics based on type and severity of pain and evaluate response   Consider cultural and social influences on pain and pain management   Notify Licensed Independent Practitioner if interventions unsuccessful or patient reports new pain   Implement non-pharmacological measures as appropriate and evaluate response   Assess pain using appropriate pain scale     Problem: Chronic Conditions and Co-morbidities  Goal: Patient's chronic conditions and co-morbidity symptoms are monitored and maintained or improved  Outcome: Progressing  Flowsheets (Taken 1/31/2025 0435)  Care Plan - Patient's Chronic Conditions and Co-Morbidity Symptoms are Monitored and Maintained or Improved:   Monitor and assess patient's chronic conditions and comorbid symptoms for stability, deterioration, or improvement   Collaborate with multidisciplinary team to address chronic and comorbid conditions and prevent exacerbation or deterioration   Update acute care plan with appropriate goals if chronic or

## 2025-01-31 NOTE — PLAN OF CARE
Problem: Discharge Planning  Goal: Discharge to home or other facility with appropriate resources  1/31/2025 1145 by Verna Rojo RN  Outcome: Completed     Problem: Pain  Goal: Verbalizes/displays adequate comfort level or baseline comfort level  1/31/2025 1145 by Verna Rojo RN  Outcome: Completed     Problem: Chronic Conditions and Co-morbidities  Goal: Patient's chronic conditions and co-morbidity symptoms are monitored and maintained or improved  1/31/2025 1145 by Verna Rojo RN  Outcome: Completed     Problem: Safety - Adult  Goal: Free from fall injury  1/31/2025 1145 by Verna Rojo RN  Outcome: Completed     Problem: ABCDS Injury Assessment  Goal: Absence of physical injury  1/31/2025 1145 by Verna Rojo RN  Outcome: Completed

## 2025-01-31 NOTE — RT PROTOCOL NOTE
RESPIRATORY ASSESSMENT PROTOCOL                                                                                              Patient Name: Franky Melendez Room#: 0311/0311-01 : 1975     Admitting diagnosis: Right hip pain [M25.551]  S/P total right hip arthroplasty [Z96.641]       Medical History:   Past Medical History:   Diagnosis Date    CAD (coronary artery disease)     Carpal tunnel syndrome     Depressive disorder, not elsewhere classified     Diabetes mellitus (HCC)     Gastrointestinal hemorrhage with hematemesis 2023    GERD (gastroesophageal reflux disease) 2022    Heart attack (HCC) 2018    STEMI    History of echocardiogram 2019    EF 40-45%. LV cavity sixe is mildly increased. Inferior and inferoseptal wall hypokenesis noted. Mild diastolic dysfunction.    History of pancreatitis 2023    Hyperlipidemia     Hypertension     PTSD (post-traumatic stress disorder)     uexpected open heart surgery in 2018       PATIENT ASSESSMENT    LABORATORY DATA  Hematology:   Lab Results   Component Value Date/Time    WBC 9.2 2025 09:05 PM    RBC 4.28 2025 09:05 PM    RBC 4.74 2012 08:14 AM    HGB 13.0 2025 09:05 PM    HCT 37.0 2025 09:05 PM     2025 09:05 PM     2012 08:14 AM     Chemistry:  No results found for: \"PHART\", \"GGP9ERC\", \"PO2ART\", \"I7XGJMBN\", \"EYG4GXA\", \"PBEA\", \"NBEA\"    VITALS  Pulse: 78   Respirations: 20  BP: 101/63  SpO2: 93 % O2 Device: None (Room air)  Temp: 97.4 °F (36.3 °C)    SKIN COLOR  [x] Normal  [] Pale  [] Dusky  [] Cyanotic    RESPIRATORY PATTERN  [x] Normal  [] Dyspnea  [] Cheyne-Bowden  [] Kussmaul  [] Biots    AMBULATORY  [x] Yes  [] No  [x] With Assistance    PEAK FLOW  Predicted:     Personal Best:            Patient Acuity 0 1 2 3 4 Score   Level of Consciousness (LOC) [x]  Alert & Oriented or Pt normal LOC []  Confused;follows directions []  Confused & uncooper-ative []  Obtunded []  Comatose  with  [physician-ordered bronchodilator(s)] q 4 & Albuterol PRN q2 hrs.   Breath-Actuated Neb if BBS Acuity = 4, and pt. can use MP. Notify physician if condition deteriorates.  HHN AEROSOL THERAPY with  [physician-ordered bronchodilator(s)]  QID and Albuterol PRN q4 hrs.   Breath-Actuated Neb if BBS Acuity = 4, and pt. can use MP.  Notify physician if condition deteriorates. MDI THERAPY with  2 actuations of [physician-ordered bronchodilator(s)] via spacer TID Albuterol and PRN q4 hrs.   If unable to utilize MDI: HHN [physician-ordered bronchodilator(s)] TID and Albuterol PRN q4 hrs.   Notify physician if condition deteriorates. MDI THERAPY with  [physician-ordered bronchodilator(s)] via spacer TID PRN.   If unable to utilize MDI: HHN [physician-ordered bronchodilator(s)] TID PRN.   Notify physician if condition deteriorates.         If Acuity Level is 2, 3, or 4 in any of the following:    [] COUGH     [] SURGICAL HISTORY (SURG HX)  [] CHEST XRAY (CXR)    Goal: Improvement in sputum mobilization in patients with ineffective airway clearance. Reverse atelectasis.    [] Bronchopulmonary Hygiene Protocol      Total Acuity:   14-28  []  Secondary Assessment in 24 hrs Total Acuity:  9-13  []  Secondary Assessment in 24 hrs Total Acuity:  4-8  []  Secondary Assessment in 24 hrs Total Acuity:  0-3  []  Secondary Assessment in 48 hrs   METANEB QID with [physician-ordered bronchodilator(s)] if CXR Acuity = 4; otherwise:  PD&P, Oscillatory Therapy, or Vest QID & PRN AND PEP QID & PRN  NT Sxn PRN for ineffective cough  METANEB QID with [physician-ordered bronchodilator(s)] if CXR Acuity = 4; otherwise:  PD&P, Oscillatory Therapy or Vest QID & PRN AND PEP QID & PRN  NT Sxn PRN for ineffective cough  PD&P, Oscillatory Therapy, or Vest TID & PRN AND PEP TID & PRN   Instruct patient to self-perform IS q1hr WA       If Acuity Level is 2 or above in the following:    [] PULMONARY HISTORY (PULM HX)    Goal: Assist patient in

## 2025-01-31 NOTE — PROGRESS NOTES
[address] , [place], [place], [address]    Consult / Progress Note    Date:   1/31/2025  Patient name:  Franky Melendez  Date of admission:  1/30/2025  6:04 AM  MRN:   [id number]  YOB: 1975    SUBJECTIVE/Last 24 hours update:     Patient seen and examined at the bed side , no new acute events overnight and no new complains noted. VSS, afebrile. Pain is not under control and he states he was only able to sleep about 2 hours last night. Notes from nursing staff and Consults had been reviewed, and the overnight progress had been checked with the nursing staff as well.    REVIEW OF SYSTEMS:      CONSTITUTIONAL:  no fevers, no headcahes  EYES: negative for blury vision  HEENT: No headaches, No nasal congestion, no difficulty swallowing  RESPIRATORY:negative for dyspnea, no wheezing, no Cough  CARDIOVASCULAR: negative for chest pain, no palpitations  GASTROINTESTINAL: no nausea, no vomiting, no change in bowel habits, no abdominal pain   GENITOURINARY: negative for dysuria, no hematuria   MUSCULOSKELETAL: no muscle aches, no swelling of joints or extremities, hip pain  NEUROLOGICAL: No  Weakness or numbness      PAST MEDICAL HISTORY:      has a past medical history of CAD (coronary artery disease), Carpal tunnel syndrome, Depressive disorder, not elsewhere classified, Diabetes mellitus (HCC), Gastrointestinal hemorrhage with hematemesis, GERD (gastroesophageal reflux disease), Heart attack (HCC), History of echocardiogram, History of pancreatitis, Hyperlipidemia, Hypertension, and PTSD (post-traumatic stress disorder).    PAST SURGICAL HISTORY:      has a past surgical history that includes Coronary angioplasty (08/2018); Cardiac catheterization (Left, 01/07/2018); Coronary artery bypass graft (2018); Cardiac catheterization (Left, 01/27/2022); Colonoscopy (N/A, 10/04/2022); Upper gastrointestinal endoscopy (N/A, 10/04/2022); Colonoscopy (10/04/2022); Esophagogastroduodenoscopy  (10/04/2022); Upper gastrointestinal endoscopy (N/A, 2023); Esophagogastroduodenoscopy (2023); Cardiac procedure (N/A, 2023); Shoulder arthroscopy (Right, 2023); Medication Injection (Bilateral, 2023); Upper gastrointestinal endoscopy (N/A, 2024); Cardiac procedure (N/A, 2024); Cholecystectomy, laparoscopic (N/A, 2024); Upper gastrointestinal endoscopy (N/A, 2024); Total hip arthroplasty (Left, 10/31/2024); Cardiac catheterization (Left, 2024); and Cardiac procedure (N/A, 2024).       SOCIAL HISTORY:      reports that he quit smoking about 2 years ago. His smoking use included cigarettes. He started smoking about 33 years ago. He has a 7.5 pack-year smoking history. He has never used smokeless tobacco. He reports that he does not currently use alcohol. He reports that he does not use drugs.    TOBACCO:   reports that he quit smoking about 2 years ago. His smoking use included cigarettes. He started smoking about 33 years ago. He has a 7.5 pack-year smoking history. He has never used smokeless tobacco.  ETOH:   reports that he does not currently use alcohol.  Reviewed and non-contributory or as noted above and/or in the HPI    FAMILY HISTORY:     family history includes Allergy (Severe) in his sister; Asthma in his sister; Atrial Fibrillation in his mother; Cancer in his maternal aunt and maternal uncle; Colon Cancer in his maternal uncle, maternal uncle, and paternal uncle; Diabetes in his maternal uncle; Early Death in his father; Heart Attack in his father; Heart Disease in his father; High Blood Pressure in his father and mother; Pancreatic Cancer in his maternal aunt and maternal aunt.      Problem Relation Age of Onset    High Blood Pressure Mother         No colon cancer or polyps    Atrial Fibrillation Mother     Heart Disease Father     High Blood Pressure Father     Early Death Father          at 47 of a hearttack 6 months after having

## 2025-01-31 NOTE — PROGRESS NOTES
RN in to assess patient. Patient just returned from ambulating to and from therapy room with walker to go up stairs. Patient resting in bed, daughter bedside, A/Ox4, Respirations Easy and Nonlabored. Lung sounds clear throughout. Patient reports pain 8/10, Toradol IV given per order. Dressing to right hip clean dry and intact, no drainage noted. + pedal pulses to BLE. Assessment and vital signs completed as charted. Call light within reach, declines needs or concerns at this time. Care ongoing.

## 2025-01-31 NOTE — PROGRESS NOTES
Report received from Kushal RN, checked in bedside with patient who is resting in bed with eyes open. Easy nonlabored respirations noted. No acute distress noted. Care ongoing.

## 2025-01-31 NOTE — PROGRESS NOTES
Discharge instructions given to patient and daughter, patient verbalized understanding of all instructions. Patient provided with CHG soap to be used in the shower ONLY if dressing remains intact. Patient aware of all follow-up appointments, medications sent to pharmacy, family already picked up. Patient reports understanding regarding not taking norco with the roxicodone, educated on narcotic medication usage and restrictions. Patient educated that last dose of tylenol was at 11am. Patient reports understanding to all of the above information. Patient d/c off unit via wheelchair with daughter and mother, to home. Belongings in hand, no issues noted.

## 2025-01-31 NOTE — CONSULTS
Hospitalist Consult Note      Requesting Physician:  Dr. Jacobs    Reason for consultation: Medical management    SUBJECTIVE:    History of Present Illness:   The patient is a 49 y.o. male with a past medical history of CAD, type 2 diabetes, hypertension, MI with double bypass, and PTSD who underwent an elective right total hip replacement by Dr. Jacobs for degenerative arthritis and pain which was uncontrolled with outpatient conservative management.  Post-op course thus far has been uncomplicated.   His pain is well controlled post operatively.  He denies nausea and vomiting post op.  He denies any chest pain, palpitations or shortness of breath.  He has ambulated to the bathroom without difficulty.    Past Medical History:        Diagnosis Date    CAD (coronary artery disease)     Carpal tunnel syndrome     Depressive disorder, not elsewhere classified     Diabetes mellitus (HCC)     Gastrointestinal hemorrhage with hematemesis 01/31/2023    GERD (gastroesophageal reflux disease) 03/2022    Heart attack (HCC) 08/11/2018    STEMI    History of echocardiogram 01/08/2019    EF 40-45%. LV cavity sixe is mildly increased. Inferior and inferoseptal wall hypokenesis noted. Mild diastolic dysfunction.    History of pancreatitis 01/31/2023    Hyperlipidemia     Hypertension 2009    PTSD (post-traumatic stress disorder)     uexpected open heart surgery in 2018       Past Surgical History:        Procedure Laterality Date    CARDIAC CATHETERIZATION Left 01/07/2018    Right Ulnar/ContraFect Jammie/    CARDIAC CATHETERIZATION Left 01/27/2022    Dr Tidwell/ContraFect Jammie/ right ulnar-    CARDIAC CATHETERIZATION Left 12/26/2024    Dr Tidwell/ContraFect Jammie/left ulnar    CARDIAC PROCEDURE N/A 08/09/2023    Left heart cath / coronary angiography performed by Pavel Tidwell MD at [facility] CARDIAC CATH/IR LAB    CARDIAC PROCEDURE N/A 04/18/2024    Left heart cath / coronary angiography performed by Sai Valdez MD

## 2025-01-31 NOTE — PROGRESS NOTES
Progress Note    Subjective:     Post-Operative Day: 1 Status Post right Total Hip Arthroplasty    Patient ambulating in hallways with therapy. Pain moderately well controlled. Had difficulty sleeping last night secondary to pain. Ambulating well with assistance. Denies chest pain, SOB, nausea, lightheadedness, dizziness, calf pain, or numbness/tingling in his toes and feet.    Objective:   VITALS:  /63   Pulse 78   Temp 97.4 °F (36.3 °C) (Oral)   Resp 18   Ht 1.702 m (5' 7\")   Wt 71.8 kg (158 lb 3.2 oz)   SpO2 93%   BMI 24.78 kg/m²   24HR INTAKE/OUTPUT:    Intake/Output Summary (Last 24 hours) at 2025 0723  Last data filed at 2025 1746  Gross per 24 hour   Intake 1500 ml   Output 300 ml   Net 1200 ml     TEMPERATURE:  Current - Temp: 97.4 °F (36.3 °C); Max - Temp  Av.1 °F (36.2 °C)  Min: 96.9 °F (36.1 °C)  Max: 97.4 °F (36.3 °C)  RESPIRATIONS RANGE: Resp  Avg: 15.2  Min: 10  Max: 20  PULSE RANGE: Pulse  Av.1  Min: 66  Max: 89  BLOOD PRESSURE RANGE:  Systolic (24hrs), Av , Min:101 , Max:153  ; Diastolic (24hrs), Av, Min:63, Max:87   PULSE OXIMETRY RANGE: SpO2  Av.7 %  Min: 93 %  Max: 100 %    General: alert, appears stated age, cooperative, and no distress   Wound: Wound clean and dry no evidence of infection., No Erythema, and No Drainage   DVT Exam: No evidence of DVT seen on physical exam.  Negative Nu's sign.  No cords or calf tenderness.  No significant calf/ankle edema.     Tenderness through proximal thigh. Thigh swollen but soft and compressible. Motor intact quad, hamstring, TA, GSC, EHL, FHL. SILT SPN, DPN, sural, saphenous, tibial nerve distribution. 2+ DP pulse. Toes warm and well perfused.      Data Review  CBC:   Lab Results   Component Value Date/Time    WBC 9.2 2025 09:05 PM    RBC 4.28 2025 09:05 PM    RBC 4.74 2012 08:14 AM    HGB 10.1 2025 05:25 AM    HCT 29.8 2025 05:25 AM     2025 09:05 PM

## 2025-01-31 NOTE — DISCHARGE INSTR - ACTIVITY
You may ambulate in your house  No heavy lifting, pushing, pulling with the implant side for 2 months  No driving while on pain medication

## 2025-02-03 ENCOUNTER — CARE COORDINATION (OUTPATIENT)
Dept: CARE COORDINATION | Age: 50
End: 2025-02-03

## 2025-02-03 ENCOUNTER — HOSPITAL ENCOUNTER (OUTPATIENT)
Dept: PHYSICAL THERAPY | Age: 50
Setting detail: THERAPIES SERIES
Discharge: HOME | End: 2025-02-03
Payer: COMMERCIAL

## 2025-02-03 DIAGNOSIS — G89.4 CHRONIC PAIN SYNDROME: Primary | ICD-10-CM

## 2025-02-03 DIAGNOSIS — M25.551 PAIN OF RIGHT HIP: ICD-10-CM

## 2025-02-03 DIAGNOSIS — G89.4 CHRONIC PAIN SYNDROME: ICD-10-CM

## 2025-02-03 PROCEDURE — 97110 THERAPEUTIC EXERCISES: CPT

## 2025-02-03 PROCEDURE — 97161 PT EVAL LOW COMPLEX 20 MIN: CPT

## 2025-02-03 RX ORDER — HYDROCODONE BITARTRATE AND ACETAMINOPHEN 10; 325 MG/1; MG/1
1 TABLET ORAL EVERY 6 HOURS PRN
Qty: 28 TABLET | Refills: 0 | Status: SHIPPED | OUTPATIENT
Start: 2025-02-03 | End: 2025-02-10

## 2025-02-03 RX ORDER — HYDROCODONE BITARTRATE AND ACETAMINOPHEN 10; 325 MG/1; MG/1
1 TABLET ORAL EVERY 6 HOURS PRN
Qty: 28 TABLET | Refills: 0 | Status: SHIPPED | OUTPATIENT
Start: 2025-02-03 | End: 2025-02-03 | Stop reason: SDUPTHER

## 2025-02-03 NOTE — PROGRESS NOTES
Phone: [phone]                       [facility]          Fax: [phone]                      Outpatient Physical Therapy                                                                      Evaluation  Date: 2/3/2025  Patient: Franky Melendez  : 1975  CSN #: [id number]    Referring Physician: Elliot Jacobs MD    Medical Diagnosis: Right hip pain, M25.551, Primary osteoarthritis of R hip, M16.11 (ICD-10-CM)    Treatment Diagnosis: R LE weakness, s/p R SUSAN  Onset Date: 25  PT Insurance Information: BCBS  Total # of Visits Approved: 12   Total # of Visits to Date: 1  No Show: 0  Canceled Appointment: 0    [x] This writer acknowledges review of patient history form     Subjective  Subjective: Patient had a R SUSAN on 25.  He reports he was able to return home and has been able to get around his home without difficulty.  He reports he did ok negotiating his steps at home.  He reports pain has been ranging from 6/10 at best to 8/10 at worst.  He reports difficulty with ambulation and stretching the hip out.  He reports he had the bandage removed and was placed on steriod for the reaction to the adhesive.  Additional Pertinent Hx: Hx of L SUSAN, CAD, carpal tunnel, depression, DMII, GERD, hx of MI, HTN, hx of cholescystectomy, hx of CABG, hx of R shoulder arthroscope      Ambulation/Gait (if applicable):   Ambulation  WB Status: WBAT  Ambulation  Surface: Level tile  Device: Rolling Walker  Assistance: Stand by assistance  Quality of Gait: Patient initially ambulating with NWB through R LE, cueing for WB through R LE  Distance: 25'      Objective    AROM       AROM LLE (degrees)  L Hip Flexion (0-125): 100  L Hip Extension (0-10): 0  L Hip ABduction (0-45): NT  R hip flexion: 53  R hip extension: 0  R hip external rotation: NT       Strength       Strength LLE  L Hip Flexion: 3+/5  L Hip Extension: NT  L Hip ABduction: NT  L Knee Flexion: 4+/5  L Knee Extension: 4+/5  L Ankle Dorsiflexion:

## 2025-02-03 NOTE — TELEPHONE ENCOUNTER
Patient called requesting refill on Norco. States what they gave him after surgery last week isn't working. Order pended for approval or denial.

## 2025-02-03 NOTE — PLAN OF CARE
[facility]           Phone: [phone]             Outpatient Physical Therapy  Fax: [phone]                                           Date: 2/3/2025  Patient: Franky Melendez : 1975 CSN #: [id number]   Referring Physician: Elliot Jacobs MD      [x] Plan of Care   [] Updated Plan of Care    Dates of Service to Include: 2/3/2025 to 25    Diagnosis:  Right hip pain, M25.551, Primary osteoarthritis of R hip, M16.11 (ICD-10-CM)     Rehab (Treatment) Diagnosis:  R LE weakness, s/p R SUSAN         Onset Date:  25    Attendance  Total # of Visits to Date: 1 No Show: 0 Canceled Appointment: 0    Assessment  Body Structures, Functions, Activity Limitations Requiring Skilled Therapeutic Intervention: Decreased functional mobility , Decreased ADL status, Decreased ROM, Decreased tolerance to work activity, Decreased strength, Decreased endurance, Decreased balance, Decreased vision/visual deficit, Decreased high-level IADLs, Increased pain, Decreased posture  Assessment: The patient is a 49 y.o. male who is s/p R SUSAN on 25.  He reports he has been doing well getting around his home with his FWW.  On evaluation he demonstrates decreased R hip ROM, decreased LE strength, decreased activity endurance and is ambulating with a FWW with little WB through R LE.  He would benefit from skilled PT to address his deficits to improve functional mobility.      Goals  Short Term Goals  Time Frame for Short Term Goals: 2 weeks  Short Term Goal 1: Pt will be inititated with HEP  Short Term Goal 2: Pt will tolerate 30-40 minutes of ther ex to improve function with ADL's.  Long Term Goals  Time Frame for Long Term Goals : 6 weeks  Long Term Goal 1: Pt will independant and compliant with HEP to maintain functional gains made at therapy.  Long Term Goal 2: Pt will report 2/10 or less R hip pain on average to facilitate

## 2025-02-03 NOTE — CARE COORDINATION
Care Transitions Note    Initial Call - Call within 2 business days of discharge: Yes    Attempted to reach patient for transitions of care follow up. Unable to reach patient.    Outreach Attempts:#1   HIPAA compliant voicemail left for patient.     Patient: Franky Melendez    Patient : 1975   MRN: [id number]    Reason for Admission: S/P total right hip arthroplasty  Discharge Date: 25  RURS: Readmission Risk Score: 6.7    Last Discharge Facility       Date Complaint Diagnosis Description Type Department Provider    25  S/P total right hip arthroplasty Admission (Discharged) [facility] Elliot Jacobs MD            Was this an external facility discharge? No    Follow Up Appointment:   Patient has hospital follow up appointment scheduled within 14 days of discharge.    Future Appointments         Provider Specialty Dept Phone    2/3/2025  9:45 AM Elliot Page PT Physical Therapy [phone]    2025 11:20 AM Elijah Orozco MD Cardiology [phone]            Plan for follow-up on next business day.      Kathrine Villarreal LPN

## 2025-02-03 NOTE — TELEPHONE ENCOUNTER
Pt asked for a week supply of Norco to go to his local pharmacy that way he can pick these up today instead of waiting for [facility] to send these to him. If agreeable please sign.     Thanks!

## 2025-02-04 ENCOUNTER — APPOINTMENT (OUTPATIENT)
Dept: PHYSICAL THERAPY | Age: 50
End: 2025-02-04
Payer: COMMERCIAL

## 2025-02-04 ENCOUNTER — CARE COORDINATION (OUTPATIENT)
Dept: CARE COORDINATION | Age: 50
End: 2025-02-04

## 2025-02-04 NOTE — CARE COORDINATION
Care Transitions Note    Initial Call - Call within 2 business days of discharge: Yes    Patient Current Location:  Home: [address] [place] [id number]    LPN Care Coordinator contacted the patient by telephone to perform post hospital discharge assessment, verified name and  as identifiers. Provided introduction to self, and explanation of the LPN Care Coordinator role.     Patient: Franky Melendez    Patient : 1975   MRN: [id number]    Reason for Admission: S/P total right hip arthroplasty   Discharge Date: 25  RURS: Readmission Risk Score: 6.7      Last Discharge Facility       Date Complaint Diagnosis Description Type Department Provider    25  S/P total right hip arthroplasty [facility]            Was this an external facility discharge? No    Additional needs identified to be addressed with provider   Scheduled pt for soonest HFU available via book it  please reach out to pt if there is anything sooner.              Method of communication with provider: chart routing.    Patients top risk factors for readmission: medical condition-S/p Left hip arthoplasty     Interventions to address risk factors:   Signs of infection. IS use,  up protein, when to contact surgeon, s/s blood clot.     Care Summary Note: Writer spoke to Franky for initial transitional care call. He was admitted 25-25 [facility] S/P total R hip arthroplasty. He stated he went to post op yesterday notes reviewed. He was prescribed Medrol pack for inflammation and given a script oxycodone twice daily prn. He states he is not going to  the script does not like how it makes him feel, feels too strong. He states he has already contacted pcp and will be switching over to [facility]. Pain currently controlled.      Prineo dressing in place. He states he had a temp  over 102 and that's why he went in feels a lot better no temp today. Next visit 2 weeks continue with PT. He has

## 2025-02-04 NOTE — TELEPHONE ENCOUNTER
Hello, can we please schedule Franky sooner for f/u, within 1 week's time, thank you.  Electronically signed by Curtis Chavarria MD on 2/4/2025 at 6:10 PM

## 2025-02-05 ENCOUNTER — TELEPHONE (OUTPATIENT)
Dept: PRIMARY CARE CLINIC | Age: 50
End: 2025-02-05

## 2025-02-05 ENCOUNTER — APPOINTMENT (OUTPATIENT)
Dept: PHYSICAL THERAPY | Age: 50
End: 2025-02-05
Payer: COMMERCIAL

## 2025-02-05 NOTE — TELEPHONE ENCOUNTER
Called patient to offer earlier appointment, pt is following up with his surgeon and doesn't feel the need to see Dr Chavarria at this time.     2/25/25 appointment canceled per patient's request.

## 2025-02-06 ENCOUNTER — HOSPITAL ENCOUNTER (OUTPATIENT)
Dept: PHYSICAL THERAPY | Age: 50
Setting detail: THERAPIES SERIES
Discharge: HOME | End: 2025-02-06
Payer: COMMERCIAL

## 2025-02-06 PROCEDURE — 97110 THERAPEUTIC EXERCISES: CPT

## 2025-02-06 NOTE — PROGRESS NOTES
Phone: [phone]                 [facility]           Fax: [phone]                           Outpatient Physical Therapy                                                                            Daily Note    Patient: Franky Melendez : 1975  CSN #: [id number]   Referring Physician: Elliot Jacobs MD  Date: 2025     Treatment Diagnosis: R LE weakness, s/p R SUSAN    Onset Date: 25  PT Insurance Information: BCBS  Total # of Visits Approved: 12 Per Physician Order  Total # of Visits to Date: 2  No Show: 0  Canceled Appointment: 0    25 Plan of Care/Recert Due    Pre-Treatment Pain:  6/10  Subjective: Pt reports he had pain going through his thigh/ leg region hat felt like a jolt which gets very uncomfortable.  Pt states he just feels real tight overall right now.  Pt rates current pain a 6/10.    Exercises:  Exercise 4: toe taps on step (Alt going to comfort) 10x ea  Exercise 5: toe raise 10x  Exercise 6: Supine PPT/ Bridge (iso) 10x ea  Exercise 7: supine L SKTC (approx 90*) to stretch R hip flexor    Assessment  Assessment: Light WB exercises and hip flexor stretching initiated with fair tolerance noted.  Pt continues to display moderate discomfort throughout transitions and exercises.  Will continue.    Activity Tolerance  Activity Tolerance: Patient limited by pain    Patient Education  Patient Education: Exercise rationale.  Pt verbalized/demonstrated good understanding:     [x] Yes         [] No, pt required further clarification.     Post Treatment Pain:  5/10    Plan  Plan Frequency: 3  Plan weeks: 4     Goals  (Total # of Visits to Date: 2)      Short Term Goals  Time Frame for Short Term Goals: 2 weeks  Short Term Goal 1: Pt will be inititated with HEP  Short Term Goal 2: Pt will tolerate 30-40 minutes of ther ex to improve function with ADL's.    Long Term Goals  Time Frame for Long Term Goals : 6 weeks  Long Term Goal 1: Pt will independant and compliant with HEP

## 2025-02-07 ENCOUNTER — HOSPITAL ENCOUNTER (OUTPATIENT)
Dept: PHYSICAL THERAPY | Age: 50
Setting detail: THERAPIES SERIES
Discharge: HOME | End: 2025-02-07
Payer: COMMERCIAL

## 2025-02-07 NOTE — PROGRESS NOTES
[facility]  Inpatient/Observation/Outpatient Rehabilitation    Date: 2025  Patient Name: Franky Melendez       [] Inpatient Acute/Observation       [x]  Outpatient  : 1975       Plan of Care/Recert ends      [] Pt refused/declined therapy at this time due to:           [x] Pt cancelled due to:  [] No Reason Given   [x] Sick/ill   [] Other:      [] Evaluation held by RN/Provider due to:    [] High Heart Rate   [] High Blood Pressure   [] Orthopedic Consult   [] Hgb < 7   [] Other:    [] Pt ordered brace per physician request:  [] Proper fit will be completed and education for wearing/skin checks    [] Pt does not require skilled services due to:      Therapist/Assistant will attempt to see this patient, at our earliest opportunity.       Kandy Cano Date: 2025

## 2025-02-10 ENCOUNTER — CARE COORDINATION (OUTPATIENT)
Dept: CARE COORDINATION | Age: 50
End: 2025-02-10

## 2025-02-10 NOTE — CARE COORDINATION
Care Transitions Note    Follow Up Call     Attempted to reach patient for transitions of care follow up.  Unable to reach patient.      Outreach Attempts:   HIPAA compliant voicemail left for patient.     Care Summary Note: 1st attempt    Follow Up Appointment:   Future Appointments         Provider Specialty Dept Phone    2/11/2025 8:30 AM Kirit Weaver Physical Therapy [phone]    2/12/2025 8:45 AM Elba Longoria PTA Physical Therapy [phone]    2/14/2025 9:15 AM Elliot Easton PT Physical Therapy [phone]    2/18/2025 8:30 AM Elliot Easton PT Physical Therapy [phone]    2/19/2025 8:30 AM Elliot Easton PT Physical Therapy [phone]    2/21/2025 8:00 AM Elba Longoria PTA Physical Therapy [phone]    2/25/2025 8:00 AM Elba Longoria PTA Physical Therapy [phone]    2/25/2025 10:45 AM Curtis Chavarria MD Primary Care [phone]    2/26/2025 8:30 AM Elliot Easton PT Physical Therapy [phone]    2/28/2025 8:45 AM Elliot Easton PT Physical Therapy [phone]    3/4/2025 8:00 AM Elba Longoria PTA Physical Therapy [phone]    3/5/2025 8:00 AM Elliot Easton PT Physical Therapy [phone]    3/7/2025 9:15 AM Elliot Page PT Physical Therapy [phone]    7/22/2025 11:20 AM Elijah Orozco MD Cardiology [phone]            Plan for follow-up on next business day.  based on severity of symptoms and risk factors. Plan for next call: symptom management-follow up on any complications from Rt hip replacement.   Did blisters from tegaderm resolve    PEDRO LATIF RN

## 2025-02-11 ENCOUNTER — CARE COORDINATION (OUTPATIENT)
Dept: CARE COORDINATION | Age: 50
End: 2025-02-11

## 2025-02-11 ENCOUNTER — HOSPITAL ENCOUNTER (OUTPATIENT)
Dept: PHYSICAL THERAPY | Age: 50
Setting detail: THERAPIES SERIES
Discharge: HOME | End: 2025-02-11
Payer: COMMERCIAL

## 2025-02-11 PROCEDURE — 97110 THERAPEUTIC EXERCISES: CPT

## 2025-02-11 NOTE — CARE COORDINATION
Care Transitions Note    Follow Up Call     Attempted to reach patient for transitions of care follow up.  Unable to reach patient.      Outreach Attempts:   Multiple attempts to contact patient at phone numbers on file.     Care Summary Note: final attempt    Follow Up Appointment:   Future Appointments         Provider Specialty Dept Phone    2/12/2025 8:45 AM Elba Longoria PTA Physical Therapy [phone]    2/14/2025 9:15 AM Elliot Easton PT Physical Therapy [phone]    2/18/2025 8:30 AM Elliot Easton PT Physical Therapy [phone]    2/19/2025 8:30 AM Elliot Easton PT Physical Therapy [phone]    2/21/2025 8:00 AM Elba Longoria PTA Physical Therapy [phone]    2/25/2025 8:00 AM Elba Longoria PTA Physical Therapy [phone]    2/25/2025 10:45 AM Curtis Chavarria MD Primary Care [phone]    2/26/2025 8:30 AM Elliot Easton PT Physical Therapy [phone]    2/28/2025 8:45 AM Elliot Easton PT Physical Therapy [phone]    3/4/2025 8:00 AM Elba Longoria PTA Physical Therapy [phone]    3/5/2025 8:00 AM Elliot Easton PT Physical Therapy [phone]    3/7/2025 9:15 AM Elliot Page PT Physical Therapy [phone]    7/22/2025 11:20 AM Elijah Orozco MD Cardiology [phone]            No further follow-up call indicated based on severity of symptoms and risk factors. Plan for next call:  final call     PEDRO LATIF RN

## 2025-02-11 NOTE — PROGRESS NOTES
/cont  Short Term Goal 2: Pt will tolerate 30-40 minutes of ther ex to improve function with ADL's.    Long Term Goals  Time Frame for Long Term Goals : 6 weeks  Long Term Goal 1: Pt will independant and compliant with HEP to maintain functional gains made at therapy.  Long Term Goal 2: Pt will report 2/10 or less R hip pain on average to facilitate completion of ADL's.  Long Term Goal 3: Pt to improve R hip flexion to >/= 100* for ADLs.  Long Term Goal 4: Pt to improve R hip strength to 4/5 grossly for ease with transfers/ambulation.  Long Term Goal 5: Pt to ambulate community distances without AD for return to PLOF  Long term goal 6: Patient will report 70% improvement in overall symptoms and function.    Minutes Tracking:  Time In: 0830  Time Out: 0903  Minutes: 33  Timed Code Treatment Minutes: 31 Minutes    Kirit Gonzalez     Date: 2/11/2025

## 2025-02-12 ENCOUNTER — HOSPITAL ENCOUNTER (OUTPATIENT)
Dept: PHYSICAL THERAPY | Age: 50
Setting detail: THERAPIES SERIES
Discharge: HOME | End: 2025-02-12
Payer: COMMERCIAL

## 2025-02-12 PROCEDURE — 97140 MANUAL THERAPY 1/> REGIONS: CPT

## 2025-02-12 PROCEDURE — 97110 THERAPEUTIC EXERCISES: CPT

## 2025-02-12 NOTE — PROGRESS NOTES
Phone: [phone]                 [facility]           Fax: [phone]                           Outpatient Physical Therapy                                                                            Daily Note    Patient: Franky Melendez : 1975  CSN #: [id number]   Referring Physician: Elliot Jacobs MD  Date: 2025       Treatment Diagnosis: R LE weakness, s/p R SUSAN    Onset Date: 25  PT Insurance Information: BCBS  Total # of Visits Approved: 12 Per Physician Order  Total # of Visits to Date: 4  No Show: 0  Canceled Appointment: 0    25 Plan of Care/Recert Due    Pre-Treatment Pain:  5/10  Subjective: Patient reports 5/10 pain prior to session. Presents to clinic with SC this date.    Exercises:  Exercise 1: HEP: Quad sets 10 sec hold x10, 3x/day, Heel slides with strap x10 2x/day, twice per day, ankle pumps x20 every hour he's awake, glute sets 10 sec x10 3x/day  Exercise 2: * no SLR for 6 weeks, no combined ER/flex  Exercise 3: SciFit bike 10  mins L2.5  Exercise 4: toe taps on step (Alt going to comfort) 15-20x ea  Exercise 5: toe raise 10x  Exercise 6: Supine PPT/ Bridge (iso) 10x ea  Exercise 7: supine L SKTC (approx 90*) to stretch R hip flexor  Exercise 8: sink exercises R Le only - no extension  Exercise 9: sit to stands x10    Manual:  Soft Tissue Mobilizaton: gentle stm to R quad and hip flexor    Assessment  Body Structures, Functions, Activity Limitations Requiring Skilled Therapeutic Intervention: Decreased functional mobility , Decreased ADL status, Decreased ROM, Decreased tolerance to work activity, Decreased strength, Decreased endurance, Decreased balance, Decreased vision/visual deficit, Decreased high-level IADLs, Increased pain, Decreased posture  Assessment: Pt presents to clinic with straight cane with mild antalgic gait pattern. Progressed patient with standing ther ex with mild discomfort noted with initial HS curls. Pt aware of extension precautions

## 2025-02-13 ENCOUNTER — CARE COORDINATION (OUTPATIENT)
Dept: CARE COORDINATION | Age: 50
End: 2025-02-13

## 2025-02-13 NOTE — CARE COORDINATION
Ambulatory Care Coordination Note     2/12/2025        This patient was received as a referral from Care Transition Nurse.      Future Appointments   Date Time Provider Department Center   2/14/2025  9:15 AM Elliot Easton, PT [facility] [place]   2/18/2025  8:30 AM Elliot Easton, PT [facility] [place]   2/19/2025  8:30 AM Elliot Easton, PT [facility] [place]   2/21/2025  8:00 AM Elba Longoria, PTA [facility] [place]   2/25/2025  8:00 AM Elba Longoria, PTA [facility] [place]   2/25/2025 10:45 AM Curtis Chavarria MD [facility]   2/26/2025  8:30 AM Elliot Easton, PT [facility] [place]   2/28/2025  8:45 AM Elliot Easton, PT [facility] [place]   3/4/2025  8:00 AM Elba Longoria, PTA [facility] [place]   3/5/2025  8:00 AM Elliot Easton, PT [facility] [place]   3/7/2025  9:15 AM Elliot Page, PT [facility] [place]   7/22/2025 11:20 AM [facility]

## 2025-02-14 ENCOUNTER — HOSPITAL ENCOUNTER (OUTPATIENT)
Dept: PHYSICAL THERAPY | Age: 50
Setting detail: THERAPIES SERIES
Discharge: HOME | End: 2025-02-14
Payer: COMMERCIAL

## 2025-02-14 NOTE — PROGRESS NOTES
[facility]  Inpatient/Observation/Outpatient Rehabilitation    Date: 2025  Patient Name: Franky Melendez       [] Inpatient Acute/Observation       [x]  Outpatient  : 1975       Plan of Care/Recert ends      [] Pt refused/declined therapy at this time due to:           [x] Pt cancelled due to:  [] No Reason Given   [] Sick/ill   [x] Other:  TRANSPORTATION    [] Evaluation held by RN/Provider due to:    [] High Heart Rate   [] High Blood Pressure   [] Orthopedic Consult   [] Hgb < 7   [] Other:    [] Pt ordered brace per physician request:  [] Proper fit will be completed and education for wearing/skin checks    [] Pt does not require skilled services due to:      Therapist/Assistant will attempt to see this patient, at our earliest opportunity.       Kandy Cano Date: 2025

## 2025-02-15 PROBLEM — Z01.818 PRE-OP EXAMINATION: Status: RESOLVED | Noted: 2025-01-16 | Resolved: 2025-02-15

## 2025-02-17 ENCOUNTER — CARE COORDINATION (OUTPATIENT)
Dept: CARE COORDINATION | Age: 50
End: 2025-02-17

## 2025-02-17 NOTE — CARE COORDINATION
Ambulatory Care Coordination Note     2/17/2025      Patient outreach attempt by this AC today to offer care management services. ACM was unable to reach the patient by telephone today;   left voice message requesting a return phone call to this ACM.     ACM: Ashley Mckeon, RN     Care Summary Note: 1st unsuccessful outreach attempt- CTN handoff     PCP/Specialist follow up:   Future Appointments         Provider Specialty Dept Phone    2/18/2025 8:30 AM Elliot Easton PT Physical Therapy [phone]    2/19/2025 8:30 AM Elliot Easton PT Physical Therapy [phone]    2/21/2025 8:00 AM Elba Longoria PTA Physical Therapy [phone]    2/25/2025 8:00 AM Elba Longoria PTA Physical Therapy [phone]    2/25/2025 10:45 AM Curtis Chavarria MD Primary Care [phone]    2/26/2025 8:30 AM Elliot Easton PT Physical Therapy [phone]    2/28/2025 8:45 AM Elliot Easton PT Physical Therapy [phone]    3/4/2025 8:00 AM Elba Longoria PTA Physical Therapy [phone]    3/5/2025 8:00 AM Elliot Easton PT Physical Therapy [phone]    3/7/2025 9:15 AM Elliot Page PT Physical Therapy [phone]    3/10/2025 10:15 AM Elliot Easton PT Physical Therapy [phone]    7/22/2025 11:20 AM Elijah Orozco MD Cardiology [phone]            Follow Up:   Plan for next AC outreach in approximately 1 week to complete:  - outreach attempt to offer care management services.

## 2025-02-18 ENCOUNTER — HOSPITAL ENCOUNTER (OUTPATIENT)
Dept: PHYSICAL THERAPY | Age: 50
Setting detail: THERAPIES SERIES
Discharge: HOME | End: 2025-02-18
Payer: COMMERCIAL

## 2025-02-18 PROCEDURE — 97140 MANUAL THERAPY 1/> REGIONS: CPT

## 2025-02-18 PROCEDURE — 97110 THERAPEUTIC EXERCISES: CPT

## 2025-02-18 NOTE — PROGRESS NOTES
Phone: [phone]                 [facility]           Fax: [phone]                           Outpatient Physical Therapy                                                                            Daily Note    Patient: Franky Melendez : 1975  CSN #: [id number]   Referring Physician: Elliot Jacobs MD  Date: 2025    Diagnosis: Right hip pain, M25.551, Primary osteoarthritis of R hip, M16.11 (ICD-10-CM)  Treatment Diagnosis: R LE weakness, s/p R SUSAN    Onset Date: 25  PT Insurance Information: BCBS  Total # of Visits Approved: 12 Per Physician Order  Total # of Visits to Date: 5  No Show: 0  Canceled Appointment: 0    25 Plan of Care/Recert Due    Pre-Treatment Pain:  6/10  Subjective: Pt with 5-6/10 R hip pain, able to ambulate with SC and does some without it at home.    Exercises:  Exercise 3: SciFit bike 10  mins L3.0  Exercise 5: 8\" FSU/LSY x 15 each  Exercise 7: supine L SKTC (approx 90*) to stretch R hip flexor  2 x 30\"  Exercise 8: sink exercises x 15 B // alt standing march x 15 each  Exercise 9: sit to stands x10  Exercise 10: short ricci step overs lateral/forward x 10 each  Exercise 11: mini lunge hip flexor stretch x 60\" R  Exercise 12: lateral walk at counter YTB x 3 laps  Exercise 13: SLS on foam x 60\"    Manual:  Soft Tissue Mobilizaton: gentle stm to R quad and hip flexor    Assessment  Assessment: Pt continues to do well with ex progression and ambulation. Able to add 8\" step ups and lateral walks with YTB. Pt gets mild discomfort at distal lateral quad as well as proximal R hip throughout session. Pt require min verbal and visual cueing for correct sequencing with new exercises. Pt tolerated small ricci step overs laterally and only 5 forward. Will continue to progress.    Activity Tolerance  Activity Tolerance: Patient tolerated treatment well    Patient Education  Patient Education: HEP  Pt verbalized/demonstrated good understanding:     [x] Yes

## 2025-02-19 ENCOUNTER — HOSPITAL ENCOUNTER (OUTPATIENT)
Dept: PHYSICAL THERAPY | Age: 50
Setting detail: THERAPIES SERIES
Discharge: HOME | End: 2025-02-19
Payer: COMMERCIAL

## 2025-02-19 PROCEDURE — 97140 MANUAL THERAPY 1/> REGIONS: CPT

## 2025-02-19 PROCEDURE — 97110 THERAPEUTIC EXERCISES: CPT

## 2025-02-19 NOTE — PROGRESS NOTES
Phone: [phone]                 [facility]           Fax: [phone]                           Outpatient Physical Therapy                                                                            Daily Note    Patient: Franky Melendez : 1975  CSN #: [id number]   Referring Physician: Elliot Jacobs MD  Date: 2025    Diagnosis: Right hip pain, M25.551, Primary osteoarthritis of R hip, M16.11 (ICD-10-CM)  Treatment Diagnosis: R LE weakness, s/p R SUSAN    Onset Date: 25  PT Insurance Information: BCBS  Total # of Visits Approved: 12 Per Physician Order  Total # of Visits to Date: 6  No Show: 0  Canceled Appointment: 0    25 Plan of Care/Recert Due    Pre-Treatment Pain:  6/10  Subjective: Pt with increased soreness from last session and having to do alot of stairs at home. 6/10 R hip pain today.    Exercises:  Exercise 3: SciFit bike 10  mins L3.0  Exercise 4: toe taps on step (Alt going to comfort) 15-20x ea  Exercise 5: 6\" FSU/LSU x 15 each  Exercise 6: seated hamstring stretch 2 x 30\"  Exercise 9: sit to stands 2x10 // YTB hamstring curl x 10 R  Exercise 10: short ricci step overs lateral/forward x 10 each  Exercise 12: lateral walk at counter YTB x 3 laps - no band today    Manual:  Soft Tissue Mobilizaton: gentle stm to R quad and hip flexor      Assessment  Assessment: Pt with increased R hip soreness today, regression of some exercises for pt tolerance. Went back to 6\" step ups and no band for lateral walks at counter. Added alt step taps today with good tolerance. Pt able to complete 10 ricci step overs going forward today compared to 5 at last session. STM to R quad and hip flexor for tightness/discomfort. Will continue to progress.    Activity Tolerance  Activity Tolerance: Patient tolerated treatment well    Patient Education  Patient Education: HEP  Pt verbalized/demonstrated good understanding:     [x] Yes         [] No, pt required further clarification.

## 2025-02-21 ENCOUNTER — APPOINTMENT (OUTPATIENT)
Dept: PHYSICAL THERAPY | Age: 50
End: 2025-02-21
Payer: COMMERCIAL

## 2025-02-25 ENCOUNTER — OFFICE VISIT (OUTPATIENT)
Dept: PRIMARY CARE CLINIC | Age: 50
End: 2025-02-25
Payer: COMMERCIAL

## 2025-02-25 ENCOUNTER — HOSPITAL ENCOUNTER (OUTPATIENT)
Dept: PHYSICAL THERAPY | Age: 50
Setting detail: THERAPIES SERIES
Discharge: HOME | End: 2025-02-25
Payer: COMMERCIAL

## 2025-02-25 VITALS
HEIGHT: 67 IN | WEIGHT: 164.6 LBS | OXYGEN SATURATION: 98 % | DIASTOLIC BLOOD PRESSURE: 62 MMHG | SYSTOLIC BLOOD PRESSURE: 104 MMHG | BODY MASS INDEX: 25.83 KG/M2 | RESPIRATION RATE: 16 BRPM | HEART RATE: 90 BPM

## 2025-02-25 DIAGNOSIS — E78.5 HYPERLIPIDEMIA, UNSPECIFIED HYPERLIPIDEMIA TYPE: ICD-10-CM

## 2025-02-25 DIAGNOSIS — M25.551 PAIN OF RIGHT HIP: ICD-10-CM

## 2025-02-25 DIAGNOSIS — M25.551 CHRONIC RIGHT HIP PAIN: Primary | ICD-10-CM

## 2025-02-25 DIAGNOSIS — I50.9 HEART FAILURE, UNSPECIFIED HF CHRONICITY, UNSPECIFIED HEART FAILURE TYPE (HCC): ICD-10-CM

## 2025-02-25 DIAGNOSIS — G89.4 CHRONIC PAIN SYNDROME: ICD-10-CM

## 2025-02-25 DIAGNOSIS — I10 HYPERTENSION, UNSPECIFIED TYPE: ICD-10-CM

## 2025-02-25 DIAGNOSIS — E11.69 TYPE 2 DIABETES MELLITUS WITH OTHER SPECIFIED COMPLICATION, WITHOUT LONG-TERM CURRENT USE OF INSULIN (HCC): ICD-10-CM

## 2025-02-25 DIAGNOSIS — G89.29 CHRONIC RIGHT HIP PAIN: Primary | ICD-10-CM

## 2025-02-25 PROCEDURE — G2211 COMPLEX E/M VISIT ADD ON: HCPCS | Performed by: STUDENT IN AN ORGANIZED HEALTH CARE EDUCATION/TRAINING PROGRAM

## 2025-02-25 PROCEDURE — 97140 MANUAL THERAPY 1/> REGIONS: CPT

## 2025-02-25 PROCEDURE — 97110 THERAPEUTIC EXERCISES: CPT

## 2025-02-25 PROCEDURE — G0283 ELEC STIM OTHER THAN WOUND: HCPCS

## 2025-02-25 PROCEDURE — 3074F SYST BP LT 130 MM HG: CPT | Performed by: STUDENT IN AN ORGANIZED HEALTH CARE EDUCATION/TRAINING PROGRAM

## 2025-02-25 PROCEDURE — 99214 OFFICE O/P EST MOD 30 MIN: CPT | Performed by: STUDENT IN AN ORGANIZED HEALTH CARE EDUCATION/TRAINING PROGRAM

## 2025-02-25 PROCEDURE — 3078F DIAST BP <80 MM HG: CPT | Performed by: STUDENT IN AN ORGANIZED HEALTH CARE EDUCATION/TRAINING PROGRAM

## 2025-02-25 RX ORDER — HYDROCODONE BITARTRATE AND ACETAMINOPHEN 5; 325 MG/1; MG/1
1 TABLET ORAL EVERY 6 HOURS PRN
Status: CANCELLED | OUTPATIENT
Start: 2025-02-25

## 2025-02-25 RX ORDER — HYDROCODONE BITARTRATE AND ACETAMINOPHEN 10; 325 MG/1; MG/1
1 TABLET ORAL EVERY 6 HOURS PRN
Qty: 28 TABLET | Refills: 0 | Status: SHIPPED | OUTPATIENT
Start: 2025-02-25 | End: 2025-03-04

## 2025-02-25 RX ORDER — HYDROCODONE BITARTRATE AND ACETAMINOPHEN 5; 325 MG/1; MG/1
TABLET ORAL
COMMUNITY
Start: 2025-02-17 | End: 2025-02-25

## 2025-02-25 RX ORDER — OXYCODONE HYDROCHLORIDE 5 MG/1
TABLET ORAL
COMMUNITY
Start: 2025-02-21 | End: 2025-02-25

## 2025-02-25 NOTE — PROGRESS NOTES
Phone: [phone]                 [facility]           Fax: [phone]                           Outpatient Physical Therapy                                                                            Daily Note    Patient: Franky Melendez : 1975  CSN #: [id number]   Referring Physician: Elliot Jacobs MD  Date: 2025       Treatment Diagnosis: R LE weakness, s/p R SUSAN    Onset Date: 25  PT Insurance Information: BCBS  Total # of Visits Approved: 12 Per Physician Order  Total # of Visits to Date: 7  No Show: 0  Canceled Appointment: 0    25 Plan of Care/Recert Due    Pre-Treatment Pain:  7/10  Subjective: Patient reports going back to work yesterday and having a terrible night last night due to pain. 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 Pt states he woke up at midnight took pain medication but has not taken anything since due to having to drive to therapy. Pt reports 7/10 prior to therapy session. Pt states he feels he pushed it so much even L LE is sore.    Exercises:  Exercise 1: HEP: Quad sets 10 sec hold x10, 3x/day, Heel slides with strap x10 2x/day, twice per day, ankle pumps x20 every hour he's awake, glute sets 10 sec x10 3x/day  Exercise 2: * no SLR for 6 weeks, no combined ER/flex  Exercise 3: SciFit bike 10  mins L3.0  Exercise 6: seated hamstring stretch 2 x 30\"  Exercise 7: supine L SKTC (approx 90*) to stretch R hip flexor  2 x 30\"  Exercise 8: sink exercises x 15 B // alt standing march x 15 each  Exercise 12: lateral walk at counter YTB x 3 laps - no band today    Manual:  Soft Tissue Mobilizaton: gentle stm to R quad, hip flexor, IT band    Modality: IFC/CP to R hip to reduce inflammation and pain/soreness.       Assessment  Body Structures, Functions, Activity Limitations Requiring Skilled Therapeutic Intervention: Decreased functional mobility , Decreased ADL status, Decreased ROM, Decreased

## 2025-02-25 NOTE — PROGRESS NOTES
Hydroxy; Future  5. Type 2 diabetes mellitus with other specified complication, without long-term current use of insulin (HCC)  -     CBC with Auto Differential; Future  -     Comprehensive Metabolic Panel; Future  -     Hemoglobin A1C; Future  -     Vitamin B12 & Folate; Future  -     Vitamin D 25 Hydroxy; Future  6. Hyperlipidemia, unspecified hyperlipidemia type  -     Lipid Panel; Future  -     Vitamin B12 & Folate; Future  -     Vitamin D 25 Hydroxy; Future  7. Hypertension, unspecified type  -     Vitamin B12 & Folate; Future  -     Vitamin D 25 Hydroxy; Future    Return in about 3 months (around 5/25/2025) for F/U Med Management.  Assessment & Plan  We discussed some of the etiologies and natural histories. We discussed the various treatment alternatives including anti-inflammatory medications, physical therapy, injections, further imaging studies and as a last resort surgery.    RBA of using Norco PRN was discussed  T2DM - at goal, continue w/ mounjaro at 7.5/weekly, insulin 12U  HTN/CAD - at goal cont amlodipine at 5mg, imdur 40mg, lisinopril 20mg, toprol 5mg,   Hyperlipidemia - at goal, cont. Lipitor at 40mg PO daily    Last PDMP Branden as Reviewed:  Review User Review Instant Review Result   CURT KATZ 2/25/2025 10:52 AM Reviewed PDMP [1]         All patient questions answered.  Pt voiced understanding.   Medications Discontinued During This Encounter   Medication Reason    HYDROcodone-acetaminophen (NORCO) 5-325 MG per tablet LIST CLEANUP    oxyCODONE (ROXICODONE) 5 MG immediate release tablet LIST CLEANUP    HYDROcodone-acetaminophen (NORCO)  MG per tablet REORDER       Patient received counseling on the following healthy behaviors: nutrition, exercise and medication adherence. I encouraged and discussed lifestyle modifications including diet and exercise (150+ minutes of moderate-high intensity) and the patient was agreeable to making positive/beneficial changes to both to help improve their

## 2025-02-26 ENCOUNTER — HOSPITAL ENCOUNTER (OUTPATIENT)
Dept: PHYSICAL THERAPY | Age: 50
Setting detail: THERAPIES SERIES
Discharge: HOME | End: 2025-02-26
Payer: COMMERCIAL

## 2025-02-26 ENCOUNTER — CARE COORDINATION (OUTPATIENT)
Dept: CARE COORDINATION | Age: 50
End: 2025-02-26

## 2025-02-26 PROCEDURE — 97110 THERAPEUTIC EXERCISES: CPT

## 2025-02-26 PROCEDURE — 97140 MANUAL THERAPY 1/> REGIONS: CPT

## 2025-02-26 NOTE — PROGRESS NOTES
Phone: [phone]                 [facility]           Fax: [phone]                           Outpatient Physical Therapy                                                                            Daily Note    Patient: Franky Melendez : 1975  CSN #: [id number]   Referring Physician: Elliot Jacobs MD  Date: 2025    Diagnosis: Right hip pain, M25.551, Primary osteoarthritis of R hip, M16.11 (ICD-10-CM)  Treatment Diagnosis: R LE weakness, s/p R SUSAN    Onset Date: 25  PT Insurance Information: BCBS  Total # of Visits Approved: 12 Per Physician Order  Total # of Visits to Date: 8  No Show: 0  Canceled Appointment: 0    25 Plan of Care/Recert Due    Pre-Treatment Pain:  5/10  Subjective: Pt reports getting a good nights sleep with 14hrs last night. With 5/10 R hip pain.    Exercises:  Exercise 3: SciFit bike 10  mins L3.0  Exercise 5: 8\" FSU/LSU x 15 each // 6\" step down  Exercise 6: 4 way hip YTB x 10 each // 4 way hip no band x 10 each  Exercise 9: sit to stands 2x10 // YTB hamstring curl 2 x 15 R  Exercise 10: short ricci step overs lateral/forward x 10 each  Exercise 12: lateral walk at counter YTB x 4 laps  Exercise 14: mini lunge on BOSU x 8 each    Manual:  Soft Tissue Mobilizaton: gentle stm to R quad, hip flexor, IT band    Assessment  Assessment: Pt reports being able to complete steps into basement (14) with min discomfort with step to pattern. Added 6\" step down and 4 way hip with YTB today with min discomfort. Pt requires min verbal and visual cueing to avoid trunk compensation with 4 way hip. STM to distal IT band and R hip flexor to reduce tension. Will continue to progress.    Activity Tolerance  Activity Tolerance: Patient tolerated treatment well, Patient limited by pain    Patient Education  Patient Education: HEP  Pt verbalized/demonstrated good understanding:     [x] Yes         [] No, pt required further clarification.       Post Treatment Pain:

## 2025-02-26 NOTE — CARE COORDINATION
Ambulatory Care Coordination Note     2/26/2025 10:45 AM          Patient closed (unable to reach patient) from the High Risk Care Management program on 2/26/2025.    Future Appointments   Date Time Provider Department Center   2/28/2025  8:45 AM Elliot Easton, PT [facility] [place]   3/4/2025  8:00 AM Elba Longoria, PTA [facility] [place]   3/5/2025  8:00 AM Elliot Easton, PT [facility] [place]   3/7/2025  9:15 AM Elliot Page, PT [facility] [place]   3/10/2025 10:15 AM Elliot Easton, PT [facility] [place]   5/29/2025  9:00 AM Curtis Chavarria MD [facility]   7/22/2025 11:20 AM Elijah Orozco MD [facility]       No further Ambulatory Care Manager follow up scheduled.

## 2025-02-28 ENCOUNTER — HOSPITAL ENCOUNTER (OUTPATIENT)
Dept: PHYSICAL THERAPY | Age: 50
Setting detail: THERAPIES SERIES
Discharge: HOME | End: 2025-02-28
Payer: COMMERCIAL

## 2025-02-28 PROCEDURE — 97140 MANUAL THERAPY 1/> REGIONS: CPT

## 2025-02-28 PROCEDURE — 97110 THERAPEUTIC EXERCISES: CPT

## 2025-02-28 NOTE — PROGRESS NOTES
Phone: [phone]                 [facility]           Fax: [phone]                           Outpatient Physical Therapy                                                                            Daily Note    Patient: Franky Melendez : 1975  CSN #: [id number]   Referring Physician: Elliot Jacobs MD  Date: 2025       Treatment Diagnosis: R LE weakness, s/p R SUSAN    Onset Date: 25  PT Insurance Information: BCBS  Total # of Visits Approved: 12 Per Physician Order  Total # of Visits to Date: 9  No Show: 0  Canceled Appointment: 0    25 Plan of Care/Recert Due    Pre-Treatment Pain:  3-4/10  Subjective: Pt presents to clinic with no AD, pain 3-4/10. Pt states he finally slept in his bed last night for the first time. Woke up a few times, overall went well.    Exercises:  Exercise 1: HEP: Quad sets 10 sec hold x10, 3x/day, Heel slides with strap x10 2x/day, twice per day, ankle pumps x20 every hour he's awake, glute sets 10 sec x10 3x/day  Exercise 2: * no SLR for 6 weeks, no combined ER/flex  Exercise 3: SciFit bike 10  mins L3.0  Exercise 5: 8\" FSU/LSU x 15 each // 6\" step down // reciprocal steps x2  Exercise 9: sit to stands 2x10 4# ball// YTB hamstring curl 2 x 15 R  Exercise 10: short ricci step overs lateral/forward x 10 each  Exercise 12: lateral walk at counter GTB x 4 laps  Exercise 15: SB stretch, HS stretch, hip flexor stretch 2 x 30\"  Exercise 16: allegra retro amb 15# x8  Exercise 17: Startrac knee flexion 35# / knee extension 20# 2 x 10    Manual:  Soft Tissue Mobilizaton: gentle stm to R quad, hip flexor, IT band    Assessment  Body Structures, Functions, Activity Limitations Requiring Skilled Therapeutic Intervention: Decreased functional mobility , Decreased ADL status, Decreased ROM, Decreased tolerance to work activity, Decreased strength, Decreased endurance, Decreased balance, Decreased vision/visual deficit, Decreased high-level IADLs, Increased pain,

## 2025-03-04 ENCOUNTER — HOSPITAL ENCOUNTER (OUTPATIENT)
Dept: PHYSICAL THERAPY | Age: 50
Setting detail: THERAPIES SERIES
Discharge: HOME | End: 2025-03-04
Payer: COMMERCIAL

## 2025-03-04 PROCEDURE — 97110 THERAPEUTIC EXERCISES: CPT

## 2025-03-04 NOTE — PROGRESS NOTES
Phone: [phone]                 [facility]           Fax: [phone]                           Outpatient Physical Therapy                                                                            Daily Note    Patient: Franky Melendez : 1975  CSN #: [id number]   Referring Physician: Elliot Jacobs MD  Date: 3/4/2025       Treatment Diagnosis: R LE weakness, s/p R SUSAN    Onset Date: 25  PT Insurance Information: BCBS  Total # of Visits Approved: 12 Per Physician Order  Total # of Visits to Date: 10  No Show: 0  Canceled Appointment: 0    25 Plan of Care/Recert Due    Pre-Treatment Pain:  4/10  Subjective: Patient reports a stabbing pain anterior and medial to R hip joint (groin area). Pt states towards the end of the day the pain increases with weight bearing. Pt reports pain 4/10 prior to session.    Exercises:  Exercise 1: HEP: Quad sets 10 sec hold x10, 3x/day, Heel slides with strap x10 2x/day, twice per day, ankle pumps x20 every hour he's awake, glute sets 10 sec x10 3x/day  Exercise 2: * no SLR for 6 weeks, no combined ER/flex  Exercise 3: SciFit bike 10  mins L3.5  Exercise 5: 8\" FSU/LSU x 15 each // 6\" step down // reciprocal steps x2  Exercise 9: sit to stands 2x10 4# ball// YTB hamstring curl 2 x 15 R - sit to stands only  Exercise 10: short ricci step overs lateral/forward x 10 each  Exercise 14: mini lunge on BOSU x 8 each  Exercise 15: SB stretch, HS stretch, hip flexor stretch 2 x 30\"  Exercise 16: allegra retro amb 19# x8 // lateral walk outs 5# x5 ea side  Exercise 17: Startrac knee flexion 35# / knee extension 20# 2 x 10  Exercise 18: foam step up fwd/lat x10 ea    Assessment  Body Structures, Functions, Activity Limitations Requiring Skilled Therapeutic Intervention: Decreased functional mobility , Decreased ADL status, Decreased ROM, Decreased tolerance to work activity, Decreased strength, Decreased endurance, Decreased balance, Decreased vision/visual

## 2025-03-05 ENCOUNTER — HOSPITAL ENCOUNTER (OUTPATIENT)
Dept: PHYSICAL THERAPY | Age: 50
Setting detail: THERAPIES SERIES
Discharge: HOME | End: 2025-03-05
Payer: COMMERCIAL

## 2025-03-05 PROCEDURE — 97110 THERAPEUTIC EXERCISES: CPT

## 2025-03-05 NOTE — PROGRESS NOTES
Short Term Goals  Time Frame for Short Term Goals: 2 weeks  Short Term Goal 1: Pt will be inititated with HEP--met /cont  Short Term Goal 2: Pt will tolerate 30-40 minutes of ther ex to improve function with ADL's. - MET    Long Term Goals  Time Frame for Long Term Goals : 6 weeks  Long Term Goal 1: Pt will independant and compliant with HEP to maintain functional gains made at therapy. - MET  Long Term Goal 2: Pt will report 2/10 or less R hip pain on average to facilitate completion of ADL's. -progressing (3/4/25 pain 4/10)  Long Term Goal 3: Pt to improve R hip flexion to >/= 100* for ADLs.  Long Term Goal 4: Pt to improve R hip strength to 4/5 grossly for ease with transfers/ambulation.  Long Term Goal 5: Pt to ambulate community distances without AD for return to PLOF  Long term goal 6: Patient will report 70% improvement in overall symptoms and function.    Minutes Tracking:  Time In: 0800  Time Out: 0835  Minutes: 35  Timed Code Treatment Minutes: 33 Minutes        DEE LOCKE PT, DPT     Date: 3/5/2025

## 2025-03-07 ENCOUNTER — HOSPITAL ENCOUNTER (OUTPATIENT)
Age: 50
Discharge: HOME | End: 2025-03-07
Payer: COMMERCIAL

## 2025-03-07 ENCOUNTER — HOSPITAL ENCOUNTER (OUTPATIENT)
Dept: PHYSICAL THERAPY | Age: 50
Setting detail: THERAPIES SERIES
Discharge: HOME | End: 2025-03-07
Payer: COMMERCIAL

## 2025-03-07 DIAGNOSIS — E11.69 TYPE 2 DIABETES MELLITUS WITH OTHER SPECIFIED COMPLICATION, WITHOUT LONG-TERM CURRENT USE OF INSULIN (HCC): ICD-10-CM

## 2025-03-07 DIAGNOSIS — E78.5 HYPERLIPIDEMIA, UNSPECIFIED HYPERLIPIDEMIA TYPE: ICD-10-CM

## 2025-03-07 DIAGNOSIS — I10 HYPERTENSION, UNSPECIFIED TYPE: ICD-10-CM

## 2025-03-07 DIAGNOSIS — G89.29 CHRONIC RIGHT HIP PAIN: ICD-10-CM

## 2025-03-07 DIAGNOSIS — G89.4 CHRONIC PAIN SYNDROME: ICD-10-CM

## 2025-03-07 DIAGNOSIS — I50.9 HEART FAILURE, UNSPECIFIED HF CHRONICITY, UNSPECIFIED HEART FAILURE TYPE (HCC): ICD-10-CM

## 2025-03-07 DIAGNOSIS — M25.551 PAIN OF RIGHT HIP: ICD-10-CM

## 2025-03-07 DIAGNOSIS — M25.551 CHRONIC RIGHT HIP PAIN: ICD-10-CM

## 2025-03-07 LAB
25(OH)D3 SERPL-MCNC: 23.1 NG/ML (ref 30–100)
ALBUMIN SERPL-MCNC: 4.6 G/DL (ref 3.5–5.2)
ALBUMIN/GLOB SERPL: 1.7 {RATIO} (ref 1–2.5)
ALP SERPL-CCNC: 76 U/L (ref 40–129)
ALT SERPL-CCNC: 12 U/L (ref 10–50)
ANION GAP SERPL CALCULATED.3IONS-SCNC: 10 MMOL/L (ref 9–16)
AST SERPL-CCNC: 20 U/L (ref 10–50)
BASOPHILS # BLD: 0.04 K/UL (ref 0–0.2)
BASOPHILS NFR BLD: 1 % (ref 0–2)
BILIRUB SERPL-MCNC: 0.3 MG/DL (ref 0–1.2)
BUN SERPL-MCNC: 18 MG/DL (ref 6–20)
BUN/CREAT SERPL: 20 (ref 9–20)
CALCIUM SERPL-MCNC: 9.7 MG/DL (ref 8.6–10.4)
CHLORIDE SERPL-SCNC: 105 MMOL/L (ref 98–107)
CHOLEST SERPL-MCNC: 101 MG/DL (ref 0–199)
CHOLESTEROL/HDL RATIO: 3
CK SERPL-CCNC: 40 U/L (ref 39–308)
CO2 SERPL-SCNC: 25 MMOL/L (ref 20–31)
CREAT SERPL-MCNC: 0.9 MG/DL (ref 0.7–1.2)
EOSINOPHIL # BLD: 0.13 K/UL (ref 0–0.44)
EOSINOPHILS RELATIVE PERCENT: 2 % (ref 1–4)
ERYTHROCYTE [DISTWIDTH] IN BLOOD BY AUTOMATED COUNT: 13.9 % (ref 11.8–14.4)
EST. AVERAGE GLUCOSE BLD GHB EST-MCNC: 97 MG/DL
FOLATE SERPL-MCNC: 15.3 NG/ML (ref 4.8–24.2)
GFR, ESTIMATED: >90 ML/MIN/1.73M2
GLUCOSE SERPL-MCNC: 109 MG/DL (ref 74–99)
HBA1C MFR BLD: 5 % (ref 4–6)
HCT VFR BLD AUTO: 37.2 % (ref 40.7–50.3)
HDLC SERPL-MCNC: 34 MG/DL
HGB BLD-MCNC: 12.4 G/DL (ref 13–17)
IMM GRANULOCYTES # BLD AUTO: <0.03 K/UL (ref 0–0.3)
IMM GRANULOCYTES NFR BLD: 0 %
LDLC SERPL CALC-MCNC: 48 MG/DL (ref 0–100)
LYMPHOCYTES NFR BLD: 2.91 K/UL (ref 1.1–3.7)
LYMPHOCYTES RELATIVE PERCENT: 41 % (ref 24–43)
MCH RBC QN AUTO: 29 PG (ref 25.2–33.5)
MCHC RBC AUTO-ENTMCNC: 33.3 G/DL (ref 28.4–34.8)
MCV RBC AUTO: 86.9 FL (ref 82.6–102.9)
MONOCYTES NFR BLD: 0.62 K/UL (ref 0.1–1.2)
MONOCYTES NFR BLD: 9 % (ref 3–12)
NEUTROPHILS NFR BLD: 47 % (ref 36–65)
NEUTS SEG NFR BLD: 3.35 K/UL (ref 1.5–8.1)
NRBC BLD-RTO: 0 PER 100 WBC
PLATELET # BLD AUTO: 416 K/UL (ref 138–453)
PMV BLD AUTO: 9.7 FL (ref 8.1–13.5)
POTASSIUM SERPL-SCNC: 4.7 MMOL/L (ref 3.7–5.3)
PROT SERPL-MCNC: 7.2 G/DL (ref 6.6–8.7)
RBC # BLD AUTO: 4.28 M/UL (ref 4.21–5.77)
SODIUM SERPL-SCNC: 140 MMOL/L (ref 136–145)
TRIGL SERPL-MCNC: 93 MG/DL
VIT B12 SERPL-MCNC: 467 PG/ML (ref 232–1245)
VLDLC SERPL CALC-MCNC: 19 MG/DL (ref 1–30)
WBC OTHER # BLD: 7.1 K/UL (ref 3.5–11.3)

## 2025-03-07 PROCEDURE — 97110 THERAPEUTIC EXERCISES: CPT

## 2025-03-07 PROCEDURE — 80053 COMPREHEN METABOLIC PANEL: CPT

## 2025-03-07 PROCEDURE — 80061 LIPID PANEL: CPT

## 2025-03-07 PROCEDURE — 82607 VITAMIN B-12: CPT

## 2025-03-07 PROCEDURE — 85025 COMPLETE CBC W/AUTO DIFF WBC: CPT

## 2025-03-07 PROCEDURE — 83036 HEMOGLOBIN GLYCOSYLATED A1C: CPT

## 2025-03-07 PROCEDURE — 82550 ASSAY OF CK (CPK): CPT

## 2025-03-07 PROCEDURE — 82306 VITAMIN D 25 HYDROXY: CPT

## 2025-03-07 PROCEDURE — 82746 ASSAY OF FOLIC ACID SERUM: CPT

## 2025-03-07 PROCEDURE — 36415 COLL VENOUS BLD VENIPUNCTURE: CPT

## 2025-03-10 ENCOUNTER — HOSPITAL ENCOUNTER (OUTPATIENT)
Dept: PHYSICAL THERAPY | Age: 50
Setting detail: THERAPIES SERIES
Discharge: HOME | End: 2025-03-10
Payer: COMMERCIAL

## 2025-03-10 ENCOUNTER — RESULTS FOLLOW-UP (OUTPATIENT)
Dept: PRIMARY CARE CLINIC | Age: 50
End: 2025-03-10

## 2025-03-10 NOTE — TELEPHONE ENCOUNTER
----- Message from Dr. Curtis Chavarria MD sent at 3/7/2025  7:57 PM EST -----  Please notify the patient that their lab results are reviewed  Lipid panel indicates significant improvement compared to 10 months prior    2) vitamin D is low - if not taking any supplements/or even if there is, I would add 1000IU OTC/day  3) cmp is stable/wnl  Cbc is stable / noted improvement of the prior anemia - nearly WNL - encourage ongoing foods with iron.   B12/folic wnl    A1c stable at 5.0   CK is negative (no noted muscle breakdown)    Thank you.  Electronically signed by Curtis Chavarria MD on 3/7/2025 at 7:56 PM

## 2025-03-10 NOTE — PROGRESS NOTES
Physical Therapy  [facility]  Inpatient/Observation/Outpatient Rehabilitation    Date: 3/10/2025  Patient Name: Franky Melendez       [] Inpatient Acute/Observation       [x]  Outpatient  : 1975       Plan of Care/Recert ends      [] Pt refused/declined therapy at this time due to:           [x] Pt cancelled due to:  [] No Reason Given   [] Sick/ill   [x] Other:  had something come up    [] Evaluation held by RN/Provider/Physical Therapist due to:    [] High Heart Rate   [] High Blood Pressure   [] Orthopedic Consult   [] Hgb < 7   [] Other:    [] Pt ordered brace per physician request:  [] Proper fit will be completed and education for wearing/skin checks    [] Pt does not require skilled services due to:      Therapist/Assistant will attempt to see this patient, at our earliest opportunity.       Jojo Kwong Date: 3/10/2025

## 2025-03-11 ENCOUNTER — APPOINTMENT (OUTPATIENT)
Dept: PHYSICAL THERAPY | Age: 50
End: 2025-03-11
Payer: COMMERCIAL

## 2025-03-21 DIAGNOSIS — G89.4 CHRONIC PAIN SYNDROME: ICD-10-CM

## 2025-03-21 DIAGNOSIS — M25.551 PAIN OF RIGHT HIP: ICD-10-CM

## 2025-03-21 DIAGNOSIS — M25.551 CHRONIC RIGHT HIP PAIN: ICD-10-CM

## 2025-03-21 DIAGNOSIS — G89.29 CHRONIC RIGHT HIP PAIN: ICD-10-CM

## 2025-03-21 RX ORDER — HYDROCODONE BITARTRATE AND ACETAMINOPHEN 5; 325 MG/1; MG/1
1 TABLET ORAL EVERY 6 HOURS PRN
Qty: 28 TABLET | Refills: 0 | Status: SHIPPED | OUTPATIENT
Start: 2025-03-21 | End: 2025-03-28

## 2025-03-21 RX ORDER — HYDROCODONE BITARTRATE AND ACETAMINOPHEN 10; 325 MG/1; MG/1
1 TABLET ORAL EVERY 6 HOURS PRN
Qty: 28 TABLET | Refills: 0 | Status: SHIPPED | OUTPATIENT
Start: 2025-03-21 | End: 2025-03-21

## 2025-03-22 NOTE — ED NOTES
Dr Dyson Carry returned call. Currently speaking with Dr. Jess Ward.      Zuri Noe A Reser  12/21/21 5337 cw atorvastatin 20mg    DIET: CC  DVT: heparin subq  Dispo: SANDHYA

## 2025-03-25 ENCOUNTER — OFFICE VISIT (OUTPATIENT)
Dept: PRIMARY CARE CLINIC | Age: 50
End: 2025-03-25
Payer: COMMERCIAL

## 2025-03-25 ENCOUNTER — HOSPITAL ENCOUNTER (OUTPATIENT)
Dept: PHYSICAL THERAPY | Age: 50
Setting detail: THERAPIES SERIES
Discharge: HOME | End: 2025-03-25
Payer: COMMERCIAL

## 2025-03-25 VITALS
WEIGHT: 168 LBS | OXYGEN SATURATION: 97 % | HEART RATE: 88 BPM | SYSTOLIC BLOOD PRESSURE: 106 MMHG | BODY MASS INDEX: 26.37 KG/M2 | DIASTOLIC BLOOD PRESSURE: 72 MMHG | HEIGHT: 67 IN

## 2025-03-25 DIAGNOSIS — G89.29 CHRONIC RIGHT HIP PAIN: Primary | ICD-10-CM

## 2025-03-25 DIAGNOSIS — M25.551 CHRONIC RIGHT HIP PAIN: Primary | ICD-10-CM

## 2025-03-25 DIAGNOSIS — Z96.642 S/P TOTAL LEFT HIP ARTHROPLASTY: ICD-10-CM

## 2025-03-25 DIAGNOSIS — G89.4 CHRONIC PAIN SYNDROME: ICD-10-CM

## 2025-03-25 PROCEDURE — G2211 COMPLEX E/M VISIT ADD ON: HCPCS | Performed by: STUDENT IN AN ORGANIZED HEALTH CARE EDUCATION/TRAINING PROGRAM

## 2025-03-25 PROCEDURE — 3074F SYST BP LT 130 MM HG: CPT | Performed by: STUDENT IN AN ORGANIZED HEALTH CARE EDUCATION/TRAINING PROGRAM

## 2025-03-25 PROCEDURE — 99214 OFFICE O/P EST MOD 30 MIN: CPT | Performed by: STUDENT IN AN ORGANIZED HEALTH CARE EDUCATION/TRAINING PROGRAM

## 2025-03-25 PROCEDURE — 97110 THERAPEUTIC EXERCISES: CPT

## 2025-03-25 PROCEDURE — 3078F DIAST BP <80 MM HG: CPT | Performed by: STUDENT IN AN ORGANIZED HEALTH CARE EDUCATION/TRAINING PROGRAM

## 2025-03-25 RX ORDER — OXYCODONE HYDROCHLORIDE 5 MG/1
5 TABLET ORAL 2 TIMES DAILY PRN
Qty: 60 TABLET | Refills: 0 | Status: SHIPPED | OUTPATIENT
Start: 2025-03-25 | End: 2025-04-24

## 2025-03-25 NOTE — PROGRESS NOTES
Phone: [phone]                 [facility]           Fax: [phone]                           Outpatient Physical Therapy                                                                            Daily Note    Patient: Franky Melendez : 1975  CSN #: [id number]   Referring Physician: Elliot Jacobs MD  Date: 3/25/2025    Diagnosis: Right hip pain, M25.551, Primary osteoarthritis of R hip, M16.11 (ICD-10-CM)  Treatment Diagnosis: R LE weakness, s/p R SUSAN    Onset Date: 25  PT Insurance Information: BCBS  Total # of Visits Approved: 20 Per Physician Order  Total # of Visits to Date: 13  No Show: 1  Canceled Appointment: 2    25 Plan of Care/Recert Due    Pre-Treatment Pain:  6/10  Subjective: Pt states pain is 5-6/10. States he did a lot of walking on vacation last week and is pretty sore    Exercises:  Exercise 3: SciFit bike 10  [place] 4.5  Exercise 5: 8\" FSU/LSU 2 x 15  ea  Exercise 9: sit to stands 2x10 8# ball  Exercise 10: 12 \"  ricci step overs lateral/forward x 10 each  Exercise 11: mini lunge hip flexor stretch x 60\" R  Exercise 13: SLS on foam x 60\"  Exercise 15: SB stretch, HS stretch, hip flexor stretch 2 x 30\"  Exercise 16: allegra retro amb 20# x8 // lateral walk outs 7# x5 ea side  Exercise 17: Startrac knee flexion 45# / knee extension 25# 2 x 10     Assessment  Assessment: Pt reporting 5-6/10 pain, states his did a lot of walking in [place] this past week. Pt is doing well and progressing with functional strengthening. Tightness and pain with hip abduction brian. Flexion is WFL's for THR. Strength is 4-/4/5. Will continue to focus on functional strengtheing as tolerated    Activity Tolerance  Activity Tolerance: Patient tolerated treatment well    Patient Education  Patient Education: HEP  Pt verbalized/demonstrated good understanding:     [x] Yes         [] No, pt required further clarification.       Post Treatment Pain:  5/10      Plan  Plan Frequency: 2  Plan weeks:

## 2025-03-25 NOTE — PROGRESS NOTES
[facility] PRIMARY CARE  [address] [place] [address] , [address]  [place], [place], [address]    Franky Melendez is a 49 y.o. male with  has a past medical history of CAD (coronary artery disease), Carpal tunnel syndrome, Depressive disorder, not elsewhere classified, Diabetes mellitus (HCC), Gastrointestinal hemorrhage with hematemesis, GERD (gastroesophageal reflux disease), Heart attack (HCC), History of echocardiogram, History of pancreatitis, Hyperlipidemia, Hypertension, and PTSD (post-traumatic stress disorder).  Presented to the office today for:  Chief Complaint   Patient presents with    Chronic Pain       Assessment/Plan   1. Chronic right hip pain  -     External Referral To Pain Clinic  -     oxyCODONE (ROXICODONE) 5 MG immediate release tablet; Take 1 tablet by mouth 2 times daily as needed for Pain for up to 30 days. Intended supply: 30 days. Take lowest dose possible to manage pain Max Daily Amount: 10 mg, Disp-60 tablet, R-0Normal  2. Chronic pain syndrome  -     External Referral To Pain Clinic  -     oxyCODONE (ROXICODONE) 5 MG immediate release tablet; Take 1 tablet by mouth 2 times daily as needed for Pain for up to 30 days. Intended supply: 30 days. Take lowest dose possible to manage pain Max Daily Amount: 10 mg, Disp-60 tablet, R-0Normal  3. S/P total left hip arthroplasty  -     External Referral To Pain Clinic  -     oxyCODONE (ROXICODONE) 5 MG immediate release tablet; Take 1 tablet by mouth 2 times daily as needed for Pain for up to 30 days. Intended supply: 30 days. Take lowest dose possible to manage pain Max Daily Amount: 10 mg, Disp-60 tablet, R-0Normal    Return for F/U per prior plans.  Assessment & Plan  The patient's pain was worsening their quality of life and function in the past and has been using the opiate medication in order to continue to improve their pain, function and quality of life. The patient denies side effects from the medication at this time including constipation

## 2025-03-27 ENCOUNTER — HOSPITAL ENCOUNTER (OUTPATIENT)
Dept: PHYSICAL THERAPY | Age: 50
Setting detail: THERAPIES SERIES
Discharge: HOME | End: 2025-03-27
Payer: COMMERCIAL

## 2025-03-27 NOTE — PROGRESS NOTES
[facility]  Inpatient/Observation/Outpatient Rehabilitation    Date: 3/27/2025  Patient Name: Franky Melendez       [] Inpatient Acute/Observation       [x]  Outpatient  : 1975           [] Pt refused/declined therapy at this time due to:           [x] Pt cancelled due to:  [] No Reason Given   [] Sick/ill   [x] Other:  stuck at work    [] Evaluation held by RN/Provider/Physical Therapist due to:    [] High Heart Rate   [] High Blood Pressure   [] Orthopedic Consult   [] Hgb < 7   [] Other:    [] Pt ordered brace per physician request:  [] Proper fit will be completed and education for wearing/skin checks    [] Pt does not require skilled services due to:      Therapist/Assistant will attempt to see this patient, at our earliest opportunity.       Kandy Cano Date: 3/27/2025

## 2025-04-09 DIAGNOSIS — E11.9 TYPE 2 DIABETES MELLITUS WITHOUT COMPLICATION, UNSPECIFIED WHETHER LONG TERM INSULIN USE (HCC): ICD-10-CM

## 2025-04-15 ENCOUNTER — TELEPHONE (OUTPATIENT)
Dept: PRIMARY CARE CLINIC | Age: 50
End: 2025-04-15

## 2025-04-15 DIAGNOSIS — G89.29 CHRONIC RIGHT HIP PAIN: ICD-10-CM

## 2025-04-15 DIAGNOSIS — Z96.642 S/P TOTAL LEFT HIP ARTHROPLASTY: ICD-10-CM

## 2025-04-15 DIAGNOSIS — M25.551 CHRONIC RIGHT HIP PAIN: ICD-10-CM

## 2025-04-15 DIAGNOSIS — K21.9 GASTROESOPHAGEAL REFLUX DISEASE, UNSPECIFIED WHETHER ESOPHAGITIS PRESENT: ICD-10-CM

## 2025-04-15 DIAGNOSIS — G89.4 CHRONIC PAIN SYNDROME: ICD-10-CM

## 2025-04-15 RX ORDER — PANTOPRAZOLE SODIUM 40 MG/1
40 TABLET, DELAYED RELEASE ORAL DAILY
Qty: 180 TABLET | Refills: 0 | Status: SHIPPED | OUTPATIENT
Start: 2025-04-15

## 2025-04-15 RX ORDER — HYDROCODONE BITARTRATE AND ACETAMINOPHEN 5; 325 MG/1; MG/1
1 TABLET ORAL 2 TIMES DAILY PRN
Qty: 14 TABLET | Refills: 0 | Status: SHIPPED | OUTPATIENT
Start: 2025-04-15 | End: 2025-04-22

## 2025-04-15 NOTE — TELEPHONE ENCOUNTER
Attempted to contact pt regarding pain management referral to [facility]. Left message to call the office back as they did receive the order and will be calling him to schedule.

## 2025-04-22 DIAGNOSIS — M25.551 CHRONIC RIGHT HIP PAIN: ICD-10-CM

## 2025-04-22 DIAGNOSIS — G89.4 CHRONIC PAIN SYNDROME: ICD-10-CM

## 2025-04-22 DIAGNOSIS — Z96.642 S/P TOTAL LEFT HIP ARTHROPLASTY: ICD-10-CM

## 2025-04-22 DIAGNOSIS — G89.29 CHRONIC RIGHT HIP PAIN: ICD-10-CM

## 2025-04-22 RX ORDER — HYDROCODONE BITARTRATE AND ACETAMINOPHEN 5; 325 MG/1; MG/1
1 TABLET ORAL EVERY 8 HOURS PRN
Qty: 21 TABLET | Refills: 0 | Status: SHIPPED | OUTPATIENT
Start: 2025-04-22 | End: 2025-04-29

## 2025-04-29 ENCOUNTER — TELEPHONE (OUTPATIENT)
Dept: PRIMARY CARE CLINIC | Age: 50
End: 2025-04-29

## 2025-05-01 DIAGNOSIS — G89.29 CHRONIC RIGHT HIP PAIN: Primary | ICD-10-CM

## 2025-05-01 DIAGNOSIS — G89.4 CHRONIC PAIN SYNDROME: ICD-10-CM

## 2025-05-01 DIAGNOSIS — M25.551 CHRONIC RIGHT HIP PAIN: Primary | ICD-10-CM

## 2025-05-01 DIAGNOSIS — Z96.642 S/P TOTAL LEFT HIP ARTHROPLASTY: ICD-10-CM

## 2025-05-01 RX ORDER — HYDROCODONE BITARTRATE AND ACETAMINOPHEN 5; 325 MG/1; MG/1
1 TABLET ORAL EVERY 8 HOURS PRN
Qty: 21 TABLET | Refills: 0 | Status: SHIPPED | OUTPATIENT
Start: 2025-05-01 | End: 2025-05-08

## 2025-05-01 NOTE — TELEPHONE ENCOUNTER
Patient phoned, states he has appointment scheduled with pain clinic in [place] for May 8. Requesting another refill prior to that appointment. Thank you.

## 2025-05-06 ENCOUNTER — OFFICE VISIT (OUTPATIENT)
Dept: PRIMARY CARE CLINIC | Age: 50
End: 2025-05-06
Payer: COMMERCIAL

## 2025-05-06 VITALS
OXYGEN SATURATION: 100 % | RESPIRATION RATE: 16 BRPM | DIASTOLIC BLOOD PRESSURE: 72 MMHG | HEART RATE: 60 BPM | SYSTOLIC BLOOD PRESSURE: 110 MMHG | WEIGHT: 167.6 LBS | BODY MASS INDEX: 26.25 KG/M2

## 2025-05-06 DIAGNOSIS — Z96.641 S/P TOTAL RIGHT HIP ARTHROPLASTY: ICD-10-CM

## 2025-05-06 DIAGNOSIS — S61.412D LACERATION OF LEFT HAND, FOREIGN BODY PRESENCE UNSPECIFIED, SUBSEQUENT ENCOUNTER: Primary | ICD-10-CM

## 2025-05-06 DIAGNOSIS — I25.10 ASHD (ARTERIOSCLEROTIC HEART DISEASE): Chronic | ICD-10-CM

## 2025-05-06 PROBLEM — S61.412A LACERATION OF LEFT HAND: Status: ACTIVE | Noted: 2025-05-06

## 2025-05-06 PROCEDURE — 3074F SYST BP LT 130 MM HG: CPT | Performed by: STUDENT IN AN ORGANIZED HEALTH CARE EDUCATION/TRAINING PROGRAM

## 2025-05-06 PROCEDURE — 99214 OFFICE O/P EST MOD 30 MIN: CPT | Performed by: STUDENT IN AN ORGANIZED HEALTH CARE EDUCATION/TRAINING PROGRAM

## 2025-05-06 PROCEDURE — G2211 COMPLEX E/M VISIT ADD ON: HCPCS | Performed by: STUDENT IN AN ORGANIZED HEALTH CARE EDUCATION/TRAINING PROGRAM

## 2025-05-06 PROCEDURE — 3078F DIAST BP <80 MM HG: CPT | Performed by: STUDENT IN AN ORGANIZED HEALTH CARE EDUCATION/TRAINING PROGRAM

## 2025-05-06 RX ORDER — HYDROCODONE BITARTRATE AND ACETAMINOPHEN 10; 325 MG/1; MG/1
1 TABLET ORAL 2 TIMES DAILY PRN
Qty: 14 TABLET | Refills: 0 | Status: SHIPPED | OUTPATIENT
Start: 2025-05-06 | End: 2025-05-13

## 2025-05-06 RX ORDER — TIRZEPATIDE 7.5 MG/.5ML
INJECTION, SOLUTION SUBCUTANEOUS
Qty: 4 ML | Refills: 5 | Status: SHIPPED | OUTPATIENT
Start: 2025-05-06

## 2025-05-06 RX ORDER — PREGABALIN 75 MG/1
75 CAPSULE ORAL 2 TIMES DAILY
Qty: 60 CAPSULE | Refills: 0 | Status: SHIPPED | OUTPATIENT
Start: 2025-05-06 | End: 2025-06-05

## 2025-05-06 NOTE — PROGRESS NOTES
[facility] [place] [facility] PRIMARY CARE  [address] [place] [address] , [address]  [place], [place], [address]    Franky Melendez is a 49 y.o. male with  has a past medical history of CAD (coronary artery disease), Carpal tunnel syndrome, Depressive disorder, not elsewhere classified, Diabetes mellitus (HCC), Gastrointestinal hemorrhage with hematemesis, GERD (gastroesophageal reflux disease), Heart attack (HCC), History of echocardiogram, History of pancreatitis, Hyperlipidemia, Hypertension, and PTSD (post-traumatic stress disorder).  Presented to the office today for:  Chief Complaint   Patient presents with    ED Follow-up       Assessment/Plan   1. Laceration of left hand, foreign body presence unspecified, subsequent encounter  2. S/P total right hip arthroplasty  -     pregabalin (LYRICA) 75 MG capsule; Take 1 capsule by mouth 2 times daily for 30 days. Max Daily Amount: 150 mg, Disp-60 capsule, R-0Normal  -     HYDROcodone-acetaminophen (NORCO)  MG per tablet; Take 1 tablet by mouth 2 times daily as needed for Pain for up to 7 days. Intended supply: 7 days Max Daily Amount: 2 tablets, Disp-14 tablet, R-0Normal  3. ASHD (arteriosclerotic heart disease) /  CABG 2018  -     HYDROcodone-acetaminophen (NORCO)  MG per tablet; Take 1 tablet by mouth 2 times daily as needed for Pain for up to 7 days. Intended supply: 7 days Max Daily Amount: 2 tablets, Disp-14 tablet, R-0Normal    Return if symptoms worsen or fail to improve.    Assessment & Plan      Suture/ Staple Removal Procedure Note    Indication: Wound healed    Procedure: The patient was placed in the appropriate position and the 4 sutures were removed without difficulty.    Other items: the wound appears well healed    The patient tolerated the procedure well.    Complications: None      Local wound care  Antibiotics to be continued /Augmentin    We discussed some of the etiologies and natural histories. We discussed the various treatment alternatives including

## 2025-05-29 ENCOUNTER — OFFICE VISIT (OUTPATIENT)
Dept: PRIMARY CARE CLINIC | Age: 50
End: 2025-05-29

## 2025-05-29 VITALS
OXYGEN SATURATION: 98 % | WEIGHT: 165 LBS | HEIGHT: 67 IN | BODY MASS INDEX: 25.9 KG/M2 | HEART RATE: 76 BPM | DIASTOLIC BLOOD PRESSURE: 80 MMHG | RESPIRATION RATE: 16 BRPM | SYSTOLIC BLOOD PRESSURE: 122 MMHG

## 2025-05-29 DIAGNOSIS — K21.9 GASTROESOPHAGEAL REFLUX DISEASE, UNSPECIFIED WHETHER ESOPHAGITIS PRESENT: ICD-10-CM

## 2025-05-29 DIAGNOSIS — M25.551 CHRONIC RIGHT HIP PAIN: Primary | ICD-10-CM

## 2025-05-29 DIAGNOSIS — G89.29 CHRONIC RIGHT HIP PAIN: Primary | ICD-10-CM

## 2025-05-29 DIAGNOSIS — E11.9 TYPE 2 DIABETES MELLITUS WITHOUT COMPLICATION, UNSPECIFIED WHETHER LONG TERM INSULIN USE (HCC): ICD-10-CM

## 2025-05-29 DIAGNOSIS — F41.9 ANXIETY: ICD-10-CM

## 2025-05-29 DIAGNOSIS — G89.4 CHRONIC PAIN SYNDROME: ICD-10-CM

## 2025-05-29 RX ORDER — HYDROXYZINE HYDROCHLORIDE 25 MG/1
25 TABLET, FILM COATED ORAL 4 TIMES DAILY PRN
Qty: 120 TABLET | Refills: 0 | Status: SHIPPED | OUTPATIENT
Start: 2025-05-29 | End: 2025-06-28

## 2025-05-29 RX ORDER — ACYCLOVIR 400 MG/1
1 TABLET ORAL
Qty: 2 EACH | Refills: 0 | Status: SHIPPED | COMMUNITY
Start: 2025-05-29

## 2025-05-29 RX ORDER — OXYCODONE HYDROCHLORIDE 5 MG/1
TABLET ORAL
COMMUNITY
Start: 2025-05-08

## 2025-05-29 RX ORDER — INSULIN GLARGINE-YFGN 100 [IU]/ML
20 INJECTION, SOLUTION SUBCUTANEOUS NIGHTLY
Qty: 3 ML | Refills: 3 | Status: SHIPPED | OUTPATIENT
Start: 2025-05-29

## 2025-05-29 RX ORDER — PANTOPRAZOLE SODIUM 40 MG/1
40 TABLET, DELAYED RELEASE ORAL DAILY
Qty: 180 TABLET | Refills: 0 | Status: SHIPPED | OUTPATIENT
Start: 2025-05-29

## 2025-05-29 NOTE — PROGRESS NOTES
[facility] PRIMARY CARE  [address] [place] [address] , [address]  [place], [place], [address]    Franky Melendez is a 49 y.o. male with  has a past medical history of CAD (coronary artery disease), Carpal tunnel syndrome, Depressive disorder, not elsewhere classified, Diabetes mellitus (HCC), Gastrointestinal hemorrhage with hematemesis, GERD (gastroesophageal reflux disease), Heart attack (HCC), History of echocardiogram, History of pancreatitis, Hyperlipidemia, Hypertension, and PTSD (post-traumatic stress disorder).  Presented to the office today for:  Chief Complaint   Patient presents with    Hip Pain    Anxiety       Assessment/Plan   1. Chronic right hip pain  2. Chronic pain syndrome  3. Type 2 diabetes mellitus without complication, unspecified whether long term insulin use (HCC)  -     Insulin Glargine-yfgn (SEMGLEE, YFGN,) 100 UNIT/ML SOPN; Inject 20 Units into the skin at bedtime, Disp-3 mL, R-3Normal  -     Tirzepatide (MOUNJARO) 10 MG/0.5ML SOAJ pen; Inject 10 mg into the skin once a week, Disp-2 mL, R-3Normal  4. Gastroesophageal reflux disease, unspecified whether esophagitis present  -     pantoprazole (PROTONIX) 40 MG tablet; Take 1 tablet by mouth daily, Disp-180 tablet, R-0Normal  5. Anxiety  -     hydrOXYzine HCl (ATARAX) 25 MG tablet; Take 1 tablet by mouth 4 times daily as needed for Anxiety, Disp-120 tablet, R-0Normal    Return in about 3 months (around 8/29/2025) for F/U Med Management.  Assessment & Plan  We discussed some of the etiologies and natural histories. We discussed the various treatment alternatives including anti-inflammatory medications, physical therapy, injections, further imaging studies and as a last resort surgery.  Lyrica to be continued. Percocet PRN BID. F/U Pain management.  T2DM - stable/at goal, continue w/ glargine at 20U nightly  Anxiety - discussed various options for management, Atarax PRN for now     All patient questions answered.  Pt voiced understanding.

## 2025-06-02 ENCOUNTER — TELEPHONE (OUTPATIENT)
Dept: PRIMARY CARE CLINIC | Age: 50
End: 2025-06-02

## 2025-06-02 DIAGNOSIS — E11.9 TYPE 2 DIABETES MELLITUS WITHOUT COMPLICATION, UNSPECIFIED WHETHER LONG TERM INSULIN USE (HCC): Primary | ICD-10-CM

## 2025-06-02 RX ORDER — INSULIN GLARGINE 100 [IU]/ML
20 INJECTION, SOLUTION SUBCUTANEOUS NIGHTLY
Qty: 5 ADJUSTABLE DOSE PRE-FILLED PEN SYRINGE | Refills: 3 | Status: SHIPPED | OUTPATIENT
Start: 2025-06-02 | End: 2025-06-04 | Stop reason: SDUPTHER

## 2025-06-02 NOTE — TELEPHONE ENCOUNTER
Semglee is on back order and was wondering if something else could be sent in.       If need to call for alternatives the contact information is    Reference # [id number]   [phone]

## 2025-06-04 DIAGNOSIS — E11.69 TYPE 2 DIABETES MELLITUS WITH OTHER SPECIFIED COMPLICATION, WITHOUT LONG-TERM CURRENT USE OF INSULIN (HCC): Primary | ICD-10-CM

## 2025-06-04 DIAGNOSIS — E11.9 TYPE 2 DIABETES MELLITUS WITHOUT COMPLICATION, UNSPECIFIED WHETHER LONG TERM INSULIN USE (HCC): ICD-10-CM

## 2025-06-04 RX ORDER — INSULIN GLARGINE 100 [IU]/ML
20 INJECTION, SOLUTION SUBCUTANEOUS NIGHTLY
Qty: 5 ADJUSTABLE DOSE PRE-FILLED PEN SYRINGE | Refills: 3 | Status: SHIPPED | OUTPATIENT
Start: 2025-06-04

## 2025-06-04 RX ORDER — INSULIN GLARGINE 100 [IU]/ML
20 INJECTION, SOLUTION SUBCUTANEOUS NIGHTLY
Qty: 10 ML | Refills: 3 | Status: SHIPPED | OUTPATIENT
Start: 2025-06-04 | End: 2025-06-05 | Stop reason: SDUPTHER

## 2025-06-04 NOTE — TELEPHONE ENCOUNTER
Lantus is not covered in patient plan(originally sent in Semglee pens which is on back order). Semglee vials are covered. Insulin syringes would be needed. When pharmacy called stating Semglee pens were on back order I asked for alternatives. They would not provide any. Orders pended and insulin syringes pended as well.

## 2025-06-05 DIAGNOSIS — E11.69 TYPE 2 DIABETES MELLITUS WITH OTHER SPECIFIED COMPLICATION, WITHOUT LONG-TERM CURRENT USE OF INSULIN (HCC): ICD-10-CM

## 2025-06-05 DIAGNOSIS — E11.9 TYPE 2 DIABETES MELLITUS WITHOUT COMPLICATION, UNSPECIFIED WHETHER LONG TERM INSULIN USE (HCC): ICD-10-CM

## 2025-06-05 RX ORDER — INSULIN GLARGINE 100 [IU]/ML
20 INJECTION, SOLUTION SUBCUTANEOUS NIGHTLY
Qty: 18 ML | Refills: 1 | Status: SHIPPED | OUTPATIENT
Start: 2025-06-05

## 2025-06-09 RX ORDER — INSULIN GLARGINE 300 U/ML
20 INJECTION, SOLUTION SUBCUTANEOUS NIGHTLY
Qty: 2 ADJUSTABLE DOSE PRE-FILLED PEN SYRINGE | Refills: 3 | Status: SHIPPED | OUTPATIENT
Start: 2025-06-09

## 2025-06-09 NOTE — TELEPHONE ENCOUNTER
LANTUS IS NOT COVERED UNDER HIS INS AND PT WOULD LIKE TOUJEO 300 UNIT PER ML PEN HE IS USING 20 UNITS NIGHTLY ON LANTUS PLEASE CHECK SCRIPT FOR CORRECT DOSING

## 2025-06-12 ENCOUNTER — TELEPHONE (OUTPATIENT)
Dept: PRIMARY CARE CLINIC | Age: 50
End: 2025-06-12

## 2025-06-12 NOTE — TELEPHONE ENCOUNTER
Hello, yes it is close to a 1:1 ratio. It is just important to make sure that as long as the units as the same, the concentration level should not make a difference, thank you.  Electronically signed by Curtis Chavarria MD on 6/12/2025 at 12:59 PM

## 2025-06-12 NOTE — TELEPHONE ENCOUNTER
Patient phoned regarding his new prescription of Toujeo. Patient was originally taking 100 unit- 20 units at night  of Semglee and now is starting Toujeo 300 unit with sig stating 20 units at night as well.  Wants to make sure he should not cut back in units on this new script. Shelly calling pharmacy back to verify.

## 2025-06-12 NOTE — TELEPHONE ENCOUNTER
Writer verified the medication information, dosing and refill quantity with the pharmacist when the pharmacist was requesting the new med on behalf of the patient. Which is what the writer pended for the provider to sign off on. Writer called the [facility]) to verify the information again and spoke with Mihaela the pharmacist who verified the information regarding the medication is correct. The patient is concerned of the medication concentration going from 100 units/ml to 300 units/ml dose and his dose of 20 units staying the same. Pharmacist states that upon reviewing the transition, that some patient going from semglee to toujeo need to increase their dose to have the same effect. She stated that she felt it was appropriate for patient to go from semglee 100/ml 20 units dose nightly to Toujeo 300 unit/ml 20 units nightly with no issue and close monitoring and adjusting of dose as needed. PCP, please advise if you agree with this.

## 2025-06-19 DIAGNOSIS — E11.9 TYPE 2 DIABETES MELLITUS WITHOUT COMPLICATION, UNSPECIFIED WHETHER LONG TERM INSULIN USE (HCC): ICD-10-CM

## 2025-06-19 RX ORDER — ACYCLOVIR 400 MG/1
TABLET ORAL
Qty: 5 EACH | Refills: 6 | Status: SHIPPED | OUTPATIENT
Start: 2025-06-19

## 2025-06-19 RX ORDER — ACYCLOVIR 400 MG/1
1 TABLET ORAL
Qty: 10 EACH | Refills: 4 | OUTPATIENT
Start: 2025-06-19

## 2025-07-01 ENCOUNTER — TELEPHONE (OUTPATIENT)
Dept: PRIMARY CARE CLINIC | Age: 50
End: 2025-07-01

## 2025-07-01 NOTE — TELEPHONE ENCOUNTER
Hello, as per prior my other response(s), I do not know who you are, your role and reason for reviewing/sending me a message regarding a patient's chart.     Please do not send me any messages until the chart is reviewed fully and determined that the message is relevant.    Thank you.  Electronically signed by Curtis Chavarria MD on 7/1/2025 at 7:36 PM

## 2025-07-01 NOTE — TELEPHONE ENCOUNTER
Our records indicate that Franky Melendez may benefit from medication optimization to meet the three pillars of CHF care (ACE/ARB/ARNI, Beta Blocker, and MRA therapy). The ask is to change the patient's medications below, as appropriate. If the patient is being followed by a cardiologist, please ensure they have a follow up appointment with that provider to discuss further.     The measure definition, denominator, inclusions, and exclusions are below:

## 2025-07-17 DIAGNOSIS — Z96.641 S/P TOTAL RIGHT HIP ARTHROPLASTY: ICD-10-CM

## 2025-07-17 RX ORDER — PREGABALIN 75 MG/1
75 CAPSULE ORAL 2 TIMES DAILY
Qty: 180 CAPSULE | Refills: 0 | Status: SHIPPED | OUTPATIENT
Start: 2025-07-17 | End: 2025-10-15

## 2025-07-21 ENCOUNTER — OFFICE VISIT (OUTPATIENT)
Dept: PRIMARY CARE CLINIC | Age: 50
End: 2025-07-21
Payer: COMMERCIAL

## 2025-07-21 VITALS
DIASTOLIC BLOOD PRESSURE: 80 MMHG | RESPIRATION RATE: 16 BRPM | OXYGEN SATURATION: 98 % | SYSTOLIC BLOOD PRESSURE: 112 MMHG | BODY MASS INDEX: 25.72 KG/M2 | WEIGHT: 164.2 LBS | HEART RATE: 86 BPM

## 2025-07-21 DIAGNOSIS — R00.2 HEART PALPITATIONS: ICD-10-CM

## 2025-07-21 DIAGNOSIS — I25.10 ASHD (ARTERIOSCLEROTIC HEART DISEASE): ICD-10-CM

## 2025-07-21 DIAGNOSIS — F41.0 PANIC ATTACK: ICD-10-CM

## 2025-07-21 DIAGNOSIS — Z95.1 S/P CABG (CORONARY ARTERY BYPASS GRAFT): ICD-10-CM

## 2025-07-21 DIAGNOSIS — F41.9 ANXIETY: Primary | ICD-10-CM

## 2025-07-21 DIAGNOSIS — G89.29 CHRONIC RIGHT HIP PAIN: ICD-10-CM

## 2025-07-21 DIAGNOSIS — E78.2 MIXED HYPERLIPIDEMIA: ICD-10-CM

## 2025-07-21 DIAGNOSIS — R11.0 NAUSEA: ICD-10-CM

## 2025-07-21 DIAGNOSIS — M25.551 CHRONIC RIGHT HIP PAIN: ICD-10-CM

## 2025-07-21 DIAGNOSIS — I10 PRIMARY HYPERTENSION: ICD-10-CM

## 2025-07-21 DIAGNOSIS — R07.89 CHEST DISCOMFORT: ICD-10-CM

## 2025-07-21 DIAGNOSIS — E78.5 HYPERLIPIDEMIA, UNSPECIFIED HYPERLIPIDEMIA TYPE: ICD-10-CM

## 2025-07-21 PROCEDURE — 3074F SYST BP LT 130 MM HG: CPT | Performed by: STUDENT IN AN ORGANIZED HEALTH CARE EDUCATION/TRAINING PROGRAM

## 2025-07-21 PROCEDURE — 99214 OFFICE O/P EST MOD 30 MIN: CPT | Performed by: STUDENT IN AN ORGANIZED HEALTH CARE EDUCATION/TRAINING PROGRAM

## 2025-07-21 PROCEDURE — 3079F DIAST BP 80-89 MM HG: CPT | Performed by: STUDENT IN AN ORGANIZED HEALTH CARE EDUCATION/TRAINING PROGRAM

## 2025-07-21 PROCEDURE — G2211 COMPLEX E/M VISIT ADD ON: HCPCS | Performed by: STUDENT IN AN ORGANIZED HEALTH CARE EDUCATION/TRAINING PROGRAM

## 2025-07-21 RX ORDER — ONDANSETRON 4 MG/1
4 TABLET, ORALLY DISINTEGRATING ORAL EVERY 8 HOURS PRN
Qty: 30 TABLET | Refills: 0 | Status: SHIPPED | OUTPATIENT
Start: 2025-07-21

## 2025-07-21 RX ORDER — HYDROXYZINE HYDROCHLORIDE 25 MG/1
25 TABLET, FILM COATED ORAL 4 TIMES DAILY PRN
Qty: 40 TABLET | Refills: 0 | Status: SHIPPED | OUTPATIENT
Start: 2025-07-21 | End: 2025-07-31

## 2025-07-21 RX ORDER — NITROGLYCERIN 0.4 MG/1
0.4 TABLET SUBLINGUAL EVERY 5 MIN PRN
Qty: 25 TABLET | Refills: 3 | Status: SHIPPED | OUTPATIENT
Start: 2025-07-21

## 2025-07-21 RX ORDER — LORAZEPAM 0.5 MG/1
0.5 TABLET ORAL EVERY 8 HOURS PRN
Qty: 30 TABLET | Refills: 0 | Status: SHIPPED | OUTPATIENT
Start: 2025-07-21 | End: 2025-07-31

## 2025-07-21 NOTE — PROGRESS NOTES
[facility] PRIMARY [facility]ARE  [address] [place] [address] , [address]  [place], [place], [address]    Franky Melendez is a 50 y.o. male with  has a past medical history of CAD (coronary artery disease), Carpal tunnel syndrome, Depressive disorder, not elsewhere classified, Diabetes mellitus (HCC), Gastrointestinal hemorrhage with hematemesis, GERD (gastroesophageal reflux disease), Heart attack (HCC), History of echocardiogram, History of pancreatitis, Hyperlipidemia, Hypertension, and PTSD (post-traumatic stress disorder).  Presented to the office today for:  Chief Complaint   Patient presents with    Anxiety       Assessment/Plan   1. Anxiety  2. Panic attack  -     hydrOXYzine HCl (ATARAX) 25 MG tablet; Take 1 tablet by mouth 4 times daily as needed for Itching or Anxiety, Disp-40 tablet, R-0Normal  -     LORazepam (ATIVAN) 0.5 MG tablet; Take 1 tablet by mouth every 8 hours as needed for Anxiety for up to 10 days. Max Daily Amount: 1.5 mg, Disp-30 tablet, R-0Normal  3. ASHD (arteriosclerotic heart disease)  -     nitroGLYCERIN (NITROSTAT) 0.4 MG SL tablet; Place 1 tablet under the tongue every 5 minutes as needed for Chest pain up to max of 3 total doses. If no relief after 1 dose, call 911., Disp-25 tablet, R-3Normal  4. S/P CABG (coronary artery bypass graft)  -     nitroGLYCERIN (NITROSTAT) 0.4 MG SL tablet; Place 1 tablet under the tongue every 5 minutes as needed for Chest pain up to max of 3 total doses. If no relief after 1 dose, call 911., Disp-25 tablet, R-3Normal  5. Heart palpitations  -     nitroGLYCERIN (NITROSTAT) 0.4 MG SL tablet; Place 1 tablet under the tongue every 5 minutes as needed for Chest pain up to max of 3 total doses. If no relief after 1 dose, call 911., Disp-25 tablet, R-3Normal  6. Primary hypertension  -     nitroGLYCERIN (NITROSTAT) 0.4 MG SL tablet; Place 1 tablet under the tongue every 5 minutes as needed for Chest pain up to max of 3 total doses. If no relief after 1 dose, call

## 2025-07-22 RX ORDER — METOPROLOL SUCCINATE 50 MG/1
50 TABLET, EXTENDED RELEASE ORAL DAILY
Qty: 90 TABLET | Refills: 3 | Status: SHIPPED | OUTPATIENT
Start: 2025-07-22

## 2025-07-22 RX ORDER — CLOPIDOGREL BISULFATE 75 MG/1
75 TABLET ORAL DAILY
Qty: 90 TABLET | Refills: 3 | Status: SHIPPED | OUTPATIENT
Start: 2025-07-22

## 2025-07-22 RX ORDER — RANOLAZINE 500 MG/1
500 TABLET, EXTENDED RELEASE ORAL 2 TIMES DAILY
Qty: 180 TABLET | Refills: 3 | Status: SHIPPED | OUTPATIENT
Start: 2025-07-22

## 2025-07-22 RX ORDER — FENOFIBRATE 160 MG/1
160 TABLET ORAL DAILY
Qty: 90 TABLET | Refills: 3 | Status: SHIPPED | OUTPATIENT
Start: 2025-07-22

## 2025-08-12 ENCOUNTER — OFFICE VISIT (OUTPATIENT)
Dept: PRIMARY CARE CLINIC | Age: 50
End: 2025-08-12

## 2025-08-12 ENCOUNTER — TELEPHONE (OUTPATIENT)
Dept: PRIMARY CARE CLINIC | Age: 50
End: 2025-08-12

## 2025-08-12 VITALS
WEIGHT: 177.6 LBS | DIASTOLIC BLOOD PRESSURE: 76 MMHG | RESPIRATION RATE: 16 BRPM | BODY MASS INDEX: 27.82 KG/M2 | OXYGEN SATURATION: 97 % | SYSTOLIC BLOOD PRESSURE: 120 MMHG | HEART RATE: 90 BPM

## 2025-08-12 DIAGNOSIS — I10 PRIMARY HYPERTENSION: ICD-10-CM

## 2025-08-12 DIAGNOSIS — Z09 HOSPITAL DISCHARGE FOLLOW-UP: Primary | ICD-10-CM

## 2025-08-12 DIAGNOSIS — R07.89 CHEST DISCOMFORT: ICD-10-CM

## 2025-08-12 DIAGNOSIS — J45.20 MILD INTERMITTENT ASTHMA, UNSPECIFIED WHETHER COMPLICATED: ICD-10-CM

## 2025-08-12 DIAGNOSIS — F41.9 ANXIETY: ICD-10-CM

## 2025-08-12 DIAGNOSIS — Z95.1 S/P CABG (CORONARY ARTERY BYPASS GRAFT): ICD-10-CM

## 2025-08-12 DIAGNOSIS — E78.2 MIXED HYPERLIPIDEMIA: ICD-10-CM

## 2025-08-12 DIAGNOSIS — I25.10 ASHD (ARTERIOSCLEROTIC HEART DISEASE): ICD-10-CM

## 2025-08-12 DIAGNOSIS — J43.9 PULMONARY EMPHYSEMA, UNSPECIFIED EMPHYSEMA TYPE (HCC): ICD-10-CM

## 2025-08-12 DIAGNOSIS — R00.2 HEART PALPITATIONS: ICD-10-CM

## 2025-08-12 RX ORDER — RANOLAZINE 1000 MG/1
1000 TABLET, EXTENDED RELEASE ORAL 2 TIMES DAILY
Status: ON HOLD | COMMUNITY

## 2025-08-12 RX ORDER — ALBUTEROL SULFATE 90 UG/1
2 INHALANT RESPIRATORY (INHALATION) 4 TIMES DAILY PRN
Qty: 18 G | Refills: 5 | Status: ON HOLD | OUTPATIENT
Start: 2025-08-12

## 2025-08-12 RX ORDER — ISOSORBIDE MONONITRATE 30 MG/1
60 TABLET, EXTENDED RELEASE ORAL DAILY
Status: ON HOLD | COMMUNITY

## 2025-08-12 RX ORDER — CLONAZEPAM 0.5 MG/1
0.5 TABLET ORAL 2 TIMES DAILY
Qty: 14 TABLET | Refills: 0 | Status: ON HOLD | OUTPATIENT
Start: 2025-08-12 | End: 2025-08-19

## 2025-08-12 RX ORDER — BUDESONIDE AND FORMOTEROL FUMARATE DIHYDRATE 160; 4.5 UG/1; UG/1
2 AEROSOL RESPIRATORY (INHALATION) 2 TIMES DAILY
Qty: 10.2 G | Refills: 0 | Status: ON HOLD | OUTPATIENT
Start: 2025-08-12

## 2025-08-13 ENCOUNTER — OFFICE VISIT (OUTPATIENT)
Dept: CARDIOLOGY | Age: 50
End: 2025-08-13
Payer: COMMERCIAL

## 2025-08-13 VITALS
DIASTOLIC BLOOD PRESSURE: 77 MMHG | HEART RATE: 67 BPM | SYSTOLIC BLOOD PRESSURE: 120 MMHG | RESPIRATION RATE: 18 BRPM | HEIGHT: 67 IN | WEIGHT: 178 LBS | BODY MASS INDEX: 27.94 KG/M2 | OXYGEN SATURATION: 99 %

## 2025-08-13 DIAGNOSIS — Z95.1 S/P CABG (CORONARY ARTERY BYPASS GRAFT): ICD-10-CM

## 2025-08-13 DIAGNOSIS — R07.89 CHEST DISCOMFORT: Primary | ICD-10-CM

## 2025-08-13 DIAGNOSIS — R00.2 HEART PALPITATIONS: ICD-10-CM

## 2025-08-13 DIAGNOSIS — I10 PRIMARY HYPERTENSION: ICD-10-CM

## 2025-08-13 DIAGNOSIS — E78.2 MIXED HYPERLIPIDEMIA: ICD-10-CM

## 2025-08-13 DIAGNOSIS — R42 DIZZINESS: ICD-10-CM

## 2025-08-13 DIAGNOSIS — R42 LIGHT HEADEDNESS: ICD-10-CM

## 2025-08-13 DIAGNOSIS — R06.02 SOB (SHORTNESS OF BREATH): ICD-10-CM

## 2025-08-13 DIAGNOSIS — I25.10 ASHD (ARTERIOSCLEROTIC HEART DISEASE): ICD-10-CM

## 2025-08-13 PROCEDURE — 3074F SYST BP LT 130 MM HG: CPT | Performed by: INTERNAL MEDICINE

## 2025-08-13 PROCEDURE — 93000 ELECTROCARDIOGRAM COMPLETE: CPT | Performed by: INTERNAL MEDICINE

## 2025-08-13 PROCEDURE — 99215 OFFICE O/P EST HI 40 MIN: CPT | Performed by: INTERNAL MEDICINE

## 2025-08-13 PROCEDURE — 3078F DIAST BP <80 MM HG: CPT | Performed by: INTERNAL MEDICINE

## 2025-08-14 ENCOUNTER — TELEPHONE (OUTPATIENT)
Age: 50
End: 2025-08-14

## 2025-08-17 ENCOUNTER — HOSPITAL ENCOUNTER (INPATIENT)
Age: 50
LOS: 2 days | Discharge: HOME | DRG: 322 | End: 2025-08-19
Attending: INTERNAL MEDICINE | Admitting: STUDENT IN AN ORGANIZED HEALTH CARE EDUCATION/TRAINING PROGRAM
Payer: COMMERCIAL

## 2025-08-17 ENCOUNTER — APPOINTMENT (OUTPATIENT)
Dept: CT IMAGING | Age: 50
End: 2025-08-17
Payer: COMMERCIAL

## 2025-08-17 ENCOUNTER — HOSPITAL ENCOUNTER (EMERGENCY)
Age: 50
Discharge: ANOTHER HOSPITAL | End: 2025-08-17
Payer: COMMERCIAL

## 2025-08-17 ENCOUNTER — APPOINTMENT (OUTPATIENT)
Dept: GENERAL RADIOLOGY | Age: 50
End: 2025-08-17
Payer: COMMERCIAL

## 2025-08-17 VITALS
HEART RATE: 109 BPM | HEIGHT: 67 IN | BODY MASS INDEX: 27.62 KG/M2 | TEMPERATURE: 99.2 F | WEIGHT: 176 LBS | RESPIRATION RATE: 13 BRPM | DIASTOLIC BLOOD PRESSURE: 60 MMHG | OXYGEN SATURATION: 97 % | SYSTOLIC BLOOD PRESSURE: 108 MMHG

## 2025-08-17 DIAGNOSIS — Z95.5 STENTED CORONARY ARTERY: Primary | ICD-10-CM

## 2025-08-17 DIAGNOSIS — I25.9 CHEST PAIN DUE TO MYOCARDIAL ISCHEMIA, UNSPECIFIED ISCHEMIC CHEST PAIN TYPE: Primary | ICD-10-CM

## 2025-08-17 DIAGNOSIS — D72.829 LEUKOCYTOSIS, UNSPECIFIED TYPE: ICD-10-CM

## 2025-08-17 DIAGNOSIS — I20.0 UNSTABLE ANGINA (HCC): ICD-10-CM

## 2025-08-17 DIAGNOSIS — I25.119 CORONARY ARTERY DISEASE WITH ANGINA PECTORIS, UNSPECIFIED VESSEL OR LESION TYPE, UNSPECIFIED WHETHER NATIVE OR TRANSPLANTED HEART: ICD-10-CM

## 2025-08-17 PROBLEM — F43.10 PTSD (POST-TRAUMATIC STRESS DISORDER): Status: ACTIVE | Noted: 2025-08-17

## 2025-08-17 LAB
ALBUMIN SERPL-MCNC: 4.6 G/DL (ref 3.5–5.2)
ALBUMIN/GLOB SERPL: 1.7 {RATIO} (ref 1–2.5)
ALP SERPL-CCNC: 47 U/L (ref 40–129)
ALT SERPL-CCNC: 21 U/L (ref 10–50)
ANION GAP SERPL CALCULATED.3IONS-SCNC: 17 MMOL/L (ref 9–16)
AST SERPL-CCNC: 23 U/L (ref 10–50)
BASOPHILS # BLD: 0.21 K/UL (ref 0–0.2)
BASOPHILS NFR BLD: 1 % (ref 0–2)
BILIRUB SERPL-MCNC: 0.6 MG/DL (ref 0–1.2)
BILIRUB UR QL STRIP: NEGATIVE
BNP SERPL-MCNC: 245 PG/ML (ref 0–125)
BUN SERPL-MCNC: 30 MG/DL (ref 6–20)
BUN/CREAT SERPL: 33 (ref 9–20)
CALCIUM SERPL-MCNC: 9.1 MG/DL (ref 8.6–10.4)
CHLORIDE SERPL-SCNC: 105 MMOL/L (ref 98–107)
CLARITY UR: CLEAR
CO2 SERPL-SCNC: 18 MMOL/L (ref 20–31)
COLOR UR: YELLOW
CREAT SERPL-MCNC: 0.9 MG/DL (ref 0.7–1.2)
CRP SERPL HS-MCNC: 11.8 MG/L (ref 0–5)
EOSINOPHIL # BLD: 0.21 K/UL (ref 0–0.44)
EOSINOPHILS RELATIVE PERCENT: 1 % (ref 1–4)
EPI CELLS #/AREA URNS HPF: ABNORMAL /HPF (ref 0–5)
ERYTHROCYTE [DISTWIDTH] IN BLOOD BY AUTOMATED COUNT: 13.1 % (ref 11.8–14.4)
ERYTHROCYTE [SEDIMENTATION RATE] IN BLOOD BY PHOTOMETRIC METHOD: 5 MM/HR (ref 0–15)
FLUAV AG SPEC QL: NEGATIVE
FLUBV AG SPEC QL: NEGATIVE
GFR, ESTIMATED: >90 ML/MIN/1.73M2
GLUCOSE BLD-MCNC: 84 MG/DL (ref 75–110)
GLUCOSE SERPL-MCNC: 109 MG/DL (ref 74–99)
GLUCOSE UR STRIP-MCNC: NEGATIVE MG/DL
HCT VFR BLD AUTO: 40.8 % (ref 40.7–50.3)
HGB BLD-MCNC: 14.1 G/DL (ref 13–17)
HGB UR QL STRIP.AUTO: NEGATIVE
IMM GRANULOCYTES # BLD AUTO: 0 K/UL (ref 0–0.3)
IMM GRANULOCYTES NFR BLD: 0 %
INR PPP: 1
KETONES UR STRIP-MCNC: NEGATIVE MG/DL
LACTATE BLDV-SCNC: 1.4 MMOL/L (ref 0.5–2.2)
LEUKOCYTE ESTERASE UR QL STRIP: NEGATIVE
LIPASE SERPL-CCNC: 25 U/L (ref 13–60)
LYMPHOCYTES NFR BLD: 1.64 K/UL (ref 1.1–3.7)
LYMPHOCYTES RELATIVE PERCENT: 8 % (ref 24–43)
MCH RBC QN AUTO: 30.5 PG (ref 25.2–33.5)
MCHC RBC AUTO-ENTMCNC: 34.6 G/DL (ref 28.4–34.8)
MCV RBC AUTO: 88.1 FL (ref 82.6–102.9)
MONOCYTES NFR BLD: 1.23 K/UL (ref 0.1–1.2)
MONOCYTES NFR BLD: 6 % (ref 3–12)
MORPHOLOGY: NORMAL
MUCOUS THREADS URNS QL MICRO: ABNORMAL
NEUTROPHILS NFR BLD: 84 % (ref 36–65)
NEUTS SEG NFR BLD: 17.21 K/UL (ref 1.5–8.1)
NITRITE UR QL STRIP: NEGATIVE
NRBC BLD-RTO: 0 PER 100 WBC
PARTIAL THROMBOPLASTIN TIME: 26.3 SEC (ref 23.1–33.7)
PH UR STRIP: 6 [PH] (ref 5–9)
PLATELET # BLD AUTO: 397 K/UL (ref 138–453)
PMV BLD AUTO: 10.1 FL (ref 8.1–13.5)
POTASSIUM SERPL-SCNC: 4.2 MMOL/L (ref 3.7–5.3)
PROCALCITONIN SERPL-MCNC: 0.68 NG/ML (ref 0–0.09)
PROT SERPL-MCNC: 7.2 G/DL (ref 6.6–8.7)
PROT UR STRIP-MCNC: NEGATIVE MG/DL
PROTHROMBIN TIME: 13.7 SEC (ref 12–15)
RBC # BLD AUTO: 4.63 M/UL (ref 4.21–5.77)
RBC #/AREA URNS HPF: ABNORMAL /HPF (ref 0–2)
SARS-COV-2 RDRP RESP QL NAA+PROBE: NOT DETECTED
SODIUM SERPL-SCNC: 140 MMOL/L (ref 136–145)
SP GR UR STRIP: >1.03 (ref 1.01–1.02)
SPECIMEN DESCRIPTION: NORMAL
TROPONIN I SERPL HS-MCNC: 10 NG/L (ref 0–22)
TROPONIN I SERPL HS-MCNC: 10 NG/L (ref 0–22)
TROPONIN I SERPL HS-MCNC: 11 NG/L (ref 0–22)
TROPONIN I SERPL HS-MCNC: 7 NG/L (ref 0–22)
UROBILINOGEN UR STRIP-ACNC: NORMAL EU/DL (ref 0–1)
WBC #/AREA URNS HPF: ABNORMAL /HPF (ref 0–5)
WBC OTHER # BLD: 20.5 K/UL (ref 3.5–11.3)

## 2025-08-17 PROCEDURE — 87154 CUL TYP ID BLD PTHGN 6+ TRGT: CPT

## 2025-08-17 PROCEDURE — 82947 ASSAY GLUCOSE BLOOD QUANT: CPT

## 2025-08-17 PROCEDURE — 94640 AIRWAY INHALATION TREATMENT: CPT

## 2025-08-17 PROCEDURE — 71045 X-RAY EXAM CHEST 1 VIEW: CPT

## 2025-08-17 PROCEDURE — 85025 COMPLETE CBC W/AUTO DIFF WBC: CPT

## 2025-08-17 PROCEDURE — 85730 THROMBOPLASTIN TIME PARTIAL: CPT

## 2025-08-17 PROCEDURE — 71275 CT ANGIOGRAPHY CHEST: CPT

## 2025-08-17 PROCEDURE — 6360000004 HC RX CONTRAST MEDICATION

## 2025-08-17 PROCEDURE — 87804 INFLUENZA ASSAY W/OPTIC: CPT

## 2025-08-17 PROCEDURE — 6370000000 HC RX 637 (ALT 250 FOR IP): Performed by: PHYSICIAN ASSISTANT

## 2025-08-17 PROCEDURE — 81001 URINALYSIS AUTO W/SCOPE: CPT

## 2025-08-17 PROCEDURE — 99285 EMERGENCY DEPT VISIT HI MDM: CPT

## 2025-08-17 PROCEDURE — 2580000003 HC RX 258

## 2025-08-17 PROCEDURE — 36415 COLL VENOUS BLD VENIPUNCTURE: CPT

## 2025-08-17 PROCEDURE — 87635 SARS-COV-2 COVID-19 AMP PRB: CPT

## 2025-08-17 PROCEDURE — 6370000000 HC RX 637 (ALT 250 FOR IP): Performed by: NURSE PRACTITIONER

## 2025-08-17 PROCEDURE — 96376 TX/PRO/DX INJ SAME DRUG ADON: CPT

## 2025-08-17 PROCEDURE — 87205 SMEAR GRAM STAIN: CPT

## 2025-08-17 PROCEDURE — 83880 ASSAY OF NATRIURETIC PEPTIDE: CPT

## 2025-08-17 PROCEDURE — 84484 ASSAY OF TROPONIN QUANT: CPT

## 2025-08-17 PROCEDURE — 87040 BLOOD CULTURE FOR BACTERIA: CPT

## 2025-08-17 PROCEDURE — 85610 PROTHROMBIN TIME: CPT

## 2025-08-17 PROCEDURE — 96375 TX/PRO/DX INJ NEW DRUG ADDON: CPT

## 2025-08-17 PROCEDURE — 6360000002 HC RX W HCPCS: Performed by: PHYSICIAN ASSISTANT

## 2025-08-17 PROCEDURE — 93005 ELECTROCARDIOGRAM TRACING: CPT | Performed by: PHYSICIAN ASSISTANT

## 2025-08-17 PROCEDURE — 99222 1ST HOSP IP/OBS MODERATE 55: CPT | Performed by: NURSE PRACTITIONER

## 2025-08-17 PROCEDURE — 96374 THER/PROPH/DIAG INJ IV PUSH: CPT

## 2025-08-17 PROCEDURE — 6360000002 HC RX W HCPCS: Performed by: NURSE PRACTITIONER

## 2025-08-17 PROCEDURE — 84145 PROCALCITONIN (PCT): CPT

## 2025-08-17 PROCEDURE — 2500000003 HC RX 250 WO HCPCS: Performed by: NURSE PRACTITIONER

## 2025-08-17 PROCEDURE — 83605 ASSAY OF LACTIC ACID: CPT

## 2025-08-17 PROCEDURE — 80053 COMPREHEN METABOLIC PANEL: CPT

## 2025-08-17 PROCEDURE — 93005 ELECTROCARDIOGRAM TRACING: CPT | Performed by: STUDENT IN AN ORGANIZED HEALTH CARE EDUCATION/TRAINING PROGRAM

## 2025-08-17 PROCEDURE — 2580000003 HC RX 258: Performed by: NURSE PRACTITIONER

## 2025-08-17 PROCEDURE — 86140 C-REACTIVE PROTEIN: CPT

## 2025-08-17 PROCEDURE — 83690 ASSAY OF LIPASE: CPT

## 2025-08-17 PROCEDURE — 85652 RBC SED RATE AUTOMATED: CPT

## 2025-08-17 PROCEDURE — 2060000000 HC ICU INTERMEDIATE R&B

## 2025-08-17 PROCEDURE — 96361 HYDRATE IV INFUSION ADD-ON: CPT

## 2025-08-17 RX ORDER — MORPHINE SULFATE 2 MG/ML
2 INJECTION, SOLUTION INTRAMUSCULAR; INTRAVENOUS ONCE
Refills: 0 | Status: COMPLETED | OUTPATIENT
Start: 2025-08-17 | End: 2025-08-17

## 2025-08-17 RX ORDER — FENTANYL CITRATE 50 UG/ML
25 INJECTION, SOLUTION INTRAMUSCULAR; INTRAVENOUS
Status: DISCONTINUED | OUTPATIENT
Start: 2025-08-17 | End: 2025-08-19

## 2025-08-17 RX ORDER — SUCRALFATE 1 G/1
1 TABLET ORAL
Status: DISCONTINUED | OUTPATIENT
Start: 2025-08-18 | End: 2025-08-19 | Stop reason: HOSPADM

## 2025-08-17 RX ORDER — INSULIN LISPRO 100 [IU]/ML
0-4 INJECTION, SOLUTION INTRAVENOUS; SUBCUTANEOUS
Status: DISCONTINUED | OUTPATIENT
Start: 2025-08-17 | End: 2025-08-19 | Stop reason: HOSPADM

## 2025-08-17 RX ORDER — SODIUM CHLORIDE 0.9 % (FLUSH) 0.9 %
10 SYRINGE (ML) INJECTION PRN
Status: DISCONTINUED | OUTPATIENT
Start: 2025-08-17 | End: 2025-08-19 | Stop reason: HOSPADM

## 2025-08-17 RX ORDER — PREGABALIN 75 MG/1
75 CAPSULE ORAL 2 TIMES DAILY
Status: DISCONTINUED | OUTPATIENT
Start: 2025-08-17 | End: 2025-08-17

## 2025-08-17 RX ORDER — MAGNESIUM SULFATE IN WATER 40 MG/ML
2000 INJECTION, SOLUTION INTRAVENOUS PRN
Status: DISCONTINUED | OUTPATIENT
Start: 2025-08-17 | End: 2025-08-19 | Stop reason: HOSPADM

## 2025-08-17 RX ORDER — METOPROLOL SUCCINATE 25 MG/1
50 TABLET, EXTENDED RELEASE ORAL DAILY
Status: DISCONTINUED | OUTPATIENT
Start: 2025-08-17 | End: 2025-08-19 | Stop reason: HOSPADM

## 2025-08-17 RX ORDER — NITROGLYCERIN 20 MG/100ML
5-200 INJECTION INTRAVENOUS CONTINUOUS
Status: DISCONTINUED | OUTPATIENT
Start: 2025-08-17 | End: 2025-08-17 | Stop reason: HOSPADM

## 2025-08-17 RX ORDER — ACETAMINOPHEN 650 MG/1
650 SUPPOSITORY RECTAL EVERY 6 HOURS PRN
Status: DISCONTINUED | OUTPATIENT
Start: 2025-08-17 | End: 2025-08-19 | Stop reason: HOSPADM

## 2025-08-17 RX ORDER — GLUCAGON 1 MG/ML
1 KIT INJECTION PRN
Status: DISCONTINUED | OUTPATIENT
Start: 2025-08-17 | End: 2025-08-19 | Stop reason: HOSPADM

## 2025-08-17 RX ORDER — POTASSIUM CHLORIDE 1500 MG/1
40 TABLET, EXTENDED RELEASE ORAL PRN
Status: DISCONTINUED | OUTPATIENT
Start: 2025-08-17 | End: 2025-08-19 | Stop reason: HOSPADM

## 2025-08-17 RX ORDER — ISOSORBIDE MONONITRATE 30 MG/1
60 TABLET, EXTENDED RELEASE ORAL DAILY
Status: DISCONTINUED | OUTPATIENT
Start: 2025-08-17 | End: 2025-08-19

## 2025-08-17 RX ORDER — RANOLAZINE 500 MG/1
1000 TABLET, EXTENDED RELEASE ORAL 2 TIMES DAILY
Status: DISCONTINUED | OUTPATIENT
Start: 2025-08-17 | End: 2025-08-19 | Stop reason: HOSPADM

## 2025-08-17 RX ORDER — OXYCODONE HYDROCHLORIDE 5 MG/1
5 TABLET ORAL EVERY 4 HOURS PRN
Refills: 0 | Status: DISCONTINUED | OUTPATIENT
Start: 2025-08-17 | End: 2025-08-19 | Stop reason: HOSPADM

## 2025-08-17 RX ORDER — ONDANSETRON 2 MG/ML
4 INJECTION INTRAMUSCULAR; INTRAVENOUS ONCE
Status: COMPLETED | OUTPATIENT
Start: 2025-08-17 | End: 2025-08-17

## 2025-08-17 RX ORDER — SODIUM CHLORIDE 9 MG/ML
INJECTION, SOLUTION INTRAVENOUS PRN
Status: DISCONTINUED | OUTPATIENT
Start: 2025-08-17 | End: 2025-08-19 | Stop reason: HOSPADM

## 2025-08-17 RX ORDER — PANTOPRAZOLE SODIUM 40 MG/1
40 TABLET, DELAYED RELEASE ORAL DAILY
Status: DISCONTINUED | OUTPATIENT
Start: 2025-08-17 | End: 2025-08-19 | Stop reason: HOSPADM

## 2025-08-17 RX ORDER — AMLODIPINE BESYLATE 5 MG/1
5 TABLET ORAL DAILY
Status: DISCONTINUED | OUTPATIENT
Start: 2025-08-18 | End: 2025-08-18

## 2025-08-17 RX ORDER — NITROGLYCERIN 0.4 MG/1
0.4 TABLET SUBLINGUAL EVERY 5 MIN PRN
Status: DISCONTINUED | OUTPATIENT
Start: 2025-08-17 | End: 2025-08-19 | Stop reason: HOSPADM

## 2025-08-17 RX ORDER — LISINOPRIL 20 MG/1
20 TABLET ORAL DAILY
Status: DISCONTINUED | OUTPATIENT
Start: 2025-08-17 | End: 2025-08-19

## 2025-08-17 RX ORDER — CLOPIDOGREL BISULFATE 75 MG/1
75 TABLET ORAL DAILY
Status: DISCONTINUED | OUTPATIENT
Start: 2025-08-17 | End: 2025-08-19 | Stop reason: HOSPADM

## 2025-08-17 RX ORDER — NITROGLYCERIN 0.4 MG/1
0.4 TABLET SUBLINGUAL EVERY 5 MIN PRN
Status: DISCONTINUED | OUTPATIENT
Start: 2025-08-17 | End: 2025-08-17 | Stop reason: HOSPADM

## 2025-08-17 RX ORDER — DEXTROSE MONOHYDRATE 100 MG/ML
INJECTION, SOLUTION INTRAVENOUS CONTINUOUS PRN
Status: DISCONTINUED | OUTPATIENT
Start: 2025-08-17 | End: 2025-08-19 | Stop reason: HOSPADM

## 2025-08-17 RX ORDER — MORPHINE SULFATE 4 MG/ML
4 INJECTION, SOLUTION INTRAMUSCULAR; INTRAVENOUS ONCE
Status: COMPLETED | OUTPATIENT
Start: 2025-08-17 | End: 2025-08-17

## 2025-08-17 RX ORDER — ONDANSETRON 2 MG/ML
4 INJECTION INTRAMUSCULAR; INTRAVENOUS EVERY 6 HOURS PRN
Status: DISCONTINUED | OUTPATIENT
Start: 2025-08-17 | End: 2025-08-19 | Stop reason: HOSPADM

## 2025-08-17 RX ORDER — CLONAZEPAM 1 MG/1
0.5 TABLET ORAL EVERY 12 HOURS PRN
Status: DISCONTINUED | OUTPATIENT
Start: 2025-08-17 | End: 2025-08-19 | Stop reason: HOSPADM

## 2025-08-17 RX ORDER — IOPAMIDOL 755 MG/ML
75 INJECTION, SOLUTION INTRAVASCULAR
Status: COMPLETED | OUTPATIENT
Start: 2025-08-17 | End: 2025-08-17

## 2025-08-17 RX ORDER — ATORVASTATIN CALCIUM 80 MG/1
80 TABLET, FILM COATED ORAL NIGHTLY
Status: DISCONTINUED | OUTPATIENT
Start: 2025-08-17 | End: 2025-08-19 | Stop reason: HOSPADM

## 2025-08-17 RX ORDER — ONDANSETRON 4 MG/1
4 TABLET, ORALLY DISINTEGRATING ORAL EVERY 8 HOURS PRN
Status: DISCONTINUED | OUTPATIENT
Start: 2025-08-17 | End: 2025-08-19 | Stop reason: HOSPADM

## 2025-08-17 RX ORDER — FENTANYL CITRATE 50 UG/ML
50 INJECTION, SOLUTION INTRAMUSCULAR; INTRAVENOUS
Status: DISCONTINUED | OUTPATIENT
Start: 2025-08-17 | End: 2025-08-17

## 2025-08-17 RX ORDER — ENOXAPARIN SODIUM 100 MG/ML
40 INJECTION SUBCUTANEOUS DAILY
Status: DISCONTINUED | OUTPATIENT
Start: 2025-08-17 | End: 2025-08-19 | Stop reason: HOSPADM

## 2025-08-17 RX ORDER — 0.9 % SODIUM CHLORIDE 0.9 %
1000 INTRAVENOUS SOLUTION INTRAVENOUS ONCE
Status: COMPLETED | OUTPATIENT
Start: 2025-08-17 | End: 2025-08-17

## 2025-08-17 RX ORDER — ASPIRIN 81 MG/1
243 TABLET, CHEWABLE ORAL ONCE
Status: COMPLETED | OUTPATIENT
Start: 2025-08-17 | End: 2025-08-17

## 2025-08-17 RX ORDER — FENOFIBRATE 160 MG/1
160 TABLET ORAL DAILY
Status: DISCONTINUED | OUTPATIENT
Start: 2025-08-17 | End: 2025-08-19 | Stop reason: HOSPADM

## 2025-08-17 RX ORDER — SODIUM CHLORIDE 9 MG/ML
INJECTION, SOLUTION INTRAVENOUS CONTINUOUS
Status: ACTIVE | OUTPATIENT
Start: 2025-08-17 | End: 2025-08-18

## 2025-08-17 RX ORDER — ALBUTEROL SULFATE 90 UG/1
2 INHALANT RESPIRATORY (INHALATION) 4 TIMES DAILY PRN
Status: DISCONTINUED | OUTPATIENT
Start: 2025-08-17 | End: 2025-08-19 | Stop reason: HOSPADM

## 2025-08-17 RX ORDER — POTASSIUM CHLORIDE 7.45 MG/ML
10 INJECTION INTRAVENOUS PRN
Status: DISCONTINUED | OUTPATIENT
Start: 2025-08-17 | End: 2025-08-19 | Stop reason: HOSPADM

## 2025-08-17 RX ORDER — BUDESONIDE AND FORMOTEROL FUMARATE DIHYDRATE 160; 4.5 UG/1; UG/1
2 AEROSOL RESPIRATORY (INHALATION) 2 TIMES DAILY
Status: DISCONTINUED | OUTPATIENT
Start: 2025-08-17 | End: 2025-08-19 | Stop reason: HOSPADM

## 2025-08-17 RX ORDER — SODIUM CHLORIDE 0.9 % (FLUSH) 0.9 %
5-40 SYRINGE (ML) INJECTION EVERY 12 HOURS SCHEDULED
Status: DISCONTINUED | OUTPATIENT
Start: 2025-08-17 | End: 2025-08-19 | Stop reason: HOSPADM

## 2025-08-17 RX ORDER — AMLODIPINE BESYLATE 5 MG/1
5 TABLET ORAL NIGHTLY
Status: DISCONTINUED | OUTPATIENT
Start: 2025-08-17 | End: 2025-08-17

## 2025-08-17 RX ORDER — INSULIN GLARGINE 100 [IU]/ML
16 INJECTION, SOLUTION SUBCUTANEOUS NIGHTLY
Status: DISCONTINUED | OUTPATIENT
Start: 2025-08-17 | End: 2025-08-19 | Stop reason: HOSPADM

## 2025-08-17 RX ORDER — ACETAMINOPHEN 325 MG/1
650 TABLET ORAL EVERY 6 HOURS PRN
Status: DISCONTINUED | OUTPATIENT
Start: 2025-08-17 | End: 2025-08-19 | Stop reason: HOSPADM

## 2025-08-17 RX ORDER — NITROGLYCERIN 0.4 MG/1
0.4 TABLET SUBLINGUAL EVERY 5 MIN PRN
Status: DISCONTINUED | OUTPATIENT
Start: 2025-08-17 | End: 2025-08-17

## 2025-08-17 RX ORDER — PREGABALIN 75 MG/1
75 CAPSULE ORAL 2 TIMES DAILY PRN
Status: DISCONTINUED | OUTPATIENT
Start: 2025-08-17 | End: 2025-08-19 | Stop reason: HOSPADM

## 2025-08-17 RX ADMIN — IOPAMIDOL 75 ML: 755 INJECTION, SOLUTION INTRAVENOUS at 17:37

## 2025-08-17 RX ADMIN — ASPIRIN 243 MG: 81 TABLET, CHEWABLE ORAL at 15:42

## 2025-08-17 RX ADMIN — SODIUM CHLORIDE: 0.9 INJECTION, SOLUTION INTRAVENOUS at 21:47

## 2025-08-17 RX ADMIN — ENOXAPARIN SODIUM 40 MG: 100 INJECTION SUBCUTANEOUS at 21:40

## 2025-08-17 RX ADMIN — NITROGLYCERIN 0.4 MG: 0.4 TABLET SUBLINGUAL at 15:43

## 2025-08-17 RX ADMIN — ONDANSETRON 4 MG: 2 INJECTION, SOLUTION INTRAMUSCULAR; INTRAVENOUS at 15:59

## 2025-08-17 RX ADMIN — OXYCODONE 5 MG: 5 TABLET ORAL at 21:40

## 2025-08-17 RX ADMIN — MORPHINE SULFATE 2 MG: 2 INJECTION, SOLUTION INTRAMUSCULAR; INTRAVENOUS at 16:14

## 2025-08-17 RX ADMIN — SODIUM CHLORIDE 1000 ML: 0.9 INJECTION, SOLUTION INTRAVENOUS at 15:57

## 2025-08-17 RX ADMIN — ATORVASTATIN CALCIUM 80 MG: 80 TABLET, FILM COATED ORAL at 21:41

## 2025-08-17 RX ADMIN — BUDESONIDE AND FORMOTEROL FUMARATE DIHYDRATE 2 PUFF: 160; 4.5 AEROSOL RESPIRATORY (INHALATION) at 20:34

## 2025-08-17 RX ADMIN — FENOFIBRATE 160 MG: 160 TABLET ORAL at 21:40

## 2025-08-17 RX ADMIN — INSULIN GLARGINE 16 UNITS: 100 INJECTION, SOLUTION SUBCUTANEOUS at 21:41

## 2025-08-17 RX ADMIN — MORPHINE SULFATE 4 MG: 4 INJECTION, SOLUTION INTRAMUSCULAR; INTRAVENOUS at 16:34

## 2025-08-17 RX ADMIN — SODIUM CHLORIDE, PRESERVATIVE FREE 10 ML: 5 INJECTION INTRAVENOUS at 21:40

## 2025-08-17 RX ADMIN — MORPHINE SULFATE 4 MG: 4 INJECTION, SOLUTION INTRAMUSCULAR; INTRAVENOUS at 17:35

## 2025-08-17 RX ADMIN — RANOLAZINE 1000 MG: 500 TABLET, FILM COATED, EXTENDED RELEASE ORAL at 21:40

## 2025-08-17 RX ADMIN — FENTANYL CITRATE 50 MCG: 50 INJECTION INTRAMUSCULAR; INTRAVENOUS at 19:48

## 2025-08-17 ASSESSMENT — PAIN - FUNCTIONAL ASSESSMENT
PAIN_FUNCTIONAL_ASSESSMENT: 0-10

## 2025-08-17 ASSESSMENT — PAIN SCALES - GENERAL
PAINLEVEL_OUTOF10: 4
PAINLEVEL_OUTOF10: 8
PAINLEVEL_OUTOF10: 9
PAINLEVEL_OUTOF10: 8
PAINLEVEL_OUTOF10: 7
PAINLEVEL_OUTOF10: 7
PAINLEVEL_OUTOF10: 4

## 2025-08-18 PROBLEM — I25.119 CORONARY ARTERY DISEASE WITH ANGINA PECTORIS: Status: ACTIVE | Noted: 2025-08-18

## 2025-08-18 LAB
ACT BLD: 219 SEC (ref 79–149)
ANION GAP SERPL CALCULATED.3IONS-SCNC: 11 MMOL/L (ref 9–16)
BASOPHILS # BLD: 0.03 K/UL (ref 0–0.2)
BASOPHILS NFR BLD: 0 % (ref 0–2)
BUN SERPL-MCNC: 21 MG/DL (ref 6–20)
CALCIUM SERPL-MCNC: 8.8 MG/DL (ref 8.6–10.4)
CHLORIDE SERPL-SCNC: 107 MMOL/L (ref 98–107)
CHOLEST SERPL-MCNC: 81 MG/DL (ref 0–199)
CHOLESTEROL/HDL RATIO: 2.7
CO2 SERPL-SCNC: 22 MMOL/L (ref 20–31)
CREAT SERPL-MCNC: 1.2 MG/DL (ref 0.7–1.2)
EKG ATRIAL RATE: 103 BPM
EKG ATRIAL RATE: 115 BPM
EKG P AXIS: 20 DEGREES
EKG P AXIS: 44 DEGREES
EKG P-R INTERVAL: 136 MS
EKG P-R INTERVAL: 138 MS
EKG Q-T INTERVAL: 326 MS
EKG Q-T INTERVAL: 354 MS
EKG QRS DURATION: 88 MS
EKG QRS DURATION: 98 MS
EKG QTC CALCULATION (BAZETT): 450 MS
EKG QTC CALCULATION (BAZETT): 463 MS
EKG R AXIS: 76 DEGREES
EKG R AXIS: 83 DEGREES
EKG T AXIS: 12 DEGREES
EKG T AXIS: 5 DEGREES
EKG VENTRICULAR RATE: 103 BPM
EKG VENTRICULAR RATE: 115 BPM
EOSINOPHIL # BLD: 0.34 K/UL (ref 0–0.44)
EOSINOPHILS RELATIVE PERCENT: 3 % (ref 1–4)
ERYTHROCYTE [DISTWIDTH] IN BLOOD BY AUTOMATED COUNT: 13.2 % (ref 11.8–14.4)
GFR, ESTIMATED: 74 ML/MIN/1.73M2
GLUCOSE BLD-MCNC: 120 MG/DL (ref 75–110)
GLUCOSE BLD-MCNC: 167 MG/DL (ref 75–110)
GLUCOSE BLD-MCNC: 65 MG/DL (ref 75–110)
GLUCOSE BLD-MCNC: 87 MG/DL (ref 75–110)
GLUCOSE SERPL-MCNC: 93 MG/DL (ref 74–99)
HCT VFR BLD AUTO: 33.6 % (ref 40.7–50.3)
HCT VFR BLD AUTO: 36.7 % (ref 40.7–50.3)
HDLC SERPL-MCNC: 30 MG/DL
HGB BLD-MCNC: 11.4 G/DL (ref 13–17)
HGB BLD-MCNC: 12.3 G/DL (ref 13–17)
IMM GRANULOCYTES # BLD AUTO: 0.03 K/UL (ref 0–0.3)
IMM GRANULOCYTES NFR BLD: 0 %
LDLC SERPL CALC-MCNC: 22 MG/DL (ref 0–100)
LYMPHOCYTES NFR BLD: 2.55 K/UL (ref 1.1–3.7)
LYMPHOCYTES RELATIVE PERCENT: 23 % (ref 24–43)
MAGNESIUM SERPL-MCNC: 1.4 MG/DL (ref 1.6–2.6)
MCH RBC QN AUTO: 30 PG (ref 25.2–33.5)
MCHC RBC AUTO-ENTMCNC: 33.5 G/DL (ref 28.4–34.8)
MCV RBC AUTO: 89.5 FL (ref 82.6–102.9)
MONOCYTES NFR BLD: 1.33 K/UL (ref 0.1–1.2)
MONOCYTES NFR BLD: 12 % (ref 3–12)
NEUTROPHILS NFR BLD: 62 % (ref 36–65)
NEUTS SEG NFR BLD: 7.01 K/UL (ref 1.5–8.1)
NRBC BLD-RTO: 0 PER 100 WBC
PLATELET # BLD AUTO: 343 K/UL (ref 138–453)
PMV BLD AUTO: 10.1 FL (ref 8.1–13.5)
POTASSIUM SERPL-SCNC: 3.8 MMOL/L (ref 3.7–5.3)
RBC # BLD AUTO: 4.1 M/UL (ref 4.21–5.77)
SODIUM SERPL-SCNC: 140 MMOL/L (ref 136–145)
TRIGL SERPL-MCNC: 144 MG/DL
VLDLC SERPL CALC-MCNC: 29 MG/DL (ref 1–30)
WBC OTHER # BLD: 11.3 K/UL (ref 3.5–11.3)

## 2025-08-18 PROCEDURE — 82947 ASSAY GLUCOSE BLOOD QUANT: CPT

## 2025-08-18 PROCEDURE — 94640 AIRWAY INHALATION TREATMENT: CPT

## 2025-08-18 PROCEDURE — 92920 PRQ TRLUML C ANGIOP 1ART&/BR: CPT | Performed by: INTERNAL MEDICINE

## 2025-08-18 PROCEDURE — 94761 N-INVAS EAR/PLS OXIMETRY MLT: CPT

## 2025-08-18 PROCEDURE — 80061 LIPID PANEL: CPT

## 2025-08-18 PROCEDURE — B2111ZZ FLUOROSCOPY OF MULTIPLE CORONARY ARTERIES USING LOW OSMOLAR CONTRAST: ICD-10-PCS | Performed by: INTERNAL MEDICINE

## 2025-08-18 PROCEDURE — 85025 COMPLETE CBC W/AUTO DIFF WBC: CPT

## 2025-08-18 PROCEDURE — 2500000003 HC RX 250 WO HCPCS: Performed by: NURSE PRACTITIONER

## 2025-08-18 PROCEDURE — 85018 HEMOGLOBIN: CPT

## 2025-08-18 PROCEDURE — B240ZZ3 ULTRASONOGRAPHY OF SINGLE CORONARY ARTERY, INTRAVASCULAR: ICD-10-PCS | Performed by: INTERNAL MEDICINE

## 2025-08-18 PROCEDURE — 80048 BASIC METABOLIC PNL TOTAL CA: CPT

## 2025-08-18 PROCEDURE — C1887 CATHETER, GUIDING: HCPCS | Performed by: INTERNAL MEDICINE

## 2025-08-18 PROCEDURE — 2060000000 HC ICU INTERMEDIATE R&B

## 2025-08-18 PROCEDURE — C1753 CATH, INTRAVAS ULTRASOUND: HCPCS | Performed by: INTERNAL MEDICINE

## 2025-08-18 PROCEDURE — C1760 CLOSURE DEV, VASC: HCPCS | Performed by: INTERNAL MEDICINE

## 2025-08-18 PROCEDURE — 36415 COLL VENOUS BLD VENIPUNCTURE: CPT

## 2025-08-18 PROCEDURE — 2580000003 HC RX 258: Performed by: NURSE PRACTITIONER

## 2025-08-18 PROCEDURE — 2709999900 HC NON-CHARGEABLE SUPPLY: Performed by: INTERNAL MEDICINE

## 2025-08-18 PROCEDURE — C1725 CATH, TRANSLUMIN NON-LASER: HCPCS | Performed by: INTERNAL MEDICINE

## 2025-08-18 PROCEDURE — 6370000000 HC RX 637 (ALT 250 FOR IP): Performed by: INTERNAL MEDICINE

## 2025-08-18 PROCEDURE — 76937 US GUIDE VASCULAR ACCESS: CPT | Performed by: INTERNAL MEDICINE

## 2025-08-18 PROCEDURE — 6370000000 HC RX 637 (ALT 250 FOR IP): Performed by: NURSE PRACTITIONER

## 2025-08-18 PROCEDURE — 92978 ENDOLUMINL IVUS OCT C 1ST: CPT | Performed by: INTERNAL MEDICINE

## 2025-08-18 PROCEDURE — C1874 STENT, COATED/COV W/DEL SYS: HCPCS | Performed by: INTERNAL MEDICINE

## 2025-08-18 PROCEDURE — 027034Z DILATION OF CORONARY ARTERY, ONE ARTERY WITH DRUG-ELUTING INTRALUMINAL DEVICE, PERCUTANEOUS APPROACH: ICD-10-PCS | Performed by: INTERNAL MEDICINE

## 2025-08-18 PROCEDURE — 85014 HEMATOCRIT: CPT

## 2025-08-18 PROCEDURE — C1894 INTRO/SHEATH, NON-LASER: HCPCS | Performed by: INTERNAL MEDICINE

## 2025-08-18 PROCEDURE — 6360000004 HC RX CONTRAST MEDICATION: Performed by: INTERNAL MEDICINE

## 2025-08-18 PROCEDURE — 93459 L HRT ART/GRFT ANGIO: CPT | Performed by: INTERNAL MEDICINE

## 2025-08-18 PROCEDURE — 85347 COAGULATION TIME ACTIVATED: CPT

## 2025-08-18 PROCEDURE — 2580000003 HC RX 258: Performed by: INTERNAL MEDICINE

## 2025-08-18 PROCEDURE — 99222 1ST HOSP IP/OBS MODERATE 55: CPT | Performed by: INTERNAL MEDICINE

## 2025-08-18 PROCEDURE — 99153 MOD SED SAME PHYS/QHP EA: CPT | Performed by: INTERNAL MEDICINE

## 2025-08-18 PROCEDURE — 4A023N7 MEASUREMENT OF CARDIAC SAMPLING AND PRESSURE, LEFT HEART, PERCUTANEOUS APPROACH: ICD-10-PCS | Performed by: INTERNAL MEDICINE

## 2025-08-18 PROCEDURE — 93010 ELECTROCARDIOGRAM REPORT: CPT | Performed by: INTERNAL MEDICINE

## 2025-08-18 PROCEDURE — 02703ZZ DILATION OF CORONARY ARTERY, ONE ARTERY, PERCUTANEOUS APPROACH: ICD-10-PCS | Performed by: INTERNAL MEDICINE

## 2025-08-18 PROCEDURE — 99152 MOD SED SAME PHYS/QHP 5/>YRS: CPT | Performed by: INTERNAL MEDICINE

## 2025-08-18 PROCEDURE — C1769 GUIDE WIRE: HCPCS | Performed by: INTERNAL MEDICINE

## 2025-08-18 PROCEDURE — 6360000002 HC RX W HCPCS: Performed by: INTERNAL MEDICINE

## 2025-08-18 PROCEDURE — 99232 SBSQ HOSP IP/OBS MODERATE 35: CPT | Performed by: STUDENT IN AN ORGANIZED HEALTH CARE EDUCATION/TRAINING PROGRAM

## 2025-08-18 PROCEDURE — 83735 ASSAY OF MAGNESIUM: CPT

## 2025-08-18 DEVICE — STENT CORONARY ONYX FRONTIER RX 2.75X30 MM ZOTAROLIMUS ELUTE: Type: IMPLANTABLE DEVICE | Status: FUNCTIONAL

## 2025-08-18 DEVICE — DEVICE CLSR ANGIO-SEAL VIP 6FR 0.035IN V TWST INTEGR PLATFRM: Type: IMPLANTABLE DEVICE | Status: FUNCTIONAL

## 2025-08-18 RX ORDER — 0.9 % SODIUM CHLORIDE 0.9 %
INTRAVENOUS SOLUTION INTRAVENOUS CONTINUOUS PRN
Status: COMPLETED | OUTPATIENT
Start: 2025-08-18 | End: 2025-08-18

## 2025-08-18 RX ORDER — FENTANYL CITRATE 50 UG/ML
INJECTION, SOLUTION INTRAMUSCULAR; INTRAVENOUS PRN
Status: DISCONTINUED | OUTPATIENT
Start: 2025-08-18 | End: 2025-08-18 | Stop reason: HOSPADM

## 2025-08-18 RX ORDER — IOPAMIDOL 755 MG/ML
INJECTION, SOLUTION INTRAVASCULAR PRN
Status: DISCONTINUED | OUTPATIENT
Start: 2025-08-18 | End: 2025-08-18 | Stop reason: HOSPADM

## 2025-08-18 RX ORDER — ASPIRIN 81 MG/1
81 TABLET, CHEWABLE ORAL DAILY
Status: DISCONTINUED | OUTPATIENT
Start: 2025-08-19 | End: 2025-08-19 | Stop reason: HOSPADM

## 2025-08-18 RX ORDER — 0.9 % SODIUM CHLORIDE 0.9 %
250 INTRAVENOUS SOLUTION INTRAVENOUS ONCE
Status: COMPLETED | OUTPATIENT
Start: 2025-08-18 | End: 2025-08-18

## 2025-08-18 RX ORDER — ACETAMINOPHEN 325 MG/1
650 TABLET ORAL EVERY 4 HOURS PRN
Status: DISCONTINUED | OUTPATIENT
Start: 2025-08-18 | End: 2025-08-19 | Stop reason: HOSPADM

## 2025-08-18 RX ORDER — SODIUM CHLORIDE 9 MG/ML
INJECTION, SOLUTION INTRAVENOUS CONTINUOUS
Status: ACTIVE | OUTPATIENT
Start: 2025-08-18 | End: 2025-08-18

## 2025-08-18 RX ORDER — MIDAZOLAM HYDROCHLORIDE 1 MG/ML
INJECTION, SOLUTION INTRAMUSCULAR; INTRAVENOUS PRN
Status: DISCONTINUED | OUTPATIENT
Start: 2025-08-18 | End: 2025-08-18 | Stop reason: HOSPADM

## 2025-08-18 RX ORDER — SODIUM CHLORIDE 0.9 % (FLUSH) 0.9 %
5-40 SYRINGE (ML) INJECTION PRN
Status: DISCONTINUED | OUTPATIENT
Start: 2025-08-18 | End: 2025-08-19 | Stop reason: HOSPADM

## 2025-08-18 RX ORDER — HEPARIN SODIUM 1000 [USP'U]/ML
INJECTION, SOLUTION INTRAVENOUS; SUBCUTANEOUS PRN
Status: DISCONTINUED | OUTPATIENT
Start: 2025-08-18 | End: 2025-08-18 | Stop reason: HOSPADM

## 2025-08-18 RX ORDER — NITROGLYCERIN 20 MG/100ML
INJECTION INTRAVENOUS PRN
Status: DISCONTINUED | OUTPATIENT
Start: 2025-08-18 | End: 2025-08-18 | Stop reason: HOSPADM

## 2025-08-18 RX ORDER — CLOPIDOGREL 300 MG/1
TABLET, FILM COATED ORAL PRN
Status: DISCONTINUED | OUTPATIENT
Start: 2025-08-18 | End: 2025-08-18 | Stop reason: HOSPADM

## 2025-08-18 RX ORDER — SODIUM CHLORIDE 0.9 % (FLUSH) 0.9 %
5-40 SYRINGE (ML) INJECTION EVERY 12 HOURS SCHEDULED
Status: DISCONTINUED | OUTPATIENT
Start: 2025-08-18 | End: 2025-08-19 | Stop reason: HOSPADM

## 2025-08-18 RX ORDER — SODIUM CHLORIDE 9 MG/ML
INJECTION, SOLUTION INTRAVENOUS PRN
Status: DISCONTINUED | OUTPATIENT
Start: 2025-08-18 | End: 2025-08-19 | Stop reason: HOSPADM

## 2025-08-18 RX ORDER — ASPIRIN 81 MG/1
TABLET, CHEWABLE ORAL PRN
Status: DISCONTINUED | OUTPATIENT
Start: 2025-08-18 | End: 2025-08-18 | Stop reason: HOSPADM

## 2025-08-18 RX ADMIN — OXYCODONE 5 MG: 5 TABLET ORAL at 09:09

## 2025-08-18 RX ADMIN — SUCRALFATE 1 G: 1 TABLET ORAL at 17:04

## 2025-08-18 RX ADMIN — NITROGLYCERIN 0.4 MG: 0.4 TABLET SUBLINGUAL at 08:41

## 2025-08-18 RX ADMIN — SODIUM CHLORIDE, PRESERVATIVE FREE 10 ML: 5 INJECTION INTRAVENOUS at 09:14

## 2025-08-18 RX ADMIN — BUDESONIDE AND FORMOTEROL FUMARATE DIHYDRATE 2 PUFF: 160; 4.5 AEROSOL RESPIRATORY (INHALATION) at 08:22

## 2025-08-18 RX ADMIN — SODIUM CHLORIDE: 0.9 INJECTION, SOLUTION INTRAVENOUS at 12:44

## 2025-08-18 RX ADMIN — METOPROLOL SUCCINATE 50 MG: 25 TABLET, EXTENDED RELEASE ORAL at 09:09

## 2025-08-18 RX ADMIN — INSULIN GLARGINE 16 UNITS: 100 INJECTION, SOLUTION SUBCUTANEOUS at 20:47

## 2025-08-18 RX ADMIN — ATORVASTATIN CALCIUM 80 MG: 80 TABLET, FILM COATED ORAL at 20:47

## 2025-08-18 RX ADMIN — ISOSORBIDE MONONITRATE 60 MG: 30 TABLET, EXTENDED RELEASE ORAL at 09:09

## 2025-08-18 RX ADMIN — FENOFIBRATE 160 MG: 160 TABLET ORAL at 09:10

## 2025-08-18 RX ADMIN — LISINOPRIL 20 MG: 20 TABLET ORAL at 09:09

## 2025-08-18 RX ADMIN — CLOPIDOGREL BISULFATE 75 MG: 75 TABLET, FILM COATED ORAL at 09:09

## 2025-08-18 RX ADMIN — AMLODIPINE BESYLATE 5 MG: 5 TABLET ORAL at 09:09

## 2025-08-18 RX ADMIN — OXYCODONE 5 MG: 5 TABLET ORAL at 02:05

## 2025-08-18 RX ADMIN — SODIUM CHLORIDE 250 ML: 0.9 INJECTION, SOLUTION INTRAVENOUS at 17:07

## 2025-08-18 RX ADMIN — PANTOPRAZOLE SODIUM 40 MG: 40 TABLET, DELAYED RELEASE ORAL at 09:09

## 2025-08-18 RX ADMIN — RANOLAZINE 1000 MG: 500 TABLET, FILM COATED, EXTENDED RELEASE ORAL at 20:47

## 2025-08-18 RX ADMIN — SODIUM CHLORIDE, PRESERVATIVE FREE 10 ML: 5 INJECTION INTRAVENOUS at 20:48

## 2025-08-18 RX ADMIN — SUCRALFATE 1 G: 1 TABLET ORAL at 06:16

## 2025-08-18 RX ADMIN — RANOLAZINE 1000 MG: 500 TABLET, FILM COATED, EXTENDED RELEASE ORAL at 09:09

## 2025-08-18 ASSESSMENT — PAIN SCALES - GENERAL
PAINLEVEL_OUTOF10: 2
PAINLEVEL_OUTOF10: 8
PAINLEVEL_OUTOF10: 8
PAINLEVEL_OUTOF10: 0
PAINLEVEL_OUTOF10: 5
PAINLEVEL_OUTOF10: 0
PAINLEVEL_OUTOF10: 8

## 2025-08-18 ASSESSMENT — PAIN - FUNCTIONAL ASSESSMENT
PAIN_FUNCTIONAL_ASSESSMENT: 0-10

## 2025-08-18 ASSESSMENT — PAIN DESCRIPTION - LOCATION
LOCATION: CHEST

## 2025-08-18 ASSESSMENT — PAIN DESCRIPTION - DESCRIPTORS: DESCRIPTORS: CRUSHING;SQUEEZING

## 2025-08-18 ASSESSMENT — PAIN DESCRIPTION - ORIENTATION: ORIENTATION: MID

## 2025-08-19 VITALS
HEART RATE: 83 BPM | WEIGHT: 183.2 LBS | TEMPERATURE: 97.3 F | DIASTOLIC BLOOD PRESSURE: 74 MMHG | HEIGHT: 67 IN | RESPIRATION RATE: 18 BRPM | BODY MASS INDEX: 28.75 KG/M2 | SYSTOLIC BLOOD PRESSURE: 117 MMHG | OXYGEN SATURATION: 100 %

## 2025-08-19 PROBLEM — F43.10 PTSD (POST-TRAUMATIC STRESS DISORDER): Status: RESOLVED | Noted: 2025-08-17 | Resolved: 2025-08-19

## 2025-08-19 PROBLEM — Z95.5 STENTED CORONARY ARTERY: Status: ACTIVE | Noted: 2025-08-19

## 2025-08-19 PROBLEM — Z87.891 HISTORY OF PRIOR CIGARETTE SMOKING: Status: ACTIVE | Noted: 2025-08-19

## 2025-08-19 LAB
ANION GAP SERPL CALCULATED.3IONS-SCNC: 11 MMOL/L (ref 9–16)
BASOPHILS # BLD: <0.03 K/UL (ref 0–0.2)
BASOPHILS NFR BLD: 0 % (ref 0–2)
BUN SERPL-MCNC: 14 MG/DL (ref 6–20)
CALCIUM SERPL-MCNC: 9.2 MG/DL (ref 8.6–10.4)
CHLORIDE SERPL-SCNC: 108 MMOL/L (ref 98–107)
CO2 SERPL-SCNC: 23 MMOL/L (ref 20–31)
CREAT SERPL-MCNC: 1 MG/DL (ref 0.7–1.2)
ECHO BSA: 1.98 M2
EOSINOPHIL # BLD: 0.13 K/UL (ref 0–0.44)
EOSINOPHILS RELATIVE PERCENT: 2 % (ref 1–4)
ERYTHROCYTE [DISTWIDTH] IN BLOOD BY AUTOMATED COUNT: 13 % (ref 11.8–14.4)
GFR, ESTIMATED: >90 ML/MIN/1.73M2
GLUCOSE BLD-MCNC: 123 MG/DL (ref 75–110)
GLUCOSE BLD-MCNC: 123 MG/DL (ref 75–110)
GLUCOSE BLD-MCNC: 167 MG/DL (ref 75–110)
GLUCOSE SERPL-MCNC: 128 MG/DL (ref 74–99)
HCT VFR BLD AUTO: 33.1 % (ref 40.7–50.3)
HGB BLD-MCNC: 11.2 G/DL (ref 13–17)
IMM GRANULOCYTES # BLD AUTO: <0.03 K/UL (ref 0–0.3)
IMM GRANULOCYTES NFR BLD: 0 %
LYMPHOCYTES NFR BLD: 1.96 K/UL (ref 1.1–3.7)
LYMPHOCYTES RELATIVE PERCENT: 27 % (ref 24–43)
MAGNESIUM SERPL-MCNC: 1.6 MG/DL (ref 1.6–2.6)
MCH RBC QN AUTO: 30 PG (ref 25.2–33.5)
MCHC RBC AUTO-ENTMCNC: 33.8 G/DL (ref 28.4–34.8)
MCV RBC AUTO: 88.7 FL (ref 82.6–102.9)
MICROORGANISM SPEC CULT: ABNORMAL
MONOCYTES NFR BLD: 0.74 K/UL (ref 0.1–1.2)
MONOCYTES NFR BLD: 10 % (ref 3–12)
NEUTROPHILS NFR BLD: 61 % (ref 36–65)
NEUTS SEG NFR BLD: 4.43 K/UL (ref 1.5–8.1)
NRBC BLD-RTO: 0 PER 100 WBC
PLATELET # BLD AUTO: 319 K/UL (ref 138–453)
PMV BLD AUTO: 10.1 FL (ref 8.1–13.5)
POTASSIUM SERPL-SCNC: 4.1 MMOL/L (ref 3.7–5.3)
RBC # BLD AUTO: 3.73 M/UL (ref 4.21–5.77)
SERVICE CMNT-IMP: ABNORMAL
SODIUM SERPL-SCNC: 142 MMOL/L (ref 136–145)
SPECIMEN DESCRIPTION: ABNORMAL
WBC OTHER # BLD: 7.3 K/UL (ref 3.5–11.3)

## 2025-08-19 PROCEDURE — 93005 ELECTROCARDIOGRAM TRACING: CPT | Performed by: STUDENT IN AN ORGANIZED HEALTH CARE EDUCATION/TRAINING PROGRAM

## 2025-08-19 PROCEDURE — 85025 COMPLETE CBC W/AUTO DIFF WBC: CPT

## 2025-08-19 PROCEDURE — 2500000003 HC RX 250 WO HCPCS: Performed by: INTERNAL MEDICINE

## 2025-08-19 PROCEDURE — 6370000000 HC RX 637 (ALT 250 FOR IP): Performed by: NURSE PRACTITIONER

## 2025-08-19 PROCEDURE — 83735 ASSAY OF MAGNESIUM: CPT

## 2025-08-19 PROCEDURE — 82947 ASSAY GLUCOSE BLOOD QUANT: CPT

## 2025-08-19 PROCEDURE — 6360000002 HC RX W HCPCS: Performed by: STUDENT IN AN ORGANIZED HEALTH CARE EDUCATION/TRAINING PROGRAM

## 2025-08-19 PROCEDURE — 6370000000 HC RX 637 (ALT 250 FOR IP): Performed by: INTERNAL MEDICINE

## 2025-08-19 PROCEDURE — 94761 N-INVAS EAR/PLS OXIMETRY MLT: CPT

## 2025-08-19 PROCEDURE — 94640 AIRWAY INHALATION TREATMENT: CPT

## 2025-08-19 PROCEDURE — 99233 SBSQ HOSP IP/OBS HIGH 50: CPT | Performed by: INTERNAL MEDICINE

## 2025-08-19 PROCEDURE — 36415 COLL VENOUS BLD VENIPUNCTURE: CPT

## 2025-08-19 PROCEDURE — 80048 BASIC METABOLIC PNL TOTAL CA: CPT

## 2025-08-19 RX ORDER — MAGNESIUM SULFATE IN WATER 40 MG/ML
2000 INJECTION, SOLUTION INTRAVENOUS ONCE
Status: COMPLETED | OUTPATIENT
Start: 2025-08-19 | End: 2025-08-19

## 2025-08-19 RX ORDER — LISINOPRIL 10 MG/1
10 TABLET ORAL DAILY
Status: DISCONTINUED | OUTPATIENT
Start: 2025-08-20 | End: 2025-08-19 | Stop reason: HOSPADM

## 2025-08-19 RX ORDER — LISINOPRIL 10 MG/1
10 TABLET ORAL DAILY
Qty: 30 TABLET | Refills: 3 | Status: SHIPPED | OUTPATIENT
Start: 2025-08-20

## 2025-08-19 RX ORDER — MAGNESIUM OXIDE 400 MG/1
400 TABLET ORAL DAILY
Qty: 30 TABLET | Refills: 1 | Status: SHIPPED | OUTPATIENT
Start: 2025-08-19

## 2025-08-19 RX ORDER — ISOSORBIDE MONONITRATE 30 MG/1
30 TABLET, EXTENDED RELEASE ORAL DAILY
Status: DISCONTINUED | OUTPATIENT
Start: 2025-08-20 | End: 2025-08-19 | Stop reason: HOSPADM

## 2025-08-19 RX ADMIN — BUDESONIDE AND FORMOTEROL FUMARATE DIHYDRATE 2 PUFF: 160; 4.5 AEROSOL RESPIRATORY (INHALATION) at 08:49

## 2025-08-19 RX ADMIN — SUCRALFATE 1 G: 1 TABLET ORAL at 06:36

## 2025-08-19 RX ADMIN — CLOPIDOGREL BISULFATE 75 MG: 75 TABLET, FILM COATED ORAL at 08:19

## 2025-08-19 RX ADMIN — FENOFIBRATE 160 MG: 160 TABLET ORAL at 08:19

## 2025-08-19 RX ADMIN — LISINOPRIL 20 MG: 20 TABLET ORAL at 08:19

## 2025-08-19 RX ADMIN — PANTOPRAZOLE SODIUM 40 MG: 40 TABLET, DELAYED RELEASE ORAL at 08:19

## 2025-08-19 RX ADMIN — RANOLAZINE 1000 MG: 500 TABLET, FILM COATED, EXTENDED RELEASE ORAL at 08:23

## 2025-08-19 RX ADMIN — SODIUM CHLORIDE, PRESERVATIVE FREE 10 ML: 5 INJECTION INTRAVENOUS at 08:19

## 2025-08-19 RX ADMIN — MAGNESIUM SULFATE HEPTAHYDRATE 2000 MG: 40 INJECTION, SOLUTION INTRAVENOUS at 14:32

## 2025-08-19 RX ADMIN — ISOSORBIDE MONONITRATE 60 MG: 30 TABLET, EXTENDED RELEASE ORAL at 08:19

## 2025-08-19 RX ADMIN — ASPIRIN 81 MG: 81 TABLET, CHEWABLE ORAL at 08:19

## 2025-08-19 RX ADMIN — METOPROLOL SUCCINATE 50 MG: 25 TABLET, EXTENDED RELEASE ORAL at 08:19

## 2025-08-19 ASSESSMENT — PAIN SCALES - GENERAL
PAINLEVEL_OUTOF10: 0

## 2025-08-20 ENCOUNTER — TELEPHONE (OUTPATIENT)
Dept: CARDIOLOGY | Age: 50
End: 2025-08-20

## 2025-08-20 ENCOUNTER — TELEPHONE (OUTPATIENT)
Dept: PRIMARY CARE CLINIC | Age: 50
End: 2025-08-20

## 2025-08-20 ENCOUNTER — CARE COORDINATION (OUTPATIENT)
Dept: CARE COORDINATION | Age: 50
End: 2025-08-20

## 2025-08-20 LAB
EKG ATRIAL RATE: 104 BPM
EKG ATRIAL RATE: 77 BPM
EKG P AXIS: 29 DEGREES
EKG P AXIS: 41 DEGREES
EKG P-R INTERVAL: 156 MS
EKG P-R INTERVAL: 158 MS
EKG Q-T INTERVAL: 352 MS
EKG Q-T INTERVAL: 398 MS
EKG QRS DURATION: 102 MS
EKG QRS DURATION: 96 MS
EKG QTC CALCULATION (BAZETT): 450 MS
EKG QTC CALCULATION (BAZETT): 462 MS
EKG R AXIS: 62 DEGREES
EKG R AXIS: 63 DEGREES
EKG T AXIS: 27 DEGREES
EKG T AXIS: 34 DEGREES
EKG VENTRICULAR RATE: 104 BPM
EKG VENTRICULAR RATE: 77 BPM

## 2025-08-21 ENCOUNTER — CARE COORDINATION (OUTPATIENT)
Dept: CARE COORDINATION | Age: 50
End: 2025-08-21

## 2025-08-21 DIAGNOSIS — R07.89 CHEST DISCOMFORT: Primary | ICD-10-CM

## 2025-08-22 ENCOUNTER — CARE COORDINATION (OUTPATIENT)
Dept: CARE COORDINATION | Age: 50
End: 2025-08-22

## 2025-08-22 LAB
MICROORGANISM SPEC CULT: NORMAL
SERVICE CMNT-IMP: NORMAL
SPECIMEN DESCRIPTION: NORMAL

## 2025-08-24 DIAGNOSIS — E11.9 TYPE 2 DIABETES MELLITUS WITHOUT COMPLICATION, UNSPECIFIED WHETHER LONG TERM INSULIN USE (HCC): ICD-10-CM

## 2025-08-24 DIAGNOSIS — R00.2 HEART PALPITATIONS: ICD-10-CM

## 2025-08-24 DIAGNOSIS — I25.10 ASHD (ARTERIOSCLEROTIC HEART DISEASE): ICD-10-CM

## 2025-08-24 DIAGNOSIS — R11.0 NAUSEA: ICD-10-CM

## 2025-08-24 DIAGNOSIS — E78.2 MIXED HYPERLIPIDEMIA: ICD-10-CM

## 2025-08-24 DIAGNOSIS — Z95.1 S/P CABG (CORONARY ARTERY BYPASS GRAFT): ICD-10-CM

## 2025-08-24 DIAGNOSIS — I10 PRIMARY HYPERTENSION: ICD-10-CM

## 2025-08-25 ENCOUNTER — OFFICE VISIT (OUTPATIENT)
Dept: CARDIOLOGY | Age: 50
End: 2025-08-25

## 2025-08-25 VITALS
BODY MASS INDEX: 28.19 KG/M2 | WEIGHT: 180 LBS | SYSTOLIC BLOOD PRESSURE: 113 MMHG | DIASTOLIC BLOOD PRESSURE: 68 MMHG | OXYGEN SATURATION: 97 % | HEART RATE: 77 BPM

## 2025-08-25 DIAGNOSIS — E78.2 MIXED HYPERLIPIDEMIA: ICD-10-CM

## 2025-08-25 DIAGNOSIS — Z95.1 S/P CABG (CORONARY ARTERY BYPASS GRAFT): ICD-10-CM

## 2025-08-25 DIAGNOSIS — R06.02 SOB (SHORTNESS OF BREATH): ICD-10-CM

## 2025-08-25 DIAGNOSIS — R42 LIGHT HEADEDNESS: ICD-10-CM

## 2025-08-25 DIAGNOSIS — R00.2 HEART PALPITATIONS: ICD-10-CM

## 2025-08-25 DIAGNOSIS — I10 PRIMARY HYPERTENSION: ICD-10-CM

## 2025-08-25 DIAGNOSIS — I25.10 ASHD (ARTERIOSCLEROTIC HEART DISEASE): Primary | ICD-10-CM

## 2025-08-25 RX ORDER — ONDANSETRON 4 MG/1
TABLET, ORALLY DISINTEGRATING ORAL
Qty: 30 TABLET | Refills: 35 | Status: SHIPPED | OUTPATIENT
Start: 2025-08-25

## 2025-08-25 RX ORDER — AMLODIPINE BESYLATE 5 MG/1
5 TABLET ORAL DAILY
Qty: 90 TABLET | Refills: 3 | OUTPATIENT
Start: 2025-08-25

## 2025-08-25 RX ORDER — PEN NEEDLE, DIABETIC 32GX 5/32"
NEEDLE, DISPOSABLE MISCELLANEOUS
Qty: 90 EACH | Refills: 3 | Status: SHIPPED | OUTPATIENT
Start: 2025-08-25

## 2025-08-26 ENCOUNTER — OFFICE VISIT (OUTPATIENT)
Dept: PRIMARY CARE CLINIC | Age: 50
End: 2025-08-26

## 2025-08-26 VITALS
HEART RATE: 86 BPM | SYSTOLIC BLOOD PRESSURE: 118 MMHG | HEIGHT: 67 IN | OXYGEN SATURATION: 96 % | WEIGHT: 176 LBS | RESPIRATION RATE: 16 BRPM | BODY MASS INDEX: 27.62 KG/M2 | DIASTOLIC BLOOD PRESSURE: 72 MMHG

## 2025-08-26 DIAGNOSIS — Z95.1 S/P CABG (CORONARY ARTERY BYPASS GRAFT): ICD-10-CM

## 2025-08-26 DIAGNOSIS — I25.10 ASHD (ARTERIOSCLEROTIC HEART DISEASE): ICD-10-CM

## 2025-08-26 DIAGNOSIS — E11.9 TYPE 2 DIABETES MELLITUS WITHOUT COMPLICATION, UNSPECIFIED WHETHER LONG TERM INSULIN USE (HCC): ICD-10-CM

## 2025-08-26 DIAGNOSIS — Z09 HOSPITAL DISCHARGE FOLLOW-UP: Primary | ICD-10-CM

## 2025-08-26 DIAGNOSIS — M25.551 CHRONIC RIGHT HIP PAIN: ICD-10-CM

## 2025-08-26 DIAGNOSIS — E83.42 HYPOMAGNESEMIA: ICD-10-CM

## 2025-08-26 DIAGNOSIS — G89.29 CHRONIC RIGHT HIP PAIN: ICD-10-CM

## 2025-08-26 RX ORDER — OXYCODONE HYDROCHLORIDE 5 MG/1
5 TABLET ORAL EVERY 6 HOURS PRN
Qty: 28 TABLET | Refills: 0 | Status: SHIPPED | OUTPATIENT
Start: 2025-08-26 | End: 2025-09-02

## 2025-08-26 RX ORDER — DIAZEPAM 5 MG/1
TABLET ORAL
COMMUNITY
Start: 2025-06-17

## 2025-08-28 ENCOUNTER — CARE COORDINATION (OUTPATIENT)
Dept: CARE COORDINATION | Age: 50
End: 2025-08-28

## 2025-09-03 ENCOUNTER — OFFICE VISIT (OUTPATIENT)
Dept: PRIMARY CARE CLINIC | Age: 50
End: 2025-09-03

## 2025-09-03 VITALS
BODY MASS INDEX: 28.31 KG/M2 | OXYGEN SATURATION: 98 % | DIASTOLIC BLOOD PRESSURE: 72 MMHG | RESPIRATION RATE: 16 BRPM | HEART RATE: 81 BPM | WEIGHT: 180.4 LBS | SYSTOLIC BLOOD PRESSURE: 130 MMHG | HEIGHT: 67 IN

## 2025-09-03 DIAGNOSIS — E78.2 MIXED HYPERLIPIDEMIA: ICD-10-CM

## 2025-09-03 DIAGNOSIS — F41.9 ANXIETY: ICD-10-CM

## 2025-09-03 DIAGNOSIS — E11.9 TYPE 2 DIABETES MELLITUS WITHOUT COMPLICATION, UNSPECIFIED WHETHER LONG TERM INSULIN USE (HCC): Primary | ICD-10-CM

## 2025-09-03 DIAGNOSIS — M25.551 CHRONIC RIGHT HIP PAIN: ICD-10-CM

## 2025-09-03 DIAGNOSIS — G89.29 CHRONIC RIGHT HIP PAIN: ICD-10-CM

## 2025-09-03 DIAGNOSIS — I10 PRIMARY HYPERTENSION: ICD-10-CM

## 2025-09-03 DIAGNOSIS — Z96.642 S/P TOTAL LEFT HIP ARTHROPLASTY: ICD-10-CM

## 2025-09-03 DIAGNOSIS — G89.4 CHRONIC PAIN SYNDROME: ICD-10-CM

## 2025-09-03 RX ORDER — HYDROCHLOROTHIAZIDE 12.5 MG/1
2 CAPSULE ORAL
Qty: 2 EACH | Refills: 0 | Status: SHIPPED | OUTPATIENT
Start: 2025-09-03

## 2025-09-03 RX ORDER — HYDROCHLOROTHIAZIDE 12.5 MG/1
CAPSULE ORAL
COMMUNITY
End: 2025-09-03 | Stop reason: SDUPTHER

## 2025-09-03 RX ORDER — CLONAZEPAM 0.5 MG/1
0.5 TABLET ORAL 2 TIMES DAILY
Qty: 14 TABLET | Refills: 0 | Status: SHIPPED | OUTPATIENT
Start: 2025-09-03 | End: 2025-09-10

## 2025-09-03 RX ORDER — HYDROCODONE BITARTRATE AND ACETAMINOPHEN 5; 325 MG/1; MG/1
1 TABLET ORAL EVERY 6 HOURS PRN
Qty: 28 TABLET | Refills: 0 | Status: SHIPPED | OUTPATIENT
Start: 2025-09-03 | End: 2025-09-10

## 2025-09-04 ENCOUNTER — CARE COORDINATION (OUTPATIENT)
Dept: CARE COORDINATION | Age: 50
End: 2025-09-04

## 2025-09-04 DIAGNOSIS — R07.89 CHEST DISCOMFORT: Primary | ICD-10-CM

## (undated) DEVICE — Device

## (undated) DEVICE — SUTURE ABSORBABLE MONOFILAMENT 1 CTX 36 CM 48 MM VIO PDS +

## (undated) DEVICE — SHEET,DRAPE,53X77,STERILE: Brand: MEDLINE

## (undated) DEVICE — DRAPE, RADIAL, STERILE: Brand: MEDLINE

## (undated) DEVICE — ELECTRO LUBE IS A SINGLE PATIENT USE DEVICE THAT IS INTENDED TO BE USED ON ELECTROSURGICAL ELECTRODES TO REDUCE STICKING.: Brand: KEY SURGICAL ELECTRO LUBE

## (undated) DEVICE — CANNULA ORAL NSL AD CO2 N INTUB O2 DEL DISP TRU LNK

## (undated) DEVICE — BENTSON WIRE GUIDE 20CM DISTAL FLEXIBILITY WITH SOFTENED TIP: Brand: BENTSON

## (undated) DEVICE — FORCEP SPEC RETRV BX AD 2 MMX155 CM 5 MM GI OVL CUP W/ NDL

## (undated) DEVICE — GLOVE ORANGE PI 7 1/2   MSG9075

## (undated) DEVICE — INFLATION KIT CUST J REV

## (undated) DEVICE — APPLICATOR MEDICATED 26 CC SOLUTION HI LT ORNG CHLORAPREP

## (undated) DEVICE — PAD PT POS 36 IN SURGYPAD DISP

## (undated) DEVICE — TOWEL,OR,DSP,ST,NATURAL,DLX,4/PK,20PK/CS: Brand: MEDLINE

## (undated) DEVICE — 4F (1.0MM ID) X 9CM STIFF4F (1.0 MICRO-STICK®INTRODUCER SE WITH NITINOL GUIDEWIREWITH NITIN WITH RADIOPAQUE TIPWITH RADIOPAQ: Brand: MICRO-STICK SETMICRO-STICK SET

## (undated) DEVICE — GLOVE SURG SZ 75 L12IN FNGR THK79MIL GRN LTX FREE

## (undated) DEVICE — ULTRACLEAN ACCESSORY ELECTRODE 4" (10.16 CM) COATED BLADE: Brand: ULTRACLEAN

## (undated) DEVICE — SYRINGE 20ML LL S/C 50

## (undated) DEVICE — SUCTION IRRIGATOR: Brand: ENDOWRIST

## (undated) DEVICE — SUTURE PDS II SZ 1 L36IN ABSRB VLT L48MM CTX 1/2 CIR Z371T

## (undated) DEVICE — SOLUTION IV 0.9% NACL IRRIGATION 3000ML BAG

## (undated) DEVICE — DUAL CUT SAGITTAL BLADE

## (undated) DEVICE — SOLUTION IRRIG 1000ML 0.9% SOD CHL USP POUR PLAS BTL

## (undated) DEVICE — STRIP,CLOSURE,WOUND,MEDI-STRIP,1/2X4: Brand: MEDLINE

## (undated) DEVICE — RADIFOCUS OPTITORQUE ANGIOGRAPHIC CATHETER: Brand: OPTITORQUE

## (undated) DEVICE — MERCY TIFFIN BASIC LAP-LF: Brand: MEDLINE INDUSTRIES, INC.

## (undated) DEVICE — SOLUTION IV 1000ML 0.9% SOD CHL FOR IRRIG PLAS CONT

## (undated) DEVICE — NEEDLE SPNL 20GA L3.5IN YEL HUB S STL REG WALL FIT STYL

## (undated) DEVICE — ARM DRAPE

## (undated) DEVICE — CATHETER ANGIO 6FR L100CM ID0.056IN MPA1 CRV ROBUST SHFT

## (undated) DEVICE — MERCY TIFFIN CATH LAB PACK: Brand: MEDLINE INDUSTRIES, INC.

## (undated) DEVICE — UNDERGLOVE BIOGEL ULTRATOUCH S INDICATOR SZ 8

## (undated) DEVICE — INTRODUCER SHTH 6FR L11CM 0.038IN STD SIDEPRT EXTN 3 W

## (undated) DEVICE — PINNACLE INTRODUCER SHEATH: Brand: PINNACLE

## (undated) DEVICE — COVER,TABLE,HEAVY DUTY,77"X90",STRL: Brand: MEDLINE

## (undated) DEVICE — OFF - ST. RITAS VASC: Brand: MEDLINE INDUSTRIES, INC.

## (undated) DEVICE — BLADELESS OBTURATOR: Brand: WECK VISTA

## (undated) DEVICE — GLIDESHEATH NITINOL HYDROPHILIC COATED INTRODUCER SHEATH: Brand: GLIDESHEATH

## (undated) DEVICE — CATHETER US 5FR L150CM GWIRE 0.014IN PLAT GLYDX HYDRPHLC

## (undated) DEVICE — BLADE SURG NO10 C STL STR DISP GLASSVAN

## (undated) DEVICE — DRAPE,T,LIMB,BILATERAL,STERILE: Brand: MEDLINE

## (undated) DEVICE — MARKER,SKIN,WI/RULER AND LABELS: Brand: MEDLINE

## (undated) DEVICE — SOLUTION IV IRRIG POUR BRL 0.9% SODIUM CHL 2F7124

## (undated) DEVICE — SEAL

## (undated) DEVICE — CLEANER,CAUTERY TIP,2X2",STERILE: Brand: MEDLINE

## (undated) DEVICE — CATHETER 5FR JL4 CORDIS 100 CM

## (undated) DEVICE — NEEDLE SPNL L3.5IN PNK HUB S STL REG WALL FIT STYL W/ QNCKE

## (undated) DEVICE — MEDIA CONTRAST INJ OMNIPAQUE 240 10 ML

## (undated) DEVICE — GOWN,REINF,POLY,AURORA,XLNG/XL,STRL: Brand: MEDLINE

## (undated) DEVICE — PROVE COVER: Brand: UNBRANDED

## (undated) DEVICE — 3M™ IOBAN™ 2 ANTIMICROBIAL INCISE DRAPE 6648EZ: Brand: IOBAN™ 2

## (undated) DEVICE — CATHETER 6FR JR4 CORDIS 100CM

## (undated) DEVICE — CATHETER GUID 6FR L100CM DIA0.071IN NYL SHFT EBU3.5

## (undated) DEVICE — VALVE HEMSTAS W/ GWIRE INSRTN TOOL GRDIAN II NC

## (undated) DEVICE — TUBING SUCT NON-STRL 9/32X100 W/CNNT

## (undated) DEVICE — SUTURE STRATAFIX SZ 3-0 30CM NONABSORB UD 26MM FS 3/8 SXMP2B412

## (undated) DEVICE — CATH BLLN ANGIO 2.50X20MM SC EUPHORA RX

## (undated) DEVICE — COVER,MAYO STAND,STERILE: Brand: MEDLINE

## (undated) DEVICE — BANDAGE COBAN 4 IN COMPR W4INXL5YD FOAM COHESIVE QUIK STK SELF ADH SFT

## (undated) DEVICE — DRAPE,U/ SHT,SPLIT,PLAS,STERIL: Brand: MEDLINE

## (undated) DEVICE — SUTURE MONOCRYL SZ 2-0 L27IN ABSRB UD CP-1 1 L36MM 1/2 CIR REV Y266H

## (undated) DEVICE — Device: Brand: NOMOLINE™ LH ADULT NASAL CO2 CANNULA WITH O2 4M

## (undated) DEVICE — 450 ML BOTTLE OF 0.05% CHLORHEXIDINE GLUCONATE IN 99.95% STERILE WATER FOR IRRIGATION, USP AND APPLICATOR.: Brand: IRRISEPT ANTIMICROBIAL WOUND LAVAGE

## (undated) DEVICE — ADHESIVE SKIN CLOSURE WND 8.661X1.5 IN 22 CM LIQUIBAND SECUR

## (undated) DEVICE — DRAPE SURG W48XL52IN POLY U FEN REINF ADV ADH MATTE FINISH

## (undated) DEVICE — GUIDEWIRE VASC FIX COR J TIP 3MM .035INX260CM

## (undated) DEVICE — INTRODUCER SHTH 0.018 IN 4 FRX70 CM 21 GAX7 CM KT MIC VSI

## (undated) DEVICE — WIRE .035 KIT J TIP 3MM 150CM

## (undated) DEVICE — SPONGE LAP W18XL18IN WHT COT 4 PLY FLD STRUNG RADPQ DISP ST 2 PER PACK

## (undated) DEVICE — CATHETER ANGIO JL3.5 0.056 INX6 FRX100 CM THRULUMEN EXPO

## (undated) DEVICE — MERCY TIFFIN BASIC-LF: Brand: MEDLINE INDUSTRIES, INC.

## (undated) DEVICE — ANGIOGRAPHIC CATHETER: Brand: EXPO™

## (undated) DEVICE — GLOVE ORANGE PI 8   MSG9080

## (undated) DEVICE — CATHETER 5FR 3DRC MEDTRONIC 100CM

## (undated) DEVICE — SUTURE ETHIBOND EXCEL SZ 5 L30IN NONABSORBABLE GRN L40MM V-37 MB66G

## (undated) DEVICE — PENCIL SMK EVAC L10FT TBNG NONSTICK ESU BLDE PLUMEPEN ELITE

## (undated) DEVICE — BIT DRL L30MM DIA3.2MM DISP FOR G7 2 MOBILITY CONSTRUCT

## (undated) DEVICE — SHEET, DRAPE, SPLIT, STERILE: Brand: MEDLINE

## (undated) DEVICE — CATHETER ANGIO 6FR L110CM ID0.056IN VENT PIG CRV ROBUST

## (undated) DEVICE — DRAPE KIT RAMAPR RADIATION SHIELD

## (undated) DEVICE — SYRINGE MED 5ML STD CLR PLAS LUERLOCK TIP N CTRL DISP

## (undated) DEVICE — SUTURE VICRYL + SZ 4-0 L27IN ABSRB UD PS-2 3/8 CIR REV CUT VCP426H

## (undated) DEVICE — TIBURON NEONATAL DRAPE: Brand: CONVERTORS

## (undated) DEVICE — TROCARS: Brand: KII® BALLOON BLUNT TIP SYSTEM

## (undated) DEVICE — 3M™ IOBAN™ 2 ANTIMICROBIAL INCISE DRAPE 6650EZ: Brand: IOBAN™ 2

## (undated) DEVICE — VASC-BAND REG

## (undated) DEVICE — DRAPE C ARM W/ POLY STRP W42XL72IN FOR MOB XR

## (undated) DEVICE — HOOD: Brand: FLYTE, SURGICOOL

## (undated) DEVICE — VASCADE 5FR

## (undated) DEVICE — CATH BLLN ANGIO 3.25X12MM NC EUPHORIA RX

## (undated) DEVICE — GOWN,SIRUS,POLYRNF,BRTHSLV,2XL,18/CS: Brand: MEDLINE

## (undated) DEVICE — CATHETER COR DIAG JUDKINS L 3.5 CRV 6FR 100CM 0 SIDE H

## (undated) DEVICE — GLIDESHEATH SLENDER ACCESS KIT: Brand: GLIDESHEATH SLENDER

## (undated) DEVICE — NEEDLE,BLUNT,18GX1": Brand: MEDLINE

## (undated) DEVICE — TRAY SURG CUST CRD CATH TOLEDO

## (undated) DEVICE — STOCKINETTE,IMPERVIOUS,12X48,STERILE: Brand: MEDLINE

## (undated) DEVICE — SPONGE GZ W4XL4IN COT 12 PLY TYP VII WVN C FLD DSGN STERILE

## (undated) DEVICE — SYRINGE, LUER LOCK, 30ML: Brand: MEDLINE

## (undated) DEVICE — BAG SPEC REM 224ML W4XL6IN DIA10MM 1 HND GYN DISP ENDOPCH

## (undated) DEVICE — WIRE .035 3MM J TIP 260CM

## (undated) DEVICE — GUIDEWIRE VASC L180CM DIA0.014IN COR STR SHARPEABLE TIP

## (undated) DEVICE — DRESSING TRNSPAR W8XL12IN FLM SURESITE 123

## (undated) DEVICE — CATHETER ETER ANGIO 6FR L100CM ID0056IN FL4 CRV ROBUST SHFT WIRE